# Patient Record
Sex: MALE | Race: WHITE | NOT HISPANIC OR LATINO | Employment: OTHER | URBAN - METROPOLITAN AREA
[De-identification: names, ages, dates, MRNs, and addresses within clinical notes are randomized per-mention and may not be internally consistent; named-entity substitution may affect disease eponyms.]

---

## 2017-07-29 ENCOUNTER — HOSPITAL ENCOUNTER (EMERGENCY)
Facility: HOSPITAL | Age: 67
Discharge: HOME | End: 2017-07-29
Admitting: EMERGENCY MEDICINE
Payer: MEDICARE

## 2017-07-29 VITALS
SYSTOLIC BLOOD PRESSURE: 168 MMHG | DIASTOLIC BLOOD PRESSURE: 78 MMHG | BODY MASS INDEX: 32.89 KG/M2 | HEIGHT: 68 IN | RESPIRATION RATE: 16 BRPM | TEMPERATURE: 98 F | WEIGHT: 217 LBS | OXYGEN SATURATION: 100 % | HEART RATE: 87 BPM

## 2017-07-29 DIAGNOSIS — G89.29 CHRONIC LEFT HIP PAIN: Primary | ICD-10-CM

## 2017-07-29 DIAGNOSIS — M25.552 CHRONIC LEFT HIP PAIN: Primary | ICD-10-CM

## 2017-07-29 PROCEDURE — 99283 EMERGENCY DEPT VISIT LOW MDM: CPT

## 2017-07-29 RX ORDER — HYDROCODONE BITARTRATE AND ACETAMINOPHEN 5; 325 MG/1; MG/1
1 TABLET ORAL EVERY 6 HOURS PRN
Qty: 20 TABLET | Refills: 0 | Status: SHIPPED | OUTPATIENT
Start: 2017-07-29 | End: 2021-07-22 | Stop reason: CLARIF

## 2017-07-29 RX ORDER — HYDROCODONE BITARTRATE AND ACETAMINOPHEN 5; 325 MG/1; MG/1
1 TABLET ORAL ONCE
Status: COMPLETED | OUTPATIENT
Start: 2017-07-29 | End: 2017-07-29

## 2017-07-29 RX ADMIN — HYDROCODONE BITARTRATE AND ACETAMINOPHEN 1 TABLET: 5; 325 TABLET ORAL at 15:33

## 2017-08-15 ENCOUNTER — APPOINTMENT (EMERGENCY)
Dept: CT IMAGING | Facility: HOSPITAL | Age: 67
End: 2017-08-15
Payer: MEDICARE

## 2017-08-15 ENCOUNTER — HOSPITAL ENCOUNTER (EMERGENCY)
Facility: HOSPITAL | Age: 67
Discharge: HOME | End: 2017-08-15
Attending: EMERGENCY MEDICINE
Payer: MEDICARE

## 2017-08-15 VITALS
RESPIRATION RATE: 16 BRPM | SYSTOLIC BLOOD PRESSURE: 142 MMHG | OXYGEN SATURATION: 97 % | HEART RATE: 76 BPM | BODY MASS INDEX: 33.32 KG/M2 | WEIGHT: 212.3 LBS | DIASTOLIC BLOOD PRESSURE: 72 MMHG | TEMPERATURE: 98 F | HEIGHT: 67 IN

## 2017-08-15 DIAGNOSIS — M25.552 LEFT HIP PAIN: Primary | ICD-10-CM

## 2017-08-15 PROCEDURE — 73700 CT LOWER EXTREMITY W/O DYE: CPT

## 2017-08-15 PROCEDURE — 99283 EMERGENCY DEPT VISIT LOW MDM: CPT

## 2017-08-15 RX ORDER — CYCLOBENZAPRINE HCL 10 MG
10 TABLET ORAL 2 TIMES DAILY PRN
Qty: 20 TABLET | Refills: 0 | Status: SHIPPED | OUTPATIENT
Start: 2017-08-15 | End: 2017-09-05

## 2017-08-17 ENCOUNTER — ALLSCRIPTS OFFICE VISIT (OUTPATIENT)
Dept: OTHER | Facility: OTHER | Age: 67
End: 2017-08-17

## 2017-08-17 DIAGNOSIS — M54.16 RADICULOPATHY OF LUMBAR REGION: ICD-10-CM

## 2017-09-05 ENCOUNTER — APPOINTMENT (EMERGENCY)
Dept: RADIOLOGY | Facility: HOSPITAL | Age: 67
DRG: 312 | End: 2017-09-05
Payer: MEDICARE

## 2017-09-05 ENCOUNTER — APPOINTMENT (OUTPATIENT)
Dept: RADIOLOGY | Facility: HOSPITAL | Age: 67
DRG: 312 | End: 2017-09-05
Payer: MEDICARE

## 2017-09-05 ENCOUNTER — APPOINTMENT (OUTPATIENT)
Dept: MRI IMAGING | Facility: HOSPITAL | Age: 67
DRG: 312 | End: 2017-09-05
Payer: MEDICARE

## 2017-09-05 ENCOUNTER — APPOINTMENT (OUTPATIENT)
Dept: NON INVASIVE DIAGNOSTICS | Facility: HOSPITAL | Age: 67
DRG: 312 | End: 2017-09-05
Payer: MEDICARE

## 2017-09-05 ENCOUNTER — HOSPITAL ENCOUNTER (INPATIENT)
Facility: HOSPITAL | Age: 67
LOS: 1 days | Discharge: HOME | DRG: 312 | End: 2017-09-07
Attending: EMERGENCY MEDICINE | Admitting: FAMILY MEDICINE
Payer: MEDICARE

## 2017-09-05 ENCOUNTER — APPOINTMENT (OUTPATIENT)
Dept: ULTRASOUND IMAGING | Facility: HOSPITAL | Age: 67
DRG: 312 | End: 2017-09-05
Payer: MEDICARE

## 2017-09-05 ENCOUNTER — APPOINTMENT (EMERGENCY)
Dept: CT IMAGING | Facility: HOSPITAL | Age: 67
DRG: 312 | End: 2017-09-05
Payer: MEDICARE

## 2017-09-05 DIAGNOSIS — R20.0 RIGHT ARM NUMBNESS: ICD-10-CM

## 2017-09-05 DIAGNOSIS — R42 DIZZINESS: Primary | ICD-10-CM

## 2017-09-05 DIAGNOSIS — T40.2X5A: ICD-10-CM

## 2017-09-05 DIAGNOSIS — E11.9 DIABETES (HCC): ICD-10-CM

## 2017-09-05 PROBLEM — Z72.0 TOBACCO ABUSE: Chronic | Status: ACTIVE | Noted: 2017-09-05

## 2017-09-05 PROBLEM — R20.2 NUMBNESS AND TINGLING OF RIGHT ARM: Status: ACTIVE | Noted: 2017-09-05

## 2017-09-05 LAB
ALBUMIN SERPL BCP-MCNC: 3.6 G/DL (ref 3.5–5)
ALP SERPL-CCNC: 55 U/L (ref 46–116)
ALT SERPL W P-5'-P-CCNC: 40 U/L (ref 12–78)
ANION GAP SERPL CALCULATED.3IONS-SCNC: 15 MMOL/L (ref 4–13)
AST SERPL W P-5'-P-CCNC: 18 U/L (ref 5–45)
BASOPHILS # BLD AUTO: 0.07 THOUSANDS/ΜL (ref 0–0.1)
BASOPHILS NFR BLD AUTO: 1 % (ref 0–1)
BILIRUB SERPL-MCNC: 0.4 MG/DL (ref 0.2–1)
BUN SERPL-MCNC: 22 MG/DL (ref 5–25)
CALCIUM SERPL-MCNC: 9.4 MG/DL (ref 8.3–10.1)
CHLORIDE SERPL-SCNC: 99 MMOL/L (ref 100–108)
CO2 SERPL-SCNC: 20 MMOL/L (ref 21–32)
CREAT SERPL-MCNC: 1.4 MG/DL (ref 0.6–1.3)
EOSINOPHIL # BLD AUTO: 0.33 THOUSAND/ΜL (ref 0–0.61)
EOSINOPHIL NFR BLD AUTO: 3 % (ref 0–6)
ERYTHROCYTE [DISTWIDTH] IN BLOOD BY AUTOMATED COUNT: 13 % (ref 11.6–15.1)
GFR SERPL CREATININE-BSD FRML MDRD: 52 ML/MIN/1.73SQ M
GLUCOSE SERPL-MCNC: 112 MG/DL (ref 65–140)
GLUCOSE SERPL-MCNC: 128 MG/DL (ref 65–140)
GLUCOSE SERPL-MCNC: 73 MG/DL (ref 65–140)
HCT VFR BLD AUTO: 38.4 % (ref 36.5–49.3)
HGB BLD-MCNC: 13.2 G/DL (ref 12–17)
LYMPHOCYTES # BLD AUTO: 3.21 THOUSANDS/ΜL (ref 0.6–4.47)
LYMPHOCYTES NFR BLD AUTO: 29 % (ref 14–44)
MCH RBC QN AUTO: 28.4 PG (ref 26.8–34.3)
MCHC RBC AUTO-ENTMCNC: 34.4 G/DL (ref 31.4–37.4)
MCV RBC AUTO: 83 FL (ref 82–98)
MONOCYTES # BLD AUTO: 0.78 THOUSAND/ΜL (ref 0.17–1.22)
MONOCYTES NFR BLD AUTO: 7 % (ref 4–12)
NEUTROPHILS # BLD AUTO: 6.48 THOUSANDS/ΜL (ref 1.85–7.62)
NEUTS SEG NFR BLD AUTO: 59 % (ref 43–75)
NRBC BLD AUTO-RTO: 0 /100 WBCS
PLATELET # BLD AUTO: 319 THOUSANDS/UL (ref 149–390)
PMV BLD AUTO: 10 FL (ref 8.9–12.7)
POTASSIUM SERPL-SCNC: 4.9 MMOL/L (ref 3.5–5.3)
PROT SERPL-MCNC: 7.3 G/DL (ref 6.4–8.2)
RBC # BLD AUTO: 4.64 MILLION/UL (ref 3.88–5.62)
SODIUM SERPL-SCNC: 134 MMOL/L (ref 136–145)
TROPONIN I SERPL-MCNC: <0.02 NG/ML
WBC # BLD AUTO: 10.97 THOUSAND/UL (ref 4.31–10.16)

## 2017-09-05 PROCEDURE — 99285 EMERGENCY DEPT VISIT HI MDM: CPT

## 2017-09-05 PROCEDURE — 93306 TTE W/DOPPLER COMPLETE: CPT

## 2017-09-05 PROCEDURE — 84484 ASSAY OF TROPONIN QUANT: CPT | Performed by: EMERGENCY MEDICINE

## 2017-09-05 PROCEDURE — 72050 X-RAY EXAM NECK SPINE 4/5VWS: CPT

## 2017-09-05 PROCEDURE — 70450 CT HEAD/BRAIN W/O DYE: CPT

## 2017-09-05 PROCEDURE — 93005 ELECTROCARDIOGRAM TRACING: CPT | Performed by: EMERGENCY MEDICINE

## 2017-09-05 PROCEDURE — 70551 MRI BRAIN STEM W/O DYE: CPT

## 2017-09-05 PROCEDURE — 36415 COLL VENOUS BLD VENIPUNCTURE: CPT

## 2017-09-05 PROCEDURE — 85025 COMPLETE CBC W/AUTO DIFF WBC: CPT | Performed by: EMERGENCY MEDICINE

## 2017-09-05 PROCEDURE — 82948 REAGENT STRIP/BLOOD GLUCOSE: CPT

## 2017-09-05 PROCEDURE — 71020 HB CHEST X-RAY 2VW FRONTAL&LATL: CPT

## 2017-09-05 PROCEDURE — 80053 COMPREHEN METABOLIC PANEL: CPT | Performed by: EMERGENCY MEDICINE

## 2017-09-05 RX ORDER — NICOTINE 21 MG/24HR
1 PATCH, TRANSDERMAL 24 HOURS TRANSDERMAL DAILY
Status: DISCONTINUED | OUTPATIENT
Start: 2017-09-05 | End: 2017-09-07 | Stop reason: HOSPADM

## 2017-09-05 RX ORDER — ASPIRIN 81 MG/1
324 TABLET, CHEWABLE ORAL DAILY
Status: DISCONTINUED | OUTPATIENT
Start: 2017-09-06 | End: 2017-09-07 | Stop reason: HOSPADM

## 2017-09-05 RX ORDER — HYDROCODONE BITARTRATE AND ACETAMINOPHEN 5; 325 MG/1; MG/1
1 TABLET ORAL EVERY 6 HOURS PRN
Status: DISCONTINUED | OUTPATIENT
Start: 2017-09-05 | End: 2017-09-07 | Stop reason: HOSPADM

## 2017-09-05 RX ORDER — ASPIRIN 81 MG/1
81 TABLET, CHEWABLE ORAL DAILY
Status: DISCONTINUED | OUTPATIENT
Start: 2017-09-06 | End: 2017-09-05

## 2017-09-05 RX ORDER — ATORVASTATIN CALCIUM 40 MG/1
40 TABLET, FILM COATED ORAL EVERY EVENING
Status: DISCONTINUED | OUTPATIENT
Start: 2017-09-05 | End: 2017-09-07 | Stop reason: HOSPADM

## 2017-09-05 RX ORDER — LISINOPRIL 20 MG/1
20 TABLET ORAL DAILY
Status: DISCONTINUED | OUTPATIENT
Start: 2017-09-06 | End: 2017-09-07 | Stop reason: HOSPADM

## 2017-09-05 RX ORDER — ACETAMINOPHEN 325 MG/1
650 TABLET ORAL EVERY 6 HOURS PRN
Status: DISCONTINUED | OUTPATIENT
Start: 2017-09-05 | End: 2017-09-07 | Stop reason: HOSPADM

## 2017-09-05 RX ADMIN — SODIUM CHLORIDE 1000 ML: 0.9 INJECTION, SOLUTION INTRAVENOUS at 12:18

## 2017-09-05 RX ADMIN — ATORVASTATIN CALCIUM 40 MG: 40 TABLET, FILM COATED ORAL at 18:31

## 2017-09-06 ENCOUNTER — APPOINTMENT (OUTPATIENT)
Dept: ULTRASOUND IMAGING | Facility: HOSPITAL | Age: 67
DRG: 312 | End: 2017-09-06
Payer: MEDICARE

## 2017-09-06 PROBLEM — R42 ORTHOSTATIC LIGHTHEADEDNESS: Status: RESOLVED | Noted: 2017-09-05 | Resolved: 2017-09-06

## 2017-09-06 PROBLEM — R20.2 NUMBNESS AND TINGLING OF RIGHT ARM: Status: RESOLVED | Noted: 2017-09-05 | Resolved: 2017-09-06

## 2017-09-06 PROBLEM — R20.0 NUMBNESS AND TINGLING OF RIGHT ARM: Status: RESOLVED | Noted: 2017-09-05 | Resolved: 2017-09-06

## 2017-09-06 LAB
ANION GAP SERPL CALCULATED.3IONS-SCNC: 10 MMOL/L (ref 4–13)
ATRIAL RATE: 157 BPM
BACTERIA UR QL AUTO: ABNORMAL /HPF
BILIRUB UR QL STRIP: NEGATIVE
BUN SERPL-MCNC: 18 MG/DL (ref 5–25)
CALCIUM SERPL-MCNC: 8.4 MG/DL (ref 8.3–10.1)
CHLORIDE SERPL-SCNC: 103 MMOL/L (ref 100–108)
CHOLEST SERPL-MCNC: 124 MG/DL (ref 50–200)
CLARITY UR: CLEAR
CO2 SERPL-SCNC: 24 MMOL/L (ref 21–32)
COLOR UR: YELLOW
CREAT SERPL-MCNC: 1.03 MG/DL (ref 0.6–1.3)
ERYTHROCYTE [DISTWIDTH] IN BLOOD BY AUTOMATED COUNT: 12.8 % (ref 11.6–15.1)
EST. AVERAGE GLUCOSE BLD GHB EST-MCNC: 123 MG/DL
GFR SERPL CREATININE-BSD FRML MDRD: 75 ML/MIN/1.73SQ M
GLUCOSE SERPL-MCNC: 122 MG/DL (ref 65–140)
GLUCOSE SERPL-MCNC: 130 MG/DL (ref 65–140)
GLUCOSE SERPL-MCNC: 135 MG/DL (ref 65–140)
GLUCOSE SERPL-MCNC: 168 MG/DL (ref 65–140)
GLUCOSE SERPL-MCNC: 84 MG/DL (ref 65–140)
GLUCOSE UR STRIP-MCNC: NEGATIVE MG/DL
HBA1C MFR BLD: 5.9 % (ref 4.2–6.3)
HCT VFR BLD AUTO: 35.8 % (ref 36.5–49.3)
HDLC SERPL-MCNC: 32 MG/DL (ref 40–60)
HGB BLD-MCNC: 12.3 G/DL (ref 12–17)
HGB UR QL STRIP.AUTO: NEGATIVE
KETONES UR STRIP-MCNC: NEGATIVE MG/DL
LDLC SERPL CALC-MCNC: 55 MG/DL (ref 0–100)
LEUKOCYTE ESTERASE UR QL STRIP: NEGATIVE
MCH RBC QN AUTO: 28.4 PG (ref 26.8–34.3)
MCHC RBC AUTO-ENTMCNC: 34.4 G/DL (ref 31.4–37.4)
MCV RBC AUTO: 83 FL (ref 82–98)
NITRITE UR QL STRIP: NEGATIVE
NON-SQ EPI CELLS URNS QL MICRO: ABNORMAL /HPF
P AXIS: 67 DEGREES
PH UR STRIP.AUTO: 5.5 [PH] (ref 4.5–8)
PLATELET # BLD AUTO: 280 THOUSANDS/UL (ref 149–390)
PMV BLD AUTO: 9.7 FL (ref 8.9–12.7)
POTASSIUM SERPL-SCNC: 4.4 MMOL/L (ref 3.5–5.3)
PROT UR STRIP-MCNC: NEGATIVE MG/DL
QRS AXIS: 84 DEGREES
QRSD INTERVAL: 72 MS
QT INTERVAL: 322 MS
QTC INTERVAL: 520 MS
RBC # BLD AUTO: 4.33 MILLION/UL (ref 3.88–5.62)
RBC #/AREA URNS AUTO: ABNORMAL /HPF
SODIUM SERPL-SCNC: 137 MMOL/L (ref 136–145)
SP GR UR STRIP.AUTO: <=1.005 (ref 1–1.03)
T WAVE AXIS: 48 DEGREES
TRIGL SERPL-MCNC: 183 MG/DL
UROBILINOGEN UR QL STRIP.AUTO: 0.2 E.U./DL
VENTRICULAR RATE: 157 BPM
WBC # BLD AUTO: 9.95 THOUSAND/UL (ref 4.31–10.16)
WBC #/AREA URNS AUTO: ABNORMAL /HPF

## 2017-09-06 PROCEDURE — 81001 URINALYSIS AUTO W/SCOPE: CPT | Performed by: PHYSICIAN ASSISTANT

## 2017-09-06 PROCEDURE — 93880 EXTRACRANIAL BILAT STUDY: CPT

## 2017-09-06 PROCEDURE — 97163 PT EVAL HIGH COMPLEX 45 MIN: CPT

## 2017-09-06 PROCEDURE — G8979 MOBILITY GOAL STATUS: HCPCS

## 2017-09-06 PROCEDURE — 82948 REAGENT STRIP/BLOOD GLUCOSE: CPT

## 2017-09-06 PROCEDURE — G8978 MOBILITY CURRENT STATUS: HCPCS

## 2017-09-06 PROCEDURE — 80061 LIPID PANEL: CPT | Performed by: PHYSICIAN ASSISTANT

## 2017-09-06 PROCEDURE — 80048 BASIC METABOLIC PNL TOTAL CA: CPT | Performed by: PHYSICIAN ASSISTANT

## 2017-09-06 PROCEDURE — 97165 OT EVAL LOW COMPLEX 30 MIN: CPT

## 2017-09-06 PROCEDURE — 83036 HEMOGLOBIN GLYCOSYLATED A1C: CPT | Performed by: PHYSICIAN ASSISTANT

## 2017-09-06 PROCEDURE — 85027 COMPLETE CBC AUTOMATED: CPT | Performed by: PHYSICIAN ASSISTANT

## 2017-09-06 RX ADMIN — HYDROCODONE BITARTRATE AND ACETAMINOPHEN 1 TABLET: 5; 325 TABLET ORAL at 03:03

## 2017-09-06 RX ADMIN — INSULIN LISPRO 1 UNITS: 100 INJECTION, SOLUTION INTRAVENOUS; SUBCUTANEOUS at 13:36

## 2017-09-06 RX ADMIN — LISINOPRIL 20 MG: 20 TABLET ORAL at 10:30

## 2017-09-06 RX ADMIN — ACETAMINOPHEN 650 MG: 325 TABLET ORAL at 07:47

## 2017-09-06 RX ADMIN — ENOXAPARIN SODIUM 40 MG: 40 INJECTION SUBCUTANEOUS at 10:31

## 2017-09-06 RX ADMIN — ATORVASTATIN CALCIUM 40 MG: 40 TABLET, FILM COATED ORAL at 17:46

## 2017-09-06 RX ADMIN — HYDROCODONE BITARTRATE AND ACETAMINOPHEN 1 TABLET: 5; 325 TABLET ORAL at 22:03

## 2017-09-06 RX ADMIN — ASPIRIN 81 MG 324 MG: 81 TABLET ORAL at 10:30

## 2017-09-07 VITALS
OXYGEN SATURATION: 98 % | WEIGHT: 190.92 LBS | DIASTOLIC BLOOD PRESSURE: 69 MMHG | TEMPERATURE: 97.4 F | HEART RATE: 64 BPM | SYSTOLIC BLOOD PRESSURE: 153 MMHG | RESPIRATION RATE: 19 BRPM | BODY MASS INDEX: 29.97 KG/M2 | HEIGHT: 67 IN

## 2017-09-07 LAB — GLUCOSE SERPL-MCNC: 122 MG/DL (ref 65–140)

## 2017-09-07 PROCEDURE — 82948 REAGENT STRIP/BLOOD GLUCOSE: CPT

## 2017-09-07 RX ORDER — ASPIRIN 81 MG/1
324 TABLET, CHEWABLE ORAL DAILY
Refills: 0
Start: 2017-09-07

## 2017-09-07 RX ORDER — NICOTINE 21 MG/24HR
1 PATCH, TRANSDERMAL 24 HOURS TRANSDERMAL DAILY
Qty: 28 PATCH | Refills: 0 | Status: SHIPPED | OUTPATIENT
Start: 2017-09-07 | End: 2021-07-22 | Stop reason: CLARIF

## 2017-09-07 RX ADMIN — LISINOPRIL 20 MG: 20 TABLET ORAL at 09:21

## 2017-09-07 RX ADMIN — HYDROCODONE BITARTRATE AND ACETAMINOPHEN 1 TABLET: 5; 325 TABLET ORAL at 04:48

## 2017-09-07 RX ADMIN — ASPIRIN 81 MG 324 MG: 81 TABLET ORAL at 09:21

## 2017-10-23 NOTE — PROGRESS NOTES
Assessment  1  Left lumbar radiculopathy (724 4) (M54 16)    Plan  Left lumbar radiculopathy    · MethylPREDNISolone 4 MG Oral Tablet Therapy Pack; Take as directed in a  tapering dose   · *1 - [facility]  *  Status: Active  Requested for: [id number]  Care Summary provided  : Yes   · *1 - [facility]  *  Status: Active  Requested for:  [id number]  Care Summary provided  : Yes    Discussion/Summary    Lumbar radiculopathy#1 referral to physical therapy#2 referral to pain management#3 Medrol Dosepak prescribed#4 follow-up on a when necessary basis  History of Present Illness  HPI: Patient comes in today with regards to his left hip  He reports his been on pain since Winter 3  No specific injury  He reports the pain is zero or one out of 10-10 out of 10 walking makes the pain worse  Lying on the right side causes hip pain lying on the back causes hip and back pain  Lying on the left side goes down his leg  Hydrocodone and cyclobenzaprine have been the medications that were prescribed by the ER  No previous injuries  Occasional twinge in the hip in the past  Past medical history does have a history of some back pain  He also has a history of diabetes views systems are otherwise unremarkable he does smoke a pack a day  He is not drink alcohol  Review of Systems    Constitutional: No fever or chills, feels well, no tiredness, no recent weight loss or weight gain  Eyes: No complaints of red eyes, no eyesight problems  ENT: no complaints of loss of hearing, no nosebleeds, no sore throat  Cardiovascular: No complaints of chest pain, no palpitations, no leg claudication or lower extremity edema  Respiratory: No complaints of shortness of breath, no wheezing, no cough  Gastrointestinal: No complaints of abdominal pain, no constipation, no nausea or vomiting, no diarrhea or bloody stools     Genitourinary: No complaints of dysuria or incontinence, no hesitancy, no nocturia  Musculoskeletal: as noted in HPI  Integumentary: No complaints of skin rash or lesion, no itching or dry skin, no skin wounds  Neurological: No complaints of headache, no confusion, no numbness or tingling, no dizziness  Psychiatric: No suicidal thoughts, no anxiety, no depression  Endocrine: No muscle weakness, no frequent urination, no excessive thirst, no feelings of weakness  Past Medical History    The active problems and past medical history were reviewed and updated today  Surgical History   · History of Tonsillectomy    The surgical history was reviewed and updated today  Family History    The family history was reviewed and updated today  Social History   · Current every day smoker (305 1) (F17 200)  The social history was reviewed and updated today  Current Meds    The medication list was reviewed and updated today  Allergies  1  No Known Drug Allergies    Physical Exam  No audible wheeze no shortness of breath no appreciable arrhythmia  Examination of the patient's lower extremity shows sensation decreased in the L3-L4 and L5 distribution  There is also weakness in the L3 L4-L5 distribution  Weakness of the hip flexor knee extensor and dorsiflexion of the foot  Straight leg raise is positive at 45? he is pinpoint tender over the SI joint lumbosacral junction as well    Nae's test is negative internal rotation does not cause any pain hip grind test is also negative     Constitutional - General appearance: Normal    Musculoskeletal - Digits and nails: Normal --f2Pewhbfnybv/palpation of joints, bones, and muscles: Normal --l5Dxektz strength/tone: Normal    Cardiovascular - Pulses: Normal    Skin - Skin and subcutaneous tissue: Normal    Neurologic - Reflexes: Normal --[id number]: Normal    Psychiatric - Orientation to person, place, and time: Normal --b0Mood and affect: Normal    Eyes   Conjunctiva and lids: Normal     Pupils and irises: Normal  Results/Data  I personally reviewed the films/images/results in the office today  My interpretation follows  CT Scan Review CT scan of left hip shows minimal arthritis especially ior aspect  MRI Review MRI of lumbar spine looks to have degenerative changes throughout the lumbar spine is also a disc bulge at L3        Signatures   Electronically signed by : Lennie Victoria DO; Aug 17 2017  2:22PM EST                       (Author)

## 2017-11-20 ENCOUNTER — APPOINTMENT (OUTPATIENT)
Dept: LAB | Facility: HOSPITAL | Age: 67
End: 2017-11-20
Payer: MEDICARE

## 2017-11-20 ENCOUNTER — TRANSCRIBE ORDERS (OUTPATIENT)
Dept: ADMINISTRATIVE | Facility: HOSPITAL | Age: 67
End: 2017-11-20

## 2017-11-20 DIAGNOSIS — E78.5 HYPERLIPIDEMIA, UNSPECIFIED HYPERLIPIDEMIA TYPE: Primary | ICD-10-CM

## 2017-11-20 DIAGNOSIS — E13.9 DIABETES MELLITUS OF OTHER TYPE WITHOUT COMPLICATION, UNSPECIFIED LONG TERM INSULIN USE STATUS: ICD-10-CM

## 2017-11-20 DIAGNOSIS — E78.5 HYPERLIPIDEMIA, UNSPECIFIED HYPERLIPIDEMIA TYPE: ICD-10-CM

## 2017-11-20 LAB
ALBUMIN SERPL BCP-MCNC: 3.9 G/DL (ref 3.5–5)
ALP SERPL-CCNC: 71 U/L (ref 46–116)
ALT SERPL W P-5'-P-CCNC: 30 U/L (ref 12–78)
ANION GAP SERPL CALCULATED.3IONS-SCNC: 10 MMOL/L (ref 4–13)
AST SERPL W P-5'-P-CCNC: 12 U/L (ref 5–45)
BILIRUB DIRECT SERPL-MCNC: 0.16 MG/DL (ref 0–0.2)
BILIRUB SERPL-MCNC: 0.5 MG/DL (ref 0.2–1)
BUN SERPL-MCNC: 16 MG/DL (ref 5–25)
CALCIUM SERPL-MCNC: 9.6 MG/DL (ref 8.3–10.1)
CHLORIDE SERPL-SCNC: 102 MMOL/L (ref 100–108)
CHOLEST SERPL-MCNC: 185 MG/DL (ref 50–200)
CO2 SERPL-SCNC: 27 MMOL/L (ref 21–32)
CREAT SERPL-MCNC: 0.96 MG/DL (ref 0.6–1.3)
CREAT UR-MCNC: 247 MG/DL
EST. AVERAGE GLUCOSE BLD GHB EST-MCNC: 214 MG/DL
GFR SERPL CREATININE-BSD FRML MDRD: 82 ML/MIN/1.73SQ M
GLUCOSE P FAST SERPL-MCNC: 156 MG/DL (ref 65–99)
HBA1C MFR BLD: 9.1 % (ref 4.2–6.3)
HDLC SERPL-MCNC: 38 MG/DL (ref 40–60)
LDLC SERPL CALC-MCNC: 120 MG/DL (ref 0–100)
MICROALBUMIN UR-MCNC: 30.3 MG/L (ref 0–20)
MICROALBUMIN/CREAT 24H UR: 12 MG/G CREATININE (ref 0–30)
POTASSIUM SERPL-SCNC: 4.3 MMOL/L (ref 3.5–5.3)
PROT SERPL-MCNC: 7.8 G/DL (ref 6.4–8.2)
SODIUM SERPL-SCNC: 139 MMOL/L (ref 136–145)
TRIGL SERPL-MCNC: 135 MG/DL

## 2017-11-20 PROCEDURE — 82043 UR ALBUMIN QUANTITATIVE: CPT | Performed by: INTERNAL MEDICINE

## 2017-11-20 PROCEDURE — 83036 HEMOGLOBIN GLYCOSYLATED A1C: CPT

## 2017-11-20 PROCEDURE — 80048 BASIC METABOLIC PNL TOTAL CA: CPT

## 2017-11-20 PROCEDURE — 82570 ASSAY OF URINE CREATININE: CPT | Performed by: INTERNAL MEDICINE

## 2017-11-20 PROCEDURE — 36415 COLL VENOUS BLD VENIPUNCTURE: CPT

## 2017-11-20 PROCEDURE — 80061 LIPID PANEL: CPT

## 2017-11-20 PROCEDURE — 80076 HEPATIC FUNCTION PANEL: CPT

## 2018-04-25 ENCOUNTER — APPOINTMENT (EMERGENCY)
Dept: RADIOLOGY | Facility: HOSPITAL | Age: 68
End: 2018-04-25
Payer: MEDICARE

## 2018-04-25 ENCOUNTER — HOSPITAL ENCOUNTER (EMERGENCY)
Facility: HOSPITAL | Age: 68
Discharge: HOME | End: 2018-04-25
Attending: EMERGENCY MEDICINE | Admitting: EMERGENCY MEDICINE
Payer: MEDICARE

## 2018-04-25 VITALS
HEART RATE: 69 BPM | BODY MASS INDEX: 29.35 KG/M2 | SYSTOLIC BLOOD PRESSURE: 137 MMHG | HEIGHT: 67 IN | DIASTOLIC BLOOD PRESSURE: 71 MMHG | RESPIRATION RATE: 17 BRPM | WEIGHT: 187 LBS | TEMPERATURE: 98.7 F | OXYGEN SATURATION: 100 %

## 2018-04-25 DIAGNOSIS — R00.2 PALPITATIONS: Primary | ICD-10-CM

## 2018-04-25 LAB
ALBUMIN SERPL BCP-MCNC: 3.6 G/DL (ref 3.5–5)
ALP SERPL-CCNC: 75 U/L (ref 46–116)
ALT SERPL W P-5'-P-CCNC: 27 U/L (ref 12–78)
ANION GAP SERPL CALCULATED.3IONS-SCNC: 5 MMOL/L (ref 4–13)
APTT PPP: 37 SECONDS (ref 23–35)
AST SERPL W P-5'-P-CCNC: 16 U/L (ref 5–45)
ATRIAL RATE: 72 BPM
BACTERIA UR QL AUTO: NORMAL /HPF
BASOPHILS # BLD AUTO: 0.07 THOUSANDS/ΜL (ref 0–0.1)
BASOPHILS NFR BLD AUTO: 1 % (ref 0–1)
BILIRUB SERPL-MCNC: 0.4 MG/DL (ref 0.2–1)
BILIRUB UR QL STRIP: NEGATIVE
BUN SERPL-MCNC: 19 MG/DL (ref 5–25)
CALCIUM SERPL-MCNC: 8.5 MG/DL (ref 8.3–10.1)
CHLORIDE SERPL-SCNC: 103 MMOL/L (ref 100–108)
CLARITY UR: CLEAR
CO2 SERPL-SCNC: 28 MMOL/L (ref 21–32)
COLOR UR: YELLOW
CREAT SERPL-MCNC: 1 MG/DL (ref 0.6–1.3)
EOSINOPHIL # BLD AUTO: 0.29 THOUSAND/ΜL (ref 0–0.61)
EOSINOPHIL NFR BLD AUTO: 4 % (ref 0–6)
ERYTHROCYTE [DISTWIDTH] IN BLOOD BY AUTOMATED COUNT: 11.9 % (ref 11.6–15.1)
GFR SERPL CREATININE-BSD FRML MDRD: 78 ML/MIN/1.73SQ M
GLUCOSE SERPL-MCNC: 326 MG/DL (ref 65–140)
GLUCOSE UR STRIP-MCNC: ABNORMAL MG/DL
HCT VFR BLD AUTO: 42.7 % (ref 36.5–49.3)
HGB BLD-MCNC: 14.4 G/DL (ref 12–17)
HGB UR QL STRIP.AUTO: NEGATIVE
INR PPP: 1.06 (ref 0.86–1.16)
KETONES UR STRIP-MCNC: NEGATIVE MG/DL
LEUKOCYTE ESTERASE UR QL STRIP: ABNORMAL
LYMPHOCYTES # BLD AUTO: 2.28 THOUSANDS/ΜL (ref 0.6–4.47)
LYMPHOCYTES NFR BLD AUTO: 34 % (ref 14–44)
MAGNESIUM SERPL-MCNC: 1.6 MG/DL (ref 1.6–2.6)
MCH RBC QN AUTO: 29 PG (ref 26.8–34.3)
MCHC RBC AUTO-ENTMCNC: 33.7 G/DL (ref 31.4–37.4)
MCV RBC AUTO: 86 FL (ref 82–98)
MONOCYTES # BLD AUTO: 0.57 THOUSAND/ΜL (ref 0.17–1.22)
MONOCYTES NFR BLD AUTO: 8 % (ref 4–12)
NEUTROPHILS # BLD AUTO: 3.56 THOUSANDS/ΜL (ref 1.85–7.62)
NEUTS SEG NFR BLD AUTO: 52 % (ref 43–75)
NITRITE UR QL STRIP: NEGATIVE
NON-SQ EPI CELLS URNS QL MICRO: NORMAL /HPF
NRBC BLD AUTO-RTO: 0 /100 WBCS
P AXIS: 69 DEGREES
PH UR STRIP.AUTO: 5.5 [PH] (ref 4.5–8)
PLATELET # BLD AUTO: 216 THOUSANDS/UL (ref 149–390)
PMV BLD AUTO: 10.4 FL (ref 8.9–12.7)
POTASSIUM SERPL-SCNC: 4.5 MMOL/L (ref 3.5–5.3)
PR INTERVAL: 192 MS
PROT SERPL-MCNC: 7 G/DL (ref 6.4–8.2)
PROT UR STRIP-MCNC: NEGATIVE MG/DL
PROTHROMBIN TIME: 14 SECONDS (ref 12.1–14.4)
QRS AXIS: 95 DEGREES
QRSD INTERVAL: 72 MS
QT INTERVAL: 386 MS
QTC INTERVAL: 422 MS
RBC # BLD AUTO: 4.97 MILLION/UL (ref 3.88–5.62)
RBC #/AREA URNS AUTO: NORMAL /HPF
SODIUM SERPL-SCNC: 136 MMOL/L (ref 136–145)
SP GR UR STRIP.AUTO: 1.02 (ref 1–1.03)
T WAVE AXIS: 76 DEGREES
TROPONIN I SERPL-MCNC: <0.02 NG/ML
TSH SERPL DL<=0.05 MIU/L-ACNC: 2.77 UIU/ML (ref 0.36–3.74)
UROBILINOGEN UR QL STRIP.AUTO: 0.2 E.U./DL
VENTRICULAR RATE: 72 BPM
WBC # BLD AUTO: 6.8 THOUSAND/UL (ref 4.31–10.16)
WBC #/AREA URNS AUTO: NORMAL /HPF

## 2018-04-25 PROCEDURE — 99285 EMERGENCY DEPT VISIT HI MDM: CPT

## 2018-04-25 PROCEDURE — 36415 COLL VENOUS BLD VENIPUNCTURE: CPT | Performed by: EMERGENCY MEDICINE

## 2018-04-25 PROCEDURE — 84443 ASSAY THYROID STIM HORMONE: CPT | Performed by: EMERGENCY MEDICINE

## 2018-04-25 PROCEDURE — 85610 PROTHROMBIN TIME: CPT | Performed by: EMERGENCY MEDICINE

## 2018-04-25 PROCEDURE — 85025 COMPLETE CBC W/AUTO DIFF WBC: CPT | Performed by: EMERGENCY MEDICINE

## 2018-04-25 PROCEDURE — 81001 URINALYSIS AUTO W/SCOPE: CPT | Performed by: EMERGENCY MEDICINE

## 2018-04-25 PROCEDURE — 93010 ELECTROCARDIOGRAM REPORT: CPT | Performed by: INTERNAL MEDICINE

## 2018-04-25 PROCEDURE — 71046 X-RAY EXAM CHEST 2 VIEWS: CPT

## 2018-04-25 PROCEDURE — 96360 HYDRATION IV INFUSION INIT: CPT

## 2018-04-25 PROCEDURE — 84484 ASSAY OF TROPONIN QUANT: CPT | Performed by: EMERGENCY MEDICINE

## 2018-04-25 PROCEDURE — 83735 ASSAY OF MAGNESIUM: CPT | Performed by: EMERGENCY MEDICINE

## 2018-04-25 PROCEDURE — 93005 ELECTROCARDIOGRAM TRACING: CPT

## 2018-04-25 PROCEDURE — 85730 THROMBOPLASTIN TIME PARTIAL: CPT | Performed by: EMERGENCY MEDICINE

## 2018-04-25 PROCEDURE — 96361 HYDRATE IV INFUSION ADD-ON: CPT

## 2018-04-25 PROCEDURE — 80053 COMPREHEN METABOLIC PANEL: CPT | Performed by: EMERGENCY MEDICINE

## 2018-04-25 RX ADMIN — SODIUM CHLORIDE 1000 ML: 0.9 INJECTION, SOLUTION INTRAVENOUS at 09:17

## 2018-04-25 NOTE — ED PROVIDER NOTES
History  Chief Complaint   Patient presents with    Irregular Heart Beat     felt like heart was pounding and irregular, had dizziness with it this morning     52-year-old male with a history of hypertension hyperlipidemia presenting chief complaint of irregular heartbeat  He reports that he woke up in his usual health this morning he states that after being awake for approximately an hour he felt that his heart was beating irregularly in his chest he had a blood pressure cuff with a heart monitor he put on his bouncing around between the 60s and 100 it is  This entire episode lasted about 2 hr he did not have dizziness chest pain shortness of breath near syncope diaphoresis cough production hemoptysis he had no other exertional symptoms with this he had no other complaints or concerns however the irregular heartbeat was concerning  I read and disagree with the nursing note  He is otherwise asymptomatic currently, he has no history of similar no known cardiac disease no risk factors signs symptoms of DVT or PE  Complete review systems otherwise negative as noted            Prior to Admission Medications   Prescriptions Last Dose Informant Patient Reported? Taking?    Calcium Carbonate (CALCIUM 600 PO)   Yes No   Sig: Take 1 tablet by mouth daily   Cholecalciferol (VITAMIN D-3 PO)   Yes No   Sig: Take 400 Units by mouth daily   Dulaglutide (TRULICITY) 1 5 OZ/8 2ZC SOPN   Yes No   Sig: Inject 1 5 mg under the skin once a week Fridays     HYDROcodone-acetaminophen (NORCO) 5-325 mg per tablet   No No   Sig: Take 1 tablet by mouth every 6 (six) hours as needed (Not relieved by Anti-inflammatory) for up to 20 doses Max Daily Amount: 4 tablets   aspirin 81 mg chewable tablet   No No   Sig: Chew 4 tablets daily   lisinopril (ZESTRIL) 20 mg tablet   Yes No   Sig: Take 20 mg by mouth daily     nicotine (NICODERM CQ) 21 mg/24 hr TD 24 hr patch   No No   Sig: Place 1 patch on the skin daily   vitamin E, tocopherol, 400 units capsule   Yes No   Sig: Take 400 Units by mouth daily      Facility-Administered Medications: None       Past Medical History:   Diagnosis Date    Arthritis     Diabetes mellitus ([facility] [address] )     Hyperlipidemia     Hypertension     Tobacco abuse        History reviewed  No pertinent surgical history  History reviewed  No pertinent family history  I have reviewed and agree with the history as documented  Social History   Substance Use Topics    Smoking status: Current Every Day Smoker     Packs/day: 1 00     Types: Cigarettes    Smokeless tobacco: Not on file    Alcohol use No        Review of Systems   Constitutional: Negative for chills and fever  Respiratory: Negative for cough and shortness of breath  Cardiovascular: Positive for palpitations  Negative for chest pain and leg swelling  Gastrointestinal: Negative for abdominal pain, diarrhea, nausea and vomiting  Genitourinary: Negative for dysuria, frequency and urgency  Musculoskeletal: Negative for arthralgias, back pain and myalgias  Skin: Negative for color change and rash  Neurological: Negative for dizziness, weakness and light-headedness  Hematological: Negative for adenopathy  Does not bruise/bleed easily  Psychiatric/Behavioral: Negative for agitation and behavioral problems  All other systems reviewed and are negative        Physical Exam  ED Triage Vitals   Temperature Pulse Respirations Blood Pressure SpO2   04/25/18 0828 04/25/18 0828 04/25/18 0828 04/25/18 0828 04/25/18 0828   98 7 °F (37 1 °C) 65 18 151/70 100 %      Temp src Heart Rate Source Patient Position - Orthostatic VS BP Location FiO2 (%)   -- 04/25/18 1044 04/25/18 1044 04/25/18 1044 --    Monitor Sitting Left arm       Pain Score       04/25/18 0828       No Pain           Orthostatic Vital Signs  Vitals:    04/25/18 0828 04/25/18 1044 04/25/18 1115 04/25/18 1130   BP: 151/70 146/99  137/71   Pulse: 65 63 63 69   Patient Position - Orthostatic VS: Sitting  Lying       Physical Exam   Constitutional: He is oriented to person, place, and time  He appears well-developed and well-nourished  No distress  Very well-appearing in no acute distress   HENT:   Head: Normocephalic and atraumatic  Eyes: EOM are normal  Pupils are equal, round, and reactive to light  Neck: Normal range of motion  Neck supple  No tracheal deviation present  Cardiovascular: Normal rate, regular rhythm and normal heart sounds  Exam reveals no gallop and no friction rub  No murmur heard  Extra systole heard however pulses regular no murmur   Pulmonary/Chest: Effort normal and breath sounds normal  He has no wheezes  He has no rales  Abdominal: Soft  Bowel sounds are normal  He exhibits no distension  There is no tenderness  There is no rebound and no guarding  Musculoskeletal: Normal range of motion  He exhibits no edema or tenderness  Neurological: He is alert and oriented to person, place, and time  No cranial nerve deficit  He exhibits normal muscle tone  Coordination normal    Skin: Skin is warm and dry  No rash noted  Psychiatric: He has a normal mood and affect  His behavior is normal    Nursing note and vitals reviewed        ED Medications  Medications   sodium chloride 0 9 % bolus 1,000 mL (0 mL Intravenous Stopped 4/25/18 1131)       Diagnostic Studies  Results Reviewed     Procedure Component Value Units Date/Time    Urine Microscopic [[id number]]  (Normal) Collected:  04/25/18 1046    Lab Status:  Final result Specimen:  Urine from Urine, Clean Catch Updated:  04/25/18 1059     RBC, UA None Seen /hpf      WBC, UA None Seen /hpf      Epithelial Cells None Seen /hpf      Bacteria, UA None Seen /hpf     UA w Reflex to Microscopic w Reflex to Culture [[id number]]  (Abnormal) Collected:  04/25/18 1046    Lab Status:  Final result Specimen:  Urine from Urine, Clean Catch Updated:  04/25/18 1051     Color, UA Yellow     Clarity, UA Clear     Specific Marble, UA 1 020 pH, UA 5 5     Leukocytes, UA Elevated glucose may cause decreased leukocyte values  See urine microscopic for [facility] result/ (A)     Nitrite, UA Negative     Protein, UA Negative mg/dl      Glucose, UA >=1000 (1%) (A) mg/dl      Ketones, UA Negative mg/dl      Urobilinogen, UA 0 2 E U /dl      Bilirubin, UA Negative     Blood, UA Negative    TSH, 3rd generation with T4 reflex [[id number]]  (Normal) Collected:  04/25/18 0916    Lab Status:  Final result Specimen:  Blood from Arm, Right Updated:  04/25/18 0949     TSH 3RD GENERATON 2 774 uIU/mL     Narrative:         Patients undergoing fluorescein dye angiography may retain small amounts of fluorescein in the body for 48-72 hours post procedure  Samples containing fluorescein can produce falsely depressed TSH values  If the patient had this procedure,a specimen should be resubmitted post fluorescein clearance  Magnesium [[id number]]  (Normal) Collected:  04/25/18 0916    Lab Status:  Final result Specimen:  Blood from Arm, Right Updated:  04/25/18 0949     Magnesium 1 6 mg/dL     Troponin I [[id number]]  (Normal) Collected:  04/25/18 0916    Lab Status:  Final result Specimen:  Blood from Arm, Right Updated:  04/25/18 0945     Troponin I <0 02 ng/mL     Narrative:         [employer] Chemistry analyzer 99% cutoff is > 0 04 ng/mL in network labs    o cTnI 99% cutoff is useful only when applied to patients in the clinical setting of myocardial ischemia  o cTnI 99% cutoff should be interpreted in the context of clinical history, ECG findings and possibly cardiac imaging to establish correct diagnosis  o cTnI 99% cutoff may be suggestive but clearly not indicative of a coronary event without the clinical setting of myocardial ischemia      Comprehensive metabolic panel [[id number]]  (Abnormal) Collected:  04/25/18 0916    Lab Status:  Final result Specimen:  Blood from Arm, Right Updated:  04/25/18 0940     Sodium 136 mmol/L      Potassium 4 5 mmol/L      Chloride 103 mmol/L CO2 28 mmol/L      Anion Gap 5 mmol/L      BUN 19 mg/dL      Creatinine 1 00 mg/dL      Glucose 326 (H) mg/dL      Calcium 8 5 mg/dL      AST 16 U/L      ALT 27 U/L      Alkaline Phosphatase 75 U/L      Total Protein 7 0 g/dL      Albumin 3 6 g/dL      Total Bilirubin 0 40 mg/dL      eGFR 78 ml/min/1 73sq m     Narrative:         National Kidney Disease Education Program recommendations are as follows:  GFR calculation is accurate only with a steady state creatinine  Chronic Kidney disease less than 60 ml/min/1 73 sq  meters  Kidney failure less than 15 ml/min/1 73 sq  meters  Protime-INR [[id number]]  (Normal) Collected:  04/25/18 0916    Lab Status:  Final result Specimen:  Blood from Arm, Right Updated:  04/25/18 0937     Protime 14 0 seconds      INR 1 06    APTT [[id number]]  (Abnormal) Collected:  04/25/18 0916    Lab Status:  Final result Specimen:  Blood from Arm, Right Updated:  04/25/18 0937     PTT 37 (H) seconds     CBC and differential [[id number]]  (Normal) Collected:  04/25/18 0916    Lab Status:  Final result Specimen:  Blood from Arm, Right Updated:  04/25/18 0924     WBC 6 80 Thousand/uL      RBC 4 97 Million/uL      Hemoglobin 14 4 g/dL      Hematocrit 42 7 %      MCV 86 fL      MCH 29 0 pg      MCHC 33 7 g/dL      RDW 11 9 %      MPV 10 4 fL      Platelets 195 Thousands/uL      nRBC 0 /100 WBCs      Neutrophils Relative 52 %      Lymphocytes Relative 34 %      Monocytes Relative 8 %      Eosinophils Relative 4 %      Basophils Relative 1 %      Neutrophils Absolute 3 56 Thousands/µL      Lymphocytes Absolute 2 28 Thousands/µL      Monocytes Absolute 0 57 Thousand/µL      Eosinophils Absolute 0 29 Thousand/µL      Basophils Absolute 0 07 Thousands/µL                  XR chest 2 views   ED Interpretation by Cinthia Gage DO (04/25 0919)   No acute abnormality      Final Result by Anabella Ulloa MD (04/25 1015)      No acute cardiopulmonary disease              Workstation performed: [id number] Procedures  Procedures       Phone Contacts  ED Phone Contact    ED Course  ED Course as of Apr 25 2224 Wed Apr 25, 2018   7076 Patient's EKG reviewed by myself normal sinus rhythm 72 beats per minute right axis otherwise normal intervals no acute ST segment changes, PVCs are present    1108 Patient has several PVCs on telemetry his EKG is sinus rhythm he has no other symptoms with this of laboratory evaluation including troponin is electrolytes and TSH were negative, will discharge with Holter monitor close return follow-up instructions patient agreeable plan                                MDM  Number of Diagnoses or Management Options  Palpitations:   Diagnosis management comments: 46-year-old male hypertension hyperlipidemia woke up this morning in his usual health felt as though his heart was beating irregularly, he had no chest pain shortness of breath near syncope dizziness diaphoresis or other associated symptoms with this he put a blood pressure cuff on with a heart rate and it was inconsistent between a rate of , symptoms resolved on exam here he is afebrile normal vital signs is clinically well appearing he does have extrasystoles also stated but regular palpable pulses on telemetry patient has infrequent PVCs EKG sinus rhythm with infrequent PVCs, will perform cardiac workup with TSH electrolytes, chest x-ray will observed on the monitor, if workup negative will consider discharge with Holter monitor cardiology follow-up close return follow-up instructions, patient takes is aspirin daily    CritCare Time    Disposition  Final diagnoses:   Palpitations     Time reflects when diagnosis was documented in both MDM as applicable and the Disposition within this note     Time User Action Codes Description Comment    4/25/2018 11:20 AM Trav Maldonado Add [R00 2] Palpitations       ED Disposition     ED Disposition Condition Comment    Discharge  Jose Luis Drier discharge to home/self care     Condition at discharge: Good        Follow-up Information     Follow up With Specialties Details Why Contact Info Additional Information    [address] [facility] Emergency Department Emergency Medicine  If symptoms worsen [address] [facility] [address]  [phone] [place] ED, [address], [place], [facility]  In 3 days  [facility] [address] [facility] [address]       Isela Fernandez MD Cardiology   [address] [address]  [phone]           Discharge Medication List as of 4/25/2018 11:27 AM      CONTINUE these medications which have NOT CHANGED    Details   aspirin 81 mg chewable tablet Chew 4 tablets daily, Starting Thu 9/7/2017, No Print      Calcium Carbonate (CALCIUM 600 PO) Take 1 tablet by mouth daily, Until Discontinued, Historical Med      Cholecalciferol (VITAMIN D-3 PO) Take 400 Units by mouth daily, Until Discontinued, Historical Med      Dulaglutide (TRULICITY) 1 5 HM/1 1UW SOPN Inject 1 5 mg under the skin once a week Fridays  , Starting Fri 8/18/2017, Historical Med      HYDROcodone-acetaminophen (NORCO) 5-325 mg per tablet Take 1 tablet by mouth every 6 (six) hours as needed (Not relieved by Anti-inflammatory) for up to 20 doses Max Daily Amount: 4 tablets, Starting Sat 7/29/2017, Print      lisinopril (ZESTRIL) 20 mg tablet Take 20 mg by mouth daily  , Historical Med      nicotine (NICODERM CQ) 21 mg/24 hr TD 24 hr patch Place 1 patch on the skin daily, Starting Thu 9/7/2017, Print      vitamin E, tocopherol, 400 units capsule Take 400 Units by mouth daily, Until Discontinued, Historical Med             Outpatient Discharge Orders  Holter monitor - 24 hour   Standing Status: Future  Standing Exp   Date: 04/25/22         ED Provider  Electronically Signed by           Eleazar Verduzco DO  04/25/18 1616

## 2018-04-25 NOTE — ED NOTES
D/c instructions and rx reviewed w/ pt  All questions and concerns addressed at this time         Kate Diggs RN  04/25/18 8420

## 2018-04-25 NOTE — DISCHARGE INSTRUCTIONS
Please return if he developed worsening symptoms or other concerning symptoms otherwise please follow up as instructed as discussed     Heart Palpitations   WHAT YOU NEED TO KNOW:   Heart palpitations are feelings that your heart races, jumps, throbs, or flutters  You may feel extra beats, no beats for a short time, or skipped beats  You may have these feelings in your chest, throat, or neck  They may happen when you are sitting, standing, or lying  Heart palpitations may be frightening, but are usually not caused by a serious problem  DISCHARGE INSTRUCTIONS:   Call 911 or have someone else call for any of the following:   · You have any of the following signs of a heart attack:      ¨ Squeezing, pressure, or pain in your chest that lasts longer than 5 minutes or returns    ¨ Discomfort or pain in your back, neck, jaw, stomach, or arm     ¨ Trouble breathing    ¨ Nausea or vomiting    ¨ Lightheadedness or a sudden cold sweat, especially with chest pain or trouble breathing    · You have any of the following signs of a stroke:      ¨ Numbness or drooping on one side of your face     ¨ Weakness in an arm or leg    ¨ Confusion or difficulty speaking    ¨ Dizziness, a severe headache, or vision loss    · You faint or lose consciousness  Return to the emergency department if:   · Your palpitations happen more often or get more intense  Contact your healthcare provider if:   · You have new or worsening swelling in your feet or ankles  · You have questions or concerns about your condition or care  Follow up with your healthcare provider as directed: You may need to follow up with a cardiologist  Masha Diaz may need tests to check for heart problems that cause palpitations  Write down your questions so you remember to ask them during your visits  Keep a record:  Write down when your palpitations start and stop, what you were doing when they started, and your symptoms   Keep track of what you ate or drank within a few hours of your palpitations  Include anything that seemed to help your symptoms, such as lying down or holding your breath  This record will help you and your healthcare provider learn what triggers your palpitations  Bring this record with you to your follow up visits  Help prevent heart palpitations:   · Manage stress and anxiety  Find ways to relax such as listening to music, meditating, or doing yoga  Exercise can also help decrease stress and anxiety  Talk to someone you trust about your stress or anxiety  You can also talk to a therapist      · Get plenty of sleep every night  Ask your healthcare provider how much sleep you need each night  · Do not drink caffeine or alcohol  Caffeine and alcohol can make your palpitations worse  Caffeine is found in soda, coffee, tea, chocolate, and drinks that increase your energy  · Do not smoke  Nicotine and other chemicals in cigarettes and cigars may damage your heart and blood vessels  Ask your healthcare provider for information if you currently smoke and need help to quit  E-cigarettes or smokeless tobacco still contain nicotine  Talk to your healthcare provider before you use these products  · Do not use illegal drugs  Talk to your healthcare provider if you use illegal drugs and want help to quit  © 2017 [facility] [facility] Information is for End User's use only and may not be sold, redistributed or otherwise used for commercial purposes  All illustrations and images included in CareNotes® are the copyrighted property of [employer] , [employer]  or Taye Gilman  The above information is an  only  It is not intended as medical advice for individual conditions or treatments  Talk to your doctor, nurse or pharmacist before following any medical regimen to see if it is safe and effective for you  Holter Monitoring   WHAT YOU NEED TO KNOW:   A Holter monitor is also called a portable electrocardiography (EKG) monitor   It shows your heart's electrical activity while you do your usual activities  The monitor is a small battery-operated device that you wear  It will show how fast your heart beats and if it beats in a regular pattern  DISCHARGE INSTRUCTIONS:   How to wear a Holter monitor:   · Sticky pads are placed on certain areas of your chest  Three to 8 sticky pads may be used  Healthcare providers may tape the electrodes to your skin to keep them in place  Wear loose-fitting clothes with your monitor so you can move freely  · The electrodes will be plugged into the monitor  The monitor will be turned on and will record electrical signals constantly for 24 to 48 hours  You may need to use your monitor for up to 7 days  The monitor will be put in a pouch for you to carry  Keep a log:  List any symptoms you have while you wear the monitor  Write down the time and what you were doing when the symptoms started  List when you take any medicines or drink any alcohol  Take this log with you when you see your healthcare provider  The log may help him learn what is causing your abnormal heart activity  The following are some examples of symptoms to write down in the log:  · Chest pain or discomfort    · Dizziness or fainting    · Irregular heartbeats, such as a fluttery feeling in your chest    · Shortness of breath or trouble breathing    · Strong, pounding heartbeats  Do not get your chest wet: Your sticky pads may fall off if they get wet  The monitor cannot record your heart rhythm without the sticky pads and electrodes in place  Do not take a shower while you wear the Holter monitor  Take sponge baths instead  Follow up with your healthcare provider or cardiologist in 24 to 48 hours: You will need to return to have the sticky pads and monitor removed  Bring your log with you  Write down your questions so you remember to ask them during your visits    Contact your healthcare provider if:   · The sticky pads or electrodes come off your chest     · Your monitor stops working  · You have a headache, dizziness, or feel like you are going to faint  · You have a rash on your skin under the sticky pads  · You have questions or concerns about the Holter monitor, your condition, or care  Return to the emergency department if:   · You are pale and have cold, sweaty skin  · You have a heavy or squeezing feeling in your chest that lasts longer than a few minutes  · You have pain in your chest that spreads to your shoulders, neck, or arms  · You have trouble breathing  © 2017 [facility] [facility] Information is for End User's use only and may not be sold, redistributed or otherwise used for commercial purposes  All illustrations and images included in CareNotes® are the copyrighted property of A D A M , Mehdi  or Taye Gilman  The above information is an  only  It is not intended as medical advice for individual conditions or treatments  Talk to your doctor, nurse or pharmacist before following any medical regimen to see if it is safe and effective for you

## 2018-04-30 ENCOUNTER — HOSPITAL ENCOUNTER (OUTPATIENT)
Dept: NON INVASIVE DIAGNOSTICS | Facility: CLINIC | Age: 68
Discharge: HOME | End: 2018-04-30
Payer: MEDICARE

## 2018-04-30 DIAGNOSIS — R00.2 PALPITATIONS: ICD-10-CM

## 2018-04-30 PROCEDURE — 93226 XTRNL ECG REC<48 HR SCAN A/R: CPT

## 2018-04-30 PROCEDURE — 93225 XTRNL ECG REC<48 HRS REC: CPT

## 2018-05-08 PROCEDURE — 93227 XTRNL ECG REC<48 HR R&I: CPT | Performed by: INTERNAL MEDICINE

## 2019-11-21 ENCOUNTER — APPOINTMENT (OUTPATIENT)
Dept: LAB | Facility: HOSPITAL | Age: 69
End: 2019-11-21
Payer: MEDICARE

## 2019-11-21 ENCOUNTER — TRANSCRIBE ORDERS (OUTPATIENT)
Dept: ADMINISTRATIVE | Facility: HOSPITAL | Age: 69
End: 2019-11-21

## 2019-11-21 DIAGNOSIS — Z12.5 SPECIAL SCREENING FOR MALIGNANT NEOPLASM OF PROSTATE: ICD-10-CM

## 2019-11-21 DIAGNOSIS — E13.69 OTHER SPECIFIED DIABETES MELLITUS WITH OTHER SPECIFIED COMPLICATION, UNSPECIFIED WHETHER LONG TERM INSULIN USE (HCC): Primary | ICD-10-CM

## 2019-11-21 DIAGNOSIS — E13.69 OTHER SPECIFIED DIABETES MELLITUS WITH OTHER SPECIFIED COMPLICATION, UNSPECIFIED WHETHER LONG TERM INSULIN USE (HCC): ICD-10-CM

## 2019-11-21 DIAGNOSIS — E78.5 HYPERLIPIDEMIA, UNSPECIFIED HYPERLIPIDEMIA TYPE: ICD-10-CM

## 2019-11-21 LAB
ALBUMIN SERPL BCP-MCNC: 3.7 G/DL (ref 3.5–5)
ALP SERPL-CCNC: 70 U/L (ref 46–116)
ALT SERPL W P-5'-P-CCNC: 39 U/L (ref 12–78)
ANION GAP SERPL CALCULATED.3IONS-SCNC: 7 MMOL/L (ref 4–13)
AST SERPL W P-5'-P-CCNC: 15 U/L (ref 5–45)
BILIRUB DIRECT SERPL-MCNC: 0.13 MG/DL (ref 0–0.2)
BILIRUB SERPL-MCNC: 0.6 MG/DL (ref 0.2–1)
BUN SERPL-MCNC: 17 MG/DL (ref 5–25)
CALCIUM SERPL-MCNC: 8.9 MG/DL (ref 8.3–10.1)
CHLORIDE SERPL-SCNC: 103 MMOL/L (ref 100–108)
CHOLEST SERPL-MCNC: 195 MG/DL (ref 50–200)
CO2 SERPL-SCNC: 29 MMOL/L (ref 21–32)
CREAT SERPL-MCNC: 0.93 MG/DL (ref 0.6–1.3)
CREAT UR-MCNC: 190 MG/DL
EST. AVERAGE GLUCOSE BLD GHB EST-MCNC: 171 MG/DL
GFR SERPL CREATININE-BSD FRML MDRD: 84 ML/MIN/1.73SQ M
GLUCOSE P FAST SERPL-MCNC: 200 MG/DL (ref 65–99)
HBA1C MFR BLD: 7.6 % (ref 4.2–6.3)
HDLC SERPL-MCNC: 38 MG/DL
LDLC SERPL CALC-MCNC: 121 MG/DL (ref 0–100)
MICROALBUMIN UR-MCNC: 46.2 MG/L (ref 0–20)
MICROALBUMIN/CREAT 24H UR: 24 MG/G CREATININE (ref 0–30)
NONHDLC SERPL-MCNC: 157 MG/DL
POTASSIUM SERPL-SCNC: 4.1 MMOL/L (ref 3.5–5.3)
PROT SERPL-MCNC: 7.2 G/DL (ref 6.4–8.2)
PSA SERPL-MCNC: 2 NG/ML (ref 0–4)
SODIUM SERPL-SCNC: 139 MMOL/L (ref 136–145)
TRIGL SERPL-MCNC: 180 MG/DL

## 2019-11-21 PROCEDURE — 80076 HEPATIC FUNCTION PANEL: CPT

## 2019-11-21 PROCEDURE — 82570 ASSAY OF URINE CREATININE: CPT | Performed by: INTERNAL MEDICINE

## 2019-11-21 PROCEDURE — G0103 PSA SCREENING: HCPCS

## 2019-11-21 PROCEDURE — 80061 LIPID PANEL: CPT

## 2019-11-21 PROCEDURE — 36415 COLL VENOUS BLD VENIPUNCTURE: CPT

## 2019-11-21 PROCEDURE — 82043 UR ALBUMIN QUANTITATIVE: CPT | Performed by: INTERNAL MEDICINE

## 2019-11-21 PROCEDURE — 80048 BASIC METABOLIC PNL TOTAL CA: CPT

## 2019-11-21 PROCEDURE — 83036 HEMOGLOBIN GLYCOSYLATED A1C: CPT

## 2021-03-10 DIAGNOSIS — Z23 ENCOUNTER FOR IMMUNIZATION: ICD-10-CM

## 2021-03-12 LAB
LEFT EYE DIABETIC RETINOPATHY: NORMAL
RIGHT EYE DIABETIC RETINOPATHY: NORMAL

## 2021-03-29 ENCOUNTER — IMMUNIZATIONS (OUTPATIENT)
Dept: FAMILY MEDICINE CLINIC | Facility: HOSPITAL | Age: 71
End: 2021-03-29

## 2021-03-29 DIAGNOSIS — Z23 ENCOUNTER FOR IMMUNIZATION: Primary | ICD-10-CM

## 2021-03-29 PROCEDURE — 0001A SARS-COV-2 / COVID-19 MRNA VACCINE (PFIZER-BIONTECH) 30 MCG: CPT

## 2021-03-29 PROCEDURE — 91300 SARS-COV-2 / COVID-19 MRNA VACCINE (PFIZER-BIONTECH) 30 MCG: CPT

## 2021-04-19 ENCOUNTER — IMMUNIZATIONS (OUTPATIENT)
Dept: FAMILY MEDICINE CLINIC | Facility: HOSPITAL | Age: 71
End: 2021-04-19

## 2021-04-19 DIAGNOSIS — Z23 ENCOUNTER FOR IMMUNIZATION: Primary | ICD-10-CM

## 2021-04-19 PROCEDURE — 91300 SARS-COV-2 / COVID-19 MRNA VACCINE (PFIZER-BIONTECH) 30 MCG: CPT

## 2021-04-19 PROCEDURE — 0002A SARS-COV-2 / COVID-19 MRNA VACCINE (PFIZER-BIONTECH) 30 MCG: CPT

## 2021-06-18 LAB — COLOGUARD (HISTORICAL): NEGATIVE

## 2021-08-10 ENCOUNTER — APPOINTMENT (OUTPATIENT)
Dept: LAB | Facility: HOSPITAL | Age: 71
End: 2021-08-10
Attending: INTERNAL MEDICINE
Payer: MEDICARE

## 2021-08-10 DIAGNOSIS — Z11.59 NEED FOR HEPATITIS C SCREENING TEST: ICD-10-CM

## 2021-08-10 DIAGNOSIS — E11.9 TYPE 2 DIABETES MELLITUS WITHOUT COMPLICATION, WITHOUT LONG-TERM CURRENT USE OF INSULIN (HCC): Chronic | ICD-10-CM

## 2021-08-10 DIAGNOSIS — Z12.5 SCREENING FOR PROSTATE CANCER: ICD-10-CM

## 2021-08-10 LAB
ALBUMIN SERPL BCP-MCNC: 3.7 G/DL (ref 3.5–5)
ALP SERPL-CCNC: 69 U/L (ref 46–116)
ALT SERPL W P-5'-P-CCNC: 32 U/L (ref 12–78)
ANION GAP SERPL CALCULATED.3IONS-SCNC: 10 MMOL/L (ref 4–13)
AST SERPL W P-5'-P-CCNC: 15 U/L (ref 5–45)
BILIRUB DIRECT SERPL-MCNC: 0.16 MG/DL (ref 0–0.2)
BILIRUB SERPL-MCNC: 0.69 MG/DL (ref 0.2–1)
BUN SERPL-MCNC: 14 MG/DL (ref 5–25)
CALCIUM SERPL-MCNC: 8.7 MG/DL (ref 8.3–10.1)
CHLORIDE SERPL-SCNC: 102 MMOL/L (ref 100–108)
CHOLEST SERPL-MCNC: 120 MG/DL (ref 50–200)
CO2 SERPL-SCNC: 27 MMOL/L (ref 21–32)
CREAT SERPL-MCNC: 1.03 MG/DL (ref 0.6–1.3)
EST. AVERAGE GLUCOSE BLD GHB EST-MCNC: 146 MG/DL
GFR SERPL CREATININE-BSD FRML MDRD: 73 ML/MIN/1.73SQ M
GLUCOSE P FAST SERPL-MCNC: 136 MG/DL (ref 65–99)
HBA1C MFR BLD: 6.7 %
HCV AB SER QL: NORMAL
HDLC SERPL-MCNC: 42 MG/DL
LDLC SERPL CALC-MCNC: 54 MG/DL (ref 0–100)
NONHDLC SERPL-MCNC: 78 MG/DL
POTASSIUM SERPL-SCNC: 4.4 MMOL/L (ref 3.5–5.3)
PROT SERPL-MCNC: 7.3 G/DL (ref 6.4–8.2)
PSA SERPL-MCNC: 3.6 NG/ML (ref 0–4)
SODIUM SERPL-SCNC: 139 MMOL/L (ref 136–145)
TRIGL SERPL-MCNC: 122 MG/DL

## 2021-08-10 PROCEDURE — 80061 LIPID PANEL: CPT

## 2021-08-10 PROCEDURE — 83036 HEMOGLOBIN GLYCOSYLATED A1C: CPT

## 2021-08-10 PROCEDURE — 36415 COLL VENOUS BLD VENIPUNCTURE: CPT

## 2021-08-10 PROCEDURE — 80048 BASIC METABOLIC PNL TOTAL CA: CPT

## 2021-08-10 PROCEDURE — 86803 HEPATITIS C AB TEST: CPT

## 2021-08-10 PROCEDURE — 80076 HEPATIC FUNCTION PANEL: CPT

## 2021-08-10 PROCEDURE — G0103 PSA SCREENING: HCPCS

## 2021-08-14 ENCOUNTER — OFFICE VISIT (OUTPATIENT)
Dept: INTERNAL MEDICINE CLINIC | Facility: CLINIC | Age: 71
End: 2021-08-14
Payer: MEDICARE

## 2021-08-14 VITALS
RESPIRATION RATE: 16 BRPM | BODY MASS INDEX: 31.64 KG/M2 | WEIGHT: 213.6 LBS | TEMPERATURE: 97.6 F | HEART RATE: 74 BPM | SYSTOLIC BLOOD PRESSURE: 122 MMHG | OXYGEN SATURATION: 97 % | DIASTOLIC BLOOD PRESSURE: 78 MMHG | HEIGHT: 69 IN

## 2021-08-14 DIAGNOSIS — E11.69 TYPE 2 DIABETES MELLITUS WITH OTHER SPECIFIED COMPLICATION, WITHOUT LONG-TERM CURRENT USE OF INSULIN (HCC): Primary | ICD-10-CM

## 2021-08-14 DIAGNOSIS — E78.5 HYPERLIPIDEMIA ASSOCIATED WITH TYPE 2 DIABETES MELLITUS (HCC): ICD-10-CM

## 2021-08-14 DIAGNOSIS — E11.59 HYPERTENSION ASSOCIATED WITH TYPE 2 DIABETES MELLITUS (HCC): ICD-10-CM

## 2021-08-14 DIAGNOSIS — Z12.2 ENCOUNTER FOR SCREENING FOR LUNG CANCER: ICD-10-CM

## 2021-08-14 DIAGNOSIS — E11.69 HYPERLIPIDEMIA ASSOCIATED WITH TYPE 2 DIABETES MELLITUS (HCC): ICD-10-CM

## 2021-08-14 DIAGNOSIS — Z86.010 PERSONAL HISTORY OF COLONIC POLYPS: ICD-10-CM

## 2021-08-14 DIAGNOSIS — I15.2 HYPERTENSION ASSOCIATED WITH TYPE 2 DIABETES MELLITUS (HCC): ICD-10-CM

## 2021-08-14 DIAGNOSIS — F17.210 CIGARETTE NICOTINE DEPENDENCE WITHOUT COMPLICATION: ICD-10-CM

## 2021-08-14 PROCEDURE — 99204 OFFICE O/P NEW MOD 45 MIN: CPT | Performed by: INTERNAL MEDICINE

## 2021-08-14 PROCEDURE — 1123F ACP DISCUSS/DSCN MKR DOCD: CPT | Performed by: INTERNAL MEDICINE

## 2021-08-14 RX ORDER — METFORMIN HYDROCHLORIDE 500 MG/1
1000 TABLET, EXTENDED RELEASE ORAL 2 TIMES DAILY
COMMUNITY
Start: 2021-07-21 | End: 2021-09-20 | Stop reason: SDUPTHER

## 2021-08-14 RX ORDER — PRAVASTATIN SODIUM 20 MG
20 TABLET ORAL DAILY
COMMUNITY
Start: 2021-06-21

## 2021-08-14 RX ORDER — DIPHENOXYLATE HYDROCHLORIDE AND ATROPINE SULFATE 2.5; .025 MG/1; MG/1
1 TABLET ORAL DAILY
COMMUNITY

## 2021-08-14 RX ORDER — EXENATIDE 2 MG/.85ML
INJECTION, SUSPENSION, EXTENDED RELEASE SUBCUTANEOUS
COMMUNITY
Start: 2021-07-19

## 2021-08-14 NOTE — PATIENT INSTRUCTIONS
Medicare Preventive Visit Patient Instructions  Thank you for completing your Welcome to Medicare Visit or Medicare Annual Wellness Visit today  Your next wellness visit will be due in one year (8/15/2022)  The screening/preventive services that you may require over the next 5-10 years are detailed below  Some tests may not apply to you based off risk factors and/or age  Screening tests ordered at today's visit but not completed yet may show as past due  Also, please note that scanned in results may not display below  Preventive Screenings:  Service Recommendations Previous Testing/Comments   Colorectal Cancer Screening  · Colonoscopy    · Fecal Occult Blood Test (FOBT)/Fecal Immunochemical Test (FIT)  · Fecal DNA/Cologuard Test  · Flexible Sigmoidoscopy Age: 54-65 years old   Colonoscopy: every 10 years (May be performed more frequently if at higher risk)  OR  FOBT/FIT: every 1 year  OR  Cologuard: every 3 years  OR  Sigmoidoscopy: every 5 years  Screening may be recommended earlier than age 48 if at higher risk for colorectal cancer  Also, an individualized decision between you and your healthcare provider will decide whether screening between the ages of 74-80 would be appropriate   Colonoscopy: Not on file  FOBT/FIT: Not on file  Cologuard: Not on file  Sigmoidoscopy: Not on file    Screening Current     Prostate Cancer Screening Individualized decision between patient and health care provider in men between ages of 53-78   Medicare will cover every 12 months beginning on the day after your 50th birthday PSA: 3 6 ng/mL     Screening Current     Hepatitis C Screening Once for adults born between 1945 and 1965  More frequently in patients at high risk for Hepatitis C Hep C Antibody: 08/10/2021    Screening Current   Diabetes Screening 1-2 times per year if you're at risk for diabetes or have pre-diabetes Fasting glucose: 136 mg/dL   A1C: 6 7 %    Screening Not Indicated  History Diabetes   Cholesterol Screening Once every 5 years if you don't have a lipid disorder  May order more often based on risk factors  Lipid panel: 08/10/2021    Screening Current      Other Preventive Screenings Covered by Medicare:  1  Abdominal Aortic Aneurysm (AAA) Screening: covered once if your at risk  You're considered to be at risk if you have a family history of AAA or a male between the age of 73-68 who smoking at least 100 cigarettes in your lifetime  2  Lung Cancer Screening: covers low dose CT scan once per year if you meet all of the following conditions: (1) Age 50-69; (2) No signs or symptoms of lung cancer; (3) Current smoker or have quit smoking within the last 15 years; (4) You have a tobacco smoking history of at least 30 pack years (packs per day x number of years you smoked); (5) You get a written order from a healthcare provider  3  Glaucoma Screening: covered annually if you're considered high risk: (1) You have diabetes OR (2) Family history of glaucoma OR (3)  aged 48 and older OR (3)  American aged 72 and older  3  Osteoporosis Screening: covered every 2 years if you meet one of the following conditions: (1) Have a vertebral abnormality; (2) On glucocorticoid therapy for more than 3 months; (3) Have primary hyperparathyroidism; (4) On osteoporosis medications and need to assess response to drug therapy  5  HIV Screening: covered annually if you're between the age of 12-76  Also covered annually if you are younger than 13 and older than 72 with risk factors for HIV infection  For pregnant patients, it is covered up to 3 times per pregnancy      Immunizations:  Immunization Recommendations   Influenza Vaccine Annual influenza vaccination during flu season is recommended for all persons aged >= 6 months who do not have contraindications   Pneumococcal Vaccine (Prevnar and Pneumovax)  * Prevnar = PCV13  * Pneumovax = PPSV23 Adults 25-60 years old: 1-3 doses may be recommended based on certain risk factors  Adults 72 years old: Prevnar (PCV13) vaccine recommended followed by Pneumovax (PPSV23) vaccine  If already received PPSV23 since turning 65, then PCV13 recommended at least one year after PPSV23 dose  Hepatitis B Vaccine 3 dose series if at intermediate or high risk (ex: diabetes, end stage renal disease, liver disease)   Tetanus (Td) Vaccine - COST NOT COVERED BY MEDICARE PART B Following completion of primary series, a booster dose should be given every 10 years to maintain immunity against tetanus  Td may also be given as tetanus wound prophylaxis  Tdap Vaccine - COST NOT COVERED BY MEDICARE PART B Recommended at least once for all adults  For pregnant patients, recommended with each pregnancy  Shingles Vaccine (Shingrix) - COST NOT COVERED BY MEDICARE PART B  2 shot series recommended in those aged 48 and above     Health Maintenance Due:      Topic Date Due    Lung Cancer Screening  Never done    Colorectal Cancer Screening  Never done    Hepatitis C Screening  Completed     Immunizations Due:      Topic Date Due    Pneumococcal Vaccine: 65+ Years (1 of 2 - PPSV23) Never done    DTaP,Tdap,and Td Vaccines (1 - Tdap) Never done    Influenza Vaccine (1) 09/01/2021     Advance Directives   What are advance directives? Advance directives are legal documents that state your wishes and plans for medical care  These plans are made ahead of time in case you lose your ability to make decisions for yourself  Advance directives can apply to any medical decision, such as the treatments you want, and if you want to donate organs  What are the types of advance directives? There are many types of advance directives, and each state has rules about how to use them  You may choose a combination of any of the following:  · Living will: This is a written record of the treatment you want  You can also choose which treatments you do not want, which to limit, and which to stop at a certain time   This includes surgery, medicine, IV fluid, and tube feedings  · Durable power of  for healthcare [facility]): This is a written record that states who you want to make healthcare choices for you when you are unable to make them for yourself  This person, called a proxy, is usually a family member or a friend  You may choose more than 1 proxy  · Do not resuscitate (DNR) order:  A DNR order is used in case your heart stops beating or you stop breathing  It is a request not to have certain forms of treatment, such as CPR  A DNR order may be included in other types of advance directives  · Medical directive: This covers the care that you want if you are in a coma, near death, or unable to make decisions for yourself  You can list the treatments you want for each condition  Treatment may include pain medicine, surgery, blood transfusions, dialysis, IV or tube feedings, and a ventilator (breathing machine)  · Values history: This document has questions about your views, beliefs, and how you feel and think about life  This information can help others choose the care that you would choose  Why are advance directives important? An advance directive helps you control your care  Although spoken wishes may be used, it is better to have your wishes written down  Spoken wishes can be misunderstood, or not followed  Treatments may be given even if you do not want them  An advance directive may make it easier for your family to make difficult choices about your care  Cigarette Smoking and Your Health   Risks to your health if you smoke:  Nicotine and other chemicals found in tobacco damage every cell in your body  Even if you are a light smoker, you have an increased risk for cancer, heart disease, and lung disease  If you are pregnant or have diabetes, smoking increases your risk for complications     Benefits to your health if you stop smoking:   · You decrease respiratory symptoms such as coughing, wheezing, and shortness of breath  · You reduce your risk for cancers of the lung, mouth, throat, kidney, bladder, pancreas, stomach, and cervix  If you already have cancer, you increase the benefits of chemotherapy  You also reduce your risk for cancer returning or a second cancer from developing  · You reduce your risk for heart disease, blood clots, heart attack, and stroke  · You reduce your risk for lung infections, and diseases such as pneumonia, asthma, chronic bronchitis, and emphysema  · Your circulation improves  More oxygen can be delivered to your body  If you have diabetes, you lower your risk for complications, such as kidney, artery, and eye diseases  You also lower your risk for nerve damage  Nerve damage can lead to amputations, poor vision, and blindness  · You improve your body's ability to heal and to fight infections  For more information and support to stop smoking:   · [employer]  Phone: 6- 392 - [phone]  Web Address: rankur  Weight Management   Why it is important to manage your weight:  Being overweight increases your risk of health conditions such as heart disease, high blood pressure, type 2 diabetes, and certain types of cancer  It can also increase your risk for osteoarthritis, sleep apnea, and other respiratory problems  Aim for a slow, steady weight loss  Even a small amount of weight loss can lower your risk of health problems  How to lose weight safely:  A safe and healthy way to lose weight is to eat fewer calories and get regular exercise  You can lose up about 1 pound a week by decreasing the number of calories you eat by 500 calories each day  Healthy meal plan for weight management:  A healthy meal plan includes a variety of foods, contains fewer calories, and helps you stay healthy  A healthy meal plan includes the following:  · Eat whole-grain foods more often  A healthy meal plan should contain fiber   Fiber is the part of grains, fruits, and vegetables that is not broken down by your body  Whole-grain foods are healthy and provide extra fiber in your diet  Some examples of whole-grain foods are whole-wheat breads and pastas, oatmeal, brown rice, and bulgur  · Eat a variety of vegetables every day  Include dark, leafy greens such as spinach, kale, shanita greens, and mustard greens  Eat yellow and orange vegetables such as carrots, sweet potatoes, and winter squash  · Eat a variety of fruits every day  Choose fresh or canned fruit (canned in its own juice or light syrup) instead of juice  Fruit juice has very little or no fiber  · Eat low-fat dairy foods  Drink fat-free (skim) milk or 1% milk  Eat fat-free yogurt and low-fat cottage cheese  Try low-fat cheeses such as mozzarella and other reduced-fat cheeses  · Choose meat and other protein foods that are low in fat  Choose beans or other legumes such as split peas or lentils  Choose fish, skinless poultry (chicken or turkey), or lean cuts of red meat (beef or pork)  Before you cook meat or poultry, cut off any visible fat  · Use less fat and oil  Try baking foods instead of frying them  Add less fat, such as margarine, sour cream, regular salad dressing and mayonnaise to foods  Eat fewer high-fat foods  Some examples of high-fat foods include french fries, doughnuts, ice cream, and cakes  · Eat fewer sweets  Limit foods and drinks that are high in sugar  This includes candy, cookies, regular soda, and sweetened drinks  Exercise:  Exercise at least 30 minutes per day on most days of the week  Some examples of exercise include walking, biking, dancing, and swimming  You can also fit in more physical activity by taking the stairs instead of the elevator or parking farther away from stores  Ask your healthcare provider about the best exercise plan for you  © Copyright [employer] 2018 Information is for End User's use only and may not be sold, redistributed or otherwise used for commercial purposes  All illustrations and images included in CareNotes® are the copyrighted property of A D A M [employer]  or Mehran Ramos

## 2021-08-14 NOTE — PROGRESS NOTES
INTERNAL MEDICINE INITIAL OFFICE VISIT  [facility] - [facility] FELIZ LIZ    NAME: Chas Jefferson  AGE: 80 y o  SEX: male  : 1950     DATE: 2021     Assessment and Plan:     1  Type 2 diabetes mellitus with other specified complication, without long-term current use of insulin ([address] )    Most recent A1c was 6 7 % on 8/10/2021  Diabetes is well controlled  Has associated HTN, HLD, obesity  Continue Bydureon BCise and Metformin XR  Not a candidate for freestyle luis m  Discussed with him that he doesn't have to test everyday  Monitor     - Hemoglobin A1C; Future  - Basic metabolic panel; Future    2  Hypertension associated with type 2 diabetes mellitus (HCC)    BP is well controlled  Continue lisinopril  Weight loss advised  3  Hyperlipidemia associated with type 2 diabetes mellitus (HCC)    Continue pravastatin  Cholesterol is well controlled  4  Personal history of colonic polyps    He would be due for surveillance colonoscopy based off the history he provided today  - Ambulatory referral to Gastroenterology; Future    5  Cigarette nicotine dependence without complication  6  Encounter for screening for lung cancer    Tobacco cessation was advised  Discussed risks and benefits of lung cancer screening and patient does meet criteria  Not having any symptoms of lung cancer  Recommend lung cancer screening and patient agreed  - CT lung screening program; Future    BMI Counseling: Body mass index is 32 kg/m²  The BMI is above normal  Nutrition recommendations include decreasing portion sizes, encouraging healthy choices of fruits and vegetables, limiting drinks that contain sugar, moderation in carbohydrate intake and increasing intake of lean protein  Exercise recommendations include exercising 3-5 times per week  Tobacco Cessation Counseling: Tobacco cessation counseling was provided   The patient is sincerely urged to quit consumption of tobacco  He is not ready to quit tobacco       Return in about 3 months (around 11/23/2021) for Follow-up  Chief Complaint:     Chief Complaint   Patient presents with    Establish Care      History of Present Illness:     New patient to establish care  Had been seeing Dr Danay Beatty  Has history of type 2 diabetes, HTN, HLD, colon polyps, and nicotine dependence  Type 2 diabetes - denies any neuropathy, retinopathy, or nephropathy  He is prescribed Bydureon BCisde and metformin  No significant renal disease  Does not see a foot doctor  Does follow with eye doctor and has some cataracts  Most recent A1c was 6 7 % on 8/10/2021  HTN/HLD - denies history of stroke or heart attack  No current cardiac symptoms  BP has been controlled per his report  He is taking lisinopril and pravastatin  Cholesterol very well controlled  Personal history of colon polyps - states he has had polyps removed in past and hasn't had colonoscopy for about 10 years  He did mention he had a cologuard in the past  After patient left, I was able to link his care everywhere account through [facility] and did confirm that he had negative cologuard on 6/18/2021  I did discuss with him that cologuard isn't indicated to use with patients who have history of colon polyps  Can't remember who did his last colonoscopy  Nicotine dependence - has been smoking since age 12/13  Has been smoking 1 ppd  Hasn't had lung cancer screening to his knowledge  The following portions of the patient's history were reviewed and updated as appropriate: allergies, current medications, past family history, past medical history, past social history, past surgical history and problem list      Review of Systems:     Review of Systems   Constitutional: Negative for appetite change, chills, fatigue and fever  HENT: Negative for congestion, hearing loss, postnasal drip, rhinorrhea, sore throat, tinnitus and trouble swallowing      Eyes: Negative for pain, discharge, redness and visual disturbance  Respiratory: Negative for cough, chest tightness, shortness of breath and wheezing  Cardiovascular: Negative for chest pain, palpitations and leg swelling  Gastrointestinal: Negative for abdominal distention, abdominal pain, blood in stool, constipation, diarrhea, nausea and vomiting  Endocrine: Negative for cold intolerance, heat intolerance, polydipsia, polyphagia and polyuria  Genitourinary: Negative for difficulty urinating, dysuria, frequency, hematuria and urgency  Musculoskeletal: Negative for arthralgias, back pain, gait problem, joint swelling, myalgias, neck pain and neck stiffness  Skin: Negative for color change and rash  Neurological: Negative for dizziness, tremors, seizures, syncope, speech difficulty, weakness, light-headedness, numbness and headaches  Hematological: Negative for adenopathy  Does not bruise/bleed easily  Psychiatric/Behavioral: Negative for agitation, behavioral problems, confusion, hallucinations, sleep disturbance and suicidal ideas  The patient is not nervous/anxious         Past Medical History:     Past Medical History:   Diagnosis Date    Arthritis     Diabetes mellitus ([facility] [address] )     Hyperlipidemia     Hypertension     Tobacco abuse       Past Surgical History:     Past Surgical History:   Procedure Laterality Date    TONSILLECTOMY  1955      Social History:     Social History     Socioeconomic History    Marital status: /Civil Union     Spouse name: None    Number of children: None    Years of education: None    Highest education level: None   Occupational History    None   Tobacco Use    Smoking status: Current Every Day Smoker     Packs/day: 1 00     Years: 55 00     Pack years: 55 00     Types: Cigarettes     Start date: 4/26/1964    Smokeless tobacco: Never Used   Vaping Use    Vaping Use: Never used   Substance and Sexual Activity    Alcohol use: No    Drug use: No    Sexual activity: None   Other Topics Concern    None   Social History Narrative    None     Social Determinants of Health     Financial Resource Strain:     Difficulty of Paying Living Expenses:    Food Insecurity:     Worried About Running Out of Food in the Last Year:     [address] in the Last Year:    Transportation Needs:     Lack of Transportation (Medical):      Lack of Transportation (Non-Medical):    Physical Activity: Inactive    Days of Exercise per Week: 0 days    Minutes of Exercise per Session: 0 min   Stress: No Stress Concern Present    Feeling of Stress : Not at all   Social Connections:     Frequency of Communication with Friends and Family:     Frequency of Social Gatherings with Friends and Family:     Attends Orthodoxy Services:     Active Member of Clubs or Organizations:     Attends Club or Organization Meetings:     Marital Status:    Intimate Partner Violence:     Fear of Current or Ex-Partner:     Emotionally Abused:     Physically Abused:     Sexually Abused:          Family History:     Family History   Problem Relation Age of Onset    Alzheimer's disease Mother     Heart block Father     Heart disease Father         Current Medications:     Current Outpatient Medications:     aspirin 81 mg chewable tablet, Chew 4 tablets daily, Disp: , Rfl: 0    Bydureon BCise 2 MG/0 85ML AUIJ, INJECT 2MG UNDER THE SKIN    EVERY 7 DAYS IN THE ABDOMEN, THIGHS OR OUTER AREA OR UPPER ARM ROTATING INJECTION SITES, Disp: , Rfl:     Cholecalciferol (VITAMIN D-3 PO), Take 400 Units by mouth daily, Disp: , Rfl:     lisinopril (ZESTRIL) 20 mg tablet, Take 20 mg by mouth daily  , Disp: , Rfl:     metFORMIN (GLUCOPHAGE-XR) 500 mg 24 hr tablet, Take 1,000 mg by mouth 2 (two) times a day, Disp: , Rfl:     multivitamin (THERAGRAN) TABS, Take 1 tablet by mouth daily, Disp: , Rfl:     pravastatin (PRAVACHOL) 20 mg tablet, Take 20 mg by mouth daily, Disp: , Rfl:      Allergies:   No Known Allergies     Physical Exam: /78 (BP Location: Left arm, Patient Position: Sitting, Cuff Size: Standard)   Pulse 74   Temp 97 6 °F (36 4 °C) (Temporal) Comment: no nsaids  Resp 16   Ht 5' 8 5" (1 74 m)   Wt 96 9 kg (213 lb 9 6 oz) Comment: with shoes on  SpO2 97%   BMI 32 00 kg/m²     Physical Exam  Vitals reviewed  Constitutional:       General: He is not in acute distress  Appearance: He is well-developed  He is obese  He is not diaphoretic  Eyes:      General: No scleral icterus  Right eye: No discharge  Left eye: No discharge  Conjunctiva/sclera: Conjunctivae normal    Neck:      Thyroid: No thyromegaly  Vascular: No JVD  Cardiovascular:      Rate and Rhythm: Normal rate and regular rhythm  Pulses: no weak pulses          Dorsalis pedis pulses are 2+ on the right side and 2+ on the left side  Posterior tibial pulses are 2+ on the right side and 2+ on the left side  Heart sounds: Normal heart sounds  No murmur heard  Pulmonary:      Effort: Pulmonary effort is normal  No respiratory distress  Breath sounds: Normal breath sounds  No wheezing or rales  Abdominal:      General: Bowel sounds are normal  There is no distension  Palpations: Abdomen is soft  Tenderness: There is no abdominal tenderness  Musculoskeletal:      Cervical back: Neck supple  Right lower leg: No edema  Left lower leg: No edema  Feet:      Right foot:      Skin integrity: No ulcer, skin breakdown, erythema, warmth, callus or dry skin  Left foot:      Skin integrity: No ulcer, skin breakdown, erythema, warmth, callus or dry skin  Lymphadenopathy:      Cervical: No cervical adenopathy  Skin:     General: Skin is warm and dry  Neurological:      Mental Status: He is alert  Psychiatric:         Mood and Affect: Mood normal          Behavior: Behavior normal      Diabetic Foot Exam  Patient's shoes and socks removed  Right Foot/Ankle   Right Foot Inspection  Skin Exam: skin normal and skin intact no dry skin, no warmth, no callus, no erythema, no maceration, no abnormal color, no pre-ulcer, no ulcer and no callus                          Toe Exam: ROM and strength within normal limits  Sensory     Proprioception: intact   Monofilament testing: intact  Vascular  Capillary refills: < 3 seconds  The right DP pulse is 2+  The right PT pulse is 2+  Left Foot/Ankle  Left Foot Inspection  Skin Exam: skin normal and skin intactno dry skin, no warmth, no erythema, no maceration, normal color, no pre-ulcer, no ulcer and no callus                         Toe Exam: ROM and strength within normal limits                   Sensory     Proprioception: intact  Monofilament: intact  Vascular  Capillary refills: < 3 seconds  The left DP pulse is 2+  The left PT pulse is 2+  Assign Risk Category:  No deformity present; No loss of protective sensation;  No weak pulses       Risk: 0     Dale Jasso [facility] [address]

## 2021-08-16 ENCOUNTER — TELEPHONE (OUTPATIENT)
Dept: ADMINISTRATIVE | Facility: OTHER | Age: 71
End: 2021-08-16

## 2021-08-16 NOTE — LETTER
Diabetic Eye Exam Form    Date Requested: 21  Patient: Kristin Moran  Patient : 1950   Referring Provider: Marques Mullins,     Dilated Retinal Exam, Optomap-Iris Exam, or Fundus Photography Done         Yes ([place] one above)         No     Date of Diabetic Eye Exam ______________________________  Left Eye      Exam did show retinopathy    Exam did not show retinopathy         Mild       Moderate       None       Proliferative       Severe     Right Eye     Exam did show retinopathy    Exam did not show retinopathy         Mild       Moderate       None       Proliferative       Severe     Comments __________________________________________________________    Practice Providing Exam ______________________________________________    Exam Performed By (print name) _______________________________________      Provider Signature ___________________________________________________      These reports are needed for  compliance    Please fax this completed form and a copy of the Diabetic Eye Exam report to our office located at [place] [address] as soon as possible to [phone] presley Figueroa: Phone [phone]    We thank you for your assistance in treating our mutual patient

## 2021-09-10 ENCOUNTER — TELEPHONE (OUTPATIENT)
Dept: GASTROENTEROLOGY | Facility: CLINIC | Age: 71
End: 2021-09-10

## 2021-09-20 DIAGNOSIS — E11.69 TYPE 2 DIABETES MELLITUS WITH OTHER SPECIFIED COMPLICATION, WITHOUT LONG-TERM CURRENT USE OF INSULIN (HCC): Primary | ICD-10-CM

## 2021-09-20 RX ORDER — METFORMIN HYDROCHLORIDE 500 MG/1
1000 TABLET, EXTENDED RELEASE ORAL 2 TIMES DAILY
Qty: 120 TABLET | Refills: 0 | Status: SHIPPED | OUTPATIENT
Start: 2021-09-20 | End: 2021-10-21

## 2021-09-20 NOTE — TELEPHONE ENCOUNTER
Patient called in to request for a refill on this medication  He stated he is completely out of the medication

## 2021-10-01 ENCOUNTER — TELEPHONE (OUTPATIENT)
Dept: GASTROENTEROLOGY | Facility: HOSPITAL | Age: 71
End: 2021-10-01

## 2021-10-01 ENCOUNTER — HOSPITAL ENCOUNTER (OUTPATIENT)
Dept: CT IMAGING | Facility: HOSPITAL | Age: 71
Discharge: HOME | End: 2021-10-01
Attending: INTERNAL MEDICINE
Payer: MEDICARE

## 2021-10-01 DIAGNOSIS — F17.210 CIGARETTE NICOTINE DEPENDENCE WITHOUT COMPLICATION: ICD-10-CM

## 2021-10-01 DIAGNOSIS — Z12.2 ENCOUNTER FOR SCREENING FOR LUNG CANCER: ICD-10-CM

## 2021-10-01 PROCEDURE — 71271 CT THORAX LUNG CANCER SCR C-: CPT

## 2021-10-04 ENCOUNTER — HOSPITAL ENCOUNTER (OUTPATIENT)
Dept: GASTROENTEROLOGY | Facility: HOSPITAL | Age: 71
Setting detail: OUTPATIENT SURGERY
Discharge: HOME | End: 2021-10-04
Attending: INTERNAL MEDICINE | Admitting: INTERNAL MEDICINE
Payer: MEDICARE

## 2021-10-04 ENCOUNTER — ANESTHESIA EVENT (OUTPATIENT)
Dept: GASTROENTEROLOGY | Facility: HOSPITAL | Age: 71
End: 2021-10-04

## 2021-10-04 ENCOUNTER — ANESTHESIA (OUTPATIENT)
Dept: GASTROENTEROLOGY | Facility: HOSPITAL | Age: 71
End: 2021-10-04

## 2021-10-04 VITALS
DIASTOLIC BLOOD PRESSURE: 62 MMHG | TEMPERATURE: 97.8 F | SYSTOLIC BLOOD PRESSURE: 120 MMHG | HEART RATE: 72 BPM | RESPIRATION RATE: 18 BRPM | WEIGHT: 207.23 LBS | OXYGEN SATURATION: 98 % | HEIGHT: 68 IN | BODY MASS INDEX: 31.41 KG/M2

## 2021-10-04 DIAGNOSIS — Z12.11 SCREEN FOR COLON CANCER: ICD-10-CM

## 2021-10-04 PROBLEM — I10 HTN (HYPERTENSION): Status: ACTIVE | Noted: 2021-10-04

## 2021-10-04 PROBLEM — E78.5 HYPERLIPIDEMIA: Status: ACTIVE | Noted: 2021-10-04

## 2021-10-04 LAB — GLUCOSE SERPL-MCNC: 135 MG/DL (ref 65–140)

## 2021-10-04 PROCEDURE — 82948 REAGENT STRIP/BLOOD GLUCOSE: CPT

## 2021-10-04 PROCEDURE — 88305 TISSUE EXAM BY PATHOLOGIST: CPT | Performed by: PATHOLOGY

## 2021-10-04 PROCEDURE — 45385 COLONOSCOPY W/LESION REMOVAL: CPT | Performed by: INTERNAL MEDICINE

## 2021-10-04 RX ORDER — SODIUM CHLORIDE, SODIUM LACTATE, POTASSIUM CHLORIDE, CALCIUM CHLORIDE 600; 310; 30; 20 MG/100ML; MG/100ML; MG/100ML; MG/100ML
125 INJECTION, SOLUTION INTRAVENOUS CONTINUOUS
Status: CANCELLED | OUTPATIENT
Start: 2021-10-04

## 2021-10-04 RX ORDER — PROPOFOL 10 MG/ML
INJECTION, EMULSION INTRAVENOUS AS NEEDED
Status: DISCONTINUED | OUTPATIENT
Start: 2021-10-04 | End: 2021-10-04

## 2021-10-04 RX ORDER — SODIUM CHLORIDE, SODIUM LACTATE, POTASSIUM CHLORIDE, CALCIUM CHLORIDE 600; 310; 30; 20 MG/100ML; MG/100ML; MG/100ML; MG/100ML
INJECTION, SOLUTION INTRAVENOUS CONTINUOUS PRN
Status: DISCONTINUED | OUTPATIENT
Start: 2021-10-04 | End: 2021-10-04

## 2021-10-04 RX ADMIN — SODIUM CHLORIDE, SODIUM LACTATE, POTASSIUM CHLORIDE, AND CALCIUM CHLORIDE: .6; .31; .03; .02 INJECTION, SOLUTION INTRAVENOUS at 07:16

## 2021-10-04 RX ADMIN — PROPOFOL 20 MG: 10 INJECTION, EMULSION INTRAVENOUS at 07:26

## 2021-10-04 RX ADMIN — PROPOFOL 20 MG: 10 INJECTION, EMULSION INTRAVENOUS at 07:22

## 2021-10-04 RX ADMIN — PROPOFOL 140 MG: 10 INJECTION, EMULSION INTRAVENOUS at 07:19

## 2021-10-07 ENCOUNTER — TELEPHONE (OUTPATIENT)
Dept: INTERNAL MEDICINE CLINIC | Facility: CLINIC | Age: 71
End: 2021-10-07

## 2021-10-21 DIAGNOSIS — E11.69 TYPE 2 DIABETES MELLITUS WITH OTHER SPECIFIED COMPLICATION, WITHOUT LONG-TERM CURRENT USE OF INSULIN (HCC): ICD-10-CM

## 2021-10-21 RX ORDER — METFORMIN HYDROCHLORIDE 500 MG/1
TABLET, EXTENDED RELEASE ORAL
Qty: 120 TABLET | Refills: 0 | Status: SHIPPED | OUTPATIENT
Start: 2021-10-21 | End: 2021-11-15

## 2021-10-24 ENCOUNTER — OFFICE VISIT (OUTPATIENT)
Dept: URGENT CARE | Facility: CLINIC | Age: 71
End: 2021-10-24
Payer: MEDICARE

## 2021-10-24 VITALS
OXYGEN SATURATION: 100 % | RESPIRATION RATE: 16 BRPM | WEIGHT: 211 LBS | BODY MASS INDEX: 32.08 KG/M2 | SYSTOLIC BLOOD PRESSURE: 132 MMHG | HEART RATE: 73 BPM | DIASTOLIC BLOOD PRESSURE: 74 MMHG | TEMPERATURE: 97.3 F

## 2021-10-24 DIAGNOSIS — S30.860A TICK BITE OF PELVIC REGION, INITIAL ENCOUNTER: Primary | ICD-10-CM
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
## 2021-10-24 PROCEDURE — G0463 HOSPITAL OUTPT CLINIC VISIT: HCPCS | Performed by: PHYSICIAN ASSISTANT

## 2021-10-24 PROCEDURE — 99213 OFFICE O/P EST LOW 20 MIN: CPT | Performed by: PHYSICIAN ASSISTANT

## 2021-10-24 RX ORDER — DOXYCYCLINE 100 MG/1
200 TABLET ORAL ONCE
Qty: 2 TABLET | Refills: 0 | Status: SHIPPED | OUTPATIENT
Start: 2021-10-24 | End: 2021-10-24

## 2021-11-02 ENCOUNTER — HOSPITAL ENCOUNTER (EMERGENCY)
Facility: HOSPITAL | Age: 71
Discharge: HOME | End: 2021-11-02
Attending: EMERGENCY MEDICINE
Payer: MEDICARE

## 2021-11-02 VITALS
DIASTOLIC BLOOD PRESSURE: 85 MMHG | TEMPERATURE: 98 F | HEART RATE: 78 BPM | SYSTOLIC BLOOD PRESSURE: 202 MMHG | RESPIRATION RATE: 18 BRPM | OXYGEN SATURATION: 99 %

## 2021-11-02 DIAGNOSIS — M79.602 LEFT ARM PAIN: ICD-10-CM

## 2021-11-02 DIAGNOSIS — I10 HYPERTENSION: ICD-10-CM

## 2021-11-02 DIAGNOSIS — M54.2 NECK PAIN: Primary | ICD-10-CM

## 2021-11-02 DIAGNOSIS — M54.9 BACK PAIN: ICD-10-CM

## 2021-11-02 PROCEDURE — 99284 EMERGENCY DEPT VISIT MOD MDM: CPT | Performed by: EMERGENCY MEDICINE

## 2021-11-02 PROCEDURE — 99283 EMERGENCY DEPT VISIT LOW MDM: CPT

## 2021-11-02 RX ORDER — CYCLOBENZAPRINE HCL 10 MG
10 TABLET ORAL
Qty: 10 TABLET | Refills: 0 | Status: SHIPPED | OUTPATIENT
Start: 2021-11-02 | End: 2022-07-25 | Stop reason: ALTCHOICE

## 2021-11-12 ENCOUNTER — OFFICE VISIT (OUTPATIENT)
Dept: INTERNAL MEDICINE CLINIC | Facility: CLINIC | Age: 71
End: 2021-11-12
Payer: MEDICARE

## 2021-11-12 VITALS
WEIGHT: 208.6 LBS | RESPIRATION RATE: 17 BRPM | DIASTOLIC BLOOD PRESSURE: 78 MMHG | TEMPERATURE: 97.5 F | HEIGHT: 68 IN | SYSTOLIC BLOOD PRESSURE: 162 MMHG | BODY MASS INDEX: 31.61 KG/M2 | OXYGEN SATURATION: 97 % | HEART RATE: 80 BPM

## 2021-11-12 DIAGNOSIS — S16.1XXS STRAIN OF NECK MUSCLE, SEQUELA: Primary | ICD-10-CM

## 2021-11-12 PROCEDURE — 99213 OFFICE O/P EST LOW 20 MIN: CPT | Performed by: NURSE PRACTITIONER

## 2021-11-12 RX ORDER — MELOXICAM 15 MG/1
15 TABLET ORAL DAILY
Qty: 30 TABLET | Refills: 0 | Status: SHIPPED | OUTPATIENT
Start: 2021-11-12 | End: 2022-03-29 | Stop reason: SDUPTHER

## 2021-11-12 RX ORDER — SENNOSIDES 8.6 MG
650 CAPSULE ORAL AS NEEDED
COMMUNITY
End: 2022-03-29 | Stop reason: CLARIF

## 2021-11-15 DIAGNOSIS — E11.69 TYPE 2 DIABETES MELLITUS WITH OTHER SPECIFIED COMPLICATION, WITHOUT LONG-TERM CURRENT USE OF INSULIN (HCC): ICD-10-CM

## 2021-11-15 RX ORDER — METFORMIN HYDROCHLORIDE 500 MG/1
TABLET, EXTENDED RELEASE ORAL
Qty: 120 TABLET | Refills: 0 | Status: SHIPPED | OUTPATIENT
Start: 2021-11-15 | End: 2021-11-18

## 2021-11-18 DIAGNOSIS — E11.69 TYPE 2 DIABETES MELLITUS WITH OTHER SPECIFIED COMPLICATION, WITHOUT LONG-TERM CURRENT USE OF INSULIN (HCC): ICD-10-CM

## 2021-11-18 RX ORDER — METFORMIN HYDROCHLORIDE 500 MG/1
TABLET, EXTENDED RELEASE ORAL
Qty: 120 TABLET | Refills: 0 | Status: SHIPPED | OUTPATIENT
Start: 2021-11-18 | End: 2022-01-11

## 2021-11-23 ENCOUNTER — RA CDI HCC (OUTPATIENT)
Dept: OTHER | Facility: HOSPITAL | Age: 71
End: 2021-11-23

## 2021-11-29 ENCOUNTER — APPOINTMENT (OUTPATIENT)
Dept: LAB | Facility: CLINIC | Age: 71
End: 2021-11-29
Payer: MEDICARE

## 2021-11-29 ENCOUNTER — OFFICE VISIT (OUTPATIENT)
Dept: INTERNAL MEDICINE CLINIC | Facility: CLINIC | Age: 71
End: 2021-11-29
Payer: MEDICARE

## 2021-11-29 ENCOUNTER — TELEPHONE (OUTPATIENT)
Dept: INTERNAL MEDICINE CLINIC | Facility: CLINIC | Age: 71
End: 2021-11-29

## 2021-11-29 VITALS
BODY MASS INDEX: 31.46 KG/M2 | TEMPERATURE: 97.7 F | HEIGHT: 68 IN | WEIGHT: 207.6 LBS | SYSTOLIC BLOOD PRESSURE: 144 MMHG | HEART RATE: 78 BPM | OXYGEN SATURATION: 98 % | DIASTOLIC BLOOD PRESSURE: 64 MMHG | RESPIRATION RATE: 16 BRPM

## 2021-11-29 DIAGNOSIS — E11.9 TYPE 2 DIABETES MELLITUS WITHOUT COMPLICATION, WITHOUT LONG-TERM CURRENT USE OF INSULIN (HCC): Primary | Chronic | ICD-10-CM

## 2021-11-29 DIAGNOSIS — Z23 ENCOUNTER FOR IMMUNIZATION: ICD-10-CM

## 2021-11-29 DIAGNOSIS — I10 PRIMARY HYPERTENSION: ICD-10-CM

## 2021-11-29 DIAGNOSIS — E11.69 TYPE 2 DIABETES MELLITUS WITH OTHER SPECIFIED COMPLICATION, WITHOUT LONG-TERM CURRENT USE OF INSULIN (HCC): ICD-10-CM

## 2021-11-29 DIAGNOSIS — E78.2 MIXED HYPERLIPIDEMIA: ICD-10-CM

## 2021-11-29 LAB
ANION GAP SERPL CALCULATED.3IONS-SCNC: 9 MMOL/L (ref 4–13)
BUN SERPL-MCNC: 24 MG/DL (ref 5–25)
CALCIUM SERPL-MCNC: 9.3 MG/DL (ref 8.3–10.1)
CHLORIDE SERPL-SCNC: 108 MMOL/L (ref 100–108)
CO2 SERPL-SCNC: 22 MMOL/L (ref 21–32)
CREAT SERPL-MCNC: 0.99 MG/DL (ref 0.6–1.3)
EST. AVERAGE GLUCOSE BLD GHB EST-MCNC: 143 MG/DL
GFR SERPL CREATININE-BSD FRML MDRD: 77 ML/MIN/1.73SQ M
GLUCOSE SERPL-MCNC: 138 MG/DL (ref 65–140)
HBA1C MFR BLD: 6.6 %
POTASSIUM SERPL-SCNC: 4.9 MMOL/L (ref 3.5–5.3)
SODIUM SERPL-SCNC: 139 MMOL/L (ref 136–145)

## 2021-11-29 PROCEDURE — 36415 COLL VENOUS BLD VENIPUNCTURE: CPT

## 2021-11-29 PROCEDURE — 99214 OFFICE O/P EST MOD 30 MIN: CPT | Performed by: INTERNAL MEDICINE

## 2021-11-29 PROCEDURE — 83036 HEMOGLOBIN GLYCOSYLATED A1C: CPT

## 2021-11-29 PROCEDURE — 80048 BASIC METABOLIC PNL TOTAL CA: CPT

## 2021-11-29 PROCEDURE — G0008 ADMIN INFLUENZA VIRUS VAC: HCPCS | Performed by: INTERNAL MEDICINE

## 2021-11-29 PROCEDURE — 90662 IIV NO PRSV INCREASED AG IM: CPT | Performed by: INTERNAL MEDICINE

## 2021-11-29 RX ORDER — AMLODIPINE BESYLATE 5 MG/1
5 TABLET ORAL DAILY
Qty: 90 TABLET | Refills: 1 | Status: SHIPPED | OUTPATIENT
Start: 2021-11-29 | End: 2022-05-21

## 2021-12-06 ENCOUNTER — EVALUATION (OUTPATIENT)
Dept: PHYSICAL THERAPY | Facility: CLINIC | Age: 71
End: 2021-12-06
Payer: MEDICARE

## 2021-12-06 DIAGNOSIS — S16.1XXD STRAIN OF NECK MUSCLE, SUBSEQUENT ENCOUNTER: Primary | ICD-10-CM

## 2021-12-06 PROCEDURE — 97161 PT EVAL LOW COMPLEX 20 MIN: CPT

## 2021-12-08 ENCOUNTER — OFFICE VISIT (OUTPATIENT)
Dept: PHYSICAL THERAPY | Facility: CLINIC | Age: 71
End: 2021-12-08
Payer: MEDICARE

## 2021-12-08 DIAGNOSIS — S16.1XXD STRAIN OF NECK MUSCLE, SUBSEQUENT ENCOUNTER: Primary | ICD-10-CM

## 2021-12-08 PROCEDURE — 97110 THERAPEUTIC EXERCISES: CPT

## 2021-12-08 PROCEDURE — 97140 MANUAL THERAPY 1/> REGIONS: CPT

## 2021-12-15 ENCOUNTER — OFFICE VISIT (OUTPATIENT)
Dept: PHYSICAL THERAPY | Facility: CLINIC | Age: 71
End: 2021-12-15
Payer: MEDICARE

## 2021-12-15 DIAGNOSIS — S16.1XXD STRAIN OF NECK MUSCLE, SUBSEQUENT ENCOUNTER: Primary | ICD-10-CM

## 2021-12-15 PROCEDURE — 97112 NEUROMUSCULAR REEDUCATION: CPT

## 2021-12-15 PROCEDURE — 97140 MANUAL THERAPY 1/> REGIONS: CPT

## 2021-12-15 PROCEDURE — 97110 THERAPEUTIC EXERCISES: CPT

## 2021-12-17 ENCOUNTER — OFFICE VISIT (OUTPATIENT)
Dept: PHYSICAL THERAPY | Facility: CLINIC | Age: 71
End: 2021-12-17
Payer: MEDICARE

## 2021-12-17 DIAGNOSIS — S16.1XXD STRAIN OF NECK MUSCLE, SUBSEQUENT ENCOUNTER: Primary | ICD-10-CM

## 2021-12-17 PROCEDURE — 97112 NEUROMUSCULAR REEDUCATION: CPT

## 2021-12-17 PROCEDURE — 97140 MANUAL THERAPY 1/> REGIONS: CPT

## 2021-12-17 PROCEDURE — 97110 THERAPEUTIC EXERCISES: CPT

## 2021-12-21 ENCOUNTER — OFFICE VISIT (OUTPATIENT)
Dept: PHYSICAL THERAPY | Facility: CLINIC | Age: 71
End: 2021-12-21
Payer: MEDICARE

## 2021-12-21 DIAGNOSIS — S16.1XXD STRAIN OF NECK MUSCLE, SUBSEQUENT ENCOUNTER: Primary | ICD-10-CM

## 2021-12-21 PROCEDURE — 97140 MANUAL THERAPY 1/> REGIONS: CPT

## 2021-12-21 PROCEDURE — 97110 THERAPEUTIC EXERCISES: CPT

## 2021-12-21 PROCEDURE — 97112 NEUROMUSCULAR REEDUCATION: CPT

## 2021-12-23 ENCOUNTER — OFFICE VISIT (OUTPATIENT)
Dept: PHYSICAL THERAPY | Facility: CLINIC | Age: 71
End: 2021-12-23
Payer: MEDICARE

## 2021-12-23 DIAGNOSIS — S16.1XXD STRAIN OF NECK MUSCLE, SUBSEQUENT ENCOUNTER: Primary | ICD-10-CM

## 2021-12-23 PROCEDURE — 97110 THERAPEUTIC EXERCISES: CPT

## 2021-12-23 PROCEDURE — 97140 MANUAL THERAPY 1/> REGIONS: CPT

## 2021-12-28 ENCOUNTER — OFFICE VISIT (OUTPATIENT)
Dept: PHYSICAL THERAPY | Facility: CLINIC | Age: 71
End: 2021-12-28
Payer: MEDICARE

## 2021-12-28 DIAGNOSIS — S16.1XXD STRAIN OF NECK MUSCLE, SUBSEQUENT ENCOUNTER: Primary | ICD-10-CM

## 2021-12-28 PROCEDURE — 97140 MANUAL THERAPY 1/> REGIONS: CPT

## 2021-12-28 PROCEDURE — 97110 THERAPEUTIC EXERCISES: CPT

## 2021-12-28 PROCEDURE — 97112 NEUROMUSCULAR REEDUCATION: CPT

## 2021-12-30 ENCOUNTER — EVALUATION (OUTPATIENT)
Dept: PHYSICAL THERAPY | Facility: CLINIC | Age: 71
End: 2021-12-30
Payer: MEDICARE

## 2021-12-30 DIAGNOSIS — S16.1XXD STRAIN OF NECK MUSCLE, SUBSEQUENT ENCOUNTER: Primary | ICD-10-CM

## 2021-12-30 PROCEDURE — 97110 THERAPEUTIC EXERCISES: CPT

## 2021-12-30 PROCEDURE — 97140 MANUAL THERAPY 1/> REGIONS: CPT

## 2022-01-04 ENCOUNTER — OFFICE VISIT (OUTPATIENT)
Dept: PHYSICAL THERAPY | Facility: CLINIC | Age: 72
End: 2022-01-04
Payer: MEDICARE

## 2022-01-04 DIAGNOSIS — S16.1XXD STRAIN OF NECK MUSCLE, SUBSEQUENT ENCOUNTER: Primary | ICD-10-CM

## 2022-01-04 PROCEDURE — 97140 MANUAL THERAPY 1/> REGIONS: CPT

## 2022-01-04 PROCEDURE — 97110 THERAPEUTIC EXERCISES: CPT

## 2022-01-04 NOTE — PROGRESS NOTES
Daily Note     Today's date: 2022  Patient name: Ela Siegel  : 1950  MRN: [id number]  Referring provider: JANET Ayon  Dx:   Encounter Diagnosis     ICD-10-CM    1  Strain of neck muscle, subsequent encounter  S16  1XXD                   Subjective: patient reports radicular symptoms into L UE earlier this morning but has since subsided  He reports soreness in L shoulder after picking up desil fuel this morning  Objective: See treatment diary below      Assessment: Tactile/visual cueing to avoid UT compensation and ensure posterior tilt of b/l scapulae specifically during rows/extensions  Added bottoms up carry to continue working on functional carrying to work on  strength  Unable to added additional exercises/repetitions secondary to muscular soreness in L shoulder  Improvement in sensation in L hand post manual NG  Plan: Continue with current POC to address pt deficits        Precautions: type 2 diabetes, HTN    DATES:  12/6 12/8 12/15 12/17 12/21 12/23 12/28 12/30 1/4    Manuals             STM   TB: L UT, cerv extensors    L UT L UT   TB    Mobs (possible upglides)       L sided upglides at C5-7 L sided upglides at C5-7     Manual Cervical Traction   ES TB TB MARK ES ES TB    Manual NG    median, ulnar: TB median, ulnar : TB median/ulnar  MARK  Median and ulnar L, ES  median and unlar L, TB    Neuro Re-Ed             Wall clocks    2x5 yellow 2x5 yellow 2x5 yellow  2x5 yellow  b/l 2x5 red  2x5 red 2x5 red     ITY             Ulnar NG     x10 L        Median NG     x10 L         DNF  x10 2x10 5s 2x10 5s 2x10 5s         C/s banded pulls  ytb x15 fwd 15x fwd yellow 15x fwd yellow, 10x diagonal b/l yellow 15x fwd yellow, 10x diagonal b/l yellow 20x fwd YTB    10x b/l  YTB 20x fwd red, 10x b/l red      LT pressdown       10x 5s       LT wall lift        2x10 b/l red                    Ther Ex             Pt edu Pt pres, anatomy, ex tech, HEP, POC Updated HEP   FOTO, current goals/difficulties    assessment, progress, HEP updated     UBE     2 min/2 min 3'/3' 2/2 min 3/3 min 3/3 min     rows  Green 2x10 green 3x10 green 2x10 Green x10 then orange 2x10 Orange 2x10  25x orange 25x orange    ext  Yellow 2x10 Yellow 3x10 yellow 2x10 Green 3x10 Green   2x15 2x10 green w/ palms up 2x10 green w/ palms up 2x10 green w/palms up      B/l ER 15x red 2x10 red supine 2x10 yellow standing 2x10 yellow standing 2x10 ytb standing  2x15 ytb standing 2x10 red seated 2x10 red seated 2x10 red seated (consider increasing resistance NV)     horiz abd  2x10 supine red 2x10 red supine 2x10 yellow standing 2x10 yellow standing  2x15 ytb standing 2x10 red seated 2x10 red seated 2x10 red seated     T/s ext  5s x20 20x 5s 20x 5s 20x 5s  20x 5"        C/s SNAGs   x10 to L  10x to L  10x b/l  10x b/l  10"x10 b/l  15x b/l 15x b/l     Tricep pressdown   25x red  25x red 20x yelllow bl   20x green L       Tricep -  or kick back      5# b/l       Weighted carries for         3 laps 10lbs L 3 laps 10lbs L +bottoms up 5lbs 2 laps                  Ther Activity                                       Gait Training                                       Modalities                          HEP: [id number]

## 2022-01-05 ENCOUNTER — OFFICE VISIT (OUTPATIENT)
Dept: PHYSICAL THERAPY | Facility: CLINIC | Age: 72
End: 2022-01-05
Payer: MEDICARE

## 2022-01-05 DIAGNOSIS — S16.1XXD STRAIN OF NECK MUSCLE, SUBSEQUENT ENCOUNTER: Primary | ICD-10-CM

## 2022-01-05 PROCEDURE — 97112 NEUROMUSCULAR REEDUCATION: CPT

## 2022-01-05 PROCEDURE — 97110 THERAPEUTIC EXERCISES: CPT

## 2022-01-05 PROCEDURE — 97140 MANUAL THERAPY 1/> REGIONS: CPT

## 2022-01-05 NOTE — PROGRESS NOTES
Daily Note     Today's date: 2022  Patient name: Durga Tidwell  : 1950  MRN: [id number]  Referring provider: JANET Germain  Dx:   Encounter Diagnosis     ICD-10-CM    1  Strain of neck muscle, subsequent encounter  S16  1XXD                   Subjective: patient reports continued soreness in L shoulder today; less than yesterday but still present  Denies radicular symptoms  Objective: See treatment diary below      Assessment: Unable to perform full length of time of UBE secondary to discomfort in L shoulder elbow  Increase in symptoms with chin tucks this visit; trialed cerv retractions in seated and UT stretch which caused an increase in symptoms into all digits therefore held and resumed manual cerv traction with straight pull which decreased radicular symptoms to shoulder  Ended with  and reviewed benefits/parameters and added ulnar nerve glides to HEP which improved radicular symptoms  Majority of session was spent performing pt education and manuals  Plan: Continue with current POC to address pt deficits        Precautions: type 2 diabetes, HTN    DATES:  12/6 12/8 12/15 12/17 12/21 12/23 12/28 12/30 1/4 1/5   Manuals             STM   TB: L UT, cerv extensors    L UT L UT   TB TB L UT   Mobs (possible upglides)       L sided upglides at C5-7 L sided upglides at C5-7     Manual Cervical Traction   ES TB TB MARK ES ES TB TB   Manual NG    median, ulnar: TB median, ulnar : TB median/ulnar  MARK  Median and ulnar L, ES  median and unlar L, TB median and ulnar L: TB   Neuro Re-Ed             Wall clocks    2x5 yellow 2x5 yellow 2x5 yellow  2x5 yellow  b/l 2x5 red  2x5 red 2x5 red     ITY             Ulnar NG     x10 L     x10 radic to elbow   x10 radic to shoulder   Median NG     x10 L      x10 L no change   cerv retractions          trialed-p! 3x +technique   DNF  x10 2x10 5s 2x10 5s 2x10 5s      15x then increased in radic   C/s banded pulls  ytb x15 fwd 15x fwd yellow 15x fwd yellow, 10x diagonal b/l yellow 15x fwd yellow, 10x diagonal b/l yellow 20x fwd YTB    10x b/l  YTB 20x fwd red, 10x b/l red      LT pressdown       10x 5s       LT wall lift        2x10 b/l red                    Ther Ex             Pt edu Pt pres, anatomy, ex tech, HEP, POC Updated HEP   FOTO, current goals/difficulties    assessment, progress, HEP updated  Ulnar NG HEP creation and instruction, symptom measurement t/o tx, benefits of MH/NG   UBE     2 min/2 min 3'/3' 2/2 min 3/3 min 3/3 min  3/1 min    rows  Green 2x10 green 3x10 green 2x10 Green x10 then orange 2x10 Orange 2x10  25x orange 25x orange    ext  Yellow 2x10 Yellow 3x10 yellow 2x10 Green 3x10 Green   2x15 2x10 green w/ palms up 2x10 green w/ palms up 2x10 green w/palms up      B/l ER 15x red 2x10 red supine 2x10 yellow standing 2x10 yellow standing 2x10 ytb standing  2x15 ytb standing 2x10 red seated 2x10 red seated 2x10 red seated (consider increasing resistance NV)     UT stretch           10s x3 L    horiz abd  2x10 supine red 2x10 red supine 2x10 yellow standing 2x10 yellow standing  2x15 ytb standing 2x10 red seated 2x10 red seated 2x10 red seated     T/s ext  5s x20 20x 5s 20x 5s 20x 5s  20x 5"     20x 5"    C/s SNAGs   x10 to L  10x to L  10x b/l  10x b/l  10"x10 b/l  15x b/l 15x b/l  15x b/l    Tricep pressdown   25x red  25x red 20x yelllow bl   20x green L       Tricep -  or kick back      5# b/l       Weighted carries for         3 laps 10lbs L 3 laps 10lbs L +bottoms up 5lbs 2 laps                  Ther Activity                                       Gait Training                                       Modalities             MH          8 min +pt ed    HEP: [id number]

## 2022-01-07 ENCOUNTER — IMMUNIZATIONS (OUTPATIENT)
Dept: FAMILY MEDICINE CLINIC | Facility: HOSPITAL | Age: 72
End: 2022-01-07

## 2022-01-07 DIAGNOSIS — Z23 ENCOUNTER FOR IMMUNIZATION: Primary | ICD-10-CM

## 2022-01-07 PROCEDURE — 0001A COVID-19 PFIZER VACC 0.3 ML: CPT

## 2022-01-07 PROCEDURE — 91300 COVID-19 PFIZER VACC 0.3 ML: CPT

## 2022-01-11 ENCOUNTER — OFFICE VISIT (OUTPATIENT)
Dept: PHYSICAL THERAPY | Facility: CLINIC | Age: 72
End: 2022-01-11
Payer: MEDICARE

## 2022-01-11 DIAGNOSIS — E11.69 TYPE 2 DIABETES MELLITUS WITH OTHER SPECIFIED COMPLICATION, WITHOUT LONG-TERM CURRENT USE OF INSULIN (HCC): ICD-10-CM

## 2022-01-11 DIAGNOSIS — S16.1XXD STRAIN OF NECK MUSCLE, SUBSEQUENT ENCOUNTER: Primary | ICD-10-CM

## 2022-01-11 PROCEDURE — 97110 THERAPEUTIC EXERCISES: CPT

## 2022-01-11 PROCEDURE — 97140 MANUAL THERAPY 1/> REGIONS: CPT

## 2022-01-11 RX ORDER — METFORMIN HYDROCHLORIDE 500 MG/1
TABLET, EXTENDED RELEASE ORAL
Qty: 120 TABLET | Refills: 0 | Status: SHIPPED | OUTPATIENT
Start: 2022-01-11 | End: 2022-02-20

## 2022-01-11 NOTE — PROGRESS NOTES
Daily Note     Today's date: 2022  Patient name: Brandy Nunn  : 1950  MRN: [id number]  Referring provider: JANET Block  Dx:   Encounter Diagnosis     ICD-10-CM    1  Strain of neck muscle, subsequent encounter  S16  1XXD                   Subjective: patient reports less pain in neck since last visit and denies parasthesias  Objective: See treatment diary below      Assessment: Patient able to resume majority of exercise program this visit not having any instances of paraesthesias minus the UBE (consider holding NV)  Paresthesias decreased in L UE with manuals and manual NG  Intermittent tactile cues to avoid UT compensation  Added UT stretch this visit with improvement in flexibility and no pain  Progress as able  Plan: Continue with current POC to address pt deficits        Precautions: type 2 diabetes, HTN    DATES:  1/11  12/15 12/17 12/21 12/23 12/28 12/30 1/4 1/5   Manuals             STM  TB L UT    L UT L UT   TB TB L UT   Mobs (possible upglides)       L sided upglides at C5-7 L sided upglides at C5-7     Manual Cervical Traction TB  ES TB TB MARK ES ES TB TB   Manual UT stretch  TB            Manual NG TB    median, ulnar: TB median, ulnar : TB median/ulnar  MARK  Median and ulnar L, ES  median and unlar L, TB median and ulnar L: TB   Neuro Re-Ed             Wall clocks    2x5 yellow 2x5 yellow 2x5 yellow  2x5 yellow  b/l 2x5 red  2x5 red 2x5 red     ITY             Ulnar NG     x10 L     x10 radic to elbow   x10 radic to shoulder   Median NG     x10 L      x10 L no change   cerv retractions          trialed-p! 3x +technique   DNF 2x10 gentle  2x10 5s 2x10 5s 2x10 5s      15x then increased in radic   C/s banded pulls   15x fwd yellow 15x fwd yellow, 10x diagonal b/l yellow 15x fwd yellow, 10x diagonal b/l yellow 20x fwd YTB    10x b/l  YTB 20x fwd red, 10x b/l red      LT pressdown       10x 5s       LT wall lift        2x10 b/l red                    Ther Ex Pt edu     FOTO, current goals/difficulties    assessment, progress, HEP updated  Ulnar NG HEP creation and instruction, symptom measurement t/o tx, benefits of MH/NG   UBE 2 min fwd then increased radic (consider holding NV)    2 min/2 min 3'/3' 2/2 min 3/3 min 3/3 min  3/1 min    rows 2x10 orange   green 3x10 green 2x10 Green x10 then orange 2x10 Orange 2x10  25x orange 25x orange    ext 2x10 green w/palms up   Yellow 3x10 yellow 2x10 Green 3x10 Green   2x15 2x10 green w/ palms up 2x10 green w/ palms up 2x10 green w/palms up      B/l ER 2x10 red supine (consider increasing NV)  2x10 yellow standing 2x10 yellow standing 2x10 ytb standing  2x15 ytb standing 2x10 red seated 2x10 red seated 2x10 red seated (consider increasing resistance NV)     UT stretch  10s x10         10s x3 L    horiz abd 2x10 red supine   2x10 red supine 2x10 yellow standing 2x10 yellow standing  2x15 ytb standing 2x10 red seated 2x10 red seated 2x10 red seated     T/s ext 20x5"  20x 5s 20x 5s 20x 5s  20x 5"     20x 5"    C/s SNAGs    10x to L  10x b/l  10x b/l  10"x10 b/l  15x b/l 15x b/l  15x b/l    Tricep pressdown   25x red  25x red 20x yelllow bl   20x green L       Tricep -  or kick back      5# b/l       Weighted carries for  3 lap 10lbs L        3 laps 10lbs L 3 laps 10lbs L +bottoms up 5lbs 2 laps                  Ther Activity                                       Gait Training                                       Modalities             MH          8 min +pt ed    HEP: [id number]

## 2022-01-13 ENCOUNTER — OFFICE VISIT (OUTPATIENT)
Dept: PHYSICAL THERAPY | Facility: CLINIC | Age: 72
End: 2022-01-13
Payer: MEDICARE

## 2022-01-13 DIAGNOSIS — S16.1XXD STRAIN OF NECK MUSCLE, SUBSEQUENT ENCOUNTER: Primary | ICD-10-CM

## 2022-01-13 PROCEDURE — 97012 MECHANICAL TRACTION THERAPY: CPT

## 2022-01-13 PROCEDURE — 97110 THERAPEUTIC EXERCISES: CPT

## 2022-01-13 PROCEDURE — 97112 NEUROMUSCULAR REEDUCATION: CPT

## 2022-01-13 PROCEDURE — 97140 MANUAL THERAPY 1/> REGIONS: CPT

## 2022-01-18 ENCOUNTER — OFFICE VISIT (OUTPATIENT)
Dept: PHYSICAL THERAPY | Facility: CLINIC | Age: 72
End: 2022-01-18
Payer: MEDICARE

## 2022-01-18 DIAGNOSIS — S16.1XXD STRAIN OF NECK MUSCLE, SUBSEQUENT ENCOUNTER: Primary | ICD-10-CM

## 2022-01-18 PROCEDURE — 97112 NEUROMUSCULAR REEDUCATION: CPT

## 2022-01-18 PROCEDURE — 97110 THERAPEUTIC EXERCISES: CPT

## 2022-01-18 PROCEDURE — 97012 MECHANICAL TRACTION THERAPY: CPT

## 2022-01-18 PROCEDURE — 97140 MANUAL THERAPY 1/> REGIONS: CPT

## 2022-01-18 NOTE — PROGRESS NOTES
Daily Note     Today's date: 2022  Patient name: Dwain Avitia  : 1950  MRN: [id number]  Referring provider: JANET Romero  Dx:   Encounter Diagnosis     ICD-10-CM    1  Strain of neck muscle, subsequent encounter  S16  1XXD                   Subjective: patient reports soreness in L shoulder today after pushing  yesterday  Radicular symptoms only in L shoulder blade  He reports intermittent radic symptoms into L elbow over the weekend but denies in ever going into digits  Objective: See treatment diary below      Assessment: Added fwd/bw pulling using [place] to work on postural positioning when using  and  strength; denied any increases in pain or increased radic symptoms  Improvement in soft tissue quality to L UT post STM  Added wall clocks back into program to work on stability with good tolerance and appropriate fatigue post  Ended with mech cerv traction w/improvement in intensity of radicular symptoms in L scap following mechanical traction  Plan: Continue with current POC to address pt deficits        Precautions: type 2 diabetes, HTN    DATES:     Manuals             STM  TB L UT TB L UT TB L UT   L UT   TB TB L UT   Mobs (possible upglides)       L sided upglides at C5-7 L sided upglides at C5-7     Manual Cervical Traction TB mechanica traction 10 min 13lbs TB mechanical traction 10 min 14lbs TB (towel roll under L elbow)   MARK ES ES TB TB   Manual UT stretch  TB  TB          Manual NG TB  TB median and ulnar TB Median/ulnar   median/ulnar  MARK  Median and ulnar L, ES  median and unlar L, TB median and ulnar L: TB   Neuro Re-Ed             Wall clocks    2x5 red    2x5 yellow  b/l 2x5 red  2x5 red 2x5 red     ITY             Ulnar NG  x20 x20       x10 radic to elbow   x10 radic to shoulder   Median NG  x20 90 deg abd, elbow ext, wrist ext x20 by side (shoulder sore)       x10 L no change   cerv retractions trialed-p! 3x +technique   DNF 2x10 gentle 20x seated  x20 seated       15x then increased in radic   C/s banded pulls      20x fwd YTB    10x b/l  YTB 20x fwd red, 10x b/l red      LT pressdown       10x 5s       LT wall lift        2x10 b/l red                    Ther Ex             Pt edu  Reviewed contras to mechanical traction, symptom measurement t/o      assessment, progress, HEP updated  Ulnar NG HEP creation and instruction, symptom measurement t/o tx, benefits of MH/NG   UBE 2 min fwd then increased radic (consider holding NV) held    3'/3' 2/2 min 3/3 min 3/3 min  3/1 min    rows 2x10 orange  25x orange 25x orange    Orange 2x10  25x orange 25x orange    ext 2x10 green w/palms up  25x green w/palms up  25x green w/palms up   Green   2x15 2x10 green w/ palms up 2x10 green w/ palms up 2x10 green w/palms up      B/l ER 2x10 red supine (consider increasing NV) 2x10 red supine 20x grn seated    2x15 ytb standing 2x10 red seated 2x10 red seated 2x10 red seated (consider increasing resistance NV)     UT stretch  10s x10 10x 10s 10x 10s       10s x3 L    horiz abd 2x10 red supine  2x10 supine 20x seated grn    2x15 ytb standing 2x10 red seated 2x10 red seated 2x10 red seated     T/s ext 20x5" 10x then increase in radic    20x 5"     20x 5"    C/s SNAGs       10"x10 b/l  15x b/l 15x b/l  15x b/l    Tricep pressdown       20x green L       Tricep -  or kick back      5# b/l       Weighted carries for  3 lap 10lbs L        3 laps 10lbs L 3 laps 10lbs L +bottoms up 5lbs 2 laps     Resisted pushing/pulling [place]    20# 5x fwd/5x bw          Ther Activity                                       Gait Training                                       Modalities             MH          8 min +pt ed    HEP: [id number]

## 2022-01-20 ENCOUNTER — OFFICE VISIT (OUTPATIENT)
Dept: PHYSICAL THERAPY | Facility: CLINIC | Age: 72
End: 2022-01-20
Payer: MEDICARE

## 2022-01-20 DIAGNOSIS — S16.1XXD STRAIN OF NECK MUSCLE, SUBSEQUENT ENCOUNTER: Primary | ICD-10-CM

## 2022-01-20 PROCEDURE — 97110 THERAPEUTIC EXERCISES: CPT

## 2022-01-20 PROCEDURE — 97012 MECHANICAL TRACTION THERAPY: CPT

## 2022-01-20 PROCEDURE — 97112 NEUROMUSCULAR REEDUCATION: CPT

## 2022-01-20 PROCEDURE — 97140 MANUAL THERAPY 1/> REGIONS: CPT

## 2022-01-20 NOTE — PROGRESS NOTES
Daily Note     Today's date: 2022  Patient name: Danie Lopez  : 1950  MRN: [id number]  Referring provider: Samuel Boas, CRNP  Dx:   Encounter Diagnosis     ICD-10-CM    1  Strain of neck muscle, subsequent encounter  S16  1XXD                   Subjective: Patient reports having a good day yesterday but having increased pain this morning after walking his dog and looking up into the [place]ari that caused him to take a Tylenol which has since been helping  He reports overall symptoms are less intense but continues to have a dull achey pain in L inferior shoulder blade and L elbow  He denies soreness in UT today  Objective: See treatment diary below      Assessment: Added IYT exercise to work on shoulder strengthening,scapular stability and postural corrections with good tolerance; denied any increases in radicular symptoms t/o visit  Fatigue with wall clocks  Mechanical traction abolished symptoms in L shoulder blade and  L elbow  Plan: Continue with current POC to address pt deficits        Precautions: type 2 diabetes, HTN    DATES:     Manuals             STM  TB L UT TB L UT TB L UT TB-L UT  L UT   TB TB L UT   Mobs (possible upglides)       L sided upglides at C5-7 L sided upglides at C5-7     Manual Cervical Traction TB mechanica traction 10 min 13lbs TB mechanical traction 10 min 14lbs TB (towel roll under L elbow) mechanical traction 10 min 14lbs (towel rol under L eblow)  MARK ES ES TB TB   Manual UT stretch  TB  TB          Manual NG TB  TB median and ulnar TB Median/ulnar TB median/ulnar  median/ulnar  MARK  Median and ulnar L, ES  median and unlar L, TB median and ulnar L: TB   Neuro Re-Ed             Wall clocks    2x5 red  3x5 red   2x5 yellow  b/l 2x5 red  2x5 red 2x5 red     ITY    bands: yellow x20 "T", pink x20 "Y"         Ulnar NG  x20 x20 x20      x10 radic to elbow   x10 radic to shoulder   Median NG  x20 90 deg abd, elbow ext, wrist ext x20 by side (shoulder sore) x20 90deg abd, ext ext, wrist ext       x10 L no change   cerv retractions          trialed-p! 3x +technique   DNF 2x10 gentle 20x seated  x20 seated x20      15x then increased in radic   C/s banded pulls      20x fwd YTB    10x b/l  YTB 20x fwd red, 10x b/l red      LT pressdown       10x 5s       LT wall lift        2x10 b/l red                    Ther Ex             Pt edu  Reviewed contras to mechanical traction, symptom measurement t/o  FOTO    assessment, progress, HEP updated  Ulnar NG HEP creation and instruction, symptom measurement t/o tx, benefits of MH/NG   UBE 2 min fwd then increased radic (consider holding NV) held    3'/3' 2/2 min 3/3 min 3/3 min  3/1 min    rows 2x10 orange  25x orange 25x orange  25x orange (consider increasing NV)  Orange 2x10  25x orange 25x orange    ext 2x10 green w/palms up  25x green w/palms up  25x green w/palms up 25x grn w/  Green   2x15 2x10 green w/ palms up 2x10 green w/ palms up 2x10 green w/palms up      B/l ER 2x10 red supine (consider increasing NV) 2x10 red supine 20x grn seated  HEP  2x15 ytb standing 2x10 red seated 2x10 red seated 2x10 red seated (consider increasing resistance NV)     UT stretch  10s x10 10x 10s 10x 10s 10s x10      10s x3 L    horiz abd 2x10 red supine  2x10 supine 20x seated grn  HEP  2x15 ytb standing 2x10 red seated 2x10 red seated 2x10 red seated     T/s ext 20x5" 10x then increase in radic    20x 5"     20x 5"    C/s SNAGs       10"x10 b/l  15x b/l 15x b/l  15x b/l    Tricep pressdown       20x green L       Tricep -  or kick back      5# b/l       Weighted carries for  3 lap 10lbs L        3 laps 10lbs L 3 laps 10lbs L +bottoms up 5lbs 2 laps     Resisted pushing/pulling [place]    20# 5x fwd/5x bw 20# 5x fwd/5x bw         Ther Activity                                       Gait Training                                       Modalities             MH          8 min +pt ed    HEP: [id number]

## 2022-01-25 ENCOUNTER — OFFICE VISIT (OUTPATIENT)
Dept: PHYSICAL THERAPY | Facility: CLINIC | Age: 72
End: 2022-01-25
Payer: MEDICARE

## 2022-01-25 DIAGNOSIS — S16.1XXD STRAIN OF NECK MUSCLE, SUBSEQUENT ENCOUNTER: Primary | ICD-10-CM

## 2022-01-25 PROCEDURE — 97012 MECHANICAL TRACTION THERAPY: CPT

## 2022-01-25 PROCEDURE — 97112 NEUROMUSCULAR REEDUCATION: CPT

## 2022-01-25 PROCEDURE — 97140 MANUAL THERAPY 1/> REGIONS: CPT

## 2022-01-25 PROCEDURE — 97110 THERAPEUTIC EXERCISES: CPT

## 2022-01-25 NOTE — PROGRESS NOTES
Daily Note     Today's date: 2022  Patient name: Stef Alvarado  : 1950  MRN: [id number]  Referring provider: JANET Gutiérrez  Dx:   Encounter Diagnosis     ICD-10-CM    1  Strain of neck muscle, subsequent encounter  S16  1XXD        Start Time: 8124  Stop Time: 09  Total time in clinic (min): 63 minutes    Subjective: patient reports feeling good today and over the weekend; just soreness that feels like a knot in L UT  Objective: See treatment diary below      Assessment: Progressed resistance with rows with intermittent tactile cues to ensure correct exercise technique  Improvement in soft tissue quality post manuals to L UT  Denied any increases in radicular symptoms this visit  Continues to be most challenged with wall clocks secondary to fatigue  Plan: Continue with current POC to address pt deficits        Precautions: type 2 diabetes, HTN    DATES:     Manuals             STM  TB L UT TB L UT TB L UT TB-L UT TB-L UT    TB TB L UT   Mobs (possible upglides)        L sided upglides at C5-7     Manual Cervical Traction TB mechanica traction 10 min 13lbs TB mechanical traction 10 min 14lbs TB (towel roll under L elbow) mechanical traction 10 min 14lbs (towel rol under L eblow) mechanical traction 10 min 14lbs    ES TB TB   Manual UT stretch  TB  TB          Manual NG TB  TB median and ulnar TB Median/ulnar TB median/ulnar TB-median/ulnar    median and unlar L, TB median and ulnar L: TB   Neuro Re-Ed             Wall clocks    2x5 red  3x5 red  3x5 red   2x5 red 2x5 red     ITY    bands: yellow x20 "T", pink x20 "Y" bands: yellow x20 "T", pink x20 "y"        Ulnar NG  x20 x20 x20 x20     x10 radic to elbow   x10 radic to shoulder   Median NG  x20 90 deg abd, elbow ext, wrist ext x20 by side (shoulder sore) x20 90deg abd, ext ext, wrist ext  x20 90 deg abd, elbow ext, wrist ext     x10 L no change   cerv retractions     x20 seated trialed-p! 3x +technique   DNF 2x10 gentle 20x seated  x20 seated x20      15x then increased in radic   C/s banded pulls             LT pressdown             LT wall lift                           Ther Ex             Pt edu  Reviewed contras to mechanical traction, symptom measurement t/o  FOTO    assessment, progress, HEP updated  Ulnar NG HEP creation and instruction, symptom measurement t/o tx, benefits of MH/NG   UBE 2 min fwd then increased radic (consider holding NV) held   2 min retro   3/3 min 3/3 min  3/1 min    rows 2x10 orange  25x orange 25x orange  25x orange (consider increasing NV) 25x blue    25x orange 25x orange    ext 2x10 green w/palms up  25x green w/palms up  25x green w/palms up 25x grn w/ 25x grn palms down    2x10 green w/ palms up 2x10 green w/palms up      B/l ER 2x10 red supine (consider increasing NV) 2x10 red supine 20x grn seated  HEP    2x10 red seated 2x10 red seated (consider increasing resistance NV)     Levator scap stretch      10s x10        UT stretch  10s x10 10x 10s 10x 10s 10s x10 10s x10     10s x3 L    horiz abd 2x10 red supine  2x10 supine 20x seated grn  HEP    2x10 red seated 2x10 red seated     T/s ext 20x5" 10x then increase in radic        20x 5"    C/s SNAGs         15x b/l 15x b/l  15x b/l    Tricep pressdown             Tricep -  or kick back             Weighted carries for  3 lap 10lbs L        3 laps 10lbs L 3 laps 10lbs L +bottoms up 5lbs 2 laps     Resisted pushing/pulling [place]    20# 5x fwd/5x bw 20# 5x fwd/5x bw 20# 10x fwd, 10x bw        Ther Activity                                       Gait Training                                       Modalities             MH          8 min +pt ed    HEP: [id number]

## 2022-01-27 ENCOUNTER — EVALUATION (OUTPATIENT)
Dept: PHYSICAL THERAPY | Facility: CLINIC | Age: 72
End: 2022-01-27
Payer: MEDICARE

## 2022-01-27 DIAGNOSIS — S16.1XXD STRAIN OF NECK MUSCLE, SUBSEQUENT ENCOUNTER: Primary | ICD-10-CM

## 2022-01-27 PROCEDURE — 97140 MANUAL THERAPY 1/> REGIONS: CPT | Performed by: PHYSICAL MEDICINE & REHABILITATION

## 2022-01-27 PROCEDURE — 97110 THERAPEUTIC EXERCISES: CPT | Performed by: PHYSICAL MEDICINE & REHABILITATION

## 2022-01-27 NOTE — PROGRESS NOTES
PT Re-Evaluation     Today's date: 2022  Patient name: Liborio Macedo  : 1950  MRN: [id number]  Referring provider: JANET Bowles  Dx:   Encounter Diagnosis     ICD-10-CM    1  Strain of neck muscle, subsequent encounter  S16  1XXD                   Assessment  Assessment details: Patient has made significant progress since beginning therapy in objective and subjective measurements  Patient continues with intermittent mild tingling, but is appropriate for discharge at this time as he now has appropriate strategies to manage sxs  We will welcome patient's return with any further issues  Impairments: abnormal or restricted ROM, activity intolerance, impaired physical strength, lacks appropriate home exercise program and pain with function    Goals  STG: to be achieved by 4 weeks  Pt will demonstrate independence with provided HEP  - UPDATED 21  Pt will demonstrate decreased pain to no greater than 2/10  - MET 21  Pt will improve bilateral cervical rotation ROM to at least 77 deg  - ALMOST MET 21  Pt will improve L shoulder flex and abd strength to at least 4+/5 (TBA)  MET    LTG: to be achieved by discharge  Pt will improve FOTO score to at least 65  - MET 21  Pt will be able to perform yard work activities with no onset of L UE pain  Pt will demonstrate WNL cervical ROM in all planes  - MET  Pt will report no difficulty with all ADLs   - progressing  Pt will improve L  strength to within at least 10lbs of the R   - PROGRESSING 21      Plan  Plan details: D/c to HEP  Patient would benefit from: PT eval and skilled physical therapy  Planned modality interventions: cryotherapy and thermotherapy: hydrocollator packs  Planned therapy interventions: home exercise program, functional ROM exercises, flexibility, therapeutic exercise, therapeutic activities, stretching, strengthening, postural training, patient education, neuromuscular re-education, manual therapy and joint mobilization        Subjective Evaluation    History of Present Illness  Mechanism of injury: : patient notes his symptoms have been "really good" lately  Improved L hand strength  Pt is RHD  No significant cervical ROM restrictions  Patient notes reduced frequency and intensity of radicular sxs      % Improvement in Condition: 80%  Improvements: less tingling, less pain, improved neck and arm ROM, L hand strength  Continued Difficulties: weakness in L hand  Goals and Continued PT: more strength in hand, continue PT     Pt noting no tingling since last session  Reporting that he had some pain in the morning in his shoulder blade  Pt reporting some difficulty with opening dogs treats at home     Pain  Current pain ratin  At worst pain ratin          Objective     Postural Observations  Seated posture: fair  Standing posture: fair    Additional Postural Observation Details  Fwd head, rounded shoulders, elevated L shoulder    : continued mild forward head posture    Tenderness     Additional Tenderness Details  +TTP to the L UT with palpable TrP with peripheralization of symptoms     : TTP through L levator, no peripheralization/reproduction of sxs    Neurological Testing     Reflexes   Left   Biceps (C5/C6): absent (0)  Brachioradialis (C6): absent (0)  Parikh's reflex: negative    Right   Biceps (C5/C6): absent (0)  Brachioradialis (C6): absent (0)  Parikh's reflex: negative    Additional Neurological Details  UE dermatomes intact b/l via bilateral discrimination    UE myotomes impaired at L elbow (C7) and L thumb (C8)  : L C7 at elbow 4+/5, L C8 at thumb 4/5  : minimal myotomal deficits noted    Active Range of Motion     Additional Active Range of Motion Details  Cervical flex: 52  Cervical ext: 45, pain, peripheralize   Cervical R rot: 72, less pain in L UE  Cervical L rot: 73, pain in L shoulder   Cervical R SB: 37  Cervical L SB: 42    :   Cervical flex: 60  Cervical ext: 51  Cervical R rot: 76  Cervical L rot: 76     1/27  Flex: 64  Ext: 52  L rotation 75  R rotation 70    Passive Range of Motion   Cervical/Thoracic Spine   Cervical     Flexion (degrees):  WFL  Left lateral flexion (degrees):  WFL  Right lateral flexion (degrees):  WFL  Left rotation (degrees):  WFL  Right rotation (degrees):  [facility]    Joint Play     Hypomobile: C2, C3, C4, C5, C6 and C7     Pain: C6 and C7   C2 comments: cPA  C3 comments: cPA  C4 comments: cPA  C5 comments: cPA  C6 comments: cPA, peripheralize  C7 comments: cPA, peripheralize  Mechanical Assessment    Cervical    Seated retraction: repeated movements   Pain location: peripheralized  Pain intensity: worse  Pain level: increased  Seated right rotation: repeated movements  Pain location: no change    Thoracic      Lumbar      Tests   Cervical   Negative vertical compression and cervical distraction (inconsistent response)  Left   Negative Spurling's Test A  Right   Negative Spurling's Test A  Left Shoulder   Negative ULTT1 and ULTT2  Right Shoulder   Negative ULTT1 and ULTT2  Lumbar   Negative vertical compression       Additional Tests Details  + L shoulder abd sign per pt report (not observed in the clinic)     General Comments:      Cervical/Thoracic Comments   STRENGTH: R = 80lbs, L = 49lbs  12/30: L = 54lbs, 60lbs then 55lbs after manuals and 56lbs   1/27: 45 lbs, 50lbs, 50lbs elbow ext position 3, elbow bent: 55#, 60#, 55#    (+) median n  tension         Precautions: type 2 diabetes, HTN           DATES:  1/11 1/13 1/18 1/20 1/25 1/27 12/30 1/4 1/5   Manuals      RE/dc       STM  TB L UT TB L UT TB L UT TB-L UT TB-L UT LH   TB TB L UT   Mobs (possible upglides)        L sided upglides at C5-7     Manual Cervical Traction TB mechanica traction 10 min 13lbs TB mechanical traction 10 min 14lbs TB (towel roll under L elbow) mechanical traction 10 min 14lbs (towel rol under L eblow) mechanical traction 10 min 14lbs    ES TB TB   Manual UT stretch  TB  TB          Manual NG TB  TB median and ulnar TB Median/ulnar TB median/ulnar TB-median/ulnar LH   median and unlar L, TB median and ulnar L: TB   Neuro Re-Ed      1/27       Wall clocks    2x5 red  3x5 red  3x5 red   2x5 red 2x5 red     ITY    bands: yellow x20 "T", pink x20 "Y" bands: yellow x20 "T", pink x20 "y"        Ulnar NG  x20 x20 x20 x20     x10 radic to elbow   x10 radic to shoulder   Median NG  x20 90 deg abd, elbow ext, wrist ext x20 by side (shoulder sore) x20 90deg abd, ext ext, wrist ext  x20 90 deg abd, elbow ext, wrist ext     x10 L no change   cerv retractions     x20 seated      trialed-p! 3x +technique   DNF 2x10 gentle 20x seated  x20 seated x20      15x then increased in radic   C/s banded pulls             LT pressdown             LT wall lift                           Ther Ex             Pt edu  Reviewed contras to mechanical traction, symptom measurement t/o  FOTO    assessment, progress, HEP updated  Ulnar NG HEP creation and instruction, symptom measurement t/o tx, benefits of MH/NG   UBE 2 min fwd then increased radic (consider holding NV) held   2 min retro 2' retro  3/3 min 3/3 min  3/1 min    rows 2x10 orange  25x orange 25x orange  25x orange (consider increasing NV) 25x blue    25x orange 25x orange    ext 2x10 green w/palms up  25x green w/palms up  25x green w/palms up 25x grn w/ 25x grn palms down    2x10 green w/ palms up 2x10 green w/palms up      B/l ER 2x10 red supine (consider increasing NV) 2x10 red supine 20x grn seated  HEP    2x10 red seated 2x10 red seated (consider increasing resistance NV)     Levator scap stretch      10s x10        UT stretch  10s x10 10x 10s 10x 10s 10s x10 10s x10     10s x3 L    horiz abd 2x10 red supine  2x10 supine 20x seated grn  HEP    2x10 red seated 2x10 red seated     T/s ext 20x5" 10x then increase in radic        20x 5"    C/s SNAGs         15x b/l 15x b/l  15x b/l    Tricep pressdown             Tricep -  or kick back             Weighted carries for  3 lap 10lbs L        3 laps 10lbs L 3 laps 10lbs L +bottoms up 5lbs 2 laps     Resisted pushing/pulling [place]    20# 5x fwd/5x bw 20# 5x fwd/5x bw 20# 10x fwd, 10x bw        Ther Activity                                       Gait Training                                       Modalities                       8 min +pt ed    HEP: [id number]

## 2022-02-20 DIAGNOSIS — E11.69 TYPE 2 DIABETES MELLITUS WITH OTHER SPECIFIED COMPLICATION, WITHOUT LONG-TERM CURRENT USE OF INSULIN (HCC): ICD-10-CM

## 2022-02-20 RX ORDER — METFORMIN HYDROCHLORIDE 500 MG/1
TABLET, EXTENDED RELEASE ORAL
Qty: 120 TABLET | Refills: 0 | Status: SHIPPED | OUTPATIENT
Start: 2022-02-20 | End: 2022-03-21

## 2022-02-26 ENCOUNTER — APPOINTMENT (EMERGENCY)
Dept: RADIOLOGY | Facility: HOSPITAL | Age: 72
End: 2022-02-26
Payer: MEDICARE

## 2022-02-26 ENCOUNTER — HOSPITAL ENCOUNTER (EMERGENCY)
Facility: HOSPITAL | Age: 72
Discharge: HOME | End: 2022-02-26
Attending: EMERGENCY MEDICINE
Payer: MEDICARE

## 2022-02-26 VITALS
SYSTOLIC BLOOD PRESSURE: 169 MMHG | OXYGEN SATURATION: 99 % | TEMPERATURE: 98.2 F | RESPIRATION RATE: 18 BRPM | DIASTOLIC BLOOD PRESSURE: 75 MMHG | HEART RATE: 78 BPM

## 2022-02-26 DIAGNOSIS — M25.512 LEFT SHOULDER PAIN: Primary | ICD-10-CM
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
## 2022-02-26 DIAGNOSIS — M25.522 LEFT ELBOW PAIN: ICD-10-CM

## 2022-02-26 PROCEDURE — 96372 THER/PROPH/DIAG INJ SC/IM: CPT

## 2022-02-26 PROCEDURE — 73080 X-RAY EXAM OF ELBOW: CPT

## 2022-02-26 PROCEDURE — 99284 EMERGENCY DEPT VISIT MOD MDM: CPT | Performed by: PHYSICIAN ASSISTANT

## 2022-02-26 PROCEDURE — 99283 EMERGENCY DEPT VISIT LOW MDM: CPT

## 2022-02-26 PROCEDURE — 73030 X-RAY EXAM OF SHOULDER: CPT

## 2022-02-26 RX ORDER — ACETAMINOPHEN 325 MG/1
650 TABLET ORAL ONCE
Status: COMPLETED | OUTPATIENT
Start: 2022-02-26 | End: 2022-02-26

## 2022-02-26 RX ORDER — KETOROLAC TROMETHAMINE 30 MG/ML
15 INJECTION, SOLUTION INTRAMUSCULAR; INTRAVENOUS ONCE
Status: COMPLETED | OUTPATIENT
Start: 2022-02-26 | End: 2022-02-26

## 2022-02-26 RX ADMIN — ACETAMINOPHEN 650 MG: 325 TABLET ORAL at 19:47

## 2022-02-26 RX ADMIN — KETOROLAC TROMETHAMINE 15 MG: 30 INJECTION, SOLUTION INTRAMUSCULAR at 19:47

## 2022-02-27 NOTE — DISCHARGE INSTRUCTIONS
Rest, ice, and elevation of the left upper extremity  Tylenol and voltaren gel for pain relief  Please follow-up for reassessment if there is persistent symptoms and return immediately to the emergency department with any new or worsening symptoms

## 2022-03-17 ENCOUNTER — TELEPHONE (OUTPATIENT)
Dept: LAB | Facility: HOSPITAL | Age: 72
End: 2022-03-17

## 2022-03-21 DIAGNOSIS — E11.69 TYPE 2 DIABETES MELLITUS WITH OTHER SPECIFIED COMPLICATION, WITHOUT LONG-TERM CURRENT USE OF INSULIN (HCC): ICD-10-CM

## 2022-03-21 RX ORDER — METFORMIN HYDROCHLORIDE 500 MG/1
TABLET, EXTENDED RELEASE ORAL
Qty: 120 TABLET | Refills: 0 | Status: SHIPPED | OUTPATIENT
Start: 2022-03-21 | End: 2022-03-22

## 2022-03-22 DIAGNOSIS — E11.69 TYPE 2 DIABETES MELLITUS WITH OTHER SPECIFIED COMPLICATION, WITHOUT LONG-TERM CURRENT USE OF INSULIN (HCC): ICD-10-CM

## 2022-03-22 RX ORDER — METFORMIN HYDROCHLORIDE 500 MG/1
TABLET, EXTENDED RELEASE ORAL
Qty: 120 TABLET | Refills: 0 | Status: SHIPPED | OUTPATIENT
Start: 2022-03-22 | End: 2022-04-21

## 2022-03-23 ENCOUNTER — APPOINTMENT (OUTPATIENT)
Dept: LAB | Facility: HOSPITAL | Age: 72
End: 2022-03-23
Attending: INTERNAL MEDICINE
Payer: MEDICARE

## 2022-03-23 DIAGNOSIS — E11.9 TYPE 2 DIABETES MELLITUS WITHOUT COMPLICATION, WITHOUT LONG-TERM CURRENT USE OF INSULIN (HCC): Chronic | ICD-10-CM

## 2022-03-23 DIAGNOSIS — E78.2 MIXED HYPERLIPIDEMIA: ICD-10-CM

## 2022-03-23 LAB
ALBUMIN SERPL BCP-MCNC: 3.9 G/DL (ref 3.5–5)
ALP SERPL-CCNC: 56 U/L (ref 46–116)
ALT SERPL W P-5'-P-CCNC: 40 U/L (ref 12–78)
ANION GAP SERPL CALCULATED.3IONS-SCNC: 2 MMOL/L (ref 4–13)
AST SERPL W P-5'-P-CCNC: 20 U/L (ref 5–45)
BILIRUB SERPL-MCNC: 0.48 MG/DL (ref 0.2–1)
BUN SERPL-MCNC: 19 MG/DL (ref 5–25)
CALCIUM SERPL-MCNC: 9.2 MG/DL (ref 8.3–10.1)
CHLORIDE SERPL-SCNC: 109 MMOL/L (ref 100–108)
CHOLEST SERPL-MCNC: 161 MG/DL
CO2 SERPL-SCNC: 27 MMOL/L (ref 21–32)
CREAT SERPL-MCNC: 0.91 MG/DL (ref 0.6–1.3)
CREAT UR-MCNC: 158 MG/DL
ERYTHROCYTE [DISTWIDTH] IN BLOOD BY AUTOMATED COUNT: 12.1 % (ref 11.6–15.1)
EST. AVERAGE GLUCOSE BLD GHB EST-MCNC: 148 MG/DL
GFR SERPL CREATININE-BSD FRML MDRD: 85 ML/MIN/1.73SQ M
GLUCOSE P FAST SERPL-MCNC: 147 MG/DL (ref 65–99)
HBA1C MFR BLD: 6.8 %
HCT VFR BLD AUTO: 44.3 % (ref 36.5–49.3)
HDLC SERPL-MCNC: 47 MG/DL
HGB BLD-MCNC: 14.4 G/DL (ref 12–17)
LDLC SERPL CALC-MCNC: 86 MG/DL (ref 0–100)
MCH RBC QN AUTO: 29.6 PG (ref 26.8–34.3)
MCHC RBC AUTO-ENTMCNC: 32.5 G/DL (ref 31.4–37.4)
MCV RBC AUTO: 91 FL (ref 82–98)
MICROALBUMIN UR-MCNC: 76.8 MG/L (ref 0–20)
MICROALBUMIN/CREAT 24H UR: 49 MG/G CREATININE (ref 0–30)
NONHDLC SERPL-MCNC: 114 MG/DL
PLATELET # BLD AUTO: 339 THOUSANDS/UL (ref 149–390)
PMV BLD AUTO: 11 FL (ref 8.9–12.7)
POTASSIUM SERPL-SCNC: 4.8 MMOL/L (ref 3.5–5.3)
PROT SERPL-MCNC: 7.5 G/DL (ref 6.4–8.2)
RBC # BLD AUTO: 4.86 MILLION/UL (ref 3.88–5.62)
SODIUM SERPL-SCNC: 138 MMOL/L (ref 136–145)
TRIGL SERPL-MCNC: 138 MG/DL
WBC # BLD AUTO: 8.58 THOUSAND/UL (ref 4.31–10.16)

## 2022-03-23 PROCEDURE — 36415 COLL VENOUS BLD VENIPUNCTURE: CPT

## 2022-03-23 PROCEDURE — 82570 ASSAY OF URINE CREATININE: CPT

## 2022-03-23 PROCEDURE — 80053 COMPREHEN METABOLIC PANEL: CPT

## 2022-03-23 PROCEDURE — 82043 UR ALBUMIN QUANTITATIVE: CPT

## 2022-03-23 PROCEDURE — 80061 LIPID PANEL: CPT

## 2022-03-23 PROCEDURE — 85027 COMPLETE CBC AUTOMATED: CPT

## 2022-03-23 PROCEDURE — 83036 HEMOGLOBIN GLYCOSYLATED A1C: CPT

## 2022-03-29 ENCOUNTER — OFFICE VISIT (OUTPATIENT)
Dept: INTERNAL MEDICINE CLINIC | Facility: CLINIC | Age: 72
End: 2022-03-29
Payer: MEDICARE

## 2022-03-29 VITALS
HEART RATE: 73 BPM | WEIGHT: 210 LBS | OXYGEN SATURATION: 99 % | HEIGHT: 68 IN | BODY MASS INDEX: 31.83 KG/M2 | TEMPERATURE: 97.6 F | SYSTOLIC BLOOD PRESSURE: 124 MMHG | DIASTOLIC BLOOD PRESSURE: 70 MMHG

## 2022-03-29 DIAGNOSIS — E11.3293 TYPE 2 DIABETES MELLITUS WITH BOTH EYES AFFECTED BY MILD NONPROLIFERATIVE RETINOPATHY WITHOUT MACULAR EDEMA, WITHOUT LONG-TERM CURRENT USE OF INSULIN (HCC): Chronic | ICD-10-CM

## 2022-03-29 DIAGNOSIS — E11.29 TYPE 2 DIABETES MELLITUS WITH MICROALBUMINURIA, WITHOUT LONG-TERM CURRENT USE OF INSULIN (HCC): Primary | Chronic | ICD-10-CM

## 2022-03-29 DIAGNOSIS — S16.1XXS STRAIN OF NECK MUSCLE, SEQUELA: ICD-10-CM

## 2022-03-29 DIAGNOSIS — E11.69 HYPERLIPIDEMIA ASSOCIATED WITH TYPE 2 DIABETES MELLITUS (HCC): ICD-10-CM

## 2022-03-29 DIAGNOSIS — R80.9 TYPE 2 DIABETES MELLITUS WITH MICROALBUMINURIA, WITHOUT LONG-TERM CURRENT USE OF INSULIN (HCC): Primary | Chronic | ICD-10-CM

## 2022-03-29 DIAGNOSIS — E78.5 HYPERLIPIDEMIA ASSOCIATED WITH TYPE 2 DIABETES MELLITUS (HCC): ICD-10-CM

## 2022-03-29 DIAGNOSIS — I10 PRIMARY HYPERTENSION: ICD-10-CM

## 2022-03-29 PROBLEM — F17.210 DEPENDENCE ON NICOTINE FROM CIGARETTES: Chronic | Status: ACTIVE | Noted: 2017-09-05

## 2022-03-29 PROBLEM — E11.3299 TYPE 2 DIABETES MELLITUS WITH MILD NONPROLIFERATIVE RETINOPATHY WITHOUT MACULAR EDEMA, WITHOUT LONG-TERM CURRENT USE OF INSULIN (HCC): Chronic | Status: ACTIVE | Noted: 2021-03-01

## 2022-03-29 PROCEDURE — 99214 OFFICE O/P EST MOD 30 MIN: CPT | Performed by: INTERNAL MEDICINE

## 2022-03-29 RX ORDER — MELOXICAM 15 MG/1
15 TABLET ORAL DAILY PRN
Qty: 90 TABLET | Refills: 1 | Status: SHIPPED | OUTPATIENT
Start: 2022-03-29 | End: 2022-08-10

## 2022-03-29 NOTE — PROGRESS NOTES
[place]    NAME: Dwain Avitia  AGE: 80 y o  SEX: male  : 1950     DATE: 3/29/2022     Assessment and Plan:     1  Type 2 diabetes mellitus with microalbuminuria, without long-term current use of insulin ([facility] )  Assessment & Plan:  Most recent A1c was 6 8 % on 3/23/2022  Continue to keep A1c controlled  Continue lisinopril as prescribed  No changes to his diabetic medications  Will repeat labs in the future  Stay active  Orders:  -     Hemoglobin A1C; Future; Expected date: 2022  -     Basic metabolic panel; Future; Expected date: 2022    2  Type 2 diabetes mellitus with both eyes affected by mild nonproliferative retinopathy without macular edema, without long-term current use of insulin [facility])  Assessment & Plan:  He last saw his eye doctor in 2021  He has to call to make an appointment this year  Continue to keep blood sugars controlled  3  Hyperlipidemia associated with type 2 diabetes mellitus ([facility] )  Assessment & Plan:  Cholesterol is well controlled and at goal   Continue pravastatin 20 mg       4  Primary hypertension  Assessment & Plan:  Blood pressure is well controlled  Continue amlodipine and lisinopril  5  Strain of neck muscle, sequela  -     meloxicam (Mobic) 15 mg tablet; Take 1 tablet (15 mg total) by mouth daily as needed for moderate pain    BMI Counseling: Body mass index is 31 93 kg/m²  The BMI is above normal  Nutrition recommendations include encouraging healthy choices of fruits and vegetables, limiting drinks that contain sugar, moderation in carbohydrate intake and increasing intake of lean protein  Exercise recommendations include exercising 3-5 times per week  Rationale for BMI follow-up plan is due to patient being overweight or obese  Depression Screening and Follow-up Plan: Patient was screened for depression during today's encounter   They screened negative with a PHQ-2 score of 0        Return in about 4 months (around 8/4/2022) for Subsequent AWV  History of Present Illness:       Patient presents for routine follow-up  Denies any chest pain, shortness of breath, palpitations  His A1c continues to be well controlled  No changes with his health  Deals with shoulder pain at times  Meloxicam helps the pain  Review of Systems:     Review of Systems   Constitutional: Negative for activity change, appetite change and fatigue  Respiratory: Negative for apnea, cough, chest tightness, shortness of breath and wheezing  Cardiovascular: Negative for chest pain, palpitations and leg swelling  Gastrointestinal: Negative for abdominal distention, abdominal pain, blood in stool, constipation, diarrhea, nausea and vomiting  Musculoskeletal: Positive for arthralgias  Neurological: Negative for dizziness, weakness, light-headedness, numbness and headaches  Psychiatric/Behavioral: Negative for behavioral problems, confusion, hallucinations, sleep disturbance and suicidal ideas  The patient is not nervous/anxious  Objective:     /70   Pulse 73   Temp 97 6 °F (36 4 °C)   Ht 5' 8" (1 727 m)   Wt 95 3 kg (210 lb)   SpO2 99%   BMI 31 93 kg/m²     Physical Exam  Constitutional:       General: He is not in acute distress  Appearance: He is obese  He is not ill-appearing  Cardiovascular:      Rate and Rhythm: Normal rate and regular rhythm  Pulses: no weak pulses          Dorsalis pedis pulses are 2+ on the right side and 2+ on the left side  Posterior tibial pulses are 2+ on the right side and 2+ on the left side  Heart sounds: No murmur heard  Pulmonary:      Effort: Pulmonary effort is normal  No respiratory distress  Breath sounds: No wheezing or rales  Abdominal:      General: Bowel sounds are normal  There is no distension  Tenderness: There is no abdominal tenderness  Musculoskeletal:      Right lower leg: No edema  Left lower leg: No edema  Feet:      Right foot:      Skin integrity: No ulcer, skin breakdown, erythema, warmth, callus or dry skin  Left foot:      Skin integrity: No ulcer, skin breakdown, erythema, warmth, callus or dry skin  Neurological:      Mental Status: He is alert  Diabetic Foot Exam  Patient's shoes and socks removed  Right Foot/Ankle   Right Foot Inspection  Skin Exam: skin normal and skin intact  No dry skin, no warmth, no callus, no erythema, no maceration, no abnormal color, no pre-ulcer, no ulcer and no callus  Toe Exam: ROM and strength within normal limits  Sensory   Proprioception: intact  Monofilament testing: intact    Vascular  Capillary refills: < 3 seconds  The right DP pulse is 2+  The right PT pulse is 2+  Left Foot/Ankle  Left Foot Inspection  Skin Exam: skin normal and skin intact  No dry skin, no warmth, no erythema, no maceration, normal color, no pre-ulcer, no ulcer and no callus  Toe Exam: ROM and strength within normal limits  Sensory   Proprioception: intact  Monofilament testing: intact    Vascular  Capillary refills: < 3 seconds  The left DP pulse is 2+  The left PT pulse is 2+       Assign Risk Category  No deformity present  No loss of protective sensation  No weak pulses  Risk: 0    Christiana Lose, DO  MEDICAL [id number]

## 2022-03-29 NOTE — ASSESSMENT & PLAN NOTE
He last saw his eye doctor in March of 2021  He has to call to make an appointment this year  Continue to keep blood sugars controlled

## 2022-03-29 NOTE — ASSESSMENT & PLAN NOTE
Most recent A1c was 6 8 % on 3/23/2022  Continue to keep A1c controlled  Continue lisinopril as prescribed  No changes to his diabetic medications  Will repeat labs in the future  Stay active

## 2022-03-29 NOTE — PATIENT INSTRUCTIONS
10% - bad control"> 10% - bad control,Hemoglobin A1c (HbA1c) greater than 10% indicating poor diabetic control,Haemoglobin A1c greater than 10% indicating poor diabetic control">   Diabetes Mellitus Type 2 in Adults, Ambulatory Care   GENERAL INFORMATION:   Diabetes mellitus type 2  is a disease that affects how your body uses glucose (sugar)  Insulin helps move sugar out of the blood so it can be used for energy  Normally, when the blood sugar level increases, the pancreas makes more insulin  Type 2 diabetes develops because either the body cannot make enough insulin, or it cannot use the insulin correctly  After many years, your pancreas may stop making insulin  Common symptoms include the following:   · More hunger or thirst than usual     · Frequent urination     · Weight loss without trying     · Blurred vision  Seek immediate care for the following symptoms:   · Severe abdominal pain, or pain that spreads to your back  You may also be vomiting  · Trouble staying awake or focusing    · Shaking or sweating    · Blurred or double vision    · Breath has a fruity, sweet smell    · Breathing is deep and labored, or rapid and shallow    · Heartbeat is fast and weak  Treatment for diabetes mellitus type 2  includes keeping your blood sugar at a normal level  You must eat the right foods, and exercise regularly  You may also need medicine if you cannot control your blood sugar level with nutrition and exercise  Manage diabetes mellitus type 2:   · Check your blood sugar level  You will be taught how to check a small drop of blood in a glucose monitor  Ask your healthcare provider when and how often to check during the day  Ask your healthcare provider what your blood sugar levels should be when you check them  · Keep track of carbohydrates (sugar and starchy foods)  Your blood sugar level can get too high if you eat too many carbohydrates   Your dietitian will help you plan meals and snacks that have the right amount of carbohydrates  · Eat low-fat foods  Some examples are skinless chicken and low-fat milk  · Eat less sodium (salt)  Some examples of high-sodium foods to limit are soy sauce, potato chips, and soup  Do not add salt to food you cook  Limit your use of table salt  · Eat high-fiber foods  Foods that are a good source of fiber include vegetables, whole grain bread, and beans  · Limit alcohol  Alcohol affects your blood sugar level and can make it harder to manage your diabetes  Women should limit alcohol to 1 drink a day  Men should limit alcohol to 2 drinks a day  A drink of alcohol is 12 ounces of beer, 5 ounces of wine, or 1½ ounces of liquor  · Get regular exercise  Exercise can help keep your blood sugar level steady, decrease your risk of heart disease, and help you lose weight  Exercise for at least 30 minutes, 5 days a week  Include muscle strengthening activities 2 days each week  Work with your healthcare provider to create an exercise plan  · Check your feet each day  for injuries or open sores  Ask your healthcare provider for activities you can do if you have an open sore  · Quit smoking  If you smoke, it is never too late to quit  Smoking can worsen the problems that may occur with diabetes  Ask your healthcare provider for information about how to stop smoking if you are having trouble quitting  · Ask about your weight:  Ask healthcare providers if you need to lose weight, and how much to lose  Ask them to help you with a weight loss program  Even a 10 to 15 pound weight loss can help you manage your blood sugar level  · Carry medical alert identification  Wear medical alert jewelry or carry a card that says you have diabetes  Ask your healthcare provider where to get these items  · Ask about vaccines  Diabetes puts you at risk of serious illness if you get the flu, pneumonia, or hepatitis   Ask your healthcare provider if you should get a flu, pneumonia, or hepatitis B vaccine, and when to get the vaccine  Follow up with your healthcare provider as directed:  Write down your questions so you remember to ask them during your visits  CARE AGREEMENT:   You have the right to help plan your care  Learn about your health condition and how it may be treated  Discuss treatment options with your caregivers to decide what care you want to receive  You always have the right to refuse treatment  The above information is an  only  It is not intended as medical advice for individual conditions or treatments  Talk to your doctor, nurse or pharmacist before following any medical regimen to see if it is safe and effective for you  © 2014 [address] is for End User's use only and may not be sold, redistributed or otherwise used for commercial purposes  All illustrations and images included in CareNotes® are the copyrighted property of A D A M , Inc  or Taye Gilman

## 2022-04-21 DIAGNOSIS — E11.69 TYPE 2 DIABETES MELLITUS WITH OTHER SPECIFIED COMPLICATION, WITHOUT LONG-TERM CURRENT USE OF INSULIN (HCC): ICD-10-CM

## 2022-04-21 RX ORDER — METFORMIN HYDROCHLORIDE 500 MG/1
TABLET, EXTENDED RELEASE ORAL
Qty: 120 TABLET | Refills: 0 | Status: SHIPPED | OUTPATIENT
Start: 2022-04-21 | End: 2022-05-21

## 2022-05-21 DIAGNOSIS — I10 PRIMARY HYPERTENSION: ICD-10-CM

## 2022-05-21 DIAGNOSIS — E11.69 TYPE 2 DIABETES MELLITUS WITH OTHER SPECIFIED COMPLICATION, WITHOUT LONG-TERM CURRENT USE OF INSULIN (HCC): ICD-10-CM

## 2022-05-21 RX ORDER — AMLODIPINE BESYLATE 5 MG/1
TABLET ORAL
Qty: 90 TABLET | Refills: 1 | Status: SHIPPED | OUTPATIENT
Start: 2022-05-21

## 2022-05-21 RX ORDER — METFORMIN HYDROCHLORIDE 500 MG/1
TABLET, EXTENDED RELEASE ORAL
Qty: 120 TABLET | Refills: 0 | Status: SHIPPED | OUTPATIENT
Start: 2022-05-21 | End: 2022-06-18

## 2022-06-18 DIAGNOSIS — E11.69 TYPE 2 DIABETES MELLITUS WITH OTHER SPECIFIED COMPLICATION, WITHOUT LONG-TERM CURRENT USE OF INSULIN (HCC): ICD-10-CM

## 2022-06-18 RX ORDER — METFORMIN HYDROCHLORIDE 500 MG/1
TABLET, EXTENDED RELEASE ORAL
Qty: 120 TABLET | Refills: 0 | Status: SHIPPED | OUTPATIENT
Start: 2022-06-18 | End: 2022-06-20

## 2022-06-20 DIAGNOSIS — E11.69 TYPE 2 DIABETES MELLITUS WITH OTHER SPECIFIED COMPLICATION, WITHOUT LONG-TERM CURRENT USE OF INSULIN (HCC): ICD-10-CM

## 2022-06-20 RX ORDER — METFORMIN HYDROCHLORIDE 500 MG/1
TABLET, EXTENDED RELEASE ORAL
Qty: 120 TABLET | Refills: 0 | Status: SHIPPED | OUTPATIENT
Start: 2022-06-20

## 2022-07-12 ENCOUNTER — TELEPHONE (OUTPATIENT)
Dept: INTERNAL MEDICINE CLINIC | Facility: CLINIC | Age: 72
End: 2022-07-12

## 2022-07-12 ENCOUNTER — HOSPITAL ENCOUNTER (OUTPATIENT)
Dept: MRI IMAGING | Facility: HOSPITAL | Age: 72
Discharge: HOME | End: 2022-07-12
Attending: INTERNAL MEDICINE
Payer: MEDICARE

## 2022-07-12 DIAGNOSIS — M24.9 DERANGEMENT OF LEFT SHOULDER JOINT: ICD-10-CM

## 2022-07-12 DIAGNOSIS — M19.019 GLENOHUMERAL ARTHRITIS: ICD-10-CM

## 2022-07-12 DIAGNOSIS — M75.122 COMPLETE TEAR OF LEFT ROTATOR CUFF, UNSPECIFIED WHETHER TRAUMATIC: Primary | ICD-10-CM

## 2022-07-12 DIAGNOSIS — M75.82 TENDINITIS OF LEFT ROTATOR CUFF: ICD-10-CM

## 2022-07-12 PROCEDURE — 73221 MRI JOINT UPR EXTREM W/O DYE: CPT

## 2022-07-12 NOTE — TELEPHONE ENCOUNTER
----- Message from Koki Lundberg DO sent at 7/12/2022  4:17 PM EDT -----  MRI shows arthritis, tendonitis, and a large rotator cuff tear  Orthopedics evaluation is advised   I placed referral

## 2022-07-14 ENCOUNTER — TELEPHONE (OUTPATIENT)
Dept: INTERNAL MEDICINE CLINIC | Facility: CLINIC | Age: 72
End: 2022-07-14

## 2022-07-15 ENCOUNTER — TELEPHONE (OUTPATIENT)
Dept: ADMINISTRATIVE | Facility: OTHER | Age: 72
End: 2022-07-15

## 2022-07-15 NOTE — TELEPHONE ENCOUNTER
----- Message from Alan Shaw LPN sent at 8/23/5659 11:38 AM EDT -----  Regarding: DM eye  07/14/22 11:38 AM    Hello, our patient Stuart Mckenzie has had DM eye exam completed/performed  Please assist in obtaining the exclusion documentation by [facility]  The date of service is 2022       Thank you,  Alan Shaw LPN  [facility]

## 2022-07-15 NOTE — LETTER
Diabetic Eye Exam Form    Date Requested: 07/15/22  Patient: Lucille Terry  Patient : 1950   Referring Provider: Koki Lundberg DO    DIABETIC Eye Exam Date _______________________________    Type of Exam MUST be documented for Diabetic Eye Exams  Please CHECK ONE  Retinal Exam       Dilated Retinal Exam       OCT       Optomap-Iris Exam      Fundus Photography     Left Eye - Please check Retinopathy AND Type or No Retinopathy      Exam did show retinopathy    Exam did not show retinopathy         Mild     Proliferative           Moderate    Severe            None         Right Eye - Please check Retinopathy AND Type or No Retinopathy     Exam did show retinopathy    Exam did not show retinopathy         Mild     Proliferative        Moderate    Severe        None       Comments __________________________________________________________    Practice Providing Exam ______________________________________________    Exam Performed By (print name) _______________________________________      Provider Signature ___________________________________________________    These reports are needed for  compliance  Please fax this completed form and a copy of the Diabetic Eye Exam report to our office located at [place] [address] as soon as possible via [phone] attention [facility] [place]: Phone [phone]  We thank you for your assistance in treating our mutual patient

## 2022-07-15 NOTE — TELEPHONE ENCOUNTER
Upon review of the In Basket request and the patient's chart, initial outreach has been made via fax, please see Contacts section for details       Thank you  Selina Esqueda

## 2022-07-18 NOTE — TELEPHONE ENCOUNTER
Upon review of the In Basket request we have found as a result of outreach that patient did not have the requested item(s) completed  The last Diabetic Eye exam with this provider was 3-  This is already in patient chart and HM  Any additional questions or concerns should be emailed to the Practice Liaisons via [email or web address] email, please do not reply via In Basket      Thank you  Sukhjinder Concepcion

## 2022-07-25 ENCOUNTER — APPOINTMENT (OUTPATIENT)
Dept: LAB | Facility: HOSPITAL | Age: 72
End: 2022-07-25
Payer: MEDICARE

## 2022-07-25 ENCOUNTER — OFFICE VISIT (OUTPATIENT)
Dept: LAB | Facility: HOSPITAL | Age: 72
End: 2022-07-25
Payer: MEDICARE

## 2022-07-25 ENCOUNTER — OFFICE VISIT (OUTPATIENT)
Dept: OBGYN CLINIC | Facility: CLINIC | Age: 72
End: 2022-07-25
Payer: MEDICARE

## 2022-07-25 ENCOUNTER — HOSPITAL ENCOUNTER (OUTPATIENT)
Dept: RADIOLOGY | Facility: HOSPITAL | Age: 72
Discharge: HOME | End: 2022-07-25
Payer: MEDICARE

## 2022-07-25 VITALS
DIASTOLIC BLOOD PRESSURE: 63 MMHG | RESPIRATION RATE: 18 BRPM | WEIGHT: 214.8 LBS | BODY MASS INDEX: 32.55 KG/M2 | HEART RATE: 76 BPM | HEIGHT: 68 IN | SYSTOLIC BLOOD PRESSURE: 145 MMHG

## 2022-07-25 DIAGNOSIS — Z01.818 PREOPERATIVE CLEARANCE: ICD-10-CM

## 2022-07-25 DIAGNOSIS — M25.512 LEFT SHOULDER PAIN, UNSPECIFIED CHRONICITY: ICD-10-CM

## 2022-07-25 DIAGNOSIS — S46.002A INJURY OF LEFT ROTATOR CUFF, INITIAL ENCOUNTER: ICD-10-CM

## 2022-07-25 DIAGNOSIS — S46.012A TRAUMATIC COMPLETE TEAR OF LEFT ROTATOR CUFF, INITIAL ENCOUNTER: Primary | ICD-10-CM

## 2022-07-25 DIAGNOSIS — S46.012A TRAUMATIC COMPLETE TEAR OF LEFT ROTATOR CUFF, INITIAL ENCOUNTER: ICD-10-CM

## 2022-07-25 DIAGNOSIS — M75.22 BICEPS TENDINITIS OF LEFT UPPER EXTREMITY: ICD-10-CM

## 2022-07-25 DIAGNOSIS — R80.9 TYPE 2 DIABETES MELLITUS WITH MICROALBUMINURIA, WITHOUT LONG-TERM CURRENT USE OF INSULIN (HCC): Chronic | ICD-10-CM

## 2022-07-25 DIAGNOSIS — E11.29 TYPE 2 DIABETES MELLITUS WITH MICROALBUMINURIA, WITHOUT LONG-TERM CURRENT USE OF INSULIN (HCC): Chronic | ICD-10-CM

## 2022-07-25 LAB
ALBUMIN SERPL BCP-MCNC: 3.9 G/DL (ref 3.5–5)
ALP SERPL-CCNC: 65 U/L (ref 46–116)
ALT SERPL W P-5'-P-CCNC: 32 U/L (ref 12–78)
ANION GAP SERPL CALCULATED.3IONS-SCNC: 9 MMOL/L (ref 4–13)
AST SERPL W P-5'-P-CCNC: 15 U/L (ref 5–45)
BASOPHILS # BLD AUTO: 0.08 THOUSANDS/ΜL (ref 0–0.1)
BASOPHILS NFR BLD AUTO: 1 % (ref 0–1)
BILIRUB SERPL-MCNC: 0.34 MG/DL (ref 0.2–1)
BUN SERPL-MCNC: 25 MG/DL (ref 5–25)
CALCIUM SERPL-MCNC: 9.4 MG/DL (ref 8.3–10.1)
CHLORIDE SERPL-SCNC: 102 MMOL/L (ref 96–108)
CO2 SERPL-SCNC: 27 MMOL/L (ref 21–32)
CREAT SERPL-MCNC: 0.91 MG/DL (ref 0.6–1.3)
EOSINOPHIL # BLD AUTO: 0.44 THOUSAND/ΜL (ref 0–0.61)
EOSINOPHIL NFR BLD AUTO: 4 % (ref 0–6)
ERYTHROCYTE [DISTWIDTH] IN BLOOD BY AUTOMATED COUNT: 11.9 % (ref 11.6–15.1)
GFR SERPL CREATININE-BSD FRML MDRD: 84 ML/MIN/1.73SQ M
GLUCOSE P FAST SERPL-MCNC: 148 MG/DL (ref 65–99)
HCT VFR BLD AUTO: 39.9 % (ref 36.5–49.3)
HGB BLD-MCNC: 13.4 G/DL (ref 12–17)
IMM GRANULOCYTES # BLD AUTO: 0.05 THOUSAND/UL (ref 0–0.2)
IMM GRANULOCYTES NFR BLD AUTO: 1 % (ref 0–2)
LYMPHOCYTES # BLD AUTO: 2.7 THOUSANDS/ΜL (ref 0.6–4.47)
LYMPHOCYTES NFR BLD AUTO: 26 % (ref 14–44)
MCH RBC QN AUTO: 29.8 PG (ref 26.8–34.3)
MCHC RBC AUTO-ENTMCNC: 33.6 G/DL (ref 31.4–37.4)
MCV RBC AUTO: 89 FL (ref 82–98)
MONOCYTES # BLD AUTO: 0.73 THOUSAND/ΜL (ref 0.17–1.22)
MONOCYTES NFR BLD AUTO: 7 % (ref 4–12)
NEUTROPHILS # BLD AUTO: 6.22 THOUSANDS/ΜL (ref 1.85–7.62)
NEUTS SEG NFR BLD AUTO: 61 % (ref 43–75)
NRBC BLD AUTO-RTO: 0 /100 WBCS
PLATELET # BLD AUTO: 299 THOUSANDS/UL (ref 149–390)
PMV BLD AUTO: 10.6 FL (ref 8.9–12.7)
POTASSIUM SERPL-SCNC: 4.6 MMOL/L (ref 3.5–5.3)
PROT SERPL-MCNC: 7.5 G/DL (ref 6.4–8.4)
RBC # BLD AUTO: 4.49 MILLION/UL (ref 3.88–5.62)
SODIUM SERPL-SCNC: 138 MMOL/L (ref 135–147)
WBC # BLD AUTO: 10.22 THOUSAND/UL (ref 4.31–10.16)

## 2022-07-25 PROCEDURE — 83036 HEMOGLOBIN GLYCOSYLATED A1C: CPT

## 2022-07-25 PROCEDURE — 85025 COMPLETE CBC W/AUTO DIFF WBC: CPT

## 2022-07-25 PROCEDURE — 36415 COLL VENOUS BLD VENIPUNCTURE: CPT

## 2022-07-25 PROCEDURE — 80053 COMPREHEN METABOLIC PANEL: CPT

## 2022-07-25 PROCEDURE — 71046 X-RAY EXAM CHEST 2 VIEWS: CPT

## 2022-07-25 PROCEDURE — 93005 ELECTROCARDIOGRAM TRACING: CPT

## 2022-07-25 PROCEDURE — 99204 OFFICE O/P NEW MOD 45 MIN: CPT | Performed by: ORTHOPAEDIC SURGERY

## 2022-07-25 RX ORDER — CHLORHEXIDINE GLUCONATE 4 G/100ML
SOLUTION TOPICAL DAILY PRN
Status: CANCELLED | OUTPATIENT
Start: 2022-07-25

## 2022-07-25 RX ORDER — CHLORHEXIDINE GLUCONATE 0.12 MG/ML
15 RINSE ORAL ONCE
Status: CANCELLED | OUTPATIENT
Start: 2022-07-25 | End: 2022-07-25

## 2022-07-25 NOTE — PROGRESS NOTES
Patient Name:  Regino Damian  MRN:  [id number]    [address] [place] [address]     1  Traumatic complete tear of left rotator cuff, initial encounter  -     Ambulatory referral to Orthopedic Surgery  -     CBC and differential; Future  -     Comprehensive metabolic panel; Future  -     XR chest pa & lateral; Future; Expected date: 07/25/2022  -     ECG 12 lead; Future  -     Ambulatory Referral to Internal Medicine; Future  -     Arc 2 0  -     Case request operating room: ARTHROSCOPY SHOULDER- Left shoulder arthroscopic rotator cuff repair, subscapularis repair, possible open subscapularis repair, all associated procedures; Standing  -     Ambulatory referral to [facility]; Future  -     Comprehensive metabolic panel; Future  -     CBC and differential; Future  -     EKG 12 lead; Future  -     XR chest pa & lateral; Future; Expected date: 07/25/2022  -     Case request operating room: ARTHROSCOPY SHOULDER- Left shoulder arthroscopic rotator cuff repair, subscapularis repair, possible open subscapularis repair, all associated procedures    2  Left shoulder pain, unspecified chronicity  -     Ambulatory referral to Orthopedic Surgery  -     CBC and differential; Future  -     Comprehensive metabolic panel; Future  -     XR chest pa & lateral; Future; Expected date: 07/25/2022  -     ECG 12 lead; Future  -     Ambulatory Referral to Internal Medicine; Future    3  Preoperative clearance  -     CBC and differential; Future  -     Comprehensive metabolic panel; Future  -     XR chest pa & lateral; Future; Expected date: 07/25/2022  -     ECG 12 lead; Future  -     Ambulatory Referral to Internal Medicine; Future    4  Injury of left rotator cuff, initial encounter   -     CBC and differential; Future    5  Biceps tendinitis of left upper extremity      80-year-old RHD male with traumatic full-thickness left supraspinatus tear s/p injury on 2/25/2022  His MRI was reviewed with him today    We discussed at length today conservative options in the form of physical therapy, oral pain relievers and steroid injection versus operative treatment of this tear  Given that this appears to be a subacute traumatic injury with persistent symptoms I would recommend left shoulder arthroscopic supraspinatus rotator cuff repair, possible open subscapularis repair, possible biceps tenotomy and all associated procedures  Risks, benefits, and expected recovery for the above procedure discussed with the patient  Risks include but are not limited to bleeding, infection/wound breakdown, malunion, nonunion, stiffness, need for additional surgery, damage to nerves and vessels, cosmetic deformity with tenotomy, cramping, DVT, PE, failure of repair, need for subsequent surgery, anesthesia complications  Patient would like to proceed with the above procedure  Patient was given the opportunity to ask questions  All questions were answered to the patient's satisfaction  Detailed consent reviewed and signed by the surgeon  We also did discuss the need for postoperative physical therapy and immobilization  I also had a lengthy discussion with the patient regarding smoking cessation today, this as well as his history of diabetes put him at increased risk of infection and poor healing of the repair  I instructed him on the importance of quit smoking completely  He understands and plans to quit immediately  I will plan to see him back in the office postoperatively for suture removal     Chief Complaint     Left shoulder pain    History of the Present Illness     Saturnino Valdivia is a 71 y o  RHD male with left shoulder pain  Patient was referred for orthopedic consultation by their PCP Dr King Navas  On 02/25/2022 patient was walking his dog when he slipped and fell onto his outstretched left hand  He had immediate onset of pain in his left shoulder  The next day he was evaluated at the ED where x-rays were taken    Since this injury he continues to complain of persistent pain in the left shoulder, worse in the posterior aspect  Pain is moderate to severe depending on what he is doing at the time  He states his pain is worse with trying to lift the arm overhead, he can only lift it to a certain point and then needs to use his other arm to help it along  He does occasionally have left-sided neck pain that shoots down the extremity but he had been doing physical therapy for this prior to his injury  He has been using Tylenol on a muscle relaxer with some relief  Patient is diabetic but denies any history of cardiac disease  He does smoke 1 pack of cigarettes per day  Review of Systems     Review of Systems   Constitutional: Negative for appetite change and unexpected weight change  HENT: Negative for congestion and trouble swallowing  Eyes: Negative for visual disturbance  Respiratory: Negative for cough and shortness of breath  Cardiovascular: Negative for chest pain and palpitations  Gastrointestinal: Negative for nausea and vomiting  Endocrine: Negative for cold intolerance and heat intolerance  Musculoskeletal: Negative for gait problem  Skin: Negative for rash  Neurological: Negative for seizures  Physical Exam     /63   Pulse 76   Resp 18   Ht 5' 8" (1 727 m)   Wt 97 4 kg (214 lb 12 8 oz)   BMI 32 66 kg/m²     Left  Shoulder: Active range of motion   60 degrees forward flexion  80 degrees abduction  70 degrees external rotation   2 level restriction internal rotation    Passive range of motion   150 degrees of forward flexion     There is no obvious tenderness present  There is 4/5 strength with external rotation testing at the side      Empty can testing is positive   Belly press test is positive for mild pain and weakness  Kaur test is positive  Rich's test is negative    Speed's test is Negative  Bear hug test is positive for mild pain and weakness  The patient is neurovascularly intact distally in the extremity  Eyes:  Anicteric sclerae  Neck:  Supple  Cardiac: Regular rate and rhythm  Pulmonary:  Wheezes in upper lobes bilaterally  Skin:  Intact without erythema  Neurologic:  Sensation grossly intact to light touch  Psychiatric:  Mood and affect are appropriate  Data Review     I have personally reviewed pertinent films in PACS, and my interpretation follows:    X-rays of the left shoulder performed 2/26/2022 demonstrate no acute osseous abnormalities  MRI of the left shoulder performed 7/12/2022 demonstrates full thickness tear of supraspinatus with ret[facility] the [facility] joint, biep tendinosis       Past Medical History:   Diagnosis Date    Arthritis     Diabetes mellitus ([facility] )     Hyperlipidemia     Hypertension     Tobacco abuse        Past Surgical History:   Procedure Laterality Date    TONSILLECTOMY  1955       No Known Allergies    Current Outpatient Medications on File Prior to Visit   Medication Sig Dispense Refill    amLODIPine (NORVASC) 5 mg tablet TAKE 1 TABLET BY MOUTH DAILY 90 tablet 1    aspirin 81 mg chewable tablet Chew 4 tablets daily  0    Bydureon BCise 2 MG/0 85ML AUIJ INJECT 2MG UNDER THE SKIN    EVERY 7 DAYS IN THE ABDOMEN, THIGHS OR OUTER AREA OR UPPER ARM ROTATING INJECTION SITES      Cholecalciferol (VITAMIN D-3 PO) Take 400 Units by mouth daily      lisinopril (ZESTRIL) 20 mg tablet Take 20 mg by mouth daily        meloxicam (Mobic) 15 mg tablet Take 1 tablet (15 mg total) by mouth daily as needed for moderate pain 90 tablet 1    metFORMIN (GLUCOPHAGE-XR) 500 mg 24 hr tablet TAKE TWO TABLETS BY MOUTH TWICE A  tablet 0    multivitamin (THERAGRAN) TABS Take 1 tablet by mouth daily      pravastatin (PRAVACHOL) 20 mg tablet Take 20 mg by mouth daily      [DISCONTINUED] cyclobenzaprine (FLEXERIL) 10 mg tablet Take 1 tablet (10 mg total) by mouth daily at bedtime as needed for muscle spasms 10 tablet 0     No current facility-administered medications on file prior to visit  Social History     Tobacco Use    Smoking status: Current Every Day Smoker     Packs/day: 1 00     Years: 55 00     Pack years: 55 00     Types: Cigarettes     Start date: 4/26/1964    Smokeless tobacco: Never Used   Vaping Use    Vaping Use: Never used   Substance Use Topics    Alcohol use: No    Drug use: No       Family History   Problem Relation Age of Onset    Alzheimer's disease Mother     Heart block Father     Heart disease Father          Procedures Performed     No procedures performed today      Scribe Attestation    I,:  Liseth Madrigal am acting as a scribe while in the presence of the attending physician :       I,:  Rajani Vizcaino DO personally performed the services described in this documentation    as scribed in my presence :

## 2022-07-25 NOTE — H&P (VIEW-ONLY)
Patient Name:  Lucille Terry  MRN:  [id number]    [address] [place] [address]     1  Traumatic complete tear of left rotator cuff, initial encounter  -     Ambulatory referral to Orthopedic Surgery  -     CBC and differential; Future  -     Comprehensive metabolic panel; Future  -     XR chest pa & lateral; Future; Expected date: 07/25/2022  -     ECG 12 lead; Future  -     Ambulatory Referral to Internal Medicine; Future  -     Arc 2 0  -     Case request operating room: ARTHROSCOPY SHOULDER- Left shoulder arthroscopic rotator cuff repair, subscapularis repair, possible open subscapularis repair, all associated procedures; Standing  -     Ambulatory referral to [facility]; Future  -     Comprehensive metabolic panel; Future  -     CBC and differential; Future  -     EKG 12 lead; Future  -     XR chest pa & lateral; Future; Expected date: 07/25/2022  -     Case request operating room: ARTHROSCOPY SHOULDER- Left shoulder arthroscopic rotator cuff repair, subscapularis repair, possible open subscapularis repair, all associated procedures    2  Left shoulder pain, unspecified chronicity  -     Ambulatory referral to Orthopedic Surgery  -     CBC and differential; Future  -     Comprehensive metabolic panel; Future  -     XR chest pa & lateral; Future; Expected date: 07/25/2022  -     ECG 12 lead; Future  -     Ambulatory Referral to Internal Medicine; Future    3  Preoperative clearance  -     CBC and differential; Future  -     Comprehensive metabolic panel; Future  -     XR chest pa & lateral; Future; Expected date: 07/25/2022  -     ECG 12 lead; Future  -     Ambulatory Referral to Internal Medicine; Future    4  Injury of left rotator cuff, initial encounter   -     CBC and differential; Future    5  Biceps tendinitis of left upper extremity      78-year-old RHD male with traumatic full-thickness left supraspinatus tear s/p injury on 2/25/2022  His MRI was reviewed with him today    We discussed at length today conservative options in the form of physical therapy, oral pain relievers and steroid injection versus operative treatment of this tear  Given that this appears to be a subacute traumatic injury with persistent symptoms I would recommend left shoulder arthroscopic supraspinatus rotator cuff repair, possible open subscapularis repair, possible biceps tenotomy and all associated procedures  Risks, benefits, and expected recovery for the above procedure discussed with the patient  Risks include but are not limited to bleeding, infection/wound breakdown, malunion, nonunion, stiffness, need for additional surgery, damage to nerves and vessels, cosmetic deformity with tenotomy, cramping, DVT, PE, failure of repair, need for subsequent surgery, anesthesia complications  Patient would like to proceed with the above procedure  Patient was given the opportunity to ask questions  All questions were answered to the patient's satisfaction  Detailed consent reviewed and signed by the surgeon  We also did discuss the need for postoperative physical therapy and immobilization  I also had a lengthy discussion with the patient regarding smoking cessation today, this as well as his history of diabetes put him at increased risk of infection and poor healing of the repair  I instructed him on the importance of quit smoking completely  He understands and plans to quit immediately  I will plan to see him back in the office postoperatively for suture removal     Chief Complaint     Left shoulder pain    History of the Present Illness     Kaushik Baker is a 71 y o  RHD male with left shoulder pain  Patient was referred for orthopedic consultation by their PCP Dr Inés Holley  On 02/25/2022 patient was walking his dog when he slipped and fell onto his outstretched left hand  He had immediate onset of pain in his left shoulder  The next day he was evaluated at the ED where x-rays were taken    Since this injury he continues to complain of persistent pain in the left shoulder, worse in the posterior aspect  Pain is moderate to severe depending on what he is doing at the time  He states his pain is worse with trying to lift the arm overhead, he can only lift it to a certain point and then needs to use his other arm to help it along  He does occasionally have left-sided neck pain that shoots down the extremity but he had been doing physical therapy for this prior to his injury  He has been using Tylenol on a muscle relaxer with some relief  Patient is diabetic but denies any history of cardiac disease  He does smoke 1 pack of cigarettes per day  Review of Systems     Review of Systems   Constitutional: Negative for appetite change and unexpected weight change  HENT: Negative for congestion and trouble swallowing  Eyes: Negative for visual disturbance  Respiratory: Negative for cough and shortness of breath  Cardiovascular: Negative for chest pain and palpitations  Gastrointestinal: Negative for nausea and vomiting  Endocrine: Negative for cold intolerance and heat intolerance  Musculoskeletal: Negative for gait problem  Skin: Negative for rash  Neurological: Negative for seizures  Physical Exam     /63   Pulse 76   Resp 18   Ht 5' 8" (1 727 m)   Wt 97 4 kg (214 lb 12 8 oz)   BMI 32 66 kg/m²     Left  Shoulder: Active range of motion   60 degrees forward flexion  80 degrees abduction  70 degrees external rotation   2 level restriction internal rotation    Passive range of motion   150 degrees of forward flexion     There is no obvious tenderness present  There is 4/5 strength with external rotation testing at the side      Empty can testing is positive   Belly press test is positive for mild pain and weakness  Kaur test is positive  Mahoning's test is negative    Speed's test is Negative  Bear hug test is positive for mild pain and weakness  The patient is neurovascularly intact distally in the extremity  Eyes:  Anicteric sclerae  Neck:  Supple  Cardiac: Regular rate and rhythm  Pulmonary:  Wheezes in upper lobes bilaterally  Skin:  Intact without erythema  Neurologic:  Sensation grossly intact to light touch  Psychiatric:  Mood and affect are appropriate  Data Review     I have personally reviewed pertinent films in PACS, and my interpretation follows:    X-rays of the left shoulder performed 2/26/2022 demonstrate no acute osseous abnormalities  MRI of the left shoulder performed 7/12/2022 demonstrates full thickness tear of supraspinatus [facility], biep tendinosis       Past Medical History:   Diagnosis Date    Arthritis     Diabetes mellitus ([facility] [address] )     Hyperlipidemia     Hypertension     Tobacco abuse        Past Surgical History:   Procedure Laterality Date    TONSILLECTOMY  1955       No Known Allergies    Current Outpatient Medications on File Prior to Visit   Medication Sig Dispense Refill    amLODIPine (NORVASC) 5 mg tablet TAKE 1 TABLET BY MOUTH DAILY 90 tablet 1    aspirin 81 mg chewable tablet Chew 4 tablets daily  0    Bydureon BCise 2 MG/0 85ML AUIJ INJECT 2MG UNDER THE SKIN    EVERY 7 DAYS IN THE ABDOMEN, THIGHS OR OUTER AREA OR UPPER ARM ROTATING INJECTION SITES      Cholecalciferol (VITAMIN D-3 PO) Take 400 Units by mouth daily      lisinopril (ZESTRIL) 20 mg tablet Take 20 mg by mouth daily        meloxicam (Mobic) 15 mg tablet Take 1 tablet (15 mg total) by mouth daily as needed for moderate pain 90 tablet 1    metFORMIN (GLUCOPHAGE-XR) 500 mg 24 hr tablet TAKE TWO TABLETS BY MOUTH TWICE A  tablet 0    multivitamin (THERAGRAN) TABS Take 1 tablet by mouth daily      pravastatin (PRAVACHOL) 20 mg tablet Take 20 mg by mouth daily      [DISCONTINUED] cyclobenzaprine (FLEXERIL) 10 mg tablet Take 1 tablet (10 mg total) by mouth daily at bedtime as needed for muscle spasms 10 tablet 0     No current facility-administered medications on file prior to visit  Social History     Tobacco Use    Smoking status: Current Every Day Smoker     Packs/day: 1 00     Years: 55 00     Pack years: 55 00     Types: Cigarettes     Start date: 4/26/1964    Smokeless tobacco: Never Used   Vaping Use    Vaping Use: Never used   Substance Use Topics    Alcohol use: No    Drug use: No       Family History   Problem Relation Age of Onset    Alzheimer's disease Mother     Heart block Father     Heart disease Father          Procedures Performed     No procedures performed today      Scribe Attestation    I,:  Killian Dwyer am acting as a scribe while in the presence of the attending physician :       I,:  Naz Addison DO personally performed the services described in this documentation    as scribed in my presence :

## 2022-07-26 LAB
EST. AVERAGE GLUCOSE BLD GHB EST-MCNC: 166 MG/DL
HBA1C MFR BLD: 7.4 %

## 2022-07-27 LAB
ATRIAL RATE: 70 BPM
P AXIS: 62 DEGREES
PR INTERVAL: 202 MS
QRS AXIS: 69 DEGREES
QRSD INTERVAL: 70 MS
QT INTERVAL: 366 MS
QTC INTERVAL: 395 MS
T WAVE AXIS: 60 DEGREES
VENTRICULAR RATE: 70 BPM

## 2022-07-27 PROCEDURE — 93010 ELECTROCARDIOGRAM REPORT: CPT | Performed by: INTERNAL MEDICINE

## 2022-08-03 LAB
LEFT EYE DIABETIC RETINOPATHY: NORMAL
LEFT EYE DIABETIC RETINOPATHY: NORMAL
RIGHT EYE DIABETIC RETINOPATHY: NORMAL
RIGHT EYE DIABETIC RETINOPATHY: NORMAL

## 2022-08-04 RX ORDER — SENNOSIDES 8.6 MG
650 CAPSULE ORAL EVERY 8 HOURS PRN
COMMUNITY

## 2022-08-04 NOTE — PRE-PROCEDURE INSTRUCTIONS
Pre-Surgery Instructions:   Medication Instructions    acetaminophen (TYLENOL) 650 mg CR tablet Uses PRN- OK to take day of surgery    amLODIPine (NORVASC) 5 mg tablet Take day of surgery   aspirin 81 mg chewable tablet to check with PCP when to stop    Bydureon BCise 2 MG/0 85ML AUIJ Hold day of surgery   Cholecalciferol (VITAMIN D-3 PO) Stop taking 7 days prior to surgery   lisinopril (ZESTRIL) 20 mg tablet Hold day of surgery   meloxicam (Mobic) 15 mg tablet Stop taking 3 days prior to surgery   metFORMIN (GLUCOPHAGE-XR) 500 mg 24 hr tablet Hold day of surgery   multivitamin (THERAGRAN) TABS Stop taking 7 days prior to surgery   pravastatin (PRAVACHOL) 20 mg tablet Take day of surgery  Verbal pre-op instructions given to pt via phone  Pt verbalizes understanding

## 2022-08-05 ENCOUNTER — CONSULT (OUTPATIENT)
Dept: INTERNAL MEDICINE CLINIC | Facility: CLINIC | Age: 72
End: 2022-08-05
Payer: MEDICARE

## 2022-08-05 VITALS
HEIGHT: 68 IN | HEART RATE: 84 BPM | TEMPERATURE: 98.1 F | OXYGEN SATURATION: 97 % | BODY MASS INDEX: 32.34 KG/M2 | SYSTOLIC BLOOD PRESSURE: 116 MMHG | WEIGHT: 213.4 LBS | DIASTOLIC BLOOD PRESSURE: 58 MMHG

## 2022-08-05 DIAGNOSIS — Z87.891 PERSONAL HISTORY OF NICOTINE DEPENDENCE: ICD-10-CM

## 2022-08-05 DIAGNOSIS — Z23 ENCOUNTER FOR IMMUNIZATION: ICD-10-CM

## 2022-08-05 DIAGNOSIS — Z12.2 ENCOUNTER FOR SCREENING FOR LUNG CANCER: ICD-10-CM

## 2022-08-05 PROCEDURE — 99214 OFFICE O/P EST MOD 30 MIN: CPT

## 2022-08-05 PROCEDURE — G0438 PPPS, INITIAL VISIT: HCPCS

## 2022-08-05 NOTE — PROGRESS NOTES
Assessment and Plan:       Falls Plan of Care: balance, strength, and gait training instructions were provided  Preventive health issues were discussed with patient, and age appropriate screening tests were ordered as noted in patient's After Visit Summary  Personalized health advice and appropriate referrals for health education or preventive services given if needed, as noted in patient's After Visit Summary       History of Present Illness:     Patient presents for a Medicare Wellness Visit as well as a pre-op clearance    HPI   Patient Care Team:  Reny Garcia DO as PCP - General (Internal Medicine)     Problem List:     Patient Active Problem List   Diagnosis    Type 2 diabetes mellitus with microalbuminuria, without long-term current use of insulin (HCC)    Dependence on nicotine from cigarettes    Primary hypertension    Hyperlipidemia associated with type 2 diabetes mellitus ([facility] [address] [facility]    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 edema, without long-term current use of insulin ([facility])      Past Medical and Surgical History:     Past Medical History:   Diagnosis Date    Arthritis     Diabetes mellitus ([address] )     Hyperlipidemia     Hypertension     Left rotator cuff tear     Tobacco abuse     Wears glasses      Past Surgical History:   Procedure Laterality Date    COLONOSCOPY      TONSILLECTOMY  1955      Family History:     Family History   Problem Relation Age of Onset    Alzheimer's disease Mother     Heart block Father     Heart disease Father       Social History:     Social History     Socioeconomic History    Marital status: /Civil Union     Spouse name: None    Number of children: None    Years of education: None    Highest education level: None   Occupational History    None   Tobacco Use    Smoking status: Current Every Day Smoker     Packs/day: 0 50     Years: 55 00     Pack years: 27 50     Types: Cigarettes     Start date: 4/26/1964    Smokeless tobacco: Never Used   Vaping Use    Vaping Use: Never used   Substance and Sexual Activity    Alcohol use: Yes     Comment: rarely    Drug use: No    Sexual activity: None   Other Topics Concern    None   Social History Narrative    None     Social Determinants of Health     Financial Resource Strain: Not on file   Food Insecurity: Not on file   Transportation Needs: Not on file   Physical Activity: Inactive    Days of Exercise per Week: 0 days   [employer] of Exercise per Session: 0 min   Stress: Stress Concern Present    Feeling of Stress : Rather much   Social Connections: Not on file   Intimate Partner Violence: Not on file   Housing Stability: Not on file      Medications and Allergies:     Current Outpatient Medications   Medication Sig Dispense Refill    acetaminophen (TYLENOL) 650 mg CR tablet Take 650 mg by mouth every 8 (eight) hours as needed for mild pain      amLODIPine (NORVASC) 5 mg tablet TAKE 1 TABLET BY MOUTH DAILY 90 tablet 1    aspirin 81 mg chewable tablet Chew 4 tablets daily (Patient taking differently: Chew 81 mg daily Pt to check with PCP 8/5 when to stop)  0    Bydureon BCise 2 MG/0 85ML AUIJ Friday or saturdays      Cholecalciferol (VITAMIN D-3 PO) Take 400 Units by mouth daily      lisinopril (ZESTRIL) 20 mg tablet Take 20 mg by mouth daily        meloxicam (Mobic) 15 mg tablet Take 1 tablet (15 mg total) by mouth daily as needed for moderate pain 90 tablet 1    metFORMIN (GLUCOPHAGE-XR) 500 mg 24 hr tablet TAKE TWO TABLETS BY MOUTH TWICE A  tablet 0    multivitamin (THERAGRAN) TABS Take 1 tablet by mouth daily      pravastatin (PRAVACHOL) 20 mg tablet Take 20 mg by mouth daily       No current facility-administered medications for this visit       No Known Allergies   Immunizations:     Immunization History   Administered Date(s) Administered    COVID-19 PFIZER VACCINE 0 3 ML IM 03/29/2021, 04/19/2021, 01/07/2022    Influenza, high dose seasonal 0 7 mL 11/29/2021      Health Maintenance:         Topic Date Due    Lung Cancer Screening  10/01/2022    Colorectal Cancer Screening  10/03/2026    Hepatitis C Screening  Completed         Topic Date Due    Pneumococcal Vaccine: 65+ Years (1 - PCV) Never done    COVID-19 Vaccine (4 - Booster for Pfizer series) 05/07/2022    Influenza Vaccine (1) 09/01/2022      Medicare Screening Tests and Risk Assessments:     Valentin Thomas is here for his Subsequent Wellness visit  Health Risk Assessment:   Patient rates overall health as good  Patient feels that their physical health rating is same  Patient is satisfied with their life  Eyesight was rated as same  Hearing was rated as same  Patient feels that their emotional and mental health rating is same  Patients states they are sometimes angry  Patient states they are sometimes unusually tired/fatigued  Pain experienced in the last 7 days has been some  Patient's pain rating has been 5/10  Patient states that he has experienced no weight loss or gain in last 6 months  Fall Risk Screening: In the past year, patient has experienced: history of falling in past year    Number of falls: 1  Injured during fall?: Yes    Feels unsteady when standing or walking?: No    Worried about falling?: No      Home Safety:  Patient does not have trouble with stairs inside or outside of their home  Patient has working smoke alarms and has working carbon monoxide detector  Home safety hazards include: none  Nutrition:   Current diet is Regular  Medications:   Patient is currently taking over-the-counter supplements  OTC medications include: see medication list  Patient is able to manage medications  Activities of Daily Living (ADLs)/Instrumental Activities of Daily Living (IADLs):   Walk and transfer into and out of bed and chair?: Yes  Dress and groom yourself?: Yes    Bathe or shower yourself?: Yes    Feed yourself?  Yes  Do your laundry/housekeeping?: Yes  Manage your money, pay your bills and track your expenses?: Yes  Make your own meals?: Yes    Do your own shopping?: Yes    Advance Care Planning:   Living will: Yes    Durable POA for healthcare: Yes    Advanced directive: Yes      PREVENTIVE SCREENINGS      Cardiovascular Screening:    General: Screening Not Indicated and History Lipid Disorder      Diabetes Screening:     General: Screening Not Indicated and History Diabetes      Colorectal Cancer Screening:     General: Screening Current      Prostate Cancer Screening:    General: Screening Current      Abdominal Aortic Aneurysm (AAA) Screening:    Risk factors include: age between 73-67 yo and tobacco use        Lung Cancer Screening:     General: Screening Not Indicated      Hepatitis C Screening:    General: Screening Current    Screening, Brief Intervention, and Referral to Treatment (SBIRT)    Screening  Typical number of drinks in a day: 0  Typical number of drinks in a week: 0  Interpretation: Low risk drinking behavior      Single Item Drug Screening:  How often have you used an illegal drug (including marijuana) or a prescription medication for non-medical reasons in the past year? never    Single Item Drug Screen Score: 0  Interpretation: Negative screen for possible drug use disorder         JANET Simon

## 2022-08-05 NOTE — PROGRESS NOTES
INTERNAL MEDICINE PRE-OPERATIVE EVALUATION  [facility] OF [address] [place] [address]    NAME: Kassandra Sunshine  AGE: 71 y o  SEX: male  : 1950     DATE: 2022     Internal Medicine Pre-Operative Evaluation:     Chief Complaint: Pre-operative Evaluation     Surgery:  ARTHROSCOPY SHOULDER- Left shoulder arthroscopic rotator cuff repair, subscapularis repair, possible open subscapularis repair, all associated procedures (Left Shoulder)  Dr Roseann Cantu    Anticipated Date of Surgery: 08/10/2022    Referring Provider: Mark Richardson DO        History of Present Illness:     Kassandra Sunshine is a 71 y o  male who presents to the office today for a preoperative consultation at the request of surgeon, Dr Ayesha Noe , who plans on performing ARTHROSCOPY SHOULDER- Left shoulder arthroscopic rotator cuff repair, subscapularis repair, possible open subscapularis repair, all associated procedures (Left Shoulder)  on 08/10/2022  Planned anesthesia is general  Patient has a bleeding risk of: no recent abnormal bleeding  Patient does not have objections to receiving blood products if needed  Current anti-platelet/anti-coagulation medications that the patient is prescribed includes: Aspirin        Assessment of Chronic Conditions:   - Diabetes Mellitus: HGA1C 6 8   - Hypertension: controlled     Assessment of Cardiac Risk:  · Denies unstable or severe angina or MI in the last 6 weeks or history of stent placement in the last year   · Denies decompensated heart failure (e g  New onset heart failure, NYHA functional class IV heart failure, or worsening existing heart failure)  · Denies significant arrhythmias such as high grade AV block, symptomatic ventricular arrhythmia, newly recognized ventricular tachycardia, supraventricular tachycardia with resting heart rate >100, or symptomatic bradycardia  · Denies severe heart valve disease including aortic stenosis or symptomatic mitral stenosis Exercise Capacity:  · Able to walk 4 blocks without symptoms?: Yes  · Able to walk 2 flights without symptoms?: Yes    Prior Anesthesia Reactions: No     Personal history of venous thromboembolic disease? No    History of steroid use for >2 weeks within last year? No    STOP-BANG Sleep Apnea Screening Questionnaire:      Do you SNORE loudly (louder than talking or loud enough to be heard through closed doors)? No = 0 point   Do you often feel TIRED, fatigued, or sleepy during daytime? No = 0 point   Has anyone OBSERVED you stop breathing during your sleep? No = 0 point   Do you have or are you being treated for high blood pressure? Yes = 1 point   BMI more than 35 kg/m2? No = 0 point   AGE over 48years old? Yes = 1 point   NECK circumference > 16 inches (40 cm)? No = 0 point   Male GENDER? Yes = 1 point   TOTAL SCORE 3 = INTERMEDIATE risk of DENICE       Review of Systems:     Review of Systems   Constitutional: Negative for appetite change, chills, diaphoresis, fatigue, fever and unexpected weight change  HENT: Negative for postnasal drip and sneezing  Eyes: Negative for visual disturbance  Respiratory: Negative for chest tightness and shortness of breath  Cardiovascular: Negative for chest pain, palpitations and leg swelling  Gastrointestinal: Negative for abdominal pain and blood in stool  Endocrine: Negative for cold intolerance, heat intolerance, polydipsia, polyphagia and polyuria  Genitourinary: Negative for difficulty urinating, dysuria, frequency and urgency  Musculoskeletal: Negative for arthralgias and myalgias  Skin: Negative for rash and wound  Neurological: Negative for dizziness, weakness, light-headedness and headaches  Hematological: Negative for adenopathy  Psychiatric/Behavioral: Negative for confusion, dysphoric mood and sleep disturbance  The patient is not nervous/anxious           Problem List:     Patient Active Problem List   Diagnosis    Type 2 diabetes mellitus with microalbuminuria, without long-term current use of insulin ([facility])    Dependence on nicotine from cigarettes    Primary hypertension    Hyperlipidemia associated with type 2 diabetes mellitus ([facility] )    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 edema, without long-term current use of insulin ([facility])        Allergies:     No Known Allergies     Current Medications:       Current Outpatient Medications:     acetaminophen (TYLENOL) 650 mg CR tablet, Take 650 mg by mouth every 8 (eight) hours as needed for mild pain, Disp: , Rfl:     amLODIPine (NORVASC) 5 mg tablet, TAKE 1 TABLET BY MOUTH DAILY, Disp: 90 tablet, Rfl: 1    aspirin 81 mg chewable tablet, Chew 4 tablets daily (Patient taking differently: Chew 81 mg daily Pt to check with PCP 8/5 when to stop), Disp: , Rfl: 0    Bydureon BCise 2 MG/0 85ML AUIJ, Friday or saturdays, Disp: , Rfl:     Cholecalciferol (VITAMIN D-3 PO), Take 400 Units by mouth daily, Disp: , Rfl:     lisinopril (ZESTRIL) 20 mg tablet, Take 20 mg by mouth daily  , Disp: , Rfl:     meloxicam (Mobic) 15 mg tablet, Take 1 tablet (15 mg total) by mouth daily as needed for moderate pain, Disp: 90 tablet, Rfl: 1    metFORMIN (GLUCOPHAGE-XR) 500 mg 24 hr tablet, TAKE TWO TABLETS BY MOUTH TWICE A DAY, Disp: 120 tablet, Rfl: 0    multivitamin (THERAGRAN) TABS, Take 1 tablet by mouth daily, Disp: , Rfl:     pravastatin (PRAVACHOL) 20 mg tablet, Take 20 mg by mouth daily, Disp: , Rfl:      Past History:     Past Medical History:   Diagnosis Date    Arthritis     Diabetes mellitus ([facility] )     Hyperlipidemia     Hypertension     Left rotator cuff tear     Tobacco abuse     Wears glasses         Past Surgical History:   Procedure Laterality Date    COLONOSCOPY      TONSILLECTOMY  1955        Family History   Problem Relation Age of Onset    Alzheimer's disease Mother     Heart block Father     Heart disease Father         Social History Socioeconomic History    Marital status: /Civil Union     Spouse name: Not on file    Number of children: Not on file    Years of education: Not on file    Highest education level: Not on file   Occupational History    Not on file   Tobacco Use    Smoking status: Current Every Day Smoker     Packs/day: 0 50     Years: 55 00     Pack years: 27 50     Types: Cigarettes     Start date: 4/26/1964    Smokeless tobacco: Never Used   Vaping Use    Vaping Use: Never used   Substance and Sexual Activity    Alcohol use: Yes     Comment: rarely    Drug use: No    Sexual activity: Not on file   Other Topics Concern    Not on file   Social History Narrative    Not on file     Social Determinants of Health     Financial Resource Strain: Not on file   Food Insecurity: Not on file   Transportation Needs: Not on file   Physical Activity: Inactive    Days of Exercise per Week: 0 days    Minutes of Exercise per Session: 0 min   Stress: Stress Concern Present    Feeling of Stress : Rather much   Social Connections: Not on file   Intimate Partner Violence: Not on file   Housing Stability: Not on file        Physical Exam:      /58 (BP Location: Right arm, Patient Position: Sitting, Cuff Size: Standard) Comment: bp  Pulse 84   Temp 98 1 °F (36 7 °C) (Temporal) Comment: no  Ht 5' 8" (1 727 m)   Wt 96 8 kg (213 lb 6 4 oz)   SpO2 97%   BMI 32 45 kg/m²     Physical Exam  Vitals reviewed  Constitutional:       General: He is not in acute distress  Appearance: He is well-developed  He is not diaphoretic  HENT:      Head: Normocephalic and atraumatic  Eyes:      General: No scleral icterus  Right eye: No discharge  Left eye: No discharge  Conjunctiva/sclera: Conjunctivae normal       Pupils: Pupils are equal, round, and reactive to light  Neck:      Thyroid: No thyromegaly  Vascular: No JVD  Trachea: No tracheal deviation     Cardiovascular:      Rate and Rhythm: Normal rate and regular rhythm  Heart sounds: Normal heart sounds  No murmur heard  No friction rub  No gallop  Pulmonary:      Effort: Pulmonary effort is normal  No respiratory distress  Breath sounds: Normal breath sounds  No stridor  No wheezing or rales  Chest:      Chest wall: No tenderness  Abdominal:      General: Bowel sounds are normal  There is no distension  Palpations: Abdomen is soft  There is no mass  Tenderness: There is no abdominal tenderness  There is no guarding or rebound  Musculoskeletal:         General: No tenderness or deformity  Left shoulder: Decreased range of motion  Cervical back: Normal range of motion and neck supple  Lymphadenopathy:      Cervical: No cervical adenopathy  Skin:     General: Skin is warm and dry  Coloration: Skin is not pale  Findings: No erythema or rash  Neurological:      Mental Status: He is alert and oriented to person, place, and time  Cranial Nerves: No cranial nerve deficit  Motor: No abnormal muscle tone  Coordination: Coordination normal    Psychiatric:         Behavior: Behavior normal            Data:     Pre-operative work-up    Laboratory Results: I have personally reviewed the pertinent laboratory results/reports     EKG: I have personally reviewed pertinent reports  Chest x-ray: I have personally reviewed pertinent reports  Assessment:     1  Encounter for immunization     2  Encounter for screening for lung cancer     3  Personal history of nicotine dependence          Plan:     71 y o  male with planned surgery: ARTHROSCOPY SHOULDER- Left shoulder arthroscopic rotator cuff repair, subscapularis repair, possible open subscapularis repair, all associated procedures (Left Shoulder)        Cardiac Risk Estimation: per the Revised Cardiac Risk Index (Circ  100:1043, 1999), the patient's risk factors for cardiac complications include HTN, putting him in: RCI RISK CLASS II (1 risk factor, risk of major cardiac compl  appr  1 3%)  1  Further preoperative workup as follows:   - None; no further preoperative work-up is required    2  Medication Management/Recommendations:   - Patient has been instructed to avoid herbs or non-directed vitamins the week prior to surgery to ensure no drug interactions with perioperative surgical and anesthetic medications  - Hold metformin the morning of surgery and do not resume until 48 hours AFTER surgery to avoid risk of lactic acidosis  Do not resume if eGFR is < 30  - Patient has been instructed to avoid aspirin containing medications or non-steroidal anti-inflammatory drugs for the week preceding surgery  3  Prophylaxis for cardiac events with perioperative beta-blockers: not indicated  4  Patient requires further consultation with: None    Clearance  Patient is CLEARED for surgery without any additional cardiac testing       Brenda Jimenez [facility] OF [address] [address]  Phone#  [phone]  Fax#  [phone]

## 2022-08-08 ENCOUNTER — TELEPHONE (OUTPATIENT)
Dept: ADMINISTRATIVE | Facility: OTHER | Age: 72
End: 2022-08-08

## 2022-08-08 NOTE — LETTER
Diabetic Eye Exam Form    Date Requested: 22  Patient: Kassandra Sunshine  Patient : 1950   Referring Provider: Ashley Flores DO    DIABETIC Eye Exam Date _______________________________    Type of Exam MUST be documented for Diabetic Eye Exams  Please CHECK ONE  Retinal Exam       Dilated Retinal Exam       OCT       Optomap-Iris Exam      Fundus Photography     Left Eye - Please check Retinopathy AND Type or No Retinopathy      Exam did show retinopathy    Exam did not show retinopathy         Mild     Proliferative           Moderate    Severe            None         Right Eye - Please check Retinopathy AND Type or No Retinopathy     Exam did show retinopathy    Exam did not show retinopathy         Mild     Proliferative        Moderate    Severe        None       Comments __________________________________________________________    Practice Providing Exam ______________________________________________    Exam Performed By (print name) _______________________________________      Provider Signature ___________________________________________________    These reports are needed for  compliance  Please fax this completed form and a copy of the Diabetic Eye Exam report to our office located at [place] [address] as soon as possible via [phone] presley [employer]: Phone [phone]  We thank you for your assistance in treating our mutual patient     [facility] [phone]

## 2022-08-08 NOTE — LETTER
Diabetic Eye Exam Form    Date Requested: 08/10/22  Patient: Danie Lopez  Patient : 1950   Referring Provider: Luba Roy DO    DIABETIC Eye Exam Date _______________________________    Type of Exam MUST be documented for Diabetic Eye Exams  Please CHECK ONE  Retinal Exam       Dilated Retinal Exam       OCT       Optomap-Iris Exam      Fundus Photography     Left Eye - Please check Retinopathy AND Type or No Retinopathy      Exam did show retinopathy    Exam did not show retinopathy         Mild     Proliferative           Moderate    Severe            None         Right Eye - Please check Retinopathy AND Type or No Retinopathy     Exam did show retinopathy    Exam did not show retinopathy         Mild     Proliferative        Moderate    Severe        None       Comments __________________________________________________________    Practice Providing Exam ______________________________________________    Exam Performed By (print name) _______________________________________      Provider Signature ___________________________________________________    These reports are needed for  compliance  Please fax this completed form and a copy of the Diabetic Eye Exam report to our office located at [place] [address] as soon as possible via [phone] presley [employer]: Phone [phone]  We thank you for your assistance in treating our mutual patient     [facility] [phone] f [phone]

## 2022-08-08 NOTE — TELEPHONE ENCOUNTER
----- Message from Keyon Winter sent at 8/5/2022  2:17 PM EDT -----  08/05/22 2:17 PM    Hello, our patient Liborio Macedo has had Diabetic Eye Exam completed/performed   Please assist in updating the patient chart by making an External outreach to Dr Angel Martin facility located in [address]  The date of service is 08/03/2022    Thank you,  Harlan Gale MA  [facility]  [address]

## 2022-08-09 ENCOUNTER — ANESTHESIA EVENT (OUTPATIENT)
Dept: PERIOP | Facility: HOSPITAL | Age: 72
End: 2022-08-09
Payer: MEDICARE

## 2022-08-09 NOTE — TELEPHONE ENCOUNTER
Upon review of the In Basket request and the patient's chart, initial outreach has been made via fax, please see Contacts section for details       Thank you  Ml Das

## 2022-08-10 ENCOUNTER — HOSPITAL ENCOUNTER (OUTPATIENT)
Facility: HOSPITAL | Age: 72
Setting detail: OUTPATIENT SURGERY
Discharge: HOME | End: 2022-08-10
Attending: ORTHOPAEDIC SURGERY | Admitting: ORTHOPAEDIC SURGERY
Payer: MEDICARE

## 2022-08-10 ENCOUNTER — ANESTHESIA (OUTPATIENT)
Dept: PERIOP | Facility: HOSPITAL | Age: 72
End: 2022-08-10
Payer: MEDICARE

## 2022-08-10 VITALS
HEIGHT: 68 IN | BODY MASS INDEX: 31.71 KG/M2 | WEIGHT: 209.22 LBS | DIASTOLIC BLOOD PRESSURE: 58 MMHG | TEMPERATURE: 98.1 F | OXYGEN SATURATION: 100 % | RESPIRATION RATE: 22 BRPM | SYSTOLIC BLOOD PRESSURE: 127 MMHG | HEART RATE: 71 BPM

## 2022-08-10 DIAGNOSIS — T65.291A TOXIC EFFECT OF TOBACCO AND NICOTINE, ACCIDENTAL OR UNINTENTIONAL, INITIAL ENCOUNTER: Primary | ICD-10-CM

## 2022-08-10 DIAGNOSIS — M75.122 COMPLETE TEAR OF LEFT ROTATOR CUFF, UNSPECIFIED WHETHER TRAUMATIC: ICD-10-CM

## 2022-08-10 DIAGNOSIS — S46.812D PARTIAL TEAR OF LEFT SUBSCAPULARIS TENDON, SUBSEQUENT ENCOUNTER: ICD-10-CM

## 2022-08-10 LAB — GLUCOSE SERPL-MCNC: 121 MG/DL (ref 65–140)

## 2022-08-10 PROCEDURE — 29823 SHO ARTHRS SRG XTNSV DBRDMT: CPT | Performed by: ORTHOPAEDIC SURGERY

## 2022-08-10 PROCEDURE — 82948 REAGENT STRIP/BLOOD GLUCOSE: CPT

## 2022-08-10 PROCEDURE — 29826 SHO ARTHRS SRG DECOMPRESSION: CPT | Performed by: ORTHOPAEDIC SURGERY

## 2022-08-10 PROCEDURE — 29826 SHO ARTHRS SRG DECOMPRESSION: CPT | Performed by: PHYSICIAN ASSISTANT

## 2022-08-10 PROCEDURE — 29827 SHO ARTHRS SRG RT8TR CUF RPR: CPT | Performed by: PHYSICIAN ASSISTANT

## 2022-08-10 PROCEDURE — C1713 ANCHOR/SCREW BN/BN,TIS/BN: HCPCS | Performed by: ORTHOPAEDIC SURGERY

## 2022-08-10 PROCEDURE — C9290 INJ, BUPIVACAINE LIPOSOME: HCPCS | Performed by: ANESTHESIOLOGY

## 2022-08-10 PROCEDURE — 29827 SHO ARTHRS SRG RT8TR CUF RPR: CPT | Performed by: ORTHOPAEDIC SURGERY

## 2022-08-10 PROCEDURE — 29823 SHO ARTHRS SRG XTNSV DBRDMT: CPT | Performed by: PHYSICIAN ASSISTANT

## 2022-08-10 DEVICE — BIO-COMPOSITE CRKSCRW 5.5X14.7MM
Type: IMPLANTABLE DEVICE | Site: SHOULDER | Status: FUNCTIONAL
Brand: ARTHREX®

## 2022-08-10 DEVICE — DBL LOADED 4.75MM BIO-COMP SWVLK
Type: IMPLANTABLE DEVICE | Site: SHOULDER | Status: FUNCTIONAL
Brand: ARTHREX®

## 2022-08-10 RX ORDER — CHLORHEXIDINE GLUCONATE 0.12 MG/ML
15 RINSE ORAL ONCE
Status: COMPLETED | OUTPATIENT
Start: 2022-08-10 | End: 2022-08-10

## 2022-08-10 RX ORDER — BUPIVACAINE HYDROCHLORIDE 5 MG/ML
INJECTION, SOLUTION PERINEURAL
Status: COMPLETED | OUTPATIENT
Start: 2022-08-10 | End: 2022-08-10

## 2022-08-10 RX ORDER — ACETAMINOPHEN 325 MG/1
975 TABLET ORAL ONCE
Status: COMPLETED | OUTPATIENT
Start: 2022-08-10 | End: 2022-08-10

## 2022-08-10 RX ORDER — NEOSTIGMINE METHYLSULFATE 1 MG/ML
INJECTION INTRAVENOUS AS NEEDED
Status: DISCONTINUED | OUTPATIENT
Start: 2022-08-10 | End: 2022-08-10

## 2022-08-10 RX ORDER — ONDANSETRON 4 MG/1
4 TABLET, ORALLY DISINTEGRATING ORAL EVERY 6 HOURS PRN
Qty: 20 TABLET | Refills: 0 | Status: SHIPPED | OUTPATIENT
Start: 2022-08-10 | End: 2022-09-19

## 2022-08-10 RX ORDER — EPHEDRINE SULFATE 50 MG/ML
INJECTION INTRAVENOUS AS NEEDED
Status: DISCONTINUED | OUTPATIENT
Start: 2022-08-10 | End: 2022-08-10

## 2022-08-10 RX ORDER — PROPOFOL 10 MG/ML
INJECTION, EMULSION INTRAVENOUS CONTINUOUS PRN
Status: DISCONTINUED | OUTPATIENT
Start: 2022-08-10 | End: 2022-08-10

## 2022-08-10 RX ORDER — ONDANSETRON 2 MG/ML
4 INJECTION INTRAMUSCULAR; INTRAVENOUS ONCE AS NEEDED
Status: DISCONTINUED | OUTPATIENT
Start: 2022-08-10 | End: 2022-08-10 | Stop reason: HOSPADM

## 2022-08-10 RX ORDER — SODIUM CHLORIDE, SODIUM LACTATE, POTASSIUM CHLORIDE, CALCIUM CHLORIDE 600; 310; 30; 20 MG/100ML; MG/100ML; MG/100ML; MG/100ML
125 INJECTION, SOLUTION INTRAVENOUS CONTINUOUS
Status: DISCONTINUED | OUTPATIENT
Start: 2022-08-10 | End: 2022-08-10 | Stop reason: HOSPADM

## 2022-08-10 RX ORDER — CEFAZOLIN SODIUM 2 G/50ML
2000 SOLUTION INTRAVENOUS ONCE
Status: COMPLETED | OUTPATIENT
Start: 2022-08-10 | End: 2022-08-10

## 2022-08-10 RX ORDER — ONDANSETRON 2 MG/ML
INJECTION INTRAMUSCULAR; INTRAVENOUS AS NEEDED
Status: DISCONTINUED | OUTPATIENT
Start: 2022-08-10 | End: 2022-08-10

## 2022-08-10 RX ORDER — PROPOFOL 10 MG/ML
INJECTION, EMULSION INTRAVENOUS AS NEEDED
Status: DISCONTINUED | OUTPATIENT
Start: 2022-08-10 | End: 2022-08-10

## 2022-08-10 RX ORDER — GLYCOPYRROLATE 0.2 MG/ML
INJECTION INTRAMUSCULAR; INTRAVENOUS AS NEEDED
Status: DISCONTINUED | OUTPATIENT
Start: 2022-08-10 | End: 2022-08-10

## 2022-08-10 RX ORDER — OXYCODONE HYDROCHLORIDE AND ACETAMINOPHEN 5; 325 MG/1; MG/1
1 TABLET ORAL EVERY 4 HOURS PRN
Qty: 20 TABLET | Refills: 0 | Status: SHIPPED | OUTPATIENT
Start: 2022-08-10 | End: 2022-09-19

## 2022-08-10 RX ORDER — ROCURONIUM BROMIDE 10 MG/ML
INJECTION, SOLUTION INTRAVENOUS AS NEEDED
Status: DISCONTINUED | OUTPATIENT
Start: 2022-08-10 | End: 2022-08-10

## 2022-08-10 RX ORDER — FENTANYL CITRATE/PF 50 MCG/ML
25 SYRINGE (ML) INJECTION
Status: DISCONTINUED | OUTPATIENT
Start: 2022-08-10 | End: 2022-08-10 | Stop reason: HOSPADM

## 2022-08-10 RX ORDER — HYDROMORPHONE HCL/PF 1 MG/ML
0.5 SYRINGE (ML) INJECTION
Status: DISCONTINUED | OUTPATIENT
Start: 2022-08-10 | End: 2022-08-10 | Stop reason: HOSPADM

## 2022-08-10 RX ORDER — ONDANSETRON 2 MG/ML
4 INJECTION INTRAMUSCULAR; INTRAVENOUS EVERY 6 HOURS PRN
Status: CANCELLED | OUTPATIENT
Start: 2022-08-10

## 2022-08-10 RX ORDER — LIDOCAINE HYDROCHLORIDE 10 MG/ML
INJECTION, SOLUTION EPIDURAL; INFILTRATION; INTRACAUDAL; PERINEURAL AS NEEDED
Status: DISCONTINUED | OUTPATIENT
Start: 2022-08-10 | End: 2022-08-10

## 2022-08-10 RX ORDER — MIDAZOLAM HYDROCHLORIDE 2 MG/2ML
INJECTION, SOLUTION INTRAMUSCULAR; INTRAVENOUS
Status: COMPLETED | OUTPATIENT
Start: 2022-08-10 | End: 2022-08-10

## 2022-08-10 RX ORDER — ALBUTEROL SULFATE 2.5 MG/3ML
2.5 SOLUTION RESPIRATORY (INHALATION) ONCE AS NEEDED
Status: DISCONTINUED | OUTPATIENT
Start: 2022-08-10 | End: 2022-08-10 | Stop reason: HOSPADM

## 2022-08-10 RX ORDER — DIPHENHYDRAMINE HYDROCHLORIDE 50 MG/ML
12.5 INJECTION INTRAMUSCULAR; INTRAVENOUS ONCE AS NEEDED
Status: DISCONTINUED | OUTPATIENT
Start: 2022-08-10 | End: 2022-08-10 | Stop reason: HOSPADM

## 2022-08-10 RX ORDER — IBUPROFEN 600 MG/1
600 TABLET ORAL EVERY 8 HOURS PRN
Qty: 30 TABLET | Refills: 0 | Status: SHIPPED | OUTPATIENT
Start: 2022-08-10

## 2022-08-10 RX ORDER — IBUPROFEN 400 MG/1
800 TABLET ORAL ONCE
Status: DISCONTINUED | OUTPATIENT
Start: 2022-08-10 | End: 2022-08-10 | Stop reason: HOSPADM

## 2022-08-10 RX ORDER — OXYCODONE HYDROCHLORIDE AND ACETAMINOPHEN 5; 325 MG/1; MG/1
1 TABLET ORAL ONCE
Status: DISCONTINUED | OUTPATIENT
Start: 2022-08-10 | End: 2022-08-10 | Stop reason: HOSPADM

## 2022-08-10 RX ORDER — FENTANYL CITRATE 50 UG/ML
INJECTION, SOLUTION INTRAMUSCULAR; INTRAVENOUS
Status: COMPLETED | OUTPATIENT
Start: 2022-08-10 | End: 2022-08-10

## 2022-08-10 RX ORDER — SODIUM CHLORIDE, SODIUM LACTATE, POTASSIUM CHLORIDE, AND CALCIUM CHLORIDE .6; .31; .03; .02 G/100ML; G/100ML; G/100ML; G/100ML
IRRIGANT IRRIGATION AS NEEDED
Status: DISCONTINUED | OUTPATIENT
Start: 2022-08-10 | End: 2022-08-10 | Stop reason: HOSPADM

## 2022-08-10 RX ADMIN — FENTANYL CITRATE 50 MCG: 50 INJECTION, SOLUTION INTRAMUSCULAR; INTRAVENOUS at 13:37

## 2022-08-10 RX ADMIN — ONDANSETRON 4 MG: 2 INJECTION INTRAMUSCULAR; INTRAVENOUS at 16:45

## 2022-08-10 RX ADMIN — FENTANYL CITRATE 50 MCG: 50 INJECTION, SOLUTION INTRAMUSCULAR; INTRAVENOUS at 12:05

## 2022-08-10 RX ADMIN — ACETAMINOPHEN 975 MG: 325 TABLET ORAL at 11:13

## 2022-08-10 RX ADMIN — MIDAZOLAM HYDROCHLORIDE 2 MG: 1 INJECTION, SOLUTION INTRAMUSCULAR; INTRAVENOUS at 12:05

## 2022-08-10 RX ADMIN — PROPOFOL 160 MG: 10 INJECTION, EMULSION INTRAVENOUS at 13:37

## 2022-08-10 RX ADMIN — EPHEDRINE SULFATE 10 MG: 50 INJECTION, SOLUTION INTRAVENOUS at 13:50

## 2022-08-10 RX ADMIN — GLYCOPYRROLATE 0.4 MG: 0.2 INJECTION, SOLUTION INTRAMUSCULAR; INTRAVENOUS at 16:45

## 2022-08-10 RX ADMIN — CEFAZOLIN SODIUM 2000 MG: 2 SOLUTION INTRAVENOUS at 13:40

## 2022-08-10 RX ADMIN — BUPIVACAINE HYDROCHLORIDE 8 ML: 5 INJECTION, SOLUTION PERINEURAL at 12:06

## 2022-08-10 RX ADMIN — ROCURONIUM BROMIDE 10 MG: 10 INJECTION, SOLUTION INTRAVENOUS at 15:43

## 2022-08-10 RX ADMIN — CHLORHEXIDINE GLUCONATE 0.12% ORAL RINSE 15 ML: 1.2 LIQUID ORAL at 11:14

## 2022-08-10 RX ADMIN — ROCURONIUM BROMIDE 50 MG: 10 INJECTION, SOLUTION INTRAVENOUS at 13:37

## 2022-08-10 RX ADMIN — ROCURONIUM BROMIDE 20 MG: 10 INJECTION, SOLUTION INTRAVENOUS at 14:36

## 2022-08-10 RX ADMIN — EPHEDRINE SULFATE 5 MG: 50 INJECTION, SOLUTION INTRAVENOUS at 14:34

## 2022-08-10 RX ADMIN — LIDOCAINE HYDROCHLORIDE 50 MG: 10 INJECTION, SOLUTION EPIDURAL; INFILTRATION; INTRACAUDAL; PERINEURAL at 13:37

## 2022-08-10 RX ADMIN — NEOSTIGMINE METHYLSULFATE 3 MG: 1 INJECTION INTRAVENOUS at 16:45

## 2022-08-10 RX ADMIN — SODIUM CHLORIDE, SODIUM LACTATE, POTASSIUM CHLORIDE, AND CALCIUM CHLORIDE 125 ML/HR: .6; .31; .03; .02 INJECTION, SOLUTION INTRAVENOUS at 11:15

## 2022-08-10 RX ADMIN — ONDANSETRON 4 MG: 2 INJECTION INTRAMUSCULAR; INTRAVENOUS at 13:49

## 2022-08-10 RX ADMIN — BUPIVACAINE 12 ML: 13.3 INJECTION, SUSPENSION, LIPOSOMAL INFILTRATION at 12:06

## 2022-08-10 NOTE — TELEPHONE ENCOUNTER
Upon review of the In Basket request we were able to locate, review, and update the patient chart as requested for Diabetic Eye Exam     Any additional questions or concerns should be emailed to the Practice Liaisons via [email or web address] email, please do not reply via In Basket      Thank you  Krystina Youngblood

## 2022-08-10 NOTE — ANESTHESIA PREPROCEDURE EVALUATION
Procedure:  ARTHROSCOPY SHOULDER- Left shoulder arthroscopic rotator cuff repair, subscapularis repair, possible open subscapularis repair, all associated procedures (Left Shoulder)    Relevant Problems   CARDIO   (+) Hyperlipidemia associated with type 2 diabetes mellitus (HCC)   (+) Primary hypertension      ENDO   (+) Type 2 diabetes mellitus with microalbuminuria, without long-term current use of insulin (HCC)   (+) 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 edema, without long-term current use of insulin (HCC)      History Comments History Comments   Hyperlipidemia  Hypertension    Diabetes mellitus ([address] )  Arthritis    Tobacco abuse  Wears glasses    Left rotator cuff tear          Surgical History     Current as of 08/10/22 1028  TONSILLECTOMY COLONOSCOPY     Substance History     Current as of 08/10/22 1028  Smoking Status: Current Every Day Smoker - 27 5 pack years   Smokeless Tobacco Status: Never Used   Alcohol use: Yes, unspecified volume   Drug use: No       Physical Exam    Airway    Mallampati score: III  TM Distance: >3 FB  Neck ROM: full     Dental       Cardiovascular  Cardiovascular exam normal    Pulmonary  Rhonchi,     Other Findings      Medical Juan   Anesthesia Plan  ASA Score- 3     Anesthesia Type- general with ASA Monitors  Additional Monitors:   Airway Plan: ETT  Comment: IS nerve block agreed to  Plan Factors-Exercise tolerance (METS): >4 METS  Chart reviewed  EKG reviewed  Imaging results reviewed  Existing labs reviewed  Patient summary reviewed  Patient is a current smoker  Patient not instructed to abstain from smoking on day of procedure  Obstructive sleep apnea risk education given perioperatively  Induction- intravenous  Postoperative Plan- Plan for postoperative opioid use  Planned trial extubation    Informed Consent- Anesthetic plan and risks discussed with patient    I personally reviewed this patient with the CRNA  Discussed and agreed on the Anesthesia Plan with the CRNA            Cardiac Risk Estimation: per the Revised Cardiac Risk Index (Circ  100:1043, 1999), the patient's risk factors for cardiac complications include HTN, putting him in: RCI RISK CLASS II (1 risk factor, risk of major cardiac compl  appr  1 3%)      1  Further preoperative workup as follows:   - None; no further preoperative work-up is required     2  Medication Management/Recommendations:   - Patient has been instructed to avoid herbs or non-directed vitamins the week prior to surgery to ensure no drug interactions with perioperative surgical and anesthetic medications  - Hold metformin the morning of surgery and do not resume until 48 hours AFTER surgery to avoid risk of lactic acidosis  Do not resume if eGFR is < 30  - Patient has been instructed to avoid aspirin containing medications or non-steroidal anti-inflammatory drugs for the week preceding surgery      3  Prophylaxis for cardiac events with perioperative beta-blockers: not indicated      4  Patient requires further consultation with: None     Clearance  Patient is CLEARED for surgery without any additional cardiac testing

## 2022-08-10 NOTE — OP NOTE
OPERATIVE REPORT  PATIENT NAME: Cinthia Erickson    :  1950  MRN: [id number]  Pt Location: [facility] OR ROOM 04    SURGERY DATE: 8/10/2022    Surgeon(s) and Role:     * Mac Lewis,  - Primary     * Rony Melvin PA-C - Assisting    Preop Diagnosis:  Traumatic complete tear of left rotator cuff, initial encounter [S46 012A]  Full-thickness tear of supraspinatus with high-grade tearing of superior subscapularis tendon      Post-Op Diagnosis Codes:     * Traumatic complete tear of left rotator cuff, initial encounter [S46 012A]  Full-thickness tear of supraspinatus tendon   Full-thickness tear of superior 1/3 subscapularis tendon  Type 2 slap tear  Degenerative tearing of inferior labrum  Subacromial impingement a large subacromial spur    Procedure(s) (LRB):  Left Shoulder Arthroscopy, supraspinatus and Subscapularis Repair, Bicep Tenotomy, acromioplasty, extensive debridement (Left)    Specimen(s):  * No specimens in log *    Estimated Blood Loss:   Minimal    Drains:  * No LDAs found *    Anesthesia Type:   General w/ Interscalene Block    Operative Indications:  Traumatic complete tear of left rotator cuff, initial encounter [S46 012A]  Persistent pain affecting activities of daily living and limited active range of motion secondary to tear    Operative Findings:  Full-thickness tear supraspinatus tendon with retraction, full-thickness tear superior 1/3 of subscapularis tendon, type 2 slap tear, degenerative tearing of inferior labrum, subacromial impingement with large subacromial spur    Complications:   None    Procedure and Technique:    Antibiotics:  2g Ancef    Fluids:  1 2 L LR    Implants:  Arthrex 4 75 mm BioComposite SwiveLock anchor, 5 5 mm BioComposite triple loaded anchor x2    The patient was seen in the preoperative holding area and the appropriate site of surgery was confirmed and the patient was marked    Upon bringing the patient back to the operating room he was placed supine on the operating room table and general anesthesia was provided  The patient was placed in the lateral decubitus position with the left side up  The patient was held in position with a beanbag reinforce with a seat belt and tape  All bony prominences were appropriately padded and the brachial plexus was offloaded with the appropriate ramp pillow functioning as an axillary roll  An exam under anesthesia was performed and displayed near full passive range of motion with no instability  The left upper extremity was prepped and draped in the usual sterile fashion and placed in 10 lbs of in-line traction  A preoperative time-out was performed to once again confirm the site of surgery and procedure  A posterior arthroscopic viewing portal was made with an 11 blade  The arthroscopic camera was inserted  Upon entering the glenohumeral joint space an anterior portal was made under direct visualization with the aid of an 18 gauge spinal needle  An anterior cannula was inserted, followed by an arthroscopic probe, and a diagnostic arthroscopy was performed  Diagnostic arthroscopy revealed type 2 slap tear with degenerative tearing of the inferior labrum  Full-thickness tearing was noted of the superior subscapularis tendon as well as supraspinatus tendon with retraction the supraspinatus nearly to the glenoid  Grade 2 chondromalacia was noted over the inferior aspect of the glenoid  At this time a meniscal biter was inserted to release the biceps tendon  This was followed by an arthroscopic shaver used to debride the proximal biceps tendon stump, underside of supraspinatus tear, superior labrum, inferior labrum, and rotator interval to reveal coracoid process  Further debridement of the subscapularis was performed with arthroscopic shaver superiorly, posteriorly, and anteriorly  This was followed by an arthroscopic elevator to further release the tendon  Mobilization was confirmed with a rotator cuff grasper  Radiofrequency device was used to remove soft tissue from footprint on the lesser tuberosity  This was followed by an arthroscopic bur to lightly decorticate the repair site  A 4 5 mm awl was used followed by insertion of a 4 75 double loaded BioComposite SwiveLock anchor  Sutures were then passed in horizontal mattress fashion through the superior aspect of the subscapularis tendon  Accessory portal was made off of the anterior lateral acromion for suture management  Upon passage of all 4 limbs sutures were sequentially tied  Proper reduction was noted of this subscapularis tendon and reduction was maintained through terminal external rotation  Upon completion of arthroscopic work within the glenohumeral joint, the arthroscope was removed and subsequently placed into the subacromial space  A lateral portal was made 3 cm off of the lateral aspect of the acromion in line with the rotator cuff tear  Subacromial bursitis was noted  The arthroscopic shaver was inserted and a bursectomy was performed  Arthroscopic radiofrequency ablation device was used to remove soft tissue off the undersurface of the acromion  At this time, the arthroscopic camera was placed in the lateral portal and the remainder of the bursectomy was performed posteriorly  A large subacromial spur was visualized on the inferior aspect of the anterolateral acromion  An arthroscopic aniket was used to perform an acromioplasty  Next, attention was turned to the rotator cuff tear  Improved reduction had been noted since repair of subscapularis tendon  After visualization of the rotator cuff and associated tear, the tear morphology was characterized  Soft tissue was removed off the footprint on greater tuberosity  An arthroscopic bur was used to lightly decorticate the rotator cuff footprint  An accessory anterolateral was made to improve trajectory to rotator cuff tear    The supraspinatus tendon was properly released with arthroscopic shaver and mobilized to the appropriate point on the rotator cuff footprint without over tensioning  Next, previously made accessory portal was used for placement of anchors  A 4 5 mm tap was inserted followed by a 5 5 mm triple loaded anchor posteriorly in the footprint  This process was repeated anteriorly to 1st anchor     At this time sutures were passed through the rotator cuff tendon near the musculotendinous junction from anterior to posterior  Sutures were then sequentially tied from anterior to posterior, noting proper reduction of the supraspinatus tendon  Final repair was visualized and probed  Proper reduction and tensioning of the rotator cuff repair was confirmed through internal and external rotation and final arthroscopic pictures were taken  Arthroscopic incisions were closed with 3-0 nylon suture  A sterile dressing was applied and the patient was placed in a sling and abduction pillow  The patient tolerated the procedure well and was transferred to the PACU without complication  I was present for the entire procedure, A qualified resident physician was not available and A physician assistant, Nhan Gallegos PA-C, was required during the procedure for patient positioning, assistance with arthroscopic camera and instrumentation due to the minimally invasive nature of the surgical case, anchor insertion, and suturing      Patient Disposition:  PACU       SIGNATURE: Sahil Dunham DO  DATE: August 10, 2022  TIME: 4:55 PM

## 2022-08-10 NOTE — DISCHARGE INSTRUCTIONS
Shoulder Surgery:  Postoperative Instructions  Dr Frances Salinas may resume your regular diet as soon as possible    Medication    Take the pain medication as prescribed   Take pain medication with food   While taking pain medications, you may NOT operate a vehicle, heavy machinery, or appliances   While taking pain medication, you may NOT drink alcoholic beverages   If you have any reactions to your medications, stop taking them and call my office   Please keep in mind that constipation is a very common side effect of taking narcotic pain medication  Take precautions to prevent constipation:  o Drink plenty of water (6-8 glasses of 8 oz  a day)  o Avoid alcohol, caffeine, and dairy products  o Eat plenty of fiber (fruits, vegetables, and whole grains)  o Take an over the counter stool softener (Colace or Dulcolax)  o Patients that have had upper extremity surgery should take frequent walks    Activity    Minimize activity the day of surgery    DO NOT USE HEAT    Please keep ice applied to the shoulder for at least the first 72 hours or as long as pain or swelling persists  Do not apply ice directly to skin, or allow water to leak on your dressing  Place a pillow behind your elbow while lying down or sleeping  Sleeping in a more upright position (recliner) may be more comfortable initially  You should NOT roll onto the operative shoulder  You are in an immobilizer or sling and it should remain on at all times except when showering until your first postoperative visit   Open and close your hand 10 times every day for about 1 minute  You may also flex and extend your elbow each day when your sling is removed for showering  Be sure to keep your elbow at your side  DO NOT actively (on your own) lift your operative arm away from the side of your body or rotate it out away from your body  DO NOT use exercise equipment unless otherwise instructed       Sling/ Immobilizer    Use the sling/immobilizer at all times and while sleeping until your next office appointment  Showering    You may shower 4 days after surgery unless told otherwise  DO NOT immerse the shoulder under water and DO NOT rub the incision  Place new Band-Aids over the sutures after showering  You may sponge bathe for the first 72 hours, taking care to keep the dressing clean and dry  Dressing Care    It is normal to get some bloody wound seepage through the bandage  DO NOT BE ALARMED  If the dressing gets soaked with wound seepage, place more gauze over the dressing and secure with tape  If this soaks through remove the entire dressing and replace with STERILE gauze and tape    Remove all dressings at 72 hours after surgery  If there is still some wound seepage, apply a fresh STERILE gauze over the incisions and secure with tape  DO NOT TOUCH OR REMOVE THE SUTURES!! Emergency/Follow-Up   Please notify my office at [phone] if you develop any fever (101 deg or above), unexpected warmth, redness or swelling  Please call if your fingers become cold, purple, numb, or there is excessive bleeding  Please call the office within 24 hours at [phone] to schedule a follow up appt within 7-10 days from surgery

## 2022-08-10 NOTE — ANESTHESIA PROCEDURE NOTES
Peripheral Block    Patient location during procedure: holding area  Start time: 8/10/2022 12:05 PM  Reason for block: at surgeon's request and post-op pain management  Staffing  Performed: Anesthesiologist   Anesthesiologist: Tru Webb MD  Preanesthetic Checklist  Completed: patient identified, IV checked, site marked, risks and benefits discussed, surgical consent, monitors and equipment checked, pre-op evaluation and timeout performed  Peripheral Block  Patient position: sitting  Prep: ChloraPrep  Patient monitoring: heart rate, cardiac monitor, continuous pulse ox and frequent blood pressure checks  Block type: interscalene  Laterality: left  Injection technique: single-shot  Procedures: ultrasound guided, Ultrasound guidance required for the procedure to increase accuracy and safety of medication placement and decrease risk of complications    Ultrasound permanent image savedbupivacaine (MARCAINE) 0 5 % - Perineural   8 mL - 8/10/2022 12:06:00 PM  midazolam (VERSED) 2 mg/2 mL - Intravenous   2 mg - 8/10/2022 12:05:00 PM  fentaNYL 50 mcg/mL - Intravenous   50 mcg - 8/10/2022 12:05:00 PM  Needle  Needle type: Stimuplex   Needle gauge: 22 G  Needle length: 10 cm  Needle localization: ultrasound guidance  Needle insertion depth: 3 cm  Test dose: negative  Assessment  Injection assessment: incremental injection, local visualized surrounding nerve on ultrasound and negative aspiration for heme  Heart rate change: no  Slow fractionated injection: yes  Post-procedure:  site cleaned  patient tolerated the procedure well with no immediate complications

## 2022-08-10 NOTE — INTERVAL H&P NOTE
H&P reviewed  After examining the patient I find no changes in the patients condition since the H&P had been written  Patient was seen and evaluated in the office where continued nonoperative vs surgical management of Left shoulder rotator cuff tear was discussed  In light of patients moderate Left shoulder pain, weakness, difficulty with ADLs and function, patient wished to proceed forward with surgical intervention  Risks of surgery discussed with patient and detailed consent obtained  Patient will go to OR with Dr Kayla Acuña on 08/10/2022 for Left shoulder arthroscopic rotator cuff repair, subscapularis repair, possible open, all associated procedures  14 point ROS in office with Left shoulder pain         Heart RRR  Lungs CTA BL      Vitals:    08/10/22 1100   BP: 166/72   Pulse: 75   Resp: 20   Temp: (!) 97 °F (36 1 °C)   SpO2: 98%

## 2022-08-10 NOTE — ANESTHESIA POSTPROCEDURE EVALUATION
Post-Op Assessment Note    CV Status:  Stable  Pain Score: 0    Pain management: adequate     Mental Status:  Alert and awake   Hydration Status:  Euvolemic   PONV Controlled:  Controlled   Airway Patency:  Patent   Two or more mitigation strategies used for obstructive sleep apnea   Post Op Vitals Reviewed: Yes      Staff: CRNA         No complications documented      BP   175/76   Temp 97 5   Pulse 83   Resp 22   SpO2 100

## 2022-08-10 NOTE — TELEPHONE ENCOUNTER
As a follow-up, a second attempt has been made for outreach via fax, please see Contacts section for details      Thank you  Ricardo Ann

## 2022-08-15 ENCOUNTER — TELEPHONE (OUTPATIENT)
Dept: OBGYN CLINIC | Facility: HOSPITAL | Age: 72
End: 2022-08-15

## 2022-08-15 NOTE — TELEPHONE ENCOUNTER
Spoke to the patient and he states that he has showered the surgical area  He is having swelling and itching to the area that is itching  He is unable to really get the ice pack to this area without removing sling  He does not see any red rash to area  Advised that he can try taking benedryl OTC for itching symptoms  Do not take the benadryl at same time as Percocet, take 2 hrs inbetween due to sedation  Patient asked if he is okay to take Tylenol Arthritis 650mg for added pain relief  He is taking his Percocet every 6 hrs  I advised that he can take 650mg twice daily inbetween Percocet doses  Max daily Tylenol dosage 3000 mg (Percocet 325mg included)  Patient verbalized understanding

## 2022-08-15 NOTE — TELEPHONE ENCOUNTER
Dr [facility]    Patient is having a lot of itching after his sx from his elbow up, not the incision site  Asking what he can do?      # [phone]

## 2022-08-22 ENCOUNTER — APPOINTMENT (OUTPATIENT)
Dept: RADIOLOGY | Facility: CLINIC | Age: 72
End: 2022-08-22
Payer: MEDICARE

## 2022-08-22 ENCOUNTER — OFFICE VISIT (OUTPATIENT)
Dept: OBGYN CLINIC | Facility: CLINIC | Age: 72
End: 2022-08-22

## 2022-08-22 VITALS
DIASTOLIC BLOOD PRESSURE: 78 MMHG | HEIGHT: 68 IN | WEIGHT: 209.3 LBS | HEART RATE: 72 BPM | BODY MASS INDEX: 31.72 KG/M2 | SYSTOLIC BLOOD PRESSURE: 138 MMHG

## 2022-08-22 DIAGNOSIS — Z48.89 AFTERCARE FOLLOWING SURGERY: ICD-10-CM

## 2022-08-22 DIAGNOSIS — Z48.89 AFTERCARE FOLLOWING SURGERY: Primary | ICD-10-CM

## 2022-08-22 DIAGNOSIS — Z98.890 S/P ARTHROSCOPY OF LEFT SHOULDER: ICD-10-CM

## 2022-08-22 DIAGNOSIS — Z98.890 H/O REPAIR OF LEFT ROTATOR CUFF: ICD-10-CM

## 2022-08-22 PROCEDURE — 73030 X-RAY EXAM OF SHOULDER: CPT

## 2022-08-22 PROCEDURE — 99024 POSTOP FOLLOW-UP VISIT: CPT | Performed by: ORTHOPAEDIC SURGERY

## 2022-08-22 NOTE — PROGRESS NOTES
Patient Name:  Aleksey Kathleen  MRN:  [id number]    [address] [place] [address]  Aftercare following surgery  -     XR shoulder 2+ vw left; Future; Expected date: 08/22/2022    2  S/P arthroscopy of left shoulder    3  H/O repair of left rotator cuff        71 y o  male approximately  2 weeks s/p left shoulder arthroscopy and rotator cuff repair performed on 08/10/2022  Arthroscopy images reviewed and discussed with the patient He should continue to wear the sling 24/7 for protection, only taking it off to with and for gentle passive range of motion exercises and for hygeine  I went over home exercises that he should start doing daily as tolerated  He will start physical therapy in 3 weeks  Sutures removed in the office today     Incisions are healing well  He will follow-up 4 weeks for repeat evaluation  History of the Present Illness   Aleksey Kathleen is a 71 y o  male approximately 2 weeks s/p left shoulder arthroscopic performed on 08/10/2022  He reports doing well  He has been compliant with his sling  Pain is well controlled at rest         Review of Systems     Review of Systems   Constitutional: Positive for activity change  Negative for chills, fever and unexpected weight change  HENT: Negative for hearing loss, nosebleeds and sore throat  Eyes: Negative for pain, redness and visual disturbance  Respiratory: Negative for cough, shortness of breath and wheezing  Cardiovascular: Negative for chest pain, palpitations and leg swelling  Gastrointestinal: Negative for abdominal pain, nausea and vomiting  Endocrine: Negative for polyphagia and polyuria  Genitourinary: Negative for dysuria and hematuria  Musculoskeletal: Positive for arthralgias, joint swelling and myalgias  See HPI   Skin: Negative for rash and wound  Neurological: Negative for dizziness, numbness and headaches  Psychiatric/Behavioral: Negative for decreased concentration and suicidal ideas   The patient is not nervous/anxious  Physical Exam     /78   Pulse 72   Ht 5' 8" (1 727 m)   Wt 94 9 kg (209 lb 4 8 oz)   BMI 31 82 kg/m²     Left  Shoulder:   Surgical incisions show over routine healing  Active range of motion    degrees forward flexion     Passive range of motion   60 degrees of forward flexion     There is no tenderness present   There is 4/5 strength with external rotation testing at the side  Empty can testing is negative  The patient is neurovascularly intact distally in the extremity  Mild ecchymosis improving    Data Review     I have personally reviewed pertinent films in PACS, and my interpretation follows  X-rays taken today 08/22/2022 of the left shoulder demonstrates mild degenerative changes  There is no fracture dislocation  There are no lytic or blastic lesions      Social History     Tobacco Use    Smoking status: Current Every Day Smoker     Packs/day: 0 50     Years: 55 00     Pack years: 27 50     Types: Cigarettes     Start date: 4/26/1964    Smokeless tobacco: Never Used   Vaping Use    Vaping Use: Never used   Substance Use Topics    Alcohol use: Yes     Comment: rarely    Drug use: No           Procedures  none    Diaz Weinberg DO   Scribe Attestation    I,:  Paige Silvestre am acting as a scribe while in the presence of the attending physician :       I,:  Diaz Weinberg DO personally performed the services described in this documentation    as scribed in my presence :

## 2022-09-18 DIAGNOSIS — E11.69 TYPE 2 DIABETES MELLITUS WITH OTHER SPECIFIED COMPLICATION, WITHOUT LONG-TERM CURRENT USE OF INSULIN (HCC): ICD-10-CM

## 2022-09-18 RX ORDER — METFORMIN HYDROCHLORIDE 500 MG/1
TABLET, EXTENDED RELEASE ORAL
Qty: 120 TABLET | Refills: 0 | Status: SHIPPED | OUTPATIENT
Start: 2022-09-18

## 2022-09-19 ENCOUNTER — OFFICE VISIT (OUTPATIENT)
Dept: OBGYN CLINIC | Facility: CLINIC | Age: 72
End: 2022-09-19

## 2022-09-19 VITALS
BODY MASS INDEX: 31.74 KG/M2 | DIASTOLIC BLOOD PRESSURE: 75 MMHG | WEIGHT: 209.4 LBS | HEIGHT: 68 IN | SYSTOLIC BLOOD PRESSURE: 132 MMHG | HEART RATE: 83 BPM

## 2022-09-19 DIAGNOSIS — Z98.890 H/O REPAIR OF LEFT ROTATOR CUFF: ICD-10-CM

## 2022-09-19 DIAGNOSIS — Z48.89 AFTERCARE FOLLOWING SURGERY: Primary | ICD-10-CM

## 2022-09-19 DIAGNOSIS — Z98.890 S/P ARTHROSCOPY OF LEFT SHOULDER: ICD-10-CM

## 2022-09-19 PROCEDURE — 99024 POSTOP FOLLOW-UP VISIT: CPT | Performed by: ORTHOPAEDIC SURGERY

## 2022-09-19 NOTE — PROGRESS NOTES
Patient Name:  Dwain Avitia  MRN:  [id number]    [address] [place] [address]  Aftercare following surgery    2  H/O repair of left rotator cuff    3  S/P arthroscopy of left shoulder        71 y o  male approximately 6 weeks s/p left shoulder arthroscopic and rotator cuff repair performed on 08/10/2022  Patient is doing well status post surgical intervention  I am pleased with the steady progress he is making thus far  He can start to transition out of wearing the sling, and doing active range of motion exercises  However, he should not be lifting anything  I am okay with him driving himself to physical therapy  He will be starting physical therapy tomorrow allowing him to make improvements with range of motion and slowly building up his strength  I would like to see him back in 6 weeks for re-evaluation of the left shoulder  History of the Present Illness   Dwain Avitia is a 71 y o  male approximately 6 weeks s/p left shoulder arthroscopic and rotator cuff repair performed on 08/10/2022  He has been compliant with wearing the sling  Tylenol is his main pain medication, he is taking 2 times per day for his soreness and stiffness postoperatively  He is starting physical therapy tomorrow  He has been compliant with doing home exercises/stretches and feels well overall  Review of Systems     Review of Systems   Constitutional: Positive for activity change  Negative for chills, fever and unexpected weight change  HENT: Negative for hearing loss, nosebleeds and sore throat  Eyes: Negative for pain, redness and visual disturbance  Respiratory: Negative for cough, shortness of breath and wheezing  Cardiovascular: Negative for chest pain, palpitations and leg swelling  Gastrointestinal: Negative for abdominal pain, nausea and vomiting  Endocrine: Negative for polyphagia and polyuria  Genitourinary: Negative for dysuria and hematuria     Musculoskeletal: Positive for arthralgias and myalgias  See HPI   Skin: Negative for rash and wound  Neurological: Negative for dizziness, numbness and headaches  Psychiatric/Behavioral: Negative for decreased concentration and suicidal ideas  The patient is not nervous/anxious  Physical Exam     /75   Pulse 83   Ht 5' 8" (1 727 m)   Wt 95 kg (209 lb 6 4 oz)   BMI 31 84 kg/m²     Left Shoulder:   Surgical incisions are well healed, show no signs infection  Active range of motion   60 degrees forward flexion  Passive range of motion   95 degrees of forward flexion    degrees of left elbow range of motion    There is no  tenderness present    The patient is neurovascularly intact distally in the extremity  Data Review     I have personally reviewed pertinent films in PACS, and my interpretation follows  No new imaging taken in the office today       Social History     Tobacco Use    Smoking status: Current Every Day Smoker     Packs/day: 0 50     Years: 55 00     Pack years: 27 50     Types: Cigarettes     Start date: 4/26/1964    Smokeless tobacco: Never Used   Vaping Use    Vaping Use: Never used   Substance Use Topics    Alcohol use: Yes     Comment: rarely    Drug use: No           Procedures  None    [facility]    I,:  Iliana Tatum am acting as a scribe while in the presence of the attending physician :       I,:  Noris Rodarte, DO personally performed the services described in this documentation    as scribed in my presence :

## 2022-09-20 ENCOUNTER — EVALUATION (OUTPATIENT)
Dept: PHYSICAL THERAPY | Facility: CLINIC | Age: 72
End: 2022-09-20
Payer: MEDICARE

## 2022-09-20 DIAGNOSIS — M75.122 COMPLETE TEAR OF LEFT ROTATOR CUFF, UNSPECIFIED WHETHER TRAUMATIC: ICD-10-CM

## 2022-09-20 DIAGNOSIS — S46.812D PARTIAL TEAR OF LEFT SUBSCAPULARIS TENDON, SUBSEQUENT ENCOUNTER: ICD-10-CM

## 2022-09-20 PROCEDURE — 97162 PT EVAL MOD COMPLEX 30 MIN: CPT | Performed by: PHYSICAL THERAPIST

## 2022-09-20 PROCEDURE — 97140 MANUAL THERAPY 1/> REGIONS: CPT | Performed by: PHYSICAL THERAPIST

## 2022-09-20 PROCEDURE — 97110 THERAPEUTIC EXERCISES: CPT | Performed by: PHYSICAL THERAPIST
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
## 2022-09-22 ENCOUNTER — OFFICE VISIT (OUTPATIENT)
Dept: PHYSICAL THERAPY | Facility: CLINIC | Age: 72
End: 2022-09-22
Payer: MEDICARE

## 2022-09-22 DIAGNOSIS — M75.122 COMPLETE TEAR OF LEFT ROTATOR CUFF, UNSPECIFIED WHETHER TRAUMATIC: Primary | ICD-10-CM

## 2022-09-22 DIAGNOSIS — S46.812D PARTIAL TEAR OF LEFT SUBSCAPULARIS TENDON, SUBSEQUENT ENCOUNTER: ICD-10-CM

## 2022-09-22 PROCEDURE — 97140 MANUAL THERAPY 1/> REGIONS: CPT

## 2022-09-22 PROCEDURE — 97110 THERAPEUTIC EXERCISES: CPT

## 2022-09-22 NOTE — PROGRESS NOTES
Daily Note     Today's date: 2022  Patient name: Obey Bravo  : 1950  MRN: [id number]  Referring provider: Kamilla Ybarra PA-C  Dx:   Encounter Diagnosis     ICD-10-CM    1  Complete tear of left rotator cuff, unspecified whether traumatic  M75 122    2  Partial tear of left subscapularis tendon, subsequent encounter  S46 812D        Start Time: 1146  Stop Time: 1215  Total time in clinic (min): 29 minutes    Subjective: patient reports increased soreness post IE  Reports non compliance with HEP due to soreness  Denies pain      Objective: See treatment diary below      Assessment:  Patient was able to complete therapy with out reported increase to baseline soreness  Was able to introduce supine cane flexion and pullies with in protocol parameters and cueing on use of R UE to promote movement and avoid AROM with L         Plan: Continue per plan of care  Progress treatment as tolerated         Diagnosis: s/p left shoulder RCR 8/10/2022   Precautions: Diabetic type II, HTN (controlled w/ meds)   POC Expires: 11/15/22   Re-evaluation Date: 10/18/22   FOTO Scores/Date: Goal - 60; 22 - 32   Visit Count 1/10 2/10      Manuals       L shoulder PROM all planes KD EARLE                                      Ther Ex       Scapular retractions 20x 5"x10      Supine cane ER 10x5" 5"x10      Step back at counter 10x5" 5"x10      Table slides 3-way 10x3" ea       Pullies  Flex/Scap x 2 min ea      Supine Cane Flex  5'x10                              HEP Creation/instruction        Neuro Re-Ed                                                                                                                       Ther Act                                         Modalities

## 2022-09-27 ENCOUNTER — OFFICE VISIT (OUTPATIENT)
Dept: PHYSICAL THERAPY | Facility: CLINIC | Age: 72
End: 2022-09-27
Payer: MEDICARE

## 2022-09-27 DIAGNOSIS — M75.122 COMPLETE TEAR OF LEFT ROTATOR CUFF, UNSPECIFIED WHETHER TRAUMATIC: Primary | ICD-10-CM

## 2022-09-27 DIAGNOSIS — S46.812D PARTIAL TEAR OF LEFT SUBSCAPULARIS TENDON, SUBSEQUENT ENCOUNTER: ICD-10-CM

## 2022-09-27 PROCEDURE — 97140 MANUAL THERAPY 1/> REGIONS: CPT

## 2022-09-27 PROCEDURE — 97110 THERAPEUTIC EXERCISES: CPT

## 2022-09-27 NOTE — PROGRESS NOTES
Daily Note     Today's date: 10/4/2022  Patient name: Beatriz Tabor  : 1950  MRN: [id number]  Referring provider: Mary Brito PA-C  Dx:   Encounter Diagnosis     ICD-10-CM    1  Complete tear of left rotator cuff, unspecified whether traumatic  M75 122    2  Partial tear of left subscapularis tendon, subsequent encounter  S46 812D        Start Time: 0800  Stop Time: 0835  Total time in clinic (min): 35 minutes    Subjective: Pt reports mild soreness at the start of his session      Objective: See treatment diary below      Assessment: Tolerated treatment well  He demonstrates good tolerance to exercises completed  He is given cues on proper reps/sets during session and is able to complete exercises with no increase in sx  Patient exhibited good technique with therapeutic exercises and would benefit from continued PT  He verbalizes no increase in pain at the end of his session  Plan: Continue per plan of care        Diagnosis: s/p left shoulder RCR 8/10/2022   Precautions: Diabetic type II, HTN (controlled w/ meds)   POC Expires: 11/15/22   Re-evaluation Date: 10/18/22   FOTO Scores/Date: Goal - 60; 22 - 28   Visit Count 1/10 2/10 3/10     Manuals      L shoulder PROM all planes KD EARLE FH                                     Ther Ex      Scapular retractions 20x 5"x10 5''x10     Supine cane ER 10x5" 5"x10 5'' x10     Step back at counter 10x5" 5"x10 5''x10     Table slides 3-way 10x3" ea  10x3'' ea     Pullies  Flex/Scap x 2 min ea Flex/Scap x 2 min ea     Supine Cane Flex  5'x10 5''x10                             HEP Creation/instruction        Neuro Re-Ed                                                                                                                       Ther Act                                         Modalities

## 2022-09-29 ENCOUNTER — APPOINTMENT (OUTPATIENT)
Dept: PHYSICAL THERAPY | Facility: CLINIC | Age: 72
End: 2022-09-29
Payer: MEDICARE

## 2022-09-30 ENCOUNTER — OFFICE VISIT (OUTPATIENT)
Dept: PHYSICAL THERAPY | Facility: CLINIC | Age: 72
End: 2022-09-30
Payer: MEDICARE

## 2022-09-30 DIAGNOSIS — M75.122 COMPLETE TEAR OF LEFT ROTATOR CUFF, UNSPECIFIED WHETHER TRAUMATIC: Primary | ICD-10-CM

## 2022-09-30 DIAGNOSIS — S46.812D PARTIAL TEAR OF LEFT SUBSCAPULARIS TENDON, SUBSEQUENT ENCOUNTER: ICD-10-CM

## 2022-09-30 PROCEDURE — 97110 THERAPEUTIC EXERCISES: CPT

## 2022-09-30 PROCEDURE — 97140 MANUAL THERAPY 1/> REGIONS: CPT

## 2022-09-30 NOTE — PROGRESS NOTES
Daily Note     Today's date: 2022  Patient name: Cinthia Erickson  : 1950  MRN: [id number]  Referring provider: Gage Teixeira PA-C  Dx:   Encounter Diagnosis     ICD-10-CM    1  Complete tear of left rotator cuff, unspecified whether traumatic  M75 122    2  Partial tear of left subscapularis tendon, subsequent encounter  S46 812D        Start Time: 0846  Stop Time: 0925  Total time in clinic (min): 39 minutes    Subjective: patient reports no new incidents or complaints  Denies any pain  Reports improved sleeping      Objective: See treatment diary below      Assessment:    Daily Note     Today's date: 2022  Patient name: Cinthia Erickson  : 1950  MRN: [id number]  Referring provider: Gage Teixeira PA-C  Dx:   Encounter Diagnosis     ICD-10-CM    1  Complete tear of left rotator cuff, unspecified whether traumatic  M75 122    2  Partial tear of left subscapularis tendon, subsequent encounter  S46 812D        Start Time: 846  Stop Time: 0925  Total time in clinic (min): 39 minutes    Subjective: patient reports no new incidents or complaints  Reports improved sleeping  Denies any increased pain  Objective: See treatment diary below      Assessment:  Patient was able to progress in therapy with in protocol  Was able to introduce supine AROM and Standing AAROM with cueing on mechanics  Patient reports soreness with out presence of pain      Plan: Continue per plan of care  Progress treatment as tolerated         Diagnosis: s/p left shoulder RCR 8/10/2022   Precautions: Diabetic type II, HTN (controlled w/ meds)   POC Expires: 11/15/22   Re-evaluation Date: 10/18/22   FOTO Scores/Date: Goal - 60; 22 - 32   Visit Count 10 2/10 3/10     Manuals      L shoulder PROM all planes KD EARLE FH                                     Ther Ex      Scapular retractions 20x 5"x10 5''x10     Supine cane ER 10x5" 5"x10 5'' x10     Step back at counter 10x5" 5"x10 5''x10     Table slides 3-way 10x3" ea  10x3'' ea     Pullies  Flex/Scap x 2 min ea Flex/Scap x 2 min ea     Supine Cane Flex  5'x10 5''x10                             HEP Creation/instruction        Neuro Re-Ed                                                                                                                       Ther Act                                         Modalities                                                                  Plan: Continue per plan of care  Progress treatment as tolerated         Diagnosis: s/p left shoulder RCR 8/10/2022   Precautions: Diabetic type II, HTN (controlled w/ meds)   POC Expires: 11/15/22   Re-evaluation Date: 10/18/22   FOTO Scores/Date: Goal - 60; 9/20/22 - 28   Visit Count 1/10 2/10 3/10 4/10    Manuals 9/20 9/22 9/27 9/30    L shoulder PROM all planes KD EARLE FH                                     Ther Ex 9/20 9/22 9/27 9/30    Scapular retractions 20x 5"x10 5''x10 5"x10    Supine cane ER 10x5" 5"x10 5'' x10 10"x10    Step back at counter 10x5" 5"x10 5''x10 DC    Table slides 3-way 10x3" ea  10x3'' ea DC    Pullies  Flex/Scap x 2 min ea Flex/Scap x 2 min ea Flex/Scap x 2 mins ea    Supine Cane Flex  5'x10 5''x10     UBE    L1 3/3    Wall Wash    Flex 3"x10     Supine Punches/Flexion    x10 ea    T-Band Rows/Ext        Standing Cane 3 Way    Flex/Scap x10     HEP Creation/instruction        Neuro Re-Ed                                                                                                                       Ther Act                                         Modalities

## 2022-10-04 ENCOUNTER — OFFICE VISIT (OUTPATIENT)
Dept: PHYSICAL THERAPY | Facility: CLINIC | Age: 72
End: 2022-10-04
Payer: MEDICARE

## 2022-10-04 DIAGNOSIS — M75.122 COMPLETE TEAR OF LEFT ROTATOR CUFF, UNSPECIFIED WHETHER TRAUMATIC: Primary | ICD-10-CM

## 2022-10-04 DIAGNOSIS — S46.812D PARTIAL TEAR OF LEFT SUBSCAPULARIS TENDON, SUBSEQUENT ENCOUNTER: ICD-10-CM

## 2022-10-04 PROCEDURE — 97140 MANUAL THERAPY 1/> REGIONS: CPT

## 2022-10-04 PROCEDURE — 97110 THERAPEUTIC EXERCISES: CPT

## 2022-10-04 NOTE — PROGRESS NOTES
Daily Note     Today's date: 10/4/2022  Patient name: Ela Siegel  : 1950  MRN: [id number]  Referring provider: Shaista Alfonso PA-C  Dx:   Encounter Diagnosis     ICD-10-CM    1  Complete tear of left rotator cuff, unspecified whether traumatic  M75 122    2  Partial tear of left subscapularis tendon, subsequent encounter  S46 812D        Start Time: 930  Stop Time: 1011  Total time in clinic (min): 41 minutes    Subjective: Pt reports elevated soreness at the start of his session  He denies any medical changes since previous session  Objective: See treatment diary below      Assessment: Tc tolerated treatment well  He is given consistent cuing throughout session on proper form with exercises and appropriate amount of reps  He is able to complete treatment with no increase in sx  TE's were performed with increased reps  New TE's were demonstrated with proper technique, and tolerated well  Following treatment, the patient exhibited good technique with therapeutic exercises and would benefit from continued PT         Plan: Continue Per Plan of care     Diagnosis: s/p left shoulder RCR 8/10/2022   Precautions: Diabetic type II, HTN (controlled w/ meds)   POC Expires: 11/15/22   Re-evaluation Date: 10/18/22   FOTO Scores/Date: Goal - 61; 22 - 32   Visit Count 1/10 2/10 3/10 4/10    Manuals  10/4    L shoulder PROM all planes KD EARLE FH FH with UT TPR                                    Ther Ex  10/4    Scapular retractions 20x 5"x10 5''x10 20x    Supine cane ER 10x5" 5"x10 5'' x10 5''x10    Step back at counter 10x5" 5"x10 5''x10 5''x10    Table slides 3-way 10x3" ea  10x3'' ea 20x 3'' ea flex/scap    Pullies  Flex/Scap x 2 min ea Flex/Scap x 2 min ea Flex/Scap x 2 min ea    Supine Cane Flex  5'x10 5''x10 5''x20    Tband Rows, ext    RTB x15                     HEP Creation/instruction        Neuro Re-Ed Ther Act                                         Modalities

## 2022-10-06 ENCOUNTER — OFFICE VISIT (OUTPATIENT)
Dept: PHYSICAL THERAPY | Facility: CLINIC | Age: 72
End: 2022-10-06
Payer: MEDICARE

## 2022-10-06 DIAGNOSIS — S46.812D PARTIAL TEAR OF LEFT SUBSCAPULARIS TENDON, SUBSEQUENT ENCOUNTER: ICD-10-CM

## 2022-10-06 DIAGNOSIS — M75.122 COMPLETE TEAR OF LEFT ROTATOR CUFF, UNSPECIFIED WHETHER TRAUMATIC: Primary | ICD-10-CM

## 2022-10-06 PROCEDURE — 97140 MANUAL THERAPY 1/> REGIONS: CPT

## 2022-10-06 PROCEDURE — 97110 THERAPEUTIC EXERCISES: CPT

## 2022-10-06 NOTE — PROGRESS NOTES
Daily Note     Today's date: 10/6/2022  Patient name: Liborio Macedo  : 1950  MRN: [id number]  Referring provider: Rand Mohr PA-C  Dx:   Encounter Diagnosis     ICD-10-CM    1  Complete tear of left rotator cuff, unspecified whether traumatic  M75 122    2  Partial tear of left subscapularis tendon, subsequent encounter  S46 646G                   Subjective: Patient reports doing well and if he does something he isnt supposed to his body lets him know  Objective: See treatment diary below      Assessment: Tolerated treatment well  No issues with charted program  ROM cont to improve  Tolerated new additional [place] with no c/o discomfort  Cuing required for technique with good carryover to performance  Patient demonstrated fatigue post treatment and would benefit from continued PT      Plan: Continue per plan of care        Diagnosis: s/p left shoulder RCR 8/10/2022   Precautions: Diabetic type II, HTN (controlled w/ meds)   POC Expires: 11/15/22   Re-evaluation Date: 10/18/22   FOTO Scores/Date: Goal - 60; 22 -    Visit Count 1/10 2/10 3/10 4/10 5/10   Manuals  10/4 10/6   L shoulder PROM all planes KD EARLE FH FH with UT TPR MARK                                   Ther Ex  10/4 10/6   Scapular retractions 20x 5"x10 5''x10 20x    Supine cane ER 10x5" 5"x10 5'' x10 5''x10 10"x10   Step back at counter 10x5" 5"x10 5''x10 5''x10 5-10" 10x   Table slides 3-way 10x3" ea  10x3'' ea 20x 3'' ea flex/scap NP   Wall slides     10x 3" ea  Flex/scap   Pullies  Flex/Scap x 2 min ea Flex/Scap x 2 min ea Flex/Scap x 2 min ea Flex/scap 2 min ea   Supine Cane Flex  5'x10 5''x10 5''x20 5"x20   Tband Rows, ext    RTB x15  GTB 20x ea   Tband ER     Pink 15x   Tband IR     Pink 15x   Serratus Press     3"x20   HEP Creation/instruction        Neuro Re-Ed                                                                                                                       Ther Act Modalities

## 2022-10-11 ENCOUNTER — OFFICE VISIT (OUTPATIENT)
Dept: PHYSICAL THERAPY | Facility: CLINIC | Age: 72
End: 2022-10-11
Payer: MEDICARE

## 2022-10-11 DIAGNOSIS — S46.812D PARTIAL TEAR OF LEFT SUBSCAPULARIS TENDON, SUBSEQUENT ENCOUNTER: ICD-10-CM

## 2022-10-11 DIAGNOSIS — M75.122 COMPLETE TEAR OF LEFT ROTATOR CUFF, UNSPECIFIED WHETHER TRAUMATIC: Primary | ICD-10-CM

## 2022-10-11 PROCEDURE — 97140 MANUAL THERAPY 1/> REGIONS: CPT

## 2022-10-11 PROCEDURE — 97110 THERAPEUTIC EXERCISES: CPT

## 2022-10-11 NOTE — PROGRESS NOTES
Daily Note     Today's date: 10/11/2022  Patient name: Beatriz Tabor  : 1950  MRN: [id number]  Referring provider: Mary Brito PA-C  Dx:   Encounter Diagnosis     ICD-10-CM    1  Complete tear of left rotator cuff, unspecified whether traumatic  M75 122    2  Partial tear of left subscapularis tendon, subsequent encounter  S46 812D        Start Time: 803  Stop Time: 08  Total time in clinic (min): 48 minutes    Subjective: patient reports no new incidents or complaints denies any increased pain since last therapy session      Objective: See treatment diary below      Assessment:   Patient was able to progress in therapy with in protocol parameters  Was able to Pitney Yaquelin with out issue and begin supine/SL AROM and standing AAROM without issue  Cueing on positioning and mechanics required  Plan: Continue per plan of care  Progress treatment as tolerated         Diagnosis: s/p left shoulder RCR 8/10/2022   Precautions: Diabetic type II, HTN (controlled w/ meds)   POC Expires: 11/15/22   Re-evaluation Date: 10/18/22   FOTO Scores/Date: Goal - 60; 22 -    Visit Count 10 2/10 3/10 4/10 5/10   Manuals 10/11 9/22 9/27 10/4 10/6   L shoulder PROM all planes EARLE EARLE FH FH with UT TPR MARK                                   Ther Ex 10/11 9/22 9/27 10/4 10/6   Scapular retractions DC 5"x10 5''x10 20x    Supine cane ER  5"x10 5'' x10 5''x10 10"x10   Step back at counter DC 5"x10 5''x10 5''x10    Table slides 3-way DC  10x3'' ea 20x 3'' ea flex/scap DC   Wall slides Flex/scap 3" x10 ea    10x 3" ea  Flex/scap   Pullies Flex/scap x 2 mins ea Flex/Scap x 2 min ea Flex/Scap x 2 min ea Flex/Scap x 2 min ea Flex/scap 2 min ea   Supine Cane Flex DC 5'x10 5''x10 5''x20 5"x20   Tband Rows, ext    RTB x15  GTB 20x ea   Tband ER     Pink 15x   Tband IR     Pink 15x   Supine Punches/Flex 2# 2x10/ 2x10       SL ABD 2x10       SL ER 2x10       Standing Cane 3 Way x10 ea       UBE L1 3/3       Serratus Press 3"x20   HEP        Neuro Re-Ed                                                                                                                      Ther Act                                      Modalities

## 2022-10-13 ENCOUNTER — OFFICE VISIT (OUTPATIENT)
Dept: PHYSICAL THERAPY | Facility: CLINIC | Age: 72
End: 2022-10-13
Payer: MEDICARE

## 2022-10-13 ENCOUNTER — TELEPHONE (OUTPATIENT)
Dept: OBGYN CLINIC | Facility: CLINIC | Age: 72
End: 2022-10-13

## 2022-10-13 DIAGNOSIS — M75.122 COMPLETE TEAR OF LEFT ROTATOR CUFF, UNSPECIFIED WHETHER TRAUMATIC: Primary | ICD-10-CM

## 2022-10-13 DIAGNOSIS — S46.812D PARTIAL TEAR OF LEFT SUBSCAPULARIS TENDON, SUBSEQUENT ENCOUNTER: ICD-10-CM

## 2022-10-13 PROCEDURE — 97110 THERAPEUTIC EXERCISES: CPT

## 2022-10-13 PROCEDURE — 97140 MANUAL THERAPY 1/> REGIONS: CPT

## 2022-10-13 NOTE — PROGRESS NOTES
Daily Note     Today's date: 10/13/2022  Patient name: Stuart Mckenzie  : 1950  MRN: [id number]  Referring provider: Yves Rider PA-C  Dx:   Encounter Diagnosis     ICD-10-CM    1  Complete tear of left rotator cuff, unspecified whether traumatic  M75 122    2  Partial tear of left subscapularis tendon, subsequent encounter  S46 812D        Start Time: 801  Stop Time: 852  Total time in clinic (min): 51 minutes    Subjective: patient reports no new complaints or incidents  Denies any pain or soreness with progressions made last therapy session  Objective: See treatment diary below      Assessment:  Patient was able to complete and make small progressions in therapy without onset of reported pain  Was able transition to standing AROM into flex and scap with cues on shoulder hike compensation  Introduced IR strap stretch to improve functional IR  Cues throughout on sequencing of exercise and corrective positioning  Plan: Continue per plan of care  Progress treatment as tolerated         Diagnosis: s/p left shoulder RCR 8/10/2022   Precautions: Diabetic type II, HTN (controlled w/ meds)   POC Expires: 11/15/22   Re-evaluation Date: 10/18/22   FOTO Scores/Date: Goal - 60; 22 - 28   Visit Count 6/10 7/10  4/10 5/10   Manuals 10/11 10/13  10/4 10/6   L shoulder PROM all planes EARLE EARLE  FH with UT TPR MARK                                   Ther Ex 10/11 10/13  10/4 10/6   Scapular retractions DC   20x    Supine cane ER    5''x10 10"x10   Step back at counter DC   5''x10    Table slides 3-way DC   20x 3'' ea flex/scap DC   Wall slides Flex/scap 3" x10 ea Flex/Scap 3" x10 ea    10x 3" ea  Flex/scap   Pullies Flex/scap x 2 mins ea DC  Flex/Scap x 2 min ea Flex/scap 2 min ea   Supine Cane Flex DC DC  5''x20 5"x20   Tband Rows, ext  GTB 2x10 ea  RTB x15  GTB 20x ea   Tband ER  GTB 2x10   Pink 15x   Tband IR  GTB 2x10   Pink 15x   Supine Punches/Flex 2# 2x10/ 2x10 Punch w/protract 2# 2x10      SL ABD 2x10 2x10      SL ER 2x10 2x10      Standing Cane 3 Way x10 ea x10 ea      UBE L1 3/3 L1 3/3      Standing AROM  Flex/Scap 3" iso x10 ea      IR Strap Stretch  10"x10      ABC's  2# x1      HEP        Neuro Re-Ed                                                                                                                     Ther Act                                   Modalities

## 2022-10-18 ENCOUNTER — OFFICE VISIT (OUTPATIENT)
Dept: PHYSICAL THERAPY | Facility: CLINIC | Age: 72
End: 2022-10-18
Payer: MEDICARE

## 2022-10-18 DIAGNOSIS — S46.812D PARTIAL TEAR OF LEFT SUBSCAPULARIS TENDON, SUBSEQUENT ENCOUNTER: ICD-10-CM

## 2022-10-18 DIAGNOSIS — M75.122 COMPLETE TEAR OF LEFT ROTATOR CUFF, UNSPECIFIED WHETHER TRAUMATIC: Primary | ICD-10-CM

## 2022-10-18 PROCEDURE — 97140 MANUAL THERAPY 1/> REGIONS: CPT

## 2022-10-18 PROCEDURE — 97110 THERAPEUTIC EXERCISES: CPT

## 2022-10-18 NOTE — PROGRESS NOTES
Daily  Note     Today's date: 10/18/2022  Patient name: Kaushik Baker  : 1950  MRN: [id number]  Referring provider: Carine Curry PA-C  Dx:   Encounter Diagnosis     ICD-10-CM    1  Complete tear of left rotator cuff, unspecified whether traumatic  M75 122    2  Partial tear of left subscapularis tendon, subsequent encounter  S46 812D        Start Time: 0800  Stop Time: 0903  Total time in clinic (min): 63 minutes    Subjective:  Patient reports having increased soreness with min pain over past few days  Reports "in joint"  Reports 1/10 coming into therapy  Denies any new incidents or complaints      Objective: See treatment diary below      Assessment:  Patient was able to complete therapy without reported increase to subjective baseline  Held any further progressions due to subjective baseline  Demonstrates improved AROM in standing being able to perform in increased ROM with less associated shoulder hike compensation  Plan: Continue per plan of care  Progress treatment as tolerated         Diagnosis: s/p left shoulder RCR 8/10/2022   Precautions: Diabetic type II, HTN (controlled w/ meds)   POC Expires: 11/15/22   Re-evaluation Date: 10/18/22   FOTO Scores/Date: Goal - 60; 22 - 28   Visit Count 6/10 7/10 8/10 4/10 5/10   Manuals 10/11 10/13 10/18 10/4 10/6   L shoulder PROM all planes EARLE EARLE EARLE FH with UT TPR MARK                                   Ther Ex 10/11 10/13 10/18 10/4 10/6   Scapular retractions DC   20x    Supine cane ER   5"x10 5''x10 10"x10   Step back at counter DC   5''x10    Table slides 3-way DC   20x 3'' ea flex/scap DC   Wall slides Flex/scap 3" x10 ea Flex/Scap 3" x10 ea  Flex/Scap 3'x10 ea  10x 3" ea  Flex/scap   Pullies Flex/scap x 2 mins ea DC  Flex/Scap x 2 min ea Flex/scap 2 min ea   Supine Cane Flex DC DC  5''x20 5"x20   Tband Rows, ext  GTB 2x10 ea GTB 2x10 ea RTB x15  GTB 20x ea   Tband ER  GTB 2x10   Pink 15x   Tband IR  GTB 2x10 GTB 2x10   Pink 15x   Supine Punches/Flex 2# 2x10/ 2x10 Punch w/protract 2# 2x10 Punch w/protract 2#/flex no weigth 2x10     SL ABD 2x10 2x10 2x10     SL ER 2x10 2x10 2x10     Standing Cane 3 Way x10 ea x10 ea      UBE L1 3/3 L1 3/3 L1 3/3     Standing AROM  Flex/Scap 3" iso x10 ea      IR Strap Stretch  10"x10 10"x10     ABC's  2# x1      HEP        Neuro Re-Ed                                                                                                                     Ther Act                                   Modalities

## 2022-10-20 ENCOUNTER — OFFICE VISIT (OUTPATIENT)
Dept: PHYSICAL THERAPY | Facility: CLINIC | Age: 72
End: 2022-10-20
Payer: MEDICARE

## 2022-10-20 DIAGNOSIS — M75.122 COMPLETE TEAR OF LEFT ROTATOR CUFF, UNSPECIFIED WHETHER TRAUMATIC: Primary | ICD-10-CM

## 2022-10-20 DIAGNOSIS — S46.812D PARTIAL TEAR OF LEFT SUBSCAPULARIS TENDON, SUBSEQUENT ENCOUNTER: ICD-10-CM

## 2022-10-20 PROCEDURE — 97140 MANUAL THERAPY 1/> REGIONS: CPT

## 2022-10-20 PROCEDURE — 97110 THERAPEUTIC EXERCISES: CPT

## 2022-10-20 NOTE — PROGRESS NOTES
PT Re-Evaluation      Collection of subjective/objective data performed by Wil Horowitz PTA; Assessment, POC, and Goal attainment performed by Jana Camargo DPT     Today's date: 10/20/2022  Patient name: Bubba Krabbe  : 1950  MRN: [id number]  Referring provider: Mary Anne Thomas PA-C  Dx:   Encounter Diagnosis     ICD-10-CM    1  Complete tear of left rotator cuff, unspecified whether traumatic  M75 122    2  Partial tear of left subscapularis tendon, subsequent encounter  S46 812D        Start Time: 801  Stop Time: 840  Total time in clinic (min): 39 minutes    Assessment  Assessment details: Patient is a 71 y o  male s/p left shoulder RCR performed on 8/10/2022 by Dr Suze Ronquillo and has completed 10 visits to date with improved FOTO score of 32 to 70 since initial evaluation  Patient demonstrates notable subjective/objective improvement since enrolling in PT to include improving pain, ROM, and strength  Patient continues to exhibit lingering deficits to include limited end range A/PROM and compensatory strategies noted with AROM, decreased strength/stability, and limited activity tolerance that continues to impact tolerance/ability to perform ADLs and recreational activities  Patient will benefit from continued skilled PT intervention to address the aforementioned impairments, achieve goals, maximize function, and improve quality of life  Pt is in agreement with this plan             Impairments: abnormal or restricted ROM, activity intolerance, impaired physical strength, lacks appropriate home exercise program, pain with function, poor posture  and poor body mechanics    Goals  ST weeks  Pt will demonstrate good understanding and compliance with HEP ONGOING  Pt will decrease pain to 3-4/10 with activity  MET  Pt will demonstrate improved postural awareness and ability to self-correct without reliance on external cues PROGRESSING    LT weeks  Pt will improve FOTO score to > or = to 60 to indicate improved functional abilities MET  Pt will decrease pain to 0-1/10 with activity PROGRESSING  Pt will increase shoulder strength to 4/5 for improved tolerance/independence with ADLs PROGRESSING  Pt will increase PROM to WNL to allow for return of AROM of affected shoulder PROGRESSING  Pt will increase shoulder flexion/abduction to 150 degrees to increase independence with ADLs  PROGRESSING  Pt will increase functional IR to be able to don/doff belt and wash back without pain PROGRESSING  Pt will increase functional ER to be able to wash hair independently without pain MET      Plan  Plan details: Cont to tx per POC  Patient would benefit from: skilled physical therapy  Planned modality interventions: cryotherapy and thermotherapy: hydrocollator packs  Planned therapy interventions: ADL training, joint mobilization, manual therapy, neuromuscular re-education, body mechanics training, patient education, postural training, self care, strengthening, stretching, therapeutic activities, therapeutic exercise, home exercise program, graded exercise, graded activity, functional ROM exercises and flexibility  Frequency: 2x week  Duration in weeks: 8  Plan of Care beginning date: 2022  Plan of Care expiration date: 11/15/2022  Treatment plan discussed with: patient        Subjective Evaluation    History of Present Illness  Mechanism of injury: Patient is 10 weeks 1 day S/P L RTC repair performed 8/10/22  Reports 70% improvement since beginning therapy  Reports improved mobility including reaching over head and out to side  Reports improved ability to self care with difficulty putting on and pulling belt on  Reports normal weakness associated with RTC surgery  Patient reports difficulty with getting wife's compression boots on as he is her primary care giver  Reports more soreness than pain      Pain  Current pain ratin  At best pain ratin  At worst pain ratin  Quality: discomfort    Hand dominance: left    Patient Goals  Patient goals for therapy: decreased pain, increased motion, increased strength, independence with ADLs/IADLs and return to sport/leisure activities          Objective     General Comments:      Shoulder Comments   Left shoulder PROM: Flex 164* Abd 145* IR 48* ER 64* (IR/ER assessed @ 90* abd)     Left shoulder AROM:  Flex  133* (min shoulder hike), Abd 140*,  Functional IR Mid Sacrum,  Functional ER:  T1 level    Left shoulder MMT:  Flex: 4-/5,  Abd 3+/5,  IR 5/5,  ER:  5/5    FOTO: 70  (32 @ IE)                Diagnosis: s/p left shoulder RCR 8/10/2022   Precautions: Diabetic type II, HTN (controlled w/ meds)   POC Expires: 11/15/22   Re-evaluation Date: 11/15/22   FOTO Scores/Date: Goal - 60; 9/20/22 - 32, 10/20/22-70   Visit Count 6/10 7/10 8/10 1/10 5/10   Manuals 10/11 10/13 10/18 10/20 10/6   L shoulder PROM all planes EARLE EARLE EARLE EARLE MARK                                   Ther Ex 10/11 10/13 10/18  10/6   Scapular retractions DC       Supine cane ER   5"x10  10"x10   Step back at counter DC       Table slides 3-way DC    DC   Wall slides Flex/scap 3" x10 ea Flex/Scap 3" x10 ea  Flex/Scap 3'x10 ea  10x 3" ea  Flex/scap   Pullies Flex/scap x 2 mins ea DC   Flex/scap 2 min ea   Supine Cane Flex DC DC   5"x20   Tband Rows, ext  GTB 2x10 ea GTB 2x10 ea  GTB 20x ea   Tband ER  GTB 2x10   Pink 15x   Tband IR  GTB 2x10 GTB 2x10   Pink 15x   Supine Punches/Flex 2# 2x10/ 2x10 Punch w/protract 2# 2x10 Punch w/protract 2#/flex no weigth 2x10     SL ABD 2x10 2x10 2x10     SL ER 2x10 2x10 2x10     Standing Cane 3 Way x10 ea x10 ea      UBE L1 3/3 L1 3/3 L1 3/3     Standing AROM  Flex/Scap 3" iso x10 ea      IR Strap Stretch  10"x10 10"x10     ABC's  2# x1      FOTO    Administered performed, subjective objective data collected for re-eval    Neuro Re-Ed                                                                                                                     Ther Act Modalities

## 2022-11-07 ENCOUNTER — OFFICE VISIT (OUTPATIENT)
Dept: OBGYN CLINIC | Facility: CLINIC | Age: 72
End: 2022-11-07

## 2022-11-07 VITALS
WEIGHT: 209.4 LBS | HEIGHT: 68 IN | HEART RATE: 76 BPM | BODY MASS INDEX: 31.74 KG/M2 | SYSTOLIC BLOOD PRESSURE: 121 MMHG | DIASTOLIC BLOOD PRESSURE: 73 MMHG

## 2022-11-07 DIAGNOSIS — Z48.89 AFTERCARE FOLLOWING SURGERY: Primary | ICD-10-CM

## 2022-11-07 DIAGNOSIS — Z98.890 S/P ARTHROSCOPY OF LEFT SHOULDER: ICD-10-CM

## 2022-11-07 NOTE — PROGRESS NOTES
Patient Name:  Kyrie Gonzales  MRN:  [id number]    [address] [place] [address]  Aftercare following surgery    2  S/P arthroscopy of left shoulder        71 y o  male approximately 12 week s/p Left  shoulder arthroscopic supraspinatus and subscapularis repair, biceps tenotomy, acromioplasty performed on 08/10/2022  Overall, patient doing very well s/p surgical intervention with improved range of motion and strength  At this time, strongly recommended patient to continue the home exercises for obtaining last few degrees of range of motion  Patient will have some difficulty going to therapy outpatient as 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  If patient can find the time, suggested at least 1 session weekly or increasing his home exercises throughout the day  Will place new script today for more range of motion  Will hold off on Rx of wheelchair for his wife; he is allowed to start increasing his strengthening in addition to range of motion  He can participate in proper lifting mechanics with PT  He will follow up in office in 3 months for reevaluation  History of the Present Illness   Kyrie Gonzales is a 71 y o  male approximately 12 week s/p Left  shoulder arthroscopic supraspinatus and subscapularis repair, biceps tenotomy, acromioplasty performed on 08/10/2022  Today, patient reports he is doing well and moving in the right direction  He states he does have some residual shoulder pain since surgery, but overall improving  He also admits to mild tingling that radiates into his thumb and into his neck  Review of Systems     Review of Systems   Constitutional: Negative for chills and fever  HENT: Negative for ear pain and sore throat  Eyes: Negative for pain and visual disturbance  Respiratory: Negative for cough and shortness of breath  Cardiovascular: Negative for chest pain and palpitations  Gastrointestinal: Negative for abdominal pain and vomiting     Genitourinary: Negative for dysuria and hematuria  Musculoskeletal: Negative for arthralgias and back pain  Skin: Negative for color change and rash  Neurological: Negative for seizures and syncope  All other systems reviewed and are negative  Physical Exam     /73   Pulse 76   Ht 5' 8" (1 727 m)   Wt 95 kg (209 lb 6 4 oz)   BMI 31 84 kg/m²     Left Shoulder:   Surgical incisions well healed  Active range of motion   150-160 degrees forward flexion  150-160 degrees abduction  50 degrees external rotation   L4 internal rotation      There is mild tenderness present over the deltoid  There is 5/5 strength with external rotation testing at the side  Empty can testing is negative   Negative Belly press test  The patient is neurovascularly intact distally in the extremity  Data Review     I have personally reviewed pertinent films in PACS, and my interpretation follows      No new images    Social History     Tobacco Use   • Smoking status: Current Every Day Smoker     Packs/day: 0 50     Years: 55 00     Pack years: 27 50     Types: Cigarettes     Start date: 4/26/1964   • Smokeless tobacco: Never Used   Vaping Use   • Vaping Use: Never used   Substance Use Topics   • Alcohol use: Yes     Comment: rarely   • Drug use: No           Procedures  None     Soheila Romo PA-C

## 2022-11-08 ENCOUNTER — EVALUATION (OUTPATIENT)
Dept: PHYSICAL THERAPY | Facility: CLINIC | Age: 72
End: 2022-11-08

## 2022-11-08 DIAGNOSIS — S46.812D PARTIAL TEAR OF LEFT SUBSCAPULARIS TENDON, SUBSEQUENT ENCOUNTER: ICD-10-CM

## 2022-11-08 DIAGNOSIS — M75.122 COMPLETE TEAR OF LEFT ROTATOR CUFF, UNSPECIFIED WHETHER TRAUMATIC: Primary | ICD-10-CM
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
## 2022-11-10 ENCOUNTER — OFFICE VISIT (OUTPATIENT)
Dept: PHYSICAL THERAPY | Facility: CLINIC | Age: 72
End: 2022-11-10

## 2022-11-10 DIAGNOSIS — M75.122 COMPLETE TEAR OF LEFT ROTATOR CUFF, UNSPECIFIED WHETHER TRAUMATIC: Primary | ICD-10-CM

## 2022-11-10 DIAGNOSIS — S46.812D PARTIAL TEAR OF LEFT SUBSCAPULARIS TENDON, SUBSEQUENT ENCOUNTER: ICD-10-CM

## 2022-11-10 NOTE — PROGRESS NOTES
Daily Note     Today's date: 11/10/2022  Patient name: Grazyna Suresh  : 1950  MRN: [id number]  Referring provider: Italia Zhong PA-C  Dx:   Encounter Diagnosis     ICD-10-CM    1  Complete tear of left rotator cuff, unspecified whether traumatic  M75 122    2  Partial tear of left subscapularis tendon, subsequent encounter  S46 726K                   Subjective: Patient reports taking a break from therapy but his Dr  told him to return  Objective: See treatment diary below      Assessment: Tolerated treatment well  Able to resume program as charted  Patient limited more by fatigue than pain  Reports good stretches to tolerance  Patient demonstrated fatigue post treatment and would benefit from continued PT      Plan: Progress treatment as tolerated  Update HEP NV        Diagnosis: s/p left shoulder RCR 8/10/2022   Precautions: Diabetic type II, HTN (controlled w/ meds)   POC Expires: 22   Re-evaluation Date: 22   FOTO Scores/Date: Goal - 60; 22 - 32, 10/20/22-; 22 -    Visit Count 2/10  8/10 1/10 1/10   Manuals 11/10  10/18 10/20 11/8   L shoulder PROM all planes MARK   EARLE EARLE KD                                   Ther Ex   10/18  11/8   Scapular retractions        Supine cane ER 10"x10   5"x10     Step back at counter        Table slides 3-way        Wall slides 10"x10  Flex/Scap 3'x10 ea     Pullies 2'/2'   5" holds       Supine Cane Flex 2# 10x10"       Tband Rows, ext   GTB 2x10 ea     Tband ER        Tband IR   GTB 2x10      Supine Punches/Flex 0# 10x ea  Punch w/protract 2#/flex no weigth 2x10     SL ABD 2x10  2x10     SL ER 2x10   2x10     Standing Cane 3 Way        UBE In use  L1 3/3     Standing AROM        IR Strap Stretch   10"x10     ABC's        FOTO    Administered performed, subjective objective data collected for re-eval Re-assessed L shoulder and FOTO; updated PT prognosis/POC and HEP   Neuro Re-Ed Ther Act                                 Modalities

## 2022-11-15 ENCOUNTER — OFFICE VISIT (OUTPATIENT)
Dept: PHYSICAL THERAPY | Facility: CLINIC | Age: 72
End: 2022-11-15

## 2022-11-15 DIAGNOSIS — M75.122 COMPLETE TEAR OF LEFT ROTATOR CUFF, UNSPECIFIED WHETHER TRAUMATIC: Primary | ICD-10-CM

## 2022-11-15 DIAGNOSIS — S46.812D PARTIAL TEAR OF LEFT SUBSCAPULARIS TENDON, SUBSEQUENT ENCOUNTER: ICD-10-CM

## 2022-11-15 NOTE — PROGRESS NOTES
Daily Note     Today's date: 11/15/2022  Patient name: Aleksey Kathleen  : 1950  MRN: [id number]  Referring provider: Sofi Mix PA-C  Dx:   Encounter Diagnosis     ICD-10-CM    1  Complete tear of left rotator cuff, unspecified whether traumatic  M75 122    2  Partial tear of left subscapularis tendon, subsequent encounter  S46 812D        Start Time: 845  Stop Time: 924  Total time in clinic (min): 39 minutes    Subjective: patient reports compliance with HEP  Denies any new incidents or complaints  Denies any increased pain/      Objective: See treatment diary below      Assessment:  Patient was able to progress in therapy within protocol introducing standing AROM being able to complete flex and scaption without compensation  Introduced ball roll up to improve strength and added lift off portion with wall slides to improve upon end range of motion    Plan: Continue per plan of care  Progress treatment as tolerated         Diagnosis: s/p left shoulder RCR 8/10/2022   Precautions: Diabetic type II, HTN (controlled w/ meds)   POC Expires: 22   Re-evaluation Date: 22   FOTO Scores/Date: Goal - 60; 22 - 32, 10/20/22-70; 22 - 78   Visit Count 2/10 3/10 8/10 1/10 1/10   Manuals 11/10 11/15 10/18 10/20 11/8   L shoulder PROM all planes MARK  EARLE EARLE EARLE KD                                   Ther Ex  11/15 10/18  118           Supine cane ER 10"x10   5"x10             Ball Roll Up Wall  5# 2x10      Wall slides 10"x10 W/lift off flex/abd 2 x 10 ea Flex/Scap 3'x10 ea     Pullies 2'/2'   5" holds       Supine Cane Flex 2# 10x10"       Tband Rows, ext   GTB 2x10 ea     Tband ER        Tband IR   GTB 2x10      Supine Punches/Flex 0# 10x ea  Punch w/protract 2#/flex no weigth 2x10     SL ABD 2x10  2x10     SL ER 2x10   2x10     Standing Cane 3 Way        UBE In use L3 3/3 L1 3/3     Standing AROM  Flex 0#x10 1#x10/ Scap 0# 2x10      IR Strap Stretch  10"x10 10"x10     ABC's        FOTO Administered performed, subjective objective data collected for re-eval Re-assessed L shoulder and FOTO; updated PT prognosis/POC and HEP   Neuro Re-Ed                                                                                                                    Ther Act                                 Modalities

## 2022-11-17 ENCOUNTER — OFFICE VISIT (OUTPATIENT)
Dept: PHYSICAL THERAPY | Facility: CLINIC | Age: 72
End: 2022-11-17

## 2022-11-17 DIAGNOSIS — E11.69 TYPE 2 DIABETES MELLITUS WITH OTHER SPECIFIED COMPLICATION, WITHOUT LONG-TERM CURRENT USE OF INSULIN (HCC): ICD-10-CM

## 2022-11-17 DIAGNOSIS — M75.122 COMPLETE TEAR OF LEFT ROTATOR CUFF, UNSPECIFIED WHETHER TRAUMATIC: Primary | ICD-10-CM

## 2022-11-17 DIAGNOSIS — S46.812D PARTIAL TEAR OF LEFT SUBSCAPULARIS TENDON, SUBSEQUENT ENCOUNTER: ICD-10-CM

## 2022-11-17 DIAGNOSIS — I10 PRIMARY HYPERTENSION: ICD-10-CM

## 2022-11-17 RX ORDER — AMLODIPINE BESYLATE 5 MG/1
TABLET ORAL
Qty: 90 TABLET | Refills: 1 | Status: SHIPPED | OUTPATIENT
Start: 2022-11-17

## 2022-11-17 RX ORDER — METFORMIN HYDROCHLORIDE 500 MG/1
TABLET, EXTENDED RELEASE ORAL
Qty: 120 TABLET | Refills: 0 | Status: SHIPPED | OUTPATIENT
Start: 2022-11-17

## 2022-11-17 NOTE — PROGRESS NOTES
Daily Note     Today's date: 2022  Patient name: Shantal Smith  : 1950  MRN: [id number]  Referring provider: Edgar Hilton PA-C  Dx:   Encounter Diagnosis     ICD-10-CM    1  Complete tear of left rotator cuff, unspecified whether traumatic  M75 122       2  Partial tear of left subscapularis tendon, subsequent encounter  S46 812D           Start Time: 09  Stop Time: 1020  Total time in clinic (min): 45 minutes    Subjective: patient reports normal soreness post last therapy session without presence of pain  Objective: See treatment diary below      Assessment:  Patient reports improved soreness post therapy session  Held load with active shoulder flex but was able to perform weight ball roll up without issue  Was able to re-introduce t-band based scapular muscularity strengthening required cues on corrective positioning and mechanics along with compensations to avoid      Plan: Continue per plan of care  Progress treatment as tolerated         Diagnosis: s/p left shoulder RCR 8/10/2022   Precautions: Diabetic type II, HTN (controlled w/ meds)   POC Expires: 22   Re-evaluation Date: 22   FOTO Scores/Date: Goal - 60; 22 - 32, 10/20/22-70; 22 - 78   Visit Count 2/10 3/10 4/10 1/10 1/10   Manuals 11/10 11/15 11/17 10/20 11/8   L shoulder PROM all planes MARK  EARLE EARLE EARLE KD                                   Ther Ex  11/15 11/17  11           Supine cane ER 10"x10   5"x10              Ball Roll Up Wall  5# 2x10 5# 2x10     Wall slides 10"x10 W/lift off flex/abd 2 x 10 ea W/lift off flex/abd 2x10 ea     Pullies 2'/2'   5" holds       Supine Cane Flex 2# 10x10"       Tband Rows, ext   [place] 2x10 ea     Tband ER   GTB 2x10 ea     Tband IR   GTB 2x10 ea     Supine Punches/Flex 0# 10x ea       SL ABD 2x10       SL ER 2x10        Standing Cane 3 Way        UBE In use L3 3/3 L3 3/3     Standing AROM  Flex 0#x10 1#x10/ Scap 0# 2x10 Flex/Scap 2x10     IR Strap Stretch  10"x10 ABC's        FOTO    Administered performed, subjective objective data collected for re-eval Re-assessed L shoulder and FOTO; updated PT prognosis/POC and HEP   Neuro Re-Ed                                                         Ther Act                                 Modalities

## 2022-11-21 ENCOUNTER — OFFICE VISIT (OUTPATIENT)
Dept: PHYSICAL THERAPY | Facility: CLINIC | Age: 72
End: 2022-11-21

## 2022-11-21 DIAGNOSIS — M75.122 COMPLETE TEAR OF LEFT ROTATOR CUFF, UNSPECIFIED WHETHER TRAUMATIC: Primary | ICD-10-CM

## 2022-11-21 DIAGNOSIS — S46.812D PARTIAL TEAR OF LEFT SUBSCAPULARIS TENDON, SUBSEQUENT ENCOUNTER: ICD-10-CM

## 2022-11-21 NOTE — PROGRESS NOTES
Daily Note     Today's date: 2022  Patient name: Jaenna Brenner  : 1950  MRN: [id number]  Referring provider: Kathie Bronson PA-C  Dx:   Encounter Diagnosis     ICD-10-CM    1  Complete tear of left rotator cuff, unspecified whether traumatic  M75 122       2  Partial tear of left subscapularis tendon, subsequent encounter  S46 812D           Start Time: 1100  Stop Time: 1147  Total time in clinic (min): 47 minutes    Subjective: patient reports normal soreness post last therapy session  Report pain with quick movements  Denies any new incidents       Objective: See treatment diary below      Assessment:  Patient was able to resume standing flexion with 1# load without compensation and demonstrating good control  Attempted 1# into scapular plane but was unable due to weakness  Demonstrates improved PROM with improved tolerance into flexion  Small cues on sequencing along with positioning required        Plan: Continue per plan of care  Progress treatment as tolerated         Diagnosis: s/p left shoulder RCR 8/10/2022   Precautions: Diabetic type II, HTN (controlled w/ meds)   POC Expires: 22   Re-evaluation Date: 22   FOTO Scores/Date: Goal - 60; 22 - 32, 10/20/22-70; 22 - 78   Visit Count 2/10 3/10 4/10 5/10 1/10   Manuals 11/10 11/15 11/17 11/21 118   L shoulder PROM all planes MARK  EARLE EARLE EARLE KD                                   Ther Ex  11/15 11/17 11/21 11/8           Supine cane ER 10"x10   5"x10  5"x10            Ball Roll Up Wall  5# 2x10 5# 2x10 5# 2x10    Wall slides 10"x10 W/lift off flex/abd 2 x 10 ea W/lift off flex/abd 2x10 ea W/lift off flex/abd 2x10 ea    Pullies 2'/2'   5" holds       Supine Cane Flex 2# 10x10"       Tband Rows, ext   Orange 2x10 ea Orange 2x10 ea    Tband ER   GTB 2x10 ea GTB 2x10    Tband IR   GTB 2x10 ea GTB 2x10     Supine Punches/Flex 0# 10x ea       SL ABD 2x10       SL ER 2x10        BL Horizontal Abd    NV    UBE In use L3 3/3 L3 3/3 L3 3/3    Standing AROM  Flex 0#x10 1#x10/ Scap 0# 2x10 Flex/Scap 2x10 Flex 1# x20/Scap 0# x20    IR Strap Stretch  10"x10  10"x10    's Carry    NV    FOTO     Re-assessed L shoulder and FOTO; updated PT prognosis/POC and HEP   Neuro Re-Ed                                                         Ther Act                              Modalities

## 2022-11-23 ENCOUNTER — OFFICE VISIT (OUTPATIENT)
Dept: PHYSICAL THERAPY | Facility: CLINIC | Age: 72
End: 2022-11-23

## 2022-11-23 DIAGNOSIS — S46.812D PARTIAL TEAR OF LEFT SUBSCAPULARIS TENDON, SUBSEQUENT ENCOUNTER: ICD-10-CM

## 2022-11-23 DIAGNOSIS — M75.122 COMPLETE TEAR OF LEFT ROTATOR CUFF, UNSPECIFIED WHETHER TRAUMATIC: Primary | ICD-10-CM

## 2022-11-23 NOTE — PROGRESS NOTES
Daily Note     Today's date: 2022  Patient name: Ela Jaramillo  : 1950  MRN: [id number]  Referring provider: Felix Snyder PA-C  Dx:   Encounter Diagnosis     ICD-10-CM    1  Complete tear of left rotator cuff, unspecified whether traumatic  M75 122       2  Partial tear of left subscapularis tendon, subsequent encounter  S46 812D           Start Time: 930  Stop Time: 1016  Total time in clinic (min): 46 minutes    Subjective: patient reports having minor episode of Left shoulder discomfort  Reports receiving quick pinch sensation that quickly improved  Objective: See treatment diary below      Assessment:  Patient was able to complete and progress in therapy with out onset of reported pain and reporting normal muscular fatigue/soreness  Was able to perform active scaption and introduce seated OH press with  1# load  Introduced 's carry without load with cueing on mechanics and compensations  Patient was encouraged to utilize rest to address fatigue      Plan: Continue per plan of care  Progress treatment as tolerated         Diagnosis: s/p left shoulder RCR 8/10/2022   Precautions: Diabetic type II, HTN (controlled w/ meds)   POC Expires: 22   Re-evaluation Date: 22   FOTO Scores/Date: Goal - 60; 22 - 32, 10/20/22-70; 22 - 78   Visit Count 210 3/10 4/10 5/10 6/10   Manuals 11/10 11/15 11/17 11/21 11/23   L shoulder PROM all planes [address]                                   Ther Ex  11/15 11/17 11/21 11/23           Supine cane ER 10"x10   5"x10  5"x10            Ball Roll Up Wall  5# 2x10 5# 2x10 5# 2x10 8# 2x10   Wall slides 10"x10 W/lift off flex/abd 2 x 10 ea W/lift off flex/abd 2x10 ea W/lift off flex/abd 2x10 ea W/lift off flex/abd 2x10 ea   Pullies 2'/2'   5" holds       Supine Cane Flex 2# 10x10"       Tband Rows, ext   Orange 2x10 ea Orange 2x10 ea    Tband ER   GTB 2x10 ea GTB 2x10    Tband IR   GTB 2x10 ea GTB 2x10     Supine Punches/Flex 0# 10x ea       SL ABD 2x10       SL ER 2x10        BL Horizontal Abd    NV GTB x 10 (Supine NV)   UBE In use L3 3/3 L3 3/3 L3 3/3 L3 3/3   Standing AROM  Flex 0#x10 1#x10/ Scap 0# 2x10 Flex/Scap 2x10 Flex 1# x20/Scap 0# x20 Flex/scao 1# 2x10 ea    Seated OH Press     1# x15    IR Strap Stretch  10"x10  10"x10 10"x10   's Carry    NV No weight x 3 laps   FOTO        Neuro Re-Ed                                                         Ther Act                              Modalities

## 2022-11-28 ENCOUNTER — APPOINTMENT (OUTPATIENT)
Dept: PHYSICAL THERAPY | Facility: CLINIC | Age: 72
End: 2022-11-28

## 2022-11-30 ENCOUNTER — APPOINTMENT (OUTPATIENT)
Dept: PHYSICAL THERAPY | Facility: CLINIC | Age: 72
End: 2022-11-30

## 2022-12-07 ENCOUNTER — OFFICE VISIT (OUTPATIENT)
Dept: PHYSICAL THERAPY | Facility: CLINIC | Age: 72
End: 2022-12-07

## 2022-12-07 DIAGNOSIS — S46.812D PARTIAL TEAR OF LEFT SUBSCAPULARIS TENDON, SUBSEQUENT ENCOUNTER: ICD-10-CM

## 2022-12-07 DIAGNOSIS — M75.122 COMPLETE TEAR OF LEFT ROTATOR CUFF, UNSPECIFIED WHETHER TRAUMATIC: Primary | ICD-10-CM

## 2022-12-07 NOTE — PROGRESS NOTES
Daily Note     Today's date: 2022  Patient name: Dustin Rayo  : 1950  MRN: [id number]  Referring provider: Katy Silva PA-C  Dx:   Encounter Diagnosis     ICD-10-CM    1  Complete tear of left rotator cuff, unspecified whether traumatic  M75 122       2  Partial tear of left subscapularis tendon, subsequent encounter  S46 812D           Start Time: 846  Stop Time: 931  Total time in clinic (min): 45 minutes    Subjective: Reports that his shoulder is doing "alirght" - hasn't given him any real problems over the past week or so  Patient almost 4 months from surgery date of 8/10/22  Is unable to think of anything really that bothers him with his shoulder at home specifically - states that caring for his wife comes with a lot of tasks around the house such as laundry, cooking, and cleaning  Objective: See treatment diary below      Assessment: Tolerated treatment well  Patient perfomred all activities with good form - good neuromotor control  Can progress per protocol with activities increasing RTC activities as well as scapular stabilization work in prone as per protocol (> 12 weeks since surgery)  Progressed RTC strengthening activities as per protocol timeframe - will benefit from continued progressions  Patient demonstrated fatigue post treatment and would benefit from continued PT      Plan: Continue per plan of care  Progress treatment as tolerated         Diagnosis: s/p left shoulder RCR 8/10/2022   Precautions: Diabetic type II, HTN (controlled w/ meds)   POC Expires: 22   Re-evaluation Date: 22   FOTO Scores/Date: Goal - 60; 22 - 32, 10/20/22-70; 22 - 78   Visit Count 7/10 3/10 10 5/10 6/10   Manuals 12/7 11/15 11/17 11/21 11/23   L shoulder PROM all planes MH EARLE EARLE EARLE EARLE   Rhythmic Stabilization 5x5" ea plane iso 90 deg flex                               Ther Ex 12/7 11/15 11/17 11/21 11/23           Supine cane ER   5"x10  5"x10            Ball Roll Up Neema Hilton #10 2x10 5# 2x10 5# 2x10 5# 2x10 8# 2x10   Wall slides W/ lift off flex/abd 2x10 ea W/lift off flex/abd 2 x 10 ea W/lift off flex/abd 2x10 ea W/lift off flex/abd 2x10 ea W/lift off flex/abd 2x10 ea   Pullies        Supine Cane Flex        Tband Rows, ext GTB 2x10x1"   Orange 2x10 ea Orange 2x10 ea    Tband ER GTB 2x10x1"  GTB 2x10 ea GTB 2x10    Tband IR GTB 2x10x1"  GTB 2x10 ea GTB 2x10     Supine Punches/Flex 1# 2x10 (progress resistance NV)       SL ABD / Flexion 1# 2x10 flexion       SL ER 1x10 NW   1x10 1# x3" iso hold       BL Horizontal Abd GTB 2x10 - progress iso hold NV or volume   NV GTB x 10 (Supine NV)   UBE L3 3'/3' L3 3/3 L3 3/3 L3 3/3 L3 3/3   Standing AROM Flex/Scap 1# 1x10  Flex/Scap 2# 1x10  Flex 0#x10 1#x10/ Scap 0# 2x10 Flex/Scap 2x10 Flex 1# x20/Scap 0# x20 Flex/scao 1# 2x10 ea    Seated OH Press     1# x15    IR Strap Stretch 10x15"  10"x10  10"x10 10"x10   's Carry NV   NV No weight x 3 laps   FOTO        Neuro Re-Ed                                                         Ther Act                              Modalities

## 2022-12-09 ENCOUNTER — OFFICE VISIT (OUTPATIENT)
Dept: PHYSICAL THERAPY | Facility: CLINIC | Age: 72
End: 2022-12-09

## 2022-12-09 DIAGNOSIS — M75.122 COMPLETE TEAR OF LEFT ROTATOR CUFF, UNSPECIFIED WHETHER TRAUMATIC: Primary | ICD-10-CM

## 2022-12-09 DIAGNOSIS — S46.812D PARTIAL TEAR OF LEFT SUBSCAPULARIS TENDON, SUBSEQUENT ENCOUNTER: ICD-10-CM
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
## 2022-12-13 ENCOUNTER — OFFICE VISIT (OUTPATIENT)
Dept: PHYSICAL THERAPY | Facility: CLINIC | Age: 72
End: 2022-12-13

## 2022-12-13 DIAGNOSIS — M75.122 COMPLETE TEAR OF LEFT ROTATOR CUFF, UNSPECIFIED WHETHER TRAUMATIC: Primary | ICD-10-CM

## 2022-12-13 DIAGNOSIS — S46.812D PARTIAL TEAR OF LEFT SUBSCAPULARIS TENDON, SUBSEQUENT ENCOUNTER: ICD-10-CM

## 2022-12-13 NOTE — PROGRESS NOTES
Daily Note     Today's date: 2022  Patient name: Lucille Terry  : 1950  MRN: [id number]  Referring provider: Jose Boucher PA-C  Dx:   Encounter Diagnosis     ICD-10-CM    1  Complete tear of left rotator cuff, unspecified whether traumatic  M75 122       2  Partial tear of left subscapularis tendon, subsequent encounter  S46 812D           Start Time: 931  Stop Time: 101  Total time in clinic (min): 46 minutes    Subjective: patient reports no new complaints or incidents  Reports no increase of pain since last therapy session  Objective: See treatment diary below      Assessment:  Patient was able to complete therapy being challenged by muscular fatigue vs pain  Was able to perform SL and Standing AROM with 2# load requiring normal rest periods between sets  Patient was challenged with [facility] strengthening as evident with performance of seated OH presses and 's carry  Plan: Continue per plan of care  Progress treatment as tolerated         ---------------------------------------------------ADD Veverly Ates----------------------------------------------------------------------------------------   Diagnosis: s/p left shoulder RCR 8/10/2022   Precautions: Diabetic type II, HTN (controlled w/ meds)   POC Expires: 23   Re-evaluation Date: 23    FOTO Scores/Date: Goal - 60; 22 - 32, 10/20/22-; 22 - ; 22 -    Visit Count 7/10 1/10 2/10 5/10 6/10   Manuals    L shoulder PROM all planes MH KD EARLE EARLE EARLE   Rhythmic Stabilization 5x5" ea plane iso 90 deg flex                               Ther Ex /23           Supine cane ER    5"x10            Ball Roll Up Wall #10 2x10  10# 2x10 5# 2x10 8# 2x10   Wall slides W/ lift off flex/abd 2x10 ea   W/lift off flex/abd 2x10 ea W/lift off flex/abd 2x10 ea   Pullies        Supine Cane Flex        Tband Rows, ext GTB 2x10x1"    Orange 2x10 ea    Tband ER GTB 2x10x1"   GTB 2x10    Tband IR GTB 2x10x1"   GTB 2x10     Supine Punches/Flex 1# 2x10 (progress resistance NV)       SL ABD / Flexion 1# 2x10 flexion  abd 2# 2x10     SL ER 1x10 NW   1x10 1# x3" iso hold  2# 2x10     BL Horizontal Abd GTB 2x10 - progress iso hold NV or volume   NV GTB x 10 (Supine NV)   UBE L3 3'/3' L3 3/3 L3 3/3 L3 3/3 L3 3/3   Standing AROM Flex/Scap 1# 1x10  Flex/Scap 2# 1x10   2# 2x10 ea Flex 1# x20/Scap 0# x20 Flex/scao 1# 2x10 ea    Seated OH Press   1#x 10  1# x15    IR Strap Stretch 10x15"    10"x10 10"x10   's Carry NV  No weight x 4 laps NV No weight x 3 laps   FOTO  Re-assessed left shoulder and FOTO; Updated PT prognosis/POC and goals       Neuro Re-Ed                                                         Ther Act                              Modalities

## 2022-12-15 ENCOUNTER — OFFICE VISIT (OUTPATIENT)
Dept: PHYSICAL THERAPY | Facility: CLINIC | Age: 72
End: 2022-12-15

## 2022-12-15 DIAGNOSIS — S46.812D PARTIAL TEAR OF LEFT SUBSCAPULARIS TENDON, SUBSEQUENT ENCOUNTER: ICD-10-CM

## 2022-12-15 DIAGNOSIS — M75.122 COMPLETE TEAR OF LEFT ROTATOR CUFF, UNSPECIFIED WHETHER TRAUMATIC: Primary | ICD-10-CM

## 2022-12-15 NOTE — PROGRESS NOTES
Daily Note     Today's date: 12/15/2022  Patient name: Saturnino Valdivia  : 1950  MRN: [id number]  Referring provider: Halima Quintana PA-C  Dx:   Encounter Diagnosis     ICD-10-CM    1  Complete tear of left rotator cuff, unspecified whether traumatic  M75 122       2  Partial tear of left subscapularis tendon, subsequent encounter  S46 812D           Start Time: 1145  Stop Time: 1230  Total time in clinic (min): 45 minutes    Subjective: Pt reports shoulder motion/strength continues to progress but still feels limited with reaching overhead and weak with heavy lifting/carrying or prolonged use of left arm  Objective: See treatment diary below      Assessment: Pt with continued limitations with manual PROM into end ranges of flexion/abd/IR with most restrictions in IR secondary to pain with slight muscle guarding  Pt remains easily fatigued with all strengthening exercises and unable to increase weight, actually having to regress to 1# for standing flex/abd due to fatigue  Pt remains reliant on cues for correction of form and maintaining maximal shoulder flexion during unloaded  carries with instruction on rest breaks between each walking lap due to fatigue  Added functional cone stacking to further challenge shoulder strength/endurance for 1 min sets with notable fatigue but no increase in shoulder pain symptoms  HEP was updated and reviewed  Plan: Continue per plan of care  Progress treatment as tolerated         ---------------------------------------------------ADD Mar Ortiz----------------------------------------------------------------------------------------   Diagnosis: s/p left shoulder RCR 8/10/2022   Precautions: Diabetic type II, HTN (controlled w/ meds)   POC Expires: 23   Re-evaluation Date: 23    FOTO Scores/Date: Goal - 60; 22 - 32, 10/20/22-70; 22 - 78; 22   Visit Count 7/10 1/10 2/10 3/10 6/10   Manuals 12/7 12/9 12/13 12/15 11/23   L shoulder PROM all planes MH KD EARLE KD EARLE   Rhythmic Stabilization 5x5" ea plane iso 90 deg flex                               Ther Ex 12/7 12/9 12/13 12/15 11/23           Supine cane ER                 Ball Roll Up Wall #10 2x10  10# 2x10 10# 2x10 8# 2x10   Wall slides W/ lift off flex/abd 2x10 ea     W/lift off flex/abd 2x10 ea   Pullies        Supine Cane Flex        Tband Rows, ext GTB 2x10x1"         Tband ER GTB 2x10x1"        Tband IR GTB 2x10x1"        Supine Punches/Flex 1# 2x10 (progress resistance NV)       SL ABD / Flexion 1# 2x10 flexion  2# 2x10 2# 2x10 ea    SL ER 1x10 NW   1x10 1# x3" iso hold  2# 2x10 2# 2x10    BL Horizontal Abd GTB 2x10 - progress iso hold NV or volume   NV GTB x 10 (Supine NV)   UBE for strength/endurance L3 3'/3' L3 3/3 L3 3/3 L3 3/3 L3 3/3   Standing AROM Flex/Scap 1# 1x10  Flex/Scap 2# 1x10   2# 2x10 ea 1# 2x10 ea Flex/scao 1# 2x10 ea    Seated OH Press   1# x10 1# 2x10 1# x15    IR Strap Stretch 10x15"      10"x10   's Carry NV  No weight x 4 laps No weight x 4 laps No weight x 3 laps   Functional cone stacking    3 cones to top shelf 2x 1 min                    FOTO  Re-assessed left shoulder and FOTO; Updated PT prognosis/POC and goals       Neuro Re-Ed                                                         Ther Act                              Modalities

## 2022-12-17 DIAGNOSIS — E11.69 TYPE 2 DIABETES MELLITUS WITH OTHER SPECIFIED COMPLICATION, WITHOUT LONG-TERM CURRENT USE OF INSULIN (HCC): ICD-10-CM

## 2022-12-17 RX ORDER — METFORMIN HYDROCHLORIDE 500 MG/1
TABLET, EXTENDED RELEASE ORAL
Qty: 120 TABLET | Refills: 0 | Status: SHIPPED | OUTPATIENT
Start: 2022-12-17

## 2022-12-20 ENCOUNTER — OFFICE VISIT (OUTPATIENT)
Dept: PHYSICAL THERAPY | Facility: CLINIC | Age: 72
End: 2022-12-20

## 2022-12-20 DIAGNOSIS — M75.122 COMPLETE TEAR OF LEFT ROTATOR CUFF, UNSPECIFIED WHETHER TRAUMATIC: Primary | ICD-10-CM

## 2022-12-20 DIAGNOSIS — S46.812D PARTIAL TEAR OF LEFT SUBSCAPULARIS TENDON, SUBSEQUENT ENCOUNTER: ICD-10-CM

## 2022-12-20 NOTE — PROGRESS NOTES
Daily Note     Today's date: 2022  Patient name: Meño Oviedo  : 1950  MRN: [id number]  Referring provider: Michoacano Osorio PA-C  Dx:   Encounter Diagnosis     ICD-10-CM    1  Complete tear of left rotator cuff, unspecified whether traumatic  M75 122       2  Partial tear of left subscapularis tendon, subsequent encounter  S46 695G                      Subjective: Patient reports his arm is a little sore since yesterday but he cannot recall doing anything differently  He cont to have a sharp pain certain ways he moves  Objective: See treatment diary below      Assessment: Tolerated treatment well  ROM progressing well, most limited with IR  Muscular endurance improving, able to increase reps as charted with no compensation  Most challenged with OH motions, occasional cues to reduce UT compensation with [facility] press   Patient demonstrated fatigue post treatment and would benefit from continued PT      Plan: Continue per plan of care       ---------------------------------------------------ADD Levora Mendez----------------------------------------------------------------------------------------   Diagnosis: s/p left shoulder RCR 8/10/2022   Precautions: Diabetic type II, HTN (controlled w/ meds)   POC Expires: 23   Re-evaluation Date: 23    FOTO Scores/Date: Goal - 60; 22 - 32, 10/20/22-70; 22 - ; 22 -    Visit Count 7/10 1/10 2/10 3/10 4/10   Manuals 12/7 12/9 12/13 12/15 12/20   L shoulder PROM all planes MH KD EARLE KD MARK   Rhythmic Stabilization 5x5" ea plane iso 90 deg flex                               Ther Ex 12/7 12/9 12/13 12/15 12/20           Supine cane ER                 Ball Roll Up Wall #10 2x10  10# 2x10 10# 2x10 10# 2x10   Wall slides W/ lift off flex/abd 2x10 ea        Pullies        Supine Cane Flex        Tband Rows, ext GTB 2x10x1"         Tband ER GTB 2x10x1"        Tband IR GTB 2x10x1"        Supine Punches/Flex 1# 2x10 (progress resistance NV)       SL ABD / Flexion 1# 2x10 flexion  2# 2x10 2# 2x10 ea 2# 2x12 ea   SL ER 1x10 NW   1x10 1# x3" iso hold  2# 2x10 2# 2x10 2# 2x12   BL Horizontal Abd GTB 2x10 - progress iso hold NV or volume   NV Seated GTB 2x10    UBE for strength/endurance L3 3'/3' L3 3/3 L3 3/3 L3 3/3 L3 3/3   Standing AROM Flex/Scap 1# 1x10  Flex/Scap 2# 1x10   2# 2x10 ea 1# 2x10 ea 1# 2x12 ea flex/scap   Seated OH Press   1# x10 1# 2x10 1# 2x10 ea   IR Strap Stretch 10x15"         's Carry NV  No weight x 4 laps No weight x 4 laps NV   Functional cone stacking    3 cones to top shelf 2x 1 min 1# cuff weight on wrist  3 cones to top shelf 2x1 min                    FOTO  Re-assessed left shoulder and FOTO; Updated PT prognosis/POC and goals       Neuro Re-Ed                                                         Ther Act                           Modalities

## 2022-12-22 ENCOUNTER — OFFICE VISIT (OUTPATIENT)
Dept: PHYSICAL THERAPY | Facility: CLINIC | Age: 72
End: 2022-12-22

## 2022-12-22 DIAGNOSIS — M75.122 COMPLETE TEAR OF LEFT ROTATOR CUFF, UNSPECIFIED WHETHER TRAUMATIC: Primary | ICD-10-CM

## 2022-12-22 DIAGNOSIS — S46.812D PARTIAL TEAR OF LEFT SUBSCAPULARIS TENDON, SUBSEQUENT ENCOUNTER: ICD-10-CM

## 2022-12-22 NOTE — PROGRESS NOTES
Daily Note     Today's date: 2022  Patient name: Pamela Hagen  : 1950  MRN: [id number]  Referring provider: Amrita Dominique PA-C  Dx:   Encounter Diagnosis     ICD-10-CM    1  Complete tear of left rotator cuff, unspecified whether traumatic  M75 122       2  Partial tear of left subscapularis tendon, subsequent encounter  S46 812D           Start Time: 09  Stop Time: 1024  Total time in clinic (min): 52 minutes    Subjective: patient reports no new complaints or incidents  Denies any increased pain since last therapy sesison  Objective: See treatment diary below      Assessment: patient was able to progress in therapy further challenged shoulder strength  Was able to perform standing and seated AROM with 2# load  Required frequent rest periods with [place] press due to muscular fatigue  Cueing with performance of 's carry performed with 1# load to address elbow compensation  Introduced functional IR cane reaching to improve upon AROM  Plan: Continue per plan of care  Progress treatment as tolerated         ---------------------------------------------------ADD Loletha Furrow----------------------------------------------------------------------------------------   Diagnosis: s/p left shoulder RCR 8/10/2022   Precautions: Diabetic type II, HTN (controlled w/ meds)   POC Expires: 23   Re-evaluation Date: 23    FOTO Scores/Date: Goal - 60; 22 - 32, 10/20/22-70; 22 - 78; 22 -    Visit Count 5/10 1/10 2/10 3/10 4/10   Manuals 12/22 12/9 12/13 12/15 12/20   L shoulder PROM all planes EARLE KD EARLE KD MARK   Rhythmic Stabilization                                Ther Ex 12/22 12/9 12/13 12/15 12/20           Supine cane ER                 Ball Roll Up Wall   10# 2x10 10# 2x10 10# 2x10   Wall slides W/lift off 2x10 flex/scap        Pullies        Supine Cane Flex        Tband Rows, ext         Tband ER         Tband IR         Supine Punches/Flex        SL ABD / Flexion   2# 2x10 2# 2x10 ea 2# 2x12 ea   SL ER   2# 2x10 2# 2x10 2# 2x12   BL Horizontal Abd    NV Seated GTB 2x10    UBE for strength/endurance L3 3/3 L3 3/3 L3 3/3 L3 3/3 L3 3/3   Standing AROM 2# 2x10 ea  2# 2x10 ea 1# 2x10 ea 1# 2x12 ea flex/scap   Seated OH Press 2# 2x10  1# x10 1# 2x10 1# 2x10 ea   IR Strap Stretch 10"x10        's Carry 1# x 4 laps   No weight x 4 laps No weight x 4 laps NV   Functional cone stacking    3 cones to top shelf 2x 1 min 1# cuff weight on wrist  3 cones to top shelf 2x1 min    Functional Stick Passing x10               FOTO  Re-assessed left shoulder and FOTO; Updated PT prognosis/POC and goals       Neuro Re-Ed                                                         Ther Act                           Modalities

## 2022-12-28 ENCOUNTER — OFFICE VISIT (OUTPATIENT)
Dept: PHYSICAL THERAPY | Facility: CLINIC | Age: 72
End: 2022-12-28

## 2022-12-28 DIAGNOSIS — M75.122 COMPLETE TEAR OF LEFT ROTATOR CUFF, UNSPECIFIED WHETHER TRAUMATIC: Primary | ICD-10-CM

## 2022-12-28 DIAGNOSIS — S46.812D PARTIAL TEAR OF LEFT SUBSCAPULARIS TENDON, SUBSEQUENT ENCOUNTER: ICD-10-CM

## 2022-12-28 NOTE — PROGRESS NOTES
Daily Note     Today's date: 2022  Patient name: Merline Rick  : 1950  MRN: [id number]  Referring provider: Stacia Stanley PA-C  Dx:   Encounter Diagnosis     ICD-10-CM    1  Complete tear of left rotator cuff, unspecified whether traumatic  M75 122       2  Partial tear of left subscapularis tendon, subsequent encounter  S46 815F           Start Time: 0800          Subjective: pt reports he still has difficulty reaching behind the back  Objective: See treatment diary below      Assessment: Tolerated treatment well  Patient would benefit from continued PT  Continued w/ current POC as listed below  Improved mobility, especially in IR, post PROM  Pt became easily fatigued w/ OH activities         Plan: Continue per plan of care       ---------------------------------------------------ADD Elnita Rush----------------------------------------------------------------------------------------   Diagnosis: s/p left shoulder RCR 8/10/2022   Precautions: Diabetic type II, HTN (controlled w/ meds)   POC Expires: 23   Re-evaluation Date: 23    FOTO Scores/Date: Goal - 60; 22 - 32, 10/20/22-; 22 - ; 22 -    Visit Count 10 6/10   4/10   Manuals    L shoulder PROM all planes [facility]   Rhythmic Stabilization                                Ther Ex            Supine cane ER                Ball Roll Up Wall     10# 2x10   Wall slides W/lift off 2x10 flex/scap       Pullies        Supine Cane Flex        Tband Rows, ext        Tband ER        Tband IR        Supine Punches/Flex        SL ABD / Flexion     2# 2x12 ea   SL ER     2# 2x12   BL Horizontal Abd     Seated GTB 2x10    UBE for strength/endurance L3 3/3 L3 3/3   L3 3/3   Standing AROM 2# 2x10 ea 2# 2x10 ea   1# 2x12 ea flex/scap   Seated OH Press 2# 2x10 2# 2x10   1# 2x10 ea   IR Strap Stretch 10"x10 10"x10      's Carry 1# x 4 laps  1# x 4 laps    NV   Functional cone stacking 1# cuff weight on wrist  3 cones to top shelf 2x1 min    Functional Stick Passing x10 10x              FOTO        Neuro Re-Ed                                                         Ther Act                          Modalities

## 2022-12-30 ENCOUNTER — OFFICE VISIT (OUTPATIENT)
Dept: PHYSICAL THERAPY | Facility: CLINIC | Age: 72
End: 2022-12-30

## 2022-12-30 DIAGNOSIS — M75.122 COMPLETE TEAR OF LEFT ROTATOR CUFF, UNSPECIFIED WHETHER TRAUMATIC: Primary | ICD-10-CM

## 2022-12-30 DIAGNOSIS — S46.812D PARTIAL TEAR OF LEFT SUBSCAPULARIS TENDON, SUBSEQUENT ENCOUNTER: ICD-10-CM

## 2022-12-30 NOTE — PROGRESS NOTES
Daily Note     Today's date: 2022  Patient name: Yumiko Randall  : 1950  MRN: [id number]  Referring provider: Bessy Kirk PA-C  Dx:   Encounter Diagnosis     ICD-10-CM    1  Complete tear of left rotator cuff, unspecified whether traumatic  M75 122       2  Partial tear of left subscapularis tendon, subsequent encounter  S46 812D           Start Time: 930  Stop Time: 1015  Total time in clinic (min): 45 minutes    Subjective: Pt reports minimal shoulder pain currently of 1-2/10, feels motion and strength is progressing well  Objective: See treatment diary below      Assessment: Pt continues to progress well with manual PROM all planes with nearly full flexion/abduction with slight end range discomfort/stretching with most limitations presenting into IR but reports of end range stiffness versus pain  Pt with notable improvement in quality/quantity of movement with functional IR mobility with IR stick pass exercises and improved active mobility after completion of exercises  Pt remains easily fatigued with functional strengthening with ability to increase weight with fwd/lat raises but unable to progress weight with  press or Magalene Her carries due to weakness with intermittent cues required for correction of form and eccentric control  Pt with improved endurance noted with cone stacking and was able to increase to 2# ankle weight, as well as adding set into scapular plane  Pt denies increase in pain post tx, only muscle fatigue/soreness  Plan: Continue per plan of care  Progress treatment as tolerated         ---------------------------------------------------ADD Augustina Pinch----------------------------------------------------------------------------------------   Diagnosis: s/p left shoulder RCR 8/10/2022   Precautions: Diabetic type II, HTN (controlled w/ meds)   POC Expires: 23   Re-evaluation Date: 23    FOTO Scores/Date: Goal - 60; 22 - 32, 10/20/22-; 22 - 78; 12/9/22 - 83   Visit Count 5/10 6/10 7/10  4/10   Manuals 12/22 12/28 12/30 12/20   L shoulder PROM all planes E[facility]   Rhythmic Stabilization                                Ther Ex 12/22 12/28 12/30 12/20           Supine cane ER                Ball Roll Up Wall     10# 2x10   Wall slides W/lift off 2x10 flex/scap       Pullies        Supine Cane Flex        Tband Rows, ext        Tband ER        Tband IR        Supine Punches/Flex        SL ABD / Flexion     2# 2x12 ea   SL ER     2# 2x12   BL Horizontal Abd     Seated GTB 2x10    UBE for strength/endurance L3 3/3 L3 3/3 L3 3/3  L3 3/3   Standing AROM 2# 2x10 ea 2# 2x10 ea 3# 2x10 ea  1# 2x12 ea flex/scap   Seated OH Press 2# 2x10 2# 2x10 2# 2x10  1# 2x10 ea   IR Strap Stretch 10"x10 10"x10 10x10"     's Carry 1# x 4 laps  1# x 4 laps  1# 4 laps  NV   Functional cone stacking   2# ankle weight on wrist, 3 cones to top shelf; 2 min ea (flex/scap)  1# cuff weight on wrist  3 cones to top shelf 2x1 min    Functional Stick Passing x10 10x 10x             FOTO        Neuro Re-Ed                                                         Ther Act                          Modalities

## 2023-01-04 ENCOUNTER — OFFICE VISIT (OUTPATIENT)
Dept: PHYSICAL THERAPY | Facility: CLINIC | Age: 73
End: 2023-01-04

## 2023-01-04 DIAGNOSIS — M75.122 COMPLETE TEAR OF LEFT ROTATOR CUFF, UNSPECIFIED WHETHER TRAUMATIC: Primary | ICD-10-CM

## 2023-01-04 DIAGNOSIS — S46.812D PARTIAL TEAR OF LEFT SUBSCAPULARIS TENDON, SUBSEQUENT ENCOUNTER: ICD-10-CM

## 2023-01-04 NOTE — PROGRESS NOTES
Daily Note     Today's date: 2023  Patient name: Ela Siegel  : 1950  MRN: [id number]  Referring provider: Fabiola Ruiz PA-C  Dx:   Encounter Diagnosis     ICD-10-CM    1  Complete tear of left rotator cuff, unspecified whether traumatic  M75 122       2  Partial tear of left subscapularis tendon, subsequent encounter  S46 812D           Start Time: 928  Stop Time:   Total time in clinic (min): 48 minutes    Subjective: patient reports no new complaints or incidents  Denies any increased pain since last therapy session  Objective: See treatment diary below      Assessment:  Patient demonstrates improved PROM presenting with full to near full in all planes  Was able to progress in therapy with addition on BL carrying and lifting activities including 20# box carry and lift with cues on postioning, mechanics, and compensatory patterns  Patient was able to complete without onset of reported pain and only normal fatigue that improved with rest periods  Plan: Continue per plan of care  Progress treatment as tolerated         ---------------------------------------------------ADD Seble Macedo----------------------------------------------------------------------------------------   Diagnosis: s/p left shoulder RCR 8/10/2022   Precautions: Diabetic type II, HTN (controlled w/ meds)   POC Expires: 23   Re-evaluation Date: 23    FOTO Scores/Date: Goal - 60; 22 - 32, 10/20/22-70; 22 - 78; 22 - 80   Visit Count 5/10 6/10 7/10 810 /10   Manuals    L shoulder PROM all planes EARLE [facility] EARLE MARK   Rhythmic Stabilization                                Ther Ex            Supine cane ER                Ball Roll Up Wall     10# 2x10   Wall slides W/lift off 2x10 flex/scap       Pullies        Supine Cane Flex        Tband Rows, ext        Tband ER        Tband IR        Supine Punches/Flex        SL ABD / Flexion     2# 2x12 ea   SL ER     2# 2x12   BL Horizontal Abd     Seated GTB 2x10    UBE for strength/endurance L3 3/3 L3 3/3 L3 3/3 L3 3/3 L3 3/3   Standing AROM 2# 2x10 ea 2# 2x10 ea 3# 2x10 ea 3# 2x10 1# 2x12 ea flex/scap   Seated OH Press 2# 2x10 2# 2x10 2# 2x10 3# 2x10  1# 2x10 ea   IR Strap Stretch 10"x10 10"x10 10x10"     's Carry 1# x 4 laps  1# x 4 laps  1# 4 laps 2# x 4 laps NV   Functional cone stacking   2# ankle weight on wrist, 3 cones to top shelf; 2 min ea (flex/scap)  1# cuff weight on wrist  3 cones to top shelf 2x1 min    Functional Stick Passing x10 10x 10x     Box Lift    Chair to cuff rack 20# 2x10    Box Carry    20# x 4 laps     FOTO        Neuro Re-Ed                                                         Ther Act                          Modalities

## 2023-01-06 ENCOUNTER — OFFICE VISIT (OUTPATIENT)
Dept: PHYSICAL THERAPY | Facility: CLINIC | Age: 73
End: 2023-01-06

## 2023-01-06 DIAGNOSIS — M75.122 COMPLETE TEAR OF LEFT ROTATOR CUFF, UNSPECIFIED WHETHER TRAUMATIC: Primary | ICD-10-CM

## 2023-01-06 DIAGNOSIS — S46.812D PARTIAL TEAR OF LEFT SUBSCAPULARIS TENDON, SUBSEQUENT ENCOUNTER: ICD-10-CM
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
## 2023-01-16 DIAGNOSIS — E11.69 TYPE 2 DIABETES MELLITUS WITH OTHER SPECIFIED COMPLICATION, WITHOUT LONG-TERM CURRENT USE OF INSULIN (HCC): ICD-10-CM

## 2023-01-16 RX ORDER — METFORMIN HYDROCHLORIDE 500 MG/1
TABLET, EXTENDED RELEASE ORAL
Qty: 120 TABLET | Refills: 0 | Status: SHIPPED | OUTPATIENT
Start: 2023-01-16

## 2023-01-30 NOTE — TELEPHONE ENCOUNTER
----- Message from Atilio Garcia DO sent at 8/14/2021  9:07 AM EDT -----  Regarding: Colonoscopy [facility] Internal [facility]  Contact: [phone]   08/14/21 9:07 AM    Hello, our patient Anjana Pandey has had Diabetic Eye Exam completed/performed  Please assist in updating the patient chart by making an External outreach to Dr April Montoya facility located in [place], [place]       Thank you,  Lester Roth DO  [facility]  [address]
Upon review of the In Basket request and the patient's chart, initial outreach has been made via fax, please see Contacts section for details       Thank you  Tai Peng
Upon review of the In Basket request we were able to locate, review, and update the patient chart as requested for Diabetic Eye Exam     Any additional questions or concerns should be emailed to the Practice Liaisons via [email or web address] email, please do not reply via In Basket      Thank you  Janel Ryan
Yes

## 2023-02-13 ENCOUNTER — OFFICE VISIT (OUTPATIENT)
Dept: OBGYN CLINIC | Facility: CLINIC | Age: 73
End: 2023-02-13

## 2023-02-13 VITALS
BODY MASS INDEX: 29.16 KG/M2 | HEIGHT: 73 IN | SYSTOLIC BLOOD PRESSURE: 134 MMHG | HEART RATE: 75 BPM | WEIGHT: 220 LBS | DIASTOLIC BLOOD PRESSURE: 77 MMHG

## 2023-02-13 DIAGNOSIS — Z98.890 S/P ARTHROSCOPY OF LEFT SHOULDER: ICD-10-CM

## 2023-02-13 DIAGNOSIS — Z98.890 H/O REPAIR OF LEFT ROTATOR CUFF: Primary | ICD-10-CM

## 2023-02-13 NOTE — PROGRESS NOTES
Patient Name:  Taylor Post  MRN:  [id number]    [address] [place] [address]     1  H/O repair of left rotator cuff    2  S/P arthroscopy of left shoulder      71 y o  male approximately 6 month s/p Left  Shoulder arthroscopic supraspinatus and subscapularis repair, biceps tenotomy, acromioplasty performed on 8/10/22  I am pleased with his progress thus far status post surgical intervention  I recommend he continue to complete his home exercise regimine provided by physical therapy to maintain his strength and range of motion post operatively  I will see the patient back as needed if symptoms worsen or fail to improve  History of the Present Illness   Taylor Post is a 71 y o  male approximately 6 month s/p Left  Shoulder arthroscopic supraspinatus and subscapularis repair, biceps tenotomy, acromioplasty performed on 8/10/22  He is doing well thus far status post surgical intervention  He is has completed formal physical therapy and transitioned to a home exercise plan  He feels significant improvement in his symptoms compared to prior to surgery         Review of Systems     Review of Systems   Constitutional: Positive for activity change  Negative for chills, fever and unexpected weight change  HENT: Negative for hearing loss, nosebleeds and sore throat  Eyes: Negative for pain, redness and visual disturbance  Respiratory: Negative for cough, shortness of breath and wheezing  Cardiovascular: Negative for chest pain, palpitations and leg swelling  Gastrointestinal: Negative for abdominal pain, nausea and vomiting  Endocrine: Negative for polyphagia and polyuria  Genitourinary: Negative for dysuria and hematuria  Musculoskeletal: Positive for myalgias  See HPI   Skin: Negative for rash and wound  Neurological: Negative for dizziness, numbness and headaches  Psychiatric/Behavioral: Negative for decreased concentration and suicidal ideas  The patient is not nervous/anxious          Physical Exam     /77   Pulse 75   Ht 6' 1" (1 854 m)   Wt 99 8 kg (220 lb)   BMI 29 03 kg/m²     Left  Shoulder:   Surgical incisions are healed  Active range of motion   160 degrees forward flexion  150 degrees abduction  60 degrees external rotation   Equal lower throacic internal rotation        There is no tenderness presentThere is 5/5 strength with external rotation testing at the side  Empty can testing is negative   Belly press: negative   The patient is neurovascularly intact distally in the extremity  Data Review     I have personally reviewed pertinent films in PACS, and my interpretation follows  No new images to review at today's appointment       Social History     Tobacco Use   • Smoking status: Every Day     Packs/day: 0 50     Years: 55 00     Pack years: 27 50     Types: Cigarettes     Start date: 4/26/1964   • Smokeless tobacco: Never   Vaping Use   • Vaping Use: Never used   Substance Use Topics   • Alcohol use: Yes     Comment: rarely   • Drug use: No           Procedures  none    Scribe Attestation    I,:  Denise Liz am acting as a scribe while in the presence of the attending physician :       I,:  Amaya Montano DO personally performed the services described in this documentation    as scribed in my presence :

## 2023-03-01 ENCOUNTER — RA CDI HCC (OUTPATIENT)
Dept: OTHER | Facility: HOSPITAL | Age: 73
End: 2023-03-01

## 2023-03-01 NOTE — PROGRESS NOTES
[address]  coding opportunities       Chart reviewed, no opportunity found: CHART REVIEWED, NO OPPORTUNITY FOUND        Patients Insurance     Medicare Insurance: Medicare

## 2023-03-07 ENCOUNTER — OFFICE VISIT (OUTPATIENT)
Dept: INTERNAL MEDICINE CLINIC | Facility: CLINIC | Age: 73
End: 2023-03-07

## 2023-03-07 VITALS
WEIGHT: 218 LBS | TEMPERATURE: 98 F | BODY MASS INDEX: 28.89 KG/M2 | HEART RATE: 80 BPM | OXYGEN SATURATION: 98 % | DIASTOLIC BLOOD PRESSURE: 64 MMHG | RESPIRATION RATE: 20 BRPM | SYSTOLIC BLOOD PRESSURE: 140 MMHG | HEIGHT: 73 IN

## 2023-03-07 DIAGNOSIS — E11.69 HYPERLIPIDEMIA ASSOCIATED WITH TYPE 2 DIABETES MELLITUS (HCC): ICD-10-CM

## 2023-03-07 DIAGNOSIS — E11.3293 TYPE 2 DIABETES MELLITUS WITH BOTH EYES AFFECTED BY MILD NONPROLIFERATIVE RETINOPATHY WITHOUT MACULAR EDEMA, WITHOUT LONG-TERM CURRENT USE OF INSULIN (HCC): Primary | Chronic | ICD-10-CM

## 2023-03-07 DIAGNOSIS — E11.29 TYPE 2 DIABETES MELLITUS WITH MICROALBUMINURIA, WITHOUT LONG-TERM CURRENT USE OF INSULIN (HCC): Chronic | ICD-10-CM

## 2023-03-07 DIAGNOSIS — R80.9 TYPE 2 DIABETES MELLITUS WITH MICROALBUMINURIA, WITHOUT LONG-TERM CURRENT USE OF INSULIN (HCC): Chronic | ICD-10-CM

## 2023-03-07 DIAGNOSIS — Z12.5 SCREENING FOR PROSTATE CANCER: ICD-10-CM

## 2023-03-07 DIAGNOSIS — F17.210 SMOKING GREATER THAN 20 PACK YEARS: ICD-10-CM

## 2023-03-07 DIAGNOSIS — Z12.2 ENCOUNTER FOR SCREENING FOR LUNG CANCER: ICD-10-CM

## 2023-03-07 DIAGNOSIS — E78.5 HYPERLIPIDEMIA ASSOCIATED WITH TYPE 2 DIABETES MELLITUS (HCC): ICD-10-CM

## 2023-03-07 DIAGNOSIS — I10 PRIMARY HYPERTENSION: Chronic | ICD-10-CM

## 2023-03-07 RX ORDER — GLIPIZIDE 10 MG/1
10 TABLET, FILM COATED, EXTENDED RELEASE ORAL DAILY
Qty: 90 TABLET | Refills: 1 | Status: SHIPPED | OUTPATIENT
Start: 2023-03-07 | End: 2023-09-03

## 2023-03-07 NOTE — PATIENT INSTRUCTIONS
Wellness Visit for Adults   AMBULATORY CARE:   A wellness visit  is when you see your healthcare provider to get screened for health problems  Your healthcare provider will also give you advice on how to stay healthy  Write down your questions so you remember to ask them  Ask your healthcare provider how often you should have a wellness visit  What happens at a wellness visit:  Your healthcare provider will ask about your health, and your family history of health problems  This includes high blood pressure, heart disease, and cancer  He or she will ask if you have symptoms that concern you, if you smoke, and about your mood  You may also be asked about your intake of medicines, supplements, food, and alcohol  Any of the following may be done: Your weight  will be checked  Your height may also be checked so your body mass index (BMI) can be calculated  Your BMI shows if you are at a healthy weight  Your blood pressure  and heart rate will be checked  Your temperature may also be checked  Blood and urine tests  may be done  Blood tests may be done to check your cholesterol levels  Abnormal cholesterol levels increase your risk for heart disease and stroke  You may also need a blood or urine test to check for diabetes if you are at increased risk  Urine tests may be done to look for signs of an infection or kidney disease  A physical exam  includes checking your heartbeat and lungs with a stethoscope  Your healthcare provider may also check your skin to look for sun damage  Screening tests  may be recommended  A screening test is done to check for diseases that may not cause symptoms  The screening tests you may need depend on your age, gender, family history, and lifestyle habits  For example, colorectal screening may be recommended if you are 48years old or older  Screening tests you need if you are a woman:   A Pap smear  is used to screen for cervical cancer   Pap smears are usually done every 3 to 5 years depending on your age  You may need them more often if you have had abnormal Pap smear test results in the past  Ask your healthcare provider how often you should have a Pap smear  A mammogram  is an x-ray of your breasts to screen for breast cancer  Experts recommend mammograms every 2 years starting at age 48 years  You may need a mammogram at age 52 years or younger if you have an increased risk for breast cancer  Talk to your healthcare provider about when you should start having mammograms and how often you need them  Vaccines you may need:   Get an influenza vaccine  every year  The influenza vaccine protects you from the flu  Several types of viruses cause the flu  The viruses change over time, so new vaccines are made each year  Get a tetanus-diphtheria (Td) booster vaccine  every 10 years  This vaccine protects you against tetanus and diphtheria  Tetanus is a severe infection that may cause painful muscle spasms and lockjaw  Diphtheria is a severe bacterial infection that causes a thick covering in the back of your mouth and throat  Get a human papillomavirus (HPV) vaccine  if you are female and aged 23 to 32 or male 23 to 24 and never received it  This vaccine protects you from HPV infection  HPV is the most common infection spread by sexual contact  HPV may also cause vaginal, penile, and anal cancers  Get a pneumococcal vaccine  if you are aged 72 years or older  The pneumococcal vaccine is an injection given to protect you from pneumococcal disease  Pneumococcal disease is an infection caused by pneumococcal bacteria  The infection may cause pneumonia, meningitis, or an ear infection  Get a shingles vaccine  if you are 60 or older, even if you have had shingles before  The shingles vaccine is an injection to protect you from the varicella-zoster virus  This is the same virus that causes chickenpox   Shingles is a painful rash that develops in people who had chickenpox or have been exposed to the virus  How to eat healthy:  My Plate is a model for planning healthy meals  It shows the types and amounts of foods that should go on your plate  Fruits and vegetables make up about half of your plate, and grains and protein make up the other half  A serving of dairy is included on the side of your plate  The amount of calories and serving sizes you need depends on your age, gender, weight, and height  Examples of healthy foods are listed below:  Eat a variety of vegetables  such as dark green, red, and orange vegetables  You can also include canned vegetables low in sodium (salt) and frozen vegetables without added butter or sauces  Eat a variety of fresh fruits , canned fruit in 100% juice, frozen fruit, and dried fruit  Include whole grains  At least half of the grains you eat should be whole grains  Examples include whole-wheat bread, wheat pasta, brown rice, and whole-grain cereals such as oatmeal     Eat a variety of protein foods such as seafood (fish and shellfish), lean meat, and poultry without skin (turkey and chicken)  Examples of lean meats include pork leg, shoulder, or tenderloin, and beef round, sirloin, tenderloin, and extra lean ground beef  Other protein foods include eggs and egg substitutes, beans, peas, soy products, nuts, and seeds  Choose low-fat dairy products such as skim or 1% milk or low-fat yogurt, cheese, and cottage cheese  Limit unhealthy fats  such as butter, hard margarine, and shortening  Exercise:  Exercise at least 30 minutes per day on most days of the week  Some examples of exercise include walking, biking, dancing, and swimming  You can also fit in more physical activity by taking the stairs instead of the elevator or parking farther away from stores  Include muscle strengthening activities 2 days each week  Regular exercise provides many health benefits   It helps you manage your weight, and decreases your risk for type 2 diabetes, heart disease, stroke, and high blood pressure  Exercise can also help improve your mood  Ask your healthcare provider about the best exercise plan for you  General health and safety guidelines:   Do not smoke  Nicotine and other chemicals in cigarettes and cigars can cause lung damage  Ask your healthcare provider for information if you currently smoke and need help to quit  E-cigarettes or smokeless tobacco still contain nicotine  Talk to your healthcare provider before you use these products  Limit alcohol  A drink of alcohol is 12 ounces of beer, 5 ounces of wine, or 1½ ounces of liquor  Lose weight, if needed  Being overweight increases your risk of certain health conditions  These include heart disease, high blood pressure, type 2 diabetes, and certain types of cancer  Protect your skin  Do not sunbathe or use tanning beds  Use sunscreen with a SPF 15 or higher  Apply sunscreen at least 15 minutes before you go outside  Reapply sunscreen every 2 hours  Wear protective clothing, hats, and sunglasses when you are outside  Drive safely  Always wear your seatbelt  Make sure everyone in your car wears a seatbelt  A seatbelt can save your life if you are in an accident  Do not use your cell phone when you are driving  This could distract you and cause an accident  Pull over if you need to make a call or send a text message  Practice safe sex  Use latex condoms if are sexually active and have more than one partner  Your healthcare provider may recommend screening tests for sexually transmitted infections (STIs)  Wear helmets, lifejackets, and protective gear  Always wear a helmet when you ride a bike or motorcycle, go skiing, or play sports that could cause a head injury  Wear protective equipment when you play sports  Wear a lifejacket when you are on a boat or doing water sports      © Copyright [facility] 2022 Information is for End User's use only and may not be sold, redistributed or otherwise used for commercial purposes  The above information is an  only  It is not intended as medical advice for individual conditions or treatments  Talk to your doctor, nurse or pharmacist before following any medical regimen to see if it is safe and effective for you

## 2023-03-07 NOTE — PROGRESS NOTES
Name: Chaitanya Rondon      : 1950      MRN: [id number]  Encounter Provider: Alyce Upton DO  Encounter Date: 3/7/2023   [facility]   [address]     1  Type 2 diabetes mellitus with both eyes affected by mild nonproliferative retinopathy without macular edema, without long-term current use of insulin ([facility] )  2  Type 2 diabetes mellitus with microalbuminuria, without long-term current use of insulin ([facility])  3  Hyperlipidemia associated with type 2 diabetes mellitus ([facility] )    Need to check updated labs  Most recent A1c was 7 4 % on 2022  GLP-1 agonists are not affordable  Switch to glipizide  Monitor for hypoglycemia  Needs to work on diet and exercise  Continue statin as prescribed  Continue ACE inhibitor for microalbuminuria  Follows with eye doctor  Last had eye exam in Aug 2022 showing mild retinopathy      - Hemoglobin A1C; Future  - Microalbumin / creatinine urine ratio; Future  - glipiZIDE (GLUCOTROL XL) 10 mg 24 hr tablet; Take 1 tablet (10 mg total) by mouth daily  Dispense: 90 tablet; Refill: 1  - Comprehensive metabolic panel; Future  - CBC and Platelet; Future  - Lipid Panel with Direct LDL reflex; Future    4  Primary hypertension    BP a little high in office today  Will monitor  Follow-up in 6 months  Watch salt in diet  5  Encounter for screening for lung cancer  6  Smoking greater than 20 pack years    I discussed with him that he is a candidate for lung cancer CT screening  The following Shared Decision-Making points were covered:  1  Benefits of screening were discussed, including the rates of reduction in death from lung cancer and other causes  Harms of screening were reviewed, including false positive tests, radiation exposure levels, risks of invasive procedures, risks of complications of screening, and risk of overdiagnosis    2  I counseled on the importance of adherence to annual lung cancer LDCT screening, impact of co-morbidities, and ability or willingness to undergo diagnosis and treatment  3  I counseled on the importance of maintaining abstinence as a former smoker or was counseled on the importance of smoking cessation if a current smoker    Review of Eligibility Criteria: He meets all of the criteria for Lung Cancer Screening  · He is 71 y o    · He has 20 pack year tobacco history and is a current smoker or has quit within the past 15 years  · He presents no signs or symptoms of lung cancer    After discussion, the patient decided to elect lung cancer screening   - CT lung screening program; Future    7  Screening for prostate cancer  - PSA, Total Screen; Future     BMI Counseling: Body mass index is 28 76 kg/m²  The BMI is above normal  Nutrition recommendations include encouraging healthy choices of fruits and vegetables, moderation in carbohydrate intake and increasing intake of lean protein  Exercise recommendations include exercising 3-5 times per week  Rationale for BMI follow-up plan is due to patient being overweight or obese  Depression Screening and Follow-up Plan: Patient was screened for depression during today's encounter  They screened negative with a PHQ-2 score of 0  Tobacco Cessation Counseling: Tobacco cessation counseling was provided  The patient is sincerely urged to quit consumption of tobacco  He is not ready to quit tobacco        Return in about 6 months (around 9/7/2023) for diabetes follow-up, Subsequent AWV  Radha Urbano presents for follow-up  Due for diabetic labs  Doesn't check his sugars at home  Cannot afford Bydureon right now  He is in the donut hole  Taking metformin as prescribed  Most recent A1c was 7 4 % on 7/25/2022  Continues to smoke unfortunately  No increased SOB or cough  Review of Systems   Constitutional: Negative  Respiratory: Negative  Cardiovascular: Negative  Gastrointestinal: Negative  Musculoskeletal: Positive for arthralgias  Objective     /64 (BP Location: Left arm, Patient Position: Sitting, Cuff Size: Standard)   Pulse 80   Temp 98 °F (36 7 °C) (Temporal)   Resp 20   Ht 6' 1" (1 854 m)   Wt 98 9 kg (218 lb)   SpO2 98%   BMI 28 76 kg/m²     Physical Exam  Constitutional:       General: He is not in acute distress  Appearance: He is well-developed  He is not diaphoretic  Neck:      Thyroid: No thyromegaly  Vascular: No JVD  Cardiovascular:      Rate and Rhythm: Normal rate and regular rhythm  Pulses: no weak pulses          Dorsalis pedis pulses are 2+ on the right side and 2+ on the left side  Posterior tibial pulses are 2+ on the right side and 2+ on the left side  Heart sounds: Normal heart sounds  No murmur heard  Pulmonary:      Effort: Pulmonary effort is normal  No respiratory distress  Breath sounds: Normal breath sounds  No wheezing or rales  Abdominal:      General: Bowel sounds are normal  There is no distension  Palpations: Abdomen is soft  There is no mass  Tenderness: There is no abdominal tenderness  There is no guarding or rebound  Musculoskeletal:      Right lower leg: No edema  Left lower leg: No edema  Feet:      Right foot:      Skin integrity: No ulcer, skin breakdown, erythema, warmth, callus or dry skin  Left foot:      Skin integrity: No ulcer, skin breakdown, erythema, warmth, callus or dry skin  Neurological:      Mental Status: He is alert  Diabetic Foot Exam  Patient's shoes and socks removed  Right Foot/Ankle   Right Foot Inspection  Skin Exam: skin normal and skin intact  No dry skin, no warmth, no callus, no erythema, no maceration, no abnormal color, no pre-ulcer, no ulcer and no callus  Toe Exam: ROM and strength within normal limits  Sensory   Proprioception: intact  Monofilament testing: intact    Vascular  Capillary refills: < 3 seconds  The right DP pulse is 2+  The right PT pulse is 2+       Left Foot/Ankle  Left Foot Inspection  Skin Exam: skin normal and skin intact  No dry skin, no warmth, no erythema, no maceration, normal color, no pre-ulcer, no ulcer and no callus  Toe Exam: ROM and strength within normal limits  Sensory   Proprioception: intact  Monofilament testing: intact    Vascular  Capillary refills: < 3 seconds  The left DP pulse is 2+  The left PT pulse is 2+       Assign Risk Category  No deformity present  No loss of protective sensation  No weak pulses  Risk: 0        Lester Roth,

## 2023-03-09 ENCOUNTER — TELEPHONE (OUTPATIENT)
Dept: INTERNAL MEDICINE CLINIC | Facility: CLINIC | Age: 73
End: 2023-03-09

## 2023-03-09 ENCOUNTER — APPOINTMENT (OUTPATIENT)
Dept: LAB | Facility: HOSPITAL | Age: 73
End: 2023-03-09

## 2023-03-09 DIAGNOSIS — E11.3293 TYPE 2 DIABETES MELLITUS WITH BOTH EYES AFFECTED BY MILD NONPROLIFERATIVE RETINOPATHY WITHOUT MACULAR EDEMA, WITHOUT LONG-TERM CURRENT USE OF INSULIN (HCC): Chronic | ICD-10-CM

## 2023-03-09 DIAGNOSIS — E11.69 HYPERLIPIDEMIA ASSOCIATED WITH TYPE 2 DIABETES MELLITUS (HCC): ICD-10-CM

## 2023-03-09 DIAGNOSIS — E78.5 HYPERLIPIDEMIA ASSOCIATED WITH TYPE 2 DIABETES MELLITUS (HCC): ICD-10-CM

## 2023-03-09 DIAGNOSIS — Z12.5 SCREENING FOR PROSTATE CANCER: ICD-10-CM

## 2023-03-09 DIAGNOSIS — E11.65 TYPE 2 DIABETES MELLITUS WITH HYPERGLYCEMIA, WITHOUT LONG-TERM CURRENT USE OF INSULIN (HCC): Primary | ICD-10-CM

## 2023-03-09 LAB
ALBUMIN SERPL BCP-MCNC: 4.3 G/DL (ref 3.5–5)
ALP SERPL-CCNC: 66 U/L (ref 34–104)
ALT SERPL W P-5'-P-CCNC: 22 U/L (ref 7–52)
ANION GAP SERPL CALCULATED.3IONS-SCNC: 6 MMOL/L (ref 4–13)
AST SERPL W P-5'-P-CCNC: 15 U/L (ref 13–39)
BILIRUB SERPL-MCNC: 0.5 MG/DL (ref 0.2–1)
BUN SERPL-MCNC: 18 MG/DL (ref 5–25)
CALCIUM SERPL-MCNC: 9.5 MG/DL (ref 8.4–10.2)
CHLORIDE SERPL-SCNC: 101 MMOL/L (ref 96–108)
CHOLEST SERPL-MCNC: 179 MG/DL
CO2 SERPL-SCNC: 29 MMOL/L (ref 21–32)
CREAT SERPL-MCNC: 0.98 MG/DL (ref 0.6–1.3)
CREAT UR-MCNC: 229 MG/DL
ERYTHROCYTE [DISTWIDTH] IN BLOOD BY AUTOMATED COUNT: 12.4 % (ref 11.6–15.1)
EST. AVERAGE GLUCOSE BLD GHB EST-MCNC: 235 MG/DL
GFR SERPL CREATININE-BSD FRML MDRD: 77 ML/MIN/1.73SQ M
GLUCOSE P FAST SERPL-MCNC: 299 MG/DL (ref 65–99)
HBA1C MFR BLD: 9.8 %
HCT VFR BLD AUTO: 43.1 % (ref 36.5–49.3)
HDLC SERPL-MCNC: 55 MG/DL
HGB BLD-MCNC: 14.2 G/DL (ref 12–17)
LDLC SERPL CALC-MCNC: 88 MG/DL (ref 0–100)
MCH RBC QN AUTO: 28.8 PG (ref 26.8–34.3)
MCHC RBC AUTO-ENTMCNC: 32.9 G/DL (ref 31.4–37.4)
MCV RBC AUTO: 87 FL (ref 82–98)
MICROALBUMIN UR-MCNC: 460 MG/L (ref 0–20)
MICROALBUMIN/CREAT 24H UR: 201 MG/G CREATININE (ref 0–30)
PLATELET # BLD AUTO: 310 THOUSANDS/UL (ref 149–390)
PMV BLD AUTO: 10.7 FL (ref 8.9–12.7)
POTASSIUM SERPL-SCNC: 4.5 MMOL/L (ref 3.5–5.3)
PROT SERPL-MCNC: 7.2 G/DL (ref 6.4–8.4)
PSA SERPL-MCNC: 3.4 NG/ML (ref 0–4)
RBC # BLD AUTO: 4.93 MILLION/UL (ref 3.88–5.62)
SODIUM SERPL-SCNC: 136 MMOL/L (ref 135–147)
TRIGL SERPL-MCNC: 178 MG/DL
WBC # BLD AUTO: 10.03 THOUSAND/UL (ref 4.31–10.16)

## 2023-03-09 NOTE — TELEPHONE ENCOUNTER
----- Message from Yulisa Wilkes DO sent at 3/9/2023  3:45 PM EST -----  Diabetes is uncontrolled and sugar levels are higher than previous  Needs to work on improving diet to avoid excess sugars  Repeat labs in 3 months and follow-up with me after labs   I placed orders

## 2023-03-12 ENCOUNTER — NURSE TRIAGE (OUTPATIENT)
Dept: OTHER | Facility: OTHER | Age: 73
End: 2023-03-12

## 2023-03-12 DIAGNOSIS — E11.65 TYPE 2 DIABETES MELLITUS WITH HYPERGLYCEMIA, WITHOUT LONG-TERM CURRENT USE OF INSULIN (HCC): Primary | ICD-10-CM

## 2023-03-12 RX ORDER — LANCETS 28 GAUGE
EACH MISCELLANEOUS
Qty: 100 EACH | Refills: 3 | Status: SHIPPED | OUTPATIENT
Start: 2023-03-12 | End: 2023-03-15

## 2023-03-12 RX ORDER — BLOOD SUGAR DIAGNOSTIC
STRIP MISCELLANEOUS
Qty: 100 EACH | Refills: 3 | Status: SHIPPED | OUTPATIENT
Start: 2023-03-12 | End: 2023-03-15

## 2023-03-12 NOTE — TELEPHONE ENCOUNTER
Answer Assessment - Initial Assessment Questions  1  DRUG NAME: "What medicine do you need to have refilled?"      Freestyle test strips  2  REFILLS REMAINING: "How many refills are remaining?" (Note: The label on the medicine or pill bottle will show how many refills are remaining  If there are no refills remaining, then a renewal may be needed )      0    Protocols used: MEDICATION REFILL AND RENEWAL CALL-ADULT-    Pt  Out of freestyle test strips  Freestyle test strips prescribed by previous PCP  Advised pt to call pharmacy and see if they are able to provide an emergency refill  Will call pt  Back to verify  Pt  Spoke with pharmacy, they are unable to refill  Paged on call provider for order for test strips  Spoke with pt again  Pt states he has not checked his BS in at least a month  Current test strips  in   Advised to call office in the morning for new script for test strips

## 2023-03-12 NOTE — TELEPHONE ENCOUNTER
Regarding: med refil- juju style test strips  ----- Message from [facility] sent at 3/12/2023  9:52 AM EDT -----  " I am out of my freestyle test strips "

## 2023-03-12 NOTE — TELEPHONE ENCOUNTER
Reason for Disposition  • [1] Prescription refill request for ESSENTIAL medicine (i e , likelihood of harm to patient if not taken) AND [2] triager unable to refill per department policy    Protocols used: MEDICATION REFILL AND RENEWAL CALL-ADULT-

## 2023-03-15 RX ORDER — LANCETS 28 GAUGE
EACH MISCELLANEOUS
Qty: 100 EACH | Refills: 3 | Status: SHIPPED | OUTPATIENT
Start: 2023-03-15

## 2023-03-15 RX ORDER — BLOOD SUGAR DIAGNOSTIC
STRIP MISCELLANEOUS
Qty: 100 EACH | Refills: 3 | Status: SHIPPED | OUTPATIENT
Start: 2023-03-15

## 2023-03-30 ENCOUNTER — HOSPITAL ENCOUNTER (OUTPATIENT)
Dept: CT IMAGING | Facility: HOSPITAL | Age: 73
End: 2023-03-30
Attending: INTERNAL MEDICINE

## 2023-03-30 DIAGNOSIS — F17.210 SMOKING GREATER THAN 20 PACK YEARS: ICD-10-CM

## 2023-03-30 DIAGNOSIS — Z12.2 ENCOUNTER FOR SCREENING FOR LUNG CANCER: ICD-10-CM

## 2023-05-01 ENCOUNTER — HOSPITAL ENCOUNTER (OUTPATIENT)
Dept: RADIOLOGY | Facility: HOSPITAL | Age: 73
Discharge: HOME | End: 2023-05-01

## 2023-05-01 ENCOUNTER — OFFICE VISIT (OUTPATIENT)
Age: 73
End: 2023-05-01

## 2023-05-01 VITALS — DIASTOLIC BLOOD PRESSURE: 72 MMHG | SYSTOLIC BLOOD PRESSURE: 138 MMHG | HEART RATE: 80 BPM

## 2023-05-01 DIAGNOSIS — M25.552 LEFT HIP PAIN: ICD-10-CM

## 2023-05-01 DIAGNOSIS — E11.65 TYPE 2 DIABETES MELLITUS WITH HYPERGLYCEMIA, WITHOUT LONG-TERM CURRENT USE OF INSULIN (HCC): Primary | ICD-10-CM

## 2023-05-01 DIAGNOSIS — B36.9 FUNGAL DERMATITIS: ICD-10-CM

## 2023-05-01 RX ORDER — CLOTRIMAZOLE AND BETAMETHASONE DIPROPIONATE 10; .64 MG/G; MG/G
CREAM TOPICAL 2 TIMES DAILY
Qty: 30 G | Refills: 1 | Status: SHIPPED | OUTPATIENT
Start: 2023-05-01

## 2023-05-01 NOTE — PROGRESS NOTES
Name: Marlene Doshi      : 1950      MRN: [id number]  Encounter Provider: Caitlyn Maya DO  Encounter Date: 2023   Encounter department: [address] [place] [address]  Type 2 diabetes mellitus with hyperglycemia, without long-term current use of insulin ([facility] )    Discussed needing to be strict with diet  Discussed carbs vs proteins  Printed out education  If sugars aren't coming down over next month, will have to potentially consider insulin as his most affordable option  2  Fungal dermatitis    Appears to have fungal irritation with his scrotum/penis  Prescription provided to his pharmacy  Risk factors are poorly controlled diabetes  Discussed symptoms to monitor for  - clotrimazole-betamethasone (LOTRISONE) 1-0 05 % cream; Apply topically 2 (two) times a day  Dispense: 30 g; Refill: 1    3  Left hip pain    Discussed arthritis +/- bursitis  Start NSAIDs over the next 2 weeks  Will check x-ray  - XR hip/pelv 2-3 vws left if performed; Future        Subjective      Has been eating poorly and sugars have been high  Cost concerns with affording some of the newer diabetic medications  Was started on glipizide at recent appointment  On 2000mg of metformin  Sugars are between 200s to 300s  With sugars being high, has had a rash and peeling skin on his scrotum/penile region  No fever or chills  No draining skin lesions  No dysuria or abnormal discharge  Also left hip has been more stiff and bothering him more  Also hurts to lay on it at night at times  Denies any recent falls or specific injury  Review of Systems   Constitutional: Negative for chills, fatigue and fever  Gastrointestinal: Negative  Genitourinary: Negative for difficulty urinating and dysuria  Musculoskeletal: Positive for arthralgias (left hip)  Skin: Positive for rash (scrotum, penis)       Objective     /72   Pulse 80     Physical Exam  Constitutional:       General: He is not in acute distress  Appearance: He is not ill-appearing  Musculoskeletal:         General: Tenderness (lateral left hip) present  Right lower leg: No edema  Left lower leg: No edema  Skin:     Findings: Rash (erythema, warmth, and dry skin noted around scrotum and head of penis) present  Neurological:      Mental Status: He is alert  Motor: No weakness         Sonya Rosa DO

## 2023-05-25 DIAGNOSIS — I10 PRIMARY HYPERTENSION: Primary | Chronic | ICD-10-CM

## 2023-05-25 RX ORDER — LISINOPRIL 20 MG/1
20 TABLET ORAL DAILY
Qty: 90 TABLET | Refills: 3 | Status: SHIPPED | OUTPATIENT
Start: 2023-05-25

## 2023-06-01 DIAGNOSIS — E11.69 TYPE 2 DIABETES MELLITUS WITH OTHER SPECIFIED COMPLICATION, WITHOUT LONG-TERM CURRENT USE OF INSULIN (HCC): ICD-10-CM

## 2023-06-01 RX ORDER — METFORMIN HYDROCHLORIDE 500 MG/1
TABLET, EXTENDED RELEASE ORAL
Qty: 120 TABLET | Refills: 0 | Status: SHIPPED | OUTPATIENT
Start: 2023-06-01

## 2023-06-07 ENCOUNTER — APPOINTMENT (OUTPATIENT)
Dept: LAB | Facility: HOSPITAL | Age: 73
End: 2023-06-07
Payer: MEDICARE

## 2023-06-07 DIAGNOSIS — E11.65 TYPE 2 DIABETES MELLITUS WITH HYPERGLYCEMIA, WITHOUT LONG-TERM CURRENT USE OF INSULIN (HCC): ICD-10-CM

## 2023-06-07 LAB
ANION GAP SERPL CALCULATED.3IONS-SCNC: 5 MMOL/L (ref 4–13)
BUN SERPL-MCNC: 27 MG/DL (ref 5–25)
CALCIUM SERPL-MCNC: 9.7 MG/DL (ref 8.4–10.2)
CHLORIDE SERPL-SCNC: 102 MMOL/L (ref 96–108)
CO2 SERPL-SCNC: 29 MMOL/L (ref 21–32)
CREAT SERPL-MCNC: 1.01 MG/DL (ref 0.6–1.3)
EST. AVERAGE GLUCOSE BLD GHB EST-MCNC: 269 MG/DL
GFR SERPL CREATININE-BSD FRML MDRD: 73 ML/MIN/1.73SQ M
GLUCOSE P FAST SERPL-MCNC: 186 MG/DL (ref 65–99)
HBA1C MFR BLD: 11 %
POTASSIUM SERPL-SCNC: 4.3 MMOL/L (ref 3.5–5.3)
SODIUM SERPL-SCNC: 136 MMOL/L (ref 135–147)

## 2023-06-07 PROCEDURE — 83036 HEMOGLOBIN GLYCOSYLATED A1C: CPT

## 2023-06-07 PROCEDURE — 36415 COLL VENOUS BLD VENIPUNCTURE: CPT

## 2023-06-07 PROCEDURE — 80048 BASIC METABOLIC PNL TOTAL CA: CPT

## 2023-06-12 ENCOUNTER — OFFICE VISIT (OUTPATIENT)
Age: 73
End: 2023-06-12
Payer: MEDICARE

## 2023-06-12 VITALS
HEIGHT: 73 IN | HEART RATE: 76 BPM | SYSTOLIC BLOOD PRESSURE: 122 MMHG | DIASTOLIC BLOOD PRESSURE: 60 MMHG | TEMPERATURE: 97.4 F | WEIGHT: 220.4 LBS | OXYGEN SATURATION: 99 % | BODY MASS INDEX: 29.21 KG/M2

## 2023-06-12 DIAGNOSIS — E78.5 HYPERLIPIDEMIA ASSOCIATED WITH TYPE 2 DIABETES MELLITUS (HCC): Chronic | ICD-10-CM

## 2023-06-12 DIAGNOSIS — E11.65 TYPE 2 DIABETES MELLITUS WITH HYPERGLYCEMIA, WITH LONG-TERM CURRENT USE OF INSULIN (HCC): Primary | ICD-10-CM

## 2023-06-12 DIAGNOSIS — E11.69 HYPERLIPIDEMIA ASSOCIATED WITH TYPE 2 DIABETES MELLITUS (HCC): Chronic | ICD-10-CM

## 2023-06-12 DIAGNOSIS — I10 PRIMARY HYPERTENSION: Chronic | ICD-10-CM

## 2023-06-12 DIAGNOSIS — Z79.4 TYPE 2 DIABETES MELLITUS WITH HYPERGLYCEMIA, WITH LONG-TERM CURRENT USE OF INSULIN (HCC): Primary | ICD-10-CM

## 2023-06-12 PROCEDURE — 99214 OFFICE O/P EST MOD 30 MIN: CPT | Performed by: INTERNAL MEDICINE

## 2023-06-12 RX ORDER — LANCETS 33 GAUGE
EACH MISCELLANEOUS
Qty: 300 EACH | Refills: 3 | Status: SHIPPED | OUTPATIENT
Start: 2023-06-12

## 2023-06-12 RX ORDER — BLOOD-GLUCOSE METER
KIT MISCELLANEOUS
Qty: 1 KIT | Refills: 0 | Status: SHIPPED | OUTPATIENT
Start: 2023-06-12

## 2023-06-12 RX ORDER — PEN NEEDLE, DIABETIC 32GX 5/32"
NEEDLE, DISPOSABLE MISCELLANEOUS
Qty: 100 EACH | Refills: 3 | Status: SHIPPED | OUTPATIENT
Start: 2023-06-12

## 2023-06-12 RX ORDER — BLOOD SUGAR DIAGNOSTIC
STRIP MISCELLANEOUS
Qty: 300 EACH | Refills: 3 | Status: SHIPPED | OUTPATIENT
Start: 2023-06-12

## 2023-06-12 RX ORDER — INSULIN GLARGINE 100 [IU]/ML
20 INJECTION, SOLUTION SUBCUTANEOUS
Qty: 18 ML | Refills: 1 | Status: SHIPPED | OUTPATIENT
Start: 2023-06-12 | End: 2023-12-09

## 2023-06-12 NOTE — PATIENT INSTRUCTIONS
Type 2 Diabetes Management for Adults   WHAT YOU NEED TO KNOW:   What do I need to know about type 2 diabetes management? Type 2 diabetes is a disease that affects how your body uses glucose (sugar)  Either your body cannot make enough insulin, or it cannot use the insulin correctly  It is important to keep diabetes controlled to prevent damage to your heart, blood vessels, and other organs  Management will help you feel well and enjoy your daily activities  Your diabetes care team providers can help you make a plan to fit diabetes care into your schedule  Your plan can change over time to fit your needs and your family's needs  What do I need to know about high blood sugar levels? High blood sugar levels may not cause any symptoms  You may feel more thirsty or urinate more often than usual  Over time, high blood sugar levels can damage your nerves, blood vessels, tissues, and organs  The following can increase your blood sugar levels:  Large meals or large amounts of carbohydrates at one time    Less physical activity    Stress    Illness    A lower dose of diabetes medicine or insulin, or a late dose    What do I need to know about low blood sugar levels? Symptoms include feeling shaky, dizzy, irritable, or confused  You can prevent symptoms by keeping your blood sugar levels from going too low  Treat a low blood sugar level right away:      Drink 4 ounces of juice or have 1 tube of glucose gel  Check your blood sugar level again 10 to 15 minutes later  When the level goes back to normal, eat a meal or snack to prevent another decrease  Keep glucose gel, raisins, or hard candy with you at all times to treat a low blood sugar level  Your blood sugar level can get too low if you take diabetes medicine or insulin and do not eat enough food  If you use insulin, check your blood sugar level before you exercise        If your blood sugar level is below 100 mg/dL, eat 4 crackers or 2 ounces of raisins, or drink 4 ounces of juice  Check your level every 30 minutes if you exercise longer than 1 hour  You may need a snack during or after exercise  What can I do to manage my blood sugar levels? Check your blood sugar levels as directed and as needed  Several items are available to use to check your levels  You may need to check by testing a drop of blood in a glucose monitor  You may instead be given a continuous glucose monitoring (CGM) device  The device is worn at all times  The CGM checks your blood sugar level every 5 minutes  It sends results to an electronic device such as a smart phone  A CGM can be used with or without an insulin pump  You and your diabetes care team providers will decide on the best method for you  The goal for blood sugar levels before meals  is between 80 and 130 mg/dL and 2 hours after eating  is lower than 180 mg/dL  Make healthy food choices  Work with a dietitian to develop a meal plan that works for you and your schedule  A dietitian can help you learn how to eat the right amount of carbohydrates during your meals and snacks  Carbohydrates can raise your blood sugar level if you eat too many at one time  Examples of foods that contain carbohydrates are breads, cereals, rice, pasta, and sweets  Eat high-fiber foods as directed  Fiber helps improve blood sugar levels  Fiber also lowers your risk for heart disease and other problems diabetes can cause  Examples of high-fiber foods include vegetables, whole-grain bread, and beans such as leyva beans  Your dietitian can tell you how much fiber to have each day  Get regular physical activity  Physical activity can help you get to your target blood sugar level goal and manage your weight  Get at least 150 minutes of moderate to vigorous aerobic physical activity each week  Do not miss more than 2 days in a row  Do not sit longer than 30 minutes at a time   Your healthcare provider can help you create an activity plan  The plan can include the best activities for you and can help you build your strength and endurance  Maintain a healthy weight  Ask your team what a healthy weight is for you  A healthy weight can help you control diabetes and prevent heart disease  Ask your team to help you create a weight loss plan, if needed  Weight loss can help make a difference in managing diabetes  Your team will help you set a weight-loss goal, such as 10 to 15 pounds, or 5% of your extra weight  Together you and your team can set manageable weight loss goals  Take your diabetes medicine or insulin as directed  You may need diabetes medicine, insulin, or both to help control your blood sugar levels  Your provider will teach you how and when to take your diabetes medicine or insulin  You will also be taught about side effects oral diabetes medicine can cause  Insulin may be injected or given through a pump or pen  You and your providers will decide on the best method for you: An insulin pump  is an implanted device that gives your insulin 24 hours a day  An insulin pump prevents the need for multiple insulin injections in a day  An insulin pen  is a device prefilled with the right amount of insulin  You and your family members will be taught how to draw up and give insulin  if this is the best method for you  Your providers will also teach you how to dispose of needles and syringes  You will learn how much insulin you need  and when to give it  You will be taught when not to give insulin  You will also be taught what to do if your blood sugar level drops too low  This may happen if you take insulin and do not eat the right amount of carbohydrates  What else can I do to manage type 2 diabetes? Wear medical alert identification  Wear medical alert jewelry or carry a card that says you have diabetes  Ask your provider where to get these items  Do not smoke  Nicotine and other chemicals in cigarettes and cigars can cause lung and blood vessel damage  It also makes it more difficult to manage your diabetes  Ask your provider for information if you currently smoke and need help to quit  Do not use e-cigarettes or smokeless tobacco in place of cigarettes or to help you quit  They still contain nicotine  Check your feet each day for cuts, scratches, calluses, or other wounds  Look for redness and swelling, and feel for warmth  Wear shoes that fit well  Check your shoes for rocks or other objects that can hurt your feet  Do not walk barefoot or wear shoes without socks  Wear cotton socks to help keep your feet dry  Ask about vaccines you may need  You have a higher risk for serious illness if you get the flu, pneumonia, COVID-19, or hepatitis  Ask your provider if you should get vaccines to prevent these or other diseases, and when to get the vaccines  Talk to your provider if you become stressed about diabetes care  Sometimes being able to fit diabetes care into your life can cause increased stress  The stress can cause you not to take care of yourself properly  Your care team providers can help by offering tips about self-care  Your providers may suggest you talk to a mental health provider who can listen and offer help with self-care issues  Have your A1c checked as directed  Your provider may check your A1c every 3 months, or 2 times each year if your diabetes is controlled  An A1c test shows the average amount of sugar in your blood over the past 2 to 3 months  Your provider will tell you what your A1c level should be  Have screening tests as directed  Your provider may recommend screening for complications of diabetes and other conditions that may develop   Some screenings may begin right away and some may happen within the first 5 years of diagnosis:    Examples of diabetes complications  include kidney problems, high cholesterol, high blood pressure, blood vessel problems, eye problems, and sleep apnea  You may be screened for a low vitamin B level  if you take oral diabetes medicine for a long time  Women of childbearing years may be screened  for polycystic ovarian syndrome (PCOS)  Have someone call your local emergency number (911 in the [address] [facility]) if:   You cannot be woken  You have signs of diabetic ketoacidosis:     confusion, fatigue    vomiting    rapid heartbeat    fruity smelling breath    extreme thirst    dry mouth and skin    You have any of the following signs of a heart attack:      Squeezing, pressure, or pain in your chest    You may  also have any of the following:     Discomfort or pain in your back, neck, jaw, stomach, or arm    Shortness of breath    Nausea or vomiting    Lightheadedness or a sudden cold sweat    You have any of the following signs of a stroke:      Numbness or drooping on one side of your face     Weakness in an arm or leg    Confusion or difficulty speaking    Dizziness, a severe headache, or vision loss    When should I call my doctor or diabetes care team provider? You have a sore or wound that will not heal     You have a change in the amount you urinate  Your blood sugar levels are higher than your target goals  You often have lower blood sugar levels than your target goals  Your skin is red, dry, warm, or swollen  You have trouble coping with diabetes, or you feel anxious or depressed  You have questions or concerns about your condition or care  CARE AGREEMENT:   You have the right to help plan your care  Learn about your health condition and how it may be treated  Discuss treatment options with your healthcare providers to decide what care you want to receive  You always have the right to refuse treatment  The above information is an  only  It is not intended as medical advice for individual conditions or treatments   Talk to your doctor, nurse or pharmacist before following any medical regimen to see if it is safe and effective for you  © Copyright [employer] 2022 Information is for End User's use only and may not be sold, redistributed or otherwise used for commercial purposes

## 2023-06-12 NOTE — PROGRESS NOTES
Name: Dewight Gosselin      : 1950      MRN: [id number]  Encounter Provider: Tanner Bang DO  Encounter Date: 2023   Encounter department: [address] [place] [address]  Type 2 diabetes mellitus with hyperglycemia, with long-term current use of insulin ([facility] [address] )  2  Hyperlipidemia associated with type 2 diabetes mellitus ([facility] [address] )    Most recent A1c was 11 0 % on 2023  Continue metformin and glipizide  Discussed with his current blood sugars, insulin would be best option  Cost concerns regarding GLP-1 agonists  Discussed basal insulin and how to inject  Discussed blood sugar goals  Discussed dietary & lifestyle principles  Repeat labs in 3-4 months  Continue statin as prescribed  - Insulin Glargine Solostar (Lantus SoloStar) 100 UNIT/ML SOPN; Inject 0 2 mL (20 Units total) under the skin daily at bedtime  Dispense: 18 mL; Refill: 1  - Insulin Pen Needle (BD Pen Needle Nadine U/F) 32G X 4 MM MISC; Use daily as directed with insulin pen  Dispense: 100 each; Refill: 3  - Blood Glucose Monitoring Suppl (OneTouch Verio Reflect) w/Device KIT; Check blood sugars three times daily  Please substitute with appropriate alternative as covered by patient's insurance  Dx: E11   Dispense: 1 kit; Refill: 0  - glucose blood (OneTouch Verio) test strip; Check blood sugars three times daily  Please substitute with appropriate alternative as covered by patient's insurance  Dx: E11 65  Dispense: 300 each; Refill: 3  - OneTouch Delica Lancets 34Y MISC; Check blood sugars three times daily  Please substitute with appropriate alternative as covered by patient's insurance  Dx: E11 65  Dispense: 300 each; Refill: 3  - Basic metabolic panel; Future  - Hemoglobin A1C; Future  - Lipid Panel with Direct LDL reflex; Future    3  Primary hypertension    BP is stable and at goal  Continue current anti-HTN regimen  Return in about 16 weeks (around 10/2/2023) for Subsequent AWV      Subjective "  Deanne Lubin presents for follow up and to go over labs  Sugars continue to remain poorly controlled and A1c is up to 11 0%  Him and his wife are trying to change their diet but they have both struggled with their weight for many years  Review of Systems   Constitutional: Negative  Respiratory: Negative  Cardiovascular: Negative  Gastrointestinal: Negative  Musculoskeletal: Positive for arthralgias  Objective     /60   Pulse 76   Temp (!) 97 4 °F (36 3 °C)   Ht 6' 1\" (1 854 m)   Wt 100 kg (220 lb 6 4 oz)   SpO2 99%   BMI 29 08 kg/m²     Physical Exam  Constitutional:       General: He is not in acute distress  Appearance: He is well-developed  He is not diaphoretic  Neck:      Thyroid: No thyromegaly  Vascular: No JVD  Cardiovascular:      Rate and Rhythm: Normal rate and regular rhythm  Heart sounds: Normal heart sounds  No murmur heard  Pulmonary:      Effort: Pulmonary effort is normal  No respiratory distress  Breath sounds: Normal breath sounds  No wheezing or rales  Abdominal:      General: Bowel sounds are normal  There is no distension  Palpations: Abdomen is soft  There is no mass  Tenderness: There is no abdominal tenderness  There is no guarding or rebound  Musculoskeletal:      Right lower leg: No edema  Left lower leg: No edema  Neurological:      Mental Status: He is alert         Lester Notlaron,     "

## 2023-07-10 DIAGNOSIS — E11.69 TYPE 2 DIABETES MELLITUS WITH OTHER SPECIFIED COMPLICATION, WITHOUT LONG-TERM CURRENT USE OF INSULIN (HCC): ICD-10-CM

## 2023-07-10 DIAGNOSIS — E11.3293 TYPE 2 DIABETES MELLITUS WITH BOTH EYES AFFECTED BY MILD NONPROLIFERATIVE RETINOPATHY WITHOUT MACULAR EDEMA, WITHOUT LONG-TERM CURRENT USE OF INSULIN (HCC): Chronic | ICD-10-CM

## 2023-07-10 RX ORDER — GLIPIZIDE 10 MG/1
TABLET, EXTENDED RELEASE ORAL
Qty: 90 TABLET | Refills: 3 | Status: SHIPPED | OUTPATIENT
Start: 2023-07-10

## 2023-07-10 RX ORDER — METFORMIN HYDROCHLORIDE 500 MG/1
TABLET, EXTENDED RELEASE ORAL
Qty: 360 TABLET | Refills: 3 | Status: SHIPPED | OUTPATIENT
Start: 2023-07-10

## 2023-07-11 DIAGNOSIS — E11.3293 TYPE 2 DIABETES MELLITUS WITH BOTH EYES AFFECTED BY MILD NONPROLIFERATIVE RETINOPATHY WITHOUT MACULAR EDEMA, WITHOUT LONG-TERM CURRENT USE OF INSULIN (HCC): Chronic | ICD-10-CM

## 2023-07-11 DIAGNOSIS — E11.69 TYPE 2 DIABETES MELLITUS WITH OTHER SPECIFIED COMPLICATION, WITHOUT LONG-TERM CURRENT USE OF INSULIN (HCC): ICD-10-CM

## 2023-07-12 RX ORDER — GLIPIZIDE 10 MG/1
10 TABLET, FILM COATED, EXTENDED RELEASE ORAL DAILY
Qty: 90 TABLET | Refills: 0 | OUTPATIENT
Start: 2023-07-12

## 2023-07-12 RX ORDER — METFORMIN HYDROCHLORIDE 500 MG/1
1000 TABLET, EXTENDED RELEASE ORAL 2 TIMES DAILY
Qty: 360 TABLET | Refills: 0 | OUTPATIENT
Start: 2023-07-12

## 2023-08-02 DIAGNOSIS — I10 PRIMARY HYPERTENSION: ICD-10-CM

## 2023-08-03 RX ORDER — AMLODIPINE BESYLATE 5 MG/1
5 TABLET ORAL DAILY
Qty: 90 TABLET | Refills: 1 | Status: SHIPPED | OUTPATIENT
Start: 2023-08-03

## 2023-08-10 ENCOUNTER — TELEPHONE (OUTPATIENT)
Age: 73
End: 2023-08-10

## 2023-08-17 DIAGNOSIS — Z79.4 TYPE 2 DIABETES MELLITUS WITH HYPERGLYCEMIA, WITH LONG-TERM CURRENT USE OF INSULIN (HCC): ICD-10-CM

## 2023-08-17 DIAGNOSIS — E11.65 TYPE 2 DIABETES MELLITUS WITH HYPERGLYCEMIA, WITH LONG-TERM CURRENT USE OF INSULIN (HCC): ICD-10-CM

## 2023-08-17 RX ORDER — INSULIN GLARGINE 100 [IU]/ML
20 INJECTION, SOLUTION SUBCUTANEOUS
Qty: 18 ML | Refills: 1 | Status: SHIPPED | OUTPATIENT
Start: 2023-08-17 | End: 2024-02-13

## 2023-08-25 LAB
LEFT EYE DIABETIC RETINOPATHY: POSITIVE
RIGHT EYE DIABETIC RETINOPATHY: POSITIVE

## 2023-09-15 DIAGNOSIS — I10 PRIMARY HYPERTENSION: Chronic | ICD-10-CM

## 2023-09-15 DIAGNOSIS — Z79.4 TYPE 2 DIABETES MELLITUS WITH HYPERGLYCEMIA, WITH LONG-TERM CURRENT USE OF INSULIN (HCC): ICD-10-CM

## 2023-09-15 DIAGNOSIS — E11.65 TYPE 2 DIABETES MELLITUS WITH HYPERGLYCEMIA, WITH LONG-TERM CURRENT USE OF INSULIN (HCC): ICD-10-CM

## 2023-09-15 RX ORDER — LISINOPRIL 20 MG/1
20 TABLET ORAL DAILY
Qty: 90 TABLET | Refills: 0 | Status: SHIPPED | OUTPATIENT
Start: 2023-09-15

## 2023-09-15 RX ORDER — LANCETS 33 GAUGE
EACH MISCELLANEOUS
Qty: 300 EACH | Refills: 0 | Status: SHIPPED | OUTPATIENT
Start: 2023-09-15

## 2023-10-02 ENCOUNTER — RA CDI HCC (OUTPATIENT)
Dept: OTHER | Facility: HOSPITAL | Age: 73
End: 2023-10-02

## 2023-10-02 NOTE — PROGRESS NOTES
[address] [facility] coding opportunities       Chart reviewed, no opportunity found: CHART REVIEWED, NO OPPORTUNITY FOUND        Patients Insurance     Medicare Insurance: Medicare

## 2023-10-06 ENCOUNTER — TELEMEDICINE (OUTPATIENT)
Age: 73
End: 2023-10-06
Payer: MEDICARE

## 2023-10-06 DIAGNOSIS — Z00.00 MEDICARE ANNUAL WELLNESS VISIT, SUBSEQUENT: ICD-10-CM

## 2023-10-06 DIAGNOSIS — I10 PRIMARY HYPERTENSION: Chronic | ICD-10-CM

## 2023-10-06 DIAGNOSIS — E78.5 HYPERLIPIDEMIA ASSOCIATED WITH TYPE 2 DIABETES MELLITUS: Primary | ICD-10-CM

## 2023-10-06 DIAGNOSIS — E11.65 TYPE 2 DIABETES MELLITUS WITH HYPERGLYCEMIA, WITHOUT LONG-TERM CURRENT USE OF INSULIN (HCC): ICD-10-CM

## 2023-10-06 DIAGNOSIS — E11.69 HYPERLIPIDEMIA ASSOCIATED WITH TYPE 2 DIABETES MELLITUS: Primary | ICD-10-CM

## 2023-10-06 PROCEDURE — G0439 PPPS, SUBSEQ VISIT: HCPCS | Performed by: INTERNAL MEDICINE

## 2023-10-06 PROCEDURE — 99214 OFFICE O/P EST MOD 30 MIN: CPT | Performed by: INTERNAL MEDICINE

## 2023-10-06 NOTE — PATIENT INSTRUCTIONS
Medicare Preventive Visit Patient Instructions  Thank you for completing your Welcome to Medicare Visit or Medicare Annual Wellness Visit today. Your next wellness visit will be due in one year (10/6/2024). The screening/preventive services that you may require over the next 5-10 years are detailed below. Some tests may not apply to you based off risk factors and/or age. Screening tests ordered at today's visit but not completed yet may show as past due. Also, please note that scanned in results may not display below. Preventive Screenings:  Service Recommendations Previous Testing/Comments   Colorectal Cancer Screening  Colonoscopy    Fecal Occult Blood Test (FOBT)/Fecal Immunochemical Test (FIT)  Fecal DNA/Cologuard Test  Flexible Sigmoidoscopy Age: 43-73 years old   Colonoscopy: every 10 years (May be performed more frequently if at higher risk)  OR  FOBT/FIT: every 1 year  OR  Cologuard: every 3 years  OR  Sigmoidoscopy: every 5 years  Screening may be recommended earlier than age 39 if at higher risk for colorectal cancer. Also, an individualized decision between you and your healthcare provider will decide whether screening between the ages of 77-80 would be appropriate.  Colonoscopy: 10/04/2021  FOBT/FIT: Not on file  Cologuard: 06/18/2021  Sigmoidoscopy: Not on file    Screening Current     Prostate Cancer Screening Individualized decision between patient and health care provider in men between ages of 53-66   Medicare will cover every 12 months beginning on the day after your 50th birthday PSA: 3.4 ng/mL     Screening Current     Hepatitis C Screening Once for adults born between 1945 and 1965  More frequently in patients at high risk for Hepatitis C Hep C Antibody: 08/10/2021    Screening Current   Diabetes Screening 1-2 times per year if you're at risk for diabetes or have pre-diabetes Fasting glucose: 186 mg/dL (6/7/2023)  A1C: 11.0 % (6/7/2023)  Screening Not Indicated  History Diabetes   Cholesterol Screening Once every 5 years if you don't have a lipid disorder. May order more often based on risk factors. Lipid panel: 03/09/2023  Screening Not Indicated  History Lipid Disorder      Other Preventive Screenings Covered by Medicare:  Abdominal Aortic Aneurysm (AAA) Screening: covered once if your at risk. You're considered to be at risk if you have a family history of AAA or a male between the age of 70-76 who smoking at least 100 cigarettes in your lifetime. Lung Cancer Screening: covers low dose CT scan once per year if you meet all of the following conditions: (1) Age 48-67; (2) No signs or symptoms of lung cancer; (3) Current smoker or have quit smoking within the last 15 years; (4) You have a tobacco smoking history of at least 20 pack years (packs per day x number of years you smoked); (5) You get a written order from a healthcare provider. Glaucoma Screening: covered annually if you're considered high risk: (1) You have diabetes OR (2) Family history of glaucoma OR (3)  aged 48 and older OR (3)  American aged 72 and older  Osteoporosis Screening: covered every 2 years if you meet one of the following conditions: (1) Have a vertebral abnormality; (2) On glucocorticoid therapy for more than 3 months; (3) Have primary hyperparathyroidism; (4) On osteoporosis medications and need to assess response to drug therapy. HIV Screening: covered annually if you're between the age of 14-79. Also covered annually if you are younger than 13 and older than 72 with risk factors for HIV infection. For pregnant patients, it is covered up to 3 times per pregnancy.     Immunizations:  Immunization Recommendations   Influenza Vaccine Annual influenza vaccination during flu season is recommended for all persons aged >= 6 months who do not have contraindications   Pneumococcal Vaccine   * Pneumococcal conjugate vaccine = PCV13 (Prevnar 13), PCV15 (Vaxneuvance), PCV20 (Prevnar 20)  * Pneumococcal polysaccharide vaccine = PPSV23 (Pneumovax) Adults 2364 years old: 1-3 doses may be recommended based on certain risk factors  Adults 72 years old: 1-2 doses may be recommended based off what pneumonia vaccine you previously received   Hepatitis B Vaccine 3 dose series if at intermediate or high risk (ex: diabetes, end stage renal disease, liver disease)   Tetanus (Td) Vaccine - COST NOT COVERED BY MEDICARE PART B Following completion of primary series, a booster dose should be given every 10 years to maintain immunity against tetanus. Td may also be given as tetanus wound prophylaxis. Tdap Vaccine - COST NOT COVERED BY MEDICARE PART B Recommended at least once for all adults. For pregnant patients, recommended with each pregnancy. Shingles Vaccine (Shingrix) - COST NOT COVERED BY MEDICARE PART B  2 shot series recommended in those aged 48 and above     Health Maintenance Due:      Topic Date Due    Lung Cancer Screening  03/30/2024    Colorectal Cancer Screening  10/03/2026    Hepatitis C Screening  Completed     Immunizations Due:      Topic Date Due    Pneumococcal Vaccine: 65+ Years (1 - PCV) Never done    COVID-19 Vaccine (4 - Pfizer series) 03/04/2022    Influenza Vaccine (1) 09/01/2023     Advance Directives   What are advance directives? Advance directives are legal documents that state your wishes and plans for medical care. These plans are made ahead of time in case you lose your ability to make decisions for yourself. Advance directives can apply to any medical decision, such as the treatments you want, and if you want to donate organs. What are the types of advance directives? There are many types of advance directives, and each state has rules about how to use them. You may choose a combination of any of the following:  Living will: This is a written record of the treatment you want. You can also choose which treatments you do not want, which to limit, and which to stop at a certain time. This includes surgery, medicine, IV fluid, and tube feedings. Durable power of  for healthcare [facility]): This is a written record that states who you want to make healthcare choices for you when you are unable to make them for yourself. This person, called a proxy, is usually a family member or a friend. You may choose more than 1 proxy. Do not resuscitate (DNR) order:  A DNR order is used in case your heart stops beating or you stop breathing. It is a request not to have certain forms of treatment, such as CPR. A DNR order may be included in other types of advance directives. Medical directive: This covers the care that you want if you are in a coma, near death, or unable to make decisions for yourself. You can list the treatments you want for each condition. Treatment may include pain medicine, surgery, blood transfusions, dialysis, IV or tube feedings, and a ventilator (breathing machine). Values history: This document has questions about your views, beliefs, and how you feel and think about life. This information can help others choose the care that you would choose. Why are advance directives important? An advance directive helps you control your care. Although spoken wishes may be used, it is better to have your wishes written down. Spoken wishes can be misunderstood, or not followed. Treatments may be given even if you do not want them. An advance directive may make it easier for your family to make difficult choices about your care. Cigarette Smoking and Your Health   Risks to your health if you smoke:  Nicotine and other chemicals found in tobacco damage every cell in your body. Even if you are a light smoker, you have an increased risk for cancer, heart disease, and lung disease. If you are pregnant or have diabetes, smoking increases your risk for complications. Benefits to your health if you stop smoking:    You decrease respiratory symptoms such as coughing, wheezing, and shortness of breath. You reduce your risk for cancers of the lung, mouth, throat, kidney, bladder, pancreas, stomach, and cervix. If you already have cancer, you increase the benefits of chemotherapy. You also reduce your risk for cancer returning or a second cancer from developing. You reduce your risk for heart disease, blood clots, heart attack, and stroke. You reduce your risk for lung infections, and diseases such as pneumonia, asthma, chronic bronchitis, and emphysema. Your circulation improves. More oxygen can be delivered to your body. If you have diabetes, you lower your risk for complications, such as kidney, artery, and eye diseases. You also lower your risk for nerve damage. Nerve damage can lead to amputations, poor vision, and blindness. You improve your body's ability to heal and to fight infections. For more information and support to stop smoking:   [employer]  Phone: 9- 432 - [phone]  Web Address: www.[employer]  Weight Management   Why it is important to manage your weight:  Being overweight increases your risk of health conditions such as heart disease, high blood pressure, type 2 diabetes, and certain types of cancer. It can also increase your risk for osteoarthritis, sleep apnea, and other respiratory problems. Aim for a slow, steady weight loss. Even a small amount of weight loss can lower your risk of health problems. How to lose weight safely:  A safe and healthy way to lose weight is to eat fewer calories and get regular exercise. You can lose up about 1 pound a week by decreasing the number of calories you eat by 500 calories each day. Healthy meal plan for weight management:  A healthy meal plan includes a variety of foods, contains fewer calories, and helps you stay healthy. A healthy meal plan includes the following:  Eat whole-grain foods more often. A healthy meal plan should contain fiber.  Fiber is the part of grains, fruits, and vegetables that is not broken down by your body. Whole-grain foods are healthy and provide extra fiber in your diet. Some examples of whole-grain foods are whole-wheat breads and pastas, oatmeal, brown rice, and bulgur. Eat a variety of vegetables every day. Include dark, leafy greens such as spinach, kale, shanita greens, and mustard greens. Eat yellow and orange vegetables such as carrots, sweet potatoes, and winter squash. Eat a variety of fruits every day. Choose fresh or canned fruit (canned in its own juice or light syrup) instead of juice. Fruit juice has very little or no fiber. Eat low-fat dairy foods. Drink fat-free (skim) milk or 1% milk. Eat fat-free yogurt and low-fat cottage cheese. Try low-fat cheeses such as mozzarella and other reduced-fat cheeses. Choose meat and other protein foods that are low in fat. Choose beans or other legumes such as split peas or lentils. Choose fish, skinless poultry (chicken or turkey), or lean cuts of red meat (beef or pork). Before you cook meat or poultry, cut off any visible fat. Use less fat and oil. Try baking foods instead of frying them. Add less fat, such as margarine, sour cream, regular salad dressing and mayonnaise to foods. Eat fewer high-fat foods. Some examples of high-fat foods include french fries, doughnuts, ice cream, and cakes. Eat fewer sweets. Limit foods and drinks that are high in sugar. This includes candy, cookies, regular soda, and sweetened drinks. Exercise:  Exercise at least 30 minutes per day on most days of the week. Some examples of exercise include walking, biking, dancing, and swimming. You can also fit in more physical activity by taking the stairs instead of the elevator or parking farther away from stores. Ask your healthcare provider about the best exercise plan for you. © Copyright [employer] 2018 Information is for End User's use only and may not be sold, redistributed or otherwise used for commercial purposes.  All illustrations and images included in [employer] are the copyrighted property of [employer] or [address] [place] [address]

## 2023-10-06 NOTE — PROGRESS NOTES
Virtual AWV Consent    Verification of patient location:    Patient is located at Home in the following state in which I hold an active license [place]    The patient was identified by name and date of birth. Harjit Bobby was informed that this is a telemedicine visit and that the visit is being conducted through the [employer]. He agrees to proceed. .  My office door was closed. No one else was in the room. He acknowledged consent and understanding of privacy and security of the video platform. The patient has agreed to participate and understands they can discontinue the visit at any time. Patient is aware this is a billable service. Encounter provider Sade Dang DO    Provider located at [facility] [place] [address] [facility]    Visit Time  Total Visit Duration: 15 minutes     Assessment and Plan:     1. Hyperlipidemia associated with type 2 diabetes mellitus   2. Type 2 diabetes mellitus with hyperglycemia, without long-term current use of insulin ([address])    Most recent A1c was 11.0 % on 6/7/2023. Need to check up-to-date lab work and it sounds like patient's A1c should be better from some of the numbers he was providing me. Continue current medications as prescribed. We will call with results of labs. 3. Primary hypertension    Continue current antihypertensive medications as prescribed. Should see him in the office in the future to recheck his blood pressure in the office. 4. Medicare annual wellness visit, subsequent      Depression Screening and Follow-up Plan: Patient was screened for depression during today's encounter. They screened negative with a PHQ-2 score of 0. Preventive health issues were discussed with patient, and age appropriate screening tests were ordered as noted in patient's After Visit Summary.   Personalized health advice and appropriate referrals for health education or preventive services given if needed, as noted in patient's After Visit Summary. History of Present Illness:     Patient presents for a Medicare Wellness Visit    Patient presents for follow-up. Very difficult to get out of the house with his wife. He does the best he can to help her with activities of daily living. States that his blood sugars have been better lately. He will get blood work drawn from the [facility] mobile lab. Patient Care Team:  Morgan Jasso DO as PCP - General (Internal Medicine)     Review of Systems:     Review of Systems   Constitutional: Negative. Respiratory: Negative. Cardiovascular: Negative. Gastrointestinal: Negative.        Problem List:     Patient Active Problem List   Diagnosis    Type 2 diabetes mellitus with microalbuminuria, without long-term current use of insulin     Dependence on nicotine from cigarettes    Primary hypertension    Hyperlipidemia associated with type 2 diabetes mellitus     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 edema, without long-term current use of insulin (HCC)    Type 2 diabetes mellitus with hyperglycemia, without long-term current use of insulin (HCC)      Past Medical and Surgical History:     Past Medical History:   Diagnosis Date    Arthritis     Diabetes mellitus ([address])     Hyperlipidemia     Hypertension     Left rotator cuff tear     Tobacco abuse     Wears glasses      Past Surgical History:   Procedure Laterality Date    COLONOSCOPY      KNEE SURGERY      CO SURGICAL ARTHROSCOPY SHOULDER W/ROTATOR CUFF RPR Left 08/10/2022    Procedure: Left Shoulder Arthroscopy, supraspinatus and Subscapularis Repair, Bicep Tenotomy, acromioplasty, extensive debridement;  Surgeon: Irma Herrera DO;  Location: [facility] OR;  Service: Orthopedics    TONSILLECTOMY  1954      Family History:     Family History   Problem Relation Age of Onset    Alzheimer's disease Mother     Heart block Father     Heart disease Father       Social History:     Social History     Socioeconomic History    Marital status: /Civil Union     Spouse name: Not on file    Number of children: Not on file    Years of education: Not on file    Highest education level: Not on file   Occupational History    Not on file   Tobacco Use    Smoking status: Every Day     Packs/day: 0.50     Years: 55.00     Total pack years: 27.50     Types: Cigarettes     Start date: 4/26/1964    Smokeless tobacco: Never   Vaping Use    Vaping Use: Never used   Substance and Sexual Activity    Alcohol use: Yes     Comment: rarely    Drug use: No    Sexual activity: Not on file   Other Topics Concern    Not on file   Social History Narrative    Not on file     Social Determinants of Health     Financial Resource Strain: Not on file   Food Insecurity: Not on file   Transportation Needs: Not on file   Physical Activity: Inactive (11/12/2021)    Exercise Vital Sign     Days of Exercise per Week: 0 days     Minutes of Exercise per Session: 0 min   Stress: Stress Concern Present (11/12/2021)    [address] [facility]     Feeling of Stress : Rather much   Social Connections: Not on file   Intimate Partner Violence: Not on file   Housing Stability: Not on file      Medications and Allergies:     Current Outpatient Medications   Medication Sig Dispense Refill    acetaminophen (TYLENOL) 650 mg CR tablet Take 650 mg by mouth every 8 (eight) hours as needed for mild pain      amLODIPine (NORVASC) 5 mg tablet Take 1 tablet (5 mg total) by mouth daily 90 tablet 1    aspirin 81 mg chewable tablet Chew 4 tablets daily (Patient taking differently: Chew 81 mg daily Pt to check with PCP 8/5 when to stop)  0    Blood Glucose Monitoring Suppl (OneTouch Verio Reflect) w/Device KIT Check blood sugars three times daily. Please substitute with appropriate alternative as covered by patient's insurance.  Dx: E11.65 1 kit 0 Cholecalciferol (VITAMIN D-3 PO) Take 400 Units by mouth daily      clotrimazole-betamethasone (LOTRISONE) 1-0.05 % cream Apply topically 2 (two) times a day 30 g 1    glipiZIDE XL 10 MG 24 hr tablet TAKE ONE TABLET BY MOUTH EVERY DAY 90 tablet 3    glucose blood (OneTouch Verio) test strip Check blood sugars three times daily. Please substitute with appropriate alternative as covered by patient's insurance. Dx: E11.65 300 each 3    ibuprofen (MOTRIN) 600 mg tablet Take 1 tablet (600 mg total) by mouth every 8 (eight) hours as needed for mild pain Take 1 tablet 3 times daily for 3 days, then take as needed. Take with food and water. Discontinue if stomach upset occurs. 30 tablet 0    Insulin Glargine Solostar (Lantus SoloStar) 100 UNIT/ML SOPN Inject 0.2 mL (20 Units total) under the skin daily at bedtime 18 mL 1    Insulin Pen Needle (BD Pen Needle Nadine U/F) 32G X 4 MM MISC Use daily as directed with insulin pen 100 each 3    lisinopril (ZESTRIL) 20 mg tablet Take 1 tablet (20 mg total) by mouth daily 90 tablet 0    metFORMIN (GLUCOPHAGE-XR) 500 mg 24 hr tablet TAKE TWO TABLETS BY MOUTH TWICE A  tablet 3    multivitamin (THERAGRAN) TABS Take 1 tablet by mouth daily      OneTouch Delica Lancets 69B MISC Check blood sugars three times daily. Please substitute with appropriate alternative as covered by patient's insurance. Dx: E11.65 300 each 0    pravastatin (PRAVACHOL) 20 mg tablet Take 20 mg by mouth daily       No current facility-administered medications for this visit.      No Known Allergies   Immunizations:     Immunization History   Administered Date(s) Administered    COVID-19 PFIZER VACCINE 0.3 ML IM 03/29/2021, 04/19/2021, 01/07/2022    Influenza, high dose seasonal 0.7 mL 11/29/2021      Health Maintenance:         Topic Date Due    Lung Cancer Screening  03/30/2024    Colorectal Cancer Screening  10/03/2026    Hepatitis C Screening  Completed         Topic Date Due    Pneumococcal Vaccine: 65+ Years (1 - PCV) Never done    COVID-19 Vaccine (4 - Pfizer series) 03/04/2022    Influenza Vaccine (1) 09/01/2023      Medicare Screening Tests and Risk Assessments:     Cesar Blue is here for his Subsequent Wellness visit. Last Medicare Wellness visit information reviewed, patient interviewed and updates made to the record today. Health Risk Assessment:   Patient rates overall health as very good. Patient feels that their physical health rating is slightly worse. Patient is satisfied with their life. Eyesight was rated as same. Hearing was rated as same. Patient feels that their emotional and mental health rating is same. Patients states they are never, rarely angry. Patient states they are never, rarely unusually tired/fatigued. Pain experienced in the last 7 days has been some. Patient's pain rating has been 4/10. Patient states that he has experienced no weight loss or gain in last 6 months. Depression Screening:   PHQ-2 Score: 0      Fall Risk Screening: In the past year, patient has experienced: no history of falling in past year      Home Safety:  Patient does not have trouble with stairs inside or outside of their home. Patient has working smoke alarms and has working carbon monoxide detector. Home safety hazards include: none. Nutrition:   Current diet is Diabetic. Medications:   Patient is currently taking over-the-counter supplements. OTC medications include: Centrum Silver, Nature Made D 3,2000 IU, Nature Made  C 1000 mg,  Care One  Inuprofen 200 mg. Patient is able to manage medications. Activities of Daily Living (ADLs)/Instrumental Activities of Daily Living (IADLs):   Walk and transfer into and out of bed and chair?: Yes  Dress and groom yourself?: Yes    Bathe or shower yourself?: Yes    Feed yourself?  Yes  Do your laundry/housekeeping?: Yes  Manage your money, pay your bills and track your expenses?: Yes  Make your own meals?: Yes    Do your own shopping?: Yes    Previous Hospitalizations:   Any hospitalizations or ED visits within the last 12 months?: No      Advance Care Planning:   Living will: Yes    Durable POA for healthcare: Yes    Advanced directive: Yes    Five wishes given: No      Cognitive Screening:   Provider or family/friend/caregiver concerned regarding cognition?: No    PREVENTIVE SCREENINGS      Cardiovascular Screening:    General: Screening Not Indicated and History Lipid Disorder      Diabetes Screening:     General: Screening Not Indicated and History Diabetes      Colorectal Cancer Screening:     General: Screening Current      Prostate Cancer Screening:    General: Screening Current      Osteoporosis Screening:    General: Screening Not Indicated      Abdominal Aortic Aneurysm (AAA) Screening:    Risk factors include: age between 70-75 yo and tobacco use      Due for: Screening AAA Ultrasound      Lung Cancer Screening:     General: Screening Current      Hepatitis C Screening:    General: Screening Current    Screening, Brief Intervention, and Referral to Treatment (SBIRT)    Screening  Typical number of drinks in a day: 0  Typical number of drinks in a week: 0  Interpretation: Low risk drinking behavior. AUDIT-C Screenin) How often did you have a drink containing alcohol in the past year? never  2) How many drinks did you have on a typical day when you were drinking in the past year? 0  3) How often did you have 6 or more drinks on one occasion in the past year? never    AUDIT-C Score: 0  Interpretation: Score 0-3 (male): Negative screen for alcohol misuse    Single Item Drug Screening:  How often have you used an illegal drug (including marijuana) or a prescription medication for non-medical reasons in the past year? never    Single Item Drug Screen Score: 0  Interpretation: Negative screen for possible drug use disorder    Brief Intervention  Alcohol & drug use screenings were reviewed. No concerns regarding substance use disorder identified. Other Counseling Topics:   Car/seat belt/driving safety, skin self-exam, sunscreen and regular weightbearing exercise. Physical Exam:     There were no vitals taken for this visit. Physical Exam  Constitutional:       General: He is not in acute distress. Appearance: He is not ill-appearing. Cardiovascular:      Rate and Rhythm: Normal rate and regular rhythm. Heart sounds: No murmur heard. Pulmonary:      Effort: Pulmonary effort is normal. No respiratory distress. Breath sounds: No wheezing. Abdominal:      General: Bowel sounds are normal. There is no distension. Tenderness: There is no abdominal tenderness. Musculoskeletal:      Right lower leg: No edema. Left lower leg: No edema. Neurological:      Mental Status: He is alert.         Corina Lambert DO

## 2023-11-12 DIAGNOSIS — E11.65 TYPE 2 DIABETES MELLITUS WITH HYPERGLYCEMIA, WITH LONG-TERM CURRENT USE OF INSULIN (HCC): ICD-10-CM

## 2023-11-12 DIAGNOSIS — Z79.4 TYPE 2 DIABETES MELLITUS WITH HYPERGLYCEMIA, WITH LONG-TERM CURRENT USE OF INSULIN (HCC): ICD-10-CM

## 2023-11-13 RX ORDER — INSULIN GLARGINE 100 [IU]/ML
20 INJECTION, SOLUTION SUBCUTANEOUS
Qty: 18 ML | Refills: 0 | Status: SHIPPED | OUTPATIENT
Start: 2023-11-13 | End: 2024-05-11

## 2023-12-04 ENCOUNTER — APPOINTMENT (OUTPATIENT)
Dept: RADIOLOGY | Facility: CLINIC | Age: 73
End: 2023-12-04
Payer: MEDICARE

## 2023-12-04 ENCOUNTER — OFFICE VISIT (OUTPATIENT)
Dept: OBGYN CLINIC | Facility: CLINIC | Age: 73
End: 2023-12-04
Payer: MEDICARE

## 2023-12-04 VITALS
HEART RATE: 75 BPM | BODY MASS INDEX: 37.98 KG/M2 | SYSTOLIC BLOOD PRESSURE: 170 MMHG | WEIGHT: 242 LBS | DIASTOLIC BLOOD PRESSURE: 81 MMHG | HEIGHT: 67 IN

## 2023-12-04 DIAGNOSIS — M25.511 RIGHT SHOULDER PAIN, UNSPECIFIED CHRONICITY: ICD-10-CM

## 2023-12-04 DIAGNOSIS — G54.9 NERVE ROOT IRRITATION: ICD-10-CM

## 2023-12-04 DIAGNOSIS — M25.511 ACUTE PAIN OF RIGHT SHOULDER: Primary | ICD-10-CM

## 2023-12-04 PROCEDURE — 73030 X-RAY EXAM OF SHOULDER: CPT

## 2023-12-04 PROCEDURE — 99213 OFFICE O/P EST LOW 20 MIN: CPT | Performed by: ORTHOPAEDIC SURGERY

## 2023-12-04 RX ORDER — NYSTATIN 100000 U/G
CREAM TOPICAL
COMMUNITY
Start: 2023-10-27

## 2023-12-04 NOTE — PROGRESS NOTES
Patient Name:  Yeyo Celestin  MRN:  [id number]    [id number] [facility]     1. Acute pain of right shoulder  -     XR shoulder 2+ vw right; Future; Expected date: 12/04/2023    2. Nerve root irritation  -     Ambulatory Referral to Physical Therapy; Future        Right shoulder pain, cervical spine nerve root irritation   In depth conversation had with patient regarding nonoperative treatment. New PT script placed today to work on shoulder range of motion and strengthening, incorporation of neck ROM and strengthening of scapulothoracic musculature. Can consider oral steroids, but will hold off in setting of high HGBA1C and recent poor blood glucose control. Can consider in the future if symptoms persist.  Continue OTC medication as needed for pain relief  If patient's symptoms persist or worsen, can consider oral steroids, Right shoulder CSI vs further imaging of the Right shoulder   Follow up 6-8 weeks     History of the Present Illness   Yeyo Celestin is a 68 y.o. male with Right shoulder pain for 3 weeks after trying to help his wife get dressed. He admits to pain throughout the Right upper extremity with numbness and tingling into the hand. He locates most pain to the scapula. Patient states this pain is different than the pain he previously had in the Left shoulder s/p rotator cuff repair in 08/2022. He has been taking Tylenol as needed for pain relief. Patient does have history of diabetes with most recent HGBA1c 11.0 performed 06/07/2023. He has started insulin about 2 months ago with improvement of blood glucose. Review of Systems     Review of Systems   Constitutional:  Negative for chills and fever. HENT:  Negative for ear pain and sore throat. Eyes:  Negative for pain and visual disturbance. Respiratory:  Negative for cough and shortness of breath. Cardiovascular:  Negative for chest pain and palpitations. Gastrointestinal:  Negative for abdominal pain and vomiting.    Genitourinary: Negative for dysuria and hematuria. Musculoskeletal:  Negative for arthralgias and back pain. Skin:  Negative for color change and rash. Neurological:  Negative for seizures and syncope. All other systems reviewed and are negative. Physical Exam     /81   Pulse 75   Ht 5' 7" (1.702 m)   Wt 110 kg (242 lb)   BMI 37.90 kg/m²     Right Shoulder: Active range of motion   150 degrees forward flexion with pain  140 degrees abduction with pain  60 degrees external rotation   2 level restriction internal rotation    There is tenderness present over the rhomboids  Supraspinatus testing 4+/5  Infraspinatus testing 4+/5  Subscapularis testing 5/5  Kaur test is negative   Do[place] Ana's test is negative    Speed's test is Negative  The patient is neurovascularly intact distally in the extremity. Cervical Spine:   Active range of motion restriction rotation Left. There is no midline tenderness. There is right paraspinal hypertonicity and tenderness. Sensation intact to light touch C5 through T1 dermatomes left upper extremity. Sensation intact to light touch C5 through T1 dermatomes right upper extremity. Left Motor: 5/5 biceps, 5/5 triceps, 5/5 wrist extension, 5/5 finger flexion, 5/5 finger abduction   Right Motor: 5/5 biceps, 5/5 triceps, 5/5 wrist extension, 5/5 finger flexion, 5/5 finger abduction   Spurling test is  negative  Parikh's sign negative      Data Review     I have personally reviewed pertinent films in PACS, and my interpretation follows. X-rays taken 12/04/2023 of Right shoulder independently reviewed and demonstrate mild glenohumeral degenerative changes, no acute fracture or dislocation noted.      Social History     Tobacco Use    Smoking status: Every Day     Packs/day: 0.50     Years: 55.00     Total pack years: 27.50     Types: Cigarettes     Start date: 4/26/1964    Smokeless tobacco: Never   Vaping Use    Vaping Use: Never used   Substance Use Topics Alcohol use: Yes     Comment: rarely    Drug use: No           Procedures  None     Teresia Hazard, DO

## 2023-12-28 ENCOUNTER — EVALUATION (OUTPATIENT)
Dept: PHYSICAL THERAPY | Facility: CLINIC | Age: 73
End: 2023-12-28
Payer: MEDICARE

## 2023-12-28 DIAGNOSIS — G54.9 NERVE ROOT IRRITATION: ICD-10-CM

## 2023-12-28 PROCEDURE — 97110 THERAPEUTIC EXERCISES: CPT

## 2023-12-28 PROCEDURE — 97162 PT EVAL MOD COMPLEX 30 MIN: CPT

## 2023-12-28 NOTE — PROGRESS NOTES
PT Evaluation     Today's date: 2023  Patient name: Tc Stovall  : 1950  MRN: [id number]  Referring provider: Isela Shannon PA-C  Dx:   Encounter Diagnosis     ICD-10-CM    1. Nerve root irritation  G54.9 Ambulatory Referral to Physical Therapy          Start Time: 1550  Stop Time: 1630  Total time in clinic (min): 40 minutes    Assessment  Assessment details: Patient is a 73 y.o. male who presents to physical therapy with c/o R shoulder and neck pain. Patient presents to evaluation with pain, decreased range of motion, decreased strength, and decreased tolerance to activity. Patient demonstrates good tolerance to treatment and was provided with a written copy of their initial home exercise program focusing on stretching and postural strengthening and was encouraged to perform daily per tolerance. I discussed risks, benefits, and alternatives to treatment, and answered all patient questions to patient satisfaction. Patient presents with baseline FOTO score of 57 indicating limited tolerance/ability to complete ADLs. Patient is an appropriate candidate for skilled PT and would benefit from skilled PT services to address the aforementioned impairments, achieve goals, maximize function, and improve quality of life. Pt is in agreement with this plan.    Patient Education: activity modifications as needed, pacing of activities, importance of HEP compliance, PT prognosis/POC    Impairments: activity intolerance and pain with function    Goals  ST weeks  Pt will demonstrate good understanding and compliance with HEP  Pt will demonstrate improved postural awareness and ability to self-correct without reliance on external cues     LT weeks  Pt will improve FOTO score to > or = to 69 to indicate improved functional abilities   Pt will decrease pain to 0-1/10 with activity  Pt will increase shoulder strength to 5/5 for improved tolerance/independence with ADLs  Pt will increase functional IR to be  able to don/doff bra/belt and wash back without pain   Pt will increase functional ER to be able to wash hair independently without pain      Plan  Patient would benefit from: PT eval and skilled physical therapy  Planned modality interventions: cryotherapy, TENS and thermotherapy: hydrocollator packs  Planned therapy interventions: manual therapy, functional ROM exercises, flexibility, graded activity, graded exercise, graded motor, home exercise program, strengthening, stretching, therapeutic activities, therapeutic exercise and therapeutic training  Frequency: 2x week  Duration in weeks: 8  Plan of Care beginning date: 2023  Plan of Care expiration date: 2024      Subjective Evaluation    History of Present Illness  Mechanism of injury: Pt is a 74 y/o male who presents to therapy with c/o of R shoulder pain and neck. 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 He notes about a month ago, he was helping her get dressed and he pulled and got instant pain. Pain and mobility has improved since the initial injury. He notes difficulty with gripping in the R hand. Pt reports difficulty with pulling, gripping, lifting, and other ADLs. He notes numbness on the R UE. He notes he was using ice and heat for help.   Patient Goals  Patient goals for therapy: independence with ADLs/IADLs and increased strength    Pain  Current pain ratin  At best pain ratin  At worst pain rating: 3        Objective     General Comments:      Shoulder Comments   Posture: fwd head, rounded shoulders, increased kyphosis     Cervical ROM:  Flex-30  Ext-40  L lat flex-20  R lat flex-20  L rot-60  R rot-70    Spurling: (-)   Traction: (+)     R AROM: flex-170 abd-170 IR-C7 ER-L3    R PROM: flex-180 abd-180    R shoulder strength: flex- 4+/5 abd- 4+/5 ER- 4+/5 IR- 4+/5    Sensation:  C4- intact  C5- intact  C6- diminished R side  C7- diminished R side  C8- intact  T1-intact    TTP: superior R shoulder    Hawkin's-Lawrence: (-)    Empty-Can: (-)              Diagnosis: R shoulder/neck pain   Precautions: hx L rtc repair, type 2 DBM   POC Expires: 02/22/24   Re-evaluation Date: 01/25/24   FOTO Scores/Date: Goal -69; 12/28-57   Visit Count 1/10       Manuals                                Ther Ex 12/28       Wall slides HEP       Scap retractions HEP       UT/LS stretch HEP                                                        Neuro Re-Ed                                                               Ther Act                                                                                 Modalities

## 2023-12-29 DIAGNOSIS — E78.5 HYPERLIPIDEMIA ASSOCIATED WITH TYPE 2 DIABETES MELLITUS: Primary | ICD-10-CM

## 2023-12-29 DIAGNOSIS — E11.69 HYPERLIPIDEMIA ASSOCIATED WITH TYPE 2 DIABETES MELLITUS: Primary | ICD-10-CM

## 2023-12-29 RX ORDER — PRAVASTATIN SODIUM 20 MG
20 TABLET ORAL DAILY
Qty: 90 TABLET | Refills: 3 | Status: SHIPPED | OUTPATIENT
Start: 2023-12-29

## 2024-01-02 ENCOUNTER — OFFICE VISIT (OUTPATIENT)
Dept: PHYSICAL THERAPY | Facility: CLINIC | Age: 74
End: 2024-01-02
Payer: MEDICARE

## 2024-01-02 DIAGNOSIS — G54.9 NERVE ROOT IRRITATION: Primary | ICD-10-CM

## 2024-01-02 PROCEDURE — 97112 NEUROMUSCULAR REEDUCATION: CPT

## 2024-01-02 PROCEDURE — 97110 THERAPEUTIC EXERCISES: CPT

## 2024-01-02 NOTE — PROGRESS NOTES
"Daily Note     Today's date: 2024  Patient name: Tc Stovall  : 1950  MRN: [id number]  Referring provider: Isela Shannon PA-C  Dx:   Encounter Diagnosis     ICD-10-CM    1. Nerve root irritation  G54.9           Start Time: 1100  Stop Time: 1140  Total time in clinic (min): 40 minutes    Subjective: Pt offers now new complaints.      Objective: See treatment diary below      Assessment: POC initiated as shown below. Cueing provided for correct form and performance with exercises. Tolerated treatment well. Patient demonstrated fatigue post treatment and would benefit from continued PT      Plan: Continue per plan of care.  Progress treatment as tolerated.       Diagnosis: R shoulder/neck pain   Precautions: hx L rtc repair, type 2 DBM   POC Expires: 24   Re-evaluation Date: 24   FOTO Scores/Date: 69; -   Visit Count 1/10 2/10      Manuals                                Ther Ex       Wall slides HEP 5\" 10x flex/scap       Scap retractions HEP 2x10       UT/LS stretch HEP  30\" x 3       Horizontal abd  2x10 rtb       B/l ER  2x10 rtb      Supine cane flex  Ys, Is 10\" x 10                               Neuro Re-Ed        UBE retro  5 min L1      Rows/ext  2x10 gtb                                              Ther Act                                                                                 Modalities                                            "

## 2024-01-04 ENCOUNTER — OFFICE VISIT (OUTPATIENT)
Dept: PHYSICAL THERAPY | Facility: CLINIC | Age: 74
End: 2024-01-04
Payer: MEDICARE

## 2024-01-04 DIAGNOSIS — G54.9 NERVE ROOT IRRITATION: Primary | ICD-10-CM

## 2024-01-04 PROCEDURE — 97110 THERAPEUTIC EXERCISES: CPT

## 2024-01-04 PROCEDURE — 97112 NEUROMUSCULAR REEDUCATION: CPT

## 2024-01-04 NOTE — PROGRESS NOTES
"Daily Note     Today's date: 2024  Patient name: Tc Stovall  : 1950  MRN: [id number]  Referring provider: Isela Shannon PA-C  Dx:   Encounter Diagnosis     ICD-10-CM    1. Nerve root irritation  G54.9                      Subjective: pt reports no pain in the shoulder/neck.       Objective: See treatment diary below      Assessment: Tolerated treatment well. Patient would benefit from continued PT.       Plan: Continue per plan of care.      Diagnosis: R shoulder/neck pain   Precautions: hx L rtc repair, type 2 DBM   POC Expires: 24   Re-evaluation Date: 24   FOTO Scores/Date: ;    Visit Count 1/10 2/10 3/10     Manuals                               Ther Ex      Wall slides HEP 5\" 10x flex/scap  5\" 10x flex/scap      Scap retractions HEP 2x10  2x10 5\"     UT/LS stretch HEP  30\" x 3  30\" x 3     Horizontal abd  2x10 rtb  2x10 rtb     B/l ER  2x10 rtb 2x10 rtb     Supine cane flex  Ys, Is 10\" x 10  Ys, Is 10\" x 10                              Neuro Re-Ed       UBE retro  5 min L1 5 min L1     Rows/ext  2x10 gtb  2x10 gtb                                             Ther Act                                                                                 Modalities                                              "

## 2024-01-09 ENCOUNTER — OFFICE VISIT (OUTPATIENT)
Dept: PHYSICAL THERAPY | Facility: CLINIC | Age: 74
End: 2024-01-09
Payer: MEDICARE

## 2024-01-09 DIAGNOSIS — G54.9 NERVE ROOT IRRITATION: Primary | ICD-10-CM

## 2024-01-09 PROCEDURE — 97110 THERAPEUTIC EXERCISES: CPT

## 2024-01-09 PROCEDURE — 97112 NEUROMUSCULAR REEDUCATION: CPT

## 2024-01-09 NOTE — PROGRESS NOTES
"Daily Note     Today's date: 2024  Patient name: Tc Stovall  : 1950  MRN: [id number]  Referring provider: Isela Shannon PA-C  Dx:   Encounter Diagnosis     ICD-10-CM    1. Nerve root irritation  G54.9                      Subjective: pt reports feeling stiff today.       Objective: See treatment diary below      Assessment: Tolerated treatment well. Patient would benefit from continued PT. Continued w/ current POC as listed below w/ good tolerance.       Plan: Continue per plan of care.      Diagnosis: R shoulder/neck pain   Precautions: hx L rtc repair, type 2 DBM   POC Expires: 24   Re-evaluation Date: 24   FOTO Scores/Date: ;    Visit Count 1/10 2/10 3/10 4/10    Manuals                              Ther Ex     Wall slides HEP 5\" 10x flex/scap  5\" 10x flex/scap  5\" 10x flex/scap    Scap retractions HEP 2x10  2x10 5\" 5\" 2x10    UT/LS stretch HEP  30\" x 3  30\" x 3 30\" x 3    Horizontal abd  2x10 rtb  2x10 rtb 2x10 rtb    B/l ER  2x10 rtb 2x10 rtb 2x10 rtb    Supine cane flex  Ys, Is 10\" x 10  Ys, Is 10\" x 10  Ys, Is 10\" x 10                             Neuro Re-Ed      UBE retro  5 min L1 5 min L1 5 min L1    Rows/ext  2x10 gtb  2x10 gtb  2x10 gtb    Prone scap retraction                                       Ther Act                                                                                 Modalities                                                "

## 2024-01-11 ENCOUNTER — OFFICE VISIT (OUTPATIENT)
Dept: PHYSICAL THERAPY | Facility: CLINIC | Age: 74
End: 2024-01-11
Payer: MEDICARE

## 2024-01-11 DIAGNOSIS — G54.9 NERVE ROOT IRRITATION: Primary | ICD-10-CM

## 2024-01-11 PROCEDURE — 97110 THERAPEUTIC EXERCISES: CPT

## 2024-01-11 PROCEDURE — 97112 NEUROMUSCULAR REEDUCATION: CPT

## 2024-01-11 PROCEDURE — 97530 THERAPEUTIC ACTIVITIES: CPT

## 2024-01-11 NOTE — PROGRESS NOTES
"Daily Note     Today's date: 2024  Patient name: Tc Stovall  : 1950  MRN: [id number]  Referring provider: Isela Shannon PA-C  Dx:   Encounter Diagnosis     ICD-10-CM    1. Nerve root irritation  G54.9                      Subjective: pt reports no new complaints upon arrival.       Objective: See treatment diary below      Assessment: Tolerated treatment well. Patient would benefit from continued PT. Progressed functional strengthening w/ good tolerance.       Plan: Continue per plan of care.      Diagnosis: R shoulder/neck pain   Precautions: hx L rtc repair, type 2 DBM   POC Expires: 24   Re-evaluation Date: 24   FOTO Scores/Date: ;    Visit Count 1/10 2/10 3/10 4/10 5/10   Manuals                             Ther Ex     Wall slides HEP 5\" 10x flex/scap  5\" 10x flex/scap  5\" 10x flex/scap 5\" 10x flex/scap   Scap retractions HEP 2x10  2x10 5\" 5\" 2x10 5\" 2x10   UT/LS stretch HEP  30\" x 3  30\" x 3 30\" x 3 30\" x 3   Horizontal abd  2x10 rtb  2x10 rtb 2x10 rtb Gtb 2x10   B/l ER  2x10 rtb 2x10 rtb 2x10 rtb Gtb 2x10   Supine cane flex  Ys, Is 10\" x 10  Ys, Is 10\" x 10  Ys, Is 10\" x 10  Ys, Is 10\" x 10  2#                           Neuro Re-Ed      UBE retro  5 min L1 5 min L1 5 min L1 5 min L1   Rows/ext  2x10 gtb  2x10 gtb  2x10 gtb 2x10 btb   Prone scap retraction                                       Ther Act             Box lifts     16# box 10x   Box lifts + carry     16# box 2x                                                       Modalities                                                  "

## 2024-01-12 DIAGNOSIS — I10 PRIMARY HYPERTENSION: Chronic | ICD-10-CM

## 2024-01-12 RX ORDER — LISINOPRIL 20 MG/1
20 TABLET ORAL DAILY
Qty: 90 TABLET | Refills: 0 | Status: SHIPPED | OUTPATIENT
Start: 2024-01-12 | End: 2024-01-21

## 2024-01-16 ENCOUNTER — APPOINTMENT (OUTPATIENT)
Dept: PHYSICAL THERAPY | Facility: CLINIC | Age: 74
End: 2024-01-16
Payer: MEDICARE

## 2024-01-18 ENCOUNTER — OFFICE VISIT (OUTPATIENT)
Dept: PHYSICAL THERAPY | Facility: CLINIC | Age: 74
End: 2024-01-18
Payer: MEDICARE

## 2024-01-18 DIAGNOSIS — G54.9 NERVE ROOT IRRITATION: Primary | ICD-10-CM

## 2024-01-18 PROCEDURE — 97110 THERAPEUTIC EXERCISES: CPT

## 2024-01-18 PROCEDURE — 97112 NEUROMUSCULAR REEDUCATION: CPT

## 2024-01-18 NOTE — PROGRESS NOTES
"Daily Note     Today's date: 2024  Patient name: Tc Stovall  : 1950  MRN: [id number]  Referring provider: Isela Shannon PA-C  Dx:   Encounter Diagnosis     ICD-10-CM    1. Nerve root irritation  G54.9                      Subjective: pt reports pulling is muscles in his R hand/arm when trying to start his tractor. Stated he applied heat the last couple days, which seemed to help.       Objective: See treatment diary below      Assessment: Tolerated treatment well. Patient would benefit from continued PT. Withheld box lifts to avoid exacerbation of R side this session.       Plan: Continue per plan of care.      Diagnosis: R shoulder/neck pain   Precautions: hx L rtc repair, type 2 DBM   POC Expires: 24   Re-evaluation Date: 24   FOTO Scores/Date: ;    Visit Count 6/10   4/10 5/10   Manuals                            Ther Ex        Wall slides    5\" 10x flex/scap 5\" 10x flex/scap   Scap retractions 5\" 2x10   5\" 2x10 5\" 2x10   UT/LS stretch 30\" x 3   30\" x 3 30\" x 3   Horizontal abd Rtb 2x10   2x10 rtb Gtb 2x10   B/l ER Rtb loop 2x10   2x10 rtb Gtb 2x10   Supine cane flex Ys, Is 10\" x 10  2#   Ys, Is 10\" x 10  Ys, Is 10\" x 10  2#                           Neuro Re-Ed        UBE retro 5 min L1   5 min L1 5 min L1   Rows/ext Gtb 2x10   2x10 gtb 2x10 btb   Prone scap retraction                                    Ther Act          Box lifts     16# box 10x   Box lifts + carry     16# box 2x                                                 Modalities                                              "

## 2024-01-19 DIAGNOSIS — I10 PRIMARY HYPERTENSION: Chronic | ICD-10-CM

## 2024-01-19 DIAGNOSIS — Z00.00 ADULT GENERAL MEDICAL EXAMINATION: Primary | ICD-10-CM

## 2024-01-21 RX ORDER — MELATONIN
1000 DAILY
Qty: 90 TABLET | Refills: 3 | Status: SHIPPED | OUTPATIENT
Start: 2024-01-21

## 2024-01-21 RX ORDER — LISINOPRIL 20 MG/1
20 TABLET ORAL DAILY
Qty: 90 TABLET | Refills: 0 | Status: SHIPPED | OUTPATIENT
Start: 2024-01-21

## 2024-01-23 ENCOUNTER — OFFICE VISIT (OUTPATIENT)
Dept: PHYSICAL THERAPY | Facility: CLINIC | Age: 74
End: 2024-01-23
Payer: MEDICARE

## 2024-01-23 DIAGNOSIS — G54.9 NERVE ROOT IRRITATION: Primary | ICD-10-CM

## 2024-01-23 PROCEDURE — 97110 THERAPEUTIC EXERCISES: CPT

## 2024-01-23 PROCEDURE — 97112 NEUROMUSCULAR REEDUCATION: CPT

## 2024-01-23 NOTE — PROGRESS NOTES
"Daily Note     Today's date: 2024  Patient name: Tc Stovall  : 1950  MRN: [id number]  Referring provider: Isela Shannon PA-C  Dx:   Encounter Diagnosis     ICD-10-CM    1. Nerve root irritation  G54.9           Start Time: 1058  Stop Time: 1141  Total time in clinic (min): 43 minutes    Subjective: patient reports no new complaints or incidents.  Denies any increased pain post last therapy session       Objective: See treatment diary below      Assessment: patient requires cueing throughout on sequencing of exercises but demonstrates good carryover from HEP and previous therapy session requiring small cues on corrective mechanics.  Was able to progress box lift adding pivot turn and reaching to shoulder height shelf.  Was able to progress in t-band resistance with BL ER and horizontal abd without compromise to control.      Plan: Continue per plan of care.  Progress treatment as tolerated.       Diagnosis: R shoulder/neck pain   Precautions: hx L rtc repair, type 2 DBM   POC Expires: 24   Re-evaluation Date: 24   FOTO Scores/Date: Goal -69; -   Visit Count 6/10 7/10  4/10 5/10   Manuals                            Ther Ex      Wall slides    5\" 10x flex/scap 5\" 10x flex/scap   Scap retractions 5\" 2x10 5\" s2x10   5\" 2x10 5\" 2x10   UT/LS stretch 30\" x 3 30\"x3 ea  30\" x 3 30\" x 3   Horizontal abd Rtb 2x10 GTB 2x10   2x10 rtb Gtb 2x10   B/l ER Rtb loop 2x10 GTB 2x10   2x10 rtb Gtb 2x10   Supine cane flex Ys, Is 10\" x 10  2# I's Y's 2# 10\"x10   Ys, Is 10\" x 10  Ys, Is 10\" x 10  2#                           Neuro Re-Ed      UBE retro 5 min L1 L3 x 6 mins  5 min L1 5 min L1   Rows/ext Gtb 2x10 BTB 2x10 ea   2x10 gtb 2x10 btb   Prone scap retraction                                    Ther Act          Box lifts  Floor to shelf x 10 16# box    16# box 10x   Box lifts + carry  16# box x 3 laps   16# box 2x                                               "   Modalities

## 2024-01-25 ENCOUNTER — OFFICE VISIT (OUTPATIENT)
Dept: PHYSICAL THERAPY | Facility: CLINIC | Age: 74
End: 2024-01-25
Payer: MEDICARE

## 2024-01-25 DIAGNOSIS — G54.9 NERVE ROOT IRRITATION: Primary | ICD-10-CM

## 2024-01-25 PROCEDURE — 97110 THERAPEUTIC EXERCISES: CPT

## 2024-01-25 NOTE — PROGRESS NOTES
PT Re-Evaluation  and PT Discharge    Today's date: 2024  Patient name: Tc Stovall  : 1950  MRN: [id number]  Referring provider: Isela Shannon PA-C  Dx:   Encounter Diagnosis     ICD-10-CM    1. Nerve root irritation  G54.9           Start Time: 1230  Stop Time: 1255  Total time in clinic (min): 25 minutes    Assessment  Assessment details: Patient is a 72 y/o male who presents to therapy with complaints of R shoulder and neck pain and has completed 8 visits to date with improved FOTO score of 74 from 57 at IE. Patient demonstrates subjective/objective improvement since starting PT such as strength, pain, and activity tolerance. Pt reports he no longer has any pain or difficulty with the shoulder. HEP reviewed with patient for home management of condition. Pt to discharge to [facility] at this time. Pt is in agreement with this plan.     Impairments: activity intolerance and pain with function    Goals  ST weeks  Pt will demonstrate good understanding and compliance with [facility] --met  Pt will demonstrate improved postural awareness and ability to self-correct without reliance on external cues --met     LT weeks  Pt will improve FOTO score to > or = to 69 to indicate improved functional abilities --met  Pt will decrease pain to 0-1/10 with activity --met  Pt will increase shoulder strength to 5/5 for improved tolerance/independence with ADLs --met  Pt will increase functional IR to be able to don/doff bra/belt and wash back without pain --met  Pt will increase functional ER to be able to wash hair independently without pain --met      Plan  Plan details: D/c to hep  Patient would benefit from: PT eval and skilled physical therapy  Planned modality interventions: cryotherapy, TENS and thermotherapy: hydrocollator packs  Planned therapy interventions: manual therapy, functional ROM exercises, flexibility, graded activity, graded exercise, graded motor, home exercise program, strengthening, stretching,  therapeutic activities, therapeutic exercise and therapeutic training  Frequency: 2x week  Duration in weeks: 8  Plan of Care beginning date: 2023  Plan of Care expiration date: 2024      Subjective Evaluation    History of Present Illness  Mechanism of injury: RE (24):  Pt is a 74 y/o male who is being seen for R shoulder pain. He notes since starting therapy he has decreased pain in the neck and improved strength. He reports it has been easier helping his wife get dressed. He no longer has any numbness in the shoulder. He notes feeling 90% improved since starting therapy.     IE (24):  Pt is a 74 y/o male who presents to therapy with c/o of R shoulder pain and neck. 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 He notes about a month ago, he was helping her get dressed and he pulled and got instant pain. Pain and mobility has improved since the initial injury. He notes difficulty with gripping in the R hand. Pt reports difficulty with pulling, gripping, lifting, and other ADLs. He notes numbness on the R UE. He notes he was using ice and heat for help.   Patient Goals  Patient goals for therapy: independence with ADLs/IADLs and increased strength    Pain  Current pain ratin  At best pain ratin  At worst pain ratin        Objective     General Comments:      Shoulder Comments   Posture: fwd head, rounded shoulders, increased kyphosis     Cervical ROM:  Flex-30  Ext-40  L lat flex-20  R lat flex-20  L rot-60  R rot-70    Spurling: (-)   Traction: (+)     R AROM: flex-180 abd-180 IR-L3 ER-T1    R PROM: flex-180 abd-180    R shoulder strength: flex- 5/5 abd- 5/5 ER- 5/5 IR- 5/5    Sensation:  C4- intact  C5- intact  C6- intact   C7- intact  C8- intact  T1-intact    TTP: no tenderness    Hawkin's-Lawrence: (-)   Empty-Can: (-)       Flowsheet Rows      Flowsheet Row Most Recent Value   PT/OT G-Codes    Current Score 74   Projected Score 69               Diagnosis: R shoulder/neck pain  "  Precautions: hx L rtc repair, type 2 DBM   POC Expires: 02/22/24   Re-evaluation Date: 01/25/24   FOTO Scores/Date: Goal -69; 12/28-57; 1/25-74    Visit Count 6/10 7/10 1/10 4/10 5/10   Manuals 1/18 1/23 1/25 1/9 1/11                           Ther Ex  1/23 1/25 1/9    Wall slides    5\" 10x flex/scap 5\" 10x flex/scap   Scap retractions 5\" 2x10 5\" s2x10   5\" 2x10 5\" 2x10   UT/LS stretch 30\" x 3 30\"x3 ea  30\" x 3 30\" x 3   Horizontal abd Rtb 2x10 GTB 2x10   2x10 rtb Gtb 2x10   B/l ER Rtb loop 2x10 GTB 2x10   2x10 rtb Gtb 2x10   Supine cane flex Ys, Is 10\" x 10  2# I's Y's 2# 10\"x10   Ys, Is 10\" x 10  Ys, Is 10\" x 10  2#              Updated FOTO/measurements             Neuro Re-Ed  1/23 1/25 1/9    UBE retro 5 min L1 L3 x 6 mins L3 x 6 mins 5 min L1 5 min L1   Rows/ext Gtb 2x10 BTB 2x10 ea   2x10 gtb 2x10 btb   Prone scap retraction                                    Ther Act          Box lifts  Floor to shelf x 10 16# box    16# box 10x   Box lifts + carry  16# box x 3 laps   16# box 2x                                                 Modalities                                      " No

## 2024-01-30 DIAGNOSIS — Z79.4 TYPE 2 DIABETES MELLITUS WITH HYPERGLYCEMIA, WITH LONG-TERM CURRENT USE OF INSULIN (HCC): ICD-10-CM

## 2024-01-30 DIAGNOSIS — E11.65 TYPE 2 DIABETES MELLITUS WITH HYPERGLYCEMIA, WITH LONG-TERM CURRENT USE OF INSULIN (HCC): ICD-10-CM

## 2024-01-30 RX ORDER — LANCETS 33 GAUGE
EACH MISCELLANEOUS
Qty: 300 EACH | Refills: 0 | Status: SHIPPED | OUTPATIENT
Start: 2024-01-30

## 2024-01-30 RX ORDER — INSULIN GLARGINE 100 [IU]/ML
20 INJECTION, SOLUTION SUBCUTANEOUS
Qty: 18 ML | Refills: 0 | Status: SHIPPED | OUTPATIENT
Start: 2024-01-30 | End: 2024-07-28

## 2024-03-03 DIAGNOSIS — I10 PRIMARY HYPERTENSION: ICD-10-CM

## 2024-03-03 RX ORDER — AMLODIPINE BESYLATE 5 MG/1
5 TABLET ORAL DAILY
Qty: 90 TABLET | Refills: 1 | Status: SHIPPED | OUTPATIENT
Start: 2024-03-03

## 2024-04-04 DIAGNOSIS — E78.5 HYPERLIPIDEMIA ASSOCIATED WITH TYPE 2 DIABETES MELLITUS  (HCC): Primary | ICD-10-CM

## 2024-04-04 DIAGNOSIS — E11.69 HYPERLIPIDEMIA ASSOCIATED WITH TYPE 2 DIABETES MELLITUS  (HCC): Primary | ICD-10-CM

## 2024-04-10 ENCOUNTER — APPOINTMENT (OUTPATIENT)
Dept: LAB | Facility: HOSPITAL | Age: 74
End: 2024-04-10
Attending: INTERNAL MEDICINE
Payer: MEDICARE

## 2024-04-10 ENCOUNTER — RA CDI HCC (OUTPATIENT)
Dept: OTHER | Facility: HOSPITAL | Age: 74
End: 2024-04-10

## 2024-04-10 ENCOUNTER — VBI (OUTPATIENT)
Dept: ADMINISTRATIVE | Facility: OTHER | Age: 74
End: 2024-04-10

## 2024-04-10 DIAGNOSIS — E11.65 TYPE 2 DIABETES MELLITUS WITH HYPERGLYCEMIA, WITH LONG-TERM CURRENT USE OF INSULIN (HCC): ICD-10-CM

## 2024-04-10 DIAGNOSIS — E78.5 HYPERLIPIDEMIA ASSOCIATED WITH TYPE 2 DIABETES MELLITUS  (HCC): ICD-10-CM

## 2024-04-10 DIAGNOSIS — E11.69 HYPERLIPIDEMIA ASSOCIATED WITH TYPE 2 DIABETES MELLITUS  (HCC): ICD-10-CM

## 2024-04-10 DIAGNOSIS — Z79.4 TYPE 2 DIABETES MELLITUS WITH HYPERGLYCEMIA, WITH LONG-TERM CURRENT USE OF INSULIN (HCC): ICD-10-CM

## 2024-04-10 LAB
ALBUMIN SERPL BCP-MCNC: 4.2 G/DL (ref 3.5–5)
ALP SERPL-CCNC: 63 U/L (ref 34–104)
ALT SERPL W P-5'-P-CCNC: 20 U/L (ref 7–52)
ANION GAP SERPL CALCULATED.3IONS-SCNC: 9 MMOL/L (ref 4–13)
AST SERPL W P-5'-P-CCNC: 19 U/L (ref 13–39)
BILIRUB SERPL-MCNC: 0.33 MG/DL (ref 0.2–1)
BUN SERPL-MCNC: 25 MG/DL (ref 5–25)
CALCIUM SERPL-MCNC: 9.4 MG/DL (ref 8.4–10.2)
CHLORIDE SERPL-SCNC: 104 MMOL/L (ref 96–108)
CHOLEST SERPL-MCNC: 154 MG/DL
CO2 SERPL-SCNC: 27 MMOL/L (ref 21–32)
CREAT SERPL-MCNC: 0.98 MG/DL (ref 0.6–1.3)
CREAT UR-MCNC: 196.2 MG/DL
ERYTHROCYTE [DISTWIDTH] IN BLOOD BY AUTOMATED COUNT: 12.5 % (ref 11.6–15.1)
EST. AVERAGE GLUCOSE BLD GHB EST-MCNC: 192 MG/DL
GFR SERPL CREATININE-BSD FRML MDRD: 76 ML/MIN/1.73SQ M
GLUCOSE P FAST SERPL-MCNC: 84 MG/DL (ref 65–99)
HBA1C MFR BLD: 8.3 %
HCT VFR BLD AUTO: 43.3 % (ref 36.5–49.3)
HDLC SERPL-MCNC: 43 MG/DL
HGB BLD-MCNC: 14.3 G/DL (ref 12–17)
LDLC SERPL CALC-MCNC: 79 MG/DL (ref 0–100)
MCH RBC QN AUTO: 28.8 PG (ref 26.8–34.3)
MCHC RBC AUTO-ENTMCNC: 33 G/DL (ref 31.4–37.4)
MCV RBC AUTO: 87 FL (ref 82–98)
MICROALBUMIN UR-MCNC: 510.7 MG/L
MICROALBUMIN/CREAT 24H UR: 260 MG/G CREATININE (ref 0–30)
PLATELET # BLD AUTO: 330 THOUSANDS/UL (ref 149–390)
PMV BLD AUTO: 10.5 FL (ref 8.9–12.7)
POTASSIUM SERPL-SCNC: 4.4 MMOL/L (ref 3.5–5.3)
PROT SERPL-MCNC: 7.1 G/DL (ref 6.4–8.4)
RBC # BLD AUTO: 4.97 MILLION/UL (ref 3.88–5.62)
SODIUM SERPL-SCNC: 140 MMOL/L (ref 135–147)
TRIGL SERPL-MCNC: 158 MG/DL
WBC # BLD AUTO: 11.53 THOUSAND/UL (ref 4.31–10.16)

## 2024-04-10 PROCEDURE — 85027 COMPLETE CBC AUTOMATED: CPT

## 2024-04-10 PROCEDURE — 36415 COLL VENOUS BLD VENIPUNCTURE: CPT

## 2024-04-10 PROCEDURE — 80053 COMPREHEN METABOLIC PANEL: CPT

## 2024-04-10 PROCEDURE — 83036 HEMOGLOBIN GLYCOSYLATED A1C: CPT

## 2024-04-10 PROCEDURE — 82043 UR ALBUMIN QUANTITATIVE: CPT

## 2024-04-10 PROCEDURE — 82570 ASSAY OF URINE CREATININE: CPT

## 2024-04-10 PROCEDURE — 80061 LIPID PANEL: CPT

## 2024-04-10 NOTE — PROGRESS NOTES
HCC coding opportunities          Chart Reviewed number of suggestions sent to Provider: 3     Patients Insurance   E11.3293, E11.50 and I73.9  Medicare Insurance: Medicare

## 2024-04-10 NOTE — TELEPHONE ENCOUNTER
04/10/24 1:43 PM    Patient contacted (left message) to bring Advance Directive, POLST, or Living Will document to next scheduled pcp visit.    Thank you.  Radha Singh  [facility]

## 2024-04-16 ENCOUNTER — TELEPHONE (OUTPATIENT)
Age: 74
End: 2024-04-16

## 2024-04-16 ENCOUNTER — OFFICE VISIT (OUTPATIENT)
Age: 74
End: 2024-04-16
Payer: MEDICARE

## 2024-04-16 VITALS
HEIGHT: 67 IN | OXYGEN SATURATION: 98 % | BODY MASS INDEX: 39.02 KG/M2 | SYSTOLIC BLOOD PRESSURE: 138 MMHG | TEMPERATURE: 96.5 F | WEIGHT: 248.6 LBS | HEART RATE: 75 BPM | DIASTOLIC BLOOD PRESSURE: 66 MMHG

## 2024-04-16 DIAGNOSIS — F17.210 SMOKING GREATER THAN 20 PACK YEARS: ICD-10-CM

## 2024-04-16 DIAGNOSIS — R20.2 NUMBNESS AND TINGLING IN RIGHT HAND: ICD-10-CM

## 2024-04-16 DIAGNOSIS — E11.29 TYPE 2 DIABETES MELLITUS WITH DIABETIC MICROALBUMINURIA, WITH LONG-TERM CURRENT USE OF INSULIN (HCC): Primary | ICD-10-CM

## 2024-04-16 DIAGNOSIS — E11.69 HYPERLIPIDEMIA ASSOCIATED WITH TYPE 2 DIABETES MELLITUS  (HCC): Chronic | ICD-10-CM

## 2024-04-16 DIAGNOSIS — E78.5 HYPERLIPIDEMIA ASSOCIATED WITH TYPE 2 DIABETES MELLITUS  (HCC): Chronic | ICD-10-CM

## 2024-04-16 DIAGNOSIS — Z79.4 TYPE 2 DIABETES MELLITUS WITH DIABETIC MICROALBUMINURIA, WITH LONG-TERM CURRENT USE OF INSULIN (HCC): Primary | ICD-10-CM

## 2024-04-16 DIAGNOSIS — R80.9 TYPE 2 DIABETES MELLITUS WITH DIABETIC MICROALBUMINURIA, WITH LONG-TERM CURRENT USE OF INSULIN (HCC): Primary | ICD-10-CM

## 2024-04-16 DIAGNOSIS — J06.9 VIRAL UPPER RESPIRATORY TRACT INFECTION: ICD-10-CM

## 2024-04-16 DIAGNOSIS — R20.0 NUMBNESS AND TINGLING IN RIGHT HAND: ICD-10-CM

## 2024-04-16 DIAGNOSIS — I10 PRIMARY HYPERTENSION: Chronic | ICD-10-CM

## 2024-04-16 PROCEDURE — 99214 OFFICE O/P EST MOD 30 MIN: CPT

## 2024-04-16 RX ORDER — GUAIFENESIN 600 MG/1
1200 TABLET, EXTENDED RELEASE ORAL EVERY 12 HOURS SCHEDULED
Qty: 28 TABLET | Refills: 0 | Status: SHIPPED | OUTPATIENT
Start: 2024-04-16 | End: 2024-04-23

## 2024-04-16 RX ORDER — FLUTICASONE PROPIONATE 50 MCG
1 SPRAY, SUSPENSION (ML) NASAL DAILY
Qty: 9.9 ML | Refills: 0 | Status: SHIPPED | OUTPATIENT
Start: 2024-04-16 | End: 2024-04-30

## 2024-04-16 RX ORDER — ACYCLOVIR 400 MG/1
1 TABLET ORAL
Qty: 9 EACH | Refills: 3 | Status: SHIPPED | OUTPATIENT
Start: 2024-04-16 | End: 2024-04-16 | Stop reason: CLARIF

## 2024-04-16 NOTE — ASSESSMENT & PLAN NOTE
Lab Results   Component Value Date    HGBA1C 8.3 (H) 04/10/2024   A1c improved from 11 in July 2023.  Pt admits he could improve his diet.  BS at home run between 90 to 170s in the a.m. fasting.  Won't make changes to regimen at this time as half his BS in the a.m. are around 90.  When they are high in the morning, pt states this is usually when he isn't eating as well.   Pt interested in CGM, order placed through [employer].  Referral to diabetes education as well to assist with diet education and CGM use.  SGLT2 is cost prohibitive for the pt - has tried to go on Trulicity and jardiance in past but never could start due to cost.  Is on Lisinopril.  Has microalbinuria.  Reviewed diet recommendations, weight loss would help.  Return in 3 months for POC A1c check and review CGM monitoring if he can get this covered by insurance.

## 2024-04-16 NOTE — TELEPHONE ENCOUNTER
Patient is being referred to a orthopedics. Please schedule accordingly.    [facility]   [phone]

## 2024-04-16 NOTE — PROGRESS NOTES
Name: Tc Stovall      : 1950      MRN: [id number]  Encounter Provider: Amanda Valenzuela PA-C  Encounter Date: 2024   Encounter department: [facility]IMARY [facility] [place]    Assessment & Plan     1. Type 2 diabetes mellitus with diabetic microalbuminuria, with long-term current use of insulin (HCC)  Assessment & Plan:    Lab Results   Component Value Date    HGBA1C 8.3 (H) 04/10/2024   A1c improved from 11 in 2023.  Pt admits he could improve his diet.  BS at home run between 90 to 170s in the a.m. fasting.  Won't make changes to regimen at this time as half his BS in the a.m. are around 90.  When they are high in the morning, pt states this is usually when he isn't eating as well.   Pt interested in CGM, order placed through [employer].  Referral to diabetes education as well to assist with diet education and CGM use.  SGLT2 is cost prohibitive for the pt - has tried to go on Trulicity and jardiance in past but never could start due to cost.  Is on Lisinopril.  Has microalbinuria.  Reviewed diet recommendations, weight loss would help.  Return in 3 months for POC A1c check and review CGM monitoring if he can get this covered by insurance.    Orders:  -     Ambulatory referral to Diabetic Education - use to refer for diabetes group classes, individual diabetes education, medical nutrition therapy, device training; Future; Expected date: 2024  -     Albumin / creatinine urine ratio; Future; Expected date: 10/13/2024  -     Hemoglobin A1C; Future; Expected date: 10/13/2024    2. Primary hypertension  Comments:  Stable, continue lisinopril and amlodipine.  Low salt diet, get more exercise and encouraged weight loss.  Assessment & Plan:  Mildly elevated today.  Encouraged to stop smoking.    Orders:  -     Comprehensive metabolic panel; Future; Expected date: 10/13/2024  -     CBC and differential; Future; Expected date: 10/13/2024    3. Smoking greater than 20 pack years  Comments:  Pt  would like to quit, but not interested in medications today. Agrees to lung cancer screening CT.  Has nodules that were stable on last scan a year ago.  Orders:  -     CT lung screening program; Future; Expected date: 04/16/2024    4. Viral upper respiratory tract infection  Comments:  Improving, but lingering symptoms. Encouraged fluids, start mucinex twice daily, Flonase once daily.  Report worsening symptoms including fever and SOB.  Orders:  -     guaiFENesin (MUCINEX) 600 mg 12 hr tablet; Take 2 tablets (1,200 mg total) by mouth every 12 (twelve) hours for 7 days  -     fluticasone (FLONASE) 50 mcg/act nasal spray; 1 spray into each nostril daily for 14 days    5. Numbness and tingling in right hand  Comments:  Start wearing wrist splint at night, avoid NSAIDs, may use ice and tylenol.  Referral to Ortho.  Orders:  -     Ambulatory Referral to Orthopedic Surgery; Future    6. Hyperlipidemia associated with type 2 diabetes mellitus  (HCC)  Comments:  Continue pravastatin, triglycerides are still elevated.  Heart healthy diet encouraged, exercise.  Orders:  -     Lipid Panel with Direct LDL reflex; Future; Expected date: 10/13/2024        Depression Screening and Follow-up Plan: Patient was screened for depression during today's encounter. They screened negative with a PHQ-2 score of 0.    Tobacco Cessation Counseling: Tobacco cessation counseling was provided. The patient is sincerely urged to quit consumption of tobacco. He is not ready to quit tobacco. Medication options and side effects of medication discussed. Patient refused medication. Has tried patches, gum, and cold turkey and has difficult time quitting.  Knows he should, but is not interested in trying at this time.        Subjective      Diabetes  Pertinent negatives for hypoglycemia include no headaches. Pertinent negatives for diabetes include no chest pain and no fatigue.     Pt is here for follow up regarding his diabetes, HLD, and HTN.      A1c  is improved from 11 to 8.3.  Is on metformin 1,000 BID, glipizide 10 mg daily, and 20 units Lantus.  Pt would like to do a CGM to keep better eye on hsi blod sugars.  He admits to room for improvement in his diet.      Pt has had PND and cough the past couple of weeks.      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  She is housebound and cannot get into car due to severe lymphedema.    Pt has R hand numbness in his thumb and index/middle fingers.  This is worse in the morning and at night.  He is assisting his wife more in recent weeks and this aggravates it.  Started a few weeks ago.  No joint swelling, erythema.    Review of Systems   Constitutional:  Negative for chills, diaphoresis, fatigue and fever.   HENT:  Positive for congestion, postnasal drip, rhinorrhea and sore throat. Negative for drooling, sinus pressure, trouble swallowing and voice change.    Eyes: Negative.    Respiratory:  Positive for cough. Negative for shortness of breath and wheezing.    Cardiovascular:  Negative for chest pain, palpitations and leg swelling.   Gastrointestinal: Negative.    Musculoskeletal:  Negative for gait problem.   Skin:  Negative for rash.   Neurological:  Negative for syncope, light-headedness and headaches.   All other systems reviewed and are negative.      Current Outpatient Medications on File Prior to Visit   Medication Sig    acetaminophen (TYLENOL) 650 mg CR tablet Take 650 mg by mouth every 8 (eight) hours as needed for mild pain    amLODIPine (NORVASC) 5 mg tablet Take 1 tablet (5 mg total) by mouth daily    aspirin 81 mg chewable tablet Chew 4 tablets daily (Patient taking differently: Chew 81 mg daily Pt to check with PCP 8/5 when to stop)    Blood Glucose Monitoring Suppl (OneTouch Verio Reflect) w/Device KIT Check blood sugars three times daily. Please substitute with appropriate alternative as covered by patient's insurance. Dx: E11.65     "cholecalciferol (VITAMIN D3) 1,000 units tablet Take 1 tablet (1,000 Units total) by mouth daily    clotrimazole-betamethasone (LOTRISONE) 1-0.05 % cream Apply topically 2 (two) times a day    glipiZIDE XL 10 MG 24 hr tablet TAKE ONE TABLET BY MOUTH EVERY DAY    glucose blood (OneTouch Verio) test strip Check blood sugars three times daily. Please substitute with appropriate alternative as covered by patient's insurance. Dx: E11.65    ibuprofen (MOTRIN) 600 mg tablet Take 1 tablet (600 mg total) by mouth every 8 (eight) hours as needed for mild pain Take 1 tablet 3 times daily for 3 days, then take as needed. Take with food and water. Discontinue if stomach upset occurs.    Insulin Glargine Solostar (Lantus SoloStar) 100 UNIT/ML SOPN Inject 0.2 mL (20 Units total) under the skin daily at bedtime    Insulin Pen Needle (BD Pen Needle Nadine U/F) 32G X 4 MM MISC Use daily as directed with insulin pen    lisinopril (ZESTRIL) 20 mg tablet TAKE ONE TABLET BY MOUTH DAILY    metFORMIN (GLUCOPHAGE-XR) 500 mg 24 hr tablet TAKE TWO TABLETS BY MOUTH TWICE A DAY    multivitamin (THERAGRAN) TABS Take 1 tablet by mouth daily    nystatin (MYCOSTATIN) cream APPLY TO FEET TWO TIMES A DAY    OneTouch Delica Lancets 33G MISC Check blood sugars three times daily. Please substitute with appropriate alternative as covered by patient's insurance. Dx: E11.65    pravastatin (PRAVACHOL) 20 mg tablet Take 1 tablet (20 mg total) by mouth daily       Objective     /66   Pulse 75   Temp (!) 96.5 °F (35.8 °C) (Tympanic)   Ht 5' 7\" (1.702 m)   Wt 113 kg (248 lb 9.6 oz)   SpO2 98%   BMI 38.94 kg/m²     Physical Exam  Vitals reviewed.   Constitutional:       General: He is not in acute distress.     Appearance: He is morbidly obese. He is not toxic-appearing or diaphoretic.   HENT:      Head: Normocephalic and atraumatic.      Right Ear: Hearing normal.      Left Ear: Hearing normal.      Nose: Congestion and rhinorrhea present. Rhinorrhea " is clear.      Right Sinus: No maxillary sinus tenderness or frontal sinus tenderness.      Left Sinus: No maxillary sinus tenderness or frontal sinus tenderness.      Mouth/Throat:      Lips: Pink.      Mouth: Mucous membranes are moist.      Pharynx: Oropharynx is clear.   Eyes:      General: Lids are normal. No scleral icterus.  Cardiovascular:      Rate and Rhythm: Normal rate and regular rhythm.      Pulses: no weak pulses.           Dorsalis pedis pulses are 2+ on the right side and 2+ on the left side.        Posterior tibial pulses are 2+ on the right side and 2+ on the left side.      Heart sounds: Normal heart sounds. No murmur heard.  Pulmonary:      Effort: Pulmonary effort is normal. No respiratory distress.      Breath sounds: Rhonchi (mild, cleared with coughing) present.   Abdominal:      General: Abdomen is protuberant. Bowel sounds are normal.      Palpations: Abdomen is soft.      Tenderness: There is no abdominal tenderness.   Musculoskeletal:      Right forearm: Normal.      Left forearm: Normal.      Right wrist: No swelling, tenderness, bony tenderness, snuff box tenderness or crepitus. Normal pulse.      Left wrist: No swelling, tenderness, bony tenderness, snuff box tenderness or crepitus. Normal pulse.      Right hand: No swelling, tenderness or bony tenderness. Normal range of motion. Decreased sensation of the radial distribution. Normal capillary refill. Normal pulse.      Left hand: Normal sensation.      Cervical back: Full passive range of motion without pain.      Right lower leg: No edema.      Left lower leg: No edema.      Comments: Pt has a positive Tinels and Phalan of the right wrist.  Normal sensation when wrist is in neutral position.  Good  strength bilaterally.   Feet:      Right foot:      Skin integrity: Callus and dry skin present. No ulcer, skin breakdown, erythema or warmth.      Left foot:      Skin integrity: Callus and dry skin present. No ulcer, skin breakdown,  erythema or warmth.   Skin:     General: Skin is warm and dry.      Capillary Refill: Capillary refill takes less than 2 seconds.   Neurological:      General: No focal deficit present.      Mental Status: He is alert and oriented to person, place, and time.      Motor: No weakness, tremor or atrophy.   Psychiatric:         Mood and Affect: Mood normal.         Behavior: Behavior normal.       Amanda Valenzuela PA-C          Diabetic Foot Exam    Patient's shoes and socks removed.    Right Foot/Ankle   Right Foot Inspection  Skin Exam: skin normal, dry skin, callus and callus. Skin not intact, no warmth, no erythema, no maceration, no abnormal color, no pre-ulcer and no ulcer.     Toe Exam: ROM and strength within normal limits.     Sensory   Proprioception: intact  Monofilament testing: intact    Vascular  Capillary refills: < 3 seconds  The right DP pulse is 2+. The right PT pulse is 2+.     Left Foot/Ankle  Left Foot Inspection  Skin Exam: skin normal, dry skin and callus. Skin not intact, no warmth, no erythema, no maceration, normal color, no pre-ulcer and no ulcer.     Toe Exam: ROM and strength within normal limits.     Sensory   Proprioception: intact  Monofilament testing: intact    Vascular  Capillary refills: < 3 seconds  The left DP pulse is 2+. The left PT pulse is 2+.     Assign Risk Category  No deformity present  No loss of protective sensation  No weak pulses  Risk: 0

## 2024-04-17 DIAGNOSIS — I10 PRIMARY HYPERTENSION: Chronic | ICD-10-CM

## 2024-04-17 LAB
DME PARACHUTE DELIVERY DATE ACTUAL: NORMAL
DME PARACHUTE DELIVERY DATE EXPECTED: NORMAL
DME PARACHUTE DELIVERY DATE REQUESTED: NORMAL
DME PARACHUTE ITEM DESCRIPTION: NORMAL
DME PARACHUTE ITEM DESCRIPTION: NORMAL
DME PARACHUTE ORDER STATUS: NORMAL
DME PARACHUTE SUPPLIER NAME: NORMAL
DME PARACHUTE SUPPLIER PHONE: NORMAL

## 2024-04-17 RX ORDER — LISINOPRIL 20 MG/1
20 TABLET ORAL DAILY
Qty: 90 TABLET | Refills: 0 | Status: SHIPPED | OUTPATIENT
Start: 2024-04-17

## 2024-04-26 DIAGNOSIS — Z79.4 TYPE 2 DIABETES MELLITUS WITH HYPERGLYCEMIA, WITH LONG-TERM CURRENT USE OF INSULIN (HCC): ICD-10-CM

## 2024-04-26 DIAGNOSIS — E11.65 TYPE 2 DIABETES MELLITUS WITH HYPERGLYCEMIA, WITH LONG-TERM CURRENT USE OF INSULIN (HCC): ICD-10-CM

## 2024-04-27 RX ORDER — INSULIN GLARGINE 100 [IU]/ML
20 INJECTION, SOLUTION SUBCUTANEOUS
Qty: 18 ML | Refills: 0 | Status: SHIPPED | OUTPATIENT
Start: 2024-04-27 | End: 2024-10-24

## 2024-04-30 NOTE — TELEPHONE ENCOUNTER
Patient is being referred to a orthopedics. Please schedule accordingly.    [facility]   [phone]

## 2024-05-07 NOTE — TELEPHONE ENCOUNTER
Patient is being referred to a orthopedics. Please schedule accordingly.    [facility] [place]   [phone]

## 2024-06-12 ENCOUNTER — NURSE TRIAGE (OUTPATIENT)
Age: 74
End: 2024-06-12

## 2024-06-12 ENCOUNTER — OFFICE VISIT (OUTPATIENT)
Age: 74
End: 2024-06-12
Payer: MEDICARE

## 2024-06-12 ENCOUNTER — APPOINTMENT (OUTPATIENT)
Age: 74
End: 2024-06-12
Payer: MEDICARE

## 2024-06-12 VITALS
HEART RATE: 80 BPM | TEMPERATURE: 97.3 F | WEIGHT: 234.6 LBS | OXYGEN SATURATION: 97 % | SYSTOLIC BLOOD PRESSURE: 140 MMHG | DIASTOLIC BLOOD PRESSURE: 64 MMHG | HEIGHT: 67 IN | BODY MASS INDEX: 36.82 KG/M2

## 2024-06-12 DIAGNOSIS — R20.0 NUMBNESS AND TINGLING OF RIGHT ARM: ICD-10-CM

## 2024-06-12 DIAGNOSIS — R20.2 NUMBNESS AND TINGLING OF RIGHT ARM: ICD-10-CM

## 2024-06-12 DIAGNOSIS — M54.2 NECK PAIN: Primary | ICD-10-CM

## 2024-06-12 DIAGNOSIS — M54.2 NECK PAIN: ICD-10-CM

## 2024-06-12 PROBLEM — E66.01 OBESITY, MORBID (HCC): Status: ACTIVE | Noted: 2024-06-12

## 2024-06-12 PROBLEM — I73.9 PERIPHERAL VASCULAR DISEASE, UNSPECIFIED (HCC): Status: ACTIVE | Noted: 2024-06-12

## 2024-06-12 PROCEDURE — 99213 OFFICE O/P EST LOW 20 MIN: CPT

## 2024-06-12 PROCEDURE — 72050 X-RAY EXAM NECK SPINE 4/5VWS: CPT

## 2024-06-12 PROCEDURE — G2211 COMPLEX E/M VISIT ADD ON: HCPCS

## 2024-06-12 RX ORDER — NAPROXEN 500 MG/1
500 TABLET ORAL 2 TIMES DAILY WITH MEALS
Qty: 30 TABLET | Refills: 1 | Status: SHIPPED | OUTPATIENT
Start: 2024-06-12

## 2024-06-12 RX ORDER — ACETAMINOPHEN 500 MG
500 TABLET ORAL EVERY 6 HOURS PRN
COMMUNITY

## 2024-06-12 NOTE — PROGRESS NOTES
Ambulatory Visit  Name: Tc Stovall      : 1950      MRN: [id number]  Encounter Provider: Sary Lockwood PA-C  Encounter Date: 2024   Encounter department: [facility] [place]    Assessment & Plan   1. Neck pain  -     XR spine cervical complete 4 or 5 vw non injury; Future; Expected date: 2024  -     Ambulatory Referral to Comprehensive Spine Program; Future  -     naproxen (Naprosyn) 500 mg tablet; Take 1 tablet (500 mg total) by mouth 2 (two) times a day with meals  Neck pain with radicular symptoms down the right arm.  Recommend completing cervical spine x-ray to look for degenerative changes and following up with the comprehensive spine program, who may recommend PT or Ortho follow-up.  Recommend the patient take naproxen up to 2 times a day as needed for pain.  Recommend not taking naproxen longer than 1 week since it can have negative effects on the kidneys.  Take with food.  2. Numbness and tingling of right arm  -     XR spine cervical complete 4 or 5 vw non injury; Future; Expected date: 2024  -     Ambulatory Referral to Comprehensive Spine Program; Future  See plan above.       History of Present Illness   {Disappearing Hyperlinks I Encounters * My Last Note * Since Last Visit * History :61943}  Tc is a 74-year-old male presenting to the office with complaint of right-sided neck pain and numbness/tingling/weakness of his right arm and numbness of his right thumb for the past month.  It has been gradually worsening over the past month.  His pain is currently a 3/10 but worsens with extension of his neck, during which he experiences increasing pain and tingling down his arm.  He has been taking Tylenol as needed for the pain.  He denies any injury or trauma to the neck, shoulder, arms.  He did have a fall about 3 weeks ago in his driveway and he skinned his knee and caught himself with his hands outstretched, but he was having some of the symptoms prior to  "that fall and did not notice worsening symptoms after the fall.  He denied any head injury from the fall.  He has been noticing some weakness in  strength of his right hand, especially when picking items up or trying to open a can.          Review of Systems   Constitutional:  Negative for chills and fever.   HENT:  Negative for congestion and sore throat.    Eyes:  Negative for pain and visual disturbance.   Respiratory:  Negative for cough and shortness of breath.    Cardiovascular:  Negative for chest pain and palpitations.   Gastrointestinal:  Negative for abdominal pain, constipation and diarrhea.   Genitourinary:  Negative for dysuria and hematuria.   Musculoskeletal:  Positive for arthralgias (Neck pain), myalgias (Neck), neck pain and neck stiffness.   Skin:  Negative for color change and rash.   Neurological:  Positive for weakness (Right hand) and numbness (Right arm, right first digit). Negative for dizziness and headaches.       Objective   {Disappearing Hyperlinks   Review Vitals * Enter New Vitals * Results Review * Labs * Imaging * Cardiology * Procedures * Lung Cancer Screening :40553}  /64   Pulse 80   Temp (!) 97.3 °F (36.3 °C) (Tympanic)   Ht 5' 7\" (1.702 m)   Wt 106 kg (234 lb 9.6 oz)   SpO2 97%   BMI 36.74 kg/m²     Physical Exam  Vitals and nursing note reviewed.   Constitutional:       General: He is not in acute distress.     Appearance: He is well-developed.   HENT:      Head: Normocephalic and atraumatic.      Right Ear: Tympanic membrane normal.      Left Ear: Tympanic membrane normal.      Nose: Nose normal.      Mouth/Throat:      Mouth: Mucous membranes are moist.      Pharynx: Oropharynx is clear. No oropharyngeal exudate.   Eyes:      Extraocular Movements: Extraocular movements intact.      Conjunctiva/sclera: Conjunctivae normal.   Neck:      Comments: Mild tenderness to palpation of the right cervical paraspinal muscles.  Full range of motion without pain to " lateral rotation of the neck.  I did not have the patient extend his neck, since that causes him significant discomfort and pain.  Cardiovascular:      Rate and Rhythm: Normal rate and regular rhythm.      Heart sounds: No murmur heard.  Pulmonary:      Effort: Pulmonary effort is normal. No respiratory distress.      Breath sounds: Normal breath sounds. No wheezing, rhonchi or rales.   Abdominal:      Palpations: Abdomen is soft.      Tenderness: There is no abdominal tenderness.   Musculoskeletal:         General: No swelling.      Right shoulder: Normal. No swelling or deformity. Normal range of motion.      Left shoulder: Normal. No swelling or deformity. Normal range of motion.      Right upper arm: Normal.      Left upper arm: Normal.      Right elbow: Normal.      Left elbow: Normal.      Right forearm: Normal.      Left forearm: Normal.      Right wrist: Normal.      Left wrist: Normal.      Right hand: No swelling, deformity or tenderness. Normal range of motion. Decreased strength of finger abduction. Normal strength of wrist extension. Decreased sensation (Right first digit). Normal pulse.      Left hand: No swelling, deformity or tenderness. Normal range of motion. Normal strength. Normal strength of finger abduction and wrist extension. Normal sensation. Normal pulse.      Cervical back: Neck supple. No edema or erythema. Pain with movement and muscular tenderness present. No spinous process tenderness. Normal range of motion.      Comments: Full range of motion of the bilateral shoulders, elbows, hands.  Full strength 5/5 to flexion extension of the shoulders bilaterally, flexion extension of the elbows bilaterally, flexion extension of the wrists bilaterally.   Decreased  strength of the right hand compared to the left.  Decreased strength of right hand finger abduction.  Numbness to palpation of the right first digit/thumb.   Skin:     General: Skin is warm and dry.      Capillary Refill:  Capillary refill takes less than 2 seconds.   Neurological:      General: No focal deficit present.      Mental Status: He is alert.   Psychiatric:         Mood and Affect: Mood normal.         Behavior: Behavior normal.       Administrative Statements {Disappearing Hyperlinks I  Level of Service * [facility]/PCSP:[id number]}

## 2024-06-12 NOTE — TELEPHONE ENCOUNTER
"Pt has c/o neck pain for 1 month that radiates down right arm and causing hand weakness. Pt feels he may have a pinched nerve. Pt scheduled for visit in office.     Reason for Disposition   Pain shoots (radiates) into arm or hand    Answer Assessment - Initial Assessment Questions  1. ONSET: \"When did the pain begin?\"       1 month ago  2. LOCATION: \"Where does it hurt?\"       Neck   3. PATTERN \"Does the pain come and go, or has it been constant since it started?\"       Comes and goes  4. SEVERITY: \"How bad is the pain?\"  (Scale 1-10; or mild, moderate, severe)    - NO PAIN (0): no pain or only slight stiffness     - MILD (1-3): doesn't interfere with normal activities     - MODERATE (4-7): interferes with normal activities or awakens from sleep     - SEVERE (8-10):  excruciating pain, unable to do any normal activities       10/10  5. RADIATION: \"Does the pain go anywhere else, shoot into your arms?\"      Radiates down right arm causing right hand weakness  6. CORD SYMPTOMS: \"Any weakness or numbness of the arms or legs?\"      Weakness in right hand  7. CAUSE: \"What do you think is causing the neck pain?\"      unsure  8. NECK OVERUSE: \"Any recent activities that involved turning or twisting the neck?\"      Denies  9. OTHER SYMPTOMS: \"Do you have any other symptoms?\" (e.g., headache, fever, chest pain, difficulty breathing, neck swelling)      Denies    Protocols used: Neck Pain or Stiffness-ADULT-[place]    "

## 2024-06-13 ENCOUNTER — TELEPHONE (OUTPATIENT)
Age: 74
End: 2024-06-13

## 2024-06-13 NOTE — TELEPHONE ENCOUNTER
----- Message from Sary Lockwood PA-C sent at 6/13/2024  9:26 AM EDT -----  X-ray shows some degenerative changes of the cervical spine.  No other significant abnormalities.  Recommend follow-up with the comprehensive spine program as discussed.

## 2024-06-14 ENCOUNTER — TELEPHONE (OUTPATIENT)
Age: 74
End: 2024-06-14

## 2024-06-18 ENCOUNTER — EVALUATION (OUTPATIENT)
Dept: PHYSICAL THERAPY | Facility: CLINIC | Age: 74
End: 2024-06-18
Payer: MEDICARE

## 2024-06-18 VITALS — HEART RATE: 75 BPM | DIASTOLIC BLOOD PRESSURE: 78 MMHG | SYSTOLIC BLOOD PRESSURE: 140 MMHG

## 2024-06-18 DIAGNOSIS — M54.2 NECK PAIN: Primary | ICD-10-CM

## 2024-06-18 PROCEDURE — 97161 PT EVAL LOW COMPLEX 20 MIN: CPT | Performed by: PHYSICAL THERAPIST

## 2024-06-18 PROCEDURE — 97140 MANUAL THERAPY 1/> REGIONS: CPT | Performed by: PHYSICAL THERAPIST

## 2024-06-18 NOTE — PROGRESS NOTES
PT Evaluation     Today's date: 2024  Patient name: Tc Stovall  : 1950  MRN: [id number]  Referring provider: Lester Roth DO  Dx:   Encounter Diagnosis     ICD-10-CM    1. Neck pain  M54.2           Start Time: 1505  Stop Time: 1540  Total time in clinic (min): 35 minutes    Assessment  Impairments: abnormal or restricted ROM, activity intolerance, impaired physical strength, lacks appropriate home exercise program, pain with function, poor posture  and poor body mechanics    Assessment details: Patient is a 74 y.o. male who presents to physical therapy with c/o neck pain with right sided radiculopathy. Patient presents to evaluation with pain, decreased range of motion, decreased strength, and decreased tolerance to activity. Patient demonstrates good tolerance to treatment focusing on manual interventions to include cervical traction with ability to fully abolish radicular sx, deferred formal exercises this date due to time constraints but will initiate next visit as symptoms allow. I discussed risks, benefits, and alternatives to treatment, and answered all patient questions to patient satisfaction. Patient presents with baseline FOTO score of 35 indicating limited tolerance/ability to complete ADLs. Patient is an appropriate candidate for skilled PT and would benefit from skilled PT services to address the aforementioned impairments, achieve goals, maximize function, and improve quality of life. Pt is in agreement with this plan.    Patient Education: activity modifications as needed, pacing of activities, importance of HEP compliance, PT prognosis/POC      Goals  ST weeks  Pt will restore full and pain-free cervical spine AROM to allow return to normal ADLs  Pt will demonstrate centralization of symptoms with repeated movements and/or traction of cervical spine  Pt will decrease neck pain to 3-4/10  Pt will demonstrate good understanding and compliance with HEP   Pt will demonstrate  improved postural awareness and ability to self-correct without reliance on external cues    LT weeks  Pt will demonstrate abolished radicular symptoms for 2 weeks  Pt will decrease neck pain to 0-1/10  Pt will exhibit proper lifting mechanics without reliance on external cues for correction of form    Pt will be able to tolerate prolonged standing/sitting activities > 30 minutes without provocation of neck pain   Pt will improve FOTO score to > or = to 57 to indicate improved functional abilities       Plan  Patient would benefit from: PT eval and skilled physical therapy  Planned modality interventions: cryotherapy and thermotherapy: hydrocollator packs    Planned therapy interventions: ADL training, activity modification, joint mobilization, manual therapy, body mechanics training, neuromuscular re-education, patient/caregiver education, postural training, self care, strengthening, stretching, therapeutic activities, therapeutic exercise, home exercise program, graded exercise, graded activity, functional ROM exercises and flexibility    Frequency: 2x week  Duration in weeks: 8  Plan of Care beginning date: 2024  Plan of Care expiration date: 2024  Treatment plan discussed with: patient        Subjective Evaluation    History of Present Illness  Mechanism of injury: Pt reports to PT with c/o neck pain that started about a month ago without antecedent trauma but does report having a fall but denies striking his head during the fall and was able to catch himself with his arms and scraped his left knee, denies noticing any increase in neck pain during or shortly after the fall but pain set in about 3 weeks ago. Pt had recent x-rays of cervical spine that revealed some degenerative changes. Pt does have N/T into his right arm that is localized to his hand. Pt reports increased pain with looking upward, rotation, and sleeping at night. Pt is currently using NSAIDs and tylenol for pain relief. Pt does  feel he is dropping items due to decreased strength on right. Pt is right hand dominant.     Pt denies hx of cancer, unrelenting night pain, saddle anesthesia, unexplained weight loss, changes in B&B function, changes in balance/gait, recent fever, hx of IV drug use, prolonged corticosteroid use, hx of osteoporosis, recent trauma.    Pt denies nausea, dizziness, diplopia, drop attacks, difficulty with speech or swallowing.    IMAGING:  X-rays of cervical spine reveal mild degenerative changes are noted at the C5-6 level which have slightly progressed compared to prior exam (per radiologist report)    Aggravating factors: looking upward, rotation, and sleeping at night    Easing Factors:  NSAIDs and tylenol    PLOF:  Episodic neck pain in the past    PMH: Diabetic, HTN (controlled w/ meds)  Patient Goals  Patient goals for therapy: increased strength, independence with ADLs/IADLs, return to sport/leisure activities, increased motion and decreased pain    Pain  Current pain ratin  At best pain ratin  At worst pain rating: 10  Quality: dull ache, sharp and throbbing          Objective     General Comments:      Cervical/Thoracic Comments  BP: 140/78 mmHg  Pulse: 75     Posture: FHP, RS, increased thoracic kyphosis      Cervical AROM:   Flex: 40*  Ext: 15* (pain w/ peripheralization of sx into right forearm)  Left ROT: 70*  Right ROT: 65* (pain w/ peripheralization of sx to medial border of right scapula)  Left Side-Bendin*  Right Side-Bendin*    UE Dermatomes:  C2: Intact/symmetrical  C3: Intact/symmetrical  C4: Intact/symmetrical  C5: Intact/symmetrical  C6: Intact bilaterally but diminished on right  C7: Intact/symmetrical  C8: Intact/symmetrical  T1: Intact/symmetrical  T2: Intact/symmetrical    UE Myotomes:  Cervical Lateral Flexion C3: Left 5/5, Right 5/5  Scapular Elevation C4: Left 5/5, Right 5/5  Shoulder Abduction C5: Left 5/5, Right 5/5  Elbow Flexion C6: Left 5/5, Right 5/5  Elbow Extension  C7: Left 5/5, Right 5/5  Thumb Extension C8: Left 5/5, Right 4+/5  Finger Abduction T1: Left 5/5, Right 4+/5     Strength: Left 70#; Right 15#  *Right hand dominant     DTRs:  Biceps Brachii (C5): Left 1+, Right 1+  Brachioradialis (C6): Left 1+, Right 1+  Triceps (C7): Left 1+, Right 1+    Cervical/Thoracic PAIVMs: deferred     Spurling compression: (+)  Cervical distraction: (+)  ULTT-A (median n.): (+)  Quadrant: (+)  Phalen's (-)  Reverse Phalen's (-)    Palpation: mild right UT tenderness    FOTO: 35        Flowsheet Rows      Flowsheet Row Most Recent Value   PT/OT G-Codes    Current Score 35   Projected Score 57               Diagnosis: Neck pain w/ right sided radiculopathy   Precautions: Diabetic, HTN (controlled w/ meds)   POC Expires: 8/13/24   Re-evaluation Date: 7/16/24   FOTO Scores/Date: Goal - 57; 6/18 - 35   Visit Count 1/10       Manuals 6/18       Cervical traction KD - in flexion and flexion/left SB       Median nerve glide NV               Ther Ex 6/18                                                                               Neuro Re-Ed                                                               Ther Act                                                                                 Modalities

## 2024-06-20 ENCOUNTER — TELEPHONE (OUTPATIENT)
Age: 74
End: 2024-06-20

## 2024-06-20 DIAGNOSIS — R80.9 TYPE 2 DIABETES MELLITUS WITH DIABETIC MICROALBUMINURIA, WITH LONG-TERM CURRENT USE OF INSULIN (HCC): Primary | ICD-10-CM

## 2024-06-20 DIAGNOSIS — E11.29 TYPE 2 DIABETES MELLITUS WITH DIABETIC MICROALBUMINURIA, WITH LONG-TERM CURRENT USE OF INSULIN (HCC): Primary | ICD-10-CM

## 2024-06-20 DIAGNOSIS — Z79.4 TYPE 2 DIABETES MELLITUS WITH DIABETIC MICROALBUMINURIA, WITH LONG-TERM CURRENT USE OF INSULIN (HCC): Primary | ICD-10-CM

## 2024-06-20 RX ORDER — ACYCLOVIR 400 MG/1
1 TABLET ORAL
Qty: 9 EACH | Refills: 3 | Status: SHIPPED | OUTPATIENT
Start: 2024-06-20

## 2024-06-20 NOTE — TELEPHONE ENCOUNTER
Need to speak to Tc- where did sensors come from last time ? Luan is stating they do not give his sensors because of medicare. I asked he call and speak to Pamela

## 2024-06-20 NOTE — TELEPHONE ENCOUNTER
Patient does not know where his supply comes from. So I told him I will order through [facility]

## 2024-06-20 NOTE — TELEPHONE ENCOUNTER
[facility] called stating they are not contracted with Medicare to dispense Dexcoms.     Please advise patient

## 2024-06-21 LAB
DME PARACHUTE DELIVERY DATE REQUESTED: NORMAL
DME PARACHUTE ITEM DESCRIPTION: NORMAL
DME PARACHUTE ITEM DESCRIPTION: NORMAL
DME PARACHUTE ORDER STATUS: NORMAL
DME PARACHUTE SUPPLIER NAME: NORMAL
DME PARACHUTE SUPPLIER PHONE: NORMAL

## 2024-06-24 ENCOUNTER — OFFICE VISIT (OUTPATIENT)
Dept: PHYSICAL THERAPY | Facility: CLINIC | Age: 74
End: 2024-06-24
Payer: MEDICARE

## 2024-06-24 DIAGNOSIS — M54.2 NECK PAIN: Primary | ICD-10-CM

## 2024-06-24 PROCEDURE — 97140 MANUAL THERAPY 1/> REGIONS: CPT

## 2024-06-24 PROCEDURE — 97110 THERAPEUTIC EXERCISES: CPT

## 2024-06-24 NOTE — PROGRESS NOTES
"Daily Note     Today's date: 2024  Patient name: Tc Stovall  : 1950  MRN: [id number]  Referring provider: Lester Roth DO  Dx:   Encounter Diagnosis     ICD-10-CM    1. Neck pain  M54.2           Start Time: 1545  Stop Time: 1615  Total time in clinic (min): 30 minutes    Subjective:  patient reports /10 coming therapy reports R sided radicular symptoms.  Reports difficulty with looking up      Objective: See treatment diary below      Assessment:  patient demonstrates fair to poor tolerance due to pain.  Requires frequent rest periods and positional changes due to pain.  Performed manual traction in degree of cervical flexion due to inability to lay flat because of neck pain.  Introduced cervical/thoracic ROM exercises with cueing on working with in tolerance and requiring rest periods between each set.   Patient reports relief with R upper trap stretch and manual traction reports decreased pain with performance and being able to hold head up higher.        Plan: Continue per plan of care.  Progress treatment as tolerated.       Diagnosis: Neck pain w/ right sided radiculopathy   Precautions: Diabetic, HTN (controlled w/ meds)   POC Expires: 24   Re-evaluation Date: 24   FOTO Scores/Date: Goal - 57;  -    Visit Count 1/10 2/10      Manuals       Cervical traction KD - in flexion and flexion/left SB EARLE in flex and L SB      Median nerve glide NV EARLE              Ther Ex       Cervical Flex/Ext  W/towel x10 ea      Cervical Rot  W/towel x 10 ea      Thoracic Ext  Seated w/ball x10       Scap Retraction   5\" x20       Upper Trap Stretch   R 30\"x3                                       Neuro Re-Ed                                                               Ther Act                                                                                 Modalities                                            "

## 2024-06-26 ENCOUNTER — OFFICE VISIT (OUTPATIENT)
Dept: PHYSICAL THERAPY | Facility: CLINIC | Age: 74
End: 2024-06-26
Payer: MEDICARE

## 2024-06-26 DIAGNOSIS — R20.0 NUMBNESS AND TINGLING OF RIGHT ARM: ICD-10-CM

## 2024-06-26 DIAGNOSIS — R20.2 NUMBNESS AND TINGLING OF RIGHT ARM: ICD-10-CM

## 2024-06-26 DIAGNOSIS — M54.2 NECK PAIN: Primary | ICD-10-CM

## 2024-06-26 PROCEDURE — 97140 MANUAL THERAPY 1/> REGIONS: CPT

## 2024-06-26 PROCEDURE — 97110 THERAPEUTIC EXERCISES: CPT

## 2024-06-26 NOTE — PROGRESS NOTES
"Daily Note     Today's date: 2024  Patient name: Tc Stovall  : 1950  MRN: [id number]  Referring provider: Lester Roth DO  Dx:   Encounter Diagnosis     ICD-10-CM    1. Neck pain  M54.2       2. Numbness and tingling of right arm  R20.0     R20.2                      Subjective: pt reports pain and sx in arm/hand are about the same. Stated he gets the greatest relief with L SB.       Objective: See treatment diary below      Assessment: Tolerated treatment well. Patient would benefit from continued PT. Pt responds well to traction in flexion and L SB w/ improved mobility and slight decrease in sx in arm. Tried medial nerve glides again w/ little to no relief. Pt did note he had increased mobility in his fingers post nerve glides. Pt experienced increased pain and irritability w/ c/s extension stretches w/ towel, noted pain was down arm and down R side of his spine. Pt has increased irritability w/ holding head up straight.       Plan: Continue per plan of care.      Diagnosis: Neck pain w/ right sided radiculopathy   Precautions: Diabetic, HTN (controlled w/ meds)   POC Expires: 24   Re-evaluation Date: 24   FOTO Scores/Date: Goal - 57;  -    Visit Count 1/10 2/10 3/10     Manuals      Cervical traction KD - in flexion and flexion/left SB EARLE in flex and L SB JH in flex and L SB     Median nerve glide NV EARLE JH             Ther Ex      Cervical Flex/Ext  W/towel x10 ea W/ towel x4 p!     Cervical Rot  W/towel x 10 ea W/towel x 10 ea     Thoracic Ext  Seated w/ball x10  Seated w/ ball      Scap Retraction   5\" x20  5\" x20     Upper Trap Stretch   R 30\"x3  R 30\" x3                                     Neuro Re-Ed                                                               Ther Act                                                                                 Modalities                                              "

## 2024-07-02 ENCOUNTER — OFFICE VISIT (OUTPATIENT)
Dept: PHYSICAL THERAPY | Facility: CLINIC | Age: 74
End: 2024-07-02
Payer: MEDICARE

## 2024-07-02 ENCOUNTER — OFFICE VISIT (OUTPATIENT)
Dept: OBGYN CLINIC | Facility: CLINIC | Age: 74
End: 2024-07-02
Payer: MEDICARE

## 2024-07-02 VITALS
DIASTOLIC BLOOD PRESSURE: 82 MMHG | HEIGHT: 67 IN | WEIGHT: 229.4 LBS | BODY MASS INDEX: 36 KG/M2 | SYSTOLIC BLOOD PRESSURE: 153 MMHG | HEART RATE: 65 BPM

## 2024-07-02 DIAGNOSIS — M54.2 NECK PAIN: Primary | ICD-10-CM

## 2024-07-02 DIAGNOSIS — M54.12 RADICULOPATHY, CERVICAL REGION: Primary | ICD-10-CM

## 2024-07-02 PROCEDURE — 97140 MANUAL THERAPY 1/> REGIONS: CPT

## 2024-07-02 PROCEDURE — 99203 OFFICE O/P NEW LOW 30 MIN: CPT | Performed by: FAMILY MEDICINE

## 2024-07-02 NOTE — PROGRESS NOTES
"Daily Note     Today's date: 2024  Patient name: Tc Stovall  : 1950  MRN: [id number]  Referring provider: Lester Roth DO  Dx:   Encounter Diagnosis     ICD-10-CM    1. Neck pain  M54.2           Start Time: 1415  Stop Time: 1435  Total time in clinic (min): 20 minutes    Subjective: patient reports increased R sided radicular symptoms coming into therapy.  Reports starting last night.  Denies any incidents or increased activity.   Reports seeing Dr. Beebe earlier in day who would like MRI of cervical spine with patient stating he would like to hold therapy till after the results.       Objective: See treatment diary below      Assessment:  patient continues to respond well to manual traction reporting temporary decrease to R sided radicular symptoms.  Reports symptoms returning after transitioning off of table to seated position.  Patient declined cervical ROM due to pain.   Plan: hold therapy till MRI results and next follow with M.D.     Diagnosis: Neck pain w/ right sided radiculopathy   Precautions: Diabetic, HTN (controlled w/ meds)   POC Expires: 24   Re-evaluation Date: 24   FOTO Scores/Date: Goal - 57;  -    Visit Count 1/10 2/10 3/10 4/10    Manuals  7/2    Cervical traction KD - in flexion and flexion/left SB EARLE in flex and L SB JH in flex and L SB EARLE in flex and L SB    Median nerve glide NV EARLE JH             Ther Ex  7/2    Cervical Flex/Ext  W/towel x10 ea W/ towel x4 p! Declined     Cervical Rot  W/towel x 10 ea W/towel x 10 ea Declined    Thoracic Ext  Seated w/ball x10  Seated w/ ball  Seated w/ball 3\"x10     Scap Retraction   5\" x20  5\" x20 5\"x10    Upper Trap Stretch   R 30\"x3  R 30\" x3 R 30\"x3                                     Neuro Re-Ed                                                               Ther Act                                                                                 Modalities                                 "

## 2024-07-02 NOTE — PROGRESS NOTES
Assessment/Plan:  Assessment & Plan   Diagnoses and all orders for this visit:    Radiculopathy, cervical region  -     MRI cervical spine wo contrast; Future      74-year-old right-hand-dominant male with neck and right upper extremity pain and numbness more than more than 2 months duration.  Discussed with patient physical exam, imaging studies, impression, and plan.  X-rays cervical spine noted for multilevel degenerative changes.  Physical exam cervical spine noted for bilateral paraspinal tenderness.  He has limited range of motion with extension and sidebending to both sides.  There is positive axial load and negative Spurling's.  Right shoulder has range of motion forward flexion to 160 degrees, abduction 160 degrees, and internal rotation to the lumbar spine.  He has normal strength in rotator cuff.  Empty can test is unremarkable.  He has decreased sensation to light touch right upper extremity dermatome C6.  Tinel's testing and carpal tunnel compression in right upper extremity are unremarkable.  He has normal radial pulse both upper extremities.  Clinical impression is that he has symptoms from cervical spine pathology contributing to cervical radiculopathy.  Symptoms persist despite conservative management of formal therapy and home exercises since 12/28/2023 and taking Tylenol and ibuprofen.  At this time I will refer him for MRI of the cervical spine to evaluate for disc pathology, stenosis, and nerve impingement as invasive management may be warranted.  He will follow-up after having MRI done.          Subjective:   Patient ID: Tc Stovall is a 74 y.o. male.  Chief Complaint   Patient presents with    Neck - Pain    Right Arm - Pain, Numbness        74-year-old right-hand-dominant male presents for evaluation of neck and right upper extremity pain and numbness more than 2 months duration.  He denies particular trauma or inciting event.  Pain described as gradual in onset, localized to the right  "side of neck and radiating to the right shoulder blade and distally to the forearm and hand, achy and burning, associated with numbness and tingling in the forearm and hand, worse with activity, and improved with resting.  He presented to primary care provider.  He was referred for x-rays which were noted for degenerative changes and he was prescribed naproxen 500 mg.  He been attending formal therapy and doing home exercises since 6/18/2024.  He reports slight improvement in symptoms, but symptoms are still bothersome.    Arm Pain  This is a new problem. The current episode started more than 1 month ago. The problem occurs constantly. The problem has been unchanged. Associated symptoms include arthralgias, neck pain and numbness. Pertinent negatives include no weakness. He has tried rest, position changes, NSAIDs and acetaminophen (Physical therapy, home exercise) for the symptoms. The treatment provided mild relief.           The following portions of the patient's history were reviewed and updated as appropriate: He  has a past medical history of Arthritis, Diabetes mellitus (HCC), Hyperlipidemia, Hypertension, Left rotator cuff tear, Tobacco abuse, and Wears glasses.  He has No Known Allergies..    Review of Systems   Musculoskeletal:  Positive for arthralgias and neck pain.   Neurological:  Positive for numbness. Negative for weakness.       Objective:  Vitals:    07/02/24 1118   BP: 153/82   Pulse: 65   Weight: 104 kg (229 lb 6.4 oz)   Height: 5' 7\" (1.702 m)      Back Exam     Comments:    Cervical spine  - Bilateral paraspinal tenderness  - Limited range of motion with extension and sidebending to both sides  - Positive axial load  - Negative Spurling's  - Decreased sensation to light touch right lower extremity dermatome C6      Right Hand Exam     Muscle Strength   Wrist extension: 5/5   Wrist flexion: 5/5     Other   Sensation: decreased  Pulse: present    Comments:  4+/5       Left Hand Exam "     Muscle Strength   Wrist extension: 5/5   Wrist flexion: 5/5   :  5/5     Other   Sensation: normal  Pulse: present      Right Elbow Exam     Other   Sensation: normal    Comments:  5/5 flexion and extension      Left Elbow Exam     Other   Sensation: normal    Comments:  5/5 flexion and extension      Right Shoulder Exam     Range of Motion   Active abduction:  150   Forward flexion:  160   Internal rotation 0 degrees:  Lumbar     Muscle Strength   Abduction: 5/5   Internal rotation: 5/5   External rotation: 5/5   Supraspinatus: 5/5     Other   Sensation: normal    Comments:  Negative empty can      Left Shoulder Exam     Other   Sensation: normal           Strength/Myotome Testing     Left Wrist/Hand   Wrist extension: 5  Wrist flexion: 5    Right Wrist/Hand   Wrist extension: 5  Wrist flexion: 5      Physical Exam  Vitals and nursing note reviewed.   Constitutional:       Appearance: Normal appearance. He is well-developed. He is not ill-appearing or diaphoretic.   HENT:      Head: Normocephalic and atraumatic.      Right Ear: External ear normal.      Left Ear: External ear normal.   Eyes:      Conjunctiva/sclera: Conjunctivae normal.   Neck:      Trachea: No tracheal deviation.   Cardiovascular:      Rate and Rhythm: Normal rate.   Pulmonary:      Effort: Pulmonary effort is normal. No respiratory distress.   Abdominal:      General: There is no distension.   Musculoskeletal:         General: Tenderness present.   Skin:     General: Skin is warm and dry.      Coloration: Skin is not jaundiced or pale.   Neurological:      Mental Status: He is alert and oriented to person, place, and time.   Psychiatric:         Mood and Affect: Mood normal.         Behavior: Behavior normal.         Thought Content: Thought content normal.         Judgment: Judgment normal.         I have personally reviewed pertinent films in PACS and my interpretation is  .  Cervical spine multilevel degenerative changes

## 2024-07-05 ENCOUNTER — APPOINTMENT (OUTPATIENT)
Dept: PHYSICAL THERAPY | Facility: CLINIC | Age: 74
End: 2024-07-05
Payer: MEDICARE

## 2024-07-08 ENCOUNTER — APPOINTMENT (OUTPATIENT)
Dept: PHYSICAL THERAPY | Facility: CLINIC | Age: 74
End: 2024-07-08
Payer: MEDICARE

## 2024-07-10 ENCOUNTER — RA CDI HCC (OUTPATIENT)
Dept: OTHER | Facility: HOSPITAL | Age: 74
End: 2024-07-10

## 2024-07-10 ENCOUNTER — APPOINTMENT (OUTPATIENT)
Dept: PHYSICAL THERAPY | Facility: CLINIC | Age: 74
End: 2024-07-10
Payer: MEDICARE

## 2024-07-10 NOTE — PROGRESS NOTES
HCC coding opportunities          Chart Reviewed number of suggestions sent to Provider: 3     Patients Insurance   E11.51, E11.59 and [id number]  Medicare Insurance: Medicare

## 2024-07-11 ENCOUNTER — TELEPHONE (OUTPATIENT)
Dept: ADMINISTRATIVE | Facility: OTHER | Age: 74
End: 2024-07-11

## 2024-07-11 NOTE — TELEPHONE ENCOUNTER
07/11/24 10:57 AM    Patient contacted to bring Advance Directive, POLST, or Living Will document to next scheduled pcp visit.[facility] Department spoke with patient/ caregiver.    Thank you.  Madai Russell MA  [facility]

## 2024-07-13 DIAGNOSIS — Z79.4 TYPE 2 DIABETES MELLITUS WITH HYPERGLYCEMIA, WITH LONG-TERM CURRENT USE OF INSULIN (HCC): ICD-10-CM

## 2024-07-13 DIAGNOSIS — E11.65 TYPE 2 DIABETES MELLITUS WITH HYPERGLYCEMIA, WITH LONG-TERM CURRENT USE OF INSULIN (HCC): ICD-10-CM

## 2024-07-13 RX ORDER — INSULIN GLARGINE 100 [IU]/ML
20 INJECTION, SOLUTION SUBCUTANEOUS
Qty: 18 ML | Refills: 2 | Status: SHIPPED | OUTPATIENT
Start: 2024-07-13 | End: 2025-01-09

## 2024-07-16 ENCOUNTER — APPOINTMENT (OUTPATIENT)
Dept: PHYSICAL THERAPY | Facility: CLINIC | Age: 74
End: 2024-07-16
Payer: MEDICARE

## 2024-07-16 ENCOUNTER — OFFICE VISIT (OUTPATIENT)
Age: 74
End: 2024-07-16
Payer: MEDICARE

## 2024-07-16 VITALS
DIASTOLIC BLOOD PRESSURE: 72 MMHG | RESPIRATION RATE: 16 BRPM | HEART RATE: 70 BPM | BODY MASS INDEX: 35.63 KG/M2 | TEMPERATURE: 96.6 F | OXYGEN SATURATION: 97 % | WEIGHT: 227 LBS | HEIGHT: 67 IN | SYSTOLIC BLOOD PRESSURE: 130 MMHG

## 2024-07-16 DIAGNOSIS — I10 PRIMARY HYPERTENSION: Chronic | ICD-10-CM

## 2024-07-16 DIAGNOSIS — E11.29 TYPE 2 DIABETES MELLITUS WITH DIABETIC MICROALBUMINURIA, WITH LONG-TERM CURRENT USE OF INSULIN (HCC): Primary | ICD-10-CM

## 2024-07-16 DIAGNOSIS — Z79.4 TYPE 2 DIABETES MELLITUS WITH DIABETIC MICROALBUMINURIA, WITH LONG-TERM CURRENT USE OF INSULIN (HCC): Primary | ICD-10-CM

## 2024-07-16 DIAGNOSIS — R80.9 TYPE 2 DIABETES MELLITUS WITH DIABETIC MICROALBUMINURIA, WITH LONG-TERM CURRENT USE OF INSULIN (HCC): Primary | ICD-10-CM

## 2024-07-16 DIAGNOSIS — H00.014 HORDEOLUM EXTERNUM LEFT UPPER EYELID: ICD-10-CM

## 2024-07-16 DIAGNOSIS — E66.01 OBESITY, MORBID (HCC): ICD-10-CM

## 2024-07-16 DIAGNOSIS — M54.12 CERVICAL RADICULOPATHY: ICD-10-CM

## 2024-07-16 LAB — SL AMB POCT HEMOGLOBIN AIC: 7.4 (ref ?–6.5)

## 2024-07-16 PROCEDURE — 99214 OFFICE O/P EST MOD 30 MIN: CPT

## 2024-07-16 PROCEDURE — 83036 HEMOGLOBIN GLYCOSYLATED A1C: CPT

## 2024-07-16 RX ORDER — ASPIRIN 81 MG/1
81 TABLET, CHEWABLE ORAL DAILY
Start: 2024-07-16

## 2024-07-16 NOTE — ASSESSMENT & PLAN NOTE
Patient has lost 21 pounds in the past 3 months.  Congratulated on his progress.  He attributes it to seeing his blood sugars in real-time using the CGM.  He has better control of his diabetes.

## 2024-07-16 NOTE — ASSESSMENT & PLAN NOTE
Lab Results   Component Value Date    HGBA1C 7.4 (A) 07/16/2024   A1c much improved, continue using the CGM.  Will get labs prior to his AWV due in October to reassess his proteinuria.  Overall patient is doing well and has better control of his diabetes.  He follows with podiatry and has an eye exam coming up in October.  Continue on lisinopril for his proteinuria and metformin/glipizide/Lantus.

## 2024-07-16 NOTE — ASSESSMENT & PLAN NOTE
Following with Ortho, has an MRI scheduled for tomorrow.  Continue with Tylenol, avoid NSAIDs if possible, gentle stretching, heat as needed.

## 2024-07-16 NOTE — PATIENT INSTRUCTIONS
"Patient Education     Carb counting for adults with diabetes   The Basics   Written by the doctors and editors at [facility]   What is carb counting? -- This is a type of meal planning that many people with diabetes use. It is a way to figure out how many carbohydrates, or \"carbs,\" you eat.  The body breaks down the food we eat into 3 main types of nutrients: carbs, proteins, and fats. Carbs are sugars and starches that come from food. The body uses carbs for energy.  Why do I need to count carbs? -- People with diabetes need to pay attention to how many carbs they eat. This is because carbs raise your blood sugar level.  Carb counting helps you:   Choose the right amount of insulin to take before meals and snacks - If you take insulin before meals, the dose depends on several things, including how many carbs you plan to eat. (It also depends on how much you plan to exercise and your blood sugar level.)   Plan your meals and snacks for the day - You can use carb counting to figure out how many carbs to eat at each meal and snack. This helps you make sure that you eat the right amount over the entire day.   Keep your blood sugar levels well managed - Spreading out the carbs you eat over a whole day can help keep your blood sugar from getting too high. If you take insulin or another diabetes medicine that can cause low blood sugar, eating about the same amount of carbs at each meal every day also helps keep your blood sugar from getting too low. Reducing the amount of carbs you eat can help you manage your diabetes better and prevent medical problems that diabetes can cause.  Your doctor, nurse, or dietitian (food expert) can help you figure out how many carbs to try to eat each day. This will depend on your eating habits, weight, activity level, and which diabetes medicines you take.  People who take insulin before meals might need to be very careful when they count the carbs in every meal and snack. This is so they " "can give themselves the right amount of insulin. If the insulin dose doesn't match the amount of carbs, their blood sugar might get too low or too high. Other people might be able to be a little more flexible as long as they get about the same amount of carbs at each meal or throughout the day.  Which foods have carbs? -- Foods with a lot of carbs include:   Grains - These include bread, pasta, rice, and cereal.   Fruits and starchy vegetables - Starchy vegetables include potatoes, corn, and squash.   Milk and other dairy products - Dairy products include cheese and yogurt.   Foods with added sugar - These include sweets and baked goods likes cookies and cakes, as well as sugary drinks like juice and soda.  It is best to get most of your carbs from fruits, vegetables, whole grains (like whole-wheat bread, whole-grain cereals, and brown rice), and low-fat milk and dairy products.  How do I count carbs? -- To count carbs in packaged foods, check the food's nutrition label (if it has one).  On the label (figure 1), check for:   \"Total Carbohydrate\" number - This tells you how many carbs are in 1 serving size of the food. If you eat 1 serving, then the number of carbs you eat is the same as the number of total carbohydrates.   \"Serving size\" - This tells you how much food is in 1 serving. If you have 2 servings, the number of carbs will be 2 times the number of carbohydrates listed.   \"Dietary Fiber\" - Fiber is a carb that is not digested, which means that it does not raise blood sugar. Foods with a lot of fiber can help manage your blood sugar. If a food has more than 5 grams (g) of fiber, you need less insulin to cover the total carbs in that food. So, if you are calculating an insulin dose, only count the carbs that are not from fiber (figure 1).  What is exchange planning? -- Exchange planning, or the \"exchange system,\" is a way for people to plan their meals without reading labels. This can be helpful since many " "foods don't come with a nutrition label.  The exchange system involves knowing how much of different foods have about 15 grams of carbs (table 1 and table 2 and table 3). Your doctor, nurse, or dietitian gives you a certain number of \"carb choices\" to eat with each meal and snack (table 4). Each \"choice\" is a portion of food that has about 15 grams of carbs. Knowing your options makes it easier to \"exchange\" 1 carb choice for another as you plan your meals and snacks. For example, 1 small apple could be exchanged for 1/3 cup of pasta.  How can I plan my meals? -- First, make sure that you know how many carbs you should be eating each day. Ask your doctor, nurse, or dietitian if you are not sure.  Here are some tips that might help:   Spread out your carbs over 4 to 6 small meals each day instead of 3 big ones.   Eat a similar number of carbs at each meal, for example, at each dinner.   Eat your meals at a similar time each day.   Plan your meals ahead of time.   Use the \"plate method.\" This is a simpler way to make sure that you get a good balance of carbs and other nutrients with each meal. It is not as exact as counting all of your carbs, but it can be helpful for people who prefer a simpler approach. If you take insulin before meals, it is generally better to adjust your insulin dose by counting how many carbs you plan to eat or using the exchange planning strategy.  For the plate method, you start with a plate about 9 inches (23 cm) across. Fill it with (figure 2):   1/2 non-starchy vegetables   1/4 protein   1/4 carbs   Follow your doctor's instructions for how and when to check your blood sugar. This can help you learn how certain foods affect your blood sugar.   Keep track of your meals and blood sugar levels. Show this to your doctor or nurse so they can adjust your treatment if needed. If you take insulin, you will also need to keep track of your exercise patterns and how much insulin you give yourself with " "each dose.   If you take insulin, make sure that you understand how to use it. This includes knowing how to adjust the dose based on your blood sugar level and what you plan to eat. Foods that have a lot of protein or fat also can affect your blood sugar level. Some people need to adjust their insulin doses when they eat these foods.   Remember that other things besides carbs can raise or lower your blood sugar level. These things can include exercise, getting sick, drinking alcohol, traveling, and stress. If you take insulin, make sure that you know how and when to adjust your dose in these situations.  If you are having trouble counting carbs or managing your blood sugar, talk to your doctor or nurse. They can help. A dietitian can also help you plan specific menus that will give you the right amount of carbs each day.  For more information, you can also get a book on counting carbs or check the American Diabetes Association website (www.diabetes.org).  All topics are updated as new evidence becomes available and our peer review process is complete.  This topic retrieved from [employer] on: Mar 27, 2024.  Topic 49289 Version 11.0  Release: 32.2.4 - C32.85  © 2024 UpToDate, Inc. and/or its affiliates. All rights reserved.  figure 1: Counting carbohydrates     To figure out the \"carb count\" in 1 serving, start with the number of grams of total carbohydrates (46 grams), then subtract the number of grams of dietary fiber (7 grams). It's also important to look at the serving size. In this example, the carb count is 39 grams. You can use this number when counting carbs for your insulin dose.  Graphic 64662 Version 8.0  table 1: Bread and grains with 15 grams of carbs*  Bread    Food  Serving size    Bagel 1/4 large bagel (1 oz)   Biscuit 1 biscuit (2.5 inches across)   Bread, reduced calorie, light 2 slices (1.5 oz)   Cornbread 1.75 inch cube (1.5 oz)   English muffin 1/2 muffin   Hot dog or hamburger bun 1/2 bun (3/4 oz) " "  Naan, chapati, or roti 1 oz   Pancake 1 pancake (4 inches across, 1/4 inch thick)   Rehana (6 inches across) 1/2 rehana   Tortilla, corn 1 small tortilla (6 inches across)   Tortilla, flour (white or whole wheat) 1 small tortilla (6 inches across) or 1/3 large tortilla (10 inches across)   Waffle 1 waffle (4-inch square or 4 inches across)   Cereals and grains (including pasta and rice)    Food  Serving size (cooked)    Barley, couscous, millet, pasta (white or whole wheat, all shapes and sizes), polenta, quinoa (all colors), or rice (white, brown, and other colors and types) 1/3 cup   Bran cereal (twigs, buds, or flakes), shredded wheat (plain), or sugar-coated cereal 1/2 cup   Bulgur, kasha, tabbouleh (tabouli), or wild rice 1/2 cup   Granola cereal 1/4 cup   Hot cereal (oats, oatmeal, grits) 1/2 cup   Unsweetened, ready-to-eat cereal 3/4 cup   * For bread and grains, 15 grams of carbs is considered 1 serving or \"choice\" for people who need to count carbs.  Graphic 989551 Version 1.0  table 2: Fruits with 15 grams of carbs*  Food  Serving size    Applesauce, unsweetened 1/2 cup   Banana 1 extra small banana, about 4 inches long (4 oz)   Blueberries 3/4 cup   Dried fruits (blueberries, cherries, cranberries, mixed fruit, raisins) 2 tbsp   Fruit, canned 1/2 cup   Fruit, whole, small (apple) 1 small fruit (4 oz)   Fruit, whole, medium (nectarine, orange, pear, tangerine) 1 medium fruit (6 oz)   Fruit juice, unsweetened 1/2 cup   Grapes 17 small grapes (3 oz)   Melon, diced 1 cup   Strawberries, whole 1 and 1/4 cups   When listed, weight (oz) includes skin and seeds. If you are not sure if your fruit is the right size for 1 serving, you can use a food scale to check the weight.  * For fruits, 15 grams of carbs is considered 1 serving or \"choice\" for people who need to count carbs.  Graphic [id number] Version 1.0  table 3: Starchy vegetables with 15 grams of carbs*  Food  Serving size (cooked)    Cassava, dasheen, or " "plantain 1/3 cup   Corn, green peas, mixed vegetables, or parsnips 1/2 cup   Marinara, pasta, or spaghetti sauce 1/2 cup   Mixed vegetables (with corn or peas) 1 cup   Potato, baked with skin 1/4 large (3 oz)   Potato, Urdu-fried (oven-baked) 1 cup (2 oz)   Potato, mashed with milk and fat 1/2 cup   Squash, winter (acorn, butternut) 1 cup   Yam or sweet potato, plain 1/2 cup (3 and 1/2 oz)   If you are not sure if your vegetable is the right size for 1 serving, you can use a food scale to check the weight.  * For starchy vegetables, 15 grams of carbs is considered 1 serving or \"choice\" for people who need to count carbs.  Graphic 304720 Version 1.0  table 4: Sample exchange system meal plan  Time  Exchange pattern  Sample menu  Carbohydrate count (g)    8 am 3 carbohydrate group    2 starch 1 English muffin 30    1 fruit 1 1/4 c strawberries 15    1 protein group 1/4 c cottage cheese -    1 fat group 1 tsp margarine -      Total: 45    12 noon 4 carbohydrate group    2 starch 2 slices of bread 30    1 fruit 1 orange 15    1 vegetable 1 c salad -    1 milk 8 oz skim milk 12    3 protein group 3 oz chicken -    1 fat group 1 tbsp low fat longo -      Total: 57    3 pm 1 carbohydrate group    1 fruit or 1 starch 1 apple or 6 crackers 15      Total: 15    6 pm 4 carbohydrate group    2 starch 1 c potato 30    1 fruit 1/2 c fruit salad 15    1 vegetable 1 c salad -    1 milk 8 oz skim milk 12    6 protein group 6 oz fish -    1 fat group 2 tbsp low fat salad dressing -      Total: 57    9 pm 1 carbohydrate group    1 starch 6 crackers 15    1 protein 2 tbsp peanut butter -      Total: 15    Graphic [id number] Version 3.0  figure 2: The \"plate method\"     For the plate method, you start with a plate about 9 inches (23 cm) across. Then fill it with 1/2 non-starchy vegetables, 1/4 protein, and 1/4 carbs.  Graphic 455623 Version 2.0  Consumer Information Use and Disclaimer   Disclaimer: This generalized information is a limited " "summary of diagnosis, treatment, and/or medication information. It is not meant to be comprehensive and should be used as a tool to help the user understand and/or assess potential diagnostic and treatment options. It does NOT include all information about conditions, treatments, medications, side effects, or risks that may apply to a specific patient. It is not intended to be medical advice or a substitute for the medical advice, diagnosis, or treatment of a health care provider based on the health care provider's examination and assessment of a patient's specific and unique circumstances. Patients must speak with a health care provider for complete information about their health, medical questions, and treatment options, including any risks or benefits regarding use of medications. This information does not endorse any treatments or medications as safe, effective, or approved for treating a specific patient. Up[employer], Inc. and its affiliates disclaim any warranty or liability relating to this information or the use thereof.The use of this information is governed by the Terms of Use, available at https://www.[employer].com/en/know/clinical-effectiveness-terms. 2024© Up[employer]Date, Inc. and its affiliates and/or licensors. All rights reserved.  Copyright   © 2024 Up[employer]Date, Inc. and/or its affiliates. All rights reserved.    Patient Education     Foot care for people with diabetes   The Basics   Written by the doctors and editors at [employer]   Why is foot care important if I have diabetes? -- Diabetes can cause nerve damage if your blood sugar is high for a long time. The medical term for this is \"diabetic neuropathy.\"  If you have problems with the nerves in your feet, you might not be able to feel pain in your foot. Normally, people feel pain when they get a cut or a blister on their foot. The pain tells them that they need to treat their cut so it can heal. But people with nerve damage might not feel any pain when " their feet get hurt. They might not even know that they have a cut, so they might not treat it. Problems that aren't treated right away can get much worse. For example, an untreated cut can get infected and turn into an open sore.  High blood sugar can also damage blood vessels and decrease blood flow to your feet. This can weaken your skin and make wounds take longer to heal. You are also more likely to get an infection if you have high blood sugar.  How do I take care of my feet? -- Taking good care of your feet can help prevent foot problems. You should:   Wash your feet every day with soap and warm water. Pat your feet dry, and be sure to dry the skin between your toes.   Keep your feet moisturized. Put lotion on the tops and bottoms of your feet, but not between your toes.   Check your feet every day (figure 1). Look for cuts, blisters, redness, or swelling. Use a mirror, or ask someone to help you check the bottoms of your feet. Check all parts of the foot, especially between the toes. Look for broken skin, ulcers, blisters, or redness.   Trim your toenails straight across when needed (figure 2). Do not cut the corners of your toenails. File rough edges. Do not cut your cuticles. Ask for help if you cannot see well or have problems reaching your feet.   Ask your doctor or nurse to check your feet at each visit. Take your shoes and socks off for these checks.   See a foot care provider (such as a podiatrist) if you have an ingrown toenail, corn, or callus. Do not try to remove corns and calluses yourself.  How do I protect my feet from injury? -- There are several ways to protect your feet. You can:   Wear shoes and socks at all times, even at home. Do not walk barefoot. Wear swim shoes if you go to the beach or a swimming pool.   Choose shoes that fit well. They should not be not too tight or too loose. Your shoes should have plenty of room for your toes (figure 3). Your doctor might give you a prescription  for special shoes. Check to see if they are covered by your insurance.   Check your shoes each time before you put them on to make sure that the lining is smooth. Also check to make sure that there is nothing inside the shoes before putting them on.   Do not wear shoes that expose any part of the foot, like sandals, thongs, or clogs.   Wear cotton socks that fit loosely. Do not wear shoes without socks.   Protect your feet from heat and cold. Test bath water before putting your feet in it to make sure that it is not too hot. Do not walk barefoot on hot ground. Take extra care when going outside in the cold and wear warm socks.  What else should I know? -- You can lower your risk for foot problems by keeping your blood sugar levels as close to your goal as possible. Other things you can do include:   Move your ankles and toes often to help with blood flow. You can wear a support stocking to help with swelling.   Walk often. Regular walking helps blood flow.   If you smoke, try to quit. Your doctor or nurse can help. Smoking causes poor blood flow to your feet and can damage your nerves.  When should I call the doctor? -- Call your doctor or nurse for advice if you have:   A fever of 100.4°F (38°C) or higher, chills, or a wound that will not heal   Swelling, redness, warmth around a wound, a foul smell coming from a wound, or yellowish, greenish, or bloody discharge   Sores or blisters on your feet that hurt more or less than you would expect   Numbness or tingling in your foot or leg   Corns, calluses, blisters, or new sores on your foot   Very dry, scaly, or cracked skin on your feet   Changes in the way your foot joints or arch look  All topics are updated as new evidence becomes available and our peer review process is complete.  This topic retrieved from [employer] on: Mar 13, 2024.  Topic 040677 Version 2.0  Release: 32.2.4 - C32.71  © 2024 UpToDate, Inc. and/or its affiliates. All rights reserved.  figure 1: Foot  check for people with diabetes     People with diabetes should check both of their feet every day. It is important to check your feet all over, including in between your toes. If you can't see the bottom of your foot, use a mirror or ask another person to check for you. Let your doctor or nurse know if you find any:  Redness   Cuts or cracks in the skin   Blisters   Swelling   Graphic 65286 Version 3.0  figure 2: Trim your toenails     Trim your toenails straight across and smooth them with a nail file.  Graphic 45947 Version 2.0  figure 3: Correct shoe shape     Choose shoes that fit the right way and are not too tight or too loose. Your shoes should have plenty of room for your toes.  Graphic 24811 Version 2.0  Consumer Information Use and Disclaimer   Disclaimer: This generalized information is a limited summary of diagnosis, treatment, and/or medication information. It is not meant to be comprehensive and should be used as a tool to help the user understand and/or assess potential diagnostic and treatment options. It does NOT include all information about conditions, treatments, medications, side effects, or risks that may apply to a specific patient. It is not intended to be medical advice or a substitute for the medical advice, diagnosis, or treatment of a health care provider based on the health care provider's examination and assessment of a patient's specific and unique circumstances. Patients must speak with a health care provider for complete information about their health, medical questions, and treatment options, including any risks or benefits regarding use of medications. This information does not endorse any treatments or medications as safe, effective, or approved for treating a specific patient. Up[employer], Inc. and its affiliates disclaim any warranty or liability relating to this information or the use thereof.The use of this information is governed by the Terms of Use, available at  https://www.[employer].com/en/know/clinical-effectiveness-terms. 2024© [employer] and its affiliates and/or licensors. All rights reserved.  Copyright   © 2024 [employer] and/or its affiliates. All rights reserved.

## 2024-07-16 NOTE — PROGRESS NOTES
Ambulatory Visit  Name: Tc Stovall      : 1950      MRN: [id number]  Encounter Provider: Amanda Valenzuela PA-C  Encounter Date: 2024   Encounter department: [facility] [place]    Assessment & Plan   1. Type 2 diabetes mellitus with diabetic microalbuminuria, with long-term current use of insulin (HCC)  Assessment & Plan:    Lab Results   Component Value Date    HGBA1C 7.4 (A) 2024   A1c much improved, continue using the CGM.  Will get labs prior to his AWV due in October to reassess his proteinuria.  Overall patient is doing well and has better control of his diabetes.  He follows with podiatry and has an eye exam coming up in October.  Continue on lisinopril for his proteinuria and metformin/glipizide/Lantus.  Orders:  -     POCT hemoglobin A1c  2. Hordeolum externum left upper eyelid  Comments:  Warm compresses to the left eye 10 minutes at a time 4 times a day. Follow-up eye doctor if not resolving after a month. Return if worsening redness and pain.  3. Primary hypertension  Comments:  Stable, continue amlodipine and lisinopril.  Orders:  -     aspirin 81 mg chewable tablet; Chew 1 tablet (81 mg total) daily  4. Cervical radiculopathy  Assessment & Plan:  Following with Ortho, has an MRI scheduled for tomorrow.  Continue with Tylenol, avoid NSAIDs if possible, gentle stretching, heat as needed.  5. Obesity, morbid (HCC)  Assessment & Plan:  Patient has lost 21 pounds in the past 3 months.  Congratulated on his progress.  He attributes it to seeing his blood sugars in real-time using the CGM.  He has better control of his diabetes.     History of Present Illness   {Disappearing Hyperlinks I Encounters * My Last Note * Since Last Visit * History :18613}  HPI    Pt is here for a 3 month follow up of diabetes with microalbuminuria and hypertension.  Pt has a chronically ill wife who recently was placed in rehab until .  However, she is not progressing well and  "may need to go to a SNF.      Last A1c was 8.3 in April 2024.  He was supposed to bring in CGM for review if he was able to get the CGM through insurance.  Unfortunately a SGLT2 is cost prohibitive for the pt.  Unfortunately the pt had to take off his sensor for the MRI tomorrow. He feels he is eating a little different now that he can see how his BS is affected by his eating.    POC A1c is 7.4 today.    Pt also seeing orthopedics for neck pain and right arm weakness and numbness.  Is scheduled for an MRI tomorrow, 7/17.  He stopped therapy for now until the MRI because it was aggravating his symptoms.    Review of Systems   Constitutional:  Negative for chills and fever.   HENT:  Negative for congestion and sore throat.    Eyes:  Negative for pain and visual disturbance.   Respiratory:  Negative for cough and shortness of breath.    Cardiovascular:  Negative for chest pain and palpitations.   Gastrointestinal:  Negative for abdominal pain, constipation and diarrhea.   Genitourinary:  Negative for dysuria and hematuria.   Musculoskeletal:  Positive for arthralgias (Neck pain), myalgias (Neck), neck pain and neck stiffness.   Skin:  Negative for color change and rash.   Neurological:  Positive for weakness (Right hand) and numbness (Right arm, right first digit). Negative for dizziness and headaches.       Objective   {Disappearing Hyperlinks   Review Vitals * Enter New Vitals * Results Review * Labs * Imaging * Cardiology * Procedures * Lung Cancer Screening :75723}  /72 (BP Location: Left arm, Patient Position: Sitting, Cuff Size: Standard)   Pulse 70   Temp (!) 96.6 °F (35.9 °C) (Tympanic)   Resp 16   Ht 5' 7\" (1.702 m)   Wt 103 kg (227 lb)   SpO2 97%   BMI 35.55 kg/m²     Physical Exam  Vitals and nursing note reviewed.   Constitutional:       General: He is not in acute distress.     Appearance: He is well-developed.   HENT:      Head: Normocephalic and atraumatic.      Right Ear: Tympanic membrane " normal.      Left Ear: Tympanic membrane normal.      Nose: Nose normal.      Mouth/Throat:      Mouth: Mucous membranes are moist.      Pharynx: Oropharynx is clear. No oropharyngeal exudate.   Eyes:      Extraocular Movements: Extraocular movements intact.      Conjunctiva/sclera: Conjunctivae normal.   Neck:      Comments: Mild tenderness to palpation of the right cervical paraspinal muscles.  Full range of motion without pain to lateral rotation of the neck.  I did not have the patient extend his neck, since that causes him significant discomfort and pain.  Cardiovascular:      Rate and Rhythm: Normal rate and regular rhythm.      Heart sounds: No murmur heard.  Pulmonary:      Effort: Pulmonary effort is normal. No respiratory distress.      Breath sounds: Normal breath sounds. No wheezing, rhonchi or rales.   Abdominal:      Palpations: Abdomen is soft.      Tenderness: There is no abdominal tenderness.   Musculoskeletal:         General: No swelling.      Right shoulder: Normal. No swelling or deformity. Normal range of motion.      Left shoulder: Normal. No swelling or deformity. Normal range of motion.      Right upper arm: Normal.      Left upper arm: Normal.      Right elbow: Normal.      Left elbow: Normal.      Right forearm: Normal.      Left forearm: Normal.      Right wrist: Normal.      Left wrist: Normal.      Right hand: No swelling, deformity or tenderness. Normal range of motion. Decreased strength of finger abduction. Normal strength of wrist extension. Decreased sensation (Right first digit). Normal pulse.      Left hand: No swelling, deformity or tenderness. Normal range of motion. Normal strength. Normal strength of finger abduction and wrist extension. Normal sensation. Normal pulse.      Cervical back: Neck supple. No edema or erythema. Pain with movement and muscular tenderness present. No spinous process tenderness. Normal range of motion.      Comments: Full range of motion of the  bilateral shoulders, elbows, hands.  Full strength 5/5 to flexion extension of the shoulders bilaterally, flexion extension of the elbows bilaterally, flexion extension of the wrists bilaterally.   Decreased  strength of the right hand compared to the left.  Decreased strength of right hand finger abduction.  Numbness to palpation of the right first digit/thumb.   Skin:     General: Skin is warm and dry.      Capillary Refill: Capillary refill takes less than 2 seconds.   Neurological:      General: No focal deficit present.      Mental Status: He is alert.   Psychiatric:         Mood and Affect: Mood normal.         Behavior: Behavior normal.       Administrative Statements {Disappearing Hyperlinks I  [id number]}

## 2024-07-17 ENCOUNTER — APPOINTMENT (OUTPATIENT)
Dept: PHYSICAL THERAPY | Facility: CLINIC | Age: 74
End: 2024-07-17
Payer: MEDICARE

## 2024-07-17 ENCOUNTER — HOSPITAL ENCOUNTER (OUTPATIENT)
Dept: MRI IMAGING | Facility: CLINIC | Age: 74
Discharge: HOME | End: 2024-07-17
Payer: MEDICARE

## 2024-07-17 DIAGNOSIS — M54.12 RADICULOPATHY, CERVICAL REGION: ICD-10-CM

## 2024-07-17 PROCEDURE — 72141 MRI NECK SPINE W/O DYE: CPT

## 2024-07-18 ENCOUNTER — APPOINTMENT (OUTPATIENT)
Dept: PHYSICAL THERAPY | Facility: CLINIC | Age: 74
End: 2024-07-18
Payer: MEDICARE

## 2024-07-19 ENCOUNTER — APPOINTMENT (OUTPATIENT)
Dept: PHYSICAL THERAPY | Facility: CLINIC | Age: 74
End: 2024-07-19
Payer: MEDICARE

## 2024-07-22 ENCOUNTER — APPOINTMENT (OUTPATIENT)
Dept: PHYSICAL THERAPY | Facility: CLINIC | Age: 74
End: 2024-07-22
Payer: MEDICARE

## 2024-07-24 ENCOUNTER — APPOINTMENT (OUTPATIENT)
Dept: PHYSICAL THERAPY | Facility: CLINIC | Age: 74
End: 2024-07-24
Payer: MEDICARE

## 2024-07-30 ENCOUNTER — APPOINTMENT (OUTPATIENT)
Dept: PHYSICAL THERAPY | Facility: CLINIC | Age: 74
End: 2024-07-30
Payer: MEDICARE

## 2024-07-30 ENCOUNTER — OFFICE VISIT (OUTPATIENT)
Dept: OBGYN CLINIC | Facility: CLINIC | Age: 74
End: 2024-07-30
Payer: MEDICARE

## 2024-07-30 VITALS
HEART RATE: 94 BPM | BODY MASS INDEX: 35.63 KG/M2 | DIASTOLIC BLOOD PRESSURE: 74 MMHG | HEIGHT: 67 IN | SYSTOLIC BLOOD PRESSURE: 124 MMHG | WEIGHT: 227 LBS

## 2024-07-30 DIAGNOSIS — M48.061 FORAMINAL STENOSIS OF LUMBAR REGION: ICD-10-CM

## 2024-07-30 DIAGNOSIS — M50.20 PROTRUSION OF CERVICAL INTERVERTEBRAL DISC: ICD-10-CM

## 2024-07-30 DIAGNOSIS — M54.12 RADICULOPATHY, CERVICAL REGION: Primary | ICD-10-CM

## 2024-07-30 PROCEDURE — 99214 OFFICE O/P EST MOD 30 MIN: CPT | Performed by: FAMILY MEDICINE

## 2024-07-30 RX ORDER — GABAPENTIN 300 MG/1
300 CAPSULE ORAL 2 TIMES DAILY
Qty: 60 CAPSULE | Refills: 1 | Status: SHIPPED | OUTPATIENT
Start: 2024-07-30

## 2024-08-19 ENCOUNTER — TELEPHONE (OUTPATIENT)
Dept: PAIN MEDICINE | Facility: CLINIC | Age: 74
End: 2024-08-19

## 2024-08-19 DIAGNOSIS — E11.69 TYPE 2 DIABETES MELLITUS WITH OTHER SPECIFIED COMPLICATION, WITHOUT LONG-TERM CURRENT USE OF INSULIN (HCC): ICD-10-CM

## 2024-08-19 DIAGNOSIS — I10 PRIMARY HYPERTENSION: Chronic | ICD-10-CM

## 2024-08-19 RX ORDER — LISINOPRIL 20 MG/1
20 TABLET ORAL DAILY
Qty: 90 TABLET | Refills: 1 | Status: SHIPPED | OUTPATIENT
Start: 2024-08-19

## 2024-08-19 RX ORDER — METFORMIN HCL 500 MG
TABLET, EXTENDED RELEASE 24 HR ORAL
Qty: 360 TABLET | Refills: 1 | Status: SHIPPED | OUTPATIENT
Start: 2024-08-19

## 2024-08-24 DIAGNOSIS — E11.3293 TYPE 2 DIABETES MELLITUS WITH BOTH EYES AFFECTED BY MILD NONPROLIFERATIVE RETINOPATHY WITHOUT MACULAR EDEMA, WITHOUT LONG-TERM CURRENT USE OF INSULIN (HCC): Chronic | ICD-10-CM

## 2024-08-24 RX ORDER — GLIPIZIDE 10 MG/1
10 TABLET, FILM COATED, EXTENDED RELEASE ORAL DAILY
Qty: 90 TABLET | Refills: 3 | Status: SHIPPED | OUTPATIENT
Start: 2024-08-24

## 2024-08-29 ENCOUNTER — HOSPITAL ENCOUNTER (OUTPATIENT)
Dept: RADIOLOGY | Facility: HOSPITAL | Age: 74
End: 2024-08-29
Payer: MEDICARE

## 2024-08-29 ENCOUNTER — NURSE TRIAGE (OUTPATIENT)
Age: 74
End: 2024-08-29
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
## 2024-08-29 DIAGNOSIS — R07.81 RIB PAIN ON LEFT SIDE: Primary | ICD-10-CM

## 2024-08-29 DIAGNOSIS — R07.81 RIB PAIN ON LEFT SIDE: ICD-10-CM

## 2024-08-29 PROCEDURE — 71101 X-RAY EXAM UNILAT RIBS/CHEST: CPT

## 2024-08-29 NOTE — H&P (VIEW-ONLY)
Assessment:  1. Radiculopathy, cervical region    2. Foraminal stenosis of lumbar region    3. Protrusion of cervical intervertebral disc        Plan:  Orders Placed This Encounter   Procedures    Durable Medical Equipment     1 cervical traction unit    FL spine and pain procedure     Standing Status:   Future     Standing Expiration Date:   8/30/2028     Order Specific Question:   Reason for Exam:     Answer:   RIGHT C7-T1 SERGIO (a1c=7.4)     Order Specific Question:   Anticoagulant hold needed?     Answer:   NSAID       No orders of the defined types were placed in this encounter.      My impressions and treatment recommendations were discussed in detail with the patient, who verbalized understanding and had no further questions.    74-year-old male presents her office with several months of neck pain with radiation down the right upper extremity.  Exam notable for right tricep extension weakness, right hand  strength weakness, positive spurling's test on the right, and notable pain down the right arm with cervical extension. MRI is notable for right sided disc protrusion causing notable foraminal narrowing and lateral recess stenosis.  This is likely responsible for most of his symptoms.  I discussed right C7-T1 cervical epidural steroid injection under fluoroscopic guidance to help the symptoms.    I will also order a cervical traction unit for the patient.     [place] Prescription Drug Monitoring Program report was reviewed and was appropriate     Complete risks and benefits including bleeding, infection, tissue reaction, nerve injury and allergic reaction were discussed. The approach was demonstrated using models and literature was provided. Verbal and written consent was obtained.    Discharge instructions were provided. I personally saw and examined the patient and I agree with the above discussed plan of care.    History of Present Illness:    Tc Stovall is a 74 y.o. male who presents to [facility] for initial evaluation of the above stated pain complaints. The patient has a past medical and chronic pain history as outlined in the assessment section. He was referred by Elia Beebe DO  [address]  [address]  [place] [address] .    Patient is here with chief complaint of 2 months of neck pain with radiation down the right arm and into the right shoulder blade region.  Moderate to severe over the past month.  10 out of 10.  Nearly constant.  All day.  Burning, shooting, numbness, sharp, pins-and-needles in nature.      He also reports diminished  strength on the right.    Pain is increased with standing, bending, exercise.  No change with lying down, sitting.  No change in relaxation, coughing, sneezing.    Past medical she includes diabetes.    He does smoke tobacco, 1 pack/day for 25 years.    PT made his symptoms worse        Review of Systems:    Review of Systems   Musculoskeletal:  Positive for myalgias and neck pain.   Neurological:  Positive for numbness.           Past Medical History:   Diagnosis Date    Arthritis     Diabetes mellitus (HCC)     Hyperlipidemia     Hypertension     Left rotator cuff tear     Tobacco abuse     Wears glasses        Past Surgical History:   Procedure Laterality Date    COLONOSCOPY      KNEE SURGERY      DC SURGICAL ARTHROSCOPY SHOULDER W/ROTATOR CUFF RPR Left 08/10/2022    Procedure: Left Shoulder Arthroscopy, supraspinatus and Subscapularis Repair, Bicep Tenotomy, acromioplasty, extensive debridement;  Surgeon: Otilio Mendez DO;  Location: [facility];  Service: Orthopedics    TONSILLECTOMY  1955       Family History   Problem Relation Age of Onset    Alzheimer's disease Mother     Heart block Father     Heart disease Father        Social History     Occupational History    Not on file   Tobacco Use    Smoking status: Every Day     Current packs/day: 0.50     Average packs/day: 0.5 packs/day for 60.3  years (30.2 ttl pk-yrs)     Types: Cigarettes     Start date: 4/26/1964    Smokeless tobacco: Never   Vaping Use    Vaping status: Never Used   Substance and Sexual Activity    Alcohol use: Yes     Comment: rarely    Drug use: No    Sexual activity: Not on file         Current Outpatient Medications:     acetaminophen (TYLENOL) 500 mg tablet, Take 500 mg by mouth every 6 (six) hours as needed for mild pain, Disp: , Rfl:     amLODIPine (NORVASC) 5 mg tablet, Take 1 tablet (5 mg total) by mouth daily, Disp: 90 tablet, Rfl: 1    aspirin 81 mg chewable tablet, Chew 1 tablet (81 mg total) daily, Disp: , Rfl:     Blood Glucose Monitoring Suppl (OneTouch Verio Reflect) w/Device KIT, Check blood sugars three times daily. Please substitute with appropriate alternative as covered by patient's insurance. Dx: E11.65, Disp: 1 kit, Rfl: 0    cholecalciferol (VITAMIN D3) 1,000 units tablet, Take 1 tablet (1,000 Units total) by mouth daily, Disp: 90 tablet, Rfl: 3    clotrimazole-betamethasone (LOTRISONE) 1-0.05 % cream, Apply topically 2 (two) times a day, Disp: 30 g, Rfl: 1    Continuous Glucose Sensor (Dexcom G7 Sensor), Use 1 Device every 10 days, Disp: 9 each, Rfl: 3    gabapentin (Neurontin) 300 mg capsule, Take 1 capsule (300 mg total) by mouth 2 (two) times a day, Disp: 60 capsule, Rfl: 1    glipiZIDE (GLUCOTROL XL) 10 mg 24 hr tablet, TAKE ONE TABLET BY MOUTH EVERY DAY, Disp: 90 tablet, Rfl: 3    glucose blood (OneTouch Verio) test strip, Check blood sugars three times daily. Please substitute with appropriate alternative as covered by patient's insurance. Dx: E11.65, Disp: 300 each, Rfl: 3    Insulin Glargine Solostar (Lantus SoloStar) 100 UNIT/ML SOPN, Inject 0.2 mL (20 Units total) under the skin daily at bedtime, Disp: 18 mL, Rfl: 2    Insulin Pen Needle (BD Pen Needle Nadine U/F) 32G X 4 MM MISC, Use daily as directed with insulin pen, Disp: 100 each, Rfl: 3    lisinopril (ZESTRIL) 20 mg tablet, TAKE ONE TABLET BY MOUTH  "DAILY, Disp: 90 tablet, Rfl: 1    metFORMIN (GLUCOPHAGE-XR) 500 mg 24 hr tablet, TAKE TWO TABLETS BY MOUTH TWICE A DAY, Disp: 360 tablet, Rfl: 1    multivitamin (THERAGRAN) TABS, Take 1 tablet by mouth daily, Disp: , Rfl:     naproxen (Naprosyn) 500 mg tablet, Take 1 tablet (500 mg total) by mouth 2 (two) times a day with meals, Disp: 30 tablet, Rfl: 1    OneTouch Delica Lancets 33G MISC, Check blood sugars three times daily. Please substitute with appropriate alternative as covered by patient's insurance. Dx: E11.65, Disp: 300 each, Rfl: 0    pravastatin (PRAVACHOL) 20 mg tablet, Take 1 tablet (20 mg total) by mouth daily, Disp: 90 tablet, Rfl: 3    fluticasone (FLONASE) 50 mcg/act nasal spray, 1 spray into each nostril daily for 14 days, Disp: 9.9 mL, Rfl: 0    No Known Allergies    Physical Exam:    /71   Pulse 79   Ht 5' 7\" (1.702 m)   Wt 103 kg (227 lb)   BMI 35.55 kg/m²     Constitutional: normal, well developed, well nourished, alert, in no distress and non-toxic and no overt pain behavior.  Eyes: anicteric  HEENT: grossly intact  Neck: supple, symmetric, trachea midline and no masses   Pulmonary:even and unlabored  Cardiovascular:No edema or pitting edema present  Skin:Normal without rashes or lesions and well hydrated  Psychiatric:Mood and affect appropriate  Neurologic:Cranial Nerves II-XII grossly intact  Musculoskeletal:normal    Cervical Spine Exam    Appearance:  Normal lordosis  Palpation/Tenderness:  no tenderness or spasm  Sensory:  no sensory deficits noted  Range of Motion:  Flexion:  No limitation  without pain  Extension:  Moderately limited  with pain  Motor Strength:  Left Arm Flexion  5/5  Left Arm Extension  5/5  Right Arm Flexion  5/5  Right Arm Extension  4/5  Left Wrist Flexion  5/5  Left Wrist Extension  5/5  Left Finger Abduction  5/5  Right Finger Abduction  5/5  Left Pincer Grasp  5/5  Right Pincer Grasp  4/5  Left    5/5  Right   4/5  Reflexes:  Left Biceps:  2+ "   Right Biceps:  2+   Left Brachioradialis:  2+   Right Brachioradialis:  2+   Left Triceps:  2+   Right Triceps:  2+   Special Tests:  Right Spurlings  positive      Imaging    MRI CERVICAL SPINE WITHOUT CONTRAST       7/17/24     INDICATION: M54.12: Radiculopathy, cervical region.     COMPARISON:  None.     TECHNIQUE:  Multiplanar, multisequence imaging of the cervical spine was performed. .        IMAGE QUALITY: The study is motion degraded.     FINDINGS:     ALIGNMENT: There is anterolisthesis of C4-C5.     MARROW SIGNAL:  Normal marrow signal is identified within the visualized bony structures.  No discrete marrow lesion.     CERVICAL AND VISUALIZED THORACIC CORD:  Normal signal within the visualized cord.     PREVERTEBRAL AND PARASPINAL SOFT TISSUES:  Normal.     VISUALIZED POSTERIOR FOSSA:  The visualized posterior fossa demonstrates no abnormal signal.     CERVICAL DISC SPACES:     C2-C3:  Normal.     C3-C4: There is a mild bulge. There is facet arthrosis. There is no significant canal stenosis. There is mild foraminal narrowing.     C4-C5: There is uncovering the disc space. There is a mild bulge. There is facet arthrosis. There is mild canal stenosis. There is mild foraminal narrowing.     C5-C6: There is mild bulge. There is no significant canal stenosis or foraminal narrowing.     C6-C7: There is a bulging annulus with a superimposed central disc protrusion. There is mild canal stenosis. There is mild foraminal narrowing.     C7-T1: There is a right paracentral disc protrusion. There is mild canal stenosis. There is moderate right foraminal narrowing. There is no significant left foraminal narrowing. There is near abutment of the right aspect of the cord.     UPPER THORACIC DISC SPACES:  Normal.     OTHER FINDINGS:  None.     IMPRESSION:     Motion-degraded examination is suboptimal for review. Multilevel cervical spondylosis as described above.     Right paracentral disc protrusion is noted at C7-T1  with near abutment of the right ventral aspect of the cord. Moderate right foraminal narrowing is present at this level.        FL spine and pain procedure    (Results Pending)       Orders Placed This Encounter   Procedures    Durable Medical Equipment    FL spine and pain procedure

## 2024-08-29 NOTE — TELEPHONE ENCOUNTER
"Pt fell off of [employer] and is requesting a left rib xr. No difficulty breathing but states he has increased pain when taking deep breathes or coughing. Advised pt that this may require an office visit but he would like a message sent to provider prior to scheduling.       Answer Assessment - Initial Assessment Questions  1. MECHANISM: \"How did the fall happen?\"      Pt fell off of scooter  2. DOMESTIC VIOLENCE AND ELDER ABUSE SCREENING: \"Did you fall because someone pushed you or tried to hurt you?\" If Yes, ask: \"Are you safe now?\"      denies  3. ONSET: \"When did the fall happen?\" (e.g., minutes, hours, or days ago)      Tuesday night  4. LOCATION: \"What part of the body hit the ground?\" (e.g., back, buttocks, head, hips, knees, hands, head, stomach)      Left ribs  5. INJURY: \"Did you hurt (injure) yourself when you fell?\" If Yes, ask: \"What did you injure? Tell me more about this?\" (e.g., body area; type of injury; pain severity)\"      Left ribs-painful when coughing and taking deep breath  6. PAIN: \"Is there any pain?\" If Yes, ask: \"How bad is the pain?\" (e.g., Scale 1-10; or mild,   moderate, severe)    - NONE (0): no pain    - MILD (1-3): doesn't interfere with normal activities     - MODERATE (4-7): interferes with normal activities or awakens from sleep     - SEVERE (8-10): excruciating pain, unable to do any normal activities       Mild-moderate  7. SIZE: For cuts, bruises, or swelling, ask: \"How large is it?\" (e.g., inches or centimeters)       denies  8. PREGNANCY: \"Is there any chance you are pregnant?\" \"When was your last menstrual period?\"      na  9. OTHER SYMPTOMS: \"Do you have any other symptoms?\" (e.g., dizziness, fever, weakness; new onset or worsening).       denies  10. CAUSE: \"What do you think caused the fall (or falling)?\" (e.g., tripped, dizzy spell)        unsure    Protocols used: Falls and Falling-ADULT-[place]    "

## 2024-08-29 NOTE — PROGRESS NOTES
Assessment:  1. Radiculopathy, cervical region    2. Foraminal stenosis of lumbar region    3. Protrusion of cervical intervertebral disc        Plan:  Orders Placed This Encounter   Procedures    Durable Medical Equipment     1 cervical traction unit    FL spine and pain procedure     Standing Status:   Future     Standing Expiration Date:   8/30/2028     Order Specific Question:   Reason for Exam:     Answer:   RIGHT C7-T1 SERGIO (a1c=7.4)     Order Specific Question:   Anticoagulant hold needed?     Answer:   NSAID       No orders of the defined types were placed in this encounter.      My impressions and treatment recommendations were discussed in detail with the patient, who verbalized understanding and had no further questions.    74-year-old male presents her office with several months of neck pain with radiation down the right upper extremity.  Exam notable for right tricep extension weakness, right hand  strength weakness, positive spurling's test on the right, and notable pain down the right arm with cervical extension. MRI is notable for right sided disc protrusion causing notable foraminal narrowing and lateral recess stenosis.  This is likely responsible for most of his symptoms.  I discussed right C7-T1 cervical epidural steroid injection under fluoroscopic guidance to help the symptoms.    I will also order a cervical traction unit for the patient.     [place] Prescription Drug Monitoring Program report was reviewed and was appropriate     Complete risks and benefits including bleeding, infection, tissue reaction, nerve injury and allergic reaction were discussed. The approach was demonstrated using models and literature was provided. Verbal and written consent was obtained.    Discharge instructions were provided. I personally saw and examined the patient and I agree with the above discussed plan of care.    History of Present Illness:    Tc Stovall is a 74 y.o. male who presents to [facility] for initial evaluation of the above stated pain complaints. The patient has a past medical and chronic pain history as outlined in the assessment section. He was referred by Elia Beebe DO  [address]  [address]  [place] [address] .    Patient is here with chief complaint of 2 months of neck pain with radiation down the right arm and into the right shoulder blade region.  Moderate to severe over the past month.  10 out of 10.  Nearly constant.  All day.  Burning, shooting, numbness, sharp, pins-and-needles in nature.      He also reports diminished  strength on the right.    Pain is increased with standing, bending, exercise.  No change with lying down, sitting.  No change in relaxation, coughing, sneezing.    Past medical she includes diabetes.    He does smoke tobacco, 1 pack/day for 25 years.    PT made his symptoms worse        Review of Systems:    Review of Systems   Musculoskeletal:  Positive for myalgias and neck pain.   Neurological:  Positive for numbness.           Past Medical History:   Diagnosis Date    Arthritis     Diabetes mellitus (HCC)     Hyperlipidemia     Hypertension     Left rotator cuff tear     Tobacco abuse     Wears glasses        Past Surgical History:   Procedure Laterality Date    COLONOSCOPY      KNEE SURGERY      DC SURGICAL ARTHROSCOPY SHOULDER W/ROTATOR CUFF RPR Left 08/10/2022    Procedure: Left Shoulder Arthroscopy, supraspinatus and Subscapularis Repair, Bicep Tenotomy, acromioplasty, extensive debridement;  Surgeon: Otilio Mendez DO;  Location: [facility];  Service: Orthopedics    TONSILLECTOMY  1955       Family History   Problem Relation Age of Onset    Alzheimer's disease Mother     Heart block Father     Heart disease Father        Social History     Occupational History    Not on file   Tobacco Use    Smoking status: Every Day     Current packs/day: 0.50     Average packs/day: 0.5 packs/day for 60.3  years (30.2 ttl pk-yrs)     Types: Cigarettes     Start date: 4/26/1964    Smokeless tobacco: Never   Vaping Use    Vaping status: Never Used   Substance and Sexual Activity    Alcohol use: Yes     Comment: rarely    Drug use: No    Sexual activity: Not on file         Current Outpatient Medications:     acetaminophen (TYLENOL) 500 mg tablet, Take 500 mg by mouth every 6 (six) hours as needed for mild pain, Disp: , Rfl:     amLODIPine (NORVASC) 5 mg tablet, Take 1 tablet (5 mg total) by mouth daily, Disp: 90 tablet, Rfl: 1    aspirin 81 mg chewable tablet, Chew 1 tablet (81 mg total) daily, Disp: , Rfl:     Blood Glucose Monitoring Suppl (OneTouch Verio Reflect) w/Device KIT, Check blood sugars three times daily. Please substitute with appropriate alternative as covered by patient's insurance. Dx: E11.65, Disp: 1 kit, Rfl: 0    cholecalciferol (VITAMIN D3) 1,000 units tablet, Take 1 tablet (1,000 Units total) by mouth daily, Disp: 90 tablet, Rfl: 3    clotrimazole-betamethasone (LOTRISONE) 1-0.05 % cream, Apply topically 2 (two) times a day, Disp: 30 g, Rfl: 1    Continuous Glucose Sensor (Dexcom G7 Sensor), Use 1 Device every 10 days, Disp: 9 each, Rfl: 3    gabapentin (Neurontin) 300 mg capsule, Take 1 capsule (300 mg total) by mouth 2 (two) times a day, Disp: 60 capsule, Rfl: 1    glipiZIDE (GLUCOTROL XL) 10 mg 24 hr tablet, TAKE ONE TABLET BY MOUTH EVERY DAY, Disp: 90 tablet, Rfl: 3    glucose blood (OneTouch Verio) test strip, Check blood sugars three times daily. Please substitute with appropriate alternative as covered by patient's insurance. Dx: E11.65, Disp: 300 each, Rfl: 3    Insulin Glargine Solostar (Lantus SoloStar) 100 UNIT/ML SOPN, Inject 0.2 mL (20 Units total) under the skin daily at bedtime, Disp: 18 mL, Rfl: 2    Insulin Pen Needle (BD Pen Needle Nadine U/F) 32G X 4 MM MISC, Use daily as directed with insulin pen, Disp: 100 each, Rfl: 3    lisinopril (ZESTRIL) 20 mg tablet, TAKE ONE TABLET BY MOUTH  "DAILY, Disp: 90 tablet, Rfl: 1    metFORMIN (GLUCOPHAGE-XR) 500 mg 24 hr tablet, TAKE TWO TABLETS BY MOUTH TWICE A DAY, Disp: 360 tablet, Rfl: 1    multivitamin (THERAGRAN) TABS, Take 1 tablet by mouth daily, Disp: , Rfl:     naproxen (Naprosyn) 500 mg tablet, Take 1 tablet (500 mg total) by mouth 2 (two) times a day with meals, Disp: 30 tablet, Rfl: 1    OneTouch Delica Lancets 33G MISC, Check blood sugars three times daily. Please substitute with appropriate alternative as covered by patient's insurance. Dx: E11.65, Disp: 300 each, Rfl: 0    pravastatin (PRAVACHOL) 20 mg tablet, Take 1 tablet (20 mg total) by mouth daily, Disp: 90 tablet, Rfl: 3    fluticasone (FLONASE) 50 mcg/act nasal spray, 1 spray into each nostril daily for 14 days, Disp: 9.9 mL, Rfl: 0    No Known Allergies    Physical Exam:    /71   Pulse 79   Ht 5' 7\" (1.702 m)   Wt 103 kg (227 lb)   BMI 35.55 kg/m²     Constitutional: normal, well developed, well nourished, alert, in no distress and non-toxic and no overt pain behavior.  Eyes: anicteric  HEENT: grossly intact  Neck: supple, symmetric, trachea midline and no masses   Pulmonary:even and unlabored  Cardiovascular:No edema or pitting edema present  Skin:Normal without rashes or lesions and well hydrated  Psychiatric:Mood and affect appropriate  Neurologic:Cranial Nerves II-XII grossly intact  Musculoskeletal:normal    Cervical Spine Exam    Appearance:  Normal lordosis  Palpation/Tenderness:  no tenderness or spasm  Sensory:  no sensory deficits noted  Range of Motion:  Flexion:  No limitation  without pain  Extension:  Moderately limited  with pain  Motor Strength:  Left Arm Flexion  5/5  Left Arm Extension  5/5  Right Arm Flexion  5/5  Right Arm Extension  4/5  Left Wrist Flexion  5/5  Left Wrist Extension  5/5  Left Finger Abduction  5/5  Right Finger Abduction  5/5  Left Pincer Grasp  5/5  Right Pincer Grasp  4/5  Left    5/5  Right   4/5  Reflexes:  Left Biceps:  2+ "   Right Biceps:  2+   Left Brachioradialis:  2+   Right Brachioradialis:  2+   Left Triceps:  2+   Right Triceps:  2+   Special Tests:  Right Spurlings  positive      Imaging    MRI CERVICAL SPINE WITHOUT CONTRAST       7/17/24     INDICATION: M54.12: Radiculopathy, cervical region.     COMPARISON:  None.     TECHNIQUE:  Multiplanar, multisequence imaging of the cervical spine was performed. .        IMAGE QUALITY: The study is motion degraded.     FINDINGS:     ALIGNMENT: There is anterolisthesis of C4-C5.     MARROW SIGNAL:  Normal marrow signal is identified within the visualized bony structures.  No discrete marrow lesion.     CERVICAL AND VISUALIZED THORACIC CORD:  Normal signal within the visualized cord.     PREVERTEBRAL AND PARASPINAL SOFT TISSUES:  Normal.     VISUALIZED POSTERIOR FOSSA:  The visualized posterior fossa demonstrates no abnormal signal.     CERVICAL DISC SPACES:     C2-C3:  Normal.     C3-C4: There is a mild bulge. There is facet arthrosis. There is no significant canal stenosis. There is mild foraminal narrowing.     C4-C5: There is uncovering the disc space. There is a mild bulge. There is facet arthrosis. There is mild canal stenosis. There is mild foraminal narrowing.     C5-C6: There is mild bulge. There is no significant canal stenosis or foraminal narrowing.     C6-C7: There is a bulging annulus with a superimposed central disc protrusion. There is mild canal stenosis. There is mild foraminal narrowing.     C7-T1: There is a right paracentral disc protrusion. There is mild canal stenosis. There is moderate right foraminal narrowing. There is no significant left foraminal narrowing. There is near abutment of the right aspect of the cord.     UPPER THORACIC DISC SPACES:  Normal.     OTHER FINDINGS:  None.     IMPRESSION:     Motion-degraded examination is suboptimal for review. Multilevel cervical spondylosis as described above.     Right paracentral disc protrusion is noted at C7-T1  with near abutment of the right ventral aspect of the cord. Moderate right foraminal narrowing is present at this level.        FL spine and pain procedure    (Results Pending)       Orders Placed This Encounter   Procedures    Durable Medical Equipment    FL spine and pain procedure

## 2024-08-30 ENCOUNTER — TELEPHONE (OUTPATIENT)
Dept: RADIOLOGY | Facility: CLINIC | Age: 74
End: 2024-08-30

## 2024-08-30 ENCOUNTER — DOCUMENTATION (OUTPATIENT)
Dept: PAIN MEDICINE | Facility: CLINIC | Age: 74
End: 2024-08-30

## 2024-08-30 ENCOUNTER — CONSULT (OUTPATIENT)
Dept: PAIN MEDICINE | Facility: CLINIC | Age: 74
End: 2024-08-30
Payer: MEDICARE

## 2024-08-30 VITALS
WEIGHT: 227 LBS | HEART RATE: 79 BPM | HEIGHT: 67 IN | DIASTOLIC BLOOD PRESSURE: 71 MMHG | BODY MASS INDEX: 35.63 KG/M2 | SYSTOLIC BLOOD PRESSURE: 132 MMHG

## 2024-08-30 DIAGNOSIS — M54.12 RADICULOPATHY, CERVICAL REGION: ICD-10-CM

## 2024-08-30 DIAGNOSIS — M50.20 PROTRUSION OF CERVICAL INTERVERTEBRAL DISC: ICD-10-CM

## 2024-08-30 DIAGNOSIS — M48.061 FORAMINAL STENOSIS OF LUMBAR REGION: ICD-10-CM

## 2024-08-30 PROCEDURE — 99204 OFFICE O/P NEW MOD 45 MIN: CPT | Performed by: STUDENT IN AN ORGANIZED HEALTH CARE EDUCATION/TRAINING PROGRAM

## 2024-08-30 NOTE — TELEPHONE ENCOUNTER
Spoke with pt about  -- is going to see if a family member is available, but does know he can drive himself, but will have to wait up to 2 hours before leaving, if he does

## 2024-08-30 NOTE — TELEPHONE ENCOUNTER
Pt is scheduled for SERGIO with Dr Smart on 9/25/24    Pt reported, while reviewing instructions, that he does not have a  available for the procedure.  Reported he was able to leave and drive himself after his back procedure.  Would like permission to do the same after this procedure.    Is aware this will mean he has to wait longer before leaving, if ok'd by physician.    Please advise.

## 2024-08-30 NOTE — PATIENT INSTRUCTIONS
"Patient Education     Epidural injection   The Basics   Written by the doctors and editors at [facility]   What is an epidural injection? -- An epidural injection can be used to treat a condition called \"radiculopathy.\" This is the medical term for the pain, weakness, numbness, or tingling that happens when nerves coming from the spinal cord get pinched or damaged.  The doctor injects medicines into the space outside the covering of the spinal cord (figure 1). This is similar to an \"epidural\" that is used for pain relief during labor and childbirth.  Epidural injections can be given into different parts of your back:   Cervical epidural injection - Used to help with pain in the head or arms.   Thoracic epidural injection - Used to help with pain in the upper or middle back.   Lumbar epidural injection - Used to help with pain in the lower back or legs.  How do I prepare for an epidural injection? -- The doctor or nurse will tell you if you need to do anything special to prepare. Before your procedure, your doctor will do an exam. They might send you to get tests, such as:   X-ray, ultrasound, or other imaging tests - Imaging tests create pictures of the inside of the body.  Your doctor will also ask you about your \"health history.\" This involves asking you questions about any health problems you have or had in the past, past surgeries, and any medicines you take. Tell them about:   Any medicines you are taking - This includes any prescription or \"over-the-counter\" medicines you use, plus any herbal supplements you take. It helps to write down and bring a list of any medicines you take, or bring a bag with all of your medicines with you.   Any allergies you have   Any bleeding problems you have - Certain medicines, including some herbs and supplements, can increase the risk of bleeding. Some health conditions also increase this risk.  You will also get information about:   Eating and drinking before your procedure - In " "some cases, you might need to \"fast\" before surgery. This means not eating or drinking anything for a period of time. In other cases, you might be allowed to have liquids until a short time before the procedure. Whether you need to fast, and for how long, depends on the procedure you are having.   What help you will need when you go home - For example, you might need to have someone else bring you home or stay with you for some time while you recover.  Ask the doctor or nurse if you have questions or if there is anything you do not understand.  What happens during an epidural injection? -- When it is time for the procedure:   You might get an \"IV,\" which is a thin tube that goes into a vein. This can be used to give you fluids and medicines.   You will get anesthesia medicines to numb the area where the doctor will give the injection. This is to make sure that you do not feel pain during the procedure. You might also get medicines to make you relax and feel sleepy, called \"sedatives.\"   The doctors and nurses will monitor your breathing, blood pressure, and heart rate during the procedure.   The doctor might use a continuous X-ray called \"fluoroscopy.\" This is to help make sure that the medicines are injected into the right place. The doctor might also inject a dye to see where to give the medicine.   The doctor will place a needle through your skin and inject the medicine into a space near your spine. Then, they will remove the needle and cover the area with a clean bandage.   The procedure takes 15 to 30 minutes.  What happens after an epidural injection? -- After your procedure, the staff will watch you closely for a short time. It might take a few days before you feel the effects of the epidural injection.  Before you go home, make sure that you know what problems to look out for and when to call the doctor. Make sure that you understand your doctor's or nurse's instructions. Ask questions about anything you do " "not understand.  For the rest of the day after your procedure:   Try to rest. Limit activities like exercise or driving.   The doctor might recommend an over-the-counter pain medicine. These include acetaminophen (sample brand name: Tylenol), ibuprofen (sample brand names: Advil, Motrin), and naproxen (sample brand name: Aleve).   Ice can help with pain and swelling. Put a cold gel pack, bag of ice, or bag of frozen vegetables on the injection site area every 1 to 2 hours, for 15 minutes each time. Put a thin towel between the ice (or other cold object) and the skin.  What are the risks of an epidural injection? -- Your doctor will talk to you about all of the possible risks, and answer your questions. Possible risks include:   Bleeding   Infection   Headache   Nerve injury  When should I call the doctor? -- Call for emergency help right away (in the [place] and [place], call 9-1-1) if:   You can't move your arms or legs.  Call for advice if:   You have a fever of 100.4°F (38°C) or higher, or chills.   You have redness or swelling around the injection site.   You have a headache.   Your arms or legs are numb, weak, or tingly.  All topics are updated as new evidence becomes available and our peer review process is complete.  This topic retrieved from [employer] on: May 15, 2024.  Topic 687272 Version 1.0  Release: 32.4.3 - C32.134  © 2024 UpToDate, Inc. and/or its affiliates. All rights reserved.  figure 1: Epidural injection     Duringan epidural injection, the doctor inserts a needle between 2 of the bones thatmake up the spine. Then, they inject medicines into the area around the spinalcord. This is an illustration of a \"lumbar\" epidural injection, which is given into the low back. The doctor can place the needle in other areas to treat other types of pain.  Graphic 996266 Version 1.0  Consumer Information Use and Disclaimer   Disclaimer: This generalized information is a limited summary of diagnosis, treatment, and/or " medication information. It is not meant to be comprehensive and should be used as a tool to help the user understand and/or assess potential diagnostic and treatment options. It does NOT include all information about conditions, treatments, medications, side effects, or risks that may apply to a specific patient. It is not intended to be medical advice or a substitute for the medical advice, diagnosis, or treatment of a health care provider based on the health care provider's examination and assessment of a patient's specific and unique circumstances. Patients must speak with a health care provider for complete information about their health, medical questions, and treatment options, including any risks or benefits regarding use of medications. This information does not endorse any treatments or medications as safe, effective, or approved for treating a specific patient. UpToDate, Inc. and its affiliates disclaim any warranty or liability relating to this information or the use thereof.The use of this information is governed by the Terms of Use, available at https://www.[employer].com/en/know/clinical-effectiveness-terms. 2024© UpToDate, Inc. and its affiliates and/or licensors. All rights reserved.  Copyright   © 2024 UpToDate, Inc. and/or its affiliates. All rights reserved.

## 2024-09-05 ENCOUNTER — TELEPHONE (OUTPATIENT)
Dept: RADIOLOGY | Facility: CLINIC | Age: 74
End: 2024-09-05

## 2024-09-05 NOTE — TELEPHONE ENCOUNTER
Pt of Dr Roth,    This mutual patient is to undergo a Cervical Epidural Steroid injection with Dr Smart  For this procedure he will need to hold his Aspirin x 6 days and resume post-op day of.    Do you give permission for this hold?  If granted, permission will be valid for only 60 days

## 2024-09-21 DIAGNOSIS — Z79.4 TYPE 2 DIABETES MELLITUS WITH DIABETIC MICROALBUMINURIA, WITH LONG-TERM CURRENT USE OF INSULIN (HCC): ICD-10-CM

## 2024-09-21 DIAGNOSIS — E11.29 TYPE 2 DIABETES MELLITUS WITH DIABETIC MICROALBUMINURIA, WITH LONG-TERM CURRENT USE OF INSULIN (HCC): ICD-10-CM

## 2024-09-21 DIAGNOSIS — R80.9 TYPE 2 DIABETES MELLITUS WITH DIABETIC MICROALBUMINURIA, WITH LONG-TERM CURRENT USE OF INSULIN (HCC): ICD-10-CM

## 2024-09-23 RX ORDER — ACYCLOVIR 400 MG/1
1 TABLET ORAL
Qty: 9 EACH | Refills: 1 | Status: SHIPPED | OUTPATIENT
Start: 2024-09-23

## 2024-09-25 ENCOUNTER — HOSPITAL ENCOUNTER (OUTPATIENT)
Dept: RADIOLOGY | Facility: CLINIC | Age: 74
Discharge: HOME | End: 2024-09-25
Payer: MEDICARE

## 2024-09-25 VITALS
OXYGEN SATURATION: 96 % | TEMPERATURE: 97 F | RESPIRATION RATE: 20 BRPM | DIASTOLIC BLOOD PRESSURE: 60 MMHG | HEART RATE: 73 BPM | SYSTOLIC BLOOD PRESSURE: 123 MMHG

## 2024-09-25 DIAGNOSIS — M54.12 RADICULOPATHY, CERVICAL REGION: ICD-10-CM

## 2024-09-25 PROCEDURE — 62321 NJX INTERLAMINAR CRV/THRC: CPT | Performed by: STUDENT IN AN ORGANIZED HEALTH CARE EDUCATION/TRAINING PROGRAM

## 2024-09-25 RX ORDER — METHYLPREDNISOLONE ACETATE 80 MG/ML
80 INJECTION, SUSPENSION INTRA-ARTICULAR; INTRALESIONAL; INTRAMUSCULAR; PARENTERAL; SOFT TISSUE ONCE
Status: DISCONTINUED | OUTPATIENT
Start: 2024-09-25 | End: 2024-09-25

## 2024-09-25 RX ORDER — METHYLPREDNISOLONE ACETATE 40 MG/ML
40 INJECTION, SUSPENSION INTRA-ARTICULAR; INTRALESIONAL; INTRAMUSCULAR; PARENTERAL; SOFT TISSUE ONCE
Status: COMPLETED | OUTPATIENT
Start: 2024-09-25 | End: 2024-09-25

## 2024-09-25 RX ADMIN — IOHEXOL 1 ML: 300 INJECTION, SOLUTION INTRAVENOUS at 09:59

## 2024-09-25 RX ADMIN — METHYLPREDNISOLONE ACETATE 40 MG: 40 INJECTION, SUSPENSION INTRA-ARTICULAR; INTRALESIONAL; INTRAMUSCULAR; SOFT TISSUE at 10:01

## 2024-09-25 NOTE — INTERVAL H&P NOTE
Update: (This section must be completed if the H&P was completed greater than 24 hrs to procedure or admission)    H&P reviewed. After examining the patient, I find no changed to the H&P since it had been written.    Patient re-evaluated. Accept as history and physical.    Jim Smart MD/September 25, 2024/9:48 AM

## 2024-09-25 NOTE — DISCHARGE INSTR - LAB
Epidural Steroid Injection   WHAT YOU NEED TO KNOW:   An epidural steroid injection (BRITTON) is a procedure to inject steroid medicine into the epidural space. The epidural space is between your spinal cord and vertebrae. Steroids reduce inflammation and fluid buildup in your spine that may be causing pain. You may be given pain medicine along with the steroids.          ACTIVITY  Do not drive or operate machinery today.  No strenuous activity today - bending, lifting, etc.  You may resume normal activites starting tomorrow - start slowly and as tolerated.  You may shower today, but no tub baths or hot tubs.  You may have numbness for several hours from the local anesthetic. Please use caution and common sense, especially with weight-bearing activities.    CARE OF THE INJECTION SITE  If you have soreness or pain, apply ice to the area today (20 minutes on/20 minutes off).  Starting tomorrow, you may use warm, moist heat or ice if needed.  You may have an increase or change in your discomfort for 36-48 hours after your treatment.  Apply ice and continue with any pain medication you have been prescribed.  Notify [facility] if you have any of the following: redness, drainage, swelling, headache, stiff neck or fever above 100°F.    SPECIAL INSTRUCTIONS  Our office will contact you in approximately 14 days for a progress report.    MEDICATIONS  Continue to take all routine medications.  Our office may have instructed you to hold some medications.    As no general anesthesia was used in today's procedure, you should not experience any side effects related to anesthesia.     If you are diabetic, the steroids used in today's injection may temporarily increase your blood sugar levels after the first few days after your injection. Please keep a close eye on your sugars and alert the doctor who manages your diabetes if your sugars are significantly high from your baseline or you are symptomatic.     If you have a  problem specifically related to your procedure, please call our office at [phone].  Problems not related to your procedure should be directed to your primary care physician.

## 2024-09-26 ENCOUNTER — TELEPHONE (OUTPATIENT)
Age: 74
End: 2024-09-26

## 2024-10-01 ENCOUNTER — TELEPHONE (OUTPATIENT)
Age: 74
End: 2024-10-01

## 2024-10-01 NOTE — TELEPHONE ENCOUNTER
Patient called the RX Refill Line. Message is being forwarded to the office.     Patient is requesting - Pt called and stated that his Rx for (Dexcom G7 Sensor) was sent to the [facility] pharmacy and it needs to got to [facility]. Pt only has a few days on his current sensor. Please review. Thank you.

## 2024-10-02 ENCOUNTER — TELEPHONE (OUTPATIENT)
Dept: LAB | Facility: HOSPITAL | Age: 74
End: 2024-10-02

## 2024-10-09 DIAGNOSIS — I10 PRIMARY HYPERTENSION: ICD-10-CM

## 2024-10-09 RX ORDER — AMLODIPINE BESYLATE 5 MG/1
5 TABLET ORAL DAILY
Qty: 90 TABLET | Refills: 1 | Status: SHIPPED | OUTPATIENT
Start: 2024-10-09

## 2024-10-16 ENCOUNTER — APPOINTMENT (OUTPATIENT)
Dept: LAB | Facility: HOSPITAL | Age: 74
End: 2024-10-16
Payer: MEDICARE

## 2024-10-16 DIAGNOSIS — E78.5 HYPERLIPIDEMIA ASSOCIATED WITH TYPE 2 DIABETES MELLITUS  (HCC): Chronic | ICD-10-CM

## 2024-10-16 DIAGNOSIS — E11.69 HYPERLIPIDEMIA ASSOCIATED WITH TYPE 2 DIABETES MELLITUS  (HCC): Chronic | ICD-10-CM

## 2024-10-16 DIAGNOSIS — E11.29 TYPE 2 DIABETES MELLITUS WITH DIABETIC MICROALBUMINURIA, WITH LONG-TERM CURRENT USE OF INSULIN (HCC): ICD-10-CM

## 2024-10-16 DIAGNOSIS — R80.9 TYPE 2 DIABETES MELLITUS WITH DIABETIC MICROALBUMINURIA, WITH LONG-TERM CURRENT USE OF INSULIN (HCC): ICD-10-CM

## 2024-10-16 DIAGNOSIS — Z79.4 TYPE 2 DIABETES MELLITUS WITH DIABETIC MICROALBUMINURIA, WITH LONG-TERM CURRENT USE OF INSULIN (HCC): ICD-10-CM

## 2024-10-16 DIAGNOSIS — I10 PRIMARY HYPERTENSION: Chronic | ICD-10-CM

## 2024-10-16 LAB
BASOPHILS # BLD AUTO: 0.09 THOUSANDS/ΜL (ref 0–0.1)
BASOPHILS NFR BLD AUTO: 1 % (ref 0–1)
CREAT UR-MCNC: 95.4 MG/DL
EOSINOPHIL # BLD AUTO: 0.44 THOUSAND/ΜL (ref 0–0.61)
EOSINOPHIL NFR BLD AUTO: 5 % (ref 0–6)
ERYTHROCYTE [DISTWIDTH] IN BLOOD BY AUTOMATED COUNT: 12.4 % (ref 11.6–15.1)
EST. AVERAGE GLUCOSE BLD GHB EST-MCNC: 157 MG/DL
HBA1C MFR BLD: 7.1 %
HCT VFR BLD AUTO: 41.5 % (ref 36.5–49.3)
HGB BLD-MCNC: 13.2 G/DL (ref 12–17)
IMM GRANULOCYTES # BLD AUTO: 0.08 THOUSAND/UL (ref 0–0.2)
IMM GRANULOCYTES NFR BLD AUTO: 1 % (ref 0–2)
LYMPHOCYTES # BLD AUTO: 2.39 THOUSANDS/ΜL (ref 0.6–4.47)
LYMPHOCYTES NFR BLD AUTO: 26 % (ref 14–44)
MCH RBC QN AUTO: 28.3 PG (ref 26.8–34.3)
MCHC RBC AUTO-ENTMCNC: 31.8 G/DL (ref 31.4–37.4)
MCV RBC AUTO: 89 FL (ref 82–98)
MICROALBUMIN UR-MCNC: 392.6 MG/L
MICROALBUMIN/CREAT 24H UR: 412 MG/G CREATININE (ref 0–30)
MONOCYTES # BLD AUTO: 0.83 THOUSAND/ΜL (ref 0.17–1.22)
MONOCYTES NFR BLD AUTO: 9 % (ref 4–12)
NEUTROPHILS # BLD AUTO: 5.27 THOUSANDS/ΜL (ref 1.85–7.62)
NEUTS SEG NFR BLD AUTO: 58 % (ref 43–75)
NRBC BLD AUTO-RTO: 0 /100 WBCS
PLATELET # BLD AUTO: 337 THOUSANDS/UL (ref 149–390)
PMV BLD AUTO: 10.9 FL (ref 8.9–12.7)
RBC # BLD AUTO: 4.67 MILLION/UL (ref 3.88–5.62)
WBC # BLD AUTO: 9.1 THOUSAND/UL (ref 4.31–10.16)

## 2024-10-16 PROCEDURE — 80053 COMPREHEN METABOLIC PANEL: CPT

## 2024-10-16 PROCEDURE — 80061 LIPID PANEL: CPT

## 2024-10-16 PROCEDURE — 83036 HEMOGLOBIN GLYCOSYLATED A1C: CPT

## 2024-10-16 PROCEDURE — 82570 ASSAY OF URINE CREATININE: CPT

## 2024-10-16 PROCEDURE — 82043 UR ALBUMIN QUANTITATIVE: CPT

## 2024-10-16 PROCEDURE — 85025 COMPLETE CBC W/AUTO DIFF WBC: CPT

## 2024-10-16 PROCEDURE — 36415 COLL VENOUS BLD VENIPUNCTURE: CPT

## 2024-10-17 LAB
ALBUMIN SERPL BCG-MCNC: 3.8 G/DL (ref 3.5–5)
ALP SERPL-CCNC: 71 U/L (ref 34–104)
ALT SERPL W P-5'-P-CCNC: 15 U/L (ref 7–52)
ANION GAP SERPL CALCULATED.3IONS-SCNC: 10 MMOL/L (ref 4–13)
AST SERPL W P-5'-P-CCNC: 14 U/L (ref 13–39)
BILIRUB SERPL-MCNC: 0.57 MG/DL (ref 0.2–1)
BUN SERPL-MCNC: 11 MG/DL (ref 5–25)
CALCIUM SERPL-MCNC: 8.8 MG/DL (ref 8.4–10.2)
CHLORIDE SERPL-SCNC: 105 MMOL/L (ref 96–108)
CHOLEST SERPL-MCNC: 134 MG/DL
CO2 SERPL-SCNC: 25 MMOL/L (ref 21–32)
CREAT SERPL-MCNC: 0.8 MG/DL (ref 0.6–1.3)
GFR SERPL CREATININE-BSD FRML MDRD: 88 ML/MIN/1.73SQ M
GLUCOSE P FAST SERPL-MCNC: 86 MG/DL (ref 65–99)
HDLC SERPL-MCNC: 39 MG/DL
LDLC SERPL CALC-MCNC: 71 MG/DL (ref 0–100)
POTASSIUM SERPL-SCNC: 4.7 MMOL/L (ref 3.5–5.3)
PROT SERPL-MCNC: 6.7 G/DL (ref 6.4–8.4)
SODIUM SERPL-SCNC: 140 MMOL/L (ref 135–147)
TRIGL SERPL-MCNC: 122 MG/DL

## 2024-10-18 ENCOUNTER — OFFICE VISIT (OUTPATIENT)
Age: 74
End: 2024-10-18
Payer: MEDICARE

## 2024-10-18 VITALS
RESPIRATION RATE: 18 BRPM | TEMPERATURE: 97.4 F | HEART RATE: 72 BPM | SYSTOLIC BLOOD PRESSURE: 124 MMHG | OXYGEN SATURATION: 95 % | HEIGHT: 67 IN | WEIGHT: 226.8 LBS | DIASTOLIC BLOOD PRESSURE: 70 MMHG | BODY MASS INDEX: 35.6 KG/M2

## 2024-10-18 DIAGNOSIS — Z23 NEED FOR COVID-19 VACCINE: ICD-10-CM

## 2024-10-18 DIAGNOSIS — Z23 ENCOUNTER FOR IMMUNIZATION: ICD-10-CM

## 2024-10-18 DIAGNOSIS — E11.69 HYPERLIPIDEMIA ASSOCIATED WITH TYPE 2 DIABETES MELLITUS  (HCC): Chronic | ICD-10-CM

## 2024-10-18 DIAGNOSIS — E11.65 TYPE 2 DIABETES MELLITUS WITH HYPERGLYCEMIA, WITHOUT LONG-TERM CURRENT USE OF INSULIN (HCC): ICD-10-CM

## 2024-10-18 DIAGNOSIS — R80.9 PROTEINURIA, UNSPECIFIED TYPE: ICD-10-CM

## 2024-10-18 DIAGNOSIS — E11.51 TYPE 2 DIABETES MELLITUS WITH PERIPHERAL VASCULAR DISEASE (HCC): Primary | ICD-10-CM

## 2024-10-18 DIAGNOSIS — Z00.00 MEDICARE ANNUAL WELLNESS VISIT, SUBSEQUENT: ICD-10-CM

## 2024-10-18 DIAGNOSIS — E78.5 HYPERLIPIDEMIA ASSOCIATED WITH TYPE 2 DIABETES MELLITUS  (HCC): Chronic | ICD-10-CM

## 2024-10-18 PROCEDURE — 91320 SARSCV2 VAC 30MCG TRS-SUC IM: CPT | Performed by: INTERNAL MEDICINE

## 2024-10-18 PROCEDURE — 90480 ADMN SARSCOV2 VAC 1/ONLY CMP: CPT | Performed by: INTERNAL MEDICINE

## 2024-10-18 PROCEDURE — 99214 OFFICE O/P EST MOD 30 MIN: CPT | Performed by: INTERNAL MEDICINE

## 2024-10-18 PROCEDURE — G0008 ADMIN INFLUENZA VIRUS VAC: HCPCS | Performed by: INTERNAL MEDICINE

## 2024-10-18 PROCEDURE — G0439 PPPS, SUBSEQ VISIT: HCPCS | Performed by: INTERNAL MEDICINE

## 2024-10-18 PROCEDURE — 90662 IIV NO PRSV INCREASED AG IM: CPT | Performed by: INTERNAL MEDICINE

## 2024-10-18 NOTE — PATIENT INSTRUCTIONS
Medicare Preventive Visit Patient Instructions  Thank you for completing your Welcome to Medicare Visit or Medicare Annual Wellness Visit today. Your next wellness visit will be due in one year (10/19/2025).  The screening/preventive services that you may require over the next 5-10 years are detailed below. Some tests may not apply to you based off risk factors and/or age. Screening tests ordered at today's visit but not completed yet may show as past due. Also, please note that scanned in results may not display below.  Preventive Screenings:  Service Recommendations Previous Testing/Comments   Colorectal Cancer Screening  Colonoscopy    Fecal Occult Blood Test (FOBT)/Fecal Immunochemical Test (FIT)  Fecal DNA/Cologuard Test  Flexible Sigmoidoscopy Age: 45-75 years old   Colonoscopy: every 10 years (May be performed more frequently if at higher risk)  OR  FOBT/FIT: every 1 year  OR  Cologuard: every 3 years  OR  Sigmoidoscopy: every 5 years  Screening may be recommended earlier than age 45 if at higher risk for colorectal cancer. Also, an individualized decision between you and your healthcare provider will decide whether screening between the ages of 76-85 would be appropriate. Colonoscopy: 10/04/2021  FOBT/FIT: Not on file  Cologuard: Not on file  Sigmoidoscopy: Not on file    Screening Current     Prostate Cancer Screening Individualized decision between patient and health care provider in men between ages of 55-69   Medicare will cover every 12 months beginning on the day after your 50th birthday PSA: 3.4 ng/mL           Hepatitis C Screening Once for adults born between 1945 and 1965  More frequently in patients at high risk for Hepatitis C Hep C Antibody: 08/10/2021    Screening Current   Diabetes Screening 1-2 times per year if you're at risk for diabetes or have pre-diabetes Fasting glucose: 86 mg/dL (10/16/2024)  A1C: 7.1 % (10/16/2024)  Screening Not Indicated  History Diabetes   Cholesterol Screening  Once every 5 years if you don't have a lipid disorder. May order more often based on risk factors. Lipid panel: 10/16/2024  Screening Not Indicated  History Lipid Disorder      Other Preventive Screenings Covered by Medicare:  Abdominal Aortic Aneurysm (AAA) Screening: covered once if your at risk. You're considered to be at risk if you have a family history of AAA or a male between the age of 65-75 who smoking at least 100 cigarettes in your lifetime.  Lung Cancer Screening: covers low dose CT scan once per year if you meet all of the following conditions: (1) Age 55-77; (2) No signs or symptoms of lung cancer; (3) Current smoker or have quit smoking within the last 15 years; (4) You have a tobacco smoking history of at least 20 pack years (packs per day x number of years you smoked); (5) You get a written order from a healthcare provider.  Glaucoma Screening: covered annually if you're considered high risk: (1) You have diabetes OR (2) Family history of glaucoma OR (3)  aged 50 and older OR (4)  American aged 65 and older  Osteoporosis Screening: covered every 2 years if you meet one of the following conditions: (1) Have a vertebral abnormality; (2) On glucocorticoid therapy for more than 3 months; (3) Have primary hyperparathyroidism; (4) On osteoporosis medications and need to assess response to drug therapy.  HIV Screening: covered annually if you're between the age of 15-65. Also covered annually if you are younger than 15 and older than 65 with risk factors for HIV infection. For pregnant patients, it is covered up to 3 times per pregnancy.    Immunizations:  Immunization Recommendations   Influenza Vaccine Annual influenza vaccination during flu season is recommended for all persons aged >= 6 months who do not have contraindications   Pneumococcal Vaccine   * Pneumococcal conjugate vaccine = PCV13 (Prevnar 13), PCV15 (Vaxneuvance), PCV20 (Prevnar 20)  * Pneumococcal polysaccharide  vaccine = PPSV23 (Pneumovax) Adults 19-65 yo with certain risk factors or if 65+ yo  If never received any pneumonia vaccine: recommend Prevnar 20 (PCV20)  Give PCV20 if previously received 1 dose of PCV13 or PPSV23   Hepatitis B Vaccine 3 dose series if at intermediate or high risk (ex: diabetes, end stage renal disease, liver disease)   Respiratory syncytial virus (RSV) Vaccine - COVERED BY MEDICARE PART D  * RSVPreF3 (Arexvy) CDC recommends that adults 60 years of age and older may receive a single dose of RSV vaccine using shared clinical decision-making (SCDM)   Tetanus (Td) Vaccine - COST NOT COVERED BY MEDICARE PART B Following completion of primary series, a booster dose should be given every 10 years to maintain immunity against tetanus. Td may also be given as tetanus wound prophylaxis.   Tdap Vaccine - COST NOT COVERED BY MEDICARE PART B Recommended at least once for all adults. For pregnant patients, recommended with each pregnancy.   Shingles Vaccine (Shingrix) - COST NOT COVERED BY MEDICARE PART B  2 shot series recommended in those 19 years and older who have or will have weakened immune systems or those 50 years and older     Health Maintenance Due:      Topic Date Due   • Lung Cancer Screening  03/30/2024   • Colorectal Cancer Screening  10/03/2026   • Hepatitis C Screening  Completed     Immunizations Due:      Topic Date Due   • Pneumococcal Vaccine: 65+ Years (1 of 2 - PCV) Never done   • Hepatitis A Vaccine (1 of 2 - Risk 2-dose series) Never done   • Influenza Vaccine (1) 09/01/2024   • COVID-19 Vaccine (5 - 2023-24 season) 09/01/2024     Advance Directives   What are advance directives?  Advance directives are legal documents that state your wishes and plans for medical care. These plans are made ahead of time in case you lose your ability to make decisions for yourself. Advance directives can apply to any medical decision, such as the treatments you want, and if you want to donate organs.    What are the types of advance directives?  There are many types of advance directives, and each state has rules about how to use them. You may choose a combination of any of the following:  Living will:  This is a written record of the treatment you want. You can also choose which treatments you do not want, which to limit, and which to stop at a certain time. This includes surgery, medicine, IV fluid, and tube feedings.   Durable power of  for healthcare (DPAHC):  This is a written record that states who you want to make healthcare choices for you when you are unable to make them for yourself. This person, called a proxy, is usually a family member or a friend. You may choose more than 1 proxy.  Do not resuscitate (DNR) order:  A DNR order is used in case your heart stops beating or you stop breathing. It is a request not to have certain forms of treatment, such as CPR. A DNR order may be included in other types of advance directives.  Medical directive:  This covers the care that you want if you are in a coma, near death, or unable to make decisions for yourself. You can list the treatments you want for each condition. Treatment may include pain medicine, surgery, blood transfusions, dialysis, IV or tube feedings, and a ventilator (breathing machine).  Values history:  This document has questions about your views, beliefs, and how you feel and think about life. This information can help others choose the care that you would choose.  Why are advance directives important?  An advance directive helps you control your care. Although spoken wishes may be used, it is better to have your wishes written down. Spoken wishes can be misunderstood, or not followed. Treatments may be given even if you do not want them. An advance directive may make it easier for your family to make difficult choices about your care.   Cigarette Smoking and Your Health   Risks to your health if you smoke:  Nicotine and other chemicals  found in tobacco damage every cell in your body. Even if you are a light smoker, you have an increased risk for cancer, heart disease, and lung disease. If you are pregnant or have diabetes, smoking increases your risk for complications.   Benefits to your health if you stop smoking:   You decrease respiratory symptoms such as coughing, wheezing, and shortness of breath.   You reduce your risk for cancers of the lung, mouth, throat, kidney, bladder, pancreas, stomach, and cervix. If you already have cancer, you increase the benefits of chemotherapy. You also reduce your risk for cancer returning or a second cancer from developing.   You reduce your risk for heart disease, blood clots, heart attack, and stroke.   You reduce your risk for lung infections, and diseases such as pneumonia, asthma, chronic bronchitis, and emphysema.  Your circulation improves. More oxygen can be delivered to your body. If you have diabetes, you lower your risk for complications, such as kidney, artery, and eye diseases. You also lower your risk for nerve damage. Nerve damage can lead to amputations, poor vision, and blindness.  You improve your body's ability to heal and to fight infections.  For more information and support to stop smoking:   [employer]  Phone: 7- 234 - 656-3461  Web Address: www.[employer]  Weight Management   Why it is important to manage your weight:  Being overweight increases your risk of health conditions such as heart disease, high blood pressure, type 2 diabetes, and certain types of cancer. It can also increase your risk for osteoarthritis, sleep apnea, and other respiratory problems. Aim for a slow, steady weight loss. Even a small amount of weight loss can lower your risk of health problems.  How to lose weight safely:  A safe and healthy way to lose weight is to eat fewer calories and get regular exercise. You can lose up about 1 pound a week by decreasing the number of calories you eat by 500 calories  each day.   Healthy meal plan for weight management:  A healthy meal plan includes a variety of foods, contains fewer calories, and helps you stay healthy. A healthy meal plan includes the following:  Eat whole-grain foods more often.  A healthy meal plan should contain fiber. Fiber is the part of grains, fruits, and vegetables that is not broken down by your body. Whole-grain foods are healthy and provide extra fiber in your diet. Some examples of whole-grain foods are whole-wheat breads and pastas, oatmeal, brown rice, and bulgur.  Eat a variety of vegetables every day.  Include dark, leafy greens such as spinach, kale, shanita greens, and mustard greens. Eat yellow and orange vegetables such as carrots, sweet potatoes, and winter squash.   Eat a variety of fruits every day.  Choose fresh or canned fruit (canned in its own juice or light syrup) instead of juice. Fruit juice has very little or no fiber.  Eat low-fat dairy foods.  Drink fat-free (skim) milk or 1% milk. Eat fat-free yogurt and low-fat cottage cheese. Try low-fat cheeses such as mozzarella and other reduced-fat cheeses.  Choose meat and other protein foods that are low in fat.  Choose beans or other legumes such as split peas or lentils. Choose fish, skinless poultry (chicken or turkey), or lean cuts of red meat (beef or pork). Before you cook meat or poultry, cut off any visible fat.   Use less fat and oil.  Try baking foods instead of frying them. Add less fat, such as margarine, sour cream, regular salad dressing and mayonnaise to foods. Eat fewer high-fat foods. Some examples of high-fat foods include french fries, doughnuts, ice cream, and cakes.  Eat fewer sweets.  Limit foods and drinks that are high in sugar. This includes candy, cookies, regular soda, and sweetened drinks.  Exercise:  Exercise at least 30 minutes per day on most days of the week. Some examples of exercise include walking, biking, dancing, and swimming. You can also fit in  more physical activity by taking the stairs instead of the elevator or parking farther away from stores. Ask your healthcare provider about the best exercise plan for you.      © Copyright [employer] 2018 Information is for End User's use only and may not be sold, redistributed or otherwise used for commercial purposes. All illustrations and images included in CareNotes® are the copyrighted property of "[employer] or [employer]

## 2024-10-18 NOTE — PROGRESS NOTES
Ambulatory Visit  Name: Tc Stovall      : 1950      MRN: [id number]  Encounter Provider: Lester Roth DO  Encounter Date: 10/18/2024   Encounter department: [facility] [place]    Assessment & Plan    1. Type 2 diabetes mellitus with peripheral vascular disease (HCC)  2. Type 2 diabetes mellitus with hyperglycemia, without long-term current use of insulin (HCC)    Most recent A1c was 7.1 % on 10/16/2024. A1c has improved very nicely over the past year. He should continue current medications as prescribed. Continue to use CGM which has been very helpful for him in monitoring his blood sugars so he knows how he is doing. Continue to work on diet and lifestyle changes to get the weight down. Follow-up with eye doctor and foot doctor. Regular walking regimen encouraged.    - CBC; Future  - Basic metabolic panel; Future  - Lipid Panel with Direct LDL reflex; Future  - Albumin / creatinine urine ratio; Future  - Hemoglobin A1C; Future    3. Hyperlipidemia associated with type 2 diabetes mellitus  (HCC)    Stable and well controlled. Continue pravastatin as prescribed.    4. Proteinuria, unspecified type    Unclear why his ratio increased even though his A1c has continued to improve. His blood pressure is controlled. Will monitor at this time as he cannot afford medications such as GLP agonists or SGLT2 inhibitors.    5. Medicare annual wellness visit, subsequent     6. Need for COVID-19 vaccine  - COVID-19 Pfizer mRNA vaccine 12 yr and older (Comirnaty pre-filled syringe)    7. Encounter for immunization  - influenza vaccine, high-dose, PF 0.5 mL (Fluzone High Dose)       Preventive health issues were discussed with patient, and age appropriate screening tests were ordered as noted in patient's After Visit Summary. Personalized health advice and appropriate referrals for health education or preventive services given if needed, as noted in patient's After Visit Summary.    History of Present  Illness     Tc presents for follow-up and medicare wellness visit. His sugars have continued to improve. He uses his CGM which has been very helpful for him in knowing where his blood sugars are at so he can be proactive. His wife is currently in the process of getting placed in a facility as it was too much for him to care for her.       Patient Care Team:  Lester Roth,  as PCP - General (Internal Medicine)    Review of Systems   Constitutional:  Negative for activity change, appetite change and fatigue.   Respiratory:  Negative for apnea, cough, chest tightness, shortness of breath and wheezing.    Cardiovascular:  Negative for chest pain, palpitations and leg swelling.   Gastrointestinal:  Negative for abdominal distention, abdominal pain, blood in stool, constipation, diarrhea, nausea and vomiting.   Musculoskeletal:  Positive for back pain.   Neurological:  Negative for dizziness, weakness, light-headedness, numbness and headaches.   Psychiatric/Behavioral:  Negative for behavioral problems, confusion, hallucinations, sleep disturbance and suicidal ideas. The patient is not nervous/anxious.      Medical History Reviewed by provider this encounter:  Tobacco  Allergies  Meds  Problems  Med Hx  Surg Hx  Fam Hx  Soc   Hx      Annual Wellness Visit Questionnaire   Tc is here for his Subsequent Wellness visit. Last Medicare Wellness visit information reviewed, patient interviewed and updates made to the record today.      Health Risk Assessment:   Patient rates overall health as good. Patient feels that their physical health rating is same. Patient is satisfied with their life. Eyesight was rated as same. Hearing was rated as same. Patient feels that their emotional and mental health rating is same. Patients states they are sometimes angry. Patient states they are never, rarely unusually tired/fatigued. Pain experienced in the last 7 days has been some. Patient's pain rating has been 5/10.  Patient states that he has experienced no weight loss or gain in last 6 months.     Fall Risk Screening:   In the past year, patient has experienced: no history of falling in past year      Home Safety:  Patient does not have trouble with stairs inside or outside of their home. Patient has working smoke alarms and has no working carbon monoxide detector. Home safety hazards include: none and not having non-slip bath and/or shower mats. Home safety hazards include: none and not having non-slip bath and/or shower mats.     Nutrition:   Current diet is Diabetic, Low Saturated Fat and Low Carb.     Medications:   Patient is currently taking over-the-counter supplements. OTC medications include: see medication list. Patient is able to manage medications.     Activities of Daily Living (ADLs)/Instrumental Activities of Daily Living (IADLs):   Walk and transfer into and out of bed and chair?: Yes  Dress and groom yourself?: Yes    Bathe or shower yourself?: Yes    Feed yourself? Yes  Do your laundry/housekeeping?: Yes  Manage your money, pay your bills and track your expenses?: Yes  Make your own meals?: Yes    Do your own shopping?: Yes    Previous Hospitalizations:   Any hospitalizations or ED visits within the last 12 months?: No      Advance Care Planning:   Living will: Yes    Durable POA for healthcare: No    Advanced directive: Yes    Five wishes given: No      PREVENTIVE SCREENINGS      Cardiovascular Screening:    General: Screening Not Indicated and History Lipid Disorder      Diabetes Screening:     General: Screening Not Indicated and History Diabetes      Colorectal Cancer Screening:     General: Screening Current      Prostate Cancer Screening:    General: Screening Current      Osteoporosis Screening:    General: Screening Not Indicated      Abdominal Aortic Aneurysm (AAA) Screening:    Risk factors include: age between 65-74 yo and tobacco use        General: Screening Current      Lung Cancer Screening:      General: Risks and Benefits Discussed    Due for: Low Dose CT (LDCT)      Hepatitis C Screening:    General: Screening Current    Screening, Brief Intervention, and Referral to Treatment (SBIRT)    Screening  Typical number of drinks in a day: 0  Typical number of drinks in a week: 0  Interpretation: Low risk drinking behavior.    AUDIT-C Screenin) How often did you have a drink containing alcohol in the past year? never  2) How many drinks did you have on a typical day when you were drinking in the past year? 0  3) How often did you have 6 or more drinks on one occasion in the past year? never    AUDIT-C Score: 0  Interpretation: Score 0-3 (male): Negative screen for alcohol misuse    Single Item Drug Screening:  How often have you used an illegal drug (including marijuana) or a prescription medication for non-medical reasons in the past year? never    Single Item Drug Screen Score: 0  Interpretation: Negative screen for possible drug use disorder    Brief Intervention  Alcohol & drug use screenings were reviewed. No concerns regarding substance use disorder identified.     Other Counseling Topics:   Car/seat belt/driving safety, skin self-exam, sunscreen and regular weightbearing exercise.     Social Determinants of Health     Financial Resource Strain: Medium Risk (10/6/2023)    Overall Financial Resource Strain (CARDIA)     Difficulty of Paying Living Expenses: Somewhat hard   Food Insecurity: No Food Insecurity (10/18/2024)    Hunger Vital Sign     Worried About Running Out of Food in the Last Year: Never true     Ran Out of Food in the Last Year: Never true   Transportation Needs: No Transportation Needs (10/18/2024)    PRAPARE - Transportation     Lack of Transportation (Medical): No     Lack of Transportation (Non-Medical): No   Housing Stability: Low Risk  (10/18/2024)    Housing Stability Vital Sign     Unable to Pay for Housing in the Last Year: No     Number of Times Moved in the Last Year: 0      "Homeless in the Last Year: No   Utilities: Not At Risk (10/18/2024)    [facility] Utilities     Threatened with loss of utilities: No     Objective     /70 (BP Location: Left arm, Patient Position: Sitting, Cuff Size: Large)   Pulse 72   Temp (!) 97.4 °F (36.3 °C) (Tympanic)   Resp 18   Ht 5' 7\" (1.702 m)   Wt 103 kg (226 lb 12.8 oz)   SpO2 95%   BMI 35.52 kg/m²     Physical Exam  Constitutional:       General: He is not in acute distress.     Appearance: He is obese. He is not ill-appearing.   Cardiovascular:      Rate and Rhythm: Normal rate and regular rhythm.      Heart sounds: No murmur heard.  Pulmonary:      Effort: Pulmonary effort is normal. No respiratory distress.      Breath sounds: No wheezing.   Abdominal:      General: Bowel sounds are normal. There is no distension.      Tenderness: There is no abdominal tenderness.   Musculoskeletal:      Right lower leg: No edema.      Left lower leg: No edema.   Neurological:      Mental Status: He is alert.       Lester Roth,    "

## 2024-10-25 ENCOUNTER — TELEPHONE (OUTPATIENT)
Age: 74
End: 2024-10-25

## 2024-10-25 NOTE — TELEPHONE ENCOUNTER
Caller: ryann Montez     Doctor: Bing     Reason for call: pt would like to know when could he get another injection.Please Advise     Call back#: [phone]

## 2024-10-25 NOTE — TELEPHONE ENCOUNTER
Per previous [employer] message,pt was offered repeat SERGIO  Pt would like to schedule  Please place order

## 2024-10-28 ENCOUNTER — PATIENT MESSAGE (OUTPATIENT)
Dept: RADIOLOGY | Facility: CLINIC | Age: 74
End: 2024-10-28

## 2024-10-28 DIAGNOSIS — M54.12 CERVICAL RADICULOPATHY: Primary | ICD-10-CM

## 2024-10-28 NOTE — PATIENT COMMUNICATION
Pt is scheduled for SERGIO with Dr Smart on 11/26/24     Pt is diabetic and wears a glucose monitor on his arm      Pt reports taking naproxen and also takes 81mg aspirin, prescribed by Dr Roth.  He is aware he will need to hold these and that the nurses will call him closer to his injection date with more details (advised in myc message to hold naproxen 4 days).     Pt given instruction review via [employer] message     Have you completed PT/HEP/Chiro in the past 6 months for dedicated area? PT with  Jhon[facility] from 6/18/24 thru 7/2/24 -- pt reported to Dr Smart that PT made his pain worse  If yes, how long did you complete?  What was the frequency?  Did it provide relief?  If no, reason therapy was not completed?   - pt has had previous injection

## 2024-11-06 DIAGNOSIS — M54.12 RADICULOPATHY, CERVICAL REGION: ICD-10-CM

## 2024-11-07 ENCOUNTER — TELEPHONE (OUTPATIENT)
Dept: RADIOLOGY | Facility: CLINIC | Age: 74
End: 2024-11-07

## 2024-11-07 RX ORDER — GABAPENTIN 300 MG/1
300 CAPSULE ORAL 2 TIMES DAILY
Qty: 60 CAPSULE | Refills: 5 | Status: SHIPPED | OUTPATIENT
Start: 2024-11-07

## 2024-11-07 NOTE — TELEPHONE ENCOUNTER
Pt of Dr Roth,    This mutual patient is to undergo a Cervical Epidural Steroid injection with Dr Smart on 11/26  For this procedure he will need to hold his Aspirin 81mg x 6 days and resume post-op day of.    Do you give permission for this hold?  If granted, permission will be valid for only 60 days

## 2024-11-20 ENCOUNTER — TELEPHONE (OUTPATIENT)
Age: 74
End: 2024-11-20

## 2024-11-20 NOTE — TELEPHONE ENCOUNTER
Message sent to care gap team to make an outreach for patients diabetic eye exam from [facility].

## 2024-11-21 ENCOUNTER — TELEPHONE (OUTPATIENT)
Dept: ADMINISTRATIVE | Facility: OTHER | Age: 74
End: 2024-11-21

## 2024-11-21 NOTE — TELEPHONE ENCOUNTER
----- Message from Tsering TSAI sent at 11/20/2024 11:36 AM EST -----  Regarding: diabetic eye exam  11/20/24 11:36 AM    Hello, our patient Tc Stovall has had Diabetic Eye Exam completed/performed. Please assist in updating the patient chart by making an External outreach to [facility] located in [address] [place] [address] . The date of service is 2024.    Thank you,  Tsering Chakraborty MA   PRIMARY CARE [place]

## 2024-11-21 NOTE — LETTER
Diabetic Eye Exam Form    Date Requested: 24  Patient: Tc Stovall  Patient : 1950   Referring Provider: Lester Roth DO      DIABETIC Eye Exam Date _______________________________      Type of Exam MUST be documented for Diabetic Eye Exams. Please CHECK ONE.     Retinal Exam       Dilated Retinal Exam       OCT       Optomap-Iris Exam      Fundus Photography       Left Eye - Please check Retinopathy or No Retinopathy        Exam did show retinopathy    Exam did not show retinopathy       Right Eye - Please check Retinopathy or No Retinopathy       Exam did show retinopathy    Exam did not show retinopathy       Comments __________________________________________________________    Practice Providing Exam ______________________________________________    Exam Performed By (print name) _______________________________________      Provider Signature ___________________________________________________      These reports are needed for  compliance.  Please fax this completed form and a copy of the Diabetic Eye Exam report to our office located at [address] [place] [address] as soon as possible via Fax [phone] [facility] [place]: Phone [phone]  We thank you for your assistance in treating our mutual patient.

## 2024-11-21 NOTE — LETTER
Diabetic Eye Exam Form    Date Requested: 24  Patient: Tc Stovall  Patient : 1950   Referring Provider: Lester Roth DO      DIABETIC Eye Exam Date _______________________________      Type of Exam MUST be documented for Diabetic Eye Exams. Please CHECK ONE.     Retinal Exam       Dilated Retinal Exam       OCT       Optomap-Iris Exam      Fundus Photography       Left Eye - Please check Retinopathy or No Retinopathy        Exam did show retinopathy    Exam did not show retinopathy       Right Eye - Please check Retinopathy or No Retinopathy       Exam did show retinopathy    Exam did not show retinopathy       Comments __________________________________________________________    Practice Providing Exam ______________________________________________    Exam Performed By (print name) _______________________________________      Provider Signature ___________________________________________________      These reports are needed for  compliance.  Please fax this completed form and a copy of the Diabetic Eye Exam report to our office located at [address] [place] [address] as soon as possible via Fax [phone] [facility] [place]: Phone [phone]  We thank you for your assistance in treating our mutual patient.

## 2024-11-21 NOTE — TELEPHONE ENCOUNTER
Upon review of the In Basket request and the patient's chart, initial outreach has been made via fax to facility. Please see Contacts section for details.     Thank you  [place] Nicholson MA

## 2024-11-26 ENCOUNTER — HOSPITAL ENCOUNTER (OUTPATIENT)
Dept: RADIOLOGY | Facility: CLINIC | Age: 74
Discharge: HOME | End: 2024-11-26
Admitting: STUDENT IN AN ORGANIZED HEALTH CARE EDUCATION/TRAINING PROGRAM
Payer: MEDICARE

## 2024-11-26 VITALS
SYSTOLIC BLOOD PRESSURE: 156 MMHG | RESPIRATION RATE: 20 BRPM | DIASTOLIC BLOOD PRESSURE: 67 MMHG | HEART RATE: 71 BPM | TEMPERATURE: 98.1 F | OXYGEN SATURATION: 97 %

## 2024-11-26 DIAGNOSIS — M54.12 CERVICAL RADICULOPATHY: ICD-10-CM

## 2024-11-26 PROCEDURE — 62321 NJX INTERLAMINAR CRV/THRC: CPT | Performed by: STUDENT IN AN ORGANIZED HEALTH CARE EDUCATION/TRAINING PROGRAM

## 2024-11-26 RX ORDER — METHYLPREDNISOLONE ACETATE 80 MG/ML
80 INJECTION, SUSPENSION INTRA-ARTICULAR; INTRALESIONAL; INTRAMUSCULAR; PARENTERAL; SOFT TISSUE ONCE
Status: COMPLETED | OUTPATIENT
Start: 2024-11-26 | End: 2024-11-26

## 2024-11-26 RX ADMIN — IOHEXOL 1 ML: 300 INJECTION, SOLUTION INTRAVENOUS at 11:30

## 2024-11-26 RX ADMIN — METHYLPREDNISOLONE ACETATE 80 MG: 80 INJECTION, SUSPENSION INTRA-ARTICULAR; INTRALESIONAL; INTRAMUSCULAR; SOFT TISSUE at 11:34

## 2024-11-26 NOTE — DISCHARGE INSTR - LAB
Epidural Steroid Injection   WHAT YOU NEED TO KNOW:   An epidural steroid injection (BRITTON) is a procedure to inject steroid medicine into the epidural space. The epidural space is between your spinal cord and vertebrae. Steroids reduce inflammation and fluid buildup in your spine that may be causing pain. You may be given pain medicine along with the steroids.          ACTIVITY  Do not drive or operate machinery today.  No strenuous activity today - bending, lifting, etc.  You may resume normal activites starting tomorrow - start slowly and as tolerated.  You may shower today, but no tub baths or hot tubs.  You may have numbness for several hours from the local anesthetic. Please use caution and common sense, especially with weight-bearing activities.    CARE OF THE INJECTION SITE  If you have soreness or pain, apply ice to the area today (20 minutes on/20 minutes off).  Starting tomorrow, you may use warm, moist heat or ice if needed.  You may have an increase or change in your discomfort for 36-48 hours after your treatment.  Apply ice and continue with any pain medication you have been prescribed.  Notify the [facility] if you have any of the following: redness, drainage, swelling, headache, stiff neck or fever above 100°F.    SPECIAL INSTRUCTIONS  Our office will contact you in approximately 14 days for a progress report.    MEDICATIONS  Continue to take all routine medications.  Our office may have instructed you to hold some medications.    As no general anesthesia was used in today's procedure, you should not experience any side effects related to anesthesia.     If you are diabetic, the steroids used in today's injection may temporarily increase your blood sugar levels after the first few days after your injection. Please keep a close eye on your sugars and alert the doctor who manages your diabetes if your sugars are significantly high from your baseline or you are symptomatic.     If you have a  problem specifically related to your procedure, please call our office at [phone].  Problems not related to your procedure should be directed to your primary care physician.

## 2024-11-26 NOTE — TELEPHONE ENCOUNTER
As a follow-up, a second attempt has been made for outreach via fax to facility. Please see Contacts section for details.    Thank you  [place] Nicholson MA

## 2024-11-26 NOTE — H&P
History of Present Illness: The patient is a 74 y.o. male who presents with complaints of right neck and arm pain    Past Medical History:   Diagnosis Date    Arthritis     Diabetes mellitus (HCC)     Hyperlipidemia     Hypertension     Left rotator cuff tear     Tobacco abuse     Wears glasses        Past Surgical History:   Procedure Laterality Date    COLONOSCOPY      KNEE SURGERY      MS SURGICAL ARTHROSCOPY SHOULDER W/ROTATOR CUFF RPR Left 08/10/2022    Procedure: Left Shoulder Arthroscopy, supraspinatus and Subscapularis Repair, Bicep Tenotomy, acromioplasty, extensive debridement;  Surgeon: Otilio Mendez DO;  Location: [facility] OR;  Service: Orthopedics    TONSILLECTOMY  1955         Current Outpatient Medications:     acetaminophen (TYLENOL) 500 mg tablet, Take 500 mg by mouth every 6 (six) hours as needed for mild pain, Disp: , Rfl:     amLODIPine (NORVASC) 5 mg tablet, TAKE ONE TABLET BY MOUTH DAILY, Disp: 90 tablet, Rfl: 1    aspirin 81 mg chewable tablet, Chew 1 tablet (81 mg total) daily, Disp: , Rfl:     Blood Glucose Monitoring Suppl (OneTouch Verio Reflect) w/Device KIT, Check blood sugars three times daily. Please substitute with appropriate alternative as covered by patient's insurance. Dx: E11.65, Disp: 1 kit, Rfl: 0    cholecalciferol (VITAMIN D3) 1,000 units tablet, Take 1 tablet (1,000 Units total) by mouth daily, Disp: 90 tablet, Rfl: 3    clotrimazole-betamethasone (LOTRISONE) 1-0.05 % cream, Apply topically 2 (two) times a day, Disp: 30 g, Rfl: 1    Continuous Glucose Sensor (Dexcom G7 Sensor), Use 1 Device every 10 days, Disp: 9 each, Rfl: 1    fluticasone (FLONASE) 50 mcg/act nasal spray, 1 spray into each nostril daily for 14 days (Patient taking differently: 1 spray into each nostril if needed), Disp: 9.9 mL, Rfl: 0    gabapentin (Neurontin) 300 mg capsule, Take 1 capsule (300 mg total) by mouth 2 (two) times a day, Disp: 60 capsule, Rfl: 5    glipiZIDE (GLUCOTROL XL) 10 mg 24 hr  tablet, TAKE ONE TABLET BY MOUTH EVERY DAY, Disp: 90 tablet, Rfl: 3    glucose blood (OneTouch Verio) test strip, Check blood sugars three times daily. Please substitute with appropriate alternative as covered by patient's insurance. Dx: E11.65, Disp: 300 each, Rfl: 3    Insulin Glargine Solostar (Lantus SoloStar) 100 UNIT/ML SOPN, Inject 0.2 mL (20 Units total) under the skin daily at bedtime, Disp: 18 mL, Rfl: 2    Insulin Pen Needle (BD Pen Needle Nadine U/F) 32G X 4 MM MISC, Use daily as directed with insulin pen, Disp: 100 each, Rfl: 3    lisinopril (ZESTRIL) 20 mg tablet, TAKE ONE TABLET BY MOUTH DAILY, Disp: 90 tablet, Rfl: 1    meloxicam (MOBIC) 15 mg tablet, Take 1 tablet (15 mg total) by mouth daily as needed for moderate pain, Disp: 30 tablet, Rfl: 0    metFORMIN (GLUCOPHAGE-XR) 500 mg 24 hr tablet, TAKE TWO TABLETS BY MOUTH TWICE A DAY, Disp: 360 tablet, Rfl: 1    multivitamin (THERAGRAN) TABS, Take 1 tablet by mouth daily, Disp: , Rfl:     OneTouch Delica Lancets 33G MISC, Check blood sugars three times daily. Please substitute with appropriate alternative as covered by patient's insurance. Dx: E11.65, Disp: 300 each, Rfl: 0    pravastatin (PRAVACHOL) 20 mg tablet, Take 1 tablet (20 mg total) by mouth daily, Disp: 90 tablet, Rfl: 3    No Known Allergies    Physical Exam: There were no vitals filed for this visit.  General: Awake, Alert, Oriented x 3, Mood and affect appropriate  Respiratory: Respirations even and unlabored  Cardiovascular: Peripheral pulses intact; no edema  Musculoskeletal Exam: neck non erythematous no lesions    ASA Score: 3         Assessment:   1. Cervical radiculopathy        Plan: right C7-T1 SERGIO

## 2024-11-29 LAB
LEFT EYE DIABETIC RETINOPATHY: POSITIVE
RIGHT EYE DIABETIC RETINOPATHY: POSITIVE

## 2024-11-30 DIAGNOSIS — E11.3293 TYPE 2 DIABETES MELLITUS WITH BOTH EYES AFFECTED BY MILD NONPROLIFERATIVE RETINOPATHY WITHOUT MACULAR EDEMA, WITHOUT LONG-TERM CURRENT USE OF INSULIN (HCC): Chronic | ICD-10-CM

## 2024-11-30 DIAGNOSIS — E11.69 TYPE 2 DIABETES MELLITUS WITH OTHER SPECIFIED COMPLICATION, WITHOUT LONG-TERM CURRENT USE OF INSULIN (HCC): ICD-10-CM

## 2024-11-30 DIAGNOSIS — R07.81 RIB PAIN: ICD-10-CM

## 2024-12-02 RX ORDER — MELOXICAM 15 MG/1
15 TABLET ORAL DAILY PRN
Qty: 30 TABLET | Refills: 1 | Status: SHIPPED | OUTPATIENT
Start: 2024-12-02

## 2024-12-02 RX ORDER — GLIPIZIDE 10 MG/1
10 TABLET, FILM COATED, EXTENDED RELEASE ORAL DAILY
Qty: 90 TABLET | Refills: 1 | Status: SHIPPED | OUTPATIENT
Start: 2024-12-02

## 2024-12-02 RX ORDER — METFORMIN HYDROCHLORIDE 500 MG/1
1000 TABLET, EXTENDED RELEASE ORAL 2 TIMES DAILY
Qty: 360 TABLET | Refills: 1 | Status: SHIPPED | OUTPATIENT
Start: 2024-12-02

## 2024-12-03 NOTE — TELEPHONE ENCOUNTER
As a final attempt, a third outreach has been made via telephone call to facility. Please see Contacts section for details. This encounter will be closed and completed by end of day. Should we receive the requested information because of previous outreach attempts, the requested patient's chart will be updated appropriately.     Thank you  [place] Nicholson MA

## 2024-12-05 ENCOUNTER — TELEPHONE (OUTPATIENT)
Dept: ADMINISTRATIVE | Facility: OTHER | Age: 74
End: 2024-12-05

## 2024-12-05 NOTE — LETTER
Diabetic Eye Exam Form     Date Requested: 24  Patient: Tc Stovall  Patient : 1950   Referring Provider: Lester Roth,       DIABETIC Eye Exam Date _______________________________      Type of Exam MUST be documented for Diabetic Eye Exams. Please CHECK ONE.     Retinal Exam       Dilated Retinal Exam       OCT       Optomap-Iris Exam      Fundus Photography       Left Eye - Please check Retinopathy or No Retinopathy        Exam did show retinopathy    Exam did not show retinopathy       Right Eye - Please check Retinopathy or No Retinopathy       Exam did show retinopathy    Exam did not show retinopathy       Comments __________________________________________________________    Practice Providing Exam ______________________________________________    Exam Performed By (print name) _______________________________________      Provider Signature ___________________________________________________      These reports are needed for  compliance.  Please fax this completed form and a copy of the Diabetic Eye Exam report to our office located at [address] [place] [address] as soon as possible via Fax [phone] [facility]: Phone [phone]  We thank you for your assistance in treating our mutual patient.

## 2024-12-05 NOTE — TELEPHONE ENCOUNTER
Upon review of the In Basket request and the patient's chart, initial outreach has been made via fax to facility. Please see Contacts section for details.     Thank you  Cara Brooke MA

## 2024-12-05 NOTE — LETTER
Diabetic Eye Exam Form 2nd Request! Please let us know, either way!    Date Requested: 24  Patient: Tc Stovall  Patient : 1950   Referring Provider: Lester Roth,       DIABETIC Eye Exam Date _______________________________      Type of Exam MUST be documented for Diabetic Eye Exams. Please CHECK ONE.     Retinal Exam       Dilated Retinal Exam       OCT       Optomap-Iris Exam      Fundus Photography       Left Eye - Please check Retinopathy or No Retinopathy        Exam did show retinopathy    Exam did not show retinopathy       Right Eye - Please check Retinopathy or No Retinopathy       Exam did show retinopathy    Exam did not show retinopathy       Comments __________________________________________________________    Practice Providing Exam ______________________________________________    Exam Performed By (print name) _______________________________________      Provider Signature ___________________________________________________      These reports are needed for  compliance.  Please fax this completed form and a copy of the Diabetic Eye Exam report to our office located at [address] [place] [address] as soon as possible via Fax [phone] [facility] [place]: Phone [phone]  We thank you for your assistance in treating our mutual patient.

## 2024-12-05 NOTE — TELEPHONE ENCOUNTER
----- Message from Lester Roth DO sent at 12/5/2024  6:59 AM EST -----  Regarding: DM eye exam  Contact: [phone]  12/05/24 6:59 AM    Hello, our patient Tc Stovall has had Diabetic Eye Exam completed/performed. Please assist in updating the patient chart by making an External outreach to [facility] (Dr. Jung) facility located in [place]. The date of service is 2024.    Thank you,  Lester Roth DO   [facility] [place]

## 2024-12-05 NOTE — TELEPHONE ENCOUNTER
Upon review of the In Basket request we were able to locate, review, and update the patient chart as requested for Diabetic Eye Exam.    Any additional questions or concerns should be emailed to the Practice Liaisons via the appropriate education email address, please do not reply via In Basket.    Thank you  [place] Nicholson MA   [facility]

## 2024-12-10 ENCOUNTER — TELEPHONE (OUTPATIENT)
Dept: PAIN MEDICINE | Facility: CLINIC | Age: 74
End: 2024-12-10

## 2024-12-10 NOTE — TELEPHONE ENCOUNTER
Pt reports  25% improvement post injection  Pain level  5/10.      Patient states unable to use neck brace and getting pain in neck radiating down please advise

## 2024-12-12 NOTE — TELEPHONE ENCOUNTER
As a follow-up, a second attempt has been made for outreach via fax to facility. Please see Contacts section for details.    Thank you  Cara Brooke MA

## 2024-12-13 DIAGNOSIS — M54.12 CERVICAL RADICULOPATHY: Primary | ICD-10-CM

## 2024-12-13 NOTE — TELEPHONE ENCOUNTER
S/w pt. Agreeable to repeat SERGIO  Please place order    Canceled 12/18 ov due to scheduling injection

## 2024-12-16 ENCOUNTER — PATIENT MESSAGE (OUTPATIENT)
Dept: PAIN MEDICINE | Facility: CLINIC | Age: 74
End: 2024-12-16

## 2024-12-16 NOTE — PATIENT COMMUNICATION
Pt scheduled for SERGIO with Dr Smart on 1/7/25     Pt is diabetic and wears a glucose monitor on his arm      Pt reports taking meloxicam and also takes 81mg aspirin, prescribed by Dr Roth.  He is aware he will need to hold Meloxicam for 4 days prior to his injection and also will need to hold aspirin, which he will receive a call regarding closer to his procedure.      Pt given instruction review via myc message     Have you completed PT/HEP/Chiro in the past 6 months for dedicated area? PT with  [facility] from 6/18/24 thru 7/2/24 -- pt reported to Dr Smart that PT made his pain worse  If yes, how long did you complete?  What was the frequency?  Did it provide relief?  If no, reason therapy was not completed?   - pt has had previous injections

## 2024-12-17 NOTE — TELEPHONE ENCOUNTER
Upon review of the In Basket request we were able to locate, review, and update the patient chart as requested for Diabetic Eye Exam. 11-29-24 fundus was done.    Any additional questions or concerns should be emailed to the Practice Liaisons via the appropriate education email address, please do not reply via In Basket.    Thank you  Cara Brooke MA   [facility]

## 2024-12-23 ENCOUNTER — TELEPHONE (OUTPATIENT)
Dept: PAIN MEDICINE | Facility: CLINIC | Age: 74
End: 2024-12-23

## 2024-12-23 NOTE — TELEPHONE ENCOUNTER
Caller: Tc    Doctor: Bing    Reason for call: patient missed your call I read him verbatim SL my chart message he understood.     Thank you for your call.

## 2025-01-02 DIAGNOSIS — E11.65 TYPE 2 DIABETES MELLITUS WITH HYPERGLYCEMIA, WITH LONG-TERM CURRENT USE OF INSULIN (HCC): ICD-10-CM

## 2025-01-02 DIAGNOSIS — Z79.4 TYPE 2 DIABETES MELLITUS WITH HYPERGLYCEMIA, WITH LONG-TERM CURRENT USE OF INSULIN (HCC): ICD-10-CM

## 2025-01-02 RX ORDER — PEN NEEDLE, DIABETIC 32GX 5/32"
NEEDLE, DISPOSABLE MISCELLANEOUS
Qty: 100 EACH | Refills: 1 | Status: ON HOLD | OUTPATIENT
Start: 2025-01-02

## 2025-01-07 ENCOUNTER — HOSPITAL ENCOUNTER (OUTPATIENT)
Dept: RADIOLOGY | Facility: CLINIC | Age: 75
Discharge: HOME | End: 2025-01-07
Payer: MEDICARE

## 2025-01-07 VITALS
HEART RATE: 70 BPM | SYSTOLIC BLOOD PRESSURE: 156 MMHG | RESPIRATION RATE: 18 BRPM | DIASTOLIC BLOOD PRESSURE: 75 MMHG | TEMPERATURE: 96.5 F | OXYGEN SATURATION: 97 %

## 2025-01-07 DIAGNOSIS — M54.12 CERVICAL RADICULOPATHY: ICD-10-CM

## 2025-01-07 DIAGNOSIS — R29.898 HAND WEAKNESS: Primary | ICD-10-CM

## 2025-01-07 PROCEDURE — 62321 NJX INTERLAMINAR CRV/THRC: CPT | Performed by: STUDENT IN AN ORGANIZED HEALTH CARE EDUCATION/TRAINING PROGRAM

## 2025-01-07 RX ORDER — METHYLPREDNISOLONE ACETATE 80 MG/ML
80 INJECTION, SUSPENSION INTRA-ARTICULAR; INTRALESIONAL; INTRAMUSCULAR; PARENTERAL; SOFT TISSUE ONCE
Status: DISCONTINUED | OUTPATIENT
Start: 2025-01-07 | End: 2025-01-07

## 2025-01-07 RX ORDER — METHYLPREDNISOLONE ACETATE 40 MG/ML
40 INJECTION, SUSPENSION INTRA-ARTICULAR; INTRALESIONAL; INTRAMUSCULAR; PARENTERAL; SOFT TISSUE ONCE
Status: COMPLETED | OUTPATIENT
Start: 2025-01-07 | End: 2025-01-07

## 2025-01-07 RX ADMIN — METHYLPREDNISOLONE ACETATE 40 MG: 40 INJECTION, SUSPENSION INTRA-ARTICULAR; INTRALESIONAL; INTRAMUSCULAR; SOFT TISSUE at 11:53

## 2025-01-07 RX ADMIN — IOHEXOL 1 ML: 300 INJECTION, SOLUTION INTRAVENOUS at 11:51

## 2025-01-07 NOTE — DISCHARGE INSTR - LAB
Epidural Steroid Injection   WHAT YOU NEED TO KNOW:   An epidural steroid injection (BRITTON) is a procedure to inject steroid medicine into the epidural space. The epidural space is between your spinal cord and vertebrae. Steroids reduce inflammation and fluid buildup in your spine that may be causing pain. You may be given pain medicine along with the steroids.          ACTIVITY  Do not drive or operate machinery today.  No strenuous activity today - bending, lifting, etc.  You may resume normal activites starting tomorrow - start slowly and as tolerated.  You may shower today, but no tub baths or hot tubs.  You may have numbness for several hours from the local anesthetic. Please use caution and common sense, especially with weight-bearing activities.    CARE OF THE INJECTION SITE  If you have soreness or pain, apply ice to the area today (20 minutes on/20 minutes off).  Starting tomorrow, you may use warm, moist heat or ice if needed.  You may have an increase or change in your discomfort for 36-48 hours after your treatment.  Apply ice and continue with any pain medication you have been prescribed.  Notify the [facility] if you have any of the following: redness, drainage, swelling, headache, stiff neck or fever above 100°F.    SPECIAL INSTRUCTIONS  Our office will contact you in approximately 14 days for a progress report.    MEDICATIONS  Continue to take all routine medications.  Our office may have instructed you to hold some medications.    As no general anesthesia was used in today's procedure, you should not experience any side effects related to anesthesia.     If you are diabetic, the steroids used in today's injection may temporarily increase your blood sugar levels after the first few days after your injection. Please keep a close eye on your sugars and alert the doctor who manages your diabetes if your sugars are significantly high from your baseline or you are symptomatic.     If you have a  problem specifically related to your procedure, please call our office at [phone].  Problems not related to your procedure should be directed to your primary care physician.

## 2025-01-07 NOTE — H&P
History of Present Illness: The patient is a 74 y.o. male who presents with complaints of neck and arm pain    Past Medical History:   Diagnosis Date    Arthritis     Diabetes mellitus (HCC)     Hyperlipidemia     Hypertension     Left rotator cuff tear     Tobacco abuse     Wears glasses        Past Surgical History:   Procedure Laterality Date    COLONOSCOPY      KNEE SURGERY      ME SURGICAL ARTHROSCOPY SHOULDER W/ROTATOR CUFF RPR Left 08/10/2022    Procedure: Left Shoulder Arthroscopy, supraspinatus and Subscapularis Repair, Bicep Tenotomy, acromioplasty, extensive debridement;  Surgeon: Otilio Mendez DO;  Location: [facility] OR;  Service: Orthopedics    TONSILLECTOMY  1955         Current Outpatient Medications:     acetaminophen (TYLENOL) 500 mg tablet, Take 500 mg by mouth every 6 (six) hours as needed for mild pain, Disp: , Rfl:     amLODIPine (NORVASC) 5 mg tablet, TAKE ONE TABLET BY MOUTH DAILY, Disp: 90 tablet, Rfl: 1    aspirin 81 mg chewable tablet, Chew 1 tablet (81 mg total) daily, Disp: , Rfl:     Blood Glucose Monitoring Suppl (OneTouch Verio Reflect) w/Device KIT, Check blood sugars three times daily. Please substitute with appropriate alternative as covered by patient's insurance. Dx: E11.65, Disp: 1 kit, Rfl: 0    cholecalciferol (VITAMIN D3) 1,000 units tablet, Take 1 tablet (1,000 Units total) by mouth daily, Disp: 90 tablet, Rfl: 3    clotrimazole-betamethasone (LOTRISONE) 1-0.05 % cream, Apply topically 2 (two) times a day, Disp: 30 g, Rfl: 1    Continuous Glucose Sensor (Dexcom G7 Sensor), Use 1 Device every 10 days, Disp: 9 each, Rfl: 1    fluticasone (FLONASE) 50 mcg/act nasal spray, 1 spray into each nostril daily for 14 days (Patient taking differently: 1 spray into each nostril if needed), Disp: 9.9 mL, Rfl: 0    gabapentin (Neurontin) 300 mg capsule, Take 1 capsule (300 mg total) by mouth 2 (two) times a day, Disp: 60 capsule, Rfl: 5    glipiZIDE (GLUCOTROL XL) 10 mg 24 hr tablet,  Take 1 tablet (10 mg total) by mouth daily, Disp: 90 tablet, Rfl: 1    glucose blood (OneTouch Verio) test strip, Check blood sugars three times daily. Please substitute with appropriate alternative as covered by patient's insurance. Dx: E11.65, Disp: 300 each, Rfl: 3    Insulin Glargine Solostar (Lantus SoloStar) 100 UNIT/ML SOPN, Inject 0.2 mL (20 Units total) under the skin daily at bedtime, Disp: 18 mL, Rfl: 2    Insulin Pen Needle (CareOne Unifine Pentips Plus) 32G X 4 MM MISC, USE DAILY AS INSTRUCTED WITH INSULIN PEN, Disp: 100 each, Rfl: 1    lisinopril (ZESTRIL) 20 mg tablet, TAKE ONE TABLET BY MOUTH DAILY, Disp: 90 tablet, Rfl: 1    meloxicam (MOBIC) 15 mg tablet, Take 1 tablet (15 mg total) by mouth daily as needed for moderate pain, Disp: 30 tablet, Rfl: 1    metFORMIN (GLUCOPHAGE-XR) 500 mg 24 hr tablet, Take 2 tablets (1,000 mg total) by mouth 2 (two) times a day, Disp: 360 tablet, Rfl: 1    multivitamin (THERAGRAN) TABS, Take 1 tablet by mouth daily, Disp: , Rfl:     OneTouch Delica Lancets 33G MISC, Check blood sugars three times daily. Please substitute with appropriate alternative as covered by patient's insurance. Dx: E11.65, Disp: 300 each, Rfl: 0    pravastatin (PRAVACHOL) 20 mg tablet, Take 1 tablet (20 mg total) by mouth daily, Disp: 90 tablet, Rfl: 3    No Known Allergies    Physical Exam: There were no vitals filed for this visit.  General: Awake, Alert, Oriented x 3, Mood and affect appropriate  Respiratory: Respirations even and unlabored  Cardiovascular: Peripheral pulses intact; no edema  Musculoskeletal Exam: neck non erythematous no lesions    ASA Score: 3    Patient/Chart Verification  Patient ID Verified: Verbal  ID Band Applied: No  Consents Confirmed: To be obtained in the Pre-Procedure area  H&P( within 30 days) Verified: To be obtained in the Procedural area  Interval H&P(within 24 hr) Complete (required for Outpatients and Surgery Admit only): To be obtained in the Procedural  area  Allergies Reviewed: Yes  Anticoag/NSAID held?: Yes (ASA x6  NSAID x4)  Currently on antibiotics?: No    Assessment:   1. Cervical radiculopathy        Plan: C7-T1 SERGIO (a1c=7.1)

## 2025-01-09 DIAGNOSIS — R07.81 RIB PAIN: ICD-10-CM

## 2025-01-09 RX ORDER — MELOXICAM 15 MG/1
TABLET ORAL
Qty: 30 TABLET | Refills: 1 | Status: SHIPPED | OUTPATIENT
Start: 2025-01-09

## 2025-01-10 ENCOUNTER — APPOINTMENT (EMERGENCY)
Dept: CT IMAGING | Facility: HOSPITAL | Age: 75
DRG: 066 | End: 2025-01-10
Payer: MEDICARE

## 2025-01-10 ENCOUNTER — HOSPITAL ENCOUNTER (INPATIENT)
Facility: HOSPITAL | Age: 75
LOS: 4 days | Discharge: HOME | DRG: 066 | End: 2025-01-14
Attending: EMERGENCY MEDICINE | Admitting: INTERNAL MEDICINE
Payer: MEDICARE

## 2025-01-10 DIAGNOSIS — R80.9 TYPE 2 DIABETES MELLITUS WITH DIABETIC MICROALBUMINURIA, WITH LONG-TERM CURRENT USE OF INSULIN (HCC): ICD-10-CM

## 2025-01-10 DIAGNOSIS — E11.29 TYPE 2 DIABETES MELLITUS WITH DIABETIC MICROALBUMINURIA, WITH LONG-TERM CURRENT USE OF INSULIN (HCC): ICD-10-CM

## 2025-01-10 DIAGNOSIS — H53.9 VISION CHANGES: Primary | ICD-10-CM

## 2025-01-10 DIAGNOSIS — I63.9 STROKE (HCC): ICD-10-CM

## 2025-01-10 DIAGNOSIS — H53.2 DIPLOPIA: ICD-10-CM

## 2025-01-10 DIAGNOSIS — Z72.0 TOBACCO ABUSE: ICD-10-CM

## 2025-01-10 DIAGNOSIS — Z79.4 TYPE 2 DIABETES MELLITUS WITH DIABETIC MICROALBUMINURIA, WITH LONG-TERM CURRENT USE OF INSULIN (HCC): ICD-10-CM

## 2025-01-10 LAB
2HR DELTA HS TROPONIN: 2 NG/L
4HR DELTA HS TROPONIN: 2 NG/L
ANION GAP SERPL CALCULATED.3IONS-SCNC: 4 MMOL/L (ref 4–13)
APTT PPP: 33 SECONDS (ref 23–34)
BUN SERPL-MCNC: 23 MG/DL (ref 5–25)
CALCIUM SERPL-MCNC: 8.3 MG/DL (ref 8.4–10.2)
CARDIAC TROPONIN I PNL SERPL HS: 3 NG/L (ref ?–50)
CARDIAC TROPONIN I PNL SERPL HS: 5 NG/L (ref ?–50)
CARDIAC TROPONIN I PNL SERPL HS: 5 NG/L (ref ?–50)
CHLORIDE SERPL-SCNC: 105 MMOL/L (ref 96–108)
CO2 SERPL-SCNC: 25 MMOL/L (ref 21–32)
CREAT SERPL-MCNC: 0.92 MG/DL (ref 0.6–1.3)
ERYTHROCYTE [DISTWIDTH] IN BLOOD BY AUTOMATED COUNT: 12.6 % (ref 11.6–15.1)
GFR SERPL CREATININE-BSD FRML MDRD: 82 ML/MIN/1.73SQ M
GLUCOSE SERPL-MCNC: 61 MG/DL (ref 65–140)
GLUCOSE SERPL-MCNC: 85 MG/DL (ref 65–140)
GLUCOSE SERPL-MCNC: 94 MG/DL (ref 65–140)
GLUCOSE SERPL-MCNC: 95 MG/DL (ref 65–140)
HCT VFR BLD AUTO: 37 % (ref 36.5–49.3)
HGB BLD-MCNC: 12 G/DL (ref 12–17)
INR PPP: 1.08 (ref 0.85–1.19)
MCH RBC QN AUTO: 28.4 PG (ref 26.8–34.3)
MCHC RBC AUTO-ENTMCNC: 32.4 G/DL (ref 31.4–37.4)
MCV RBC AUTO: 88 FL (ref 82–98)
PLATELET # BLD AUTO: 267 THOUSANDS/UL (ref 149–390)
PMV BLD AUTO: 10 FL (ref 8.9–12.7)
POTASSIUM SERPL-SCNC: 4.2 MMOL/L (ref 3.5–5.3)
PROTHROMBIN TIME: 14.7 SECONDS (ref 12.3–15)
RBC # BLD AUTO: 4.23 MILLION/UL (ref 3.88–5.62)
SODIUM SERPL-SCNC: 134 MMOL/L (ref 135–147)
WBC # BLD AUTO: 9.99 THOUSAND/UL (ref 4.31–10.16)

## 2025-01-10 PROCEDURE — 85610 PROTHROMBIN TIME: CPT

## 2025-01-10 PROCEDURE — 36415 COLL VENOUS BLD VENIPUNCTURE: CPT

## 2025-01-10 PROCEDURE — 99291 CRITICAL CARE FIRST HOUR: CPT | Performed by: PSYCHIATRY & NEUROLOGY

## 2025-01-10 PROCEDURE — 85730 THROMBOPLASTIN TIME PARTIAL: CPT

## 2025-01-10 PROCEDURE — 70496 CT ANGIOGRAPHY HEAD: CPT

## 2025-01-10 PROCEDURE — 80048 BASIC METABOLIC PNL TOTAL CA: CPT

## 2025-01-10 PROCEDURE — 84484 ASSAY OF TROPONIN QUANT: CPT

## 2025-01-10 PROCEDURE — 99285 EMERGENCY DEPT VISIT HI MDM: CPT

## 2025-01-10 PROCEDURE — 93005 ELECTROCARDIOGRAM TRACING: CPT

## 2025-01-10 PROCEDURE — 82948 REAGENT STRIP/BLOOD GLUCOSE: CPT

## 2025-01-10 PROCEDURE — 99223 1ST HOSP IP/OBS HIGH 75: CPT | Performed by: INTERNAL MEDICINE

## 2025-01-10 PROCEDURE — 70498 CT ANGIOGRAPHY NECK: CPT

## 2025-01-10 PROCEDURE — 85027 COMPLETE CBC AUTOMATED: CPT

## 2025-01-10 RX ORDER — LISINOPRIL 20 MG/1
20 TABLET ORAL DAILY
Status: CANCELLED | OUTPATIENT
Start: 2025-01-11

## 2025-01-10 RX ORDER — TETRACAINE HYDROCHLORIDE 5 MG/ML
1 SOLUTION OPHTHALMIC ONCE
Status: COMPLETED | OUTPATIENT
Start: 2025-01-10 | End: 2025-01-10

## 2025-01-10 RX ORDER — NICOTINE 21 MG/24HR
1 PATCH, TRANSDERMAL 24 HOURS TRANSDERMAL DAILY
Status: DISCONTINUED | OUTPATIENT
Start: 2025-01-11 | End: 2025-01-14 | Stop reason: HOSPADM

## 2025-01-10 RX ORDER — NICOTINE 21 MG/24HR
1 PATCH, TRANSDERMAL 24 HOURS TRANSDERMAL ONCE
Status: COMPLETED | OUTPATIENT
Start: 2025-01-10 | End: 2025-01-11

## 2025-01-10 RX ORDER — AMLODIPINE BESYLATE 5 MG/1
5 TABLET ORAL DAILY
Status: CANCELLED | OUTPATIENT
Start: 2025-01-11

## 2025-01-10 RX ORDER — GABAPENTIN 300 MG/1
300 CAPSULE ORAL 2 TIMES DAILY
Status: DISCONTINUED | OUTPATIENT
Start: 2025-01-11 | End: 2025-01-14 | Stop reason: HOSPADM

## 2025-01-10 RX ORDER — INSULIN GLARGINE 100 [IU]/ML
20 INJECTION, SOLUTION SUBCUTANEOUS
Status: DISCONTINUED | OUTPATIENT
Start: 2025-01-10 | End: 2025-01-14 | Stop reason: HOSPADM

## 2025-01-10 RX ORDER — INSULIN LISPRO 100 [IU]/ML
1-6 INJECTION, SOLUTION INTRAVENOUS; SUBCUTANEOUS
Status: DISCONTINUED | OUTPATIENT
Start: 2025-01-10 | End: 2025-01-14 | Stop reason: HOSPADM

## 2025-01-10 RX ORDER — ACETAMINOPHEN 325 MG/1
650 TABLET ORAL EVERY 6 HOURS PRN
Status: DISCONTINUED | OUTPATIENT
Start: 2025-01-10 | End: 2025-01-14 | Stop reason: HOSPADM

## 2025-01-10 RX ORDER — CLOTRIMAZOLE AND BETAMETHASONE DIPROPIONATE 10; .64 MG/G; MG/G
CREAM TOPICAL 2 TIMES DAILY
Status: DISCONTINUED | OUTPATIENT
Start: 2025-01-10 | End: 2025-01-14 | Stop reason: HOSPADM

## 2025-01-10 RX ORDER — FLUTICASONE PROPIONATE 50 MCG
1 SPRAY, SUSPENSION (ML) NASAL DAILY
Status: DISCONTINUED | OUTPATIENT
Start: 2025-01-11 | End: 2025-01-14 | Stop reason: HOSPADM

## 2025-01-10 RX ORDER — LORAZEPAM 2 MG/ML
1 INJECTION INTRAMUSCULAR ONCE AS NEEDED
Status: COMPLETED | OUTPATIENT
Start: 2025-01-10 | End: 2025-01-12

## 2025-01-10 RX ORDER — INSULIN LISPRO 100 [IU]/ML
1-6 INJECTION, SOLUTION INTRAVENOUS; SUBCUTANEOUS
Status: DISCONTINUED | OUTPATIENT
Start: 2025-01-11 | End: 2025-01-14 | Stop reason: HOSPADM

## 2025-01-10 RX ORDER — GLIPIZIDE 2.5 MG/1
10 TABLET, EXTENDED RELEASE ORAL DAILY
Status: DISCONTINUED | OUTPATIENT
Start: 2025-01-11 | End: 2025-01-10

## 2025-01-10 RX ORDER — CLOPIDOGREL BISULFATE 75 MG/1
75 TABLET ORAL DAILY
Status: DISCONTINUED | OUTPATIENT
Start: 2025-01-11 | End: 2025-01-12

## 2025-01-10 RX ORDER — GABAPENTIN 300 MG/1
300 CAPSULE ORAL ONCE
Status: COMPLETED | OUTPATIENT
Start: 2025-01-10 | End: 2025-01-10

## 2025-01-10 RX ORDER — ATORVASTATIN CALCIUM 40 MG/1
40 TABLET, FILM COATED ORAL EVERY EVENING
Status: DISCONTINUED | OUTPATIENT
Start: 2025-01-10 | End: 2025-01-14 | Stop reason: HOSPADM

## 2025-01-10 RX ORDER — ASPIRIN 81 MG/1
81 TABLET, CHEWABLE ORAL DAILY
Status: DISCONTINUED | OUTPATIENT
Start: 2025-01-11 | End: 2025-01-14 | Stop reason: HOSPADM

## 2025-01-10 RX ORDER — CLOPIDOGREL 300 MG/1
600 TABLET, FILM COATED ORAL ONCE
Status: COMPLETED | OUTPATIENT
Start: 2025-01-10 | End: 2025-01-10

## 2025-01-10 RX ORDER — ASPIRIN 81 MG/1
81 TABLET, CHEWABLE ORAL ONCE
Status: COMPLETED | OUTPATIENT
Start: 2025-01-10 | End: 2025-01-10

## 2025-01-10 RX ADMIN — IOHEXOL 85 ML: 350 INJECTION, SOLUTION INTRAVENOUS at 14:51

## 2025-01-10 RX ADMIN — GABAPENTIN 300 MG: 300 CAPSULE ORAL at 15:53

## 2025-01-10 RX ADMIN — NICOTINE 1 PATCH: 14 PATCH, EXTENDED RELEASE TRANSDERMAL at 21:33

## 2025-01-10 RX ADMIN — ASPIRIN 81 MG: 81 TABLET, CHEWABLE ORAL at 15:53

## 2025-01-10 RX ADMIN — CLOPIDOGREL BISULFATE 600 MG: 300 TABLET, FILM COATED ORAL at 17:57

## 2025-01-10 RX ADMIN — ATORVASTATIN CALCIUM 40 MG: 40 TABLET, FILM COATED ORAL at 17:47

## 2025-01-10 RX ADMIN — FLUORESCEIN SODIUM 1 STRIP: 1 STRIP OPHTHALMIC at 15:14

## 2025-01-10 RX ADMIN — TETRACAINE HYDROCHLORIDE 1 DROP: 5 SOLUTION OPHTHALMIC at 15:13

## 2025-01-10 RX ADMIN — ACETAMINOPHEN 650 MG: 325 TABLET, FILM COATED ORAL at 21:33

## 2025-01-10 NOTE — ASSESSMENT & PLAN NOTE
"Lab Results   Component Value Date    HGBA1C 7.1 (H) 10/16/2024       No results for input(s): \"POCGLU\" in the last 72 hours.    Blood Sugar Average: Last 72 hrs:    Continue statin  "

## 2025-01-10 NOTE — ASSESSMENT & PLAN NOTE
Patient prescribed amlodipine 5, lisinopril 20  Holding for permissive hypertension per neurology SBP < 220

## 2025-01-10 NOTE — H&P
"H&P - Hospitalist   Name: Tc Stovall 74 y.o. male I MRN: [id number]  Unit/Bed#: FT 03 I Date of Admission: 1/10/2025   Date of Service: 1/10/2025 I Hospital Day: 0     Assessment & Plan  Diplopia  Suspected secondary to CVA, patient was stroke alert in ED not a candidate for thrombolytics  Admit to [facility] telemetry  Loaded with aspirin and Plavix continue daily  Permissive hypertension  Continue statin  CT, CTA performed CT with evidence of ischemic CVA indeterminate age  MRI ordered and pending echocardiogram ordered and pending  Neurochecks  Lipid panel, hemoglobin A1c  PT OT  Neurology following    Primary hypertension  Patient prescribed amlodipine 5, lisinopril 20  Holding for permissive hypertension per neurology SBP < 220    Hyperlipidemia associated with type 2 diabetes mellitus  (HCC)  Lab Results   Component Value Date    HGBA1C 7.1 (H) 10/16/2024       No results for input(s): \"POCGLU\" in the last 72 hours.    Blood Sugar Average: Last 72 hrs:    Continue statin  Type 2 diabetes mellitus with peripheral vascular disease (HCC)  Lab Results   Component Value Date    HGBA1C 7.1 (H) 10/16/2024       No results for input(s): \"POCGLU\" in the last 72 hours.    Blood Sugar Average: Last 72 hrs:    Patient prescribed 20 units Lantus at bedtime (however he has been taking it as needed for sugar greater than 200) in addition to glipizide and metformin  Continue Lantus holding p.o. antihyperglycemic's  Sliding scale  Patient needs diabetic education    Radiculopathy, cervical region  Continue gabapentin      VTE Pharmacologic Prophylaxis:   Moderate Risk (Score 3-4) - Pharmacological DVT Prophylaxis Contraindicated. Sequential Compression Devices Ordered.  Code Status: Prior DNR  Discussion with family: Patient declined call to .     Anticipated Length of Stay: Patient will be admitted on an inpatient basis with an anticipated length of stay of greater than 2 midnights secondary to strokelike " symptoms.    History of Present Illness   Chief Complaint: Double vision    Tc Stovall is a 74 y.o. male with a PMH of diabetes on insulin, hyperlipidemia, hypertension who presents with double vision starting this morning.  Patient states this morning he was in his normal state of health and traveled to his wife's facility around 930 this morning when he suddenly developed double vision.  He states he is never experienced something like this before nor has he any associated weakness.  He stated he would close either eye and double vision would resolve.  He also denied any associated headache or new numbness (does have chronic numbness of right hand due to cervical radiculopathy).  He phoned his ophthalmologist who recommended he come to the emergency department.    On initial arrival he is blood pressure was elevated 155/102.  Given his new onset of acute symptoms stroke alert was called and he underwent CT head showed evidence of hypodensities within the body of the left caudate and left corona radiata, concerning for age-indeterminate infarcts.  CTA head and neck was negative for large vessel occlusion.  Dilation by neurology during stroke alert they deemed him not a candidate for thrombolytics he was loaded with aspirin and Plavix and referred for medical admission.    Review of Systems   Constitutional:  Negative for activity change and appetite change.   Eyes:         Diplopia   Respiratory:  Negative for chest tightness and shortness of breath.    Gastrointestinal:  Negative for abdominal distention and abdominal pain.   Neurological:  Negative for seizures, syncope and weakness.        Right hand numbness       Historical Information   Past Medical History:   Diagnosis Date    Arthritis     Diabetes mellitus (HCC)     Hyperlipidemia     Hypertension     Left rotator cuff tear     Tobacco abuse     Wears glasses      Past Surgical History:   Procedure Laterality Date    COLONOSCOPY      KNEE SURGERY       [facility] SURGICAL ARTHROSCOPY SHOULDER W/ROTATOR CUFF RPR Left 08/10/2022    Procedure: Left Shoulder Arthroscopy, supraspinatus and Subscapularis Repair, Bicep Tenotomy, acromioplasty, extensive debridement;  Surgeon: Otilio Mendez DO;  Location: [facility] OR;  Service: Orthopedics    TONSILLECTOMY  1955     Social History     Tobacco Use    Smoking status: Every Day     Current packs/day: 0.50     Average packs/day: 0.6 packs/day for 85.5 years (55.2 ttl pk-yrs)     Types: Cigarettes     Start date: 4/26/1964    Smokeless tobacco: Never   Vaping Use    Vaping status: Never Used   Substance and Sexual Activity    Alcohol use: Yes     Comment: rarely    Drug use: No    Sexual activity: Not Currently     Partners: Female     Birth control/protection: Female Sterilization     E-Cigarette/Vaping    E-Cigarette Use Never User      E-Cigarette/Vaping Substances    Nicotine No     THC No     CBD No     Flavoring No     Other No     Unknown No      Family History   Problem Relation Age of Onset    Alzheimer's disease Mother     Diabetes Mother     Heart block Father     Heart disease Father      Social History:  Marital Status: /Civil Union   Patient Pre-hospital Living Situation: Home  Patient Pre-hospital Level of Mobility: walks  Patient Pre-hospital Diet Restrictions: DM2    Meds/Allergies   I have reviewed home medications with patient personally.  Prior to Admission medications    Medication Sig Start Date End Date Taking? Authorizing Provider   acetaminophen (TYLENOL) 500 mg tablet Take 500 mg by mouth every 6 (six) hours as needed for mild pain    Historical Provider, MD   amLODIPine (NORVASC) 5 mg tablet TAKE ONE TABLET BY MOUTH DAILY 10/9/24   Lester Roth DO   aspirin 81 mg chewable tablet Chew 1 tablet (81 mg total) daily 7/16/24   Amanda Valenzuela PA-C   Blood Glucose Monitoring Suppl (OneTouch Verio Reflect) w/Device KIT Check blood sugars three times daily. Please substitute with appropriate  alternative as covered by patient's insurance. Dx: E11.65 6/12/23   [facility]   cholecalciferol (VITAMIN D3) 1,000 units tablet Take 1 tablet (1,000 Units total) by mouth daily 1/21/24   Lester Ira, DO   clotrimazole-betamethasone (LOTRISONE) 1-0.05 % cream Apply topically 2 (two) times a day 5/1/23   Lester Pachecojeffery, DO   Continuous Glucose Sensor (Dexcom G7 Sensor) Use 1 Device every 10 days 9/23/24   Amanda Valenzuela PA-C   fluticasone (FLONASE) 50 mcg/act nasal spray 1 spray into each nostril daily for 14 days  Patient taking differently: 1 spray into each nostril if needed 4/16/24 10/18/24  Amanda Valenzuela PA-C   gabapentin (Neurontin) 300 mg capsule Take 1 capsule (300 mg total) by mouth 2 (two) times a day 11/7/24   Lester Ira, DO   glipiZIDE (GLUCOTROL XL) 10 mg 24 hr tablet Take 1 tablet (10 mg total) by mouth daily 12/2/24   Lester Pachecojeffery, DO   glucose blood (OneTouch Verio) test strip Check blood sugars three times daily. Please substitute with appropriate alternative as covered by patient's insurance. Dx: E11.65 6/12/23   [facility]ester [facility] DO   Insulin Glargine Solostar (Lantus SoloStar) 100 UNIT/ML SOPN Inject 0.2 mL (20 Units total) under the skin daily at bedtime 7/13/24 1/9/25  Lester Pachecojeffery, DO   Insulin Pen Needle ([employer]One Unifine Pentips Plus) 32G X 4 MM MISC USE DAILY AS INSTRUCTED WITH INSULIN PEN 1/2/25   [facility], DO   lisinopril (ZESTRIL) 20 mg tablet TAKE ONE TABLET BY MOUTH DAILY 8/19/24   Lester Pachecojeffery, DO   meloxicam (MOBIC) 15 mg tablet TAKE ONE TABLET BY MOUTH EVERY DAY AS NEEDED FOR MODERATE PAIN 1/9/25   [facility]   metFORMIN (GLUCOPHAGE-XR) 500 mg 24 hr tablet Take 2 tablets (1,000 mg total) by mouth 2 (two) times a day 12/2/24   Lester Roth, DO   multivitamin (THERAGRAN) TABS Take 1 tablet by mouth daily    Historical Provider, MD   OneTouch Delica Lancets 33G MISC Check blood sugars three times daily. Please substitute with  appropriate alternative as covered by patient's insurance. Dx: E11.65 1/30/24   Lester Roth, DO   pravastatin (PRAVACHOL) 20 mg tablet Take 1 tablet (20 mg total) by mouth daily 12/29/23   Lester Ira, DO     No Known Allergies    Objective :  Temp:  [98.5 °F (36.9 °C)] 98.5 °F (36.9 °C)  HR:  [70-86] 70  BP: (155-180)/() 180/79  Resp:  [17-18] 18  SpO2:  [96 %-98 %] 96 %  O2 Device: None (Room air)    Physical Exam  Constitutional:       Comments: Elderly male well-appearing no acute distress   Cardiovascular:      Rate and Rhythm: Normal rate and regular rhythm.      Heart sounds: No murmur heard.  Pulmonary:      Effort: Pulmonary effort is normal. No respiratory distress.      Breath sounds: No wheezing.   Abdominal:      General: Abdomen is flat. There is no distension.   Neurological:      General: No focal deficit present.      Mental Status: He is oriented to person, place, and time.      Cranial Nerves: No cranial nerve deficit.            Lines/Drains:            Lab Results: I have reviewed the following results:  Results from last 7 days   Lab Units 01/10/25  1502   WBC Thousand/uL 9.99   HEMOGLOBIN g/dL 12.0   HEMATOCRIT % 37.0   PLATELETS Thousands/uL 267     Results from last 7 days   Lab Units 01/10/25  1502   SODIUM mmol/L 134*   POTASSIUM mmol/L 4.2   CHLORIDE mmol/L 105   CO2 mmol/L 25   BUN mg/dL 23   CREATININE mg/dL 0.92   ANION GAP mmol/L 4   CALCIUM mg/dL 8.3*   GLUCOSE RANDOM mg/dL 94     Results from last 7 days   Lab Units 01/10/25  1502   INR  1.08         Lab Results   Component Value Date    HGBA1C 7.1 (H) 10/16/2024    HGBA1C 7.4 (A) 07/16/2024    HGBA1C 8.3 (H) 04/10/2024           Imaging Results Review: I reviewed radiology reports from this admission including: CT head.  Other Study Results Review: No additional pertinent studies reviewed.    Administrative Statements   I have spent a total time of 65 minutes in caring for this patient on the day of the  visit/encounter including Documenting in the medical record, Reviewing / ordering tests, medicine, procedures  , Obtaining or reviewing history  , and Communicating with other healthcare professionals .    ** Please Note: This note has been constructed using a voice recognition system. **

## 2025-01-10 NOTE — STROKE DOCUMENTATION
Stroke protocols followed to extent possible within the confines of the department.  No rooms available, patient evaluated in hallway.  No vital sign monitoring equipment available as it is being used to monitor a cardiac patient in same hallway.  Neuro assessments completed.  Patient moved to first available room and EKG and repeat vital signs completed.

## 2025-01-10 NOTE — ED PROVIDER NOTES
Time reflects when diagnosis was documented in both MDM as applicable and the Disposition within this note       Time User Action Codes Description Comment    1/10/2025  2:43 PM Sadaf Brewer Add [H53.9] Vision changes     1/10/2025  3:45 PM Yael Lopez Add [H53.2] Diplopia           ED Disposition       ED Disposition   Admit    Condition   Stable    Date/Time   Fri Logan 10, 2025  3:24 PM    Comment                  Assessment & Plan       Medical Decision Making  Neurologic exam without evidence of meningismus, altered mental status, focal neurologic findings so doubt meningitis, encephalitis.   Presentation not consistent with acute intracranial bleed to include SAH.  No history of trauma so doubt ICH.  Given history and physical temporal arteritis unlikely, as is acute angle-closure glaucoma.  Doubt carotid artery dissection given no focal neurologic deficits, no neck trauma or neck strain.  Patient does have diplopia.  Patient reports that his double vision and blurred vision started at 0930 this morning.  Patient was a stroke alert.  Neurology recommended admitting for MRI of the brain and orbits with an echocardiogram. Patient was not a TNK candidate.  Patient outside the TNK window. CT/CTA resulted:  Hypodensities within the body of the left caudate and left corona radiata, concerning for age-indeterminate infarcts, although may be remote. Loaded patient with Plavix and aspirin.  Patient case discussed with [facility] internal medicine and patient was accepted for further evaluation.        Amount and/or Complexity of Data Reviewed  Labs: ordered.  Radiology: ordered.    Risk  OTC drugs.  Prescription drug management.  Decision regarding hospitalization.        ED Course as of 01/10/25 1558   Fri Logan 10, 2025   1445 Stroke Alert called   1534 Spoke with SLIM about patient's case.   Neurology recommended admission for MRI non-contrast with orbits and ECHO  Plavix loaded with ASA       Medications    clopidogrel (PLAVIX) tablet 600 mg (has no administration in time range)   atorvastatin (LIPITOR) tablet 40 mg (has no administration in time range)   aspirin chewable tablet 81 mg (has no administration in time range)   clopidogrel (PLAVIX) tablet 75 mg (has no administration in time range)   LORazepam (ATIVAN) injection 1 mg (has no administration in time range)   tetracaine 0.5 % ophthalmic solution 1 drop (1 drop Left Eye Given 1/10/25 1513)   fluorescein sodium sterile ophthalmic strip 1 strip (1 strip Both Eyes Given 1/10/25 1514)   iohexol (OMNIPAQUE) 350 MG/ML injection (MULTI-DOSE) 85 mL (85 mL Intravenous Given 1/10/25 1451)   aspirin chewable tablet 81 mg (81 mg Oral Given 1/10/25 1553)   gabapentin (NEURONTIN) capsule 300 mg (300 mg Oral Given 1/10/25 1553)       ED Risk Strat Scores                          SBIRT 22yo+      Flowsheet Row Most Recent Value   Initial Alcohol Screen: US AUDIT-C     1. How often do you have a drink containing alcohol? 0 Filed at: 01/10/2025 1431   2. How many drinks containing alcohol do you have on a typical day you are drinking?  0 Filed at: 01/10/2025 1431   3a. Male UNDER 65: How often do you have five or more drinks on one occasion? 0 Filed at: 01/10/2025 1431   3b. FEMALE Any Age, or MALE 65+: How often do you have 4 or more drinks on one occassion? 0 Filed at: 01/10/2025 1431   Audit-C Score 0 Filed at: 01/10/2025 1431   DILIP: How many times in the past year have you...    Used an illegal drug or used a prescription medication for non-medical reasons? Never Filed at: 01/10/2025 1431                            History of Present Illness       Chief Complaint   Patient presents with    Eye Problem     Double vision when looking with both eyes, if he covers one eye he doesn't have double vision; sent by eye doc fro head ct, started at 0930 this am     STROKE Alert       Past Medical History:   Diagnosis Date    Arthritis     Diabetes mellitus (HCC)     Hyperlipidemia      Hypertension     Left rotator cuff tear     Tobacco abuse     Wears glasses       Past Surgical History:   Procedure Laterality Date    COLONOSCOPY      KNEE SURGERY      [place] SURGICAL ARTHROSCOPY SHOULDER W/ROTATOR CUFF RPR Left 08/10/2022    Procedure: Left Shoulder Arthroscopy, supraspinatus and Subscapularis Repair, Bicep Tenotomy, acromioplasty, extensive debridement;  Surgeon: Otilio Mendez DO;  Location: [facility];  Service: Orthopedics    TONSILLECTOMY  1955      Family History   Problem Relation Age of Onset    Alzheimer's disease Mother     Diabetes Mother     Heart block Father     Heart disease Father       Social History     Tobacco Use    Smoking status: Every Day     Current packs/day: 0.50     Average packs/day: 0.6 packs/day for 85.5 years (55.2 ttl pk-yrs)     Types: Cigarettes     Start date: 4/26/1964    Smokeless tobacco: Never   Vaping Use    Vaping status: Never Used   Substance Use Topics    Alcohol use: Yes     Comment: rarely    Drug use: No      E-Cigarette/Vaping    E-Cigarette Use Never User       E-Cigarette/Vaping Substances    Nicotine No     THC No     CBD No     Flavoring No     Other No     Unknown No       I have reviewed and agree with the history as documented.     74-year-old male patient presents the ER for evaluation of double vision.  Patient stated that he was awake at 930 this morning when he started to have double vision and blurry vision.  Patient states that when he covers his right eye or left eye he is able to see and single vision although with eyes uncovered he has double vision.  Patient reports no head trauma or injury.  No noted loss of consciousness.  Patient does take aspirin daily.  No history of TIA/CVA.  Patient called his ophthalmologist and was referred to the ER for further evaluation.  Patient denies focal deficit.  Patient ambulatory on arrival.  Patient does wear glasses daily.      Eye Problem  Associated symptoms: no vomiting    STROKE  Alert  Associated symptoms: no abdominal pain, no chest pain, no cough, no ear pain, no fever, no rash, no shortness of breath, no sore throat and no vomiting        Review of Systems   Constitutional:  Negative for chills and fever.   HENT:  Negative for ear pain and sore throat.    Eyes:  Positive for visual disturbance. Negative for pain.   Respiratory:  Negative for cough and shortness of breath.    Cardiovascular:  Negative for chest pain and palpitations.   Gastrointestinal:  Negative for abdominal pain and vomiting.   Genitourinary:  Negative for dysuria and hematuria.   Musculoskeletal:  Negative for arthralgias and back pain.   Skin:  Negative for color change and rash.   Neurological:  Negative for seizures and syncope.   All other systems reviewed and are negative.          Objective       ED Triage Vitals [01/10/25 1428]   Temperature Pulse Blood Pressure Respirations SpO2 Patient Position - Orthostatic VS   98.5 °F (36.9 °C) 86 (!) 155/102 18 97 % Sitting      Temp Source Heart Rate Source BP Location FiO2 (%) Pain Score    Oral Monitor Left arm -- --      Vitals      Date and Time Temp Pulse SpO2 Resp BP Pain Score FACES Pain Rating User   01/10/25 1545 -- 72 98 % 17 171/75 -- -- TW   01/10/25 1428 98.5 °F (36.9 °C) 86 97 % 18 155/102 -- -- SG            Physical Exam  Vitals and nursing note reviewed.   Constitutional:       General: He is not in acute distress.     Appearance: He is well-developed.   HENT:      Head: Normocephalic and atraumatic.      Right Ear: External ear normal.      Left Ear: External ear normal.   Eyes:      General: Visual field deficit present.      Intraocular pressure: Right eye pressure is 9 mmHg. Left eye pressure is 10 mmHg.      Conjunctiva/sclera: Conjunctivae normal.      Pupils:      Right eye: No corneal abrasion or fluorescein uptake.      Left eye: No corneal abrasion or fluorescein uptake.   Cardiovascular:      Rate and Rhythm: Normal rate and regular rhythm.       Pulses: Normal pulses.      Heart sounds: Normal heart sounds.   Pulmonary:      Effort: Pulmonary effort is normal. No respiratory distress.      Breath sounds: Normal breath sounds.   Abdominal:      Palpations: Abdomen is soft.      Tenderness: There is no abdominal tenderness.   Musculoskeletal:         General: No swelling.      Cervical back: Neck supple.   Skin:     General: Skin is warm and dry.      Capillary Refill: Capillary refill takes less than 2 seconds.   Neurological:      Mental Status: He is alert.   Psychiatric:         Mood and Affect: Mood normal.         Results Reviewed       Procedure Component Value Units Date/Time    HS Troponin 0hr (reflex protocol) [[id number]]  (Normal) Collected: 01/10/25 1502    Lab Status: Final result Specimen: Blood from Arm, Right Updated: 01/10/25 1537     hs TnI 0hr 3 ng/L     HS Troponin I 2hr [[id number]]     Lab Status: No result Specimen: Blood     Basic metabolic panel [[id number]]  (Abnormal) Collected: 01/10/25 1502    Lab Status: Final result Specimen: Blood from Arm, Left Updated: 01/10/25 1526     Sodium 134 mmol/L      Potassium 4.2 mmol/L      Chloride 105 mmol/L      CO2 25 mmol/L      ANION GAP 4 mmol/L      BUN 23 mg/dL      Creatinine 0.92 mg/dL      Glucose 94 mg/dL      Calcium 8.3 mg/dL      eGFR 82 ml/min/1.73sq m     Narrative:      National Kidney Disease Foundation guidelines for Chronic Kidney Disease (CKD):     Stage 1 with normal or high GFR (GFR > 90 mL/min/1.73 square meters)    Stage 2 Mild CKD (GFR = 60-89 mL/min/1.73 square meters)    Stage 3A Moderate CKD (GFR = 45-59 mL/min/1.73 square meters)    Stage 3B Moderate CKD (GFR = 30-44 mL/min/1.73 square meters)    Stage 4 Severe CKD (GFR = 15-29 mL/min/1.73 square meters)    Stage 5 End Stage CKD (GFR <15 mL/min/1.73 square meters)  Note: GFR calculation is accurate only with a steady state creatinine    Protime-INR [[id number]]  (Normal) Collected: 01/10/25 1502    Lab Status: Final  result Specimen: Blood from Arm, Right Updated: 01/10/25 1523     Protime 14.7 seconds      INR 1.08    Narrative:      INR Therapeutic Range    Indication                                             INR Range      Atrial Fibrillation                                               2.0-3.0  Hypercoagulable State                                    2.0.2.3  Left Ventricular Asist Device                            2.0-3.0  Mechanical Heart Valve                                  -    Aortic(with afib, MI, embolism, HF, LA enlargement,    and/or coagulopathy)                                     2.0-3.0 (2.5-3.5)     Mitral                                                             2.5-3.5  Prosthetic/Bioprosthetic Heart Valve               2.0-3.0  Venous thromboembolism (VTE: VT, PE        2.0-3.0    APTT [[id number]]  (Normal) Collected: 01/10/25 1502    Lab Status: Final result Specimen: Blood from Arm, Right Updated: 01/10/25 1523     PTT 33 seconds     CBC and Platelet [[id number]]  (Normal) Collected: 01/10/25 1502    Lab Status: Final result Specimen: Blood from Arm, Left Updated: 01/10/25 1509     WBC 9.99 Thousand/uL      RBC 4.23 Million/uL      Hemoglobin 12.0 g/dL      Hematocrit 37.0 %      MCV 88 fL      MCH 28.4 pg      MCHC 32.4 g/dL      RDW 12.6 %      Platelets 267 Thousands/uL      MPV 10.0 fL             CTA stroke alert (head/neck)   Final Interpretation by Oumar Gómez MD (01/10 1539)      -No intracranial large vessel occlusion, proximal high-grade stenosis or aneurysm.      -No significant stenosis of the cervical carotid arteries. Mild to moderate stenosis of bilateral cervical vertebral arteries.      -Bilateral pulmonary nodules measuring up to 3 mm. Based on current Fleischner Society 2017 Guidelines on incidental pulmonary nodule, no routine follow-up is needed if the patient is low risk. If the patient is high risk, optional follow-up chest CT at    12 months can be considered.          Findings were directly discussed with Kailash Garduno at 3:23 pm. on 1/10/2025.      Workstation performed: [id number]         CT stroke alert brain   Final Interpretation by Oumar Gómez MD (01/10 9595)      -Hypodensities within the body of the left caudate and left corona radiata, concerning for age-indeterminate infarcts, although may be remote. Correlate with MRI.      -Chronic microangiopathic changes and remote lacunar infarcts as above.      Findings were directly discussed with Kailash Garduno at approximately 3:03 pm. on 1/10/2025..      Workstation performed: [id number]         MRI Inpatient Order    (Results Pending)       Procedures    ED Medication and Procedure Management   Prior to Admission Medications   Prescriptions Last Dose Informant Patient Reported? Taking?   Blood Glucose Monitoring Suppl (OneTouch Verio Reflect) w/Device KIT  Self No No   Sig: Check blood sugars three times daily. Please substitute with appropriate alternative as covered by patient's insurance. Dx: E11.65   Continuous Glucose Sensor (Dexcom G7 Sensor)  Self No No   Sig: Use 1 Device every 10 days   Insulin Glargine Solostar (Lantus SoloStar) 100 UNIT/ML SOPN  Self No No   Sig: Inject 0.2 mL (20 Units total) under the skin daily at bedtime   Insulin Pen Needle (CareOne Unifine Pentips Plus) 32G X 4 MM MISC   No No   Sig: USE DAILY AS INSTRUCTED WITH INSULIN PEN   OneTouch Delica Lancets 33G MISC  Self No No   Sig: Check blood sugars three times daily. Please substitute with appropriate alternative as covered by patient's insurance. Dx: E11.65   acetaminophen (TYLENOL) 500 mg tablet  Self Yes No   Sig: Take 500 mg by mouth every 6 (six) hours as needed for mild pain   amLODIPine (NORVASC) 5 mg tablet  Self No No   Sig: TAKE ONE TABLET BY MOUTH DAILY   aspirin 81 mg chewable tablet  Self No No   Sig: Chew 1 tablet (81 mg total) daily   cholecalciferol (VITAMIN D3) 1,000 units tablet  Self No No   Sig: Take 1 tablet (1,000 Units  total) by mouth daily   clotrimazole-betamethasone (LOTRISONE) 1-0.05 % cream  Self No No   Sig: Apply topically 2 (two) times a day   fluticasone (FLONASE) 50 mcg/act nasal spray  Self No No   Si spray into each nostril daily for 14 days   Patient taking differently: 1 spray into each nostril if needed   gabapentin (Neurontin) 300 mg capsule   No No   Sig: Take 1 capsule (300 mg total) by mouth 2 (two) times a day   glipiZIDE (GLUCOTROL XL) 10 mg 24 hr tablet   No No   Sig: Take 1 tablet (10 mg total) by mouth daily   glucose blood (OneTouch Verio) test strip  Self No No   Sig: Check blood sugars three times daily. Please substitute with appropriate alternative as covered by patient's insurance. Dx: E11.65   lisinopril (ZESTRIL) 20 mg tablet  Self No No   Sig: TAKE ONE TABLET BY MOUTH DAILY   meloxicam (MOBIC) 15 mg tablet   No No   Sig: TAKE ONE TABLET BY MOUTH EVERY DAY AS NEEDED FOR MODERATE PAIN   metFORMIN (GLUCOPHAGE-XR) 500 mg 24 hr tablet   No No   Sig: Take 2 tablets (1,000 mg total) by mouth 2 (two) times a day   multivitamin (THERAGRAN) TABS  Self Yes No   Sig: Take 1 tablet by mouth daily   pravastatin (PRAVACHOL) 20 mg tablet  Self No No   Sig: Take 1 tablet (20 mg total) by mouth daily      Facility-Administered Medications: None     Patient's Medications   Discharge Prescriptions    No medications on file     No discharge procedures on file.  ED SEPSIS DOCUMENTATION   Time reflects when diagnosis was documented in both MDM as applicable and the Disposition within this note       Time User Action Codes Description Comment    1/10/2025  2:43 PM Sadaf Brewer Add [H53.9] Vision changes     1/10/2025  3:45 PM Yael Lopez Add [H53.2] Diplopia                  JANET Van  01/10/25 9038

## 2025-01-10 NOTE — ASSESSMENT & PLAN NOTE
74 y.o. male with DM2, HTN, HLD, cervical radiculopathy with chronic neck/RUE pain/numbness, and tobacco use who presented to [facility] on 1/10/2025 as a stroke alert for intermittent binocular diplopia when looking to the left.    Upon arrival, /102.  NIHSS 0.  However, does appear to have subtle left cranial nerve VI palsy.  CT head shows hypodensities within the body of the left caudate and left corona radiata, concerning for age-indeterminate infarcts, although may be remote.  CTA head/neck negative for LVO or significant stenosis.  Incidentally noted bilateral pulmonary nodules.  Patient was not a TNK candidate due to being outside the appropriate time window.  Patient was loaded with Plavix 600 mg once and continued on home aspirin.    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  Diplopia could be due to diabetic cranial neuropathy, but cannot fully rule out acute brainstem stroke.    Plan:  - Stroke pathway  - MRI brain and orbits wo contrast pending (Ativan ordered)  - Echo pending  - Lipid Panel pending  - Hemoglobin A1c pending  - Load with Plavix 600 mg once and give home aspirin 81 mg  - Continue home Aspirin 81 mg and start Plavix 75 mg daily.  Will determine length of DAPT based on MRI findings  - Start Atorvastatin 40 mg QHS (patient was taking pravastatin PTA)  - Permissive HTN, labetalol if SBP >200  - Euglycemic, normothermic goal  - Continue telemetry  - PT/OT/ST  - Stroke education  - Patient should follow-up with ophthalmology outpatient  - Frequent neuro checks. Continue to monitor and notify neurology with any changes.  - STAT CT head for any acute change in neuro exam  - Medical management and supportive care per primary team. Correction of any metabolic or infectious disturbances.

## 2025-01-10 NOTE — CONSULTS
Consultation - Neurology   Name: Tc Stovall 74 y.o. male I MRN: [id number]  Unit/Bed#: FTH10 I Date of Admission: 1/10/2025   Date of Service: 1/10/2025 I Hospital Day: 0   Consult to Neurology  Consult performed by: Yael Lopez PA-C  Consult ordered by: JANET Van        Physician Requesting Evaluation: Ortiz Campos MD   Reason for Evaluation / Principal Problem: diplopia    Assessment & Plan  Diplopia  74 y.o. male with DM2, HTN, HLD, cervical radiculopathy with chronic neck/RUE pain/numbness, and tobacco use who presented to [facility] on 1/10/2025 as a stroke alert for intermittent binocular diplopia when looking to the left.    Upon arrival, /102.  NIHSS 0.  However, does appear to have subtle left cranial nerve VI palsy.  CT head shows hypodensities within the body of the left caudate and left corona radiata, concerning for age-indeterminate infarcts, although may be remote.  CTA head/neck negative for LVO or significant stenosis.  Incidentally noted bilateral pulmonary nodules.  Patient was not a TNK candidate due to being outside the appropriate time window.  Patient was loaded with Plavix 600 mg once and continued on home aspirin.    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  Diplopia could be due to diabetic cranial neuropathy, but cannot fully rule out acute brainstem stroke.    Plan:  - Stroke pathway  - MRI brain and orbits wo contrast pending (Ativan ordered)  - Echo pending  - Lipid Panel pending  - Hemoglobin A1c pending  - Load with Plavix 600 mg once and give home aspirin 81 mg  - Continue home Aspirin 81 mg and start Plavix 75 mg daily.  Will determine length of DAPT based on MRI findings  - Start Atorvastatin 40 mg QHS (patient was taking pravastatin PTA)  - Permissive HTN, labetalol if SBP >200  - Euglycemic, normothermic goal  - Continue telemetry  - PT/OT/ST  - Stroke education  - Patient should follow-up with ophthalmology outpatient  - Frequent neuro checks. Continue to  monitor and notify neurology with any changes.  - STAT CT head for any acute change in neuro exam  - Medical management and supportive care per primary team. Correction of any metabolic or infectious disturbances.       Thrombolytic Decision: Patient not a candidate. Unclear time of onset outside appropriate time window.    Recommendations for outpatient neurological follow up have yet to be determined.    History of Present Illness   Hx and PE limited by: N/A  Patient last known well: 9:30 AM today  Stroke alert called: 2:45 PM  Neurology time of arrival: immediate  HPI: Tc Stovall is a 74 y.o. male with DM2, HTN, HLD, cervical radiculopathy with chronic neck/RUE pain/numbness, and tobacco use who presented to [facility] on 1/10/2025 as a stroke alert for intermittent binocular diplopia.    History obtained per patient and chart review.  This morning, patient woke up in his normal state.  Around 9:30 AM he was visiting his wife at [facility] when he suddenly developed binocular diplopia.  He states that if he close each eye individually, double vision would resolve.  The diplopia was worse when looking to the left and would resolve when looking straight ahead or to the right.  No associated eye pain, headache, visual field loss, new numbness, weakness, facial asymmetry, or speech disturbances.  He called his ophthalmologist who recommended that he be evaluated in the ED.    Upon arrival, /102.  NIHSS 0.  However, does appear to have subtle left cranial nerve VI palsy.  CT head hypodensities within the body of the left caudate and left corona radiata, concerning for age-indeterminate infarcts, although may be remote.  CTA head/neck negative for LVO or significant stenosis.  Incidentally noted bilateral pulmonary nodules.  Patient was not a TNK candidate due to being outside the appropriate time window.  Patient was loaded with Plavix 600 mg once and continued on home aspirin.  He will be placed  on stroke pathway.    Review of Systems   Eyes:  Positive for visual disturbance.   All other systems reviewed and are negative.    I have reviewed the patient's PMH, PSH, Social History, Family History, Meds, and Allergies  Historical Information   Past Medical History:   Diagnosis Date    Arthritis     Diabetes mellitus (HCC)     Hyperlipidemia     Hypertension     Left rotator cuff tear     Tobacco abuse     Wears glasses      Past Surgical History:   Procedure Laterality Date    COLONOSCOPY      KNEE SURGERY      OK SURGICAL ARTHROSCOPY SHOULDER W/ROTATOR CUFF RPR Left 08/10/2022    Procedure: Left Shoulder Arthroscopy, supraspinatus and Subscapularis Repair, Bicep Tenotomy, acromioplasty, extensive debridement;  Surgeon: Otilio Mendez DO;  Location: [facility] OR;  Service: Orthopedics    TONSILLECTOMY  1955     Social History     Tobacco Use    Smoking status: Every Day     Current packs/day: 0.50     Average packs/day: 0.6 packs/day for 85.5 years (55.2 ttl pk-yrs)     Types: Cigarettes     Start date: 4/26/1964    Smokeless tobacco: Never   Vaping Use    Vaping status: Never Used   Substance and Sexual Activity    Alcohol use: Yes     Comment: rarely    Drug use: No    Sexual activity: Not Currently     Partners: Female     Birth control/protection: Female Sterilization     E-Cigarette/Vaping    E-Cigarette Use Never User      E-Cigarette/Vaping Substances    Nicotine No     THC No     CBD No     Flavoring No     Other No     Unknown No      Family History   Problem Relation Age of Onset    Alzheimer's disease Mother     Diabetes Mother     Heart block Father     Heart disease Father      Social History     Tobacco Use    Smoking status: Every Day     Current packs/day: 0.50     Average packs/day: 0.6 packs/day for 85.5 years (55.2 ttl pk-yrs)     Types: Cigarettes     Start date: 4/26/1964    Smokeless tobacco: Never   Vaping Use    Vaping status: Never Used   Substance and Sexual Activity    Alcohol use:  Yes     Comment: rarely    Drug use: No    Sexual activity: Not Currently     Partners: Female     Birth control/protection: Female Sterilization       Current Facility-Administered Medications:     aspirin chewable tablet 81 mg, Once    [START ON 2025] aspirin chewable tablet 81 mg, Daily    atorvastatin (LIPITOR) tablet 40 mg, QPM    clopidogrel (PLAVIX) tablet 600 mg, Once    [START ON 2025] clopidogrel (PLAVIX) tablet 75 mg, Daily    gabapentin (NEURONTIN) capsule 300 mg, Once  Prior to Admission Medications   Prescriptions Last Dose Informant Patient Reported? Taking?   Blood Glucose Monitoring Suppl (OneTouch Verio Reflect) w/Device KIT  Self No No   Sig: Check blood sugars three times daily. Please substitute with appropriate alternative as covered by patient's insurance. Dx: E11.65   Continuous Glucose Sensor (Dexcom G7 Sensor)  Self No No   Sig: Use 1 Device every 10 days   Insulin Glargine Solostar (Lantus SoloStar) 100 UNIT/ML SOPN  Self No No   Sig: Inject 0.2 mL (20 Units total) under the skin daily at bedtime   Insulin Pen Needle (CareOne Unifine Pentips Plus) 32G X 4 MM MISC   No No   Sig: USE DAILY AS INSTRUCTED WITH INSULIN PEN   OneTouch Delica Lancets 33G MISC  Self No No   Sig: Check blood sugars three times daily. Please substitute with appropriate alternative as covered by patient's insurance. Dx: E11.65   acetaminophen (TYLENOL) 500 mg tablet  Self Yes No   Sig: Take 500 mg by mouth every 6 (six) hours as needed for mild pain   amLODIPine (NORVASC) 5 mg tablet  Self No No   Sig: TAKE ONE TABLET BY MOUTH DAILY   aspirin 81 mg chewable tablet  Self No No   Sig: Chew 1 tablet (81 mg total) daily   cholecalciferol (VITAMIN D3) 1,000 units tablet  Self No No   Sig: Take 1 tablet (1,000 Units total) by mouth daily   clotrimazole-betamethasone (LOTRISONE) 1-0.05 % cream  Self No No   Sig: Apply topically 2 (two) times a day   fluticasone (FLONASE) 50 mcg/act nasal spray  Self No No   Si  spray into each nostril daily for 14 days   Patient taking differently: 1 spray into each nostril if needed   gabapentin (Neurontin) 300 mg capsule   No No   Sig: Take 1 capsule (300 mg total) by mouth 2 (two) times a day   glipiZIDE (GLUCOTROL XL) 10 mg 24 hr tablet   No No   Sig: Take 1 tablet (10 mg total) by mouth daily   glucose blood (OneTouch Verio) test strip  Self No No   Sig: Check blood sugars three times daily. Please substitute with appropriate alternative as covered by patient's insurance. Dx: E11.65   lisinopril (ZESTRIL) 20 mg tablet  Self No No   Sig: TAKE ONE TABLET BY MOUTH DAILY   meloxicam (MOBIC) 15 mg tablet   No No   Sig: TAKE ONE TABLET BY MOUTH EVERY DAY AS NEEDED FOR MODERATE PAIN   metFORMIN (GLUCOPHAGE-XR) 500 mg 24 hr tablet   No No   Sig: Take 2 tablets (1,000 mg total) by mouth 2 (two) times a day   multivitamin (THERAGRAN) TABS  Self Yes No   Sig: Take 1 tablet by mouth daily   pravastatin (PRAVACHOL) 20 mg tablet  Self No No   Sig: Take 1 tablet (20 mg total) by mouth daily      Facility-Administered Medications: None     Patient has no known allergies.    Objective :  Temp:  [98.5 °F (36.9 °C)] 98.5 °F (36.9 °C)  HR:  [86] 86  BP: (155)/(102) 155/102  Resp:  [18] 18  SpO2:  [97 %] 97 %  O2 Device: None (Room air)    Physical Exam  Vitals and nursing note reviewed.   Constitutional:       Appearance: Normal appearance.      Comments: Pleasant male lying comfortably in bed in no acute distress   HENT:      Head: Normocephalic and atraumatic.      Mouth/Throat:      Mouth: Mucous membranes are moist.      Pharynx: Oropharynx is clear.   Eyes:      Conjunctiva/sclera: Conjunctivae normal.      Pupils: Pupils are equal, round, and reactive to light.      Comments: When looking to the left, the left eye appears to be slightly delayed with abduction compared to the right eye.  Patient has subjective diplopia.  Concern for possible left cranial nerve VI palsy   Pulmonary:      Effort:  Pulmonary effort is normal.   Musculoskeletal:         General: Normal range of motion.   Skin:     General: Skin is warm and dry.   Neurological:      Mental Status: He is alert.     Neurological Exam  Mental Status  Alert.  Patient awake and alert.  Oriented to person, place, month, and year.  No dysarthria or aphasia.  Able to follow simple midline and appendicular commands..    Cranial Nerves  CN II: Vision test: When looking to the left, the left eye appears to be slightly delayed with abduction compared to the right eye.  Patient has subjective diplopia.  Concern for possible left cranial nerve VI palsy.  CN III, IV, VI: Pupils equal round and reactive to light bilaterally.  Pupils 3 mm, round, reactive to light bilaterally.  Have a subtle left cranial nerve VI palsy with subjective diplopia.  No nystagmus.  Normal EOMs when looking to the right, up, and down  No visual field deficits.  Facial sensation to light touch intact throughout.  Facial expressions full and symmetric.  Tongue midline.  Hearing grossly intact..    Motor  Normal muscle bulk throughout. Normal muscle tone.    No drift in any extremity  5/5 strength in bilateral upper and lower extremities except trace weakness in the RUE due to pain and cervical radiculopathy.    Sensory  Sensation to light touch and pinprick intact throughout..    Coordination    No ataxia with finger-to-nose or heel-to-shin bilaterally.    Gait    Deferred for patient's safety.      NIHSS:  1a.Level of Consciousness: 0 = Alert   1b. LOC Questions: 0 = Answers both correctly   1c. LOC Commands: 0 = Obeys both correctly   2. Best Gaze: 0 = Normal   3. Visual: 0 = No visual field loss   4. Facial Palsy: 0=Normal symmetric movement   5a. Motor Right Arm: 0=No drift, limb holds 90 (or 45) degrees for full 10 seconds   5b. Motor Left Arm: 0=No drift, limb holds 90 (or 45) degrees for full 10 seconds   6a. Motor Right Le=No drift, limb holds 90 (or 45) degrees for full 10  "seconds   6b. Motor Left Le=No drift, limb holds 90 (or 45) degrees for full 10 seconds   7. Limb Ataxia:  0=Absent   8. Sensory: 0=Normal; no sensory loss   9. Best Language:  0=No aphasia, normal   10. Dysarthria: 0=Normal articulation   11. Extinction and Inattention (formerly Neglect): 0=No abnormality   Total Score: 0     Time NIHSS was completed: 3:01 PM    Modified [place] Score:  0 (No baseline symptoms/disability)      Lab Results: I have reviewed the following results:CBC/BMP:   .     01/10/25  1502   WBC 9.99   HGB 12.0   HCT 37.0      SODIUM 134*   K 4.2      CO2 25   BUN 23   CREATININE 0.92   GLUC 94    , Creatinine Clearance: Estimated Creatinine Clearance: 81.8 mL/min (by C-G formula based on SCr of 0.92 mg/dL)., LFTs: No new results in last 24 hours. , PTT/INR:  .     01/10/25  1502   PTT 33   INR 1.08    , Troponin,BNP:  .     01/10/25  1502   HSTNI0 3    , Lactic Acid: No new results in last 24 hours. , Procalcitonin: No results found for: \"PROCALCITONI\", ABG: No new results in last 24 hours. , Lipase: No results found for: \"LIPASE\", Amylase: No results found for: \"AMYLASE\", Ammonia:   , Vitamins B1, B6, B12, A, and D: No results found for: \"B1\", \"THYROGLB\", \"JEOPRJUS76\", \"IAGA61MLHKGB\", Iron: No results found for: \"IRON\", Transferrin: No results found for: \"TRANSFERRIN\", Folate: No results found for: \"FOLATE\", Prealbumin: No results found for: \"PREALBUMIN\", Lipid Profile:   , TSH:   , Blood Culture: No results found for: \"BLOODCX\", Urinalysis: No results found for: \"COLORU\", \"CLARITYU\", \"SPECGRAV\", \"PHUR\", \"LEUKOCYTESUR\", \"NITRITE\", \"PROTEINUA\", \"GLUCOSEU\", \"KETONESU\", \"BILIRUBINUR\", \"BLOODU\", Urine Culture: No results found for: \"URINECX\", Wound Culure: No results found for: \"WOUNDCULT\", Lipid Profile:   , Drug Screen:   , Medication Drug Levels:       Invalid input(s): \"CARBAMAZEPINE\", \"OXCARBAZEPINE\", Sputum Culture: No results found for: \"SPUTUMCULTUR\", Throat Culture: No " "components found for: \"THROATCX\", RSV: No results found for: \"RSV\", FLU: No components found for: \"INFLUENZA\", Rapid Strep: No components found for: \"RAPIDSTREP\"    Imaging Results Review: I reviewed radiology reports from this admission including: CT head, CTA head/neck.    VTE Prophylaxis: VTE covered by:    None    and Sequential compression device (Venodyne)     Code Status: Prior  Advance Directive and Living Will:      Power of :    POLST:      "

## 2025-01-10 NOTE — ASSESSMENT & PLAN NOTE
Suspected secondary to CVA, patient was stroke alert in ED not a candidate for thrombolytics  Admit to [facility] telemetry  Loaded with aspirin and Plavix continue daily  Permissive hypertension  Continue statin  CT, CTA performed CT with evidence of ischemic CVA indeterminate age  MRI ordered and pending echocardiogram ordered and pending  Neurochecks  Lipid panel, hemoglobin A1c  PT OT  Neurology following

## 2025-01-10 NOTE — ASSESSMENT & PLAN NOTE
"Lab Results   Component Value Date    HGBA1C 7.1 (H) 10/16/2024       No results for input(s): \"POCGLU\" in the last 72 hours.    Blood Sugar Average: Last 72 hrs:    Patient prescribed 20 units Lantus at bedtime (however he has been taking it as needed for sugar greater than 200) in addition to glipizide and metformin  Continue Lantus holding p.o. antihyperglycemic's  Sliding scale  Patient needs diabetic education    "

## 2025-01-11 LAB
ANION GAP SERPL CALCULATED.3IONS-SCNC: 7 MMOL/L (ref 4–13)
ATRIAL RATE: 65 BPM
BUN SERPL-MCNC: 21 MG/DL (ref 5–25)
CALCIUM SERPL-MCNC: 8.8 MG/DL (ref 8.4–10.2)
CHLORIDE SERPL-SCNC: 105 MMOL/L (ref 96–108)
CHOLEST SERPL-MCNC: 129 MG/DL (ref ?–200)
CO2 SERPL-SCNC: 25 MMOL/L (ref 21–32)
CREAT SERPL-MCNC: 0.93 MG/DL (ref 0.6–1.3)
ERYTHROCYTE [DISTWIDTH] IN BLOOD BY AUTOMATED COUNT: 12.6 % (ref 11.6–15.1)
EST. AVERAGE GLUCOSE BLD GHB EST-MCNC: 171 MG/DL
GFR SERPL CREATININE-BSD FRML MDRD: 81 ML/MIN/1.73SQ M
GLUCOSE SERPL-MCNC: 101 MG/DL (ref 65–140)
GLUCOSE SERPL-MCNC: 148 MG/DL (ref 65–140)
GLUCOSE SERPL-MCNC: 289 MG/DL (ref 65–140)
GLUCOSE SERPL-MCNC: 93 MG/DL (ref 65–140)
GLUCOSE SERPL-MCNC: 97 MG/DL (ref 65–140)
HBA1C MFR BLD: 7.6 %
HCT VFR BLD AUTO: 39.2 % (ref 36.5–49.3)
HDLC SERPL-MCNC: 38 MG/DL
HGB BLD-MCNC: 12.9 G/DL (ref 12–17)
LDLC SERPL CALC-MCNC: 57 MG/DL (ref 0–100)
MCH RBC QN AUTO: 28.5 PG (ref 26.8–34.3)
MCHC RBC AUTO-ENTMCNC: 32.9 G/DL (ref 31.4–37.4)
MCV RBC AUTO: 87 FL (ref 82–98)
P AXIS: 51 DEGREES
PLATELET # BLD AUTO: 247 THOUSANDS/UL (ref 149–390)
PMV BLD AUTO: 10.1 FL (ref 8.9–12.7)
POTASSIUM SERPL-SCNC: 4.1 MMOL/L (ref 3.5–5.3)
PR INTERVAL: 210 MS
QRS AXIS: 89 DEGREES
QRSD INTERVAL: 68 MS
QT INTERVAL: 378 MS
QTC INTERVAL: 393 MS
RBC # BLD AUTO: 4.52 MILLION/UL (ref 3.88–5.62)
SODIUM SERPL-SCNC: 137 MMOL/L (ref 135–147)
T WAVE AXIS: 32 DEGREES
TRIGL SERPL-MCNC: 168 MG/DL (ref ?–150)
VENTRICULAR RATE: 65 BPM
WBC # BLD AUTO: 9.03 THOUSAND/UL (ref 4.31–10.16)

## 2025-01-11 PROCEDURE — 85027 COMPLETE CBC AUTOMATED: CPT | Performed by: INTERNAL MEDICINE

## 2025-01-11 PROCEDURE — 99232 SBSQ HOSP IP/OBS MODERATE 35: CPT | Performed by: INTERNAL MEDICINE

## 2025-01-11 PROCEDURE — 99232 SBSQ HOSP IP/OBS MODERATE 35: CPT | Performed by: PSYCHIATRY & NEUROLOGY

## 2025-01-11 PROCEDURE — 93010 ELECTROCARDIOGRAM REPORT: CPT | Performed by: INTERNAL MEDICINE

## 2025-01-11 PROCEDURE — 80048 BASIC METABOLIC PNL TOTAL CA: CPT | Performed by: INTERNAL MEDICINE

## 2025-01-11 PROCEDURE — 80061 LIPID PANEL: CPT | Performed by: INTERNAL MEDICINE

## 2025-01-11 PROCEDURE — 82948 REAGENT STRIP/BLOOD GLUCOSE: CPT

## 2025-01-11 PROCEDURE — 83036 HEMOGLOBIN GLYCOSYLATED A1C: CPT | Performed by: INTERNAL MEDICINE

## 2025-01-11 PROCEDURE — 97166 OT EVAL MOD COMPLEX 45 MIN: CPT

## 2025-01-11 RX ADMIN — CLOTRIMAZOLE AND BETAMETHASONE DIPROPIONATE 1 APPLICATION: 10; .64 CREAM TOPICAL at 09:14

## 2025-01-11 RX ADMIN — INSULIN LISPRO 4 UNITS: 100 INJECTION, SOLUTION INTRAVENOUS; SUBCUTANEOUS at 13:04

## 2025-01-11 RX ADMIN — ATORVASTATIN CALCIUM 40 MG: 40 TABLET, FILM COATED ORAL at 17:14

## 2025-01-11 RX ADMIN — FLUTICASONE PROPIONATE 1 SPRAY: 50 SPRAY, METERED NASAL at 09:14

## 2025-01-11 RX ADMIN — NICOTINE 1 PATCH: 14 PATCH, EXTENDED RELEASE TRANSDERMAL at 09:14

## 2025-01-11 RX ADMIN — ASPIRIN 81 MG: 81 TABLET, CHEWABLE ORAL at 09:14

## 2025-01-11 RX ADMIN — GABAPENTIN 300 MG: 300 CAPSULE ORAL at 17:14

## 2025-01-11 RX ADMIN — CLOPIDOGREL 75 MG: 75 TABLET ORAL at 09:14

## 2025-01-11 RX ADMIN — CLOTRIMAZOLE AND BETAMETHASONE DIPROPIONATE: 10; .64 CREAM TOPICAL at 17:14

## 2025-01-11 RX ADMIN — ACETAMINOPHEN 650 MG: 325 TABLET, FILM COATED ORAL at 02:52

## 2025-01-11 RX ADMIN — GABAPENTIN 300 MG: 300 CAPSULE ORAL at 09:14

## 2025-01-11 NOTE — ASSESSMENT & PLAN NOTE
Lab Results   Component Value Date    HGBA1C 7.1 (H) 10/16/2024       Recent Labs     01/10/25  1811 01/10/25  2128 01/11/25  0717 01/11/25  1047   POCGLU 95 85 101 289*       Blood Sugar Average: Last 72 hrs:  (P) 126.2

## 2025-01-11 NOTE — PROGRESS NOTES
Progress Note - Neurology   Name: Tc Stovall 74 y.o. male I MRN: [id number]  Unit/Bed#: 2 E 256-01 I Date of Admission: 1/10/2025   Date of Service: 1/11/2025 I Hospital Day: 1     Assessment & Plan  Diplopia  74 y.o. male with DM2, HTN, HLD, cervical radiculopathy with chronic neck/RUE pain/numbness, and tobacco use who presented to [facility] on 1/10/2025 as a stroke alert for intermittent binocular diplopia when looking to the left.    Upon arrival, /102.  NIHSS 0.  However, does appear to have subtle left cranial nerve 3 palsy.  CT head shows hypodensities within the body of the left caudate and left corona radiata, concerning for age-indeterminate infarcts, although may be remote.  CTA head/neck negative for LVO or significant stenosis.  Incidentally noted bilateral pulmonary nodules.  Patient was not a TNK candidate due to being outside the appropriate time window.  Patient was loaded with Plavix 600 mg once and continued on home aspirin.    Appears to have subtle L CN3 palsy.  Diplopia could be due to ischemic cranial neuropathy, but cannot fully rule out acute brainstem stroke.    Plan:  - Stroke pathway  - MRI brain and orbits wo contrast pending (Ativan ordered)  - Echo pending  - LDL 57  - Hemoglobin A1c pending  - Continue home Aspirin 81 mg and start Plavix 75 mg daily.  Will determine length of DAPT based on MRI findings  - Continue Atorvastatin 40 mg QHS (patient was taking pravastatin PTA)  - Permissive HTN, labetalol if SBP >200  - Euglycemic, normothermic goal  - Continue telemetry  - PT/OT/ST  - Stroke education  - Patient should follow-up with ophthalmology outpatient  - Frequent neuro checks. Continue to monitor and notify neurology with any changes.  - STAT CT head for any acute change in neuro exam  - Medical management and supportive care per primary team. Correction of any metabolic or infectious disturbances.   Hyperlipidemia associated with type 2 diabetes mellitus  (HCC)  Lab  Results   Component Value Date    HGBA1C 7.1 (H) 10/16/2024       Recent Labs     01/10/25  1811 01/10/25  2128 01/11/25  0717 01/11/25  1047   POCGLU 95 85 101 289*       Blood Sugar Average: Last 72 hrs:  (P) 126.2    Type 2 diabetes mellitus with peripheral vascular disease (HCC)  Lab Results   Component Value Date    HGBA1C 7.1 (H) 10/16/2024       Recent Labs     01/10/25  1811 01/10/25  2128 01/11/25  0717 01/11/25  1047   POCGLU 95 85 101 289*       Blood Sugar Average: Last 72 hrs:  (P) 126.2      Recommendations for outpatient neurological follow up have yet to be determined.      Subjective   No new symptoms. Feels his diplopia has resolved but with exam still endorsed horizontal diplopia when looking to the L. Otherwise doing well. Imaging remains pending.    Review of Systems   All other systems reviewed and are negative.      Objective :  Temp:  [97.6 °F (36.4 °C)-98.5 °F (36.9 °C)] 97.6 °F (36.4 °C)  HR:  [55-86] 61  BP: (140-187)/() 169/74  Resp:  [15-22] 18  SpO2:  [94 %-99 %] 99 %  O2 Device: None (Room air)    Physical Exam  Constitutional:       Appearance: He is well-developed.   HENT:      Head: Normocephalic and atraumatic.   Eyes:      Pupils: Pupils are equal, round, and reactive to light.   Cardiovascular:      Rate and Rhythm: Normal rate and regular rhythm.      Heart sounds: Normal heart sounds.   Pulmonary:      Effort: Pulmonary effort is normal.      Breath sounds: Normal breath sounds.   Abdominal:      General: Bowel sounds are normal.      Palpations: Abdomen is soft.   Musculoskeletal:      Cervical back: Normal range of motion and neck supple.   Neurological:      Mental Status: He is alert and oriented to person, place, and time.      Motor: Motor strength is normal.     Coordination: Coordination is intact.      Deep Tendon Reflexes: Reflexes are normal and symmetric.   Psychiatric:         Speech: Speech normal.     Neurological Exam  Mental Status  Alert. Oriented to  person, place and time. Oriented to person, place, and time. Speech is normal. Language is fluent with no aphasia. Attention and concentration are normal.    Cranial Nerves  CN II: Visual fields full to confrontation.  CN III, IV, VI: Pupils equal round and reactive to light bilaterally.  CN V: Facial sensation is normal.  CN VII: Full and symmetric facial movement.  CN VIII: Hearing is normal.  CN XII: Tongue midline without atrophy or fasciculations.  With gaze to the L, pt describes horizontal diplopia and appears to have subtle adduction palsy in the R eye; no clear abduction palsy as originally felt on AP exam yesterday.    Motor   Strength is 5/5 throughout all four extremities.    Sensory  Light touch is normal in upper and lower extremities.     Reflexes  Deep tendon reflexes are 2+ and symmetric in all four extremities.    Coordination    Finger-to-nose, rapid alternating movements and heel-to-shin normal bilaterally without dysmetria.        Lab Results: I have reviewed the following results:  Imaging Results Review: No pertinent imaging studies reviewed.  Other Study Results Review: No additional pertinent studies reviewed.    VTE Pharmacologic Prophylaxis: VTE covered by:    None

## 2025-01-11 NOTE — CASE MANAGEMENT
Case Management Assessment & Discharge Planning Note    Patient name Tc Stovall  Location 2 [facility] 256/2 E 256-01 MRN [id number]  : 1950 Date 2025       Current Admission Date: 1/10/2025  Current Admission Diagnosis:Diplopia   Patient Active Problem List    Diagnosis Date Noted Date Diagnosed    Diplopia 01/10/2025     Radiculopathy, cervical region 2024     Foraminal stenosis of lumbar region 2024     Protrusion of cervical intervertebral disc 2024     Cervical radiculopathy 2024     Type 2 diabetes mellitus with peripheral vascular disease ([facility]) 2024     Obesity, morbid ([facility]) 2024     Type 2 diabetes mellitus with hyperglycemia, without long-term current use of insulin ([facility]) 2023     Primary hypertension 10/04/2021     Hyperlipidemia associated with type 2 diabetes mellitus  ([facility]) 10/04/2021     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 - [place], [place] - [facility]  [address] [place] [address]  Phone: [phone] Fax: [phone]    Primary Care Provider: Lester Roth DO    Primary Insurance: MEDICARE  Secondary Insurance: AARP    ASSESSMENT:  Active Health Care Proxies       Mateusz Stovall Health Care Agent - Son   Primary Phone: [phone] (Home)                 Advance Directives  Does patient have a Health Care POA?: Yes  Does patient have Advance Directives?: Yes  Advance Directives: Living will  Primary Contact: son         Readmission  Root Cause  30 Day Readmission: No    Patient Information  Admitted from:: Home  Mental Status: Alert  During Assessment patient was accompanied by: Not accompanied during assessment  Assessment information provided by:: Patient  Primary Caregiver: Self  Support Systems: Son  County of Residence: [place]  What city do you live in?: [place]  Home entry access options. Select all that apply.: No steps to enter home  Type of Current Residence: [facility]  Living Arrangements: Lives Alone  Is patient a ?: No    Activities of Daily Living Prior to Admission  Functional Status: Independent  Completes ADLs independently?: Yes  Ambulates independently?: Yes  Does patient use assisted devices?: No  Does patient currently own DME?: Yes  What DME does the patient currently own?: Wheelchair, Walker, Straight Cane  Does patient have a history of Outpatient Therapy (PT/OT)?: Yes  Does the patient have a history of Short-Term Rehab?: No  Does patient have a history of HHC?: No  Does patient currently have HHC?: No         Patient Information Continued  Income Source: Pension/longterm  Does patient have prescription coverage?: Yes  Does patient receive dialysis treatments?: No  Does patient have a history of substance abuse?: No  Does patient have a history of Mental Health Diagnosis?: No         Means of Transportation  Means of Transport to Appts:: Drives Self          DISCHARGE DETAILS:    Discharge planning discussed with:: Patient  Freedom of Choice: Yes  Comments - Freedom of Choice: no anticipated DC needs. Does not want homecare  CM contacted family/caregiver?: No- see comments                  Requested Home Health Care         Is the patient interested in HHC at discharge?: No    DME Referral Provided  Referral made for DME?: No    Other Referral/Resources/Interventions Provided:  Referral Comments: No anticipated DC needs. Does not want homecare         Treatment Team Recommendation: Home  Discharge  Destination Plan:: Home  Transport at Discharge : Family

## 2025-01-11 NOTE — ASSESSMENT & PLAN NOTE
Lab Results   Component Value Date    HGBA1C 7.6 (H) 01/11/2025       Recent Labs     01/10/25  2128 01/11/25  0717 01/11/25  1047 01/11/25  1614   POCGLU 85 101 289* 93       Blood Sugar Average: Last 72 hrs:  (P) 120.9598107745960490  Continue statin

## 2025-01-11 NOTE — PLAN OF CARE
Problem: PAIN - ADULT  Goal: Verbalizes/displays adequate comfort level or baseline comfort level  Description: Interventions:  - Encourage patient to monitor pain and request assistance  - Assess pain using appropriate pain scale  - Administer analgesics based on type and severity of pain and evaluate response  - Implement non-pharmacological measures as appropriate and evaluate response  - Consider cultural and social influences on pain and pain management  - Notify physician/advanced practitioner if interventions unsuccessful or patient reports new pain  Outcome: Progressing     Problem: DISCHARGE PLANNING  Goal: Discharge to home or other facility with appropriate resources  Description: INTERVENTIONS:  - Identify barriers to discharge w/patient and caregiver  - Arrange for needed discharge resources and transportation as appropriate  - Identify discharge learning needs (meds, wound care, etc.)  - Arrange for interpretive services to assist at discharge as needed  - Refer to Case Management Department for coordinating discharge planning if the patient needs post-hospital services based on physician/advanced practitioner order or complex needs related to functional status, cognitive ability, or social support system  Outcome: Progressing     Problem: Knowledge Deficit  Goal: Patient/family/caregiver demonstrates understanding of disease process, treatment plan, medications, and discharge instructions  Description: Complete learning assessment and assess knowledge base.  Interventions:  - Provide teaching at level of understanding  - Provide teaching via preferred learning methods  Outcome: Progressing     Problem: Neurological Deficit  Goal: Neurological status is stable or improving  Description: Interventions:  - Monitor and assess patient's level of consciousness, motor function, sensory function, and level of assistance needed for ADLs.   - Monitor and report changes from baseline. Collaborate with  interdisciplinary team to initiate plan and implement interventions as ordered.   - Provide and maintain a safe environment.  - Consider seizure precautions.  - Consider fall precautions.  - Consider aspiration precautions.  - Consider bleeding precautions.  Outcome: Progressing     Problem: Nutrition  Goal: Nutrition/Hydration status is improving  Description: Monitor and assess patient's nutrition/hydration status for malnutrition (ex- brittle hair, bruises, dry skin, pale skin and conjunctiva, muscle wasting, smooth red tongue, and disorientation). Collaborate with interdisciplinary team and initiate plan and interventions as ordered.  Monitor patient's weight and dietary intake as ordered or per policy. Utilize nutrition screening tool and intervene per policy. Determine patient's food preferences and provide high-protein, high-caloric foods as appropriate.     - Assist patient with eating.  - Allow adequate time for meals.  - Encourage patient to take dietary supplement as ordered.  - Collaborate with clinical nutritionist.  - Include patient/family/caregiver in decisions related to nutrition.  Outcome: Progressing

## 2025-01-11 NOTE — ASSESSMENT & PLAN NOTE
74 y.o. male with DM2, HTN, HLD, cervical radiculopathy with chronic neck/RUE pain/numbness, and tobacco use who presented to [facility] on 1/10/2025 as a stroke alert for intermittent binocular diplopia when looking to the left.    Upon arrival, /102.  NIHSS 0.  However, does appear to have subtle left cranial nerve 3 palsy.  CT head shows hypodensities within the body of the left caudate and left corona radiata, concerning for age-indeterminate infarcts, although may be remote.  CTA head/neck negative for LVO or significant stenosis.  Incidentally noted bilateral pulmonary nodules.  Patient was not a TNK candidate due to being outside the appropriate time window.  Patient was loaded with Plavix 600 mg once and continued on home aspirin.    Appears to have subtle L CN3 palsy.  Diplopia could be due to ischemic cranial neuropathy, but cannot fully rule out acute brainstem stroke.    Plan:  - Stroke pathway  - MRI brain and orbits wo contrast pending (Ativan ordered)  - Echo pending  - LDL 57  - Hemoglobin A1c pending  - Continue home Aspirin 81 mg and start Plavix 75 mg daily.  Will determine length of DAPT based on MRI findings  - Continue Atorvastatin 40 mg QHS (patient was taking pravastatin PTA)  - Permissive HTN, labetalol if SBP >200  - Euglycemic, normothermic goal  - Continue telemetry  - PT/OT/ST  - Stroke education  - Patient should follow-up with ophthalmology outpatient  - Frequent neuro checks. Continue to monitor and notify neurology with any changes.  - STAT CT head for any acute change in neuro exam  - Medical management and supportive care per primary team. Correction of any metabolic or infectious disturbances.

## 2025-01-11 NOTE — OCCUPATIONAL THERAPY NOTE
Occupational Therapy Evaluation     Patient Name: Tc Stovall  Today's Date: 1/11/2025  Problem List  Principal Problem:    Diplopia  Active Problems:    Primary hypertension    Hyperlipidemia associated with type 2 diabetes mellitus  (HCC)    Type 2 diabetes mellitus with peripheral vascular disease (HCC)    Radiculopathy, cervical region    Past Medical History  Past Medical History:   Diagnosis Date    Arthritis     Diabetes mellitus (HCC)     Hyperlipidemia     Hypertension     Left rotator cuff tear     Tobacco abuse     Wears glasses      Past Surgical History  Past Surgical History:   Procedure Laterality Date    COLONOSCOPY      KNEE SURGERY      CO SURGICAL ARTHROSCOPY SHOULDER W/ROTATOR CUFF RPR Left 08/10/2022    Procedure: Left Shoulder Arthroscopy, supraspinatus and Subscapularis Repair, Bicep Tenotomy, acromioplasty, extensive debridement;  Surgeon: Otilio Mendez DO;  Location: [facility];  Service: Orthopedics    TONSILLECTOMY  1955 01/11/25 0902   OT Last Visit   OT Visit Date 01/11/25   Note Type   Note type Evaluation   Pain Assessment   Pain Assessment Tool 0-10   Pain Score 4   Pain Location/Orientation Orientation: Right;Location: Arm   Restrictions/Precautions   Weight Bearing Precautions Per Order No   Braces or Orthoses   (None per patient.)   Other Precautions Chair Alarm;Bed Alarm;Fall Risk;Pain   Home Living   Type of Home House   Home Layout One level;Ramped entrance   Bathroom Shower/Tub Walk-in shower   Bathroom Toilet Raised   Bathroom Equipment Grab bars in shower;Grab bars around toilet;Toilet raiser;Tub transfer bench   Bathroom Accessibility Accessible   Home Equipment Walker;Cane  (Does not use)   Prior Function   Level of [place] Independent with ADLs;Independent with functional mobility;Independent with IADLS   Lives With Alone  (Wife resides in a SNF)   Receives Help From Family   IADLs Independent with driving;Independent with meal prep;Independent  "with medication management   Falls in the last 6 months 0   Vocational Retired  (Worked at [employer])   Lifestyle   Autonomy Patient is independent with all ADLs and IADLs.   Reciprocal Relationships Family   Service to Others Retired   Intrinsic Gratification Watching tv   General   Family/Caregiver Present No   Subjective   Subjective \"I like to beny but I haven't since the issue with my neck.\"   ADL   Where Assessed   (In room)   Eating Assistance 7  Independent   Eating Deficit None   Grooming Assistance 6  Modified Independent   Grooming Deficit Increased time to complete   UB Bathing Assistance 6  Modified Independent   UB Bathing Deficit Increased time to complete   LB Bathing Assistance 6  Modified Independent   LB Bathing Deficit Increased time to complete   UB Dressing Assistance 6  Modified independent   UB Dressing Deficit Increased time to complete   LB Dressing Assistance 6  Modified independent   LB Dressing Deficit Increased time to complete   Toileting Assistance  6  Modified independent   Toileting Deficit Increased time to complete   Functional Assistance 6  Modified independent   Functional Deficit Increased time to complete   Bed Mobility   Supine to Sit 6  Modified independent   Transfers   Sit to Stand 7  Independent   Functional Mobility   Functional Mobility 7  Independent   Balance   Static Sitting Good   Dynamic Sitting Good   Static Standing Fair +   Dynamic Standing Fair +   Ambulatory Fair +   Activity Tolerance   Activity Tolerance Patient limited by pain   Nurse Made Aware CELE [place]   RUE Assessment   RUE Assessment X   RUE Overall AROM   R Shoulder Flexion WFL   R Elbow Flexion WFL   R Elbow Extension WFL   R Elbow Supination Limited   R Wrist Extension Limited   R Finger Extension Limited   R Finger ABduction Limited   R Finger ADduction Limited   RUE Strength   R Shoulder Flexion 4/5   R Elbow Flexion 4/5  (Hand 2+/5)   R Wrist Extension 2+/5   LUE Assessment   LUE " Assessment WFL   Hand Function   Gross Motor Coordination Functional   Fine Motor Coordination Impaired  (Right hand due to strength)   Sensation   Light Touch Partial deficits in the RUE   Sharp/Dull Partial deficits in the RUE   Proprioception   Proprioception No apparent deficits   Vision-Basic Assessment   Current Vision Wears glasses all the time   Vision - Complex Assessment   Acuity Able to read employee name badge without difficulty;Able to read normal print without difficulty   Diplopia Assessment   (Denies)   Perception   Inattention/Neglect Appears intact   Motor Planning Appears intact   Perseveration Not present   Cognition   Orientation Level Oriented X4   Assessment   Prognosis Good   Assessment Patient is a 74 y.o. male seen for OT evaluation s/p admit to [facility] on 1/10/2025 w/Diplopia. Commorbidities affecting patient's functional performance at time of assessment include: neck and right arm pain/weakness. Orders placed for OT evaluation and treatment. Performed at least two patient identifiers during session including name and wristband. Prior to admission, patient was independent with ADLs and IADLs . Personal factors affecting patient at time of initial evaluation include: limited caregiver support. Upon evaluation, patient requires independent and modified independent assist for UB ADLs, modified independent assist for LB ADLs, transfers and functional ambulation in room and bathroom with modified independent assist, without the use of a device. Presents with functional use of LUE, with intact prehension, coordination and  muscle strength, decreased strength and ROM on RUE/ Patient was educated on recommendation for outpatient therapy to address RUE, patient verbalized understanding and demonstrated recommended plan correctly.  Patient appears to be functioning at baseline. No further acute OT needs identified at this time to warrant continuation of services. D/C OT services.  From OT standpoint, recommendation at time of d/c would be Level III (Minimum Resource Intensity) to address RUE.   Plan   OT Frequency Eval only   Discharge Recommendation   Rehab Resource Intensity Level, OT III (Minimum Resource Intensity)   AM-PAC Daily Activity Inpatient   Lower Body Dressing 4   Bathing 4   Toileting 4   Upper Body Dressing 4   Grooming 4   Eating 4   Daily Activity Raw Score 24   Daily Activity Standardized Score (Calc for Raw Score >=11) 57.54   AM-PAC Applied Cognition Inpatient   Following a Speech/Presentation 4   Understanding Ordinary Conversation 4   Taking Medications 4   Remembering Where Things Are Placed or Put Away 4   Remembering List of 4-5 Errands 4   Taking Care of Complicated Tasks 4   Applied Cognition Raw Score 24   Applied Cognition Standardized Score 62.21   Barthel Index   Feeding 10   Bathing 5   Grooming Score 5   Dressing Score 10   Bladder Score 10   Bowels Score 10   Toilet Use Score 10   Transfers (Bed/Chair) Score 15   Mobility (Level Surface) Score 15   Stairs Score 10   Barthel Index Score 100

## 2025-01-11 NOTE — ASSESSMENT & PLAN NOTE
Lab Results   Component Value Date    HGBA1C 7.6 (H) 01/11/2025       Recent Labs     01/10/25  2128 01/11/25  0717 01/11/25  1047 01/11/25  1614   POCGLU 85 101 289* 93       Blood Sugar Average: Last 72 hrs:  (P) 120.5112963256975708  Patient prescribed 20 units Lantus at bedtime (however he has been taking it as needed for sugar greater than 200) in addition to glipizide and metformin  Continue Lantus holding p.o. antihyperglycemic's  Sliding scale  Patient needs diabetic education

## 2025-01-11 NOTE — PROGRESS NOTES
Progress Note - Hospitalist   Name: Tc Stovall 74 y.o. male I MRN: [id number]  Unit/Bed#: 2 E 256-01 I Date of Admission: 1/10/2025   Date of Service: 1/11/2025 I Hospital Day: 1    Assessment & Plan  Diplopia  Suspected secondary to CVA, patient was stroke alert in ED not a candidate for thrombolytics  Admit to Medr telemetry  Loaded with aspirin and Plavix continue daily  Permissive hypertension  Continue statin  CT, CTA performed CT with evidence of ischemic CVA indeterminate age  MRI ordered and pending echocardiogram ordered and pending  Neurochecks  Lipid panel, hemoglobin A1c  PT OT  Neurology following    Primary hypertension  Patient prescribed amlodipine 5, lisinopril 20  Holding for permissive hypertension per neurology SBP < 220    Hyperlipidemia associated with type 2 diabetes mellitus  (HCC)  Lab Results   Component Value Date    HGBA1C 7.6 (H) 01/11/2025       Recent Labs     01/10/25  2128 01/11/25  0717 01/11/25  1047 01/11/25  1614   POCGLU 85 101 289* 93       Blood Sugar Average: Last 72 hrs:  (P) 120.3857621678204646  Continue statin  Type 2 diabetes mellitus with peripheral vascular disease (HCC)  Lab Results   Component Value Date    HGBA1C 7.6 (H) 01/11/2025       Recent Labs     01/10/25  2128 01/11/25  0717 01/11/25  1047 01/11/25  1614   POCGLU 85 101 289* 93       Blood Sugar Average: Last 72 hrs:  (P) 120.6890370243862089  Patient prescribed 20 units Lantus at bedtime (however he has been taking it as needed for sugar greater than 200) in addition to glipizide and metformin  Continue Lantus holding p.o. antihyperglycemic's  Sliding scale  Patient needs diabetic education    Radiculopathy, cervical region  Continue gabapentin    VTE Pharmacologic Prophylaxis:   Moderate Risk (Score 3-4) - Pharmacological DVT Prophylaxis Contraindicated. Sequential Compression Devices Ordered.    Mobility:   Basic Mobility Inpatient Raw Score: 24  JH-HLM Goal: 8: Walk 250 feet or more  JH-HLM  Achieved: 8: Walk 250 feet ot more  -HLM Goal achieved. Continue to encourage appropriate mobility.    Patient Centered Rounds: I performed bedside rounds with nursing staff today.   Discussions with Specialists or Other Care Team Provider: RN    Education and Discussions with Family / Patient: Updated  (son) at bedside.    Current Length of Stay: 1 day(s)  Current Patient Status: Inpatient   Certification Statement: The patient will continue to require additional inpatient hospital stay due to CVA workup  Discharge Plan: Anticipate discharge in 24-48 hrs to discharge location to be determined pending rehab evaluations.    Code Status: Level 1 - Full Code    Subjective   Seen and examined at bedside.  No acute events overnight.  Symptoms that prompted him to present to the hospital have not recurred since being admitted.    Objective :  Temp:  [97.6 °F (36.4 °C)-98.2 °F (36.8 °C)] 98 °F (36.7 °C)  HR:  [55-69] 61  BP: (140-187)/(62-93) 146/76  Resp:  [15-22] 18  SpO2:  [94 %-99 %] 97 %  O2 Device: None (Room air)    Body mass index is 35.4 kg/m².     Input and Output Summary (last 24 hours):   No intake or output data in the 24 hours ending 01/11/25 1644    Physical Exam    Constitutional:       Comments: Elderly male well-appearing no acute distress   Cardiovascular:      Rate and Rhythm: Normal rate and regular rhythm.      Heart sounds: No murmur heard.  Pulmonary:      Effort: Pulmonary effort is normal. No respiratory distress.      Breath sounds: No wheezing.   Abdominal:      General: Abdomen is flat. There is no distension.   Neurological:      General: No focal deficit present.      Mental Status: He is oriented to person, place, and time.      Cranial Nerves: No cranial nerve deficit.     Lines/Drains:        Telemetry:  Telemetry Orders (From admission, onward)               24 Hour Telemetry Monitoring  Continuous x 24 Hours (Telem)        Expiring   Question:  Reason for 24 Hour  Telemetry  Answer:  TIA/Suspected CVA/ Confirmed CVA                     Telemetry Reviewed: Normal Sinus Rhythm  Indication for Continued Telemetry Use: Acute CVA               Lab Results: I have reviewed the following results:   Results from last 7 days   Lab Units 01/11/25  0510   WBC Thousand/uL 9.03   HEMOGLOBIN g/dL 12.9   HEMATOCRIT % 39.2   PLATELETS Thousands/uL 247     Results from last 7 days   Lab Units 01/11/25  0510   SODIUM mmol/L 137   POTASSIUM mmol/L 4.1   CHLORIDE mmol/L 105   CO2 mmol/L 25   BUN mg/dL 21   CREATININE mg/dL 0.93   ANION GAP mmol/L 7   CALCIUM mg/dL 8.8   GLUCOSE RANDOM mg/dL 97     Results from last 7 days   Lab Units 01/10/25  1502   INR  1.08     Results from last 7 days   Lab Units 01/11/25  1614 01/11/25  1047 01/11/25  0717 01/10/25  2128 01/10/25  1811 01/10/25  1729   POC GLUCOSE mg/dl 93 289* 101 85 95 61*     Results from last 7 days   Lab Units 01/11/25  0510   HEMOGLOBIN A1C % 7.6*           Recent Cultures (last 7 days):         Imaging Results Review: I reviewed radiology reports from this admission including: CT head.  Other Study Results Review: No additional pertinent studies reviewed.    Last 24 Hours Medication List:     Current Facility-Administered Medications:     acetaminophen (TYLENOL) tablet 650 mg, Q6H PRN    aspirin chewable tablet 81 mg, Daily    atorvastatin (LIPITOR) tablet 40 mg, QPM    clopidogrel (PLAVIX) tablet 75 mg, Daily    clotrimazole-betamethasone (LOTRISONE) 1-0.05 % cream, BID    fluticasone (FLONASE) 50 mcg/act nasal spray 1 spray, Daily    gabapentin (NEURONTIN) capsule 300 mg, BID    insulin glargine (LANTUS) subcutaneous injection 20 Units 0.2 mL, HS    insulin lispro (HumALOG/ADMELOG) 100 units/mL subcutaneous injection 1-6 Units, TID AC **AND** Fingerstick Glucose (POCT), TID AC    insulin lispro (HumALOG/ADMELOG) 100 units/mL subcutaneous injection 1-6 Units, HS    LORazepam (ATIVAN) injection 1 mg, Once PRN    nicotine (NICODERM  CQ) 14 mg/24hr TD 24 hr patch 1 patch, Daily    nicotine (NICODERM CQ) 14 mg/24hr TD 24 hr patch 1 patch, Once    Administrative Statements   Today, Patient Was Seen By: Timothy Rivera MD    **Please Note: This note may have been constructed using a voice recognition system.**

## 2025-01-11 NOTE — PHYSICAL THERAPY NOTE
01/11/25 0901   PT Last Visit   PT Visit Date 01/11/25   Note Type   Note type Screen   Additional Comments Chart reviewed. Pt encountered supine in bed. Pt was screened and is functional from a PT standpoint, as he is independent in ambulation, transfers, and has no deficits in strength in the LE's. No further needs identified at this time. If pt experiences change in medical status, PT will reconsult as appropriate.

## 2025-01-12 ENCOUNTER — APPOINTMENT (INPATIENT)
Dept: MRI IMAGING | Facility: HOSPITAL | Age: 75
DRG: 066 | End: 2025-01-12
Payer: MEDICARE

## 2025-01-12 PROBLEM — I63.9 STROKE (HCC): Status: ACTIVE | Noted: 2025-01-10

## 2025-01-12 LAB
D DIMER PPP FEU-MCNC: 1.03 UG/ML FEU
GLUCOSE SERPL-MCNC: 121 MG/DL (ref 65–140)
GLUCOSE SERPL-MCNC: 138 MG/DL (ref 65–140)
GLUCOSE SERPL-MCNC: 150 MG/DL (ref 65–140)
GLUCOSE SERPL-MCNC: 210 MG/DL (ref 65–140)
GLUCOSE SERPL-MCNC: 232 MG/DL (ref 65–140)

## 2025-01-12 PROCEDURE — 99233 SBSQ HOSP IP/OBS HIGH 50: CPT | Performed by: INTERNAL MEDICINE

## 2025-01-12 PROCEDURE — 82948 REAGENT STRIP/BLOOD GLUCOSE: CPT

## 2025-01-12 PROCEDURE — 70551 MRI BRAIN STEM W/O DYE: CPT

## 2025-01-12 PROCEDURE — 85379 FIBRIN DEGRADATION QUANT: CPT | Performed by: PSYCHIATRY & NEUROLOGY

## 2025-01-12 RX ORDER — LISINOPRIL 20 MG/1
20 TABLET ORAL DAILY
Status: DISCONTINUED | OUTPATIENT
Start: 2025-01-13 | End: 2025-01-14 | Stop reason: HOSPADM

## 2025-01-12 RX ORDER — AMLODIPINE BESYLATE 5 MG/1
5 TABLET ORAL DAILY
Status: DISCONTINUED | OUTPATIENT
Start: 2025-01-13 | End: 2025-01-14 | Stop reason: HOSPADM

## 2025-01-12 RX ADMIN — ATORVASTATIN CALCIUM 40 MG: 40 TABLET, FILM COATED ORAL at 17:22

## 2025-01-12 RX ADMIN — CLOPIDOGREL 75 MG: 75 TABLET ORAL at 08:27

## 2025-01-12 RX ADMIN — GABAPENTIN 300 MG: 300 CAPSULE ORAL at 08:27

## 2025-01-12 RX ADMIN — LORAZEPAM 1 MG: 2 INJECTION INTRAMUSCULAR; INTRAVENOUS at 10:22

## 2025-01-12 RX ADMIN — INSULIN LISPRO 1 UNITS: 100 INJECTION, SOLUTION INTRAVENOUS; SUBCUTANEOUS at 08:28

## 2025-01-12 RX ADMIN — INSULIN LISPRO 2 UNITS: 100 INJECTION, SOLUTION INTRAVENOUS; SUBCUTANEOUS at 11:54

## 2025-01-12 RX ADMIN — INSULIN GLARGINE 20 UNITS: 100 INJECTION, SOLUTION SUBCUTANEOUS at 21:26

## 2025-01-12 RX ADMIN — GABAPENTIN 300 MG: 300 CAPSULE ORAL at 17:22

## 2025-01-12 RX ADMIN — INSULIN LISPRO 3 UNITS: 100 INJECTION, SOLUTION INTRAVENOUS; SUBCUTANEOUS at 21:26

## 2025-01-12 RX ADMIN — NICOTINE 1 PATCH: 14 PATCH, EXTENDED RELEASE TRANSDERMAL at 08:28

## 2025-01-12 RX ADMIN — ASPIRIN 81 MG: 81 TABLET, CHEWABLE ORAL at 08:27

## 2025-01-12 NOTE — QUICK NOTE
Neurology Note    MRI imaging done this morning personally reviewed - subacute infarcts in R paramedian gianfranco and L parietal lobe.    Etiology is suspected to be proximal embolism, potentially cardioembolic, given different vascular territories with both anterior and posterior circulation involvement. DAPT is not indicated in this case - discontinued Plavix and can continue ASA. TTE is pending. Ordered D-dimer - if significantly elevated would check CT chest/abd/pel w/ contrast to evaluate for malignancy. Maintain on telemetry. No need for permissive HTN; goal normotension. If work-up unremarkable likely will need Zio patch and/or loop recorder on discharge.

## 2025-01-12 NOTE — PLAN OF CARE
Problem: PAIN - ADULT  Goal: Verbalizes/displays adequate comfort level or baseline comfort level  Description: Interventions:  - Encourage patient to monitor pain and request assistance  - Assess pain using appropriate pain scale  - Administer analgesics based on type and severity of pain and evaluate response  - Implement non-pharmacological measures as appropriate and evaluate response  - Consider cultural and social influences on pain and pain management  - Notify physician/advanced practitioner if interventions unsuccessful or patient reports new pain  Outcome: Progressing     Problem: DISCHARGE PLANNING  Goal: Discharge to home or other facility with appropriate resources  Description: INTERVENTIONS:  - Identify barriers to discharge w/patient and caregiver  - Arrange for needed discharge resources and transportation as appropriate  - Identify discharge learning needs (meds, wound care, etc.)  - Arrange for interpretive services to assist at discharge as needed  - Refer to Case Management Department for coordinating discharge planning if the patient needs post-hospital services based on physician/advanced practitioner order or complex needs related to functional status, cognitive ability, or social support system  Outcome: Progressing     Problem: Knowledge Deficit  Goal: Patient/family/caregiver demonstrates understanding of disease process, treatment plan, medications, and discharge instructions  Description: Complete learning assessment and assess knowledge base.  Interventions:  - Provide teaching at level of understanding  - Provide teaching via preferred learning methods  Outcome: Progressing     Problem: Neurological Deficit  Goal: Neurological status is stable or improving  Description: Interventions:  - Monitor and assess patient's level of consciousness, motor function, sensory function, and level of assistance needed for ADLs.   - Monitor and report changes from baseline. Collaborate with  interdisciplinary team to initiate plan and implement interventions as ordered.   - Provide and maintain a safe environment.  - Consider seizure precautions.  - Consider fall precautions.  - Consider aspiration precautions.  - Consider bleeding precautions.  Outcome: Progressing     Problem: Nutrition  Goal: Nutrition/Hydration status is improving  Description: Monitor and assess patient's nutrition/hydration status for malnutrition (ex- brittle hair, bruises, dry skin, pale skin and conjunctiva, muscle wasting, smooth red tongue, and disorientation). Collaborate with interdisciplinary team and initiate plan and interventions as ordered.  Monitor patient's weight and dietary intake as ordered or per policy. Utilize nutrition screening tool and intervene per policy. Determine patient's food preferences and provide high-protein, high-caloric foods as appropriate.     - Assist patient with eating.  - Allow adequate time for meals.  - Encourage patient to take dietary supplement as ordered.  - Collaborate with clinical nutritionist.  - Include patient/family/caregiver in decisions related to nutrition.  Outcome: Progressing     Problem: Nutrition/Hydration-ADULT  Goal: Nutrient/Hydration intake appropriate for improving, restoring or maintaining nutritional needs  Description: Monitor and assess patient's nutrition/hydration status for malnutrition. Collaborate with interdisciplinary team and initiate plan and interventions as ordered.  Monitor patient's weight and dietary intake as ordered or per policy. Utilize nutrition screening tool and intervene as necessary. Determine patient's food preferences and provide high-protein, high-caloric foods as appropriate.     INTERVENTIONS:  - Monitor oral intake, urinary output, labs, and treatment plans  - Assess nutrition and hydration status and recommend course of action  - Evaluate amount of meals eaten  - Assist patient with eating if necessary   - Allow adequate time for  meals  - Recommend/ encourage appropriate diets, oral nutritional supplements, and vitamin/mineral suppleme  - Order, calculate, and assess calorie counts as needed  - Recommend, monitor, and adjust tube feedings and TPN/PPN based on assessed needs  - Assess need for intravenous fluids  - Provide specific nutrition/hydration education as appropriate  - Include patient/family/caregiver in decisions related to nutrition  Outcome: Progressing

## 2025-01-12 NOTE — PLAN OF CARE
Problem: DISCHARGE PLANNING  Goal: Discharge to home or other facility with appropriate resources  Description: INTERVENTIONS:  - Identify barriers to discharge w/patient and caregiver  - Arrange for needed discharge resources and transportation as appropriate  - Identify discharge learning needs (meds, wound care, etc.)  - Arrange for interpretive services to assist at discharge as needed  - Refer to Case Management Department for coordinating discharge planning if the patient needs post-hospital services based on physician/advanced practitioner order or complex needs related to functional status, cognitive ability, or social support system  Outcome: Progressing     Problem: Knowledge Deficit  Goal: Patient/family/caregiver demonstrates understanding of disease process, treatment plan, medications, and discharge instructions  Description: Complete learning assessment and assess knowledge base.  Interventions:  - Provide teaching at level of understanding  - Provide teaching via preferred learning methods  Outcome: Progressing     Problem: Neurological Deficit  Goal: Neurological status is stable or improving  Description: Interventions:  - Monitor and assess patient's level of consciousness, motor function, sensory function, and level of assistance needed for ADLs.   - Monitor and report changes from baseline. Collaborate with interdisciplinary team to initiate plan and implement interventions as ordered.   - Provide and maintain a safe environment.  - Consider seizure precautions.  - Consider fall precautions.  - Consider aspiration precautions.  - Consider bleeding precautions.  Outcome: Progressing

## 2025-01-12 NOTE — SPEECH THERAPY NOTE
Consult received and chart reviewed. MRI results noted. Despite this per chart review and discussion with RN, pt passed RN dysphagia assessment and is tolerating regular diet with thin liquids with no s/s of aspiration. No dysarthria or language deficits noted or reported. Therefore, formal speech/swallow evaluation not indicated at this time. If new concerns arise, please reconsult.     Charmaine Hernandez M.S., CCC-SLP  Speech Language Pathologist   Available via Secure Chat  [place] #[id number]  PA #[id number]

## 2025-01-13 ENCOUNTER — APPOINTMENT (INPATIENT)
Dept: CT IMAGING | Facility: HOSPITAL | Age: 75
DRG: 066 | End: 2025-01-13
Payer: MEDICARE

## 2025-01-13 ENCOUNTER — TELEPHONE (OUTPATIENT)
Age: 75
End: 2025-01-13

## 2025-01-13 ENCOUNTER — APPOINTMENT (INPATIENT)
Dept: VASCULAR ULTRASOUND | Facility: HOSPITAL | Age: 75
DRG: 066 | End: 2025-01-13
Payer: MEDICARE

## 2025-01-13 ENCOUNTER — APPOINTMENT (INPATIENT)
Dept: NON INVASIVE DIAGNOSTICS | Facility: HOSPITAL | Age: 75
DRG: 066 | End: 2025-01-13
Payer: MEDICARE

## 2025-01-13 PROBLEM — Z72.0 TOBACCO ABUSE: Status: ACTIVE | Noted: 2025-01-13

## 2025-01-13 PROBLEM — E11.9 DM2 (DIABETES MELLITUS, TYPE 2) (HCC): Status: ACTIVE | Noted: 2024-06-12

## 2025-01-13 LAB
AORTIC ROOT: 3.7 CM
ASCENDING AORTA: 3.5 CM
AV REGURGITATION PRESSURE HALF TIME: 743 MS
BSA FOR ECHO PROCEDURE: 2.13 M2
E WAVE DECELERATION TIME: 257 MS
E/A RATIO: 0.67
FRACTIONAL SHORTENING: 30 (ref 28–44)
GLUCOSE SERPL-MCNC: 141 MG/DL (ref 65–140)
GLUCOSE SERPL-MCNC: 210 MG/DL (ref 65–140)
GLUCOSE SERPL-MCNC: 289 MG/DL (ref 65–140)
GLUCOSE SERPL-MCNC: 87 MG/DL (ref 65–140)
INTERVENTRICULAR SEPTUM IN DIASTOLE (PARASTERNAL SHORT AXIS VIEW): 1 CM
INTERVENTRICULAR SEPTUM: 1 CM (ref 0.6–1.1)
LAAS-AP2: 11.2 CM2
LAAS-AP4: 12.6 CM2
LEFT ATRIUM AREA SYSTOLE SINGLE PLANE A4C: 11.9 CM2
LEFT ATRIUM SIZE: 3.2 CM
LEFT ATRIUM VOLUME (MOD BIPLANE): 26 ML
LEFT ATRIUM VOLUME INDEX (MOD BIPLANE): 12.2 ML/M2
LEFT INTERNAL DIMENSION IN SYSTOLE: 3.3 CM (ref 2.1–4)
LEFT VENTRICULAR INTERNAL DIMENSION IN DIASTOLE: 4.7 CM (ref 3.5–6)
LEFT VENTRICULAR POSTERIOR WALL IN END DIASTOLE: 0.9 CM
LEFT VENTRICULAR STROKE VOLUME: 61 ML
LV EF US.2D.A4C+ESTIMATED: 55 %
LVSV (TEICH): 61 ML
MV E'TISSUE VEL-SEP: 6 CM/S
MV PEAK A VEL: 0.92 M/S
MV PEAK E VEL: 62 CM/S
MV STENOSIS PRESSURE HALF TIME: 75 MS
MV VALVE AREA P 1/2 METHOD: 2.93
RIGHT ATRIUM AREA SYSTOLE A4C: 10.3 CM2
RIGHT VENTRICLE ID DIMENSION: 2.5 CM
SL CV AV DECELERATION TIME RETROGRADE: 2563 MS
SL CV AV PEAK GRADIENT RETROGRADE: 64 MMHG
SL CV LEFT ATRIUM LENGTH A2C: 4 CM
SL CV LV EF: 55
SL CV PED ECHO LEFT VENTRICLE DIASTOLIC VOLUME (MOD BIPLANE) 2D: 104 ML
SL CV PED ECHO LEFT VENTRICLE SYSTOLIC VOLUME (MOD BIPLANE) 2D: 43 ML
TR MAX PG: 3 MMHG
TR PEAK VELOCITY: 0.9 M/S
TRICUSPID ANNULAR PLANE SYSTOLIC EXCURSION: 2.4 CM
TRICUSPID VALVE PEAK REGURGITATION VELOCITY: 0.89 M/S

## 2025-01-13 PROCEDURE — 93306 TTE W/DOPPLER COMPLETE: CPT

## 2025-01-13 PROCEDURE — 93306 TTE W/DOPPLER COMPLETE: CPT | Performed by: INTERNAL MEDICINE

## 2025-01-13 PROCEDURE — 93970 EXTREMITY STUDY: CPT

## 2025-01-13 PROCEDURE — 82948 REAGENT STRIP/BLOOD GLUCOSE: CPT

## 2025-01-13 PROCEDURE — 99232 SBSQ HOSP IP/OBS MODERATE 35: CPT | Performed by: STUDENT IN AN ORGANIZED HEALTH CARE EDUCATION/TRAINING PROGRAM

## 2025-01-13 PROCEDURE — 74177 CT ABD & PELVIS W/CONTRAST: CPT

## 2025-01-13 PROCEDURE — 99232 SBSQ HOSP IP/OBS MODERATE 35: CPT | Performed by: INTERNAL MEDICINE

## 2025-01-13 PROCEDURE — 71260 CT THORAX DX C+: CPT

## 2025-01-13 RX ORDER — ENOXAPARIN SODIUM 100 MG/ML
40 INJECTION SUBCUTANEOUS
Status: DISCONTINUED | OUTPATIENT
Start: 2025-01-13 | End: 2025-01-14 | Stop reason: HOSPADM

## 2025-01-13 RX ADMIN — NICOTINE 1 PATCH: 14 PATCH, EXTENDED RELEASE TRANSDERMAL at 08:33

## 2025-01-13 RX ADMIN — INSULIN LISPRO 4 UNITS: 100 INJECTION, SOLUTION INTRAVENOUS; SUBCUTANEOUS at 21:03

## 2025-01-13 RX ADMIN — ENOXAPARIN SODIUM 40 MG: 40 INJECTION SUBCUTANEOUS at 17:00

## 2025-01-13 RX ADMIN — GABAPENTIN 300 MG: 300 CAPSULE ORAL at 18:19

## 2025-01-13 RX ADMIN — INSULIN GLARGINE 20 UNITS: 100 INJECTION, SOLUTION SUBCUTANEOUS at 21:03

## 2025-01-13 RX ADMIN — IOHEXOL 100 ML: 350 INJECTION, SOLUTION INTRAVENOUS at 14:30

## 2025-01-13 RX ADMIN — ATORVASTATIN CALCIUM 40 MG: 40 TABLET, FILM COATED ORAL at 18:19

## 2025-01-13 RX ADMIN — AMLODIPINE BESYLATE 5 MG: 5 TABLET ORAL at 08:32

## 2025-01-13 RX ADMIN — GABAPENTIN 300 MG: 300 CAPSULE ORAL at 08:33

## 2025-01-13 RX ADMIN — ASPIRIN 81 MG: 81 TABLET, CHEWABLE ORAL at 08:32

## 2025-01-13 RX ADMIN — FLUTICASONE PROPIONATE 1 SPRAY: 50 SPRAY, METERED NASAL at 08:34

## 2025-01-13 RX ADMIN — INSULIN LISPRO 2 UNITS: 100 INJECTION, SOLUTION INTRAVENOUS; SUBCUTANEOUS at 12:00

## 2025-01-13 RX ADMIN — LISINOPRIL 20 MG: 20 TABLET ORAL at 08:32

## 2025-01-13 NOTE — ASSESSMENT & PLAN NOTE
Confirmed on MRI subacute infarcts in R paramedian gianfranco and L parietal lobe suspected embolic possibly cardioembolic.  Discussed in detail with neurology  Continue statins for now  Continue telemetry to monitor for occult A-fib  Noted neurology recommendation to check D-dimer and to evaluate for malignancy if significantly elevated, only 1.0 -we will look for other potential causes  Echocardiogram pending, if negative workup noted Zio patch recommendation  CT scan of the chest abdomen and pelvis to rule out any malignancy that could be causing this  Lipid panel, hemoglobin A1c  PT OT input appreciated

## 2025-01-13 NOTE — TELEPHONE ENCOUNTER
STILL ADMITTED;1/10/2025 - present (3 days)  [facility]    HFU/ SL [place]/ STROKE     ----- Message from Radha Martinez PA-C sent at 1/13/2025 12:11 PM EST -----  Regarding: HFU  Tc SHREE Stovall will need follow-up in in 6 weeks with neurovascular team for Other = ATTENDING in 60 minute appointment. They will not require outpatient neurological testing.

## 2025-01-13 NOTE — PROGRESS NOTES
Progress Note - Hospitalist   Name: Tc Stovall 74 y.o. male I MRN: [id number]  Unit/Bed#: 2 E 256-01 I Date of Admission: 1/10/2025   Date of Service: 1/12/2025 I Hospital Day: 2    Assessment & Plan  Stroke (HCC)  Confirmed on MRI subacute infarcts in R paramedian gianfranco and L parietal lobe suspected embolic possibly cardioembolic  Continue statin  Continue telemetry to monitor for occult A-fib  Noted neurology recommendation to check D-dimer and to evaluate for malignancy if significantly elevated, only 1.0 -we will look for other potential causes  Echocardiogram pending, if negative workup noted Zio patch recommendation  Neurochecks continued  Lipid panel, hemoglobin A1c  PT OT  Neurology following    Primary hypertension  Resume amlodipine 5, lisinopril 20  Monitor and titrate as needed    Hyperlipidemia associated with type 2 diabetes mellitus  (HCC)  Lab Results   Component Value Date    HGBA1C 7.6 (H) 01/11/2025       Recent Labs     01/12/25  0705 01/12/25  1032 01/12/25  1545 01/12/25  1723   POCGLU 150* 210* 138 121       Blood Sugar Average: Last 72 hrs:  (P) 135.0733591222277804  Continue statin  Type 2 diabetes mellitus with peripheral vascular disease (HCC)  Lab Results   Component Value Date    HGBA1C 7.6 (H) 01/11/2025       Recent Labs     01/12/25  0705 01/12/25  1032 01/12/25  1545 01/12/25  1723   POCGLU 150* 210* 138 121       Blood Sugar Average: Last 72 hrs:  (P) 135.4979357158314097  Patient prescribed 20 units Lantus at bedtime (however he has been taking it as needed for sugar greater than 200) in addition to glipizide and metformin  Continue Lantus holding p.o. antihyperglycemic's  Sliding scale  Patient needs diabetic education    Radiculopathy, cervical region  Continue gabapentin    VTE Pharmacologic Prophylaxis:   Moderate Risk (Score 3-4) - Pharmacological DVT Prophylaxis Contraindicated. Sequential Compression Devices Ordered.    Mobility:   Basic Mobility Inpatient Raw Score:  24  JH-HLM Goal: 8: Walk 250 feet or more  JH-HLM Achieved: 8: Walk 250 feet ot more  JH-HLM Goal achieved. Continue to encourage appropriate mobility.    Patient Centered Rounds: I performed bedside rounds with nursing staff today.   Discussions with Specialists or Other Care Team Provider: RN    Education and Discussions with Family / Patient: Updated  (son) at bedside.    Current Length of Stay: 2 day(s)  Current Patient Status: Inpatient   Certification Statement: The patient will continue to require additional inpatient hospital stay due to CVA workup  Discharge Plan: Anticipate discharge in 24-48 hrs to discharge location to be determined pending rehab evaluations.    Code Status: Level 1 - Full Code    Subjective   Seen and examined at bedside.  No acute events overnight.  Continues to feel well and symptoms of diplopia have not recurred no other complaints    Objective :  Temp:  [97.4 °F (36.3 °C)-97.9 °F (36.6 °C)] 97.8 °F (36.6 °C)  HR:  [56-71] 71  BP: (152-177)/(60-78) 158/64  Resp:  [18-19] 18  SpO2:  [94 %-98 %] 98 %  O2 Device: None (Room air)    Body mass index is 35.4 kg/m².     Input and Output Summary (last 24 hours):     Intake/Output Summary (Last 24 hours) at 1/12/2025 2016  Last data filed at 1/12/2025 1100  Gross per 24 hour   Intake 960 ml   Output --   Net 960 ml       Physical Exam    Constitutional:       Comments: Elderly male well-appearing no acute distress   Cardiovascular:      Rate and Rhythm: Normal rate and regular rhythm.      Heart sounds: No murmur heard.  Pulmonary:      Effort: Pulmonary effort is normal. No respiratory distress.      Breath sounds: No wheezing.   Abdominal:      General: Abdomen is flat. There is no distension.   Neurological:      General: No focal deficit present.      Mental Status: He is oriented to person, place, and time.      Cranial Nerves: No cranial nerve deficit.     Lines/Drains:        Telemetry:  Telemetry Orders (From admission,  onward)               24 Hour Telemetry Monitoring  Continuous x 24 Hours (Telem)        Expiring   Question:  Reason for 24 Hour Telemetry  Answer:  TIA/Suspected CVA/ Confirmed CVA                     Telemetry Reviewed: Normal Sinus Rhythm  Indication for Continued Telemetry Use: Acute CVA               Lab Results: I have reviewed the following results:   Results from last 7 days   Lab Units 01/11/25  0510   WBC Thousand/uL 9.03   HEMOGLOBIN g/dL 12.9   HEMATOCRIT % 39.2   PLATELETS Thousands/uL 247     Results from last 7 days   Lab Units 01/11/25  0510   SODIUM mmol/L 137   POTASSIUM mmol/L 4.1   CHLORIDE mmol/L 105   CO2 mmol/L 25   BUN mg/dL 21   CREATININE mg/dL 0.93   ANION GAP mmol/L 7   CALCIUM mg/dL 8.8   GLUCOSE RANDOM mg/dL 97     Results from last 7 days   Lab Units 01/10/25  1502   INR  1.08     Results from last 7 days   Lab Units 01/12/25  1723 01/12/25  1545 01/12/25  1032 01/12/25  0705 01/11/25  2127 01/11/25  1614 01/11/25  1047 01/11/25  0717 01/10/25  2128 01/10/25  1811 01/10/25  1729   POC GLUCOSE mg/dl 121 138 210* 150* 148* 93 289* 101 85 95 61*     Results from last 7 days   Lab Units 01/11/25  0510   HEMOGLOBIN A1C % 7.6*           Recent Cultures (last 7 days):         Imaging Results Review: I reviewed radiology reports from this admission including: CT head and MRI brain.  Other Study Results Review: Other studies reviewed include: labs    Last 24 Hours Medication List:     Current Facility-Administered Medications:   •  acetaminophen (TYLENOL) tablet 650 mg, Q6H PRN  •  [START ON 1/13/2025] amLODIPine (NORVASC) tablet 5 mg, Daily  •  aspirin chewable tablet 81 mg, Daily  •  atorvastatin (LIPITOR) tablet 40 mg, QPM  •  clotrimazole-betamethasone (LOTRISONE) 1-0.05 % cream, BID  •  fluticasone (FLONASE) 50 mcg/act nasal spray 1 spray, Daily  •  gabapentin (NEURONTIN) capsule 300 mg, BID  •  insulin glargine (LANTUS) subcutaneous injection 20 Units 0.2 mL, HS  •  insulin lispro  (HumALOG/ADMELOG) 100 units/mL subcutaneous injection 1-6 Units, TID AC **AND** Fingerstick Glucose (POCT), TID AC  •  insulin lispro (HumALOG/ADMELOG) 100 units/mL subcutaneous injection 1-6 Units, HS  •  [START ON 1/13/2025] lisinopril (ZESTRIL) tablet 20 mg, Daily  •  nicotine (NICODERM CQ) 14 mg/24hr TD 24 hr patch 1 patch, Daily    Administrative Statements   Today, Patient Was Seen By: Timothy Rivera MD    **Please Note: This note may have been constructed using a voice recognition system.**

## 2025-01-13 NOTE — ASSESSMENT & PLAN NOTE
- Encourage cessation.  - Extensively discussed with patient today at bedside and he notes he is planning to continue with Nicotine patch at home and he does appear motivated to quit.

## 2025-01-13 NOTE — PROGRESS NOTES
Progress Note - Hospitalist   Name: Tc Stovall 74 y.o. male I MRN: [id number]  Unit/Bed#: 2 E 256-01 I Date of Admission: 1/10/2025   Date of Service: 1/13/2025 I Hospital Day: 3    Assessment & Plan  Stroke (HCC)  Confirmed on MRI subacute infarcts in R paramedian gianfranco and L parietal lobe suspected embolic possibly cardioembolic.  Discussed in detail with neurology  Continue statins for now  Continue telemetry to monitor for occult A-fib  Noted neurology recommendation to check D-dimer and to evaluate for malignancy if significantly elevated, only 1.0 -we will look for other potential causes  Echocardiogram pending, if negative workup noted Zio patch recommendation  CT scan of the chest abdomen and pelvis to rule out any malignancy that could be causing this  Lipid panel, hemoglobin A1c  PT OT input appreciated    Primary hypertension  Resume amlodipine 5, lisinopril 20  Monitor and titrate as needed    Hyperlipidemia associated with type 2 diabetes mellitus  (HCC)  Lab Results   Component Value Date    HGBA1C 7.6 (H) 01/11/2025       Recent Labs     01/12/25  1723 01/12/25  2041 01/13/25  0747 01/13/25  1054   POCGLU 121 232* 141* 210*       Blood Sugar Average: Last 72 hrs:  (P) 148.5373242204886907  Continue statin  DM2 (diabetes mellitus, type 2) (HCC)  Lab Results   Component Value Date    HGBA1C 7.6 (H) 01/11/2025       Recent Labs     01/12/25  1723 01/12/25  2041 01/13/25  0747 01/13/25  1054   POCGLU 121 232* 141* 210*       Blood Sugar Average: Last 72 hrs:  (P) 148.1706029386162102  Patient prescribed 20 units Lantus at bedtime (however he has been taking it as needed for sugar greater than 200) in addition to glipizide and metformin  Continue Lantus holding p.o. antihyperglycemic's  Sliding scale  Patient needs diabetic education    Radiculopathy, cervical region  Continue gabapentin  Tobacco abuse      VTE Pharmacologic Prophylaxis:   Lovenox    Mobility:   Basic Mobility Inpatient Raw Score:  24  JH-HLM Goal: 8: Walk 250 feet or more  JH-HLM Achieved: 8: Walk 250 feet ot more  JH-HLM Goal NOT achieved. Continue with multidisciplinary rounding and encourage appropriate mobility to improve upon JH-HLM goals.    Patient Centered Rounds: I performed bedside rounds with nursing staff today.   Discussions with Specialists or Other Care Team Provider: Neurology    Education and Discussions with Family / Patient:  Spoke at length with patient.     Current Length of Stay: 3 day(s)  Current Patient Status: Inpatient   Certification Statement: The patient will continue to require additional inpatient hospital stay due to management of stroke  Discharge Plan: Anticipate discharge in 24-48 hrs to home with home services.    Code Status: Level 1 - Full Code    Subjective     Patient seen and examined at bedside by me.  He does not complain of any chest pain, palpitations or diaphoresis.  No fever or chills at this point of time.  Patient does have generalized weakness.  No dysuria or diarrhea    Objective :  Temp:  [97.7 °F (36.5 °C)-98.3 °F (36.8 °C)] 98.3 °F (36.8 °C)  HR:  [55-71] 63  BP: (151-169)/(64-97) 151/75  Resp:  [18] 18  SpO2:  [97 %-99 %] 98 %  O2 Device: None (Room air)    Body mass index is 35.56 kg/m².     Input and Output Summary (last 24 hours):   No intake or output data in the 24 hours ending 01/13/25 4307    Physical Exam    General exam- looks a little weak  HEENT - atraumatic and normocephalic  Neck- supple  Skin - no fresh rash  Extremities no fresh focal deformities  Cardiovascular- S1-S2 heard  Respiratory- bilateral air entry present, no crackles or rhonchi  Skin - no fresh rash  Abdomen - normal bowel sounds present, no rebound tenderness  CNS- No fresh focal deficits  Psych- no acute psychosis      Lines/Drains:        Telemetry:  Telemetry Orders (From admission, onward)               24 Hour Telemetry Monitoring  Continuous x 24 Hours (Telem)        Expiring   Question:  Reason for 24  Hour Telemetry  Answer:  TIA/Suspected CVA/ Confirmed CVA                     Telemetry Reviewed: Normal Sinus Rhythm  Indication for Continued Telemetry Use: Acute CVA               Lab Results: I have reviewed the following results:   Results from last 7 days   Lab Units 01/11/25  0510   WBC Thousand/uL 9.03   HEMOGLOBIN g/dL 12.9   HEMATOCRIT % 39.2   PLATELETS Thousands/uL 247     Results from last 7 days   Lab Units 01/11/25  0510   SODIUM mmol/L 137   POTASSIUM mmol/L 4.1   CHLORIDE mmol/L 105   CO2 mmol/L 25   BUN mg/dL 21   CREATININE mg/dL 0.93   ANION GAP mmol/L 7   CALCIUM mg/dL 8.8   GLUCOSE RANDOM mg/dL 97     Results from last 7 days   Lab Units 01/10/25  1502   INR  1.08     Results from last 7 days   Lab Units 01/13/25  1054 01/13/25  0747 01/12/25  2041 01/12/25  1723 01/12/25  1545 01/12/25  1032 01/12/25  0705 01/11/25  2127 01/11/25  1614 01/11/25  1047 01/11/25  0717 01/10/25  2128   POC GLUCOSE mg/dl 210* 141* 232* 121 138 210* 150* 148* 93 289* 101 85     Results from last 7 days   Lab Units 01/11/25  0510   HEMOGLOBIN A1C % 7.6*           Recent Cultures (last 7 days):         Imaging Results Review: No pertinent imaging studies reviewed.  Other Study Results Review: EKG was reviewed.     Last 24 Hours Medication List:     Current Facility-Administered Medications:     acetaminophen (TYLENOL) tablet 650 mg, Q6H PRN    amLODIPine (NORVASC) tablet 5 mg, Daily    aspirin chewable tablet 81 mg, Daily    atorvastatin (LIPITOR) tablet 40 mg, QPM    clotrimazole-betamethasone (LOTRISONE) 1-0.05 % cream, BID    enoxaparin (LOVENOX) subcutaneous injection 40 mg, Q24H LEONELA    fluticasone (FLONASE) 50 mcg/act nasal spray 1 spray, Daily    gabapentin (NEURONTIN) capsule 300 mg, BID    insulin glargine (LANTUS) subcutaneous injection 20 Units 0.2 mL, HS    insulin lispro (HumALOG/ADMELOG) 100 units/mL subcutaneous injection 1-6 Units, TID AC **AND** Fingerstick Glucose (POCT), TID AC    insulin lispro  (HumALOG/ADMELOG) 100 units/mL subcutaneous injection 1-6 Units, HS    lisinopril (ZESTRIL) tablet 20 mg, Daily    nicotine (NICODERM CQ) 14 mg/24hr TD 24 hr patch 1 patch, Daily    Administrative Statements   Today, Patient Was Seen By: Mac Crawford MD      **Please Note: This note may have been constructed using a voice recognition system.**

## 2025-01-13 NOTE — ASSESSMENT & PLAN NOTE
Lab Results   Component Value Date    HGBA1C 7.6 (H) 01/11/2025       Recent Labs     01/12/25  1545 01/12/25  1723 01/12/25  2041 01/13/25  0747   POCGLU 138 121 232* 141*       Blood Sugar Average: Last 72 hrs:  (P) 143.8575820298127352

## 2025-01-13 NOTE — PROGRESS NOTES
Progress Note - Neurology   Name: Tc Stovall 74 y.o. male I MRN: [id number]  Unit/Bed#: 2 E 256-01 I Date of Admission: 1/10/2025   Date of Service: 1/13/2025 I Hospital Day: 3     Assessment & Plan  Stroke (HCC)  74 y.o. male with DM2, HTN, HLD, cervical radiculopathy with chronic neck/RUE pain/numbness, and tobacco use who presented to [facility] on 1/10/2025 as a stroke alert for intermittent binocular diplopia when looking to the left. Stroke alert was activated and upon arrival to ED, /102 and NIHSS 0.  Patient was not a TNK candidate due to being outside the appropriate time window.  Patient was loaded with Plavix 600 mg once and continued on home aspirin.    Neuroimaging   1/10/2025 CTH:   Hypodensities within the body of the left caudate and left corona radiata, concerning for age-indeterminate infarcts, although may be remote. Correlate with MRI.   Chronic microangiopathic changes and remote lacunar infarcts.   1/10/2025 CTA head and neck with and without contrast:   No intracranial large vessel occlusion, proximal high-grade stenosis or aneurysm.   No significant stenosis of the cervical carotid arteries. Mild to moderate stenosis of B/L cervical vertebral arteryes.   B/L pulmonary nodules measuring up to 3 mm.   1/12/2025 MRI brain without contrast:   Acute to subacute lacunar infarcts involving the left corona radiate and parietal lobe white matter as well as within the right paramedian gianfranco. No associated hemorrhage.   Mild chronic microvascular ischemic change and chronic lacunar infarcts.   Left air cell effusion.     Etiology of infarcts is unclear, but concern for proximal embolism given appearance on imaging with strokes in different vascular territories and both anterior and posterior circulation involvement.    Plan:   - Continue with stroke pathway.  - Echocardiogram completed and results are pending.   - Lipid panel reviewed, total cholesterol 129, triglycerides 168, HDL 38, LDL 57.  -  Hemoglobin A1c reviewed, 7.6.  - Continue on home ASA 81 mg QD. DAPT not indicated in this case.  - Continue Atorvastatin 40 mg QHS (patient was taking pravastatin PTA).  - Given elevated d dimer, recommend check CT C/A/P with contrast to evaluate for occult malignancy and venous dopplers B/L LE to evaluate for DVT.   - Continue to monitor on telemetry. If above work-up is unremarkable for embolic source of infarcts, recommend outpatient cardiology evaluation for long term rhythm monitoring with Zio patch vs loop recorder.  - Goal normotension, normothermia and euglycemia.  - Continue telemetry  - PT/OT/ST  - Stroke education  - Patient should follow-up with ophthalmology outpatient.  - Frequent neuro checks. Continue to monitor and notify neurology with any changes.  - STAT CT head for any acute change in neuro exam  - Medical management and supportive care per primary team. Correction of any metabolic or infectious disturbances.   DM2 (diabetes mellitus, type 2) ([facility])  Lab Results   Component Value Date    HGBA1C 7.6 (H) 01/11/2025       Recent Labs     01/12/25  1545 01/12/25  1723 01/12/25  2041 01/13/25  0747   POCGLU 138 121 232* 141*       Blood Sugar Average: Last 72 hrs:  (P) 143.5148834763277531    - Goal euglycemia .   - Management as per primary team.  Tobacco abuse  - Encourage cessation.  - Extensively discussed with patient today at bedside and he notes he is planning to continue with Nicotine patch at home and he does appear motivated to quit.    Tc Stovall will need follow-up in in 6 weeks with neurovascular team for Other in 60 minute appointment. They will not require outpatient neurological testing. Message has been sent to scheduling team.    I have discussed the above management plan in detail with the primary service.     Subjective   Patient reports feels well and denies complaints. He notes he is eating well and ambulating without difficulty. He denies any new or worsening symptoms at  this time. He denies any issues with diplopia at this time.    Review of Systems   Constitutional:  Negative for chills, fatigue and fever.   HENT:  Negative for trouble swallowing.    Eyes:  Negative for visual disturbance.   Respiratory:  Negative for shortness of breath.    Cardiovascular:  Negative for chest pain.   Gastrointestinal:  Negative for abdominal pain, nausea and vomiting.   Genitourinary:  Negative for difficulty urinating and dysuria.   Musculoskeletal:  Negative for back pain, gait problem and neck pain.   Skin:  Negative for rash.   Neurological:  Negative for dizziness, speech difficulty, weakness, light-headedness, numbness and headaches.   Psychiatric/Behavioral:  Negative for confusion.      Medications  Scheduled Meds:  Current Facility-Administered Medications   Medication Dose Route Frequency Provider Last Rate    acetaminophen  650 mg Oral Q6H PRN Timothy Rivera MD      amLODIPine  5 mg Oral Daily Timothy Rivera MD      aspirin  81 mg Oral Daily Timothy Rivera MD      atorvastatin  40 mg Oral QPM Timothy Rivera MD      clotrimazole-betamethasone   Topical BID Timothy Rivera MD      fluticasone  1 spray Nasal Daily Timothy Rivera MD      gabapentin  300 mg Oral BID Timothy Rivera MD      insulin glargine  20 Units Subcutaneous HS Timothy Rivera MD      insulin lispro  1-6 Units Subcutaneous TID AC Cara Tobias PA-C      insulin lispro  1-6 Units Subcutaneous HS Cara Tobias PA-C      lisinopril  20 mg Oral Daily Timothy Rivera MD      nicotine  1 patch Transdermal Daily Timothy Rivera MD       Continuous Infusions:   PRN Meds:.  acetaminophen      Objective :  Temp:  [97.7 °F (36.5 °C)-98.1 °F (36.7 °C)] 97.7 °F (36.5 °C)  HR:  [55-71] 66  BP: (153-177)/(64-97) 158/76  Resp:  [18-19] 18  SpO2:  [96 %-99 %] 98 %  O2 Device: None (Room air)    Physical Exam  Constitutional:       General: He is not in acute distress.     Appearance: Normal appearance. He is not ill-appearing, toxic-appearing or  diaphoretic.   HENT:      Head: Normocephalic and atraumatic.      Mouth/Throat:      Mouth: Mucous membranes are moist.      Pharynx: Oropharynx is clear. No oropharyngeal exudate.   Eyes:      General: No scleral icterus.        Right eye: No discharge.         Left eye: No discharge.      Extraocular Movements: EOM normal. No nystagmus.      Conjunctiva/sclera: Conjunctivae normal.   Cardiovascular:      Rate and Rhythm: Normal rate and regular rhythm.   Pulmonary:      Effort: Pulmonary effort is normal. No respiratory distress.      Breath sounds: Normal breath sounds.   Abdominal:      General: There is no distension.      Palpations: Abdomen is soft.      Tenderness: There is no abdominal tenderness.   Musculoskeletal:         General: Normal range of motion.      Cervical back: Normal range of motion and neck supple.      Right lower leg: No edema.      Left lower leg: No edema.   Skin:     General: Skin is warm and dry.      Findings: No erythema or rash.   Neurological:      Mental Status: He is alert and oriented to person, place, and time.   Psychiatric:         Mood and Affect: Mood normal.         Speech: Speech normal.         Behavior: Behavior normal.     Neurological Exam  Mental Status  Alert. Oriented to person, place, time and situation. Oriented to person, place, and time. Speech is normal. Language is fluent with no aphasia. Attention and concentration are normal.    Cranial Nerves  CN II: Right normal visual field. Left normal visual field.  CN III, IV, VI: Extraocular movements intact bilaterally. No nystagmus. Normal saccades.   Right pupil: 3 mm. Round. Reactive to light.   Left pupil: 3 mm. Round. Reactive to light.  CN V:  Right: Facial sensation is normal.  Left: Facial sensation is normal on the left.  CN VII:  Right: There is no facial weakness.  Left: There is no facial weakness.  CN XII: Tongue midline without atrophy or fasciculations.    Motor  Normal muscle bulk throughout. No  fasciculations present. Normal muscle tone. No abnormal involuntary movements. No pronator drift.  Motor strength 5/5 B/L UE and LE.    Sensory  Light touch is normal in upper and lower extremities.     Coordination  Right: Finger-to-nose normal.Left: Finger-to-nose normal.    Gait    Deferred for safety..        Lab Results: I have reviewed the following results:  Recent Results (from the past 24 hours)   D-dimer, quantitative    Collection Time: 01/12/25 12:21 PM   Result Value Ref Range    D-Dimer, Quant 1.03 (H) <0.50 ug/ml FEU   Fingerstick Glucose (POCT)    Collection Time: 01/12/25  3:45 PM   Result Value Ref Range    POC Glucose 138 65 - 140 mg/dl   Fingerstick Glucose (POCT)    Collection Time: 01/12/25  5:23 PM   Result Value Ref Range    POC Glucose 121 65 - 140 mg/dl   Fingerstick Glucose (POCT)    Collection Time: 01/12/25  8:41 PM   Result Value Ref Range    POC Glucose 232 (H) 65 - 140 mg/dl   Fingerstick Glucose (POCT)    Collection Time: 01/13/25  7:47 AM   Result Value Ref Range    POC Glucose 141 (H) 65 - 140 mg/dl   Echo complete w/ contrast if indicated    Collection Time: 01/13/25  8:43 AM   Result Value Ref Range    BSA 2.13 m2    A4C EF 55 %    LVIDd 4.70 cm    LVIDS 3.30 cm    IVSd 1.00 cm    LVPWd 0.90 cm    FS 30 28 - 44    MV E' Tissue Velocity Septal 6 cm/s    LA Volume Index (BP) 12.2 mL/m2    E/A ratio 0.67     E wave deceleration time 257 ms    MV Peak E Antonio 62 cm/s    MV Peak A Antonio 0.92 m/s    RVID d 2.5 cm    Tricuspid annular plane systolic excursion 2.40 cm    LA size 3.2 cm    LA length (A2C) 4.00 cm    REDD A4C 11.9 cm2    LA volume (BP) 26 mL    RAA A4C 10.3 cm2    AV Deceleration Time 2,563 ms    AV regurgitation pressure 1/2 time 743 ms    MV stenosis pressure 1/2 time 75 ms    MV valve area p 1/2 method 2.93     TR Peak Antonio 0.9 m/s    Triscuspid Valve Regurgitation Peak Gradient 3.0 mmHg    Ao root 3.70 cm    Asc Ao 3.5 cm    AV peak gradient 64 mmHg    Tricuspid valve peak  regurgitation velocity 0.89 m/s    Left ventricular stroke volume (2D) 61.00 mL    IVS 1 cm    LEFT VENTRICLE SYSTOLIC VOLUME (MOD BIPLANE) 2D 43 mL    LV DIASTOLIC VOLUME (MOD BIPLANE) 2D 104 mL    Left Atrium Area-systolic Four Chamber 12.6 cm2    Left Atrium Area-systolic Apical Two Chamber 11.2 cm2    LVSV, 2D 61 mL    LV EF 55    Fingerstick Glucose (POCT)    Collection Time: 01/13/25 10:54 AM   Result Value Ref Range    POC Glucose 210 (H) 65 - 140 mg/dl     Imaging  Imaging Results Review: I personally reviewed the following image studies in PACS and associated radiology reports: MRI brain without contrast- Acute to subacute lacunar infarcts involving the left corona radiata and parietal lobe white matter as well as within the right paramedian gianfranco. No associated hemorrhage. Mild chronic microvascular ischemic change and chronic lacunar infarcts. Left mastoid air cell effusion.

## 2025-01-13 NOTE — ASSESSMENT & PLAN NOTE
Lab Results   Component Value Date    HGBA1C 7.6 (H) 01/11/2025       Recent Labs     01/12/25  1723 01/12/25  2041 01/13/25  0747 01/13/25  1054   POCGLU 121 232* 141* 210*       Blood Sugar Average: Last 72 hrs:  (P) 148.6501996982735878  Patient prescribed 20 units Lantus at bedtime (however he has been taking it as needed for sugar greater than 200) in addition to glipizide and metformin  Continue Lantus holding p.o. antihyperglycemic's  Sliding scale  Patient needs diabetic education

## 2025-01-13 NOTE — ASSESSMENT & PLAN NOTE
74 y.o. male with DM2, HTN, HLD, cervical radiculopathy with chronic neck/RUE pain/numbness, and tobacco use who presented to [facility] on 1/10/2025 as a stroke alert for intermittent binocular diplopia when looking to the left. Stroke alert was activated and upon arrival to ED, /102 and NIHSS 0.  Patient was not a TNK candidate due to being outside the appropriate time window.  Patient was loaded with Plavix 600 mg once and continued on home aspirin.    Neuroimaging   1/10/2025 CTH:   Hypodensities within the body of the left caudate and left corona radiata, concerning for age-indeterminate infarcts, although may be remote. Correlate with MRI.   Chronic microangiopathic changes and remote lacunar infarcts.   1/10/2025 CTA head and neck with and without contrast:   No intracranial large vessel occlusion, proximal high-grade stenosis or aneurysm.   No significant stenosis of the cervical carotid arteries. Mild to moderate stenosis of B/L cervical vertebral arteryes.   B/L pulmonary nodules measuring up to 3 mm.   1/12/2025 MRI brain without contrast:   Acute to subacute lacunar infarcts involving the left corona radiate and parietal lobe white matter as well as within the right paramedian gianfranco. No associated hemorrhage.   Mild chronic microvascular ischemic change and chronic lacunar infarcts.   Left air cell effusion.     Etiology of infarcts is unclear, but concern for proximal embolism given appearance on imaging with strokes in different vascular territories and both anterior and posterior circulation involvement.    Plan:   - Continue with stroke pathway.  - Echocardiogram completed and results are pending.   - Lipid panel reviewed, total cholesterol 129, triglycerides 168, HDL 38, LDL 57.  - Hemoglobin A1c reviewed, 7.6.  - Continue on home ASA 81 mg QD. DAPT not indicated in this case.  - Continue Atorvastatin 40 mg QHS (patient was taking pravastatin PTA).  - Given elevated d dimer, recommend check CT  C/A/P with contrast to evaluate for occult malignancy and venous dopplers B/L LE to evaluate for DVT.   - Continue to monitor on telemetry. If above work-up is unremarkable for embolic source of infarcts, recommend outpatient cardiology evaluation for long term rhythm monitoring with Zio patch vs loop recorder.  - Goal normotension, normothermia and euglycemia.  - Continue telemetry  - PT/OT/ST  - Stroke education  - Patient should follow-up with ophthalmology outpatient.  - Frequent neuro checks. Continue to monitor and notify neurology with any changes.  - STAT CT head for any acute change in neuro exam  - Medical management and supportive care per primary team. Correction of any metabolic or infectious disturbances.

## 2025-01-13 NOTE — ASSESSMENT & PLAN NOTE
Confirmed on MRI subacute infarcts in R paramedian gianfranco and L parietal lobe suspected embolic possibly cardioembolic  Continue statin  Continue telemetry to monitor for occult A-fib  Noted neurology recommendation to check D-dimer and to evaluate for malignancy if significantly elevated, only 1.0 -we will look for other potential causes  Echocardiogram pending, if negative workup noted Zio patch recommendation  Neurochecks continued  Lipid panel, hemoglobin A1c  PT OT  Neurology following

## 2025-01-13 NOTE — PLAN OF CARE
Problem: PAIN - JULIET  Goal: Verbalizes/displays adequate comfort level or baseline comfort level  Description: Interventions:  - Encourage patient to monitor pain and request assistance  - Assess pain using appropriate pain scale  - Administer analgesics based on type and severity of pain and evaluate response  - Implement non-pharmacological measures as appropriate and evaluate response  - Consider cultural and social influences on pain and pain management  - Notify physician/advanced practitioner if interventions unsuccessful or patient reports new pain  Outcome: Progressing     Problem: DISCHARGE PLANNING  Goal: Discharge to home or other facility with appropriate resources  Description: INTERVENTIONS:  - Identify barriers to discharge w/patient and caregiver  - Arrange for needed discharge resources and transportation as appropriate  - Identify discharge learning needs (meds, wound care, etc.)  - Arrange for interpretive services to assist at discharge as needed  - Refer to Case Management Department for coordinating discharge planning if the patient needs post-hospital services based on physician/advanced practitioner order or complex needs related to functional status, cognitive ability, or social support system  Outcome: Progressing     Problem: Knowledge Deficit  Goal: Patient/family/caregiver demonstrates understanding of disease process, treatment plan, medications, and discharge instructions  Description: Complete learning assessment and assess knowledge base.  Interventions:  - Provide teaching at level of understanding  - Provide teaching via preferred learning methods  Outcome: Progressing     Problem: Neurological Deficit  Goal: Neurological status is stable or improving  Description: Interventions:  - Monitor and assess patient's level of consciousness, motor function, sensory function, and level of assistance needed for ADLs.   - Monitor and report changes from baseline. Collaborate with  interdisciplinary team to initiate plan and implement interventions as ordered.   - Provide and maintain a safe environment.  - Consider seizure precautions.  - Consider fall precautions.  - Consider aspiration precautions.  - Consider bleeding precautions.  Outcome: Progressing     Problem: Nutrition  Goal: Nutrition/Hydration status is improving  Description: Monitor and assess patient's nutrition/hydration status for malnutrition (ex- brittle hair, bruises, dry skin, pale skin and conjunctiva, muscle wasting, smooth red tongue, and disorientation). Collaborate with interdisciplinary team and initiate plan and interventions as ordered.  Monitor patient's weight and dietary intake as ordered or per policy. Utilize nutrition screening tool and intervene per policy. Determine patient's food preferences and provide high-protein, high-caloric foods as appropriate.     - Assist patient with eating.  - Allow adequate time for meals.  - Encourage patient to take dietary supplement as ordered.  - Collaborate with clinical nutritionist.  - Include patient/family/caregiver in decisions related to nutrition.  Outcome: Progressing     Problem: Nutrition/Hydration-ADULT  Goal: Nutrient/Hydration intake appropriate for improving, restoring or maintaining nutritional needs  Description: Monitor and assess patient's nutrition/hydration status for malnutrition. Collaborate with interdisciplinary team and initiate plan and interventions as ordered.  Monitor patient's weight and dietary intake as ordered or per policy. Utilize nutrition screening tool and intervene as necessary. Determine patient's food preferences and provide high-protein, high-caloric foods as appropriate.     INTERVENTIONS:  - Monitor oral intake, urinary output, labs, and treatment plans  - Assess nutrition and hydration status and recommend course of action  - Evaluate amount of meals eaten  - Assist patient with eating if necessary   - Allow adequate time for  meals  - Recommend/ encourage appropriate diets, oral nutritional supplements, and vitamin/mineral supplements  - Order, calculate, and assess calorie counts as needed  - Recommend, monitor, and adjust tube feedings and TPN/PPN based on assessed needs  - Assess need for intravenous fluids  - Provide specific nutrition/hydration education as appropriate  - Include patient/family/caregiver in decisions related to nutrition  Outcome: Progressing

## 2025-01-13 NOTE — CASE MANAGEMENT
Case Management Progress Note    Patient name Tc Stovall  Location [facility] 256/2 E 256-01 MRN [id number]  : 1950 Date 2025       LOS (days): 3  Geometric Mean LOS (GMLOS) (days):   Days to GMLOS:        OBJECTIVE:     Current admission status: Inpatient  Preferred Pharmacy:   [id number] - [place], [place] - [address]  [address]  [facility] [address]  Phone: [phone] Fax: [phone]    Primary Care Provider: Lester Roth DO    Primary Insurance: MEDICARE  Secondary Insurance: [employer]    PROGRESS NOTE:  Per SLIM note, patient anticipated for d/c in 24-48hrs.  CM handoff notes patient does not want HHC.  AMPAC is 24.  MC is primary with IP status 1/10.  CM will continue to follow for needs.

## 2025-01-13 NOTE — ASSESSMENT & PLAN NOTE
>>ASSESSMENT AND PLAN FOR TOBACCO ABUSE WRITTEN ON 1/13/2025 12:16 PM BY SUSAN GRAVES PA-C    - Encourage cessation.  - Extensively discussed with patient today at bedside and he notes he is planning to continue with Nicotine patch at home and he does appear motivated to quit.

## 2025-01-13 NOTE — ASSESSMENT & PLAN NOTE
Lab Results   Component Value Date    HGBA1C 7.6 (H) 01/11/2025       Recent Labs     01/12/25  0705 01/12/25  1032 01/12/25  1545 01/12/25  1723   POCGLU 150* 210* 138 121       Blood Sugar Average: Last 72 hrs:  (P) 135.4098347992285075  Continue statin

## 2025-01-13 NOTE — ASSESSMENT & PLAN NOTE
Lab Results   Component Value Date    HGBA1C 7.6 (H) 01/11/2025       Recent Labs     01/12/25  0705 01/12/25  1032 01/12/25  1545 01/12/25  1723   POCGLU 150* 210* 138 121       Blood Sugar Average: Last 72 hrs:  (P) 135.8474615202370672  Patient prescribed 20 units Lantus at bedtime (however he has been taking it as needed for sugar greater than 200) in addition to glipizide and metformin  Continue Lantus holding p.o. antihyperglycemic's  Sliding scale  Patient needs diabetic education

## 2025-01-13 NOTE — ASSESSMENT & PLAN NOTE
Lab Results   Component Value Date    HGBA1C 7.6 (H) 01/11/2025       Recent Labs     01/12/25  1723 01/12/25  2041 01/13/25  0747 01/13/25  1054   POCGLU 121 232* 141* 210*       Blood Sugar Average: Last 72 hrs:  (P) 148.3648680064179264  Continue statin

## 2025-01-13 NOTE — ASSESSMENT & PLAN NOTE
Lab Results   Component Value Date    HGBA1C 7.6 (H) 01/11/2025       Recent Labs     01/12/25  1545 01/12/25  1723 01/12/25  2041 01/13/25  0747   POCGLU 138 121 232* 141*       Blood Sugar Average: Last 72 hrs:  (P) 143.0819358941790608    - Goal euglycemia .   - Management as per primary team.

## 2025-01-13 NOTE — PLAN OF CARE
Problem: PAIN - ADULT  Goal: Verbalizes/displays adequate comfort level or baseline comfort level  Description: Interventions:  - Encourage patient to monitor pain and request assistance  - Assess pain using appropriate pain scale  - Administer analgesics based on type and severity of pain and evaluate response  - Implement non-pharmacological measures as appropriate and evaluate response  - Consider cultural and social influences on pain and pain management  - Notify physician/advanced practitioner if interventions unsuccessful or patient reports new pain  Outcome: Progressing     Problem: DISCHARGE PLANNING  Goal: Discharge to home or other facility with appropriate resources  Description: INTERVENTIONS:  - Identify barriers to discharge w/patient and caregiver  - Arrange for needed discharge resources and transportation as appropriate  - Identify discharge learning needs (meds, wound care, etc.)  - Arrange for interpretive services to assist at discharge as needed  - Refer to Case Management Department for coordinating discharge planning if the patient needs post-hospital services based on physician/advanced practitioner order or complex needs related to functional status, cognitive ability, or social support system  Outcome: Progressing     Problem: Knowledge Deficit  Goal: Patient/family/caregiver demonstrates understanding of disease process, treatment plan, medications, and discharge instructions  Description: Complete learning assessment and assess knowledge base.  Interventions:  - Provide teaching at level of understanding  - Provide teaching via preferred learning methods  Outcome: Progressing     Problem: Neurological Deficit  Goal: Neurological status is stable or improving  Description: Interventions:  - Monitor and assess patient's level of consciousness, motor function, sensory function, and level of assistance needed for ADLs.   - Monitor and report changes from baseline. Collaborate with  interdisciplinary team to initiate plan and implement interventions as ordered.   - Provide and maintain a safe environment.  - Consider seizure precautions.  - Consider fall precautions.  - Consider aspiration precautions.  - Consider bleeding precautions.  Outcome: Progressing     Problem: Nutrition  Goal: Nutrition/Hydration status is improving  Description: Monitor and assess patient's nutrition/hydration status for malnutrition (ex- brittle hair, bruises, dry skin, pale skin and conjunctiva, muscle wasting, smooth red tongue, and disorientation). Collaborate with interdisciplinary team and initiate plan and interventions as ordered.  Monitor patient's weight and dietary intake as ordered or per policy. Utilize nutrition screening tool and intervene per policy. Determine patient's food preferences and provide high-protein, high-caloric foods as appropriate.     - Assist patient with eating.  - Allow adequate time for meals.  - Encourage patient to take dietary supplement as ordered.  - Collaborate with clinical nutritionist.  - Include patient/family/caregiver in decisions related to nutrition.  Outcome: Progressing     Problem: Nutrition/Hydration-ADULT  Goal: Nutrient/Hydration intake appropriate for improving, restoring or maintaining nutritional needs  Description: Monitor and assess patient's nutrition/hydration status for malnutrition. Collaborate with interdisciplinary team and initiate plan and interventions as ordered.  Monitor patient's weight and dietary intake as ordered or per policy. Utilize nutrition screening tool and intervene as necessary. Determine patient's food preferences and provide high-protein, high-caloric foods as appropriate.     INTERVENTIONS:  - Monitor oral intake, urinary output, labs, and treatment plans  - Assess nutrition and hydration status and recommend course of action  - Evaluate amount of meals eaten  - Assist patient with eating if necessary   - Allow adequate time for  meals  - Recommend/ encourage appropriate diets, oral nutritional supplements, and vitamin/mineral supplements  - Order, calculate, and assess calorie counts as needed  - Recommend, monitor, and adjust tube feedings and TPN/PPN based on assessed needs  - Assess need for intravenous fluids  - Provide specific nutrition/hydration education as appropriate  - Include patient/family/caregiver in decisions related to nutrition  Outcome: Progressing

## 2025-01-14 VITALS
BODY MASS INDEX: 35.64 KG/M2 | HEIGHT: 67 IN | WEIGHT: 227.07 LBS | HEART RATE: 68 BPM | DIASTOLIC BLOOD PRESSURE: 73 MMHG | SYSTOLIC BLOOD PRESSURE: 147 MMHG | TEMPERATURE: 98.1 F | RESPIRATION RATE: 18 BRPM | OXYGEN SATURATION: 98 %

## 2025-01-14 LAB
GLUCOSE SERPL-MCNC: 162 MG/DL (ref 65–140)
GLUCOSE SERPL-MCNC: 77 MG/DL (ref 65–140)

## 2025-01-14 PROCEDURE — 82948 REAGENT STRIP/BLOOD GLUCOSE: CPT

## 2025-01-14 PROCEDURE — 99239 HOSP IP/OBS DSCHRG MGMT >30: CPT | Performed by: INTERNAL MEDICINE

## 2025-01-14 PROCEDURE — 93970 EXTREMITY STUDY: CPT | Performed by: INTERNAL MEDICINE

## 2025-01-14 RX ORDER — NICOTINE 21 MG/24HR
1 PATCH, TRANSDERMAL 24 HOURS TRANSDERMAL DAILY
Qty: 28 PATCH | Refills: 0 | Status: SHIPPED | OUTPATIENT
Start: 2025-01-15 | End: 2025-01-21

## 2025-01-14 RX ORDER — ATORVASTATIN CALCIUM 40 MG/1
40 TABLET, FILM COATED ORAL EVERY EVENING
Qty: 30 TABLET | Refills: 0 | Status: SHIPPED | OUTPATIENT
Start: 2025-01-14

## 2025-01-14 RX ORDER — INSULIN GLARGINE 100 [IU]/ML
20 INJECTION, SOLUTION SUBCUTANEOUS
Qty: 10 ML | Refills: 0 | Status: SHIPPED | OUTPATIENT
Start: 2025-01-14

## 2025-01-14 RX ADMIN — AMLODIPINE BESYLATE 5 MG: 5 TABLET ORAL at 09:20

## 2025-01-14 RX ADMIN — ACETAMINOPHEN 650 MG: 325 TABLET, FILM COATED ORAL at 00:58

## 2025-01-14 RX ADMIN — ASPIRIN 81 MG: 81 TABLET, CHEWABLE ORAL at 09:20

## 2025-01-14 RX ADMIN — GABAPENTIN 300 MG: 300 CAPSULE ORAL at 09:20

## 2025-01-14 RX ADMIN — NICOTINE 1 PATCH: 14 PATCH, EXTENDED RELEASE TRANSDERMAL at 09:21

## 2025-01-14 RX ADMIN — ENOXAPARIN SODIUM 40 MG: 40 INJECTION SUBCUTANEOUS at 09:20

## 2025-01-14 RX ADMIN — LISINOPRIL 20 MG: 20 TABLET ORAL at 09:20

## 2025-01-14 RX ADMIN — FLUTICASONE PROPIONATE 1 SPRAY: 50 SPRAY, METERED NASAL at 09:21

## 2025-01-14 NOTE — CASE MANAGEMENT
Case Management Discharge Planning Note    Patient name Tc Stovall  Location 2 [facility] 256/2 E 256-01 MRN [id number]  : 1950 Date 2025       Current Admission Date: 1/10/2025  Current Admission Diagnosis:Stroke (formerly [facility])   Patient Active Problem List    Diagnosis Date Noted Date Diagnosed    Tobacco abuse 2025     Stroke (formerly [facility]) 01/10/2025     Radiculopathy, cervical region 2024     Foraminal stenosis of lumbar region 2024     Protrusion of cervical intervertebral disc 2024     Cervical radiculopathy 2024     DM2 (diabetes mellitus, type 2) (formerly [facility]) 2024     Obesity, morbid (formerly [facility]) 2024     Type 2 diabetes mellitus with hyperglycemia, without long-term current use of insulin (formerly [facility]) 2023     Primary hypertension 10/04/2021     Hyperlipidemia associated with type 2 diabetes mellitus  (formerly [facility]) 10/04/2021     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 edema, without long-term current use of insulin (formerly [facility]) 2021     Type 2 diabetes mellitus with diabetic microalbuminuria, with long-term current use of insulin (formerly [facility]) 2017     Dependence on nicotine from cigarettes 2017       LOS (days): 4  Geometric Mean LOS (GMLOS) (days): 1.9  Days to GMLOS:-1.9     OBJECTIVE:  Risk of Unplanned Readmission Score: 7.13         Current admission status: Inpatient   Preferred Pharmacy:   [id number] [place], [place] - [facility] [place] [id number]  Phone: [phone] Fax: [phone]    Primary Care Provider: Lester Roth DO    Primary Insurance: MEDICARE  Secondary Insurance: AARP    DISCHARGE DETAILS:    Discharge planning discussed with:: Patient at bedside.  Freedom of Choice: Yes  Comments - Freedom of Choice: FOC maintained - CM reviewed DCP.  Patient with no anticipated CM needs.  Patient endorses same.  AMPAC is 24.  Aware CM will remain available through to d/c for any needs/services.   States son will transport him home once cleared.  CM contacted family/caregiver?: No- see comments (Independent)  Were Treatment Team discharge recommendations reviewed with patient/caregiver?: Yes  Did patient/caregiver verbalize understanding of patient care needs?: Yes  Were patient/caregiver advised of the risks associated with not following Treatment Team discharge recommendations?: Yes    Requested Home Health Care         Is the patient interested in HHC at discharge?: No    DME Referral Provided  Referral made for DME?: No    Other Referral/Resources/Interventions Provided:  Interventions: None Indicated  Referral Comments: None at this time.    Treatment Team Recommendation: Home  Discharge Destination Plan:: Home  Transport at Discharge : Family    IMM Given (Date):: 01/14/25  IMM Given to:: Patient (Verbal review of IMM at bedside with patient.  Understanding verbalized, no questions or concerns offered.  Original to medical records.)

## 2025-01-14 NOTE — ASSESSMENT & PLAN NOTE
Lab Results   Component Value Date    HGBA1C 7.6 (H) 01/11/2025       Recent Labs     01/13/25  1557 01/13/25  2043 01/14/25  0638 01/14/25  1112   POCGLU 87 289* 77 162*       Blood Sugar Average: Last 72 hrs:  (P) 163.2  Replace Pravachol with atorvastatin

## 2025-01-14 NOTE — ASSESSMENT & PLAN NOTE
Lab Results   Component Value Date    HGBA1C 7.6 (H) 01/11/2025       Recent Labs     01/13/25  1557 01/13/25  2043 01/14/25  0638 01/14/25  1112   POCGLU 87 289* 77 162*       Blood Sugar Average: Last 72 hrs:  (P) 163.2  Patient prescribed 20 units Lantus at bedtime (however he has been taking it as needed for sugar greater than 200) in addition to glipizide and metformin

## 2025-01-14 NOTE — DISCHARGE SUMMARY
Discharge Summary - Hospitalist   Name: Tc Stovall 74 y.o. male I MRN: [id number]  Unit/Bed#: 2 E 256-01 I Date of Admission: 1/10/2025   Date of Service: 1/14/2025 I Hospital Day: 4     Assessment & Plan  Stroke (HCC)  Confirmed on MRI subacute infarcts in R paramedian gianfranco and L parietal lobe suspected embolic possibly cardioembolic.  Discussed in detail with neurology  Continue statins for now  No definite A-fib on telemetry.  Zio patch ordered for follow-up with cardiology as outpatient    Primary hypertension  Resume amlodipine 5, lisinopril 20    Hyperlipidemia associated with type 2 diabetes mellitus  (HCC)  Lab Results   Component Value Date    HGBA1C 7.6 (H) 01/11/2025       Recent Labs     01/13/25  1557 01/13/25  2043 01/14/25  0638 01/14/25  1112   POCGLU 87 289* 77 162*       Blood Sugar Average: Last 72 hrs:  (P) 163.2  Replace Pravachol with atorvastatin  DM2 (diabetes mellitus, type 2) (HCC)  Lab Results   Component Value Date    HGBA1C 7.6 (H) 01/11/2025       Recent Labs     01/13/25  1557 01/13/25  2043 01/14/25  0638 01/14/25  1112   POCGLU 87 289* 77 162*       Blood Sugar Average: Last 72 hrs:  (P) 163.2  Patient prescribed 20 units Lantus at bedtime (however he has been taking it as needed for sugar greater than 200) in addition to glipizide and metformin    Radiculopathy, cervical region  Continue gabapentin  Patient has outpatient follow-up with EMG  Tobacco abuse  Counseled extensively to quit.  Nicotine patch ordered     Admitting Provider:  Jeffrey Hook MD  Discharge Provider:  Mac Crawford MD  Admission Date: 1/10/2025       Discharge Date: 01/14/25   LOS: 4  Primary Care Physician at Discharge: Lester Roth [phone]    HOSPITAL COURSE:  Tc Stovall is a 74 y.o. male who presented with acute CVA.  Patient was extensively worked up and no obvious cause of the CVA was found.  Patient was also worked up with a CT chest abdomen pelvis to rule out occult malignancy  but even this was negative.  Patient is being discharged for follow-up with cardiology with Dr. Alvarez on June 20, 2025 and outpatient Zio patch ordering.  Mobility:   Basic Mobility Inpatient Raw Score: 24  JH-HLM Goal: 8: Walk 250 feet or more  JH-HLM Achieved: 8: Walk 250 feet ot more  JH-HLM Goal achieved. Continue to encourage appropriate mobility.    REASON FOR ADMISSION/ ADMISSION DIAGNOSES    DISCHARGE DIAGNOSES  Tobacco abuse  Assessment & Plan  Counseled extensively to quit.  Nicotine patch ordered    Radiculopathy, cervical region  Assessment & Plan  Continue gabapentin  Patient has outpatient follow-up with EMG    DM2 (diabetes mellitus, type 2) ([facility])  Assessment & Plan  Lab Results   Component Value Date    HGBA1C 7.6 (H) 01/11/2025       Recent Labs     01/13/25  1557 01/13/25  2043 01/14/25  0638 01/14/25  1112   POCGLU 87 289* 77 162*         Blood Sugar Average: Last 72 hrs:  (P) 163.2  Patient prescribed 20 units Lantus at bedtime (however he has been taking it as needed for sugar greater than 200) in addition to glipizide and metformin      Hyperlipidemia associated with type 2 diabetes mellitus  ([facility])  Assessment & Plan  Lab Results   Component Value Date    HGBA1C 7.6 (H) 01/11/2025       Recent Labs     01/13/25  1557 01/13/25  2043 01/14/25  0638 01/14/25  1112   POCGLU 87 289* 77 162*         Blood Sugar Average: Last 72 hrs:  (P) 163.2  Replace Pravachol with atorvastatin    Primary hypertension  Assessment & Plan  Resume amlodipine 5, lisinopril 20      * Stroke ([facility])  Assessment & Plan  Confirmed on MRI subacute infarcts in R paramedian gianfranco and L parietal lobe suspected embolic possibly cardioembolic.  Discussed in detail with neurology  Continue statins for now  No definite A-fib on telemetry.  Zio patch ordered for follow-up with cardiology as outpatient        CONSULTING PROVIDERS   IP CONSULT TO NEUROLOGY  IP CONSULT TO CASE MANAGEMENT  IP CONSULT TO NUTRITION SERVICES    PROCEDURES  PERFORMED  * No surgery found *    RADIOLOGY RESULTS  CT chest abdomen pelvis w contrast  Result Date: 1/13/2025  Impression: No evidence of a primary or metastatic malignancy in the chest, abdomen or pelvis within limitation of CT technique. Prostatomegaly. Additional findings as above. Workstation performed: [id number]     MRI brain wo contrast  Result Date: 1/12/2025  Impression: Acute to subacute lacunar infarcts involving the left corona radiata and parietal lobe white matter as well as within the right paramedian gianfranco. No associated hemorrhage. Mild chronic microvascular ischemic change and chronic lacunar infarcts, as described above. Left mastoid air cell effusion. The study was marked in EPIC for immediate notification. Resident: TOMASZ GRAF I, the attending radiologist, have reviewed the images and agree with the final report above. Workstation performed: [id number]     CT stroke alert brain  Result Date: 1/10/2025  Impression: -Hypodensities within the body of the left caudate and left corona radiata, concerning for age-indeterminate infarcts, although may be remote. Correlate with MRI. -Chronic microangiopathic changes and remote lacunar infarcts as above. Findings were directly discussed with Kailash Garduno at approximately 3:03 pm. on 1/10/2025.. Workstation performed: [id number]     CTA stroke alert (head/neck)  Result Date: 1/10/2025  Impression: -No intracranial large vessel occlusion, proximal high-grade stenosis or aneurysm. -No significant stenosis of the cervical carotid arteries. Mild to moderate stenosis of bilateral cervical vertebral arteries. -Bilateral pulmonary nodules measuring up to 3 mm. Based on current Fleischner Society 2017 Guidelines on incidental pulmonary nodule, no routine follow-up is needed if the patient is low risk. If the patient is high risk, optional follow-up chest CT at 12 months can be considered. Findings were directly discussed with Kailash Garduno at 3:23 pm. on  "1/10/2025. Workstation performed: [id number]       LABS  Results from last 7 days   Lab Units 01/11/25  0510 01/10/25  1502   WBC Thousand/uL 9.03 9.99   HEMOGLOBIN g/dL 12.9 12.0   HEMATOCRIT % 39.2 37.0   MCV fL 87 88   PLATELETS Thousands/uL 247 267   INR   --  1.08     Results from last 7 days   Lab Units 01/11/25  0510 01/10/25  1502   SODIUM mmol/L 137 134*   POTASSIUM mmol/L 4.1 4.2   CHLORIDE mmol/L 105 105   CO2 mmol/L 25 25   BUN mg/dL 21 23   CREATININE mg/dL 0.93 0.92   CALCIUM mg/dL 8.8 8.3*   EGFR ml/min/1.73sq m 81 82   GLUCOSE RANDOM mg/dL 97 94              Results from last 7 days   Lab Units 01/12/25  1221   D-DIMER QUANTITATIVE ug/ml FEU 1.03*     Results from last 7 days   Lab Units 01/14/25  1112 01/14/25  0638 01/13/25  2043 01/13/25  1557 01/13/25  1054 01/13/25  0747 01/12/25  2041 01/12/25  1723 01/12/25  1545 01/12/25  1032   POC GLUCOSE mg/dl 162* 77 289* 87 210* 141* 232* 121 138 210*     Results from last 7 days   Lab Units 01/11/25  0510   HEMOGLOBIN A1C % 7.6*             Results from last 7 days   Lab Units 01/11/25  0510   TRIGLYCERIDES mg/dL 168*   CHOLESTEROL mg/dL 129   LDL CALC mg/dL 57   HDL mg/dL 38*       Cultures:                   PHYSICAL EXAM:  Vitals:   Blood Pressure: 147/73 (01/14/25 1107)  Pulse: 68 (01/14/25 1107)  Temperature: 98.1 °F (36.7 °C) (01/14/25 1107)  Temp Source: Oral (01/14/25 1100)  Respirations: 18 (01/14/25 1100)  Height: 5' 7\" (170.2 cm) (01/13/25 0842)  Weight - Scale: 103 kg (227 lb 1.2 oz) (01/13/25 0842)  SpO2: 98 % (01/14/25 1107)    General appearance: alert, fatigued, appears stated age, and cooperative  HEENT - atraumatic and normocephalic  Neck- supple  Skin - no fresh rash  Extremities no fresh focal deformities  Cardiovascular- S1-S2 heard  Respiratory- bilateral air entry present, no crackles or rhonchi  Skin - no fresh rash  Abdomen - normal bowel sounds present, no rebound tenderness  CNS- No fresh focal deficits  Psych- no acute " psychosis     Planned Re-admission: No  Discharge Disposition: Home/Self Care    Test Results Pending at Discharge:   Incidental findings: None    Medications   Summary of Medication Adjustments made as a result of this hospitalization: Statin  Medication Dosing Tapers - Please refer to Discharge Medication List for details on any medication dosing tapers (if applicable to patient).  Discharge Medication List: See after visit summary for reconciled discharge medications.     Diet restrictions: Heart healthy diet       Diet Orders   (From admission, onward)                 Start     Ordered    01/10/25 2114  Diet Cam/CHO Controlled; Consistent Carbohydrate Diet Level 2 (5 carb servings/75 grams CHO/meal)  Diet effective now        References:    Adult Nutrition Support Algorithm    RD Therapeutic Diet Order Protocol   Question Answer Comment   Diet Type Cam/CHO Controlled    Cam/CHO Controlled Consistent Carbohydrate Diet Level 2 (5 carb servings/75 grams CHO/meal)    RD to adjust diet per protocol? Yes        01/10/25 2114                  Activity restrictions: No strenuous activity  Discharge Condition: stable    Outpatient Follow-Up and Discharge Instructions  See after visit summary section titled Discharge Instructions for information provided to patient and family.      Code Status: Level 1 - Full Code  Discharge Statement   I spent 35 minutes discharging the patient. This time was spent on the day of discharge. Greater than 50% of total time was spent with the patient and / or family counseling and / or coordination of care.    Mac Crawford MD  [facility]'s Internal Medicine    ** Please Note: This note has been constructed using a voice recognition system. **

## 2025-01-14 NOTE — ASSESSMENT & PLAN NOTE
>>ASSESSMENT AND PLAN FOR TOBACCO ABUSE WRITTEN ON 1/14/2025  6:25 PM BY LILIYA PARSONS MD    Counseled extensively to quit.  Nicotine patch ordered

## 2025-01-14 NOTE — ASSESSMENT & PLAN NOTE
Confirmed on MRI subacute infarcts in R paramedian gianfranco and L parietal lobe suspected embolic possibly cardioembolic.  Discussed in detail with neurology  Continue statins for now  No definite A-fib on telemetry.  Zio patch ordered for follow-up with cardiology as outpatient

## 2025-01-15 ENCOUNTER — TRANSITIONAL CARE MANAGEMENT (OUTPATIENT)
Age: 75
End: 2025-01-15

## 2025-01-16 NOTE — TELEPHONE ENCOUNTER
Hello Good Day,     Can you please help schedule an HFU Long With  as per below request only with a  stroke Attending, placed on waiting list , nothing available/sooner THAN JUNE AND PATIENT DECLINED TO SCHEDULE FOR THAT REASON BUT ASKED IF WE CAN GET HIM ASAP THEN HE WILL MAKE THE       Thank you in advance,     Nurys           PR- [place]- 1/14/2025  I HAVE PLACED ON WAITING LIST...

## 2025-01-21 ENCOUNTER — OFFICE VISIT (OUTPATIENT)
Age: 75
End: 2025-01-21
Payer: MEDICARE

## 2025-01-21 VITALS
BODY MASS INDEX: 35.47 KG/M2 | WEIGHT: 226 LBS | OXYGEN SATURATION: 95 % | DIASTOLIC BLOOD PRESSURE: 72 MMHG | TEMPERATURE: 98 F | SYSTOLIC BLOOD PRESSURE: 134 MMHG | RESPIRATION RATE: 18 BRPM | HEART RATE: 75 BPM | HEIGHT: 67 IN

## 2025-01-21 DIAGNOSIS — E78.5 HYPERLIPIDEMIA ASSOCIATED WITH TYPE 2 DIABETES MELLITUS  (HCC): ICD-10-CM

## 2025-01-21 DIAGNOSIS — E66.01 SEVERE OBESITY (BMI 35.0-39.9) WITH COMORBIDITY (HCC): ICD-10-CM

## 2025-01-21 DIAGNOSIS — I63.81 LACUNAR INFARCTION (HCC): Primary | Chronic | ICD-10-CM

## 2025-01-21 DIAGNOSIS — I10 PRIMARY HYPERTENSION: Chronic | ICD-10-CM

## 2025-01-21 DIAGNOSIS — E11.69 HYPERLIPIDEMIA ASSOCIATED WITH TYPE 2 DIABETES MELLITUS  (HCC): ICD-10-CM

## 2025-01-21 PROCEDURE — 99495 TRANSJ CARE MGMT MOD F2F 14D: CPT | Performed by: INTERNAL MEDICINE

## 2025-01-21 RX ORDER — NICOTINE 21 MG/24HR
1 PATCH, TRANSDERMAL 24 HOURS TRANSDERMAL EVERY 24 HOURS
COMMUNITY

## 2025-01-21 NOTE — PROGRESS NOTES
Transition of Care Visit  Name: Tc Stovall      : 1950      MRN: [id number]  Encounter Provider: Lester Roth DO  Encounter Date: 2025   Encounter department: [facility] [place]    Assessment & Plan  Lacunar infarction (HCC)  MRI brain showed Acute to subacute lacunar infarcts involving the left corona radiata and parietal lobe white matter as well as within the right paramedian gianfranco. No associated hemorrhage. Mild chronic microvascular ischemic change and chronic lacunar infarcts. Possible embolic source. Recommend Zio patch. Has appt with cardiology in February. Continue aspirin and statin. Keep blood pressure and blood sugars controlled.    He is having no residual deficits from the stroke.    Recommend mediterranean diet. Needs to quit smoking. Avoid excess alcohol intake.     Blood pressure goal <130/80  A1c goal <7.0%  LDL goal < 55 mg/dL       Hyperlipidemia associated with type 2 diabetes mellitus  (HCC)    Lab Results   Component Value Date    HGBA1C 7.6 (H) 2025     Work on improving blood sugars. Needs to eat better and be more active.    Primary hypertension    BP is stable and controlled. Continue current anti-HTN medications.    Severe obesity (BMI 35.0-39.9) with comorbidity (HCC)    Weight Loss Tips:     Weight loss is NOT easy for anyone.  Achieving weight loss goals have been a challenge for many people and a quick fad diet will NOT work the way the product or commercials promises us it will work.  Life changes are long lasting.      Weight loss comes in time and with changes that are personalized for you, NOT the same for everyone.       7% weight loss of your body weight will MAKE a HUGE difference in your health status including improvement to Diabetes and high blood pressure management.       Here are some quick calculations for 7% weight loss:      If you weigh 180 pounds then GOAL 7% weight loss would be 12.5 pounds.  If you weigh 190 pounds then  GOAL 7% weight loss would be 13.0 pounds.  If you weigh 200 pounds then GOAL 7% weight loss would be 14.0 pounds.  If you weigh 210 pounds then GOAL 7% weight loss would be 14.7 pounds.  If you weigh 220 pounds then GOAL 7% weight loss would be 15.5 pounds.  If you weigh 230 pounds then GOAL 7% weight loss would be 16.0 pounds.  If you weigh 240 pounds then GOAL 7% weight loss would be 17.0 pounds.     If you weight over 250 pounds then a weight loss of >17 pounds is recommended.       Quick tips:  1. Track food intake with an ezequiel or tracker like www.[employer]Cloud.com, sparkpeople.com, Levantait.com, calorieking.com.   Reduce calorie intake by 500 a day to help lose weight but do NOT eat less than 1400 calories a day as this will turn off your metabolism and result in no weight loss at all.       2. Higher protein and lower carbohydrate intake works well for many but do NOT eliminate ONE food group completely as this is difficult to maintain in the long-term and will result in gaining back the weight quickly.       3. Track your steps with ezequiel on phone, Heart ezequiel on iphone, or a pedometer such as fitbit or [employer] Up 24 to help you reach 10,000 steps daily.       4.  Many patients do well with a carbohydrate level daily that is less than 200 g of carbs.  In addition the protein level should be over 75 g of protein in the full day.       Depression Screening and Follow-up Plan: Patient was screened for depression during today's encounter. They screened negative with a PHQ-2 score of 1.    Tobacco Cessation Counseling: Tobacco cessation counseling was provided. The patient is sincerely urged to quit consumption of tobacco. He is not ready to quit tobacco.       History of Present Illness     Transitional Care Management Review:   Tc Stovall is a 74 y.o. male here for TCM follow up.     During the TCM phone call patient stated:  TCM Call     Date and time call was made  1/16/2025  9:26 AM    Patient was hospitialized  "at  [facility]    Date of Admission  01/10/25    Date of discharge  01/14/25    Diagnosis  double vision=Stroke    Disposition  Home      TCM Call     Post hospital issues  None    Scheduled for follow up?  Yes    Patients specialists  Cardiologist    Cardiologist name  Juancarlos Alvarez MD    Cardiologist contact #  [phone]    Did you obtain your prescribed medications  --  atorvastatin        Tc presents for TCM management. He was admitted on stroke pathway due to vision changes. MRI confirmed acute to subacute lacunar infarcts involving the left corona radiata and parietal lobe white matter as well as within the right paramedian gianfranco. Pravastatin was changed to atorvastatin. He was instructed to continue baby aspirin. He has hospital follow-up with cardiology scheduled and will likely be ordered Zio patch. No evidence of malignancy on CT chest/abdomen/pelvis.    He has no concerns or questions today. He is not having any residual deficits.      Review of Systems   Constitutional:  Negative for activity change, appetite change and fatigue.   Respiratory:  Negative for apnea, cough, chest tightness, shortness of breath and wheezing.    Cardiovascular:  Negative for chest pain, palpitations and leg swelling.   Gastrointestinal:  Negative for abdominal distention, abdominal pain, blood in stool, constipation, diarrhea, nausea and vomiting.   Neurological:  Negative for dizziness, weakness, light-headedness, numbness and headaches.   Psychiatric/Behavioral:  Negative for behavioral problems, confusion, hallucinations, sleep disturbance and suicidal ideas. The patient is not nervous/anxious.      Objective   /72 (BP Location: Left arm, Patient Position: Sitting, Cuff Size: Large)   Pulse 75   Temp 98 °F (36.7 °C) (Tympanic)   Resp 18   Ht 5' 7\" (1.702 m)   Wt 103 kg (226 lb)   SpO2 95%   BMI 35.40 kg/m²     Physical Exam  Constitutional:       General: He is not in acute distress.     " Appearance: He is obese. He is not ill-appearing.   Cardiovascular:      Rate and Rhythm: Normal rate and regular rhythm.      Heart sounds: No murmur heard.  Pulmonary:      Effort: Pulmonary effort is normal. No respiratory distress.      Breath sounds: No wheezing.   Abdominal:      General: Bowel sounds are normal. There is no distension.      Tenderness: There is no abdominal tenderness.   Musculoskeletal:      Right lower leg: No edema.      Left lower leg: No edema.   Neurological:      Mental Status: He is alert.       Medications have been reviewed by provider in current encounter    Lester Roth DO

## 2025-01-21 NOTE — ASSESSMENT & PLAN NOTE
Weight Loss Tips:     Weight loss is NOT easy for anyone.  Achieving weight loss goals have been a challenge for many people and a quick fad diet will NOT work the way the product or commercials promises us it will work.  Life changes are long lasting.      Weight loss comes in time and with changes that are personalized for you, NOT the same for everyone.       7% weight loss of your body weight will MAKE a HUGE difference in your health status including improvement to Diabetes and high blood pressure management.       Here are some quick calculations for 7% weight loss:      If you weigh 180 pounds then GOAL 7% weight loss would be 12.5 pounds.  If you weigh 190 pounds then GOAL 7% weight loss would be 13.0 pounds.  If you weigh 200 pounds then GOAL 7% weight loss would be 14.0 pounds.  If you weigh 210 pounds then GOAL 7% weight loss would be 14.7 pounds.  If you weigh 220 pounds then GOAL 7% weight loss would be 15.5 pounds.  If you weigh 230 pounds then GOAL 7% weight loss would be 16.0 pounds.  If you weigh 240 pounds then GOAL 7% weight loss would be 17.0 pounds.     If you weight over 250 pounds then a weight loss of >17 pounds is recommended.       Quick tips:  1. Track food intake with an ezequiel or tracker like www.[employer].com, sparkpeople.com, loseit.com, calorieking.com.   Reduce calorie intake by 500 a day to help lose weight but do NOT eat less than 1400 calories a day as this will turn off your metabolism and result in no weight loss at all.       2. Higher protein and lower carbohydrate intake works well for many but do NOT eliminate ONE food group completely as this is difficult to maintain in the long-term and will result in gaining back the weight quickly.       3. Track your steps with ezequiel on phone, Heart ezequiel on iphone, or a pedometer such as fitbit or [employer] Up 24 to help you reach 10,000 steps daily.       4.  Many patients do well with a carbohydrate level daily that is less than 200 g of  carbs.  In addition the protein level should be over 75 g of protein in the full day.

## 2025-01-21 NOTE — ASSESSMENT & PLAN NOTE
MRI brain showed Acute to subacute lacunar infarcts involving the left corona radiata and parietal lobe white matter as well as within the right paramedian gianfranco. No associated hemorrhage. Mild chronic microvascular ischemic change and chronic lacunar infarcts. Possible embolic source. Recommend Zio patch. Has appt with cardiology in February. Continue aspirin and statin. Keep blood pressure and blood sugars controlled.    He is having no residual deficits from the stroke.    Recommend mediterranean diet. Needs to quit smoking. Avoid excess alcohol intake.     Blood pressure goal <130/80  A1c goal <7.0%  LDL goal < 55 mg/dL

## 2025-02-11 ENCOUNTER — OFFICE VISIT (OUTPATIENT)
Dept: CARDIOLOGY CLINIC | Facility: CLINIC | Age: 75
End: 2025-02-11
Payer: MEDICARE

## 2025-02-11 VITALS
BODY MASS INDEX: 35.6 KG/M2 | DIASTOLIC BLOOD PRESSURE: 70 MMHG | HEART RATE: 68 BPM | WEIGHT: 226.8 LBS | HEIGHT: 67 IN | SYSTOLIC BLOOD PRESSURE: 132 MMHG

## 2025-02-11 DIAGNOSIS — E66.01 CLASS 2 SEVERE OBESITY DUE TO EXCESS CALORIES WITH SERIOUS COMORBIDITY AND BODY MASS INDEX (BMI) OF 35.0 TO 35.9 IN ADULT (HCC): ICD-10-CM

## 2025-02-11 DIAGNOSIS — Z79.4 TYPE 2 DIABETES MELLITUS WITH DIABETIC MICROALBUMINURIA, WITH LONG-TERM CURRENT USE OF INSULIN (HCC): ICD-10-CM

## 2025-02-11 DIAGNOSIS — E11.29 TYPE 2 DIABETES MELLITUS WITH DIABETIC MICROALBUMINURIA, WITH LONG-TERM CURRENT USE OF INSULIN (HCC): ICD-10-CM

## 2025-02-11 DIAGNOSIS — E66.812 CLASS 2 SEVERE OBESITY DUE TO EXCESS CALORIES WITH SERIOUS COMORBIDITY AND BODY MASS INDEX (BMI) OF 35.0 TO 35.9 IN ADULT (HCC): ICD-10-CM

## 2025-02-11 DIAGNOSIS — R80.9 TYPE 2 DIABETES MELLITUS WITH DIABETIC MICROALBUMINURIA, WITH LONG-TERM CURRENT USE OF INSULIN (HCC): ICD-10-CM

## 2025-02-11 DIAGNOSIS — I63.9 CEREBRAL INFARCTION, UNSPECIFIED MECHANISM (HCC): ICD-10-CM

## 2025-02-11 DIAGNOSIS — I49.3 PVC (PREMATURE VENTRICULAR CONTRACTION): ICD-10-CM

## 2025-02-11 DIAGNOSIS — I63.40 CEREBROVASCULAR ACCIDENT (CVA) DUE TO EMBOLISM OF CEREBRAL ARTERY (HCC): Primary | ICD-10-CM

## 2025-02-11 DIAGNOSIS — E78.5 HYPERLIPIDEMIA ASSOCIATED WITH TYPE 2 DIABETES MELLITUS  (HCC): Chronic | ICD-10-CM

## 2025-02-11 DIAGNOSIS — E11.69 HYPERLIPIDEMIA ASSOCIATED WITH TYPE 2 DIABETES MELLITUS  (HCC): Chronic | ICD-10-CM

## 2025-02-11 DIAGNOSIS — I10 PRIMARY HYPERTENSION: Chronic | ICD-10-CM

## 2025-02-11 PROCEDURE — 99204 OFFICE O/P NEW MOD 45 MIN: CPT | Performed by: INTERNAL MEDICINE

## 2025-02-11 NOTE — ASSESSMENT & PLAN NOTE
Lab Results   Component Value Date    HGBA1C 7.6 (H) 01/11/2025   He has triglyceride elevations over the years  Counseled regarding diet modifications to lower carbohydrate and added sugar intake  Increase cardio-exercises, pursue weight loss  Continue atorvastatin 40 mg daily

## 2025-02-11 NOTE — PROGRESS NOTES
[facility] [address] [address] [phone]                                              Cardiology Office Consult  Tc Stovall, 74 y.o. male  YOB: 1950  MRN: [id number] Encounter: [id number]      PCP - Lester Roth DO  Referring Provider - Self, Referral    Chief Complaint   Patient presents with   • New Patient Visit       Assessment  Acute stroke  PVC  Hyperlipidemia  Hypertension  Diabetes Mellitus Type 2  Obesity, Body mass index is 35.52 kg/m².     Plan  Assessment & Plan  Cerebrovascular accident (CVA) due to embolism of cerebral artery (HCC)  Overview  1/2025: [facility] Adm: blurred vision  MRi brain -subacute infarction right paramedian gianfranco and left parietal lobe, suspicious for cardioembolic stroke  Referred to cardiology for loop monitoring after placing on aspirin, statin  CTA stroke -no acute intracranial bleed, no significant carotid obstruction, mild to moderate stenosis of bilateral cervical vertebral arteries   TTE -LVEF 55%, grade 1 diastolic dysfunction, mild MR/TR/AI  2/11/25 - initial OV with me  No chest pain or shortness of breath  No major complaints of palpitations  Vision is reportedly back to baseline, and is free of any weakness or residual stroke symptoms  Impression  Recent stroke suspicious for embolic etiology, but no clear symptoms to suggest A-fib  Plan  Extensively counseled regarding variety of stroke etiologies, possibility of A-fib causing his stroke and need to pursue cardiac monitoring for this  Will start off with the 2 weeks ZIO AT monitor  If no A-fib on this, then he will need further evaluation and continued cardiac monitoring with a loop recorder implant -counseled regarding the same and he is agreeable  Follow-up with neurologist  Continue aspirin, statin  Cerebral infarction, unspecified mechanism (HCC)  See above  Hyperlipidemia associated with type 2  diabetes mellitus  (Formerly [facility])    Lab Results   Component Value Date    HGBA1C 7.6 (H) 01/11/2025   He has triglyceride elevations over the years  Counseled regarding diet modifications to lower carbohydrate and added sugar intake  Increase cardio-exercises, pursue weight loss  Continue atorvastatin 40 mg daily    Primary hypertension  Well-controlled, 132/70  Continue amlodipine 5 mg daily, lisinopril 20 mg daily    Type 2 diabetes mellitus with diabetic microalbuminuria, with long-term current use of insulin (Formerly [facility])    Lab Results   Component Value Date    HGBA1C 7.6 (H) 01/11/2025   Counseled regarding need to optimize diabetes control  Close follow-up with PCP is recommended  PVC (premature ventricular contraction)  Occasional PVCs have been noted on several ECGs over the past couple of years  No palpitations or symptoms and appears to be quite rare  Monitor clinically for now  Class 2 severe obesity due to excess calories with serious comorbidity and body mass index (BMI) of 35.0 to 35.9 in adult (Formerly [facility])        No results found for this visit on 02/11/25.    Orders Placed This Encounter   Procedures   • Zio AT       No follow-ups on file.      History of Present Illness   74 y.o. male comes in as a new patient for consultation regarding cardiac evaluation for recent acute stroke, after being referred by neurologist from the hospital.    He reports no active complaints of chest pain, shortness of breath, palpitations or dizziness.  No known prior history of coronary artery disease or heart failure.  No known major cardiac hospitalizations.    In January 2025, he reports having had sudden onset blurry vision symptoms which are occurring intermittently for a day and then subsequently persisted, prompting him to seek evaluation in the hospital.  He was evaluated through the stroke pathway and an MRI brain subsequently confirmed acute strokes in regions.  Based on neurology assessment, this was felt to be possible embolic  stroke and he was recommended outpatient cardiac monitoring due to lack of other clear sources of emboli including carotid stenosis.    Over the last 1 month, he has done well and remains free of any chest pain, palpitations or recurrent stroke symptoms.  He is now here for further cardiac monitoring.    Historical Information   Past Medical History:   Diagnosis Date   • Arthritis    • Diabetes mellitus (HCC)    • Hyperlipidemia    • Hypertension    • Left rotator cuff tear    • Stroke ([facility]) [id number]   • Tobacco abuse    • Wears glasses      Past Surgical History:   Procedure Laterality Date   • COLONOSCOPY     • KNEE SURGERY     • NE SURGICAL ARTHROSCOPY SHOULDER W/ROTATOR CUFF RPR Left 08/10/2022    Procedure: Left Shoulder Arthroscopy, supraspinatus and Subscapularis Repair, Bicep Tenotomy, acromioplasty, extensive debridement;  Surgeon: Otilio Mendez DO;  Location: [facility] OR;  Service: Orthopedics   • TONSILLECTOMY  1955     Family History   Problem Relation Age of Onset   • Alzheimer's disease Mother    • Diabetes Mother    • Heart block Father    • Heart disease Father      Current Outpatient Medications   Medication Instructions   • acetaminophen (TYLENOL) 500 mg, Every 6 hours PRN   • amLODIPine (NORVASC) 5 mg, Oral, Daily   • aspirin 81 mg, Oral, Daily   • atorvastatin (LIPITOR) 40 mg, Oral, Every evening   • Blood Glucose Monitoring Suppl (OneTouch Verio Reflect) w/Device KIT Check blood sugars three times daily. Please substitute with appropriate alternative as covered by patient's insurance. Dx: E11.65   • cholecalciferol (VITAMIN D3) 1,000 Units, Oral, Daily   • clotrimazole-betamethasone (LOTRISONE) 1-0.05 % cream Topical, 2 times daily   • Continuous Glucose Sensor (Dexcom G7 Sensor) 1 Device, Does not apply, Every 10 days   • fluticasone (FLONASE) 50 mcg/act nasal spray 1 spray, Nasal, Daily   • gabapentin (NEURONTIN) 300 mg, Oral, 2 times daily   • glipiZIDE (GLUCOTROL XL) 10 mg, Oral, Daily    • glucose blood (OneTouch Verio) test strip Check blood sugars three times daily. Please substitute with appropriate alternative as covered by patient's insurance. Dx: E11.65   • insulin glargine (LANTUS) 20 Units, Subcutaneous, Daily at bedtime   • Insulin Pen Needle (CareOne Unifine Pentips Plus) 32G X 4 MM MISC USE DAILY AS INSTRUCTED WITH INSULIN PEN   • lisinopril (ZESTRIL) 20 mg, Oral, Daily   • meloxicam (MOBIC) 15 mg tablet TAKE ONE TABLET BY MOUTH EVERY DAY AS NEEDED FOR MODERATE PAIN   • metFORMIN (GLUCOPHAGE-XR) 1,000 mg, Oral, 2 times daily   • nicotine (NICODERM CQ) 14 mg/24hr TD 24 hr patch 1 patch, Every 24 hours   • OneTouch Delica Lancets 33G MISC Check blood sugars three times daily. Please substitute with appropriate alternative as covered by patient's insurance. Dx: E11.65      No Known Allergies  Social History     Socioeconomic History   • Marital status: /Civil Union     Spouse name: None   • Number of children: None   • Years of education: None   • Highest education level: None   Occupational History   • None   Tobacco Use   • Smoking status: Every Day     Current packs/day: 0.50     Average packs/day: 0.6 packs/day for 85.6 years (55.2 ttl pk-yrs)     Types: Cigarettes     Start date: 4/26/1964   • Smokeless tobacco: Never   Vaping Use   • Vaping status: Never Used   Substance and Sexual Activity   • Alcohol use: Yes     Comment: rarely   • Drug use: No   • Sexual activity: Not Currently     Partners: Female     Birth control/protection: Female Sterilization   Other Topics Concern   • None   Social History Narrative   • None     Social Drivers of Health     Financial Resource Strain: Medium Risk (10/6/2023)    Overall Financial Resource Strain (CARDIA)    • Difficulty of Paying Living Expenses: Somewhat hard   Food Insecurity: No Food Insecurity (1/10/2025)    Nursing - Inadequate Food Risk Classification    • Worried About Running Out of Food in the Last Year: Never true    • Ran  "Out of Food in the Last Year: Never true    • Ran Out of Food in the Last Year: Never true   Transportation Needs: No Transportation Needs (1/10/2025)    Nursing - Transportation Risk Classification    • Lack of Transportation: Not on file    • Lack of Transportation: No   Physical Activity: Inactive (2021)    Exercise Vital Sign    • Days of Exercise per Week: 0 days    • Minutes of Exercise per Session: 0 min   Stress: Stress Concern Present (2021)    [employer] - Occupational Stress Questionnaire    • Feeling of Stress : Rather much   Social Connections: Unknown (2024)    Received from [employer]    • How often do you feel lonely or isolated from those around you? (Adult - for ages 18 years and over): Not on file   Intimate Partner Violence: Unknown (1/10/2025)    Nursing IPS    • Feels Physically and Emotionally Safe: Not on file    • Physically Hurt by Someone: Not on file    • Humiliated or Emotionally Abused by Someone: Not on file    • Physically Hurt by Someone: No    • Hurt or Threatened by Someone: No   Housing Stability: Unknown (1/10/2025)    Nursing: Inadequate Housing Risk Classification    • Has Housing: Not on file    • Worried About Losing Housing: Not on file    • Unable to Get Utilities: Not on file    • Unable to Pay for Housing in the Last Year: No    • Has Housin        Review of Systems   All other systems reviewed and are negative.        Vitals:  Vitals:    25 1132   BP: 132/70   Pulse: 68   Weight: 103 kg (226 lb 12.8 oz)   Height: 5' 7\" (1.702 m)     BMI - Body mass index is 35.52 kg/m².  Wt Readings from Last 7 Encounters:   25 103 kg (226 lb 12.8 oz)   25 103 kg (226 lb)   25 103 kg (227 lb 1.2 oz)   10/18/24 103 kg (226 lb 12.8 oz)   24 103 kg (227 lb)   24 103 kg (227 lb)   24 103 kg (227 lb)       Physical Exam  Vitals and nursing note reviewed.   Constitutional:       " General: He is not in acute distress.     Appearance: He is well-developed. He is obese. He is not ill-appearing or diaphoretic.   HENT:      Head: Normocephalic and atraumatic.      Nose: No congestion.   Eyes:      General: No scleral icterus.     Conjunctiva/sclera: Conjunctivae normal.   Neck:      Vascular: No carotid bruit or JVD.   Cardiovascular:      Rate and Rhythm: Normal rate and regular rhythm.      Heart sounds: Normal heart sounds. No murmur heard.     No friction rub. No gallop.   Pulmonary:      Effort: Pulmonary effort is normal. No respiratory distress.      Breath sounds: Normal breath sounds. No wheezing or rales.   Chest:      Chest wall: No tenderness.   Abdominal:      General: There is no distension.      Palpations: Abdomen is soft.      Tenderness: There is no abdominal tenderness.   Musculoskeletal:         General: No swelling, tenderness or deformity.      Cervical back: Neck supple. No muscular tenderness.      Right lower leg: No edema.      Left lower leg: No edema.   Skin:     General: Skin is warm.   Neurological:      General: No focal deficit present.      Mental Status: He is alert and oriented to person, place, and time. Mental status is at baseline.   Psychiatric:         Mood and Affect: Mood normal.         Behavior: Behavior normal.         Thought Content: Thought content normal.           Labs:  CBC:   Lab Results   Component Value Date    WBC 9.03 01/11/2025    RBC 4.52 01/11/2025    HGB 12.9 01/11/2025    HCT 39.2 01/11/2025    MCV 87 01/11/2025     01/11/2025    RDW 12.6 01/11/2025       CMP:   Lab Results   Component Value Date    K 4.1 01/11/2025     01/11/2025    CO2 25 01/11/2025    BUN 21 01/11/2025    CREATININE 0.93 01/11/2025    EGFR 81 01/11/2025    CALCIUM 8.8 01/11/2025    AST 14 10/16/2024    ALT 15 10/16/2024    ALKPHOS 71 10/16/2024       Magnesium:  Lab Results   Component Value Date    MG 1.6 04/25/2018       Lipid Profile:   Lab Results  "  Component Value Date    HDL 38 (L) 2025    TRIG 168 (H) 2025    LDLCALC 57 2025       Thyroid Studies:   Lab Results   Component Value Date    RGD9WYTMULGI 2.774 2018    FREET4 1.10 2020       A1c:  No components found for: \"HGA1C\"    INR:  Lab Results   Component Value Date    INR 1.08 01/10/2025    INR 1.06 2018   5    Cardiac testing:   Results for orders placed during the hospital encounter of 17    Echo complete with contrast if indicated    [address]  [place] [id number] [phone]    Transthoracic Echocardiogram  2D, M-mode, Doppler, and Color Doppler    Study date:  05-Sep-2017    Patient: SUNIL JIMENES  MR number: [id number]  Account number: [id number]  : 1950  Age: 67 years  Gender: Male  Status: Outpatient  Location: Emergency room  Height: 67 in  Weight: 205.9 lb  BP: 172/ 77 mmHg    Indications: Transient Ischemic Attack    Diagnoses: G45.9 - Transient cerebral ischemic attack, unspecified    Sonographer:  CLEMENTE Perez,RDCS  Interpreting Physician:  Nanci Horowitz MD  Referring Physician:  Fatuma Aparicio PA-C  Group:  [facility]    SUMMARY    LEFT VENTRICLE:  Ejection fraction was estimated to be 60 %.  There were no regional wall motion abnormalities.  Wall thickness was mildly increased.    MITRAL VALVE:  There was trace regurgitation.    AORTIC VALVE:  There was trace regurgitation.    TRICUSPID VALVE:  There was trace regurgitation.    HISTORY: PRIOR HISTORY: Orthostatic Lightheadedness, Type 2 Diabetes Mellitus, Tobacco Abuse    PROCEDURE: The procedure was performed in the emergency room. This was a routine study. The transthoracic approach was used. The study included complete 2D imaging, M-mode, complete spectral Doppler, and color Doppler. The heart rate was  90 bpm, at the start of the study. Images were obtained from the parasternal, apical, " subcostal, and suprasternal notch acoustic windows. Echocardiographic views were limited due to poor acoustic window availability, decreased penetration,  and lung interference. This was a technically difficult study.    LEFT VENTRICLE: Size was normal. Ejection fraction was estimated to be 60 %. There were no regional wall motion abnormalities. Wall thickness was mildly increased. DOPPLER: There was an increased relative contribution of atrial contraction  to ventricular filling.    RIGHT VENTRICLE: The size was normal. Systolic function was normal. Wall thickness was normal.    LEFT ATRIUM: Size was normal.    RIGHT ATRIUM: Size was normal.    MITRAL VALVE: Valve structure was normal. There was normal leaflet separation. DOPPLER: The transmitral velocity was within the normal range. There was no evidence for stenosis. There was trace regurgitation.    AORTIC VALVE: The valve was trileaflet. Leaflets exhibited mildly increased thickness and mild calcification. DOPPLER: There was trace regurgitation.    TRICUSPID VALVE: The valve structure was normal. There was normal leaflet separation. DOPPLER: The transtricuspid velocity was within the normal range. There was no evidence for stenosis. There was trace regurgitation.    PULMONIC VALVE: Leaflets exhibited normal thickness, no calcification, and normal cuspal separation. DOPPLER: The transpulmonic velocity was within the normal range. There was no regurgitation.    PERICARDIUM: There was no pericardial effusion. The pericardium was normal in appearance.    AORTA: The root exhibited normal size.    SYSTEM MEASUREMENT TABLES    2D  %FS: 37.6 %  Ao Diam: 3.3 cm  EDV(Teich): 105.8 ml  EF(Teich): 67.6 %  ESV(Teich): 34.3 ml  IVSd: 1.1 cm  LA Area: 12.9 cm2  LA Diam: 2.6 cm  LVEDV MOD A4C: 128.3 ml  LVEF MOD A4C: 56.9 %  LVESV MOD A4C: 55.3 ml  LVIDd: 4.8 cm  LVIDs: 3 cm  LVLd A4C: 8.5 cm  LVLs A4C: 7.3 cm  LVPWd: 0.9 cm  RA Area: 11.5 cm2  RVIDd: 2.8 cm  SV MOD A4C: 73  ml  SV(Teich): 71.5 ml    CW  AR Dec Blaine: 2.2 m/s2  AR Dec Time: 1671.7 ms  AR PHT: 484.8 ms  AR Vmax: 3.7 m/s  AR maxP.9 mmHg    MM  TAPSE: 2.3 cm    PW  E': 0.1 m/s  E/E': 8.7  MV A Antonio: 0.8 m/s  MV Dec Blaine: 3.6 m/s2  MV DecT: 174 ms  MV E Antonio: 0.6 m/s  MV E/A Ratio: 0.8  MV PHT: 50.5 ms  MVA By PHT: 4.4 cm2    [facility] Accredited [facility]chocardi[facility]    Prepared and electronically signed by    Nanci Horowitz MD  Signed 05-Sep-2017 17:27:56    No results found for this or any previous visit.    No results found for this or any previous visit.    No results found for this or any previous visit.       VAS VENOUS DUPLEX - LOWER LIMB BILATERAL     [facility] REPORT  CLINICAL:  Indications:  Patient presents with elevated D-dimer, ordering physician wants to determine  the source.  Operative History:  no prior cardiovascular surgeries  Risk Factors  The patient has history of Obesity, HTN, Diabetes (IDDM), Hyperlipidemia and  smoking (current) 0.5 ppd.        CONCLUSION:  Impression:  RIGHT LOWER LIMB:  No evidence of acute or chronic deep vein thrombosis.  No evidence of superficial thrombophlebitis noted.  Doppler evaluation shows a normal response to augmentation maneuvers..  Popliteal, posterior tibial and anterior tibial arterial Doppler waveforms are  triphasic.     LEFT LOWER LIMB:  No evidence of acute or chronic deep vein thrombosis.  No evidence of superficial thrombophlebitis noted.  Doppler evaluation shows a normal response to augmentation maneuvers.  Popliteal, posterior tibial and anterior tibial arterial Doppler waveforms are  triphasic.     SIGNATURE:  Electronically Signed by: SABINO ONEIL MD [facility] on 2025 08:19:56 AM

## 2025-02-11 NOTE — ASSESSMENT & PLAN NOTE
Lab Results   Component Value Date    HGBA1C 7.6 (H) 01/11/2025   Counseled regarding need to optimize diabetes control  Close follow-up with PCP is recommended

## 2025-02-19 DIAGNOSIS — R07.81 RIB PAIN: ICD-10-CM

## 2025-02-20 RX ORDER — MELOXICAM 15 MG/1
15 TABLET ORAL DAILY PRN
Qty: 30 TABLET | Refills: 1 | Status: SHIPPED | OUTPATIENT
Start: 2025-02-20

## 2025-03-10 LAB
CV ZIO BASELINE AVG BPM: 72 BPM
CV ZIO BASELINE BPM HIGH: 176 BPM
CV ZIO BASELINE BPM LOW: 50 BPM
CV ZIO DEVICE ANALYSIS TIME: NORMAL
CV ZIO ECT SVE COUNT: 9274 EPISODES
CV ZIO ECT SVE CPLT COUNT: 6024 EPISODES
CV ZIO ECT SVE CPLT FREQ: NORMAL
CV ZIO ECT SVE FREQ: NORMAL
CV ZIO ECT SVE TPLT COUNT: 745 EPISODES
CV ZIO ECT SVE TPLT FREQ: NORMAL
CV ZIO ECT VE COUNT: 2274 EPISODES
CV ZIO ECT VE CPLT COUNT: 65 EPISODES
CV ZIO ECT VE CPLT FREQ: NORMAL
CV ZIO ECT VE FREQ: NORMAL
CV ZIO ECT VE TPLT COUNT: 0 EPISODES
CV ZIO ECT VE TPLT FREQ: 0
CV ZIO ECTOPIC SVE COUPLET RAW PERCENT: 0.94 %
CV ZIO ECTOPIC SVE ISOLATED PERCENT: 0.72 %
CV ZIO ECTOPIC SVE TRIPLET RAW PERCENT: 0.17 %
CV ZIO ECTOPIC VE COUPLET RAW PERCENT: 0.01 %
CV ZIO ECTOPIC VE ISOLATED PERCENT: 0.18 %
CV ZIO ECTOPIC VE TRIPLET RAW PERCENT: 0 %
CV ZIO ENROLLMENT END: NORMAL
CV ZIO ENROLLMENT START: NORMAL
CV ZIO L TRIGEMINY DUR: 16.9 SEC
CV ZIO L TRIGEMINY END: NORMAL
CV ZIO L TRIGEMINY START: NORMAL
CV ZIO PATIENT EVENTS DIARIES: 0
CV ZIO PATIENT EVENTS TRIGGERS: 4
CV ZIO PAUSE COUNT: 0
CV ZIO PRESCRIPTION STATUS: NORMAL
CV ZIO SVT AVG BPM: 120 BPM
CV ZIO SVT BPM HIGH: 176 BPM
CV ZIO SVT BPM LOW: 57 BPM
CV ZIO SVT COUNT: 113
CV ZIO SVT F EPI AVG BPM: 145 BPM
CV ZIO SVT F EPI BEATS: 13 BEATS
CV ZIO SVT F EPI BPM HIGH: 176 BPM
CV ZIO SVT F EPI BPM LOW: 103 BPM
CV ZIO SVT F EPI DUR: 5.6 SEC
CV ZIO SVT F EPI END: NORMAL
CV ZIO SVT F EPI START: NORMAL
CV ZIO SVT L EPI AVG BPM: 105 BPM
CV ZIO SVT L EPI BEATS: 22 BEATS
CV ZIO SVT L EPI BPM HIGH: 145 BPM
CV ZIO SVT L EPI BPM LOW: 57 BPM
CV ZIO SVT L EPI DUR: 13.2 SEC
CV ZIO SVT L EPI END: NORMAL
CV ZIO SVT L EPI START: NORMAL
CV ZIO TOTAL  ENROLLMENT PERIOD: NORMAL
CV ZIO VT COUNT: 0

## 2025-03-15 DIAGNOSIS — R07.81 RIB PAIN: ICD-10-CM

## 2025-03-15 DIAGNOSIS — I10 PRIMARY HYPERTENSION: ICD-10-CM

## 2025-03-15 DIAGNOSIS — E11.69 TYPE 2 DIABETES MELLITUS WITH OTHER SPECIFIED COMPLICATION, WITHOUT LONG-TERM CURRENT USE OF INSULIN (HCC): ICD-10-CM

## 2025-03-16 RX ORDER — AMLODIPINE BESYLATE 5 MG/1
5 TABLET ORAL DAILY
Qty: 90 TABLET | Refills: 1 | Status: SHIPPED | OUTPATIENT
Start: 2025-03-16

## 2025-03-16 RX ORDER — METFORMIN HYDROCHLORIDE 500 MG/1
1000 TABLET, EXTENDED RELEASE ORAL 2 TIMES DAILY
Qty: 360 TABLET | Refills: 1 | Status: SHIPPED | OUTPATIENT
Start: 2025-03-16

## 2025-03-16 RX ORDER — MELOXICAM 15 MG/1
15 TABLET ORAL DAILY PRN
Qty: 30 TABLET | Refills: 0 | Status: SHIPPED | OUTPATIENT
Start: 2025-03-16

## 2025-03-18 ENCOUNTER — PROCEDURE VISIT (OUTPATIENT)
Dept: NEUROLOGY | Facility: CLINIC | Age: 75
End: 2025-03-18
Payer: MEDICARE

## 2025-03-18 DIAGNOSIS — R29.898 HAND WEAKNESS: ICD-10-CM

## 2025-03-18 PROCEDURE — 95886 MUSC TEST DONE W/N TEST COMP: CPT | Performed by: PHYSICAL MEDICINE & REHABILITATION

## 2025-03-18 PROCEDURE — 95910 NRV CNDJ TEST 7-8 STUDIES: CPT | Performed by: PHYSICAL MEDICINE & REHABILITATION

## 2025-03-21 ENCOUNTER — TELEPHONE (OUTPATIENT)
Age: 75
End: 2025-03-21

## 2025-03-21 NOTE — TELEPHONE ENCOUNTER
Pt stated missed call from office. No notations in the chart. Confirmed pt upcoming appt on 3/27/25.

## 2025-03-25 NOTE — PROGRESS NOTES
Pain Medicine Follow-Up Note    Assessment:  1. Carpal tunnel syndrome of right wrist    2. Radiculopathy, cervical region    3. Ulnar neuropathy of right upper extremity    4. Myofascial pain syndrome    5. Spasm of thoracic back muscle    6. Cervical radiculopathy        Plan:  Orders Placed This Encounter   Procedures   • Ambulatory referral to Orthopedic Surgery     Standing Status:   Future     Expiration Date:   3/27/2026     Referral Priority:   Routine     Referral Type:   Consult - AMB     Referral Reason:   Specialty Services Required     Referred to Provider:   Jackelyn Granados MD     Requested Specialty:   Hand Surgery     Number of Visits Requested:   1     Expiration Date:   3/27/2026   • Ambulatory referral to Physical Therapy     Standing Status:   Future     Expiration Date:   3/27/2026     Referral Priority:   Routine     Referral Type:   Physical Therapy     Referral Reason:   Specialty Services Required     Requested Specialty:   Physical Therapy     Number of Visits Requested:   1     Expiration Date:   3/27/2026       New Medications Ordered This Visit   Medications   • gabapentin (Neurontin) 600 MG tablet     Sig: Take 1 tablet (600 mg total) by mouth 2 (two) times a day     Dispense:  60 tablet     Refill:  2       My impressions and treatment recommendations were discussed in detail with the patient who verbalized understanding and had no further questions.      Patient returns to the office stating that his pain is the same with a pain score of 5 out of 10 on the verbal numeric pain scale.  Patient did have a cervical epidural steroid injection on 1/7/2025 that provided him minimal pain relief.  Patient has since had an EMG study done on his right arm on 3/18/2025.  EMG reviewed with patient.  EMG shows severe severe median nerve compression consistent with carpal tunnel syndrome along with severe ulnar neuropathy and cervical radiculopathy at C7-C8.  I recommend that the patient see a  hand specialist regarding possible carpal tunnel and cubital tunnel syndromes.  Patient continues to use gabapentin 300 mg twice daily he does find this medication somewhat helpful but still continues to have moderate to severe pain at times, patient denies any side effects, I recommend that he increase this medication to 600 mg twice daily.  I instructed the patient to do this slowly by taking 300 mg in the morning and 600 mg in the evening for 3 days before proceeding with 600 mg twice daily.    Patient also has a pneumatic cervical traction collar that unfortunately he is unable to use due to his right hand weakness.  I recommend the patient return to physical therapy for cervical traction and suggested that physical therapist might be able to help with the patient with the pneumatic traction collar.    Patient is also complaining of right upper back pain.  Patient has noted trigger points in his right rhomboid muscle area, I recommend the patient have trigger point injections. Complete risks and benefits including bleeding, infection, tissue reaction, nerve injury and allergic reaction were discussed. The approach was demonstrated using models and literature was provided. Verbal and written consent was obtained.    Follow-up is planned in 4 to 6 weeks time or sooner as warranted.  Discharge instructions were provided. I personally saw and examined the patient and I agree with the above discussed plan of care.    History of Present Illness:    Tc Stovall is a 74 y.o. male who presents to [facility] for interval re-evaluation of the above stated pain complaints. The patient has a past medical and chronic pain history as outlined in the assessment section. He was last seen on 1/7/2025.    At today's visit patient states that their pain symptoms are the same with a pain score of 5/10 on the verbal numeric pain scale.  The patient's pain is worse in the evening.  The patient's pain is  intermittent in nature.  And the quality of the patient's pain is described as burning, sharp, shooting, numbness, and pins-and-needles.  The patient's pain is located in the right-sided posterior head/neck radiating down his right arm as well as medial to his scapula.  Patient states the amount of pain relief he is obtaining from his current pain relievers which consist of gabapentin 300 mg twice daily is enough to make a difference in his life.  Patient denies any side effects using gabapentin.    Other than as stated above, the patient denies any interval changes in medications, medical condition, mental condition, symptoms, or allergies since the last office visit.         Review of Systems:    Review of Systems   Respiratory:  Negative for shortness of breath.    Cardiovascular:  Negative for chest pain.   Gastrointestinal:  Negative for constipation, diarrhea, nausea and vomiting.   Musculoskeletal:  Positive for arthralgias and myalgias. Negative for gait problem and joint swelling.        DROM  Swelling righthand   Skin:  Negative for rash.   Neurological:  Negative for dizziness, seizures and weakness.   All other systems reviewed and are negative.        Past Medical History:   Diagnosis Date   • Arthritis    • Diabetes mellitus (Formerly [facility])    • Hyperlipidemia    • Hypertension    • Left rotator cuff tear    • Stroke (Formerly [facility]) [id number]   • Tobacco abuse    • Wears glasses        Past Surgical History:   Procedure Laterality Date   • COLONOSCOPY     • KNEE SURGERY     • UT SURGICAL ARTHROSCOPY SHOULDER W/ROTATOR CUFF RPR Left 08/10/2022    Procedure: Left Shoulder Arthroscopy, supraspinatus and Subscapularis Repair, Bicep Tenotomy, acromioplasty, extensive debridement;  Surgeon: Otilio Mendez DO;  Location: [facility];  Service: Orthopedics   • TONSILLECTOMY  1955       Family History   Problem Relation Age of Onset   • Alzheimer's disease Mother    • Diabetes Mother    • Heart block Father    • Heart disease  Father        Social History     Occupational History   • Not on file   Tobacco Use   • Smoking status: Every Day     Current packs/day: 0.50     Average packs/day: 0.6 packs/day for 85.7 years (55.3 ttl pk-yrs)     Types: Cigarettes     Start date: 4/26/1964   • Smokeless tobacco: Never   Vaping Use   • Vaping status: Never Used   Substance and Sexual Activity   • Alcohol use: Yes     Comment: rarely   • Drug use: No   • Sexual activity: Not Currently     Partners: Female     Birth control/protection: Female Sterilization         Current Outpatient Medications:   •  acetaminophen (TYLENOL) 500 mg tablet, Take 500 mg by mouth every 6 (six) hours as needed for mild pain, Disp: , Rfl:   •  amLODIPine (NORVASC) 5 mg tablet, Take 1 tablet (5 mg total) by mouth daily, Disp: 90 tablet, Rfl: 1  •  aspirin 81 mg chewable tablet, Chew 1 tablet (81 mg total) daily, Disp: , Rfl:   •  atorvastatin (LIPITOR) 40 mg tablet, Take 1 tablet (40 mg total) by mouth every evening, Disp: 30 tablet, Rfl: 0  •  Blood Glucose Monitoring Suppl (OneTouch Verio Reflect) w/Device KIT, Check blood sugars three times daily. Please substitute with appropriate alternative as covered by patient's insurance. Dx: E11.65, Disp: 1 kit, Rfl: 0  •  cholecalciferol (VITAMIN D3) 1,000 units tablet, Take 1 tablet (1,000 Units total) by mouth daily, Disp: 90 tablet, Rfl: 3  •  clotrimazole-betamethasone (LOTRISONE) 1-0.05 % cream, Apply topically 2 (two) times a day, Disp: 30 g, Rfl: 1  •  Continuous Glucose Sensor (Dexcom G7 Sensor), Use 1 Device every 10 days, Disp: 9 each, Rfl: 1  •  fluticasone (FLONASE) 50 mcg/act nasal spray, 1 spray into each nostril daily for 14 days, Disp: 9.9 mL, Rfl: 0  •  gabapentin (Neurontin) 600 MG tablet, Take 1 tablet (600 mg total) by mouth 2 (two) times a day, Disp: 60 tablet, Rfl: 2  •  glipiZIDE (GLUCOTROL XL) 10 mg 24 hr tablet, Take 1 tablet (10 mg total) by mouth daily, Disp: 90 tablet, Rfl: 1  •  glucose blood  "(OneTouch Verio) test strip, Check blood sugars three times daily. Please substitute with appropriate alternative as covered by patient's insurance. Dx: E11.65, Disp: 300 each, Rfl: 3  •  insulin glargine (LANTUS) 100 units/mL subcutaneous injection, Inject 20 Units under the skin daily at bedtime (Patient taking differently: Inject 20 Units under the skin daily at bedtime If needed), Disp: 10 mL, Rfl: 0  •  Insulin Pen Needle (CareOne Unifine Pentips Plus) 32G X 4 MM MISC, USE DAILY AS INSTRUCTED WITH INSULIN PEN, Disp: 100 each, Rfl: 1  •  lisinopril (ZESTRIL) 20 mg tablet, TAKE ONE TABLET BY MOUTH DAILY, Disp: 90 tablet, Rfl: 1  •  meloxicam (MOBIC) 15 mg tablet, Take 1 tablet (15 mg total) by mouth daily as needed for moderate pain, Disp: 30 tablet, Rfl: 0  •  metFORMIN (GLUCOPHAGE-XR) 500 mg 24 hr tablet, Take 2 tablets (1,000 mg total) by mouth 2 (two) times a day, Disp: 360 tablet, Rfl: 1  •  nicotine (NICODERM CQ) 14 mg/24hr TD 24 hr patch, Place 1 patch on the skin every 24 hours, Disp: , Rfl:   •  OneTouch Delica Lancets 33G MISC, Check blood sugars three times daily. Please substitute with appropriate alternative as covered by patient's insurance. Dx: E11.65, Disp: 300 each, Rfl: 0    No Known Allergies    Physical Exam:    Ht 5' 7\" (1.702 m)   Wt 105 kg (232 lb)   BMI 36.34 kg/m²     Constitutional:normal, well developed, well nourished, alert, in no distress and non-toxic and no overt pain behavior. and obese  Eyes:anicteric  HEENT:grossly intact  Neck:supple, symmetric, trachea midline and no masses , trigger points noted in right upper back/rhomboid area  Pulmonary:even and unlabored  Cardiovascular:No edema or pitting edema present  Skin:Normal without rashes or lesions and well hydrated  Psychiatric:Mood and affect appropriate  Neurologic:Cranial Nerves II-XII grossly intact  Musculoskeletal:normal gait, tenderness to palpation in right upper back    Orders Placed This Encounter   Procedures   • " Ambulatory referral to Orthopedic Surgery   • Ambulatory referral to Physical Therapy       This document was created using speech voice recognition software.   Grammatical errors, random word insertions, pronoun errors, and incomplete sentences are an occasional consequence of this system due to software limitations, ambient noise, and hardware issues.   Any formal questions or concerns about content, text, or information contained within the body of this dictation should be directly addressed to the provider for clarification.

## 2025-03-27 ENCOUNTER — TELEPHONE (OUTPATIENT)
Age: 75
End: 2025-03-27

## 2025-03-27 ENCOUNTER — OFFICE VISIT (OUTPATIENT)
Dept: PAIN MEDICINE | Facility: CLINIC | Age: 75
End: 2025-03-27
Payer: MEDICARE

## 2025-03-27 VITALS — HEIGHT: 67 IN | BODY MASS INDEX: 36.41 KG/M2 | WEIGHT: 232 LBS

## 2025-03-27 DIAGNOSIS — M54.12 RADICULOPATHY, CERVICAL REGION: ICD-10-CM

## 2025-03-27 DIAGNOSIS — M62.830 SPASM OF THORACIC BACK MUSCLE: ICD-10-CM

## 2025-03-27 DIAGNOSIS — G56.21 ULNAR NEUROPATHY OF RIGHT UPPER EXTREMITY: ICD-10-CM

## 2025-03-27 DIAGNOSIS — G56.01 CARPAL TUNNEL SYNDROME OF RIGHT WRIST: Primary | ICD-10-CM

## 2025-03-27 DIAGNOSIS — M79.18 MYOFASCIAL PAIN SYNDROME: ICD-10-CM

## 2025-03-27 DIAGNOSIS — M54.12 CERVICAL RADICULOPATHY: ICD-10-CM

## 2025-03-27 PROCEDURE — 99214 OFFICE O/P EST MOD 30 MIN: CPT

## 2025-03-27 RX ORDER — GABAPENTIN 600 MG/1
600 TABLET ORAL 2 TIMES DAILY
Qty: 60 TABLET | Refills: 2 | Status: SHIPPED | OUTPATIENT
Start: 2025-03-27

## 2025-03-27 NOTE — PATIENT INSTRUCTIONS
Trigger Point Injection   WHAT YOU NEED TO KNOW:   What do I need to know about a trigger point injection?  A trigger point injection is used to relax a muscle knot. This helps relieve pain. You may be able to have more than one trigger point treated during a session.  How do I prepare for a trigger point injection?   Your healthcare provider will tell you how to prepare. Arrange to have someone drive you home after the injection.    Tell your provider about all medicines you take, including pain medicine, blood thinners, and muscle relaxers. He or she will tell you if you need to stop any medicine for the injection, and when to stop. He or she will tell you which medicines to take or not take on the day of the injection.    Tell your provider about all your allergies, including to any pain medicine.    What will happen during a trigger point injection?   You may be sitting or lying, depending on where the trigger point is located. Your healthcare provider will feel for a knot in the muscle. He or she may uday your skin over the knot.    Your provider will put a needle through your skin and into the trigger point. Saline (salt solution), pain relievers, or other medicines may be pushed through the needle into the trigger point. Your provider may use only a dry needle (no medicine). He or she will pull the needle almost all the way out and then push it in again. He or she will repeat this several times until the muscle stops twitching or feels relaxed.    Your provider will remove the needle and stretch the muscle area. He or she may apply pressure to the area for 2 minutes. A bandage will be put over the injection site to prevent bleeding or an infection.    What should I expect after a trigger point injection?   You may feel pain relief right away. It is normal for some pain to start again 2 hours later. An ice pack or over-the-counter pain medicine can help lower the pain.    You may feel sore in the injection  site for a few days. If you need another injection in the same area, wait until the area is not sore.    Your healthcare provider may give you specific activity instructions to follow at home or recommend physical therapy. In general, you should try to stay active. Avoid strenuous activity for the first 3 or 4 days after the injection.    Do not have more injections if you still have trigger point pain after 2 or 3 injections.    What are the risks of a trigger point injection?  You may have a severe allergic reaction to pain medicine injected. The injection may be painful, or you may be sore where you got the injection. You may bleed, bruise, or develop an infection in the injection area. The injection may cause you to feel faint. Rarely, the needle may cause muscle or blood vessel damage or your lung may collapse if you get the injection near your chest.  CARE AGREEMENT:   You have the right to help plan your care. Learn about your health condition and how it may be treated. Discuss treatment options with your healthcare providers to decide what care you want to receive. You always have the right to refuse treatment. The above information is an  only. It is not intended as medical advice for individual conditions or treatments. Talk to your doctor, nurse or pharmacist before following any medical regimen to see if it is safe and effective for you.  © Copyright [employer] 2022 Information is for End User's use only and may not be sold, redistributed or otherwise used for commercial purposes. All illustrations and images included in CareNotes® are the copyrighted property of [employer], [employer] or [employer]   Gabapentin (By mouth)   Gabapentin (yoon-a-PEN-tin)  Treats seizures and pain caused by shingles.   Brand Name(s): FusePaq Fanatrex, Neurontin   There may be other brand names for this medicine.  When This Medicine Should Not Be Used:   This medicine is not right for everyone. Do not use  it if you had an allergic reaction to gabapentin.  How to Use This Medicine:   Capsule, Liquid, Tablet  Take your medicine as directed. Your dose may need to be changed several times to find what works best for you. If you have epilepsy, do not allow more than 12 hours to pass between doses.  Capsule: Swallow the capsule whole with plenty of water. Do not open, crush, or chew it.  Gralise® tablet: Swallow the tablet whole . Do not crush, break, or chew it.  Neurontin® tablet: If you break a tablet into 2 pieces, use the second half as your next dose. Do not use the half-tablet if the whole tablet has been cut or broken after 28 days.  Oral liquid: Measure the oral liquid medicine with a marked measuring spoon, oral syringe, or medicine cup.  This medicine should come with a Medication Guide. Ask your pharmacist for a copy if you do not have one.  Missed dose: Take a dose as soon as you remember. If it is almost time for your next dose, wait until then and take a regular dose. Do not take extra medicine to make up for a missed dose.  Store the medicine in a closed container at room temperature, away from heat, moisture, and direct light. Store the Neurontin® oral liquid in the refrigerator. Do not freeze.  Drugs and Foods to Avoid:   Ask your doctor or pharmacist before using any other medicine, including over-the-counter medicines, vitamins, and herbal products.  Some medicines can affect how gabapentin works. Tell your doctor if you also using hydrocodone or morphine.  If you take an antacid, wait at least 2 hours before you take gabapentin.  Do not drink alcohol while you are using this medicine.  Tell your doctor if you use anything else that makes you sleepy. Some examples are allergy medicine, narcotic pain medicine, and alcohol. Tell your doctor if you are also using lorazepam, oxycodone, or zolpidem.  Warnings While Using This Medicine:   Tell your doctor if you are pregnant or breastfeeding, or if you have  kidney problems (including patients receiving dialysis) or lung problems. Tell your doctor if you have a history of depression or mental health problems.  This medicine may cause the following problems:  Drug reaction with eosinophilia and systemic symptoms (DRESS) or multiorgan hypersensitivity, which may damage the liver, kidney, blood, heart, or muscles  Changes in mood or behavior, including suicidal thoughts or behavior  Respiratory depression (serious breathing problem that can be life-threatening), when used with narcotic pain medicines  Do not stop using this medicine suddenly. Your doctor will need to slowly decrease your dose before you stop it completely.  This medicine may make you dizzy or drowsy. Do not drive or do anything else that could be dangerous until you know how this medicine affects you.  Tell any doctor or dentist who treats you that you are using this medicine. This medicine may affect certain medical test results.  Your doctor will check your progress and the effects of this medicine at regular visits. Keep all appointments.  Keep all medicine out of the reach of children. Never share your medicine with anyone.  Possible Side Effects While Using This Medicine:   Call your doctor right away if you notice any of these side effects:  Allergic reaction: Itching or hives, swelling in your face or hands, swelling or tingling in your mouth or throat, chest tightness, trouble breathing  Behavior problems, aggression, restlessness, trouble concentrating, moodiness (especially in children)  Blistering, peeling, red skin rash  Blue lips, fingernails, or skin, chest pain, fast heartbeat, trouble breathing  Change in how much or how often you urinate, bloody or cloudy urine  Dark urine or pale stools, nausea, vomiting, loss of appetite, stomach pain, yellow skin or eyes  Fever, chills, cough, sore throat, body aches  Problems with coordination, shakiness, unsteadiness, unusual eye movement  Rapid  weight gain, swelling in your hands, ankles, or feet  Rash, swollen or tender glands in the neck, armpit, or groin  Unusual moods or behaviors, thoughts of hurting yourself, feeling depressed  If you notice these less serious side effects, talk with your doctor:   Dizziness, drowsiness, sleepiness, tiredness  If you notice other side effects that you think are caused by this medicine, tell your doctor.   Call your doctor for medical advice about side effects. You may report side effects to FDA at 0-753-FDA-3208    © Copyright [employer] 2022 Information is for End User's use only and may not be sold, redistributed or otherwise used for commercial purposes.  The above information is an  only. It is not intended as medical advice for individual conditions or treatments. Talk to your doctor, nurse or pharmacist before following any medical regimen to see if it is safe and effective for you.

## 2025-04-01 ENCOUNTER — RESULTS FOLLOW-UP (OUTPATIENT)
Dept: CARDIOLOGY CLINIC | Facility: CLINIC | Age: 75
End: 2025-04-01

## 2025-04-01 NOTE — RESULT ENCOUNTER NOTE
Left message for Tc. Let him know to call us back if he would like to proceed with the loop prior to his appointment.

## 2025-04-01 NOTE — TELEPHONE ENCOUNTER
Patient called back. Informed him of notes from Dr. Lujan. He expressed understanding and would like to discuss further at his next office visit.

## 2025-04-02 ENCOUNTER — EVALUATION (OUTPATIENT)
Dept: PHYSICAL THERAPY | Facility: CLINIC | Age: 75
End: 2025-04-02
Payer: MEDICARE

## 2025-04-02 DIAGNOSIS — R07.81 RIB PAIN: ICD-10-CM

## 2025-04-02 DIAGNOSIS — M54.12 RADICULOPATHY, CERVICAL REGION: ICD-10-CM

## 2025-04-02 PROCEDURE — 97161 PT EVAL LOW COMPLEX 20 MIN: CPT | Performed by: PHYSICAL THERAPIST

## 2025-04-02 PROCEDURE — 97140 MANUAL THERAPY 1/> REGIONS: CPT | Performed by: PHYSICAL THERAPIST

## 2025-04-02 RX ORDER — MELOXICAM 15 MG/1
15 TABLET ORAL DAILY PRN
Qty: 30 TABLET | Refills: 1 | Status: SHIPPED | OUTPATIENT
Start: 2025-04-02

## 2025-04-02 NOTE — PROGRESS NOTES
PT Evaluation     Today's date: 2025  Patient name: Tc Stovall  : 1950  MRN: [id number]  Referring provider: Jade Finnegan CRNP  Dx:   Encounter Diagnosis     ICD-10-CM    1. Radiculopathy, cervical region  M54.12 Ambulatory referral to Physical Therapy          Start Time: 09  Stop Time: 1010  Total time in clinic (min): 35 minutes    Assessment  Impairments: abnormal or restricted ROM, activity intolerance, impaired physical strength, lacks appropriate home exercise program, pain with function, poor posture , poor body mechanics, unable to perform ADL, participation limitations and activity limitations    Assessment details: Patient is a 74 y.o. male who presents to physical therapy with c/o chronic neck pain with right sided radiculopathy. Patient presents to evaluation with pain, decreased range of motion, decreased strength, and decreased tolerance to activity. Patient demonstrates good tolerance to treatment focusing on manual cervical traction with significant reduction in pain/radicular sx, deferred formal exercises this date due to time constraints but will initiate next visit as symptoms allow. I discussed risks, benefits, and alternatives to treatment, and answered all patient questions to patient satisfaction. Patient presents with baseline FOTO score of 59 indicating limited tolerance/ability to complete ADLs. Patient is an appropriate candidate for skilled PT and would benefit from skilled PT services to address the aforementioned impairments, achieve goals, maximize function, and improve quality of life. Pt is in agreement with this plan.    Patient Education: activity modifications as needed, pacing of activities, importance of HEP compliance, PT prognosis/POC    Barriers to therapy: Chronicity of sx    Goals  ST weeks  Pt will restore full and pain-free cervical spine AROM to allow return to normal ADLs  Pt will demonstrate centralization of symptoms with repeated movements  and/or traction of cervical spine  Pt will decrease neck pain to 3-4/10  Pt will demonstrate good understanding and compliance with HEP   Pt will demonstrate improved postural awareness and ability to self-correct without reliance on external cues    LT weeks  Pt will demonstrate abolished radicular symptoms for 2 weeks  Pt will decrease neck pain to 1-2/10  Pt will exhibit proper lifting mechanics without reliance on external cues for correction of form    Pt will be able to tolerate prolonged standing/sitting activities > 30 minutes without provocation of neck pain   Pt will improve FOTO score to > or = to 68 to indicate improved functional abilities       Plan  Patient would benefit from: PT eval and skilled physical therapy  Planned modality interventions: cryotherapy and thermotherapy: hydrocollator packs    Planned therapy interventions: ADL training, activity modification, joint mobilization, manual therapy, body mechanics training, neuromuscular re-education, postural training, patient/caregiver education, self care, strengthening, stretching, therapeutic activities, therapeutic exercise, home exercise program, graded exercise, graded activity, functional ROM exercises and flexibility    Frequency: 2x week  Duration in weeks: 8  Plan of Care beginning date: 2025  Plan of Care expiration date: 2025  Treatment plan discussed with: patient        Subjective Evaluation    History of Present Illness  Mechanism of injury: Pt reports to PT with c/o chronic neck pain that started about 1-2 years ago and has hx of prior course of PT with some improvement reported. Pt has been working with pain management who prescribed gabapentin and discussed possible neck injections in the future. Pt admits to good symptom improvement with gabapentin. Pt admits to using home traction unit with recent difficulty with setting up himself due to issues right sided ulnar and median nerve issues. Pt admits to intermittent  tingling into right arm that travels to his hand that increases with certain neck movements. Pt reports increased pain/limitation with cervical mobility all planes, prolonged positioning, and sleeping at night. Pt also admits limitations with heavy lifting/carrying but feels this is more related to right ulnar/median nerve issues and has scheduled appointment with Dr. Granados for evaluation for elbow/hand issues later this month. Pt denies surgical hx of cervical spine.     Pt denies hx of cancer, unrelenting night pain, saddle anesthesia, unexplained weight loss, changes in B&B function, changes in balance/gait, recent fever, hx of IV drug use, prolonged corticosteroid use, hx of osteoporosis, recent trauma.    Pt denies nausea, dizziness, diplopia, drop attacks, difficulty with speech or swallowing.    Imaging: MRI reveals:  -Multilevel cervical spondylosis  -Right paracentral disc protrusion is noted at C7-T1 with near abutment of the right ventral aspect of the cord. Moderate right foraminal narrowing is present at this level.    Aggravating factors: cervical mobility all planes, prolonged positioning, and sleeping at night    Easing Factors: Rest, gabapentin, tylenol     PLOF:  Hx of neck pain for > 2 years    PMH: Hx of TIA x 2, HTN (controlled w/ meds), DM (controlled w/ meds)  Patient Goals  Patient goals for therapy: decreased pain, increased motion, increased strength, independence with ADLs/IADLs and return to sport/leisure activities    Pain  Current pain ratin  At best pain ratin  At worst pain rating: 10  Quality: dull ache, sharp, radiating, knife-like and throbbing  Relieving factors: rest and change in position    Hand dominance: right          Objective     General Comments:      Cervical/Thoracic Comments  Posture: FHP, RS, increased thoracic kyphosis      Cervical AROM:   Flex: 45*  Ext: 45* (pain, peripheralization of sx into right arm to fingers)  Left ROT: 65*  Right ROT: 60*   Left  Side-Bendin*  Right Side-Bendin*    UE Dermatomes:  C2: Intact/symmetrical  C3: Intact/symmetrical  C4: Intact/symmetrical  C5: Intact/symmetrical  C6: Intact/symmetrical  C7: Intact/symmetrical  C8: Intact/symmetrical  T1: Intact/symmetrical  T2: Intact/symmetrical    UE Myotomes:  Cervical Lateral Flexion C3: Left 5/5, Right 5/5  Scapular Elevation C4: Left 5/5, Right 5/5  Shoulder Abduction C5: Left 5/5, Right 5/5  Elbow Flexion C6: Left 5/5, Right 5/5  Elbow Extension C7: Left 5/5, Right 5/5  Thumb Extension C8: Left 5/5, Right 4/5  Finger Abduction T1: Left 5/5, Right 4/5     Strength: Left 70#; Right 20#    DTRs:  Biceps Brachii (C5): Left 1+, Right 1+  Brachioradialis (C6): Left 1+, Right 1+  Triceps (C7): Left 0, Right 0    Spurling compression: (+)  Cervical distraction: (+)  ULTT-A (median n.): (+)  Quadrant: (+)    Palpation: minimal TTP along cervical paraspinals, sub-occipital mm    FOTO: 59                 POC expires Unit limit Auth Expiration date PT/OT/ST + Visit Limit?   25 BOMN 25 BOMN                           Visit/Unit Tracking  AUTH Status:  Date               No Auth Required Used                Remaining                    Diagnosis: Chronic neck pain   Precautions: Hx of TIA x 2, HTN (controlled w/ meds), DM (controlled w/ meds)   POC Expires: 25   Re-evaluation Date: 25   FOTO Scores/Date: Goal - 68;  - 59   Visit Count 1/10       Manuals 4/2       Cervical traction KD                       Ther Ex /2       Seated thoracic ext NV       Cervical ROT w/ towel NV                                                               Neuro Re-Ed /2       Median nerve glides NV       Prone scapular retraction/depression NV       Prone scapular retraction/depression w/ lift off NV       Rows/pulldowns NV                                      Ther Act                                                                                 Modalities

## 2025-04-07 ENCOUNTER — OFFICE VISIT (OUTPATIENT)
Dept: PHYSICAL THERAPY | Facility: CLINIC | Age: 75
End: 2025-04-07
Payer: MEDICARE

## 2025-04-07 DIAGNOSIS — M54.12 RADICULOPATHY, CERVICAL REGION: Primary | ICD-10-CM

## 2025-04-07 PROCEDURE — 97110 THERAPEUTIC EXERCISES: CPT | Performed by: PHYSICAL THERAPIST

## 2025-04-07 PROCEDURE — 97112 NEUROMUSCULAR REEDUCATION: CPT | Performed by: PHYSICAL THERAPIST

## 2025-04-07 PROCEDURE — 97140 MANUAL THERAPY 1/> REGIONS: CPT | Performed by: PHYSICAL THERAPIST

## 2025-04-07 NOTE — PROGRESS NOTES
"Daily Note     Today's date: 2025  Patient name: Tc Stovall  : 1950  MRN: [id number]  Referring provider: Jade Finnegan CRNP  Dx:   Encounter Diagnosis     ICD-10-CM    1. Radiculopathy, cervical region  M54.12           Start Time: 1500  Stop Time: 1539  Total time in clinic (min): 39 minutes    Subjective: Pt reports mild radiating pain into right arm currently. Pt denies any increase in pain after evaluation.      Objective: See treatment diary below      Assessment: Pt responds well to cervical traction which was performed pre and post tx with good reduction in neck pain and radicular sx post manual tx. Pt was able to introduce gentle cervical/thoracic mobility with good tolerance, notes some right hand weakness and more difficulty with towel pull while working into right cervical rotation but no pain. Initiate scapular re-education and strengthening exercises per flowsheet with cues required for correction of scapular mechanics without increase in neck pain but did note mild peripheralization of sx with TB pulldowns, however dissipated with repeating cervical traction post tx. Will assess pt status next visit and progress as able.       Plan: Continue per plan of care.  Progress treatment as tolerated.         POC expires Unit limit Auth Expiration date PT/OT/ST + Visit Limit?   25 BOMN 25 BOMN                           Visit/Unit Tracking  AUTH Status:  Date               No Auth Required Used                Remaining                    Diagnosis: Chronic neck pain   Precautions: Hx of TIA x 2, HTN (controlled w/ meds), DM (controlled w/ meds)   POC Expires: 25   Re-evaluation Date: 25   FOTO Scores/Date: Goal - 68;  - 59   Visit Count 1/10 2/10      Manuals       Cervical traction KD KD pre/post tx                      Ther Ex       Seated thoracic ext NV 10x3\"      Cervical ROT w/ towel NV 10x3\" ea                                                      Pt " "education  KD      Neuro Re-Ed 4/2 4/7      Median nerve glides NV 20x ea      Prone scapular retraction/depression NV 10x10\"      Prone scapular retraction/depression w/ lift off NV 10x10\"      Robbery  Blue 10x5\"       Rows/pulldowns NV Grn 2x10 ea                                     Ther Act                                                                                 Modalities                                            "

## 2025-04-09 DIAGNOSIS — I10 PRIMARY HYPERTENSION: Chronic | ICD-10-CM

## 2025-04-09 RX ORDER — LISINOPRIL 20 MG/1
20 TABLET ORAL DAILY
Qty: 90 TABLET | Refills: 0 | Status: SHIPPED | OUTPATIENT
Start: 2025-04-09

## 2025-04-10 ENCOUNTER — OFFICE VISIT (OUTPATIENT)
Dept: PHYSICAL THERAPY | Facility: CLINIC | Age: 75
End: 2025-04-10
Payer: MEDICARE

## 2025-04-10 DIAGNOSIS — M54.12 RADICULOPATHY, CERVICAL REGION: Primary | ICD-10-CM

## 2025-04-10 PROCEDURE — 97140 MANUAL THERAPY 1/> REGIONS: CPT

## 2025-04-10 PROCEDURE — 97112 NEUROMUSCULAR REEDUCATION: CPT

## 2025-04-10 PROCEDURE — 97110 THERAPEUTIC EXERCISES: CPT

## 2025-04-10 NOTE — PROGRESS NOTES
"Daily Note     Today's date: 4/10/2025  Patient name: Tc Stovall  : 1950  MRN: [id number]  Referring provider: Jade Finnegan CRNP  Dx:   Encounter Diagnosis     ICD-10-CM    1. Radiculopathy, cervical region  M54.12           Start Time: 0802  Stop Time: 08  Total time in clinic (min): 40 minutes    Subjective: patient reports no new complaints.  Reports feeling better after last therapy session.        Objective: See treatment diary below      Assessment: patient demonstrates fair control of postural and scapular muscularity requires small cues including tactile and demonstration for corrective setting, positioning, and mechanics to ensure proper muscular recruitment without compensation.  Reports R sided radicular symptoms with performance of traction post exercise in neutral position that improved with performance in slight neck flexion and with upper trap stretch      Plan: Continue per plan of care.  Progress treatment as tolerated.         POC expires Unit limit Auth Expiration date PT/OT/ST + Visit Limit?   25 BOMN 25 BOMN                           Visit/Unit Tracking  AUTH Status:  Date               No Auth Required Used                Remaining                    Diagnosis: Chronic neck pain   Precautions: Hx of TIA x 2, HTN (controlled w/ meds), DM (controlled w/ meds)   POC Expires: 25   Re-evaluation Date: 25   FOTO Scores/Date: Goal - 68;  - 59   Visit Count 1/10 2/10 3/10     Manuals  4/10     Cervical traction KD KD pre/post tx EARLE pre/post                      Ther Ex  4/10     Seated thoracic ext NV 10x3\" 3\"x10      Cervical ROT w/ towel NV 10x3\" ea 3\" x10 ea                                                     Pt education  KD      Neuro Re-Ed  4/10     Median nerve glides NV 20x ea X20 ea      Prone scapular retraction/depression NV 10x10\" 10\"x10      Prone scapular retraction/depression w/ lift off NV 10x10\" 10\"x10      Robbery  Blue " "10x5\"  Blue 5\"x10      Rows/pulldowns NV Grn 2x10 ea Grb 2x10 ea                                     Ther Act                                                                                 Modalities                                              "

## 2025-04-14 ENCOUNTER — OFFICE VISIT (OUTPATIENT)
Dept: PHYSICAL THERAPY | Facility: CLINIC | Age: 75
End: 2025-04-14
Payer: MEDICARE

## 2025-04-14 ENCOUNTER — RA CDI HCC (OUTPATIENT)
Dept: OTHER | Facility: HOSPITAL | Age: 75
End: 2025-04-14

## 2025-04-14 DIAGNOSIS — M54.12 RADICULOPATHY, CERVICAL REGION: Primary | ICD-10-CM

## 2025-04-14 PROCEDURE — 97112 NEUROMUSCULAR REEDUCATION: CPT

## 2025-04-14 PROCEDURE — 97140 MANUAL THERAPY 1/> REGIONS: CPT

## 2025-04-14 PROCEDURE — 97110 THERAPEUTIC EXERCISES: CPT

## 2025-04-14 NOTE — PROGRESS NOTES
"Daily Note     Today's date: 2025  Patient name: Tc Stovall  : 1950  MRN: [id number]  Referring provider: Jade Finnegan CRNP  Dx:   Encounter Diagnosis     ICD-10-CM    1. Radiculopathy, cervical region  M54.12           Start Time: 1501  Stop Time: 1544  Total time in clinic (min): 43 minutes    Subjective: patient reports no new complaints or incidents.  Reports improved R sided radicular symptoms.        Objective: See treatment diary below      Assessment: patient continues to respond well to manual interventions reporting relief with performance of manual traction.  Demonstrates improving control of postural muscularity being able to progress in resistance with rows/ext with small cues on upper trap compensations.  Introduced horizontal abd to improved scapular strength with cues on elbow and upper trap compensations.    Plan: Continue per plan of care.  Progress treatment as tolerated.         POC expires Unit limit Auth Expiration date PT/OT/ST + Visit Limit?   25 BOMN 25 BOMN                           Visit/Unit Tracking  AUTH Status:  Date               No Auth Required Used                Remaining                    Diagnosis: Chronic neck pain   Precautions: Hx of TIA x 2, HTN (controlled w/ meds), DM (controlled w/ meds)   POC Expires: 25   Re-evaluation Date: 25   FOTO Scores/Date: Goal - 68;  - 59   Visit Count 1/10 2/10 3/10 4/10    Manuals 4/2 4/7 4/10 4/14    Cervical traction KD KD pre/post tx EARLE pre/post  EARLE                    Ther Ex 4/2 4/7 4/10 4/14    Seated thoracic ext NV 10x3\" 3\"x10  3\"x10     Cervical ROT w/ towel NV 10x3\" ea 3\" x10 ea 3\"x10 ea     BL Horizontal Abd    GTB 2x10                                            Pt education  KD      Neuro Re-Ed 4/2 4/7 4/10 4/14    Median nerve glides NV 20x ea X20 ea  X20 ea    Prone scapular retraction/depression NV 10x10\" 10\"x10  10\"x10     Prone scapular retraction/depression w/ lift off NV 10x10\" " "10\"x10  10\"x10    Robbery  Blue 10x5\"  Blue 5\"x10  Blue 5\"x10     Rows/pulldowns NV Grn 2x10 ea Grb 2x10 ea  GTB/BTB 2x10 ea                                    Ther Act                                                                                 Modalities                                                "

## 2025-04-14 NOTE — PROGRESS NOTES
HCC coding opportunities          Chart Reviewed number of suggestions sent to Provider: 3     Patients Insurance   E11.59, E11.51 and [id number]  Medicare Insurance: Medicare

## 2025-04-16 ENCOUNTER — OFFICE VISIT (OUTPATIENT)
Dept: OBGYN CLINIC | Facility: CLINIC | Age: 75
End: 2025-04-16
Payer: MEDICARE

## 2025-04-16 VITALS — BODY MASS INDEX: 35.31 KG/M2 | HEIGHT: 67 IN | WEIGHT: 225 LBS

## 2025-04-16 DIAGNOSIS — G56.01 CARPAL TUNNEL SYNDROME OF RIGHT WRIST: ICD-10-CM

## 2025-04-16 DIAGNOSIS — E11.29 TYPE 2 DIABETES MELLITUS WITH DIABETIC MICROALBUMINURIA, WITH LONG-TERM CURRENT USE OF INSULIN (HCC): ICD-10-CM

## 2025-04-16 DIAGNOSIS — Z79.4 TYPE 2 DIABETES MELLITUS WITH DIABETIC MICROALBUMINURIA, WITH LONG-TERM CURRENT USE OF INSULIN (HCC): ICD-10-CM

## 2025-04-16 DIAGNOSIS — Z01.812 PRE-PROCEDURE LAB EXAM: ICD-10-CM

## 2025-04-16 DIAGNOSIS — R80.9 TYPE 2 DIABETES MELLITUS WITH DIABETIC MICROALBUMINURIA, WITH LONG-TERM CURRENT USE OF INSULIN (HCC): ICD-10-CM

## 2025-04-16 DIAGNOSIS — G56.21 ULNAR NEUROPATHY OF RIGHT UPPER EXTREMITY: ICD-10-CM

## 2025-04-16 DIAGNOSIS — G56.21 CUBITAL TUNNEL SYNDROME ON RIGHT: Primary | ICD-10-CM

## 2025-04-16 PROCEDURE — 99205 OFFICE O/P NEW HI 60 MIN: CPT | Performed by: SURGERY

## 2025-04-16 RX ORDER — SODIUM CHLORIDE 9 MG/ML
75 INJECTION, SOLUTION INTRAVENOUS CONTINUOUS
OUTPATIENT
Start: 2025-04-16

## 2025-04-16 RX ORDER — CHLORHEXIDINE GLUCONATE ORAL RINSE 1.2 MG/ML
15 SOLUTION DENTAL ONCE
OUTPATIENT
Start: 2025-04-16 | End: 2025-04-16

## 2025-04-16 NOTE — ASSESSMENT & PLAN NOTE
Updated hemoglobin A1c will be obtained prior to surgery  Lab Results   Component Value Date    HGBA1C 7.6 (H) 01/11/2025       Orders:    Ambulatory referral to Family Practice; Future    Basic metabolic panel; Future    HEMOGLOBIN A1C W/ EAG ESTIMATION; Future    EKG 12 lead; Future

## 2025-04-16 NOTE — ASSESSMENT & PLAN NOTE
EMG of right upper extremity was reviewed with the patient noting severe carpal tunnel syndrome and severe cubital tunnel syndrome with C7-8 radiculopathy.  On exam patient has severe atrophy in the thenar eminence and hypothenar eminence with intrinsics.  Patient has loss of motor function to the thumb and the ability to abduct the fingers and adduct the fingers.  Discussed surgical intervention with the patient which would help with the progression of his motor symptoms, but due to the extensive compression along with the cervical spine component it is difficult to tell how much he will gain back.  To help starting formal occupational therapy postop day #5 will help with strengthening  The anatomy and physiology of carpal tunnel syndrome was discussed with the patient today.  Increase pressure localized under the transverse carpal ligament can cause pain, numbness, tingling, or dysesthesias within the median nerve distribution as well as feelings of fatigue, clumsiness, or awkwardness.  These symptoms typically occur at night and worse in the morning upon waking.  Eventually, untreated carpal tunnel syndrome can result in weakness and permanent loss of muscle within the thenar compartment of the hand.  Treatment options were discussed with the patient.  Conservative treatment includes nocturnal resting splints to keep the nerve in a neutral position, ergonomic changes within the work or home environment, activity modification, and tendon gliding exercises.  Steroid injections within the carpal canal can help a majority of patients, however this is often self-limited in a majority of patients.  Surgical intervention to divide the transverse carpal ligament typically results in a long-lasting relief of the patient's complaints, with the recurrence rate of less than 1%.  Patient wishes to proceed with a right cubital tunnel release with possible transposition with adipose pedicle flap, right carpal tunnel release  outpatient at [place].  Postop dressing will be on for 5 days postoperatively with no heavy lifting, can perform activities of daily living such as cutting food, brushing teeth and combing hair.  Needs to cover for showering/bathing.  After 5 days postop dressings can come off and patient can wash hands with warm water, soap and pat dry but still no submerging.  Sutures will come out between 10 and 14 days postoperatively.  Please allow additional 3 to 5 days for suture holes to close up before submerging in any body of water.  Patient experienced pillar pain that is located over the incision area approximately 6 to 8 months after surgery.  To help decrease this pain, recommended to start scar massage after the first postop visit.  Nighttime symptoms usually resolve first with daytime symptoms following behind.  At 18 months the symptoms you has will remain.     Orders:    Ambulatory referral to Orthopedic Surgery    Ambulatory Referral to PT/OT Hand Therapy; Future    Case request operating room: RELEASE CUBITAL TUNNEL WITH POSSIBLE TRANSPOSITION WITH ADIPOSE PEDICLE FLAP, RELEASE CARPAL TUNNEL; Standing    Ambulatory referral to Family Practice; Future    Basic metabolic panel; Future    HEMOGLOBIN A1C W/ EAG ESTIMATION; Future    EKG 12 lead; Future

## 2025-04-16 NOTE — PROGRESS NOTES
Jackelyn Granados M.D.  Attending, Orthopaedic Surgery  Hand, Wrist, and Elbow Surgery  [facility]      ORTHOPAEDIC HAND, WRIST, AND ELBOW OFFICE  VISIT       ASSESSMENT/PLAN:        Assessment & Plan  Carpal tunnel syndrome of right wrist  EMG of right upper extremity was reviewed with the patient noting severe carpal tunnel syndrome and severe cubital tunnel syndrome with C7-8 radiculopathy.  On exam patient has severe atrophy in the thenar eminence and hypothenar eminence with intrinsics.  Patient has loss of motor function to the thumb and the ability to abduct the fingers and adduct the fingers.  Discussed surgical intervention with the patient which would help with the progression of his motor symptoms, but due to the extensive compression along with the cervical spine component it is difficult to tell how much he will gain back.  To help starting formal occupational therapy postop day #5 will help with strengthening  The anatomy and physiology of carpal tunnel syndrome was discussed with the patient today.  Increase pressure localized under the transverse carpal ligament can cause pain, numbness, tingling, or dysesthesias within the median nerve distribution as well as feelings of fatigue, clumsiness, or awkwardness.  These symptoms typically occur at night and worse in the morning upon waking.  Eventually, untreated carpal tunnel syndrome can result in weakness and permanent loss of muscle within the thenar compartment of the hand.  Treatment options were discussed with the patient.  Conservative treatment includes nocturnal resting splints to keep the nerve in a neutral position, ergonomic changes within the work or home environment, activity modification, and tendon gliding exercises.  Steroid injections within the carpal canal can help a majority of patients, however this is often self-limited in a majority of patients.  Surgical intervention to divide the transverse carpal ligament  typically results in a long-lasting relief of the patient's complaints, with the recurrence rate of less than 1%.  Patient wishes to proceed with a right cubital tunnel release with possible transposition with adipose pedicle flap, right carpal tunnel release outpatient at [place].  Postop dressing will be on for 5 days postoperatively with no heavy lifting, can perform activities of daily living such as cutting food, brushing teeth and combing hair.  Needs to cover for showering/bathing.  After 5 days postop dressings can come off and patient can wash hands with warm water, soap and pat dry but still no submerging.  Sutures will come out between 10 and 14 days postoperatively.  Please allow additional 3 to 5 days for suture holes to close up before submerging in any body of water.  Patient experienced pillar pain that is located over the incision area approximately 6 to 8 months after surgery.  To help decrease this pain, recommended to start scar massage after the first postop visit.  Nighttime symptoms usually resolve first with daytime symptoms following behind.  At 18 months the symptoms you has will remain.     Orders:    Ambulatory referral to Orthopedic Surgery    Ambulatory Referral to PT/OT Hand Therapy; Future    Case request operating room: RELEASE CUBITAL TUNNEL WITH POSSIBLE TRANSPOSITION WITH ADIPOSE PEDICLE FLAP, RELEASE CARPAL TUNNEL; Standing    Ambulatory referral to Family Practice; Future    Basic metabolic panel; Future    HEMOGLOBIN A1C W/ EAG ESTIMATION; Future    EKG 12 lead; Future    Ulnar neuropathy of right upper extremity    Orders:    Ambulatory referral to Orthopedic Surgery    Ambulatory referral to Family Practice; Future    Basic metabolic panel; Future    HEMOGLOBIN A1C W/ EAG ESTIMATION; Future    EKG 12 lead; Future    Cubital tunnel syndrome on right  EMG of right upper extremity was reviewed with the patient noting severe carpal tunnel syndrome and cubital tunnel syndrome  with C7-8 radiculopathy.  On exam patient has severe atrophy in the thenar eminence and hypothenar eminence with intrinsics.  Patient has loss of motor function to the thumb and the ability to abduct the fingers and adduct the fingers.  Discussed surgical intervention with the patient which would help with the progression of his motor symptoms, but due to the extensive compression along with the cervical spine component it is difficult to tell how much he will gain back.  To help starting formal occupational therapy postop day #5 will help with strengthening.  The anatomy and physiology of cubital tunnel syndrome were discussed with the patient today in the office.  Typically, increased elbow flexion activities decrease blood flow within the intraneural spaces, resulting in a feeling of numbness, tingling, weakness, or clumsiness within the hand and fingers.  Occasionally, anatomic structures such as medial elbow osteophytes, the medial head of the triceps, were subluxing ulnar nerve may result in increased pressure or aggravation at the cubital tunnel.  Typical signs and symptoms usually include numbness and tingling within the ring and small finger, weakness with , and weakness with pinch.  Conservative treatment and includes nocturnal bracing to keep the elbow in a semi-extended position, activity modification, therapy, and avoiding excessive elbow flexion activities.  A majority of patients typically respond to conservative treatment over a period of approximately 3-6 months.  EMG/NCV testing of the ulnar nerve at the elbow is not as reliable as carpal tunnel syndrome.  Surgical intervention in the form of in situ release of the ulnar nerve at the elbow or ulnar nerve transposition may be required in up to 20% of patients.   Patient wishes to proceed with a right cubital tunnel release with possible transposition with adipose pedicle flap, right carpal tunnel release outpatient at [facility].  Postop dressing  will be on for 5 days postoperatively with no heavy lifting, can perform activities of daily living such as cutting food, brushing teeth and combing hair.  Needs to cover for showering/bathing.  After 5 days postop dressings can come off and patient can wash hands with warm water, soap and pat dry but still no submerging.  Sutures will come out between 10 and 14 days postoperatively.  Please allow additional 3 to 5 days for suture holes to close up before submerging in any body of water.  Patient shows understanding and agrees with this plan of care.  Will follow-up the patient postoperatively  Orders:    Ambulatory Referral to PT/OT Hand Therapy; Future    Case request operating room: RELEASE CUBITAL TUNNEL WITH POSSIBLE TRANSPOSITION WITH ADIPOSE PEDICLE FLAP, RELEASE CARPAL TUNNEL; Standing    Ambulatory referral to Family Practice; Future    Basic metabolic panel; Future    HEMOGLOBIN A1C W/ EAG ESTIMATION; Future    EKG 12 lead; Future    Type 2 diabetes mellitus with diabetic microalbuminuria, with long-term current use of insulin (HCC)  Updated hemoglobin A1c will be obtained prior to surgery  Lab Results   Component Value Date    HGBA1C 7.6 (H) 01/11/2025       Orders:    Ambulatory referral to Family Practice; Future    Basic metabolic panel; Future    HEMOGLOBIN A1C W/ EAG ESTIMATION; Future    EKG 12 lead; Future          The patient verbalized understanding of exam findings and treatment plan. We engaged in the shared decision-making process and treatment options were discussed at length with the patient. Surgical and conservative management discussed today along with risks and benefits.    Diagnoses and all orders for this visit:    Cubital tunnel syndrome on right  -     Ambulatory Referral to PT/OT Hand Therapy; Future    Carpal tunnel syndrome of right wrist  -     Ambulatory referral to Orthopedic Surgery  -     Ambulatory Referral to PT/OT Hand Therapy; Future    Ulnar neuropathy of right upper  extremity  -     Ambulatory referral to Orthopedic Surgery    Type 2 diabetes mellitus with diabetic microalbuminuria, with long-term current use of insulin (HCC)        Follow Up:  Return for Follow up post op 10-14 days.    To Do Next Visit:  Re-evaluation of current issue      Operative Discussions:  Cubital Tunnel Release:   The anatomy and physiology of cubital tunnel syndrome were discussed with the patient today in the office.  Typically, increased elbow flexion activities decrease blood flow within the intraneural spaces, resulting in a feeling of numbness, tingling, weakness, or clumsiness within the hand and fingers.  Occasionally, anatomic structures such as medial elbow osteophytes, the medial head of the triceps, were subluxing ulnar nerve may result in increased pressure or aggravation at the cubital tunnel.  Typical signs and symptoms usually include numbness and tingling within the ring and small finger, weakness with , and weakness with pinch.  Conservative treatment and includes nocturnal bracing to keep the elbow in a semi-extended position, activity modification, therapy, and avoiding excessive elbow flexion activities.  A majority of patients typically respond to conservative treatment over a period of approximately 3-6 months.  Vitamin B6 one tablet daily over the counter may helpful to reduce symptoms.  EMG/NCV testing of the ulnar nerve at the elbow is not as reliable as carpal tunnel syndrome.  Surgical intervention in the form of in situ release of the ulnar nerve at the elbow or ulnar nerve transposition may be required in up to 20% of patients. The patient has elected to undergo cubital tunnel release.  The possibility of converting to a subcutaneous or submuscular ulnar nerve transposition depending on the nerve stability was discussed with the patient.  Typically, in the postoperative period, light activities are allowed immediately, driving is allowed when narcotic medications have  stopped, and the incision may get wet after 5 days.  Heavy activities will be allowed after follow up appointment in 1-2 weeks.  While the pain within the ring and small finger of the hands generally improves rapidly, the numbness and tingling, as well as the strength, will slowly improve over a period of weeks to months.  Total recovery can take up to 18 months from the time of surgery.  Numbness and tingling near the incision, or near the medial aspect of the forearm was discussed with the patient.  The patient has an understanding of the above mentioned discussion. The risks and benefits of the procedure were explained to the patient, which include, but are not limited to: Bleeding, infection, recurrence, pain, scar, damage to tendons, damage to nerves, and damage to blood vessels, failure to give desired results and complications related to anesthesia.  These risks, along with alternative conservative treatment options, and postoperative protocols were voiced back and understood by the patient.  All questions were answered to the patient's satisfaction.  The patient agrees to comply with a standard postoperative protocol, and is willing to proceed.  Education was provided via written and auditory forms.  There were no barriers to learning. Written handouts regarding wound care, incision and scar care, and general preoperative information was provided to the patient.  Prior to surgery, the patient may be requested to stop all anti-inflammatory medications.  Prophylactic aspirin, Plavix, and Coumadin may be allowed to be continued.  Medications including vitamin E., ginkgo, and fish oil are requested to be stopped approximately one week prior to surgery.  Hypertensive medications and beta blockers, if taken, should be continued. Vitamin B6 one tablet daily over the counter may helpful to reduce symptoms.  , Open Carpal Tunnel Release:   The anatomy and physiology of carpal tunnel syndrome was discussed with the  patient today.  Increase pressure localized under the transverse carpal ligament can cause pain, numbness, tingling, or dysesthesias within the median nerve distribution as well as feelings of fatigue, clumsiness, or awkwardness.  These symptoms typically occur at night and worse in the morning upon waking.  Eventually, untreated carpal tunnel syndrome can result in weakness and permanent loss of muscle within the thenar compartment of the hand.  Treatment options were discussed with the patient.  Conservative treatment includes nocturnal resting splints to keep the nerve in a neutral position, ergonomic changes within the work or home environment, activity modification, and tendon gliding exercises.  Vitamin B6 one tablet daily over the counter may helpful to reduce symptoms.  Steroid injections within the carpal canal can help a majority of patients, however this is often self-limited in a majority of patients.  Surgical intervention to divide the transverse carpal ligament typically results in a long-lasting relief of the patient's complaints, with the recurrence rate of less than 1%. The patient has elected to undergo an open carpal tunnel release.  The palmar incision technique was discussed in the office with the patient today.   In the postoperative period, light activities are allowed immediately, driving is allowed when narcotic medication has stopped, and the bandages may be removed and incision may get wet after 2 days.  Heavy activities (lifting more than approximately 10 pounds) will be allowed after follow up appointment in 1-2 weeks.  While night symptoms (waking from sleep, pain, and discomfort in the hands) generally improves rapidly, the numbness and tingling as well as the strength will slowly improve over weeks to months depending on the chronicity and severity of the carpal tunnel syndrome.  Pillar pain and scar discomfort were discussed with the patient which are self-limiting conditions.  The  risks of bleeding and infection from the surgery are less than 1%.  Risk of recurrence is approximately 0.5%.  The risks of nerve injury or nerve damage or damage to the blood vessels is approximately 1 in 1200. The patient has an understanding of the above mentioned discussion.The risks and benefits of the procedure were explained to the patient, which include, but are not limited to: Bleeding, infection, recurrence, pain, scar, damage to tendons, damage to nerves, and damage to blood vessels, failure to give desired results and complications related to anesthesia.  These risks, along with alternative conservative treatment options, and postoperative protocols were voiced back and understood by the patient.  All questions were answered to the patient's satisfaction.  The patient agrees to comply with a standard postoperative protocol, and is willing to proceed.  Education was provided via written and auditory forms.  There were no barriers to learning. Written handouts regarding wound care, incision and scar care, and general preoperative information was provided to the patient.  Prior to surgery, the patient may be requested to stop all anti-inflammatory medications.  Prophylactic aspirin, Plavix, and Coumadin may be allowed to be continued.  Medications including vitamin E., ginkgo, and fish oil are requested to be stopped approximately one week prior to surgery.  Hypertensive medications and beta blockers, if taken, s, and Diabetes:   The risks of diabetes were discussed with the patient. These risks include increased risk of infection, delayed wound healing, increased swelling, increased stiffness, and increased scar formation.  While these risks are generally increased in all diabetics, keeping one's blood sugar under strict control can help decrease problems after surgery. If blood sugar levels are too high, or hemoglobin A1c levels are too high, surgery may be delayed or  canceled.      ____________________________________________________________________________________________________________________________________________      CHIEF COMPLAINT:  Chief Complaint   Patient presents with    Right Hand - Numbness, Pain     EMG in chart       SUBJECTIVE:  Tc Stovall is a 74 y.o. year old RHD male who presents today for consultation for his right hand numbness and tingling referred by nurse practitioner Jade Finnegan.  Patient has an EMG to be reviewed at today's visit. He states that he had a 'pinched nerve' in his neck effecting his right side at the end of February 2025. He does treat with Dr. Hobson for his cervical spine with ESIs.   Patient is a retired shoemaker and .  Is a type II diabetic last hemoglobin A1c of 7.6 on 1/11/2025.   Pain/symptom timing:  Worse during the day when active  Pain/symptom context:  Worse with activites and work  Pain/symptom modifying factors:  Rest makes better, activities make worse  Pain/symptom associated signs/symptoms: none    Prior treatment   NSAIDsYes   Injections No   Bracing/Orthotics No    Physical Therapy No     I have personally reviewed all the relevant PMH, PSH, SH, FH, Medications and allergies      PAST MEDICAL HISTORY:  Past Medical History:   Diagnosis Date    Arthritis     Diabetes mellitus (HCC)     Hyperlipidemia     Hypertension     Left rotator cuff tear     Stroke ([facility]) [id number]    Tobacco abuse     Wears glasses        PAST SURGICAL HISTORY:  Past Surgical History:   Procedure Laterality Date    COLONOSCOPY      KNEE SURGERY      [facility] SURGICAL ARTHROSCOPY SHOULDER W/ROTATOR CUFF RPR Left 08/10/2022    Procedure: Left Shoulder Arthroscopy, supraspinatus and Subscapularis Repair, Bicep Tenotomy, acromioplasty, extensive debridement;  Surgeon: Otilio Mendez DO;  Location: [facility] [place] OR;  Service: Orthopedics    TONSILLECTOMY  1955       FAMILY HISTORY:  Family History   Problem Relation Age of Onset    Alzheimer's  disease Mother     Diabetes Mother     Heart block Father     Heart disease Father        SOCIAL HISTORY:  Social History     Tobacco Use    Smoking status: Every Day     Current packs/day: 0.50     Average packs/day: 0.6 packs/day for 85.8 years (55.3 ttl pk-yrs)     Types: Cigarettes     Start date: 4/26/1964    Smokeless tobacco: Never   Vaping Use    Vaping status: Never Used   Substance Use Topics    Alcohol use: Yes     Comment: rarely    Drug use: No       MEDICATIONS:    Current Outpatient Medications:     acetaminophen (TYLENOL) 500 mg tablet, Take 500 mg by mouth every 6 (six) hours as needed for mild pain, Disp: , Rfl:     amLODIPine (NORVASC) 5 mg tablet, Take 1 tablet (5 mg total) by mouth daily, Disp: 90 tablet, Rfl: 1    aspirin 81 mg chewable tablet, Chew 1 tablet (81 mg total) daily, Disp: , Rfl:     atorvastatin (LIPITOR) 40 mg tablet, Take 1 tablet (40 mg total) by mouth every evening, Disp: 30 tablet, Rfl: 0    Blood Glucose Monitoring Suppl (OneTouch Verio Reflect) w/Device KIT, Check blood sugars three times daily. Please substitute with appropriate alternative as covered by patient's insurance. Dx: E11.65, Disp: 1 kit, Rfl: 0    cholecalciferol (VITAMIN D3) 1,000 units tablet, Take 1 tablet (1,000 Units total) by mouth daily, Disp: 90 tablet, Rfl: 3    clotrimazole-betamethasone (LOTRISONE) 1-0.05 % cream, Apply topically 2 (two) times a day, Disp: 30 g, Rfl: 1    Continuous Glucose Sensor (Dexcom G7 Sensor), Use 1 Device every 10 days, Disp: 9 each, Rfl: 1    fluticasone (FLONASE) 50 mcg/act nasal spray, 1 spray into each nostril daily for 14 days, Disp: 9.9 mL, Rfl: 0    gabapentin (Neurontin) 600 MG tablet, Take 1 tablet (600 mg total) by mouth 2 (two) times a day, Disp: 60 tablet, Rfl: 2    glipiZIDE (GLUCOTROL XL) 10 mg 24 hr tablet, Take 1 tablet (10 mg total) by mouth daily, Disp: 90 tablet, Rfl: 1    glucose blood (OneTouch Verio) test strip, Check blood sugars three times daily.  Please substitute with appropriate alternative as covered by patient's insurance. Dx: E11.65, Disp: 300 each, Rfl: 3    insulin glargine (LANTUS) 100 units/mL subcutaneous injection, Inject 20 Units under the skin daily at bedtime, Disp: 10 mL, Rfl: 0    Insulin Pen Needle (CareOne Unifine Pentips Plus) 32G X 4 MM MISC, USE DAILY AS INSTRUCTED WITH INSULIN PEN, Disp: 100 each, Rfl: 1    lisinopril (ZESTRIL) 20 mg tablet, Take 1 tablet (20 mg total) by mouth daily, Disp: 90 tablet, Rfl: 0    meloxicam (MOBIC) 15 mg tablet, Take 1 tablet (15 mg total) by mouth daily as needed for moderate pain, Disp: 30 tablet, Rfl: 1    metFORMIN (GLUCOPHAGE-XR) 500 mg 24 hr tablet, Take 2 tablets (1,000 mg total) by mouth 2 (two) times a day, Disp: 360 tablet, Rfl: 1    nicotine (NICODERM CQ) 14 mg/24hr TD 24 hr patch, Place 1 patch on the skin every 24 hours, Disp: , Rfl:     OneTouch Delica Lancets 33G MISC, Check blood sugars three times daily. Please substitute with appropriate alternative as covered by patient's insurance. Dx: E11.65, Disp: 300 each, Rfl: 0    ALLERGIES:  No Known Allergies        REVIEW OF SYSTEMS:  Review of Systems   Constitutional:  Negative for chills, fever and unexpected weight change.   HENT:  Negative for hearing loss, nosebleeds and sore throat.    Eyes:  Negative for pain, redness and visual disturbance.   Respiratory:  Negative for cough, shortness of breath and wheezing.    Cardiovascular:  Negative for chest pain, palpitations and leg swelling.   Gastrointestinal:  Negative for abdominal pain, nausea and vomiting.   Endocrine: Negative for polydipsia and polyuria.   Genitourinary:  Negative for dysuria and hematuria.   Skin:  Negative for rash and wound.   Neurological:  Positive for weakness and numbness. Negative for dizziness, light-headedness and headaches.   Psychiatric/Behavioral:  Negative for decreased concentration, dysphoric mood and suicidal ideas. The patient is not nervous/anxious.         VITALS:  There were no vitals filed for this visit.    LABS:      _____________________________________________________  PHYSICAL EXAMINATION:  General: well developed and well nourished, alert, oriented times 3, and appears comfortable  Psychiatric: Normal  HEENT: Normocephalic, Atraumatic Trachea Midline, No torticollis  Pulmonary: No audible wheezing or respiratory distress   Abdomen/GI: Non tender, non distended   Cardiovascular: No pitting edema, 2+ radial pulse   Skin: No masses, erythema, lacerations, fluctation, ulcerations  Neurovascular: Sensation Intact to the Radial Nerve, Decreased Sensation to  the Median Nerve, Decreased Sensation to  the Ulnar Nerve, Motor Intact to the Median, Ulnar, Radial Nerve, and Pulses Intact  Musculoskeletal: Normal, except as noted in detailed exam and in HPI.      MUSCULOSKELETAL EXAMINATION:    Right Carpal Tunnel Exam:    Positive thenar atrophy. Positive phalen's test. Positive carpal tunnel compression. Positive tinels over median nerve at the wrist.  Opposition strength 3/5.  Abduction strength 3/5.      Thumb retropulsion not intact  AIN not intact    Right Ulnar Nerve Exam:    Positive intrinsic atrophy.  Negative  deformity at the elbow.   Full range of motion with flexion and extension of the elbow.   Negative ulnar nerve compression test at the elbow. Positive tinels over the ulnar nerve at the elbow.  Positive cross finger test in the index and long fingers.   FDP strength is 4/5 to the ring finger. FDP strength is 4/5 to the small finger.  Intrinsic strength 3/5 .    ___________________________________________________  STUDIES REVIEWED:  EMG right upper extremity reviewed from 3/18/2025:  Impression:  Severe median nerve compression neuropathy at the wrist with ongoing denervation, consistent with carpal tunnel syndrome.  Severe ulnar neuropathy, localized best across the elbow with ongoing denervation.  Chronic coexistent C7-8 radiculopathy with ongoing  denervation.  LABS REVIEWED:    HgA1c:   Lab Results   Component Value Date    HGBA1C 7.6 (H) 01/11/2025     BMP:   Lab Results   Component Value Date    CALCIUM 8.8 01/11/2025    K 4.1 01/11/2025    CO2 25 01/11/2025     01/11/2025    BUN 21 01/11/2025    CREATININE 0.93 01/11/2025           PROCEDURES PERFORMED:  Procedures  No Procedures performed today    _____________________________________________________      Scribe Attestation      I,:  Yane Fournier am acting as a scribe while in the presence of the attending physician.:       I,:  Jackelyn Granados MD personally performed [email or web address] services described in this documentation    as scribed in my presence.:

## 2025-04-16 NOTE — H&P
Jackelyn Granados M.D.  Attending, Orthopaedic Surgery  Hand, Wrist, and Elbow Surgery  [facility]      ORTHOPAEDIC HAND, WRIST, AND ELBOW OFFICE  VISIT       ASSESSMENT/PLAN:        Assessment & Plan  Carpal tunnel syndrome of right wrist  EMG of right upper extremity was reviewed with the patient noting severe carpal tunnel syndrome and severe cubital tunnel syndrome with C7-8 radiculopathy.  On exam patient has severe atrophy in the thenar eminence and hypothenar eminence with intrinsics.  Patient has loss of motor function to the thumb and the ability to abduct the fingers and adduct the fingers.  Discussed surgical intervention with the patient which would help with the progression of his motor symptoms, but due to the extensive compression along with the cervical spine component it is difficult to tell how much he will gain back.  To help starting formal occupational therapy postop day #5 will help with strengthening  The anatomy and physiology of carpal tunnel syndrome was discussed with the patient today.  Increase pressure localized under the transverse carpal ligament can cause pain, numbness, tingling, or dysesthesias within the median nerve distribution as well as feelings of fatigue, clumsiness, or awkwardness.  These symptoms typically occur at night and worse in the morning upon waking.  Eventually, untreated carpal tunnel syndrome can result in weakness and permanent loss of muscle within the thenar compartment of the hand.  Treatment options were discussed with the patient.  Conservative treatment includes nocturnal resting splints to keep the nerve in a neutral position, ergonomic changes within the work or home environment, activity modification, and tendon gliding exercises.  Steroid injections within the carpal canal can help a majority of patients, however this is often self-limited in a majority of patients.  Surgical intervention to divide the transverse carpal ligament  typically results in a long-lasting relief of the patient's complaints, with the recurrence rate of less than 1%.  Patient wishes to proceed with a right cubital tunnel release with possible transposition with adipose pedicle flap, right carpal tunnel release outpatient at [place].  Postop dressing will be on for 5 days postoperatively with no heavy lifting, can perform activities of daily living such as cutting food, brushing teeth and combing hair.  Needs to cover for showering/bathing.  After 5 days postop dressings can come off and patient can wash hands with warm water, soap and pat dry but still no submerging.  Sutures will come out between 10 and 14 days postoperatively.  Please allow additional 3 to 5 days for suture holes to close up before submerging in any body of water.  Patient experienced pillar pain that is located over the incision area approximately 6 to 8 months after surgery.  To help decrease this pain, recommended to start scar massage after the first postop visit.  Nighttime symptoms usually resolve first with daytime symptoms following behind.  At 18 months the symptoms you has will remain.     Orders:    Ambulatory referral to Orthopedic Surgery    Ambulatory Referral to PT/OT Hand Therapy; Future    Case request operating room: RELEASE CUBITAL TUNNEL WITH POSSIBLE TRANSPOSITION WITH ADIPOSE PEDICLE FLAP, RELEASE CARPAL TUNNEL; Standing    Ambulatory referral to Family Practice; Future    Basic metabolic panel; Future    HEMOGLOBIN A1C W/ EAG ESTIMATION; Future    EKG 12 lead; Future    Ulnar neuropathy of right upper extremity    Orders:    Ambulatory referral to Orthopedic Surgery    Ambulatory referral to Family Practice; Future    Basic metabolic panel; Future    HEMOGLOBIN A1C W/ EAG ESTIMATION; Future    EKG 12 lead; Future    Cubital tunnel syndrome on right  EMG of right upper extremity was reviewed with the patient noting severe carpal tunnel syndrome and cubital tunnel syndrome  with C7-8 radiculopathy.  On exam patient has severe atrophy in the thenar eminence and hypothenar eminence with intrinsics.  Patient has loss of motor function to the thumb and the ability to abduct the fingers and adduct the fingers.  Discussed surgical intervention with the patient which would help with the progression of his motor symptoms, but due to the extensive compression along with the cervical spine component it is difficult to tell how much he will gain back.  To help starting formal occupational therapy postop day #5 will help with strengthening.  The anatomy and physiology of cubital tunnel syndrome were discussed with the patient today in the office.  Typically, increased elbow flexion activities decrease blood flow within the intraneural spaces, resulting in a feeling of numbness, tingling, weakness, or clumsiness within the hand and fingers.  Occasionally, anatomic structures such as medial elbow osteophytes, the medial head of the triceps, were subluxing ulnar nerve may result in increased pressure or aggravation at the cubital tunnel.  Typical signs and symptoms usually include numbness and tingling within the ring and small finger, weakness with , and weakness with pinch.  Conservative treatment and includes nocturnal bracing to keep the elbow in a semi-extended position, activity modification, therapy, and avoiding excessive elbow flexion activities.  A majority of patients typically respond to conservative treatment over a period of approximately 3-6 months.  EMG/NCV testing of the ulnar nerve at the elbow is not as reliable as carpal tunnel syndrome.  Surgical intervention in the form of in situ release of the ulnar nerve at the elbow or ulnar nerve transposition may be required in up to 20% of patients.   Patient wishes to proceed with a right cubital tunnel release with possible transposition with adipose pedicle flap, right carpal tunnel release outpatient at [facility].  Postop dressing  will be on for 5 days postoperatively with no heavy lifting, can perform activities of daily living such as cutting food, brushing teeth and combing hair.  Needs to cover for showering/bathing.  After 5 days postop dressings can come off and patient can wash hands with warm water, soap and pat dry but still no submerging.  Sutures will come out between 10 and 14 days postoperatively.  Please allow additional 3 to 5 days for suture holes to close up before submerging in any body of water.  Patient shows understanding and agrees with this plan of care.  Will follow-up the patient postoperatively  Orders:    Ambulatory Referral to PT/OT Hand Therapy; Future    Case request operating room: RELEASE CUBITAL TUNNEL WITH POSSIBLE TRANSPOSITION WITH ADIPOSE PEDICLE FLAP, RELEASE CARPAL TUNNEL; Standing    Ambulatory referral to Family Practice; Future    Basic metabolic panel; Future    HEMOGLOBIN A1C W/ EAG ESTIMATION; Future    EKG 12 lead; Future    Type 2 diabetes mellitus with diabetic microalbuminuria, with long-term current use of insulin (HCC)  Updated hemoglobin A1c will be obtained prior to surgery  Lab Results   Component Value Date    HGBA1C 7.6 (H) 01/11/2025       Orders:    Ambulatory referral to Family Practice; Future    Basic metabolic panel; Future    HEMOGLOBIN A1C W/ EAG ESTIMATION; Future    EKG 12 lead; Future          The patient verbalized understanding of exam findings and treatment plan. We engaged in the shared decision-making process and treatment options were discussed at length with the patient. Surgical and conservative management discussed today along with risks and benefits.    Diagnoses and all orders for this visit:    Cubital tunnel syndrome on right  -     Ambulatory Referral to PT/OT Hand Therapy; Future    Carpal tunnel syndrome of right wrist  -     Ambulatory referral to Orthopedic Surgery  -     Ambulatory Referral to PT/OT Hand Therapy; Future    Ulnar neuropathy of right upper  extremity  -     Ambulatory referral to Orthopedic Surgery    Type 2 diabetes mellitus with diabetic microalbuminuria, with long-term current use of insulin (HCC)        Follow Up:  Return for Follow up post op 10-14 days.    To Do Next Visit:  Re-evaluation of current issue      Operative Discussions:  Cubital Tunnel Release:   The anatomy and physiology of cubital tunnel syndrome were discussed with the patient today in the office.  Typically, increased elbow flexion activities decrease blood flow within the intraneural spaces, resulting in a feeling of numbness, tingling, weakness, or clumsiness within the hand and fingers.  Occasionally, anatomic structures such as medial elbow osteophytes, the medial head of the triceps, were subluxing ulnar nerve may result in increased pressure or aggravation at the cubital tunnel.  Typical signs and symptoms usually include numbness and tingling within the ring and small finger, weakness with , and weakness with pinch.  Conservative treatment and includes nocturnal bracing to keep the elbow in a semi-extended position, activity modification, therapy, and avoiding excessive elbow flexion activities.  A majority of patients typically respond to conservative treatment over a period of approximately 3-6 months.  Vitamin B6 one tablet daily over the counter may helpful to reduce symptoms.  EMG/NCV testing of the ulnar nerve at the elbow is not as reliable as carpal tunnel syndrome.  Surgical intervention in the form of in situ release of the ulnar nerve at the elbow or ulnar nerve transposition may be required in up to 20% of patients. The patient has elected to undergo cubital tunnel release.  The possibility of converting to a subcutaneous or submuscular ulnar nerve transposition depending on the nerve stability was discussed with the patient.  Typically, in the postoperative period, light activities are allowed immediately, driving is allowed when narcotic medications have  stopped, and the incision may get wet after 5 days.  Heavy activities will be allowed after follow up appointment in 1-2 weeks.  While the pain within the ring and small finger of the hands generally improves rapidly, the numbness and tingling, as well as the strength, will slowly improve over a period of weeks to months.  Total recovery can take up to 18 months from the time of surgery.  Numbness and tingling near the incision, or near the medial aspect of the forearm was discussed with the patient.  The patient has an understanding of the above mentioned discussion. The risks and benefits of the procedure were explained to the patient, which include, but are not limited to: Bleeding, infection, recurrence, pain, scar, damage to tendons, damage to nerves, and damage to blood vessels, failure to give desired results and complications related to anesthesia.  These risks, along with alternative conservative treatment options, and postoperative protocols were voiced back and understood by the patient.  All questions were answered to the patient's satisfaction.  The patient agrees to comply with a standard postoperative protocol, and is willing to proceed.  Education was provided via written and auditory forms.  There were no barriers to learning. Written handouts regarding wound care, incision and scar care, and general preoperative information was provided to the patient.  Prior to surgery, the patient may be requested to stop all anti-inflammatory medications.  Prophylactic aspirin, Plavix, and Coumadin may be allowed to be continued.  Medications including vitamin E., ginkgo, and fish oil are requested to be stopped approximately one week prior to surgery.  Hypertensive medications and beta blockers, if taken, should be continued. Vitamin B6 one tablet daily over the counter may helpful to reduce symptoms.  , Open Carpal Tunnel Release:   The anatomy and physiology of carpal tunnel syndrome was discussed with the  patient today.  Increase pressure localized under the transverse carpal ligament can cause pain, numbness, tingling, or dysesthesias within the median nerve distribution as well as feelings of fatigue, clumsiness, or awkwardness.  These symptoms typically occur at night and worse in the morning upon waking.  Eventually, untreated carpal tunnel syndrome can result in weakness and permanent loss of muscle within the thenar compartment of the hand.  Treatment options were discussed with the patient.  Conservative treatment includes nocturnal resting splints to keep the nerve in a neutral position, ergonomic changes within the work or home environment, activity modification, and tendon gliding exercises.  Vitamin B6 one tablet daily over the counter may helpful to reduce symptoms.  Steroid injections within the carpal canal can help a majority of patients, however this is often self-limited in a majority of patients.  Surgical intervention to divide the transverse carpal ligament typically results in a long-lasting relief of the patient's complaints, with the recurrence rate of less than 1%. The patient has elected to undergo an open carpal tunnel release.  The palmar incision technique was discussed in the office with the patient today.   In the postoperative period, light activities are allowed immediately, driving is allowed when narcotic medication has stopped, and the bandages may be removed and incision may get wet after 2 days.  Heavy activities (lifting more than approximately 10 pounds) will be allowed after follow up appointment in 1-2 weeks.  While night symptoms (waking from sleep, pain, and discomfort in the hands) generally improves rapidly, the numbness and tingling as well as the strength will slowly improve over weeks to months depending on the chronicity and severity of the carpal tunnel syndrome.  Pillar pain and scar discomfort were discussed with the patient which are self-limiting conditions.  The  risks of bleeding and infection from the surgery are less than 1%.  Risk of recurrence is approximately 0.5%.  The risks of nerve injury or nerve damage or damage to the blood vessels is approximately 1 in 1200. The patient has an understanding of the above mentioned discussion.The risks and benefits of the procedure were explained to the patient, which include, but are not limited to: Bleeding, infection, recurrence, pain, scar, damage to tendons, damage to nerves, and damage to blood vessels, failure to give desired results and complications related to anesthesia.  These risks, along with alternative conservative treatment options, and postoperative protocols were voiced back and understood by the patient.  All questions were answered to the patient's satisfaction.  The patient agrees to comply with a standard postoperative protocol, and is willing to proceed.  Education was provided via written and auditory forms.  There were no barriers to learning. Written handouts regarding wound care, incision and scar care, and general preoperative information was provided to the patient.  Prior to surgery, the patient may be requested to stop all anti-inflammatory medications.  Prophylactic aspirin, Plavix, and Coumadin may be allowed to be continued.  Medications including vitamin E., ginkgo, and fish oil are requested to be stopped approximately one week prior to surgery.  Hypertensive medications and beta blockers, if taken, s, and Diabetes:   The risks of diabetes were discussed with the patient. These risks include increased risk of infection, delayed wound healing, increased swelling, increased stiffness, and increased scar formation.  While these risks are generally increased in all diabetics, keeping one's blood sugar under strict control can help decrease problems after surgery. If blood sugar levels are too high, or hemoglobin A1c levels are too high, surgery may be delayed or  canceled.      ____________________________________________________________________________________________________________________________________________      CHIEF COMPLAINT:  Chief Complaint   Patient presents with    Right Hand - Numbness, Pain     EMG in chart       SUBJECTIVE:  Tc Stovall is a 74 y.o. year old RHD male who presents today for consultation for his right hand numbness and tingling referred by nurse practitioner Jade Finnegan.  Patient has an EMG to be reviewed at today's visit. He states that he had a 'pinched nerve' in his neck effecting his right side at the end of February 2025. He does treat with Dr. Hobson for his cervical spine with ESIs.   Patient is a retired shoemaker and .  Is a type II diabetic last hemoglobin A1c of 7.6 on 1/11/2025.   Pain/symptom timing:  Worse during the day when active  Pain/symptom context:  Worse with activites and work  Pain/symptom modifying factors:  Rest makes better, activities make worse  Pain/symptom associated signs/symptoms: none    Prior treatment   NSAIDsYes   Injections No   Bracing/Orthotics No    Physical Therapy No     I have personally reviewed all the relevant PMH, PSH, SH, FH, Medications and allergies      PAST MEDICAL HISTORY:  Past Medical History:   Diagnosis Date    Arthritis     Diabetes mellitus (HCC)     Hyperlipidemia     Hypertension     Left rotator cuff tear     Stroke ([facility]) [id number]    Tobacco abuse     Wears glasses        PAST SURGICAL HISTORY:  Past Surgical History:   Procedure Laterality Date    COLONOSCOPY      KNEE SURGERY      MT SURGICAL ARTHROSCOPY SHOULDER W/ROTATOR CUFF RPR Left 08/10/2022    Procedure: Left Shoulder Arthroscopy, supraspinatus and Subscapularis Repair, Bicep Tenotomy, acromioplasty, extensive debridement;  Surgeon: Otilio Mendez DO;  Location: [facility] OR;  Service: Orthopedics    TONSILLECTOMY  1955       FAMILY HISTORY:  Family History   Problem Relation Age of Onset    Alzheimer's  disease Mother     Diabetes Mother     Heart block Father     Heart disease Father        SOCIAL HISTORY:  Social History     Tobacco Use    Smoking status: Every Day     Current packs/day: 0.50     Average packs/day: 0.6 packs/day for 85.8 years (55.3 ttl pk-yrs)     Types: Cigarettes     Start date: 4/26/1964    Smokeless tobacco: Never   Vaping Use    Vaping status: Never Used   Substance Use Topics    Alcohol use: Yes     Comment: rarely    Drug use: No       MEDICATIONS:    Current Outpatient Medications:     acetaminophen (TYLENOL) 500 mg tablet, Take 500 mg by mouth every 6 (six) hours as needed for mild pain, Disp: , Rfl:     amLODIPine (NORVASC) 5 mg tablet, Take 1 tablet (5 mg total) by mouth daily, Disp: 90 tablet, Rfl: 1    aspirin 81 mg chewable tablet, Chew 1 tablet (81 mg total) daily, Disp: , Rfl:     atorvastatin (LIPITOR) 40 mg tablet, Take 1 tablet (40 mg total) by mouth every evening, Disp: 30 tablet, Rfl: 0    Blood Glucose Monitoring Suppl (OneTouch Verio Reflect) w/Device KIT, Check blood sugars three times daily. Please substitute with appropriate alternative as covered by patient's insurance. Dx: E11.65, Disp: 1 kit, Rfl: 0    cholecalciferol (VITAMIN D3) 1,000 units tablet, Take 1 tablet (1,000 Units total) by mouth daily, Disp: 90 tablet, Rfl: 3    clotrimazole-betamethasone (LOTRISONE) 1-0.05 % cream, Apply topically 2 (two) times a day, Disp: 30 g, Rfl: 1    Continuous Glucose Sensor (Dexcom G7 Sensor), Use 1 Device every 10 days, Disp: 9 each, Rfl: 1    fluticasone (FLONASE) 50 mcg/act nasal spray, 1 spray into each nostril daily for 14 days, Disp: 9.9 mL, Rfl: 0    gabapentin (Neurontin) 600 MG tablet, Take 1 tablet (600 mg total) by mouth 2 (two) times a day, Disp: 60 tablet, Rfl: 2    glipiZIDE (GLUCOTROL XL) 10 mg 24 hr tablet, Take 1 tablet (10 mg total) by mouth daily, Disp: 90 tablet, Rfl: 1    glucose blood (OneTouch Verio) test strip, Check blood sugars three times daily.  Please substitute with appropriate alternative as covered by patient's insurance. Dx: E11.65, Disp: 300 each, Rfl: 3    insulin glargine (LANTUS) 100 units/mL subcutaneous injection, Inject 20 Units under the skin daily at bedtime, Disp: 10 mL, Rfl: 0    Insulin Pen Needle (CareOne Unifine Pentips Plus) 32G X 4 MM MISC, USE DAILY AS INSTRUCTED WITH INSULIN PEN, Disp: 100 each, Rfl: 1    lisinopril (ZESTRIL) 20 mg tablet, Take 1 tablet (20 mg total) by mouth daily, Disp: 90 tablet, Rfl: 0    meloxicam (MOBIC) 15 mg tablet, Take 1 tablet (15 mg total) by mouth daily as needed for moderate pain, Disp: 30 tablet, Rfl: 1    metFORMIN (GLUCOPHAGE-XR) 500 mg 24 hr tablet, Take 2 tablets (1,000 mg total) by mouth 2 (two) times a day, Disp: 360 tablet, Rfl: 1    nicotine (NICODERM CQ) 14 mg/24hr TD 24 hr patch, Place 1 patch on the skin every 24 hours, Disp: , Rfl:     OneTouch Delica Lancets 33G MISC, Check blood sugars three times daily. Please substitute with appropriate alternative as covered by patient's insurance. Dx: E11.65, Disp: 300 each, Rfl: 0    ALLERGIES:  No Known Allergies        REVIEW OF SYSTEMS:  Review of Systems   Constitutional:  Negative for chills, fever and unexpected weight change.   HENT:  Negative for hearing loss, nosebleeds and sore throat.    Eyes:  Negative for pain, redness and visual disturbance.   Respiratory:  Negative for cough, shortness of breath and wheezing.    Cardiovascular:  Negative for chest pain, palpitations and leg swelling.   Gastrointestinal:  Negative for abdominal pain, nausea and vomiting.   Endocrine: Negative for polydipsia and polyuria.   Genitourinary:  Negative for dysuria and hematuria.   Skin:  Negative for rash and wound.   Neurological:  Positive for weakness and numbness. Negative for dizziness, light-headedness and headaches.   Psychiatric/Behavioral:  Negative for decreased concentration, dysphoric mood and suicidal ideas. The patient is not nervous/anxious.         VITALS:  There were no vitals filed for this visit.    LABS:      _____________________________________________________  PHYSICAL EXAMINATION:  General: well developed and well nourished, alert, oriented times 3, and appears comfortable  Psychiatric: Normal  HEENT: Normocephalic, Atraumatic Trachea Midline, No torticollis  Pulmonary: No audible wheezing or respiratory distress   Abdomen/GI: Non tender, non distended   Cardiovascular: No pitting edema, 2+ radial pulse   Skin: No masses, erythema, lacerations, fluctation, ulcerations  Neurovascular: Sensation Intact to the Radial Nerve, Decreased Sensation to  the Median Nerve, Decreased Sensation to  the Ulnar Nerve, Motor Intact to the Median, Ulnar, Radial Nerve, and Pulses Intact  Musculoskeletal: Normal, except as noted in detailed exam and in HPI.      MUSCULOSKELETAL EXAMINATION:    Right Carpal Tunnel Exam:    Positive thenar atrophy. Positive phalen's test. Positive carpal tunnel compression. Positive tinels over median nerve at the wrist.  Opposition strength 3/5.  Abduction strength 3/5.      Thumb retropulsion not intact  AIN not intact    Right Ulnar Nerve Exam:    Positive intrinsic atrophy.  Negative  deformity at the elbow.   Full range of motion with flexion and extension of the elbow.   Negative ulnar nerve compression test at the elbow. Positive tinels over the ulnar nerve at the elbow.  Positive cross finger test in the index and long fingers.   FDP strength is 4/5 to the ring finger. FDP strength is 4/5 to the small finger.  Intrinsic strength 3/5 .    ___________________________________________________  STUDIES REVIEWED:  EMG right upper extremity reviewed from 3/18/2025:  Impression:  Severe median nerve compression neuropathy at the wrist with ongoing denervation, consistent with carpal tunnel syndrome.  Severe ulnar neuropathy, localized best across the elbow with ongoing denervation.  Chronic coexistent C7-8 radiculopathy with ongoing  denervation.  LABS REVIEWED:    HgA1c:   Lab Results   Component Value Date    HGBA1C 7.6 (H) 01/11/2025     BMP:   Lab Results   Component Value Date    CALCIUM 8.8 01/11/2025    K 4.1 01/11/2025    CO2 25 01/11/2025     01/11/2025    BUN 21 01/11/2025    CREATININE 0.93 01/11/2025           PROCEDURES PERFORMED:  Procedures  No Procedures performed today    _____________________________________________________      Scribe Attestation      I,:  Yane Fournier am acting as a scribe while in the presence of the attending physician.:       I,:  Jackelyn Granados MD personally performed the services described in this documentation    as scribed in my presence.:

## 2025-04-16 NOTE — ASSESSMENT & PLAN NOTE
EMG of right upper extremity was reviewed with the patient noting severe carpal tunnel syndrome and cubital tunnel syndrome with C7-8 radiculopathy.  On exam patient has severe atrophy in the thenar eminence and hypothenar eminence with intrinsics.  Patient has loss of motor function to the thumb and the ability to abduct the fingers and adduct the fingers.  Discussed surgical intervention with the patient which would help with the progression of his motor symptoms, but due to the extensive compression along with the cervical spine component it is difficult to tell how much he will gain back.  To help starting formal occupational therapy postop day #5 will help with strengthening.  The anatomy and physiology of cubital tunnel syndrome were discussed with the patient today in the office.  Typically, increased elbow flexion activities decrease blood flow within the intraneural spaces, resulting in a feeling of numbness, tingling, weakness, or clumsiness within the hand and fingers.  Occasionally, anatomic structures such as medial elbow osteophytes, the medial head of the triceps, were subluxing ulnar nerve may result in increased pressure or aggravation at the cubital tunnel.  Typical signs and symptoms usually include numbness and tingling within the ring and small finger, weakness with , and weakness with pinch.  Conservative treatment and includes nocturnal bracing to keep the elbow in a semi-extended position, activity modification, therapy, and avoiding excessive elbow flexion activities.  A majority of patients typically respond to conservative treatment over a period of approximately 3-6 months.  EMG/NCV testing of the ulnar nerve at the elbow is not as reliable as carpal tunnel syndrome.  Surgical intervention in the form of in situ release of the ulnar nerve at the elbow or ulnar nerve transposition may be required in up to 20% of patients.   Patient wishes to proceed with a right cubital tunnel release  with possible transposition with adipose pedicle flap, right carpal tunnel release outpatient at [place].  Postop dressing will be on for 5 days postoperatively with no heavy lifting, can perform activities of daily living such as cutting food, brushing teeth and combing hair.  Needs to cover for showering/bathing.  After 5 days postop dressings can come off and patient can wash hands with warm water, soap and pat dry but still no submerging.  Sutures will come out between 10 and 14 days postoperatively.  Please allow additional 3 to 5 days for suture holes to close up before submerging in any body of water.  Patient shows understanding and agrees with this plan of care.  Will follow-up the patient postoperatively  Orders:    Ambulatory Referral to PT/OT Hand Therapy; Future    Case request operating room: RELEASE CUBITAL TUNNEL WITH POSSIBLE TRANSPOSITION WITH ADIPOSE PEDICLE FLAP, RELEASE CARPAL TUNNEL; Standing    Ambulatory referral to Family Practice; Future    Basic metabolic panel; Future    HEMOGLOBIN A1C W/ EAG ESTIMATION; Future    EKG 12 lead; Future

## 2025-04-17 ENCOUNTER — APPOINTMENT (OUTPATIENT)
Dept: PHYSICAL THERAPY | Facility: CLINIC | Age: 75
End: 2025-04-17
Payer: MEDICARE

## 2025-04-18 ENCOUNTER — TELEPHONE (OUTPATIENT)
Age: 75
End: 2025-04-18

## 2025-04-18 ENCOUNTER — OFFICE VISIT (OUTPATIENT)
Age: 75
End: 2025-04-18
Payer: MEDICARE

## 2025-04-18 VITALS
TEMPERATURE: 98.6 F | OXYGEN SATURATION: 95 % | HEART RATE: 84 BPM | RESPIRATION RATE: 18 BRPM | HEIGHT: 67 IN | WEIGHT: 228 LBS | SYSTOLIC BLOOD PRESSURE: 126 MMHG | DIASTOLIC BLOOD PRESSURE: 72 MMHG | BODY MASS INDEX: 35.79 KG/M2

## 2025-04-18 DIAGNOSIS — E11.69 HYPERLIPIDEMIA ASSOCIATED WITH TYPE 2 DIABETES MELLITUS  (HCC): Primary | Chronic | ICD-10-CM

## 2025-04-18 DIAGNOSIS — G56.21 CUBITAL TUNNEL SYNDROME ON RIGHT: ICD-10-CM

## 2025-04-18 DIAGNOSIS — E78.5 HYPERLIPIDEMIA ASSOCIATED WITH TYPE 2 DIABETES MELLITUS  (HCC): Primary | Chronic | ICD-10-CM

## 2025-04-18 DIAGNOSIS — E11.65 TYPE 2 DIABETES MELLITUS WITH HYPERGLYCEMIA, WITHOUT LONG-TERM CURRENT USE OF INSULIN (HCC): ICD-10-CM

## 2025-04-18 DIAGNOSIS — J20.9 ACUTE BRONCHITIS, UNSPECIFIED ORGANISM: ICD-10-CM

## 2025-04-18 PROBLEM — Z86.73 HISTORY OF LACUNAR CEREBROVASCULAR ACCIDENT (CVA): Chronic | Status: ACTIVE | Noted: 2025-01-10

## 2025-04-18 PROBLEM — E11.9 DM2 (DIABETES MELLITUS, TYPE 2) (HCC): Status: RESOLVED | Noted: 2024-06-12 | Resolved: 2025-04-18

## 2025-04-18 PROCEDURE — G2211 COMPLEX E/M VISIT ADD ON: HCPCS | Performed by: INTERNAL MEDICINE

## 2025-04-18 PROCEDURE — 99214 OFFICE O/P EST MOD 30 MIN: CPT | Performed by: INTERNAL MEDICINE

## 2025-04-18 RX ORDER — BENZONATATE 100 MG/1
100 CAPSULE ORAL 3 TIMES DAILY PRN
Qty: 60 CAPSULE | Refills: 1 | Status: SHIPPED | OUTPATIENT
Start: 2025-04-18

## 2025-04-18 RX ORDER — AZITHROMYCIN 250 MG/1
TABLET, FILM COATED ORAL
Qty: 6 TABLET | Refills: 0 | Status: SHIPPED | OUTPATIENT
Start: 2025-04-18 | End: 2025-04-23

## 2025-04-18 NOTE — PATIENT INSTRUCTIONS
Pre-operative Medication Instructions    Avoid herbs or non-directed vitamins one week prior to surgery  Avoid aspirin containing medications or non-steroidal anti-inflammatory drugs one week preceding surgery  May take tylenol for pain up until the night before surgery    ACE Inhibitors or ARBs       Medication Name     lisinopril (ZESTRIL) 20 mg tablet        Continue this medication up to the evening before surgery/procedure, but do not take the morning of the day of surgery.    Seizure Medication       Medication Name     gabapentin (Neurontin) 600 MG tablet        Continue to take this medication on your normal schedule.  If this is an oral medication and you take it in the morning, then you may take this medicine with a sip of water.    Calcium Channel Blockers       Medication Name     amLODIPine (NORVASC) 5 mg tablet        Continue to take this heart medication on your normal schedule.  If this is an oral medication and you take it in the morning, then you may take this medicine with a sip of water.    Cholesterol lowering meds       Medication Name     atorvastatin (LIPITOR) 40 mg tablet        Continue to take this medication on your normal schedule.  If this is an oral medication and you take it in the morning, then you may take this medicine with a sip of water.    Diabetic Medications       Medication Name     glipiZIDE (GLUCOTROL XL) 10 mg 24 hr tablet     insulin glargine (LANTUS) 100 units/mL subcutaneous injection     metFORMIN (GLUCOPHAGE-XR) 500 mg 24 hr tablet          Medicine Instructions for Adults with Diabetes (NO Bowel Prep)    Follow these instructions when a BOWEL PREP is NOT required for your procedure or surgery!    NOTE:  GLP Agonists taken weekly: do not take in the 7 days before your procedure. **Bariatric surgery: do not take 4 weeks prior to your procedure.    SGLT-2 Inhibitors: do not take in the 4 days before your procedure    On the Day Before Surgery/Procedure  If you are  having a procedure (e.g., Colonoscopy) or surgery which DOES NOT require a bowel prep, follow the directions below based on the type of medicine you take for your diabetes.  Type of Medicine You Take Examples What to Do   Pre-Mixed Insulin Intermediate  Qlpmrpm89/25, Jjjqzsj99/30, Novolog 70/30, Regular Insulin Take 1/2 your regular dose the evening before our procedure.   Rapid/Fast Acting  Insulin and/or Long-Acting Insulin Humalog U200, NovoLog, Apidra,  Lantus, Levemir, Tresiba, Toujeo,  Fias, Basaglar Take your FULL regular dose the day before procedure.   Oral Diabetic Medicines (sulfonylurea) Glipizide/Glimepiride/  Glucotrol Take your regular dose with dinner the evening before your procedure.   Other Oral Diabetic Medicines Metformin, Glucophage, Glucophage  XR, Riomet, Glumetza, Actose,  Avandia, Gl set, Prandin Take your regular dose with dinner the evening before your procedure   GLP Agonists Adlyxin, Byetta, Bydureon,  Ozempic, Soliqua, Tanzeum,  Trulicity, Victoza, Saxenda,  Rybelsus, Wegovy, Mounjaro, Zepbound If taken daily, take as normal  If taken weekly, do not take this medicine for 7 days before your procedure including the day of the procedure (resume taking after the procedure). **Bariatric surgery: do not take 4 weeks prior to procedure   SGLT-2 Inhibitors Jardiance, Invokana, Farxiga, Steglatro, Brenzavvy, Qtern, Segluromet Glyxambi, Synjardy, Synjardy XR, Invokamet, InvokametXR, Trijary XR, Xigduo X Do not take for 4 days before your procedure including the day of the procedure (resume taking after the procedure)   This educational material has been approved by the Patient Education Advisory Committee.    On the Day of Surgery/Procedure  Follow the directions below based on the type of medicine you take for your diabetes.  Type of Medicine You Take  Examples What to Do   Long-Acting Insulin Lantus, Levemir, Tresiba,  Toujeo, Basaglar, Semglee If you normally take your Long Acting Insulin in  the morning, take the full dose as scheduled.   GLP-I Agonists Adlyxin, Byetta, Bydureon,  Ozempic, Soliqua, Tanzeum,  Trulicity, Victoza, Saxenda,  Rybelsus, Mounjaro Do NOT take this medicine on the day of your procedure (resume taking after the procedure)   Except for the morning Long-Acting Insulin, DO NOT take ANY diabetic medicine on the day of your procedure unless you were instructed by the doctor who manages your diabetes medicines.  Continue to check your blood sugars.  If you have an insulin pump, ask your endocrinologist for instructions at least 3 days before your procedure. NOTE: If you are not able to ask your endocrinologist in advance, on the day of the procedure set your insulin pump to your basal rate only. Bring your insulin pump supplies to the hospital.    If you have any questions about taking your diabetes medicines prior to your procedure, please contact the doctor who manages your diabetes medicines.

## 2025-04-18 NOTE — TELEPHONE ENCOUNTER
Caller: Tc WHEELER    Doctor: Dr. Smart     Reason for call: Pt is sick CX procedure     Call back#: [phone]

## 2025-04-18 NOTE — ASSESSMENT & PLAN NOTE
Lab Results   Component Value Date    HGBA1C 7.6 (H) 01/11/2025     Check labs before surgery and I will review them. Reviewed his DEXCOM G7 data which looks fairly good. Could still make diet and lifestyle modifications to get his weight down. Repeat labs before next appt.    Orders:  •  CBC; Future  •  Lipid Panel with Direct LDL reflex; Future  •  Hemoglobin A1C; Future  •  Comprehensive metabolic panel; Future  •  Albumin / creatinine urine ratio; Future  •  TSH, 3rd generation with Free T4 reflex; Future

## 2025-04-18 NOTE — PROGRESS NOTES
Name: Tc Stovall      : 1950      MRN: [id number]  Encounter Provider: Lester Roth DO  Encounter Date: 2025   Encounter department: [facility] [place]  :  Assessment & Plan  Hyperlipidemia associated with type 2 diabetes mellitus  (HCC)    Lab Results   Component Value Date    HGBA1C 7.6 (H) 2025     Check labs before surgery and I will review them. Reviewed his DEXCOM G7 data which looks fairly good. Could still make diet and lifestyle modifications to get his weight down. Repeat labs before next appt.    Orders:  •  CBC; Future  •  Lipid Panel with Direct LDL reflex; Future  •  Hemoglobin A1C; Future  •  Comprehensive metabolic panel; Future  •  Albumin / creatinine urine ratio; Future  •  TSH, 3rd generation with Free T4 reflex; Future    Type 2 diabetes mellitus with hyperglycemia, without long-term current use of insulin (HCC)    Plan as above. No changes in medication were made today.    Acute bronchitis, unspecified organism    Discussed cough medicine and antibiotics. Needs to quit smoking.    Orders:  •  azithromycin (Zithromax) 250 mg tablet; Take 2 tablets (500 mg total) by mouth daily for 1 day, THEN 1 tablet (250 mg total) daily for 4 days.  •  benzonatate (TESSALON PERLES) 100 mg capsule; Take 1 capsule (100 mg total) by mouth 3 (three) times a day as needed for cough    Cubital tunnel syndrome on right    Scheduled for RELEASE CUBITAL TUNNEL WITH POSSIBLE TRANSPOSITION WITH ADIPOSE PEDICLE FLAP and RELEASE CARPAL TUNNEL on . Asked him to get labs and EKG done 2 weeks before surgery. I anticipate being able to clear him. I put pre-op medication recommendations in his AVS.      Falls Plan of Care: balance, strength, and gait training instructions were provided.     Tobacco Cessation Counseling: Tobacco cessation counseling was provided. The patient is sincerely urged to quit consumption of tobacco. He is not ready to quit tobacco.       History of Present  "Katy Montez presents for follow-up. He is scheduled for future surgery on his right wrist for cubital tunnel/carpal tunnel. He has severe disease on EMG. He started getting sick about 3 days ago. Has cough/congestion/sore throat. No difficulty with breathing. Feels better when he is outside in the fresh. His DEXCOM G7 says his average A1c in past 14 days is 7.3%. He has to get labs done before surgery. Last A1c was 7.6% in Jan 2025.      Review of Systems   Constitutional:  Positive for fatigue. Negative for chills and fever.   HENT:  Positive for congestion and sore throat.    Respiratory:  Positive for cough. Negative for chest tightness, shortness of breath and wheezing.    Cardiovascular: Negative.    Gastrointestinal: Negative.    Musculoskeletal:  Positive for arthralgias and back pain.   Neurological:  Positive for numbness.     Objective   /72 (BP Location: Left arm, Patient Position: Sitting, Cuff Size: Large)   Pulse 84   Temp 98.6 °F (37 °C) (Tympanic)   Resp 18   Ht 5' 7\" (1.702 m)   Wt 103 kg (228 lb)   SpO2 95%   BMI 35.71 kg/m²      Physical Exam  Constitutional:       General: He is not in acute distress.     Appearance: He is obese. He is not ill-appearing.   Cardiovascular:      Rate and Rhythm: Normal rate and regular rhythm.      Pulses: no weak pulses.           Dorsalis pedis pulses are 2+ on the right side and 2+ on the left side.        Posterior tibial pulses are 2+ on the right side and 2+ on the left side.      Heart sounds: No murmur heard.  Pulmonary:      Effort: Pulmonary effort is normal. No respiratory distress.      Breath sounds: No wheezing.   Abdominal:      General: Bowel sounds are normal. There is no distension.      Tenderness: There is no abdominal tenderness.   Musculoskeletal:      Right lower leg: No edema.      Left lower leg: No edema.   Feet:      Right foot:      Skin integrity: Callus and dry skin present. No ulcer, skin breakdown, erythema or " warmth.      Left foot:      Skin integrity: Callus and dry skin present. No ulcer, skin breakdown, erythema or warmth.   Neurological:      Mental Status: He is alert.       Diabetic Foot Exam    Patient's shoes and socks removed.    Right Foot/Ankle   Right Foot Inspection  Skin Exam: skin normal, skin intact, dry skin, callus and callus. No warmth, no erythema, no maceration, no abnormal color, no pre-ulcer and no ulcer.     Toe Exam: ROM and strength within normal limits.     Sensory   Monofilament testing: intact    Vascular  Capillary refills: < 3 seconds  The right DP pulse is 2+. The right PT pulse is 2+.     Left Foot/Ankle  Left Foot Inspection  Skin Exam: skin normal, skin intact, dry skin and callus. No warmth, no erythema, no maceration, normal color, no pre-ulcer and no ulcer.     Toe Exam: ROM and strength within normal limits.     Sensory   Monofilament testing: intact    Vascular  Capillary refills: < 3 seconds  The left DP pulse is 2+. The left PT pulse is 2+.     Assign Risk Category  Deformity present  No loss of protective sensation  No weak pulses  Risk: 0

## 2025-04-18 NOTE — ASSESSMENT & PLAN NOTE
Scheduled for RELEASE CUBITAL TUNNEL WITH POSSIBLE TRANSPOSITION WITH ADIPOSE PEDICLE FLAP and RELEASE CARPAL TUNNEL on 5/27. Asked him to get labs and EKG done 2 weeks before surgery. I anticipate being able to clear him. I put pre-op medication recommendations in his AVS.

## 2025-04-21 ENCOUNTER — OFFICE VISIT (OUTPATIENT)
Dept: PHYSICAL THERAPY | Facility: CLINIC | Age: 75
End: 2025-04-21
Payer: MEDICARE

## 2025-04-21 DIAGNOSIS — M54.12 RADICULOPATHY, CERVICAL REGION: Primary | ICD-10-CM

## 2025-04-21 PROCEDURE — 97110 THERAPEUTIC EXERCISES: CPT | Performed by: PHYSICAL THERAPIST

## 2025-04-21 PROCEDURE — 97112 NEUROMUSCULAR REEDUCATION: CPT | Performed by: PHYSICAL THERAPIST

## 2025-04-21 PROCEDURE — 97140 MANUAL THERAPY 1/> REGIONS: CPT | Performed by: PHYSICAL THERAPIST

## 2025-04-21 NOTE — PROGRESS NOTES
Daily Note     Today's date: 2025  Patient name: Tc Stovall  : 1950  MRN: [id number]  Referring provider: Jade Finnegan CRNP  Dx:   Encounter Diagnosis     ICD-10-CM    1. Radiculopathy, cervical region  M54.12           Start Time: 1500  Stop Time: 1541  Total time in clinic (min): 41 minutes    Subjective: Pt denies any current pain, still having intermittent tingling into right arm which is currently traveling to his fingers.       Objective: See treatment diary below      Assessment: Pt continues to respond favorably to manual interventions with ability to fully abolish right sided radicular symptoms. Overall good tolerance noted with ROM exercises with more c/o stiffness versus pain, cues issued for proper form with seated thoracic extension to ensure hinging through thoracic spine versus excessive cervical extension with good improvement in form. Good form noted with prone scapular re-education exercises with occasional cues for correction of scapular mechanics but less reliant on cues for correction of form compared to previous sessions. Pt remains easily fatigued with scapular strengthening exercises and able to further challenge with bilateral ER with cues for form but no pain noted, only fatigue. HEP was updated and reviewed.       Plan: Continue per plan of care.  Progress treatment as tolerated.         POC expires Unit limit Auth Expiration date PT/OT/ST + Visit Limit?   25 BOMN 25 BOMN                           Visit/Unit Tracking  AUTH Status:  Date               No Auth Required Used                Remaining                    Diagnosis: Chronic neck pain   Precautions: Hx of TIA x 2, HTN (controlled w/ meds), DM (controlled w/ meds)   POC Expires: 25   Re-evaluation Date: 25   FOTO Scores/Date: Goal - 68;  -    Visit Count 1/10 2/10 3/10 4/10 5/10   Manuals 4/2 4/7 4/10 4/14 4/21   Cervical traction KD KD pre/post tx EARLE pre/post  EARLE KD pre/post tx          "          Ther Ex 4/2 4/7 4/10 4/14 4/21   Seated thoracic ext NV 10x3\" 3\"x10  3\"x10  10x3\"    Cervical ROT w/ towel NV 10x3\" ea 3\" x10 ea 3\"x10 ea  10x3\" ea   BL Horizontal Abd    GTB 2x10 Grn 2x10   Bilateral ER     Grn 2x10                                   Pt education  KD   KD   Neuro Re-Ed 4/2 4/7 4/10 4/14 4/21   Median nerve glides NV 20x ea X20 ea  X20 ea X20 ea   Prone scapular retraction/depression NV 10x10\" 10\"x10  10\"x10  10x10\"    Prone scapular retraction/depression w/ lift off NV 10x10\" 10\"x10  10\"x10 10x10\"   Robbery  Blue 10x5\"  Blue 5\"x10  Blue 5\"x10  Blue 10x5\"    Rows/pulldowns NV Grn 2x10 ea Grb 2x10 ea  GTB/BTB 2x10 ea  Blue 2x10 ea                                  Ther Act                                                                                 Modalities                                                  "

## 2025-04-24 ENCOUNTER — OFFICE VISIT (OUTPATIENT)
Dept: PHYSICAL THERAPY | Facility: CLINIC | Age: 75
End: 2025-04-24
Payer: MEDICARE

## 2025-04-24 DIAGNOSIS — M54.12 RADICULOPATHY, CERVICAL REGION: Primary | ICD-10-CM

## 2025-04-24 PROCEDURE — 97110 THERAPEUTIC EXERCISES: CPT

## 2025-04-24 PROCEDURE — 97140 MANUAL THERAPY 1/> REGIONS: CPT

## 2025-04-24 PROCEDURE — 97112 NEUROMUSCULAR REEDUCATION: CPT

## 2025-04-24 NOTE — PROGRESS NOTES
"Daily Note     Today's date: 2025  Patient name: Tc Stovall  : 1950  MRN: [id number]  Referring provider: Jade Finnegan CRNP  Dx:   Encounter Diagnosis     ICD-10-CM    1. Radiculopathy, cervical region  M54.12                      Subjective: pt reports feeling pretty sore when getting up this morning.       Objective: See treatment diary below      Assessment: Tolerated treatment well. Patient would benefit from continued PT. Some increased numbness with traction but decreased some t/o. Cueing on posture and sequencing t/o session.       Plan: Continue per plan of care.        POC expires Unit limit Auth Expiration date PT/OT/ST + Visit Limit?   25 BOMN 25 BOMN                           Visit/Unit Tracking  AUTH Status:  Date               No Auth Required Used                Remaining                    Diagnosis: Chronic neck pain   Precautions: Hx of TIA x 2, HTN (controlled w/ meds), DM (controlled w/ meds)   POC Expires: 25   Re-evaluation Date: 25   FOTO Scores/Date: Goal - 68;  -    Visit Count 6/10 2/10 3/10 4/10 5/10   Manuals 4/24 4/7 4/10 4/14 4/21   Cervical traction JH pre/post tx KD pre/post tx EARLE pre/post  EARLE KD pre/post tx                   Ther Ex 4/24 4/7 4/10 4/14 4/21   Seated thoracic ext 10x3\"  10x3\" 3\"x10  3\"x10  10x3\"    Cervical ROT w/ towel 10x3\" ea 10x3\" ea 3\" x10 ea 3\"x10 ea  10x3\" ea   BL Horizontal Abd Grn 2x10   GTB 2x10 Grn 2x10   Bilateral ER Grn 2x10    Grn 2x10                                   Pt education  KD   KD   Neuro Re-Ed 4/24 4/7 4/10 4/14 4/21   Median nerve glides X20 ea 20x ea X20 ea  X20 ea X20 ea   Prone scapular retraction/depression 10x10\"  10x10\" 10\"x10  10\"x10  10x10\"    Prone scapular retraction/depression w/ lift off Unable p! 10x10\" 10\"x10  10\"x10 10x10\"   Robbery Blue 10x5\"  Blue 10x5\"  Blue 5\"x10  Blue 5\"x10  Blue 10x5\"    Rows/pulldowns Blue 2x10 ea Grn 2x10 ea Grb 2x10 ea  GTB/BTB 2x10 ea  Blue 2x10 ea         "                         Ther Act                                                                                 Modalities

## 2025-04-28 ENCOUNTER — OFFICE VISIT (OUTPATIENT)
Dept: PHYSICAL THERAPY | Facility: CLINIC | Age: 75
End: 2025-04-28
Payer: MEDICARE

## 2025-04-28 DIAGNOSIS — M54.12 RADICULOPATHY, CERVICAL REGION: Primary | ICD-10-CM

## 2025-04-28 PROCEDURE — 97112 NEUROMUSCULAR REEDUCATION: CPT | Performed by: PHYSICAL THERAPIST

## 2025-04-28 PROCEDURE — 97110 THERAPEUTIC EXERCISES: CPT | Performed by: PHYSICAL THERAPIST

## 2025-04-28 PROCEDURE — 97140 MANUAL THERAPY 1/> REGIONS: CPT | Performed by: PHYSICAL THERAPIST

## 2025-04-28 NOTE — PROGRESS NOTES
Daily Note     Today's date: 2025  Patient name: Tc Stovall  : 1950  MRN: [id number]  Referring provider: Jade Finnegan CRNP  Dx:   Encounter Diagnosis     ICD-10-CM    1. Radiculopathy, cervical region  M54.12           Start Time: 0800  Stop Time: 08  Total time in clinic (min): 42 minutes    Subjective: Pt denies any current pain, slight tingling noted into right arm. Pt reports increased hand pain after last visit from having to wrap band around hand due to poor .      Objective: See treatment diary below      Assessment: Pt continues to respond well to cervical traction with ability to abolished right sided radicular sx. Improved scapular mechanics noted in prone with less reliance on external cues for correction of form. TB exercises modified today to use bands at wall with handles to improve gripping tolerance with adjusting to unilateral ER versus bilateral ER with good tolerance and no increase in hand discomfort. Introduced med ball roll up wall with notable fatigue but no pain, only fatigue. Cervical traction performed again post tx to assist in reducing mild neck discomfort provoked with new exercise. HEP was updated and reviewed, will continue to progress next visit as symptoms allow.       Plan: Continue per plan of care.  Progress treatment as tolerated.         POC expires Unit limit Auth Expiration date PT/OT/ST + Visit Limit?   25 BOMN 25 BOMN                           Visit/Unit Tracking  AUTH Status:  Date               No Auth Required Used                Remaining                    Diagnosis: Chronic neck pain   Precautions: Hx of TIA x 2, HTN (controlled w/ meds), DM (controlled w/ meds)   POC Expires: 25   Re-evaluation Date: 25   FOTO Scores/Date: Goal - 68;  -    Visit Count 610 7/10 3/10 4/10 5/10   Manuals 4/24 4/28 4/10 4/14 4/21   Cervical traction JH pre/post tx KD pre/post tx EARLE pre/post  EARLE KD pre/post tx                   Ther Ex  "4/24 4/28 4/10 4/14 4/21   Seated thoracic ext 10x3\"  10x3\" 3\"x10  3\"x10  10x3\"    Cervical ROT w/ towel 10x3\" ea 10x3\" ea 3\" x10 ea 3\"x10 ea  10x3\" ea   BL Horizontal Abd Grn 2x10 Blue 2x10 (TB at wall with handle)  GTB 2x10 Grn 2x10   Bilateral ER Grn 2x10 Blue 2x10 (unilaterally w/ TB at wall with handle)   Grn 2x10   Ball roll up wall  5# 2x10                              Pt education  KD   KD   Neuro Re-Ed 4/24 4/28 4/10 4/14 4/21   Median nerve glides X20 ea 20x ea X20 ea  X20 ea X20 ea   Prone scapular retraction/depression 10x10\"  10x10\" 10\"x10  10\"x10  10x10\"    Prone scapular retraction/depression w/ lift off Unable p! 10x10\" 10\"x10  10\"x10 10x10\"   Robbery Blue 10x5\"  Blue 10x5\"  Blue 5\"x10  Blue 5\"x10  Blue 10x5\"    Rows/pulldowns Blue 2x10 ea Blue 2x10 ea Grb 2x10 ea  GTB/BTB 2x10 ea  Blue 2x10 ea                                 Ther Act                                                                                 Modalities                                                      "

## 2025-05-01 ENCOUNTER — OFFICE VISIT (OUTPATIENT)
Dept: PHYSICAL THERAPY | Facility: CLINIC | Age: 75
End: 2025-05-01
Payer: MEDICARE

## 2025-05-01 DIAGNOSIS — M54.12 RADICULOPATHY, CERVICAL REGION: Primary | ICD-10-CM

## 2025-05-01 PROCEDURE — 97110 THERAPEUTIC EXERCISES: CPT

## 2025-05-01 NOTE — PROGRESS NOTES
"Daily Note     Today's date: 2025  Patient name: Tc Stovall  : 1950  MRN: [id number]  Referring provider: Jade Finnegan CRNP  Dx:   Encounter Diagnosis     ICD-10-CM    1. Radiculopathy, cervical region  M54.12                      Subjective: pt reports his neck is feeling pretty good.       Objective: See treatment diary below      Assessment: Tolerated treatment well. Patient would benefit from continued PT. No pain reported t/o session. Minimal to no radicular sx t/o session reported.       Plan: Continue per plan of care.        POC expires Unit limit Auth Expiration date PT/OT/ST + Visit Limit?   25 BOMN 25 BOMN                           Visit/Unit Tracking  AUTH Status:  Date               No Auth Required Used                Remaining                    Diagnosis: Chronic neck pain   Precautions: Hx of TIA x 2, HTN (controlled w/ meds), DM (controlled w/ meds)   POC Expires: 25   Re-evaluation Date: 25   FOTO Scores/Date: Goal - 68;  -    Visit Count 6/10 7/10 8/10 4/10 5/10   Manuals    Cervical traction JH pre/post tx KD pre/post tx JH pre EARLE KD pre/post tx                   Ther Ex    Seated thoracic ext 10x3\"  10x3\" 3\"x10  3\"x10  10x3\"    Cervical ROT w/ towel 10x3\" ea 10x3\" ea 3\" x10 ea 3\"x10 ea  10x3\" ea   BL Horizontal Abd Grn 2x10 Blue 2x10 (TB at wall with handle) Blue 2x10 (TB at wall with handle) GTB 2x10 Grn 2x10   Bilateral ER Grn 2x10 Blue 2x10 (unilaterally w/ TB at wall with handle) Blue 2x10 (unilaterally w/ TB at wall with handle)  Grn 2x10   Ball roll up wall  5# 2x10 5# 2x10                             Pt education  KD   KD   Neuro Re-Ed    Median nerve glides X20 ea 20x ea X20 ea  X20 ea X20 ea   Prone scapular retraction/depression 10x10\"  10x10\" 10\"x10  10\"x10  10x10\"    Prone scapular retraction/depression w/ lift off Unable p! 10x10\" 10\"x10  10\"x10 10x10\"   Robbery Blue " "10x5\"  Blue 10x5\"  Blue 5\"x10  Blue 5\"x10  Blue 10x5\"    Rows/pulldowns Blue 2x10 ea Blue 2x10 ea blue 2x10 ea  GTB/BTB 2x10 ea  Blue 2x10 ea                                 Ther Act                                                                                 Modalities                                                        "

## 2025-05-05 ENCOUNTER — OFFICE VISIT (OUTPATIENT)
Dept: PHYSICAL THERAPY | Facility: CLINIC | Age: 75
End: 2025-05-05
Payer: MEDICARE

## 2025-05-05 DIAGNOSIS — M54.12 RADICULOPATHY, CERVICAL REGION: Primary | ICD-10-CM

## 2025-05-05 PROCEDURE — 97110 THERAPEUTIC EXERCISES: CPT | Performed by: PHYSICAL THERAPIST

## 2025-05-05 PROCEDURE — 97140 MANUAL THERAPY 1/> REGIONS: CPT | Performed by: PHYSICAL THERAPIST

## 2025-05-05 NOTE — PROGRESS NOTES
PT Discharge    Today's date: 2025  Patient name: Tc Stovall  : 1950  MRN: [id number]  Referring provider: Jade Finnegan CRNP  Dx:   Encounter Diagnosis     ICD-10-CM    1. Radiculopathy, cervical region  M54.12           Start Time: 1016  Stop Time: 1048  Total time in clinic (min): 32 minutes    Assessment    Assessment details: Pt has been seen for 9 visits and has made excellent subjective/objective improvements since enrolling in therapy with improved FOTO score from 59 to 78 since initial evaluation. Pt has returned all normal ADLs and recreational activities without pain or limitation and is appropriate for discharge to home exercise program at this time. Pt encouraged to continue with HEP on regular basis per tolerance and was educated on how to progress/regress exercises per symptoms/tolerance. Pt is in agreement with plan.        Goals  ST weeks  Pt will restore full and pain-free cervical spine AROM to allow return to normal ADLs MET  Pt will demonstrate centralization of symptoms with repeated movements and/or traction of cervical spine MET  Pt will decrease neck pain to 3-4/10 MET  Pt will demonstrate good understanding and compliance with HEP  MET  Pt will demonstrate improved postural awareness and ability to self-correct without reliance on external cues MET    LT weeks  Pt will demonstrate abolished radicular symptoms for 2 weeks MET  Pt will decrease neck pain to 1-2/10 MET  Pt will exhibit proper lifting mechanics without reliance on external cues for correction of form MET  Pt will be able to tolerate prolonged standing/sitting activities > 30 minutes without provocation of neck pain  MET  Pt will improve FOTO score to > or = to 68 to indicate improved functional abilities MET      Plan  Patient would benefit from: PT eval and skilled physical therapy  Planned modality interventions: cryotherapy and thermotherapy: hydrocollator packs    Planned therapy interventions: ADL  training, activity modification, joint mobilization, manual therapy, body mechanics training, neuromuscular re-education, postural training, patient/caregiver education, self care, strengthening, stretching, therapeutic activities, therapeutic exercise, home exercise program, graded exercise, graded activity, functional ROM exercises and flexibility    Plan of Care beginning date: 4/2/2025  Plan of Care expiration date: 5/28/2025  Treatment plan discussed with: patient  Plan details: Discharge to [facility]        Subjective Evaluation    History of Present Illness  Mechanism of injury: DISCHARGE (5/5/25):  Pt has completed 9 visits to date and notes good improvement in pain and radicular sx. Pt reports he continues to have most limitations with right hand and awaiting surgery at the end of the month for cubital/carpal tunnel release. Pt feels he has returned to normal ADLs in regards to his neck and is ready to transition to a home program.      IE:  Pt reports to PT with c/o chronic neck pain that started about 1-2 years ago and has hx of prior course of PT with some improvement reported. Pt has been working with pain management who prescribed gabapentin and discussed possible neck injections in the future. Pt admits to good symptom improvement with gabapentin. Pt admits to using home traction unit with recent difficulty with setting up himself due to issues right sided ulnar and median nerve issues. Pt admits to intermittent tingling into right arm that travels to his hand that increases with certain neck movements. Pt reports increased pain/limitation with cervical mobility all planes, prolonged positioning, and sleeping at night. Pt also admits limitations with heavy lifting/carrying but feels this is more related to right ulnar/median nerve issues and has scheduled appointment with Dr. Granados for evaluation for elbow/hand issues later this month. Pt denies surgical hx of cervical spine.     Pt denies hx of cancer,  unrelenting night pain, saddle anesthesia, unexplained weight loss, changes in B&B function, changes in balance/gait, recent fever, hx of IV drug use, prolonged corticosteroid use, hx of osteoporosis, recent trauma.    Pt denies nausea, dizziness, diplopia, drop attacks, difficulty with speech or swallowing.    Imaging: MRI reveals:  -Multilevel cervical spondylosis  -Right paracentral disc protrusion is noted at C7-T1 with near abutment of the right ventral aspect of the cord. Moderate right foraminal narrowing is present at this level.    Aggravating factors: cervical mobility all planes, prolonged positioning, and sleeping at night    Easing Factors: Rest, gabapentin, tylenol     PLOF:  Hx of neck pain for > 2 years    PMH: Hx of TIA x 2, HTN (controlled w/ meds), DM (controlled w/ meds)  Pain  Current pain ratin  At best pain ratin  At worst pain ratin  Quality: dull ache    Hand dominance: right          Objective     General Comments:      Cervical/Thoracic Comments  Cervical AROM:   Flex: 50*  Ext: 50*  Left ROT: 70*  Right ROT: 70*   Left Side-Bendin*  Right Side-Bendin*    UE Dermatomes:  C2: Intact/symmetrical  C3: Intact/symmetrical  C4: Intact/symmetrical  C5: Intact/symmetrical  C6: Intact/symmetrical  C7: Intact/symmetrical  C8: Intact/symmetrical  T1: Intact/symmetrical  T2: Intact/symmetrical    UE Myotomes:  Cervical Lateral Flexion C3: Left 5/5, Right 5/5  Scapular Elevation C4: Left 5/5, Right 5/5  Shoulder Abduction C5: Left 5/5, Right 5/5  Elbow Flexion C6: Left 5/5, Right 5/5  Elbow Extension C7: Left 5/5, Right 5/5  Thumb Extension C8: Left 5/5, Right 4/5  Finger Abduction T1: Left 5/5, Right 4/5     Strength: Left 73#; Right 20#    Palpation: no TTP about cervical spine    FOTO: 78 (improved from 59 at IE)                 POC expires Unit limit Auth Expiration date PT/OT/ST + Visit Limit?   25 BOMN 25 BOMN                           Visit/Unit  "Tracking  AUTH Status:  Date               No Auth Required Used                Remaining                    Diagnosis: Chronic neck pain   Precautions: Hx of TIA x 2, HTN (controlled w/ meds), DM (controlled w/ meds)   POC Expires: 5/28/25   Re-evaluation Date: 5/28/25   FOTO Scores/Date: Goal - 68; 4/2 - 59; 5/5 - 78   Visit Count 6/10 7/10 8/10 1/10 5/10   Manuals 4/24 4/28 5/1 5/5 4/21   Cervical traction JH pre/post tx KD pre/post tx JH pre KD KD pre/post tx                   Ther Ex 4/24 4/28 5/1 5/5 4/21   Seated thoracic ext 10x3\"  10x3\" 3\"x10  3\"x10  10x3\"    Cervical ROT w/ towel 10x3\" ea 10x3\" ea 3\" x10 ea 3\"x10 ea  10x3\" ea   BL Horizontal Abd Grn 2x10 Blue 2x10 (TB at wall with handle) Blue 2x10 (TB at wall with handle)  Grn 2x10   Bilateral ER Grn 2x10 Blue 2x10 (unilaterally w/ TB at wall with handle) Blue 2x10 (unilaterally w/ TB at wall with handle)  Grn 2x10   Ball roll up wall  5# 2x10 5# 2x10                             Pt education  KD  Re-assessed cervical spine and FOTO; HEP creation/instruction, educated on importance of continued HEP compliance, how to progress/regress exercises as needed KD   Neuro Re-Ed 4/24 4/28 5/1 5/5 4/21   Median nerve glides X20 ea 20x ea X20 ea   X20 ea   Prone scapular retraction/depression 10x10\"  10x10\" 10\"x10  10\"x10  10x10\"    Prone scapular retraction/depression w/ lift off Unable p! 10x10\" 10\"x10  10\"x10 10x10\"   Robbery Blue 10x5\"  Blue 10x5\"  Blue 5\"x10   Blue 10x5\"    Rows/pulldowns Blue 2x10 ea Blue 2x10 ea blue 2x10 ea   Blue 2x10 ea                                 Ther Act                                                                                 Modalities                                            "

## 2025-05-06 ENCOUNTER — TELEPHONE (OUTPATIENT)
Age: 75
End: 2025-05-06

## 2025-05-06 DIAGNOSIS — I63.9 STROKE (HCC): ICD-10-CM

## 2025-05-06 DIAGNOSIS — Z79.4 TYPE 2 DIABETES MELLITUS WITH DIABETIC MICROALBUMINURIA, WITH LONG-TERM CURRENT USE OF INSULIN (HCC): ICD-10-CM

## 2025-05-06 DIAGNOSIS — R80.9 TYPE 2 DIABETES MELLITUS WITH DIABETIC MICROALBUMINURIA, WITH LONG-TERM CURRENT USE OF INSULIN (HCC): ICD-10-CM

## 2025-05-06 DIAGNOSIS — Z72.0 TOBACCO ABUSE: ICD-10-CM

## 2025-05-06 DIAGNOSIS — E11.29 TYPE 2 DIABETES MELLITUS WITH DIABETIC MICROALBUMINURIA, WITH LONG-TERM CURRENT USE OF INSULIN (HCC): ICD-10-CM

## 2025-05-06 RX ORDER — ATORVASTATIN CALCIUM 40 MG/1
40 TABLET, FILM COATED ORAL EVERY EVENING
Qty: 90 TABLET | Refills: 3 | Status: ON HOLD | OUTPATIENT
Start: 2025-05-06

## 2025-05-06 NOTE — TELEPHONE ENCOUNTER
Philly mojicas called hiral avila on line from [facility] pharmacy in [place] asking do you still want patient. To be on atorvastatin 40 mg if so send refill to [facility]

## 2025-05-09 ENCOUNTER — TELEPHONE (OUTPATIENT)
Dept: CARDIOLOGY CLINIC | Facility: CLINIC | Age: 75
End: 2025-05-09

## 2025-05-09 ENCOUNTER — OFFICE VISIT (OUTPATIENT)
Dept: CARDIOLOGY CLINIC | Facility: CLINIC | Age: 75
End: 2025-05-09
Payer: MEDICARE

## 2025-05-09 VITALS
BODY MASS INDEX: 34.78 KG/M2 | HEIGHT: 67 IN | HEART RATE: 75 BPM | DIASTOLIC BLOOD PRESSURE: 70 MMHG | SYSTOLIC BLOOD PRESSURE: 122 MMHG | WEIGHT: 221.6 LBS

## 2025-05-09 DIAGNOSIS — I10 PRIMARY HYPERTENSION: Chronic | ICD-10-CM

## 2025-05-09 DIAGNOSIS — E66.09 CLASS 1 OBESITY DUE TO EXCESS CALORIES WITH SERIOUS COMORBIDITY AND BODY MASS INDEX (BMI) OF 34.0 TO 34.9 IN ADULT: ICD-10-CM

## 2025-05-09 DIAGNOSIS — I49.3 PVC (PREMATURE VENTRICULAR CONTRACTION): ICD-10-CM

## 2025-05-09 DIAGNOSIS — I47.19 PAROXYSMAL ATRIAL TACHYCARDIA (HCC): Primary | ICD-10-CM

## 2025-05-09 DIAGNOSIS — E66.811 CLASS 1 OBESITY DUE TO EXCESS CALORIES WITH SERIOUS COMORBIDITY AND BODY MASS INDEX (BMI) OF 34.0 TO 34.9 IN ADULT: ICD-10-CM

## 2025-05-09 DIAGNOSIS — E78.5 HYPERLIPIDEMIA ASSOCIATED WITH TYPE 2 DIABETES MELLITUS  (HCC): ICD-10-CM

## 2025-05-09 DIAGNOSIS — E11.69 HYPERLIPIDEMIA ASSOCIATED WITH TYPE 2 DIABETES MELLITUS  (HCC): ICD-10-CM

## 2025-05-09 DIAGNOSIS — I63.40 CEREBROVASCULAR ACCIDENT (CVA) DUE TO EMBOLISM OF CEREBRAL ARTERY (HCC): ICD-10-CM

## 2025-05-09 PROCEDURE — 93000 ELECTROCARDIOGRAM COMPLETE: CPT | Performed by: INTERNAL MEDICINE

## 2025-05-09 PROCEDURE — 99214 OFFICE O/P EST MOD 30 MIN: CPT | Performed by: INTERNAL MEDICINE

## 2025-05-09 NOTE — TELEPHONE ENCOUNTER
----- Message from Deonte Lujan MD sent at 5/9/2025 11:07 AM EDT -----  Regarding: Loop recorder scheduling at [place]  Patient needs to be setup for a loop recorder implant for stroke. Can you please help arrange this at [facility]

## 2025-05-09 NOTE — PROGRESS NOTES
[facility] [address] [address] [phone]                                              Cardiology Office Follow up  Tc Stovall, 75 y.o. male  YOB: 1950  MRN: [id number] Encounter: [id number]      PCP - Lester Roth DO  Referring Provider - Self, Referral    Chief Complaint   Patient presents with   • Follow-up     Discuss loop    • Pre-op Clearance     Carpal tunnel surgery 5/27/25       Assessment  Acute stroke  PVC  Hyperlipidemia  Hypertension  Diabetes Mellitus Type 2  Current smoker  Obesity, Body mass index is 34.71 kg/m².     Plan  Assessment & Plan  Paroxysmal atrial tachycardia (HCC)  Overview  2/2025: Zio-AT - done for stroke evaluation  113 short runs of SVT/AT, upto 13.2 seconds - asymptomatic to patient  Rare PACs and PVCs  No A-fib  Patient triggered events noted to be accidental by patient, showed normal sinus rhythm  5/2025: No palpitations  Plan  With him being asymptomatic we will continue to monitor  If he needs medical therapy, he would benefit from switching from amlodipine to Cardizem in the future - has occasional wheezing on exam  Will arrange loop recorder implant to help evaluate for possibility of A-fib due to his recent stroke  Counseled regarding need to cut down and quit smoking  Minimize caffeine/alcohol  Weight loss recommended  PVC (premature ventricular contraction)  Noted to be infrequent/rare on the Zio patch monitor recently  No current symptoms  Monitor  Limit caffeine/alcohol intake  Cerebrovascular accident (CVA) due to embolism of cerebral artery (HCC)  Overview  1/2025: [facility]: blurred vision  MRi brain -subacute infarction right paramedian gianfranco and left parietal lobe, suspicious for cardioembolic stroke  Referred to cardiology for loop monitoring after placing on aspirin, statin  CTA stroke -no acute intracranial bleed, no significant carotid  obstruction, mild to moderate stenosis of bilateral cervical vertebral arteries   TTE -LVEF 55%, grade 1 diastolic dysfunction, mild MR/TR/AI  2/11/25 - initial OV with me  No chest pain or shortness of breath  No major complaints of palpitations  Vision is reportedly back to baseline, and is free of any weakness or residual stroke symptoms  5/2025: No further vision issues, Zio patch completed and noted to be without any A-fib but he does have frequent short runs of SVT/atrial tachycardia  Impression  Recent stroke suspicious for embolic etiology, but no clear symptoms to suggest A-fib  Plan  Recommend proceeding with loop recorder implant to evaluate for A-fib  Counseled regarding same and he is agreeable to proceed   Continue aspirin 81 mg daily  Continue atorvastatin 40 mg daily  He has not yet followed up with neurologist and does not have an appointment  Provided referral to see neurologist   Hyperlipidemia associated with type 2 diabetes mellitus  (HCC)    Lab Results   Component Value Date    HGBA1C 7.6 (H) 01/11/2025           Lab Results   Component Value Date     HGBA1C 7.6 (H) 01/11/2025   I am not sure regarding need for tetraploid physicians to lower triglyceride levels  Tolerating atorvastatin without problem  Continue atorvastatin 40 mg daily  Follow-up with PCP regarding diabetes  Primary hypertension  Well-controlled, 122/70  Continue amlodipine 5 mg daily, lisinopril 20 mg daily  Class 1 obesity due to excess calories with serious comorbidity and body mass index (BMI) of 34.0 to 34.9 in adult  Body mass index is 34.71 kg/m².   Modifications, weight loss recommended      No results found for this visit on 05/09/25.    Orders Placed This Encounter   Procedures   • Ambulatory referral to Neurology   • POCT ECG       Return in about 6 months (around 11/9/2025), or if symptoms worsen or fail to improve.      History of Present Illness   75 y.o. male comes in as a new patient for consultation regarding  cardiac evaluation for recent acute stroke, after being referred by neurologist from the hospital.    He reports no active complaints of chest pain, shortness of breath, palpitations or dizziness.  No known prior history of coronary artery disease or heart failure.  No known major cardiac hospitalizations.    In January 2025, he reports having had sudden onset blurry vision symptoms which are occurring intermittently for a day and then subsequently persisted, prompting him to seek evaluation in the hospital.  He was evaluated through the stroke pathway and an MRI brain subsequently confirmed acute strokes in regions.  Based on neurology assessment, this was felt to be possible embolic stroke and he was recommended outpatient cardiac monitoring due to lack of other clear sources of emboli including carotid stenosis.    Over the last 1 month, he has done well and remains free of any chest pain, palpitations or recurrent stroke symptoms.  He is now here for further cardiac monitoring.    Interval history - 5/9/2025  Returns for follow-up after 4 months.  He completed Zio patch monitor and this revealed multiple runs of atrial tachycardia, but there was no A-fib.  He remains free of chest pain, shortness of breath, dizziness and is active      Historical Information   Past Medical History:   Diagnosis Date   • Arthritis    • Cerebellar infarction (HCC)    • Cerebrovascular accident (CVA) due to embolism of cerebral artery (HCC)    • Diabetes mellitus (HCC)    • Hyperlipidemia    • Hypertension    • Left rotator cuff tear    • Stroke (HCC) [id number]   • Tobacco abuse    • Wears glasses      Past Surgical History:   Procedure Laterality Date   • COLONOSCOPY     • KNEE SURGERY     • NE SURGICAL ARTHROSCOPY SHOULDER W/ROTATOR CUFF RPR Left 08/10/2022    Procedure: Left Shoulder Arthroscopy, supraspinatus and Subscapularis Repair, Bicep Tenotomy, acromioplasty, extensive debridement;  Surgeon: Otilio Mendez DO;  Location:  [facility];  Service: Orthopedics   • TONSILLECTOMY  1955     Family History   Problem Relation Age of Onset   • Alzheimer's disease Mother    • Diabetes Mother    • Heart block Father    • Heart disease Father      Current Outpatient Medications   Medication Instructions   • acetaminophen (TYLENOL) 500 mg, Every 6 hours PRN   • amLODIPine (NORVASC) 5 mg, Oral, Daily   • aspirin 81 mg, Oral, Daily   • atorvastatin (LIPITOR) 40 mg, Oral, Every evening   • benzonatate (TESSALON PERLES) 100 mg, Oral, 3 times daily PRN   • Blood Glucose Monitoring Suppl (OneTouch Verio Reflect) w/Device KIT Check blood sugars three times daily. Please substitute with appropriate alternative as covered by patient's insurance. Dx: E11.65   • cholecalciferol (VITAMIN D3) 1,000 Units, Oral, Daily   • Continuous Glucose Sensor (Dexcom G7 Sensor) 1 Device, Does not apply, Every 10 days   • fluticasone (FLONASE) 50 mcg/act nasal spray 1 spray, Nasal, Daily   • gabapentin (NEURONTIN) 600 mg, Oral, 2 times daily   • glipiZIDE (GLUCOTROL XL) 10 mg, Oral, Daily   • glucose blood (OneTouch Verio) test strip Check blood sugars three times daily. Please substitute with appropriate alternative as covered by patient's insurance. Dx: E11.65   • insulin glargine (LANTUS) 20 Units, Subcutaneous, Daily at bedtime   • Insulin Pen Needle (CareOne Unifine Pentips Plus) 32G X 4 MM MISC USE DAILY AS INSTRUCTED WITH INSULIN PEN   • lisinopril (ZESTRIL) 20 mg, Oral, Daily   • meloxicam (MOBIC) 15 mg, Oral, Daily PRN   • metFORMIN (GLUCOPHAGE-XR) 1,000 mg, Oral, 2 times daily   • nicotine (NICODERM CQ) 14 mg/24hr TD 24 hr patch 1 patch, Every 24 hours   • OneTouch Delica Lancets 33G MISC Check blood sugars three times daily. Please substitute with appropriate alternative as covered by patient's insurance. Dx: E11.65      No Known Allergies  Social History     Socioeconomic History   • Marital status: /Civil Union     Spouse name: None   • Number of  children: None   • Years of education: None   • Highest education level: None   Occupational History   • None   Tobacco Use   • Smoking status: Every Day     Current packs/day: 0.50     Average packs/day: 0.6 packs/day for 85.8 years (55.3 ttl pk-yrs)     Types: Cigarettes     Start date: 4/26/1964   • Smokeless tobacco: Never   Vaping Use   • Vaping status: Never Used   Substance and Sexual Activity   • Alcohol use: Yes     Comment: rarely   • Drug use: No   • Sexual activity: Not Currently     Partners: Female     Birth control/protection: Female Sterilization   Other Topics Concern   • None   Social History Narrative   • None     Social Drivers of Health     Financial Resource Strain: Medium Risk (10/6/2023)    Overall Financial Resource Strain (CARDIA)    • Difficulty of Paying Living Expenses: Somewhat hard   Food Insecurity: No Food Insecurity (1/10/2025)    Nursing - Inadequate Food Risk Classification    • Worried About Running Out of Food in the Last Year: Never true    • Ran Out of Food in the Last Year: Never true    • Ran Out of Food in the Last Year: Never true   Transportation Needs: No Transportation Needs (1/10/2025)    Nursing - Transportation Risk Classification    • Lack of Transportation: Not on file    • Lack of Transportation: No   Physical Activity: Inactive (11/12/2021)    Exercise Vital Sign    • Days of Exercise per Week: 0 days    • Minutes of Exercise per Session: 0 min   Stress: Stress Concern Present (11/12/2021)    [employer] - Occupational Stress Questionnaire    • Feeling of Stress : Rather much   Social Connections: Unknown (6/18/2024)    Received from [facility]    Social Connections    • How often do you feel lonely or isolated from those around you? (Adult - for ages 18 years and over): Not on file   Intimate Partner Violence: Unknown (1/10/2025)    Nursing IPS    • Feels Physically and Emotionally Safe: Not on file    • Physically Hurt by Someone:  "Not on file    • Humiliated or Emotionally Abused by Someone: Not on file    • Physically Hurt by Someone: No    • Hurt or Threatened by Someone: No   Housing Stability: Unknown (1/10/2025)    Nursing: Inadequate Housing Risk Classification    • Has Housing: Not on file    • Worried About Losing Housing: Not on file    • Unable to Get Utilities: Not on file    • Unable to Pay for Housing in the Last Year: No    • Has Housin        Review of Systems   All other systems reviewed and are negative.        Vitals:  Vitals:    25 1051   BP: 122/70   Pulse: 75   Weight: 101 kg (221 lb 9.6 oz)   Height: 5' 7\" (1.702 m)       BMI - Body mass index is 34.71 kg/m².  Wt Readings from Last 7 Encounters:   25 101 kg (221 lb 9.6 oz)   25 103 kg (228 lb)   25 102 kg (225 lb)   25 105 kg (232 lb)   25 103 kg (226 lb 12.8 oz)   25 103 kg (226 lb)   25 103 kg (227 lb 1.2 oz)       Physical Exam  Vitals and nursing note reviewed.   Constitutional:       General: He is not in acute distress.     Appearance: He is well-developed. He is obese. He is not ill-appearing or diaphoretic.   HENT:      Head: Normocephalic and atraumatic.      Nose: No congestion.   Eyes:      General: No scleral icterus.     Conjunctiva/sclera: Conjunctivae normal.   Neck:      Vascular: No carotid bruit or JVD.   Cardiovascular:      Rate and Rhythm: Normal rate and regular rhythm.      Heart sounds: Normal heart sounds. No murmur heard.     No friction rub. No gallop.   Pulmonary:      Effort: Pulmonary effort is normal. No respiratory distress.      Breath sounds: Normal breath sounds. No wheezing or rales.   Chest:      Chest wall: No tenderness.   Abdominal:      General: There is no distension.      Palpations: Abdomen is soft.      Tenderness: There is no abdominal tenderness.   Musculoskeletal:         General: No swelling, tenderness or deformity.      Cervical back: Neck supple. No muscular " "tenderness.      Right lower leg: No edema.      Left lower leg: No edema.   Skin:     General: Skin is warm.   Neurological:      General: No focal deficit present.      Mental Status: He is alert and oriented to person, place, and time. Mental status is at baseline.   Psychiatric:         Mood and Affect: Mood normal.         Behavior: Behavior normal.         Thought Content: Thought content normal.           Labs:  CBC:   Lab Results   Component Value Date    WBC 9.03 2025    RBC 4.52 2025    HGB 12.9 2025    HCT 39.2 2025    MCV 87 2025     2025    RDW 12.6 2025       CMP:   Lab Results   Component Value Date    K 4.1 2025     2025    CO2 25 2025    BUN 21 2025    CREATININE 0.93 2025    EGFR 81 2025    CALCIUM 8.8 2025    AST 14 10/16/2024    ALT 15 10/16/2024    ALKPHOS 71 10/16/2024       Magnesium:  Lab Results   Component Value Date    MG 1.6 2018       Lipid Profile:   Lab Results   Component Value Date    HDL 38 (L) 2025    TRIG 168 (H) 2025    LDLCALC 57 2025       Thyroid Studies:   Lab Results   Component Value Date    YRG0AEGVRGQY 2.774 2018    FREET4 1.10 2020       A1c:  No components found for: \"HGA1C\"    INR:  Lab Results   Component Value Date    INR 1.08 01/10/2025    INR 1.06 2018   5    Cardiac testing:   Results for orders placed during the hospital encounter of 17    Echo complete with contrast if indicated    Narrative  [address] [address] [phone]    Transthoracic Echocardiogram  2D, M-mode, Doppler, and Color Doppler    Study date:  05-Sep-2017    Patient: SUNIL JIMENES  MR number: [id number]  Account number: [id number]  : 1950  Age: 67 years  Gender: Male  Status: Outpatient  Location: Emergency room  Height: 67 in  Weight: 205.9 lb  BP: 172/ 77 mmHg    Indications: " Transient Ischemic Attack    Diagnoses: G45.9 - Transient cerebral ischemic attack, unspecified    Sonographer:  CLEMENTE Perez,RDCS  Interpreting Physician:  Nanci Horowitz MD  Referring Physician:  Fatuma Aparicio PA-C  Group:  [facility]    SUMMARY    LEFT VENTRICLE:  Ejection fraction was estimated to be 60 %.  There were no regional wall motion abnormalities.  Wall thickness was mildly increased.    MITRAL VALVE:  There was trace regurgitation.    AORTIC VALVE:  There was trace regurgitation.    TRICUSPID VALVE:  There was trace regurgitation.    HISTORY: PRIOR HISTORY: Orthostatic Lightheadedness, Type 2 Diabetes Mellitus, Tobacco Abuse    PROCEDURE: The procedure was performed in the emergency room. This was a routine study. The transthoracic approach was used. The study included complete 2D imaging, M-mode, complete spectral Doppler, and color Doppler. The heart rate was  90 bpm, at the start of the study. Images were obtained from the parasternal, apical, subcostal, and suprasternal notch acoustic windows. Echocardiographic views were limited due to poor acoustic window availability, decreased penetration,  and lung interference. This was a technically difficult study.    LEFT VENTRICLE: Size was normal. Ejection fraction was estimated to be 60 %. There were no regional wall motion abnormalities. Wall thickness was mildly increased. DOPPLER: There was an increased relative contribution of atrial contraction  to ventricular filling.    RIGHT VENTRICLE: The size was normal. Systolic function was normal. Wall thickness was normal.    LEFT ATRIUM: Size was normal.    RIGHT ATRIUM: Size was normal.    MITRAL VALVE: Valve structure was normal. There was normal leaflet separation. DOPPLER: The transmitral velocity was within the normal range. There was no evidence for stenosis. There was trace regurgitation.    AORTIC VALVE: The valve was trileaflet. Leaflets exhibited mildly  increased thickness and mild calcification. DOPPLER: There was trace regurgitation.    TRICUSPID VALVE: The valve structure was normal. There was normal leaflet separation. DOPPLER: The transtricuspid velocity was within the normal range. There was no evidence for stenosis. There was trace regurgitation.    PULMONIC VALVE: Leaflets exhibited normal thickness, no calcification, and normal cuspal separation. DOPPLER: The transpulmonic velocity was within the normal range. There was no regurgitation.    PERICARDIUM: There was no pericardial effusion. The pericardium was normal in appearance.    AORTA: The root exhibited normal size.    SYSTEM MEASUREMENT TABLES    2D  %FS: 37.6 %  Ao Diam: 3.3 cm  EDV(Teich): 105.8 ml  EF(Teich): 67.6 %  ESV(Teich): 34.3 ml  IVSd: 1.1 cm  LA Area: 12.9 cm2  LA Diam: 2.6 cm  LVEDV MOD A4C: 128.3 ml  LVEF MOD A4C: 56.9 %  LVESV MOD A4C: 55.3 ml  LVIDd: 4.8 cm  LVIDs: 3 cm  LVLd A4C: 8.5 cm  LVLs A4C: 7.3 cm  LVPWd: 0.9 cm  RA Area: 11.5 cm2  RVIDd: 2.8 cm  SV MOD A4C: 73 ml  SV(Teich): 71.5 ml    CW  AR Dec Maury: 2.2 m/s2  AR Dec Time: 1671.7 ms  AR PHT: 484.8 ms  AR Vmax: 3.7 m/s  AR maxP.9 mmHg    MM  TAPSE: 2.3 cm    PW  E': 0.1 m/s  E/E': 8.7  MV A Antonio: 0.8 m/s  MV Dec Maury: 3.6 m/s2  MV DecT: 174 ms  MV E Antonio: 0.6 m/s  MV E/A Ratio: 0.8  MV PHT: 50.5 ms  MVA By PHT: 4.4 cm2    [employer] Accredited Echocardiography Laboratory    Prepared and electronically signed by    Nanci Horowitz MD  Signed 05-Sep-2017 17:27:56    No results found for this or any previous visit.    No results found for this or any previous visit.    No results found for this or any previous visit.      Zio AT  14-day ZIO AT monitor 2025 to 2025    Impression  Predominantly sinus rhythm with average rate of 72 bpm  Rare PACs and PVCs  Frequent short runs of SVT with an average rate of 120 bpm, longest of   which lasted 13.2 seconds.  Some of these episodes were atrial tachycardia    with variable block.  4 patient triggered events.  Correlation with tracing showed normal sinus   rhythm    Findings  Patient had a min HR of 50 bpm, max HR of 176 bpm, and avg HR of 72 bpm.   Predominant underlying rhythm was Sinus Rhythm. First Degree AV Block was   present.     113 Supraventricular Tachycardia runs occurred, the run with the fastest   interval lasting 13 beats with a max rate of 176 bpm, the longest lasting   13.2 secs with an avg rate of 105 bpm. Some episodes of Supraventricular   Tachycardia may be possible Atrial Tachycardia with variable block.     Isolated SVEs were rare (<1.0%), SVE Couplets were rare (<1.0%), and SVE   Triplets were rare (<1.0%).     Isolated VEs were rare (<1.0%), VE Couplets were rare (<1.0%), and no VE   Triplets were present. Ventricular Trigeminy was present.

## 2025-05-09 NOTE — ASSESSMENT & PLAN NOTE
Lab Results   Component Value Date    HGBA1C 7.6 (H) 01/11/2025           Lab Results   Component Value Date     HGBA1C 7.6 (H) 01/11/2025   I am not sure regarding need for tetraploid physicians to lower triglyceride levels  Tolerating atorvastatin without problem  Continue atorvastatin 40 mg daily  Follow-up with PCP regarding diabetes

## 2025-05-12 DIAGNOSIS — Z79.4 TYPE 2 DIABETES MELLITUS WITH DIABETIC MICROALBUMINURIA, WITH LONG-TERM CURRENT USE OF INSULIN (HCC): ICD-10-CM

## 2025-05-12 DIAGNOSIS — I63.9 STROKE (HCC): ICD-10-CM

## 2025-05-12 DIAGNOSIS — R80.9 TYPE 2 DIABETES MELLITUS WITH DIABETIC MICROALBUMINURIA, WITH LONG-TERM CURRENT USE OF INSULIN (HCC): ICD-10-CM

## 2025-05-12 DIAGNOSIS — E11.29 TYPE 2 DIABETES MELLITUS WITH DIABETIC MICROALBUMINURIA, WITH LONG-TERM CURRENT USE OF INSULIN (HCC): ICD-10-CM

## 2025-05-12 DIAGNOSIS — I10 PRIMARY HYPERTENSION: Chronic | ICD-10-CM

## 2025-05-12 DIAGNOSIS — Z72.0 TOBACCO ABUSE: ICD-10-CM

## 2025-05-12 DIAGNOSIS — E11.3293 TYPE 2 DIABETES MELLITUS WITH BOTH EYES AFFECTED BY MILD NONPROLIFERATIVE RETINOPATHY WITHOUT MACULAR EDEMA, WITHOUT LONG-TERM CURRENT USE OF INSULIN (HCC): Chronic | ICD-10-CM

## 2025-05-13 ENCOUNTER — OFFICE VISIT (OUTPATIENT)
Dept: LAB | Facility: HOSPITAL | Age: 75
End: 2025-05-13
Attending: SURGERY
Payer: MEDICARE

## 2025-05-13 ENCOUNTER — ANESTHESIA EVENT (OUTPATIENT)
Dept: ANESTHESIOLOGY | Facility: HOSPITAL | Age: 75
End: 2025-05-13

## 2025-05-13 ENCOUNTER — ANESTHESIA (OUTPATIENT)
Dept: ANESTHESIOLOGY | Facility: HOSPITAL | Age: 75
End: 2025-05-13

## 2025-05-13 ENCOUNTER — APPOINTMENT (OUTPATIENT)
Dept: LAB | Facility: HOSPITAL | Age: 75
End: 2025-05-13
Attending: SURGERY
Payer: MEDICARE

## 2025-05-13 DIAGNOSIS — Z01.812 PRE-PROCEDURE LAB EXAM: ICD-10-CM

## 2025-05-13 DIAGNOSIS — R80.9 TYPE 2 DIABETES MELLITUS WITH DIABETIC MICROALBUMINURIA, WITH LONG-TERM CURRENT USE OF INSULIN (HCC): ICD-10-CM

## 2025-05-13 DIAGNOSIS — Z79.4 TYPE 2 DIABETES MELLITUS WITH DIABETIC MICROALBUMINURIA, WITH LONG-TERM CURRENT USE OF INSULIN (HCC): ICD-10-CM

## 2025-05-13 DIAGNOSIS — G56.21 CUBITAL TUNNEL SYNDROME ON RIGHT: ICD-10-CM

## 2025-05-13 DIAGNOSIS — G56.21 ULNAR NEUROPATHY OF RIGHT UPPER EXTREMITY: ICD-10-CM

## 2025-05-13 DIAGNOSIS — E11.29 TYPE 2 DIABETES MELLITUS WITH DIABETIC MICROALBUMINURIA, WITH LONG-TERM CURRENT USE OF INSULIN (HCC): ICD-10-CM

## 2025-05-13 DIAGNOSIS — G56.01 CARPAL TUNNEL SYNDROME OF RIGHT WRIST: ICD-10-CM

## 2025-05-13 LAB
ANION GAP SERPL CALCULATED.3IONS-SCNC: 8 MMOL/L (ref 4–13)
ATRIAL RATE: 78 BPM
BUN SERPL-MCNC: 27 MG/DL (ref 5–25)
CALCIUM SERPL-MCNC: 9.1 MG/DL (ref 8.4–10.2)
CHLORIDE SERPL-SCNC: 105 MMOL/L (ref 96–108)
CO2 SERPL-SCNC: 25 MMOL/L (ref 21–32)
CREAT SERPL-MCNC: 0.98 MG/DL (ref 0.6–1.3)
EST. AVERAGE GLUCOSE BLD GHB EST-MCNC: 174 MG/DL
GFR SERPL CREATININE-BSD FRML MDRD: 75 ML/MIN/1.73SQ M
GLUCOSE P FAST SERPL-MCNC: 165 MG/DL (ref 65–99)
HBA1C MFR BLD: 7.7 %
P AXIS: 61 DEGREES
POTASSIUM SERPL-SCNC: 4 MMOL/L (ref 3.5–5.3)
PR INTERVAL: 208 MS
QRS AXIS: 57 DEGREES
QRSD INTERVAL: 70 MS
QT INTERVAL: 368 MS
QTC INTERVAL: 420 MS
SODIUM SERPL-SCNC: 138 MMOL/L (ref 135–147)
T WAVE AXIS: 45 DEGREES
VENTRICULAR RATE: 78 BPM

## 2025-05-13 PROCEDURE — 36415 COLL VENOUS BLD VENIPUNCTURE: CPT

## 2025-05-13 PROCEDURE — 80048 BASIC METABOLIC PNL TOTAL CA: CPT

## 2025-05-13 PROCEDURE — 93010 ELECTROCARDIOGRAM REPORT: CPT | Performed by: INTERNAL MEDICINE

## 2025-05-13 PROCEDURE — 83036 HEMOGLOBIN GLYCOSYLATED A1C: CPT

## 2025-05-13 PROCEDURE — 93005 ELECTROCARDIOGRAM TRACING: CPT

## 2025-05-13 RX ORDER — ATORVASTATIN CALCIUM 40 MG/1
40 TABLET, FILM COATED ORAL EVERY EVENING
Qty: 90 TABLET | Refills: 0 | OUTPATIENT
Start: 2025-05-13

## 2025-05-13 RX ORDER — GLIPIZIDE 10 MG/1
10 TABLET, FILM COATED, EXTENDED RELEASE ORAL DAILY
Qty: 90 TABLET | Refills: 1 | Status: SHIPPED | OUTPATIENT
Start: 2025-05-13 | End: 2025-05-23

## 2025-05-13 RX ORDER — LISINOPRIL 20 MG/1
20 TABLET ORAL DAILY
Qty: 90 TABLET | Refills: 1 | Status: SHIPPED | OUTPATIENT
Start: 2025-05-13 | End: 2025-05-23

## 2025-05-14 ENCOUNTER — HOSPITAL ENCOUNTER (OUTPATIENT)
Facility: HOSPITAL | Age: 75
Setting detail: OBSERVATION
End: 2025-05-15
Payer: MEDICARE

## 2025-05-14 ENCOUNTER — APPOINTMENT (EMERGENCY)
Dept: CT IMAGING | Facility: HOSPITAL | Age: 75
End: 2025-05-14
Payer: MEDICARE

## 2025-05-14 DIAGNOSIS — R07.9 CHEST PAIN: Primary | ICD-10-CM

## 2025-05-14 DIAGNOSIS — I21.4 NSTEMI (NON-ST ELEVATED MYOCARDIAL INFARCTION) (HCC): ICD-10-CM

## 2025-05-14 PROBLEM — Z86.73 HISTORY OF CVA (CEREBROVASCULAR ACCIDENT): Status: ACTIVE | Noted: 2025-01-10

## 2025-05-14 LAB
2HR DELTA HS TROPONIN: 342 NG/L
ALBUMIN SERPL BCG-MCNC: 4.1 G/DL (ref 3.5–5)
ALP SERPL-CCNC: 76 U/L (ref 34–104)
ALT SERPL W P-5'-P-CCNC: 15 U/L (ref 7–52)
ANION GAP SERPL CALCULATED.3IONS-SCNC: 10 MMOL/L (ref 4–13)
APTT PPP: 34 SECONDS (ref 23–34)
AST SERPL W P-5'-P-CCNC: 13 U/L (ref 13–39)
BASOPHILS # BLD AUTO: 0.08 THOUSANDS/ÂΜL (ref 0–0.1)
BASOPHILS NFR BLD AUTO: 1 % (ref 0–1)
BILIRUB SERPL-MCNC: 0.53 MG/DL (ref 0.2–1)
BNP SERPL-MCNC: 19 PG/ML (ref 0–100)
BUN SERPL-MCNC: 28 MG/DL (ref 5–25)
CALCIUM SERPL-MCNC: 9.6 MG/DL (ref 8.4–10.2)
CARDIAC TROPONIN I PNL SERPL HS: 2146 NG/L (ref 8–18)
CARDIAC TROPONIN I PNL SERPL HS: 392 NG/L (ref ?–50)
CARDIAC TROPONIN I PNL SERPL HS: 50 NG/L (ref ?–50)
CHLORIDE SERPL-SCNC: 104 MMOL/L (ref 96–108)
CO2 SERPL-SCNC: 25 MMOL/L (ref 21–32)
CREAT SERPL-MCNC: 1.02 MG/DL (ref 0.6–1.3)
EOSINOPHIL # BLD AUTO: 0.37 THOUSAND/ÂΜL (ref 0–0.61)
EOSINOPHIL NFR BLD AUTO: 4 % (ref 0–6)
ERYTHROCYTE [DISTWIDTH] IN BLOOD BY AUTOMATED COUNT: 12.2 % (ref 11.6–15.1)
ERYTHROCYTE [DISTWIDTH] IN BLOOD BY AUTOMATED COUNT: 12.3 % (ref 11.6–15.1)
GFR SERPL CREATININE-BSD FRML MDRD: 72 ML/MIN/1.73SQ M
GLUCOSE SERPL-MCNC: 289 MG/DL (ref 65–140)
GLUCOSE SERPL-MCNC: 295 MG/DL (ref 65–140)
GLUCOSE SERPL-MCNC: 416 MG/DL (ref 65–140)
HCT VFR BLD AUTO: 35.2 % (ref 36.5–49.3)
HCT VFR BLD AUTO: 36.2 % (ref 36.5–49.3)
HGB BLD-MCNC: 11.6 G/DL (ref 12–17)
HGB BLD-MCNC: 12 G/DL (ref 12–17)
IMM GRANULOCYTES # BLD AUTO: 0.07 THOUSAND/UL (ref 0–0.2)
IMM GRANULOCYTES NFR BLD AUTO: 1 % (ref 0–2)
INR PPP: 1 (ref 0.85–1.19)
LIPASE SERPL-CCNC: 40 U/L (ref 11–82)
LYMPHOCYTES # BLD AUTO: 2.57 THOUSANDS/ÂΜL (ref 0.6–4.47)
LYMPHOCYTES NFR BLD AUTO: 28 % (ref 14–44)
MCH RBC QN AUTO: 28.6 PG (ref 26.8–34.3)
MCH RBC QN AUTO: 28.7 PG (ref 26.8–34.3)
MCHC RBC AUTO-ENTMCNC: 33 G/DL (ref 31.4–37.4)
MCHC RBC AUTO-ENTMCNC: 33.1 G/DL (ref 31.4–37.4)
MCV RBC AUTO: 87 FL (ref 82–98)
MCV RBC AUTO: 87 FL (ref 82–98)
MONOCYTES # BLD AUTO: 0.81 THOUSAND/ÂΜL (ref 0.17–1.22)
MONOCYTES NFR BLD AUTO: 9 % (ref 4–12)
NEUTROPHILS # BLD AUTO: 5.17 THOUSANDS/ÂΜL (ref 1.85–7.62)
NEUTS SEG NFR BLD AUTO: 57 % (ref 43–75)
NRBC BLD AUTO-RTO: 0 /100 WBCS
PLATELET # BLD AUTO: 314 THOUSANDS/UL (ref 149–390)
PLATELET # BLD AUTO: 340 THOUSANDS/UL (ref 149–390)
PMV BLD AUTO: 10 FL (ref 8.9–12.7)
PMV BLD AUTO: 10.2 FL (ref 8.9–12.7)
POTASSIUM SERPL-SCNC: 4.2 MMOL/L (ref 3.5–5.3)
PROT SERPL-MCNC: 7 G/DL (ref 6.4–8.4)
PROTHROMBIN TIME: 13.9 SECONDS (ref 12.3–15)
RBC # BLD AUTO: 4.05 MILLION/UL (ref 3.88–5.62)
RBC # BLD AUTO: 4.18 MILLION/UL (ref 3.88–5.62)
SODIUM SERPL-SCNC: 139 MMOL/L (ref 135–147)
WBC # BLD AUTO: 11.3 THOUSAND/UL (ref 4.31–10.16)
WBC # BLD AUTO: 9.07 THOUSAND/UL (ref 4.31–10.16)

## 2025-05-14 PROCEDURE — 85027 COMPLETE CBC AUTOMATED: CPT

## 2025-05-14 PROCEDURE — 85610 PROTHROMBIN TIME: CPT

## 2025-05-14 PROCEDURE — 99285 EMERGENCY DEPT VISIT HI MDM: CPT

## 2025-05-14 PROCEDURE — 82948 REAGENT STRIP/BLOOD GLUCOSE: CPT

## 2025-05-14 PROCEDURE — 84484 ASSAY OF TROPONIN QUANT: CPT | Performed by: STUDENT IN AN ORGANIZED HEALTH CARE EDUCATION/TRAINING PROGRAM

## 2025-05-14 PROCEDURE — 85025 COMPLETE CBC W/AUTO DIFF WBC: CPT

## 2025-05-14 PROCEDURE — 80053 COMPREHEN METABOLIC PANEL: CPT

## 2025-05-14 PROCEDURE — 83690 ASSAY OF LIPASE: CPT

## 2025-05-14 PROCEDURE — 96375 TX/PRO/DX INJ NEW DRUG ADDON: CPT

## 2025-05-14 PROCEDURE — 84484 ASSAY OF TROPONIN QUANT: CPT | Performed by: PHYSICIAN ASSISTANT

## 2025-05-14 PROCEDURE — 85730 THROMBOPLASTIN TIME PARTIAL: CPT

## 2025-05-14 PROCEDURE — 96374 THER/PROPH/DIAG INJ IV PUSH: CPT

## 2025-05-14 PROCEDURE — 93005 ELECTROCARDIOGRAM TRACING: CPT

## 2025-05-14 PROCEDURE — 83880 ASSAY OF NATRIURETIC PEPTIDE: CPT

## 2025-05-14 PROCEDURE — 99223 1ST HOSP IP/OBS HIGH 75: CPT | Performed by: STUDENT IN AN ORGANIZED HEALTH CARE EDUCATION/TRAINING PROGRAM

## 2025-05-14 PROCEDURE — 85730 THROMBOPLASTIN TIME PARTIAL: CPT | Performed by: STUDENT IN AN ORGANIZED HEALTH CARE EDUCATION/TRAINING PROGRAM

## 2025-05-14 PROCEDURE — 84484 ASSAY OF TROPONIN QUANT: CPT

## 2025-05-14 PROCEDURE — 71275 CT ANGIOGRAPHY CHEST: CPT

## 2025-05-14 PROCEDURE — 36415 COLL VENOUS BLD VENIPUNCTURE: CPT

## 2025-05-14 PROCEDURE — 74174 CTA ABD&PLVS W/CONTRAST: CPT

## 2025-05-14 RX ORDER — NICOTINE 21 MG/24HR
1 PATCH, TRANSDERMAL 24 HOURS TRANSDERMAL DAILY
Status: DISCONTINUED | OUTPATIENT
Start: 2025-05-15 | End: 2025-05-15 | Stop reason: HOSPADM

## 2025-05-14 RX ORDER — INSULIN LISPRO 100 [IU]/ML
1-5 INJECTION, SOLUTION INTRAVENOUS; SUBCUTANEOUS
Status: DISCONTINUED | OUTPATIENT
Start: 2025-05-14 | End: 2025-05-15

## 2025-05-14 RX ORDER — LISINOPRIL 20 MG/1
20 TABLET ORAL DAILY
Status: DISCONTINUED | OUTPATIENT
Start: 2025-05-15 | End: 2025-05-15 | Stop reason: HOSPADM

## 2025-05-14 RX ORDER — CLOPIDOGREL 300 MG/1
600 TABLET, FILM COATED ORAL ONCE
Status: COMPLETED | OUTPATIENT
Start: 2025-05-14 | End: 2025-05-14

## 2025-05-14 RX ORDER — INSULIN LISPRO 100 [IU]/ML
1-6 INJECTION, SOLUTION INTRAVENOUS; SUBCUTANEOUS
Status: DISCONTINUED | OUTPATIENT
Start: 2025-05-14 | End: 2025-05-15

## 2025-05-14 RX ORDER — HEPARIN SODIUM 1000 [USP'U]/ML
4000 INJECTION, SOLUTION INTRAVENOUS; SUBCUTANEOUS ONCE
Status: COMPLETED | OUTPATIENT
Start: 2025-05-14 | End: 2025-05-14

## 2025-05-14 RX ORDER — ATORVASTATIN CALCIUM 40 MG/1
40 TABLET, FILM COATED ORAL EVERY EVENING
Status: DISCONTINUED | OUTPATIENT
Start: 2025-05-14 | End: 2025-05-15 | Stop reason: HOSPADM

## 2025-05-14 RX ORDER — CLOPIDOGREL BISULFATE 75 MG/1
300 TABLET ORAL ONCE
Status: DISCONTINUED | OUTPATIENT
Start: 2025-05-14 | End: 2025-05-14

## 2025-05-14 RX ORDER — HEPARIN SODIUM 1000 [USP'U]/ML
2000 INJECTION, SOLUTION INTRAVENOUS; SUBCUTANEOUS EVERY 6 HOURS PRN
Status: DISCONTINUED | OUTPATIENT
Start: 2025-05-14 | End: 2025-05-15 | Stop reason: HOSPADM

## 2025-05-14 RX ORDER — ASPIRIN 81 MG/1
81 TABLET, CHEWABLE ORAL DAILY
Status: DISCONTINUED | OUTPATIENT
Start: 2025-05-15 | End: 2025-05-15 | Stop reason: HOSPADM

## 2025-05-14 RX ORDER — GABAPENTIN 300 MG/1
600 CAPSULE ORAL 2 TIMES DAILY
Status: DISCONTINUED | OUTPATIENT
Start: 2025-05-14 | End: 2025-05-15 | Stop reason: HOSPADM

## 2025-05-14 RX ORDER — FENTANYL CITRATE 50 UG/ML
50 INJECTION, SOLUTION INTRAMUSCULAR; INTRAVENOUS ONCE
Refills: 0 | Status: COMPLETED | OUTPATIENT
Start: 2025-05-14 | End: 2025-05-14

## 2025-05-14 RX ORDER — INSULIN GLARGINE 100 [IU]/ML
20 INJECTION, SOLUTION SUBCUTANEOUS
Status: DISCONTINUED | OUTPATIENT
Start: 2025-05-14 | End: 2025-05-15 | Stop reason: HOSPADM

## 2025-05-14 RX ORDER — HEPARIN SODIUM 10000 [USP'U]/100ML
3-20 INJECTION, SOLUTION INTRAVENOUS
Status: DISCONTINUED | OUTPATIENT
Start: 2025-05-14 | End: 2025-05-15 | Stop reason: HOSPADM

## 2025-05-14 RX ORDER — MAGNESIUM HYDROXIDE/ALUMINUM HYDROXICE/SIMETHICONE 120; 1200; 1200 MG/30ML; MG/30ML; MG/30ML
30 SUSPENSION ORAL ONCE
Status: COMPLETED | OUTPATIENT
Start: 2025-05-14 | End: 2025-05-14

## 2025-05-14 RX ORDER — ASPIRIN 325 MG
325 TABLET ORAL ONCE
Status: DISCONTINUED | OUTPATIENT
Start: 2025-05-14 | End: 2025-05-14

## 2025-05-14 RX ORDER — CLOPIDOGREL BISULFATE 75 MG/1
75 TABLET ORAL DAILY
Status: DISCONTINUED | OUTPATIENT
Start: 2025-05-15 | End: 2025-05-15

## 2025-05-14 RX ORDER — SUCRALFATE 1 G/1
1 TABLET ORAL ONCE
Status: COMPLETED | OUTPATIENT
Start: 2025-05-14 | End: 2025-05-14

## 2025-05-14 RX ORDER — HEPARIN SODIUM 1000 [USP'U]/ML
4000 INJECTION, SOLUTION INTRAVENOUS; SUBCUTANEOUS EVERY 6 HOURS PRN
Status: DISCONTINUED | OUTPATIENT
Start: 2025-05-14 | End: 2025-05-15 | Stop reason: HOSPADM

## 2025-05-14 RX ADMIN — NITROGLYCERIN 0.5 INCH: 20 OINTMENT TOPICAL at 16:27

## 2025-05-14 RX ADMIN — HEPARIN SODIUM 4000 UNITS: 1000 INJECTION, SOLUTION INTRAVENOUS; SUBCUTANEOUS at 17:30

## 2025-05-14 RX ADMIN — ALUMINUM HYDROXIDE, MAGNESIUM HYDROXIDE, AND DIMETHICONE 30 ML: 200; 20; 200 SUSPENSION ORAL at 16:24

## 2025-05-14 RX ADMIN — SUCRALFATE 1 G: 1 TABLET ORAL at 16:24

## 2025-05-14 RX ADMIN — INSULIN LISPRO 6 UNITS: 100 INJECTION, SOLUTION INTRAVENOUS; SUBCUTANEOUS at 21:16

## 2025-05-14 RX ADMIN — HEPARIN SODIUM 11.1 UNITS/KG/HR: 10000 INJECTION, SOLUTION INTRAVENOUS at 17:33

## 2025-05-14 RX ADMIN — ATORVASTATIN CALCIUM 40 MG: 40 TABLET, FILM COATED ORAL at 19:44

## 2025-05-14 RX ADMIN — FENTANYL CITRATE 50 MCG: 50 INJECTION INTRAMUSCULAR; INTRAVENOUS at 16:25

## 2025-05-14 RX ADMIN — IOHEXOL 100 ML: 350 INJECTION, SOLUTION INTRAVENOUS at 16:38

## 2025-05-14 RX ADMIN — CLOPIDOGREL BISULFATE 600 MG: 300 TABLET, FILM COATED ORAL at 20:40

## 2025-05-14 RX ADMIN — GABAPENTIN 600 MG: 300 CAPSULE ORAL at 19:44

## 2025-05-14 RX ADMIN — MORPHINE SULFATE 2 MG: 2 INJECTION, SOLUTION INTRAMUSCULAR; INTRAVENOUS at 19:44

## 2025-05-14 RX ADMIN — INSULIN GLARGINE 20 UNITS: 100 INJECTION, SOLUTION SUBCUTANEOUS at 21:16

## 2025-05-14 NOTE — PLAN OF CARE
Problem: PAIN - ADULT  Goal: Verbalizes/displays adequate comfort level or baseline comfort level  Description: Interventions:  - Encourage patient to monitor pain and request assistance  - Assess pain using appropriate pain scale  - Administer analgesics as ordered based on type and severity of pain and evaluate response  - Implement non-pharmacological measures as appropriate and evaluate response  - Consider cultural and social influences on pain and pain management  - Notify physician/advanced practitioner if interventions unsuccessful or patient reports new pain  - Educate patient/family on pain management process including their role and importance of  reporting pain   - Provide non-pharmacologic/complimentary pain relief interventions  5/14/2025 1942 by Felipa Bryan RN  Outcome: Progressing  5/14/2025 1942 by Felipa Bryan RN  Outcome: Progressing     Problem: CARDIOVASCULAR - ADULT  Goal: Maintains optimal cardiac output and hemodynamic stability  Description: INTERVENTIONS:  - Monitor I/O, vital signs and rhythm  - Monitor for S/S and trends of decreased cardiac output  - Administer and titrate ordered vasoactive medications to optimize hemodynamic stability  - Assess quality of pulses, skin color and temperature  - Assess for signs of decreased coronary artery perfusion  - Instruct patient to report change in severity of symptoms  Outcome: Progressing  Goal: Absence of cardiac dysrhythmias or at baseline rhythm  Description: INTERVENTIONS:  - Continuous cardiac monitoring, vital signs, obtain 12 lead EKG if ordered  - Administer antiarrhythmic and heart rate control medications as ordered  - Monitor electrolytes and administer replacement therapy as ordered  Outcome: Progressing

## 2025-05-14 NOTE — ASSESSMENT & PLAN NOTE
75-year-old male patient of medical history of diabetes, hypertension, hyperlipidemia, obesity, CVA presents to [facility] on room with complaining of chest pain.    HS trop 50 --> 392  EKG noted with normal sinus rhythm, first-degree AV block, anteroseptal T wave inversion.  Patient was given nitroglycerin, fentanyl, started on IV heparin drip in the emergency room.    Currently patient is complaining of 6 out of 10 chest pain.  Previously chest pain was 10 out of 10 and now improved.  Currently on encounter patient appears comfortable not in distress.  Patient had received loading dose of aspirin via EMS per Pt.  Ordered loading dose of Plavix 600mg.   Continue aspirin 81 mg daily, Plavix 75 mg daily starting tomorrow  continue statin, continue heparin drip.  Continue telemetry monitoring.  Transdermal nitroglycerin patch.  Started on IV morphine as needed.  Nicotine patch ordered.  Counseled on smoking cessation.  Cardiology consult ordered.

## 2025-05-14 NOTE — ASSESSMENT & PLAN NOTE
Lab Results   Component Value Date    HGBA1C 7.7 (H) 05/13/2025       Recent Labs     05/14/25  1748   POCGLU 295*       Blood Sugar Average: Last 72 hrs:  (P) 295  Present on admission history of diabetes per  A1c 7.7  Reasonably controlled.  Home regimen includes glipizide, metformin, Lantus 20 units nightly  Home dose of p.o. agents.  Continue with home dose of Lantus.  Continue diabetic diet.  Continue with sliding scale coverage.

## 2025-05-14 NOTE — ED PROVIDER NOTES
Time reflects when diagnosis was documented in both MDM as applicable and the Disposition within this note       Time User Action Codes Description Comment    5/14/2025  5:34 PM Randy Carlsonmil Add [R07.9] Chest pain     5/14/2025  7:24 PM Tutu Lemon Add [I21.4] NSTEMI (non-ST elevated myocardial infarction) ([facility])     5/15/2025  8:27 AM Ketan Masterson Modify [I21.4] NSTEMI (non-ST elevated myocardial infarction) ([facility])           ED Disposition       ED Disposition   Admit    Condition   Stable    Date/Time   Wed May 14, 2025  5:34 PM    Comment   Case was discussed with jill and the patient's admission status was agreed to be Admission Status: observation status to the service of Dr. Lemon .               Assessment & Plan       Medical Decision Making  75y M p/w chest pain    Received ASA/NTG via EMS    Ddx includes acs, dissection, ptx, metabolic derangement, anemia, GERD    Plan: bloodwork, imaging    ECG shows concerning changes with depressions inferiorly and possible lateral changes.  Discussed with cardiology, no STEMI criteria met, given elevated blood pressure and description of pain stat CTA dissection was obtained which was negative for acute dissection, ptx.  Received maalox, carafate, fentanyl, NTG paste with ongoing chest pain. Per cardiology recommendations heparin was ordered.  He remained stable in the emergency department.  Discussed case with internal medicine, will admit to their service for further evaluation.    Amount and/or Complexity of Data Reviewed  Labs: ordered. Decision-making details documented in ED Course.  Radiology: ordered.    Risk  OTC drugs.  Prescription drug management.  Decision regarding hospitalization.        ED Course as of 05/16/25 0708   Wed May 14, 2025   1606 Just received ECG. Concerning STD in II, III, aVF, possible lateral changes, will discuss with cardiology   1614 Repeat ECG drawn at request of cardiology.  Concern for lateral ST changes.   1615  Discussed with cardiology, concern for possible lateral ST changes however does not meet STEMI criteria.  Will obtain stat CT, begin heparin if normal, control blood pressure   1617 hs TnI 0hr(!): 50  Will obtain STAT CT, messaged Ct tech. If normal plan on heparinization       Medications   sucralfate (CARAFATE) tablet 1 g (1 g Oral Given 5/14/25 1624)   aluminum-magnesium hydroxide-simethicone (MAALOX) oral suspension 30 mL (30 mL Oral Given 5/14/25 1624)   fentaNYL injection 50 mcg (50 mcg Intravenous Given 5/14/25 1625)   nitroglycerin (NITRO-BID) 2 % TD ointment 0.5 inch (0.5 inches Topical Given 5/14/25 1627)   iohexol (OMNIPAQUE) 350 MG/ML injection (MULTI-DOSE) 100 mL (100 mL Intravenous Given 5/14/25 1638)   heparin (porcine) injection 4,000 Units (4,000 Units Intravenous Given 5/14/25 1730)       ED Risk Strat Scores                    No data recorded        SBIRT 20yo+      Flowsheet Row Most Recent Value   DILIP: How many times in the past year have you...    Used an illegal drug or used a prescription medication for non-medical reasons? Never Filed at: 05/14/2025 1535          PAOLO Risk Score      Flowsheet Row Most Recent Value   Age >= 65 1 Filed at: 05/15/2025 0841   Known CAD (stenosis >= 50%) 1 Filed at: 05/15/2025 0841   Recent (<=24 hrs) Service Angina 1 Filed at: 05/15/2025 0841   ST Deviation >= 0.5 mm 0 Filed at: 05/15/2025 0841   3+ CAD Risk Factors (FHx, HTN, HLP, DM, Smoker) 1 Filed at: 05/15/2025 0841   Aspirin Use Past 7 Days 1 Filed at: 05/15/2025 0841   Elevated Cardiac Markers 1 Filed at: 05/15/2025 0841   PAOLO Risk Score (Calculated) 6 Filed at: 05/15/2025 0841                          History of Present Illness       Chief Complaint   Patient presents with    Chest Pain     Pt BIB EMS c/o sudden onset chest pain that radiates to shoulders denies cardiac Hx. But Hx HTN        Past Medical History:   Diagnosis Date    Arthritis     Cerebellar infarction (HCC)     Cerebrovascular  accident (CVA) due to embolism of cerebral artery (HCC)     Diabetes mellitus (HCC)     Hyperlipidemia     Hypertension     Left rotator cuff tear     Stroke ([facility]) [id number]    Tobacco abuse     Wears glasses       Past Surgical History:   Procedure Laterality Date    COLONOSCOPY      KNEE SURGERY      NE SURGICAL ARTHROSCOPY SHOULDER W/ROTATOR CUFF RPR Left 08/10/2022    Procedure: Left Shoulder Arthroscopy, supraspinatus and Subscapularis Repair, Bicep Tenotomy, acromioplasty, extensive debridement;  Surgeon: Otilio Mendez DO;  Location: [facility] OR;  Service: Orthopedics    TONSILLECTOMY  1955      Family History   Problem Relation Age of Onset    Alzheimer's disease Mother     Diabetes Mother     Heart block Father     Heart disease Father       Social History[1]   E-Cigarette/Vaping    E-Cigarette Use Never User       E-Cigarette/Vaping Substances    Nicotine No     THC No     CBD No     Flavoring No     Other No     Unknown No       I have reviewed and agree with the history as documented.     Patient is a 75-year-old male with a past medical history significant for hypertension, dyslipidemia, diabetes, current smoker, family history of cardiac death of immediate family member in the 50s presenting to emergency department for evaluation of chest pain.  He reports he ate some [employer] for lunch.  Around 2:30 PM he developed severe 8 out of 10 chest pain radiating to his back.  He called 911 and EMS found him to have a blood pressure of 200/110.  He received aspirin and nitroglycerin with improvement in his discomfort.  He describes as a stabbing pain while he was driving in the middle of his chest that was constant.  He notes reproduction with palpation of his chest.  Denies any nausea or vomiting, denies fever, cough, chills, numbness, weakness, tingling.  Denies any current abdominal pain.        Review of Systems   Constitutional:  Negative for chills and fever.   HENT:  Negative for ear pain and sore  throat.    Eyes:  Negative for pain and visual disturbance.   Respiratory:  Negative for cough and shortness of breath.    Cardiovascular:  Positive for chest pain. Negative for palpitations.   Gastrointestinal:  Negative for abdominal pain and vomiting.   Genitourinary:  Negative for dysuria and hematuria.   Musculoskeletal:  Negative for arthralgias and back pain.   Skin:  Negative for color change and rash.   Neurological:  Negative for seizures and syncope.   All other systems reviewed and are negative.          Objective       ED Triage Vitals   Temperature Pulse Blood Pressure Respirations SpO2 Patient Position - Orthostatic VS   05/14/25 1533 05/14/25 1533 05/14/25 1533 05/14/25 1533 05/14/25 1533 05/14/25 1533   98.3 °F (36.8 °C) 89 151/86 18 99 % Lying      Temp Source Heart Rate Source BP Location FiO2 (%) Pain Score    05/14/25 1533 05/14/25 1533 05/14/25 1533 -- 05/14/25 1625    Oral Monitor Right arm  7      Vitals      Date and Time Temp Pulse SpO2 Resp BP Pain Score FACES Pain Rating User   05/15/25 1844 -- -- -- -- -- 4 -- MC   05/15/25 1508 -- 60 96 % -- 107/66 -- -- DII   05/15/25 1503 -- 60 98 % -- 107/66 -- -- DII   05/15/25 1440 -- 59 97 % -- 123/65 -- -- DII   05/15/25 1319 -- 61 -- -- -- -- -- SS   05/15/25 1319 -- -- 95 % -- 121/70 -- -- DII   05/15/25 1153 98 °F (36.7 °C) 70 96 % 17 148/77 -- -- JV   05/15/25 1122 -- -- -- -- 152/78 -- -- MC   05/15/25 1052 -- 64 98 % -- 135/67 -- -- DII   05/15/25 1022 -- 58 98 % -- 127/59 -- -- DII   05/15/25 1011 -- 60 98 % -- 114/52 -- -- DII   05/15/25 1001 -- 61 98 % -- 114/52 -- -- DII   05/15/25 1000 -- -- 98 % -- 114/52 No Pain -- MC   05/15/25 0935 -- -- -- -- 120/61 -- -- DII   05/15/25 0912 -- -- -- -- -- No Pain -- AF   05/15/25 0850 -- -- -- -- -- No Pain -- AF   05/15/25 0836 98.6 °F (37 °C) 70 100 % 20 174/75 No Pain -- DB   05/15/25 0800 97.8 °F (36.6 °C) 59 94 % 17 128/76 -- -- JV   05/15/25 0747 -- 65 95 % 18 145/77 -- -- DII   05/15/25  0315 -- -- -- 16 -- No Pain --    05/15/25 0315 -- 75 96 % -- 136/77 -- -- [facility]   05/14/25 2353 -- -- -- -- -- No Pain pt stating no chest pain/pressure at this justice --    05/14/25 2310 -- 95 95 % -- 148/68 -- -- [facility]   05/14/25 2000 97.8 °F (36.6 °C) 92 97 % 17 167/96 -- -- OW   05/14/25 1944 -- 92 97 % -- 167/96 5 --    05/14/25 1932 -- -- -- 17 -- 5 --    05/14/25 1930 -- -- -- -- -- 5 --    05/14/25 1829 97.8 °F (36.6 °C) 94 97 % 17 145/93 -- -- [facility]   05/14/25 1829 97.8 °F (36.6 °C) 87 97 % 17 145/93 -- -- [facility]   05/14/25 1828 97.8 °F (36.6 °C) 87 97 % 17 145/93 -- -- [facility]   05/14/25 1625 -- -- -- -- -- 7 --    05/14/25 1533 98.3 °F (36.8 °C) 89 99 % 18 151/86 -- -- MARK            Physical Exam  Vitals and nursing note reviewed.   Constitutional:       General: He is not in acute distress.     Appearance: Normal appearance. He is not ill-appearing, toxic-appearing or diaphoretic.      Comments: Patient sitting in bed no acute distress   HENT:      Head: Normocephalic and atraumatic.     Eyes:      General: No scleral icterus.        Right eye: No discharge.         Left eye: No discharge.      Conjunctiva/sclera: Conjunctivae normal.       Cardiovascular:      Rate and Rhythm: Normal rate.      Pulses: Normal pulses.      Comments: 2+ radial pulse BL  Pulmonary:      Effort: Pulmonary effort is normal. No respiratory distress.      Breath sounds: Normal breath sounds. No stridor. No wheezing, rhonchi or rales.      Comments: No wheezing, rales, rhonchi  Abdominal:      General: Abdomen is flat. Bowel sounds are normal. There is no distension.      Palpations: Abdomen is soft.      Tenderness: There is no abdominal tenderness. There is no guarding or rebound.      Comments: NTTP     Musculoskeletal:         General: Normal range of motion.      Cervical back: Normal range of motion. No rigidity.     Skin:     General: Skin is warm and dry.      Capillary Refill: Capillary refill takes less than 2 seconds.       Coloration: Skin is not jaundiced.      Findings: No bruising or lesion.     Neurological:      General: No focal deficit present.      Mental Status: He is alert and oriented to person, place, and time. Mental status is at baseline.     Psychiatric:         Mood and Affect: Mood normal.         Behavior: Behavior normal.         Thought Content: Thought content normal.         Judgment: Judgment normal.         Results Reviewed       Procedure Component Value Units Date/Time    APTT [[id number]]  (Abnormal) Collected: 05/14/25 2353    Lab Status: Final result Specimen: Blood from Hand, Right Updated: 05/15/25 0033     PTT 56 seconds     HS Troponin I 2hr [[id number]]  (Abnormal) Collected: 05/14/25 1724    Lab Status: Final result Specimen: Blood from Arm, Left Updated: 05/14/25 1815     hs TnI 2hr 392 ng/L      Delta 2hr hsTnI 342 ng/L     APTT six (6) hours after Heparin bolus/drip initiation or dosing change [[id number]]  (Normal) Collected: 05/14/25 1724    Lab Status: Final result Specimen: Blood from Arm, Left Updated: 05/14/25 1757     PTT 34 seconds     Protime-INR [[id number]]  (Normal) Collected: 05/14/25 1724    Lab Status: Final result Specimen: Blood from Arm, Left Updated: 05/14/25 1757     Protime 13.9 seconds      INR 1.00    Narrative:      INR Therapeutic Range    Indication                                             INR Range      Atrial Fibrillation                                               2.0-3.0  Hypercoagulable State                                    2.0.2.3  Left Ventricular Asist Device                            2.0-3.0  Mechanical Heart Valve                                  -    Aortic(with afib, MI, embolism, HF, LA enlargement,    and/or coagulopathy)                                     2.0-3.0 (2.5-3.5)     Mitral                                                             2.5-3.5  Prosthetic/Bioprosthetic Heart Valve               2.0-3.0  Venous thromboembolism (VTE: VT, PE         2.0-3.0    Fingerstick Glucose (POCT) [[id number]]  (Abnormal) Collected: 05/14/25 1748    Lab Status: Final result Specimen: Blood Updated: 05/14/25 1748     POC Glucose 295 mg/dl     CBC [[id number]]  (Abnormal) Collected: 05/14/25 1724    Lab Status: Final result Specimen: Blood from Arm, Left Updated: 05/14/25 1729     WBC 11.30 Thousand/uL      RBC 4.05 Million/uL      Hemoglobin 11.6 g/dL      Hematocrit 35.2 %      MCV 87 fL      MCH 28.6 pg      MCHC 33.0 g/dL      RDW 12.2 %      Platelets 314 Thousands/uL      MPV 10.0 fL     B-Type Natriuretic Peptide(BNP) [[id number]]  (Normal) Collected: 05/14/25 1543    Lab Status: Final result Specimen: Blood from Arm, Right Updated: 05/14/25 1642     BNP 19 pg/mL     HS Troponin 0hr (reflex protocol) [[id number]]  (Abnormal) Collected: 05/14/25 1543    Lab Status: Final result Specimen: Blood from Arm, Right Updated: 05/14/25 1615     hs TnI 0hr 50 ng/L     Comprehensive metabolic panel [[id number]]  (Abnormal) Collected: 05/14/25 1543    Lab Status: Final result Specimen: Blood from Arm, Right Updated: 05/14/25 1607     Sodium 139 mmol/L      Potassium 4.2 mmol/L      Chloride 104 mmol/L      CO2 25 mmol/L      ANION GAP 10 mmol/L      BUN 28 mg/dL      Creatinine 1.02 mg/dL      Glucose 289 mg/dL      Calcium 9.6 mg/dL      AST 13 U/L      ALT 15 U/L      Alkaline Phosphatase 76 U/L      Total Protein 7.0 g/dL      Albumin 4.1 g/dL      Total Bilirubin 0.53 mg/dL      eGFR 72 ml/min/1.73sq m     Narrative:      National Kid[employer] Disease Foundation guidelines for Chronic Kidney Disease (CKD):     Stage 1 with normal or high GFR (GFR > 90 mL/min/1.73 square meters)    Stage 2 Mild CKD (GFR = 60-89 mL/min/1.73 square meters)    Stage 3A Moderate CKD (GFR = 45-59 mL/min/1.73 square meters)    Stage 3B Moderate CKD (GFR = 30-44 mL/min/1.73 square meters)    Stage 4 Severe CKD (GFR = 15-29 mL/min/1.73 square meters)    Stage 5 End Stage CKD (GFR <15 mL/min/1.73 square  meters)  Note: GFR calculation is accurate only with a steady state creatinine    Lipase [[id number]]  (Normal) Collected: 05/14/25 1543    Lab Status: Final result Specimen: Blood from Arm, Right Updated: 05/14/25 1607     Lipase 40 u/L     CBC and differential [[id number]]  (Abnormal) Collected: 05/14/25 1543    Lab Status: Final result Specimen: Blood from Arm, Right Updated: 05/14/25 1552     WBC 9.07 Thousand/uL      RBC 4.18 Million/uL      Hemoglobin 12.0 g/dL      Hematocrit 36.2 %      MCV 87 fL      MCH 28.7 pg      MCHC 33.1 g/dL      RDW 12.3 %      MPV 10.2 fL      Platelets 340 Thousands/uL      nRBC 0 /100 WBCs      Segmented % 57 %      Immature Grans % 1 %      Lymphocytes % 28 %      Monocytes % 9 %      Eosinophils Relative 4 %      Basophils Relative 1 %      Absolute Neutrophils 5.17 Thousands/µL      Absolute Immature Grans 0.07 Thousand/uL      Absolute Lymphocytes 2.57 Thousands/µL      Absolute Monocytes 0.81 Thousand/µL      Eosinophils Absolute 0.37 Thousand/µL      Basophils Absolute 0.08 Thousands/µL             VAS lower limb vein mapping bypass graft   Final Interpretation by Juancarlos Alvarez MD (05/15 1650)      VAS carotid complete study   Final Interpretation by Juancarlos Alvarez MD (05/15 1650)      CTA dissection protocol chest/abdomen/pelvis   Final Interpretation by E. Alec Schoenberger, MD (05/14 1705)   No aortic aneurysm or dissection.   Several tiny nodular opacities in the right upper lobe with adjacent tree-in-bud opacities favored to be infectious/inflammatory.   No acute intra-abdominal pathology.         Workstation performed: [id number]             Procedures    ED Medication and Procedure Management   Prior to Admission Medications   Prescriptions Last Dose Informant Patient Reported? Taking?   Blood Glucose Monitoring Suppl (OneTouch Verio Reflect) w/Device KIT  Self No No   Sig: Check blood sugars three times daily. Please substitute with appropriate alternative as  covered by patient's insurance. Dx: E11.65   Continuous Glucose Sensor (Dexcom G7 Sensor) 2025 Self No Yes   Sig: Use 1 Device every 10 days   Insulin Pen Needle (CareOne Unifine Pentips Plus) 32G X 4 MM MISC 2025 Self No Yes   Sig: USE DAILY AS INSTRUCTED WITH INSULIN PEN   OneTouch Delica Lancets 33G MISC 2025 Self No Yes   Sig: Check blood sugars three times daily. Please substitute with appropriate alternative as covered by patient's insurance. Dx: E11.65   acetaminophen (TYLENOL) 500 mg tablet  Self Yes No   Sig: Take 500 mg by mouth every 6 (six) hours as needed for mild pain   amLODIPine (NORVASC) 5 mg tablet 2025 Self No Yes   Sig: Take 1 tablet (5 mg total) by mouth daily   aspirin 81 mg chewable tablet 2025 Evening Self No Yes   Sig: Chew 1 tablet (81 mg total) daily   atorvastatin (LIPITOR) 40 mg tablet 2025  No Yes   Sig: Take 1 tablet (40 mg total) by mouth every evening   cholecalciferol (VITAMIN D3) 1,000 units tablet 2025 Self No Yes   Sig: Take 1 tablet (1,000 Units total) by mouth daily   fluticasone (FLONASE) 50 mcg/act nasal spray Unknown Self No No   Si spray into each nostril daily for 14 days   gabapentin (Neurontin) 600 MG tablet 2025 Self No Yes   Sig: Take 1 tablet (600 mg total) by mouth 2 (two) times a day   glipiZIDE (GLUCOTROL XL) 10 mg 24 hr tablet   No No   Sig: Take 1 tablet (10 mg total) by mouth daily   glucose blood (OneTouch Verio) test strip 2025 Self No Yes   Sig: Check blood sugars three times daily. Please substitute with appropriate alternative as covered by patient's insurance. Dx: E11.65   insulin glargine (LANTUS) 100 units/mL subcutaneous injection Unknown Self No No   Sig: Inject 20 Units under the skin daily at bedtime   lisinopril (ZESTRIL) 20 mg tablet 2025  No Yes   Sig: Take 1 tablet (20 mg total) by mouth daily   meloxicam (MOBIC) 15 mg tablet 2025 Self No Yes   Sig: Take 1 tablet (15 mg total) by mouth  daily as needed for moderate pain   metFORMIN (GLUCOPHAGE-XR) 500 mg 24 hr tablet 5/13/2025 Evening Self No Yes   Sig: Take 2 tablets (1,000 mg total) by mouth 2 (two) times a day   nicotine (NICODERM CQ) 14 mg/24hr TD 24 hr patch Not Taking Self Yes No   Sig: Place 1 patch on the skin every 24 hours   Patient not taking: Reported on 5/9/2025      Facility-Administered Medications: None     Discharge Medication List as of 5/15/2025  7:56 PM        CONTINUE these medications which have NOT CHANGED    Details   amLODIPine (NORVASC) 5 mg tablet Take 1 tablet (5 mg total) by mouth daily, Starting Sun 3/16/2025, Normal      aspirin 81 mg chewable tablet Chew 1 tablet (81 mg total) daily, Starting Tue 7/16/2024, No Print      atorvastatin (LIPITOR) 40 mg tablet Take 1 tablet (40 mg total) by mouth every evening, Starting Tue 5/6/2025, Normal      cholecalciferol (VITAMIN D3) 1,000 units tablet Take 1 tablet (1,000 Units total) by mouth daily, Starting Sun 1/21/2024, Normal      Continuous Glucose Sensor (Dexcom G7 Sensor) Use 1 Device every 10 days, Starting Mon 9/23/2024, Normal      gabapentin (Neurontin) 600 MG tablet Take 1 tablet (600 mg total) by mouth 2 (two) times a day, Starting Thu 3/27/2025, Normal      glucose blood (OneTouch Verio) test strip Check blood sugars three times daily. Please substitute with appropriate alternative as covered by patient's insurance. Dx: E11.65, Normal      Insulin Pen Needle (CareOne Unifine Pentips Plus) 32G X 4 MM MISC USE DAILY AS INSTRUCTED WITH INSULIN PEN, Normal      lisinopril (ZESTRIL) 20 mg tablet Take 1 tablet (20 mg total) by mouth daily, Starting Tue 5/13/2025, Normal      meloxicam (MOBIC) 15 mg tablet Take 1 tablet (15 mg total) by mouth daily as needed for moderate pain, Starting Wed 4/2/2025, Normal      metFORMIN (GLUCOPHAGE-XR) 500 mg 24 hr tablet Take 2 tablets (1,000 mg total) by mouth 2 (two) times a day, Starting Sun 3/16/2025, Normal      OneTouch Delica  Lancets 33G MISC Check blood sugars three times daily. Please substitute with appropriate alternative as covered by patient's insurance. Dx: E11.65, Normal      acetaminophen (TYLENOL) 500 mg tablet Take 500 mg by mouth every 6 (six) hours as needed for mild pain, Historical Med      Blood Glucose Monitoring Suppl (OneTouch Verio Reflect) w/Device KIT Check blood sugars three times daily. Please substitute with appropriate alternative as covered by patient's insurance. Dx: E11.65, Normal      fluticasone (FLONASE) 50 mcg/act nasal spray 1 spray into each nostril daily for 14 days, Starting Tue 4/16/2024, Normal      glipiZIDE (GLUCOTROL XL) 10 mg 24 hr tablet Take 1 tablet (10 mg total) by mouth daily, Starting Tue 5/13/2025, Normal      insulin glargine (LANTUS) 100 units/mL subcutaneous injection Inject 20 Units under the skin daily at bedtime, Starting Tue 1/14/2025, Normal      nicotine (NICODERM CQ) 14 mg/24hr TD 24 hr patch Place 1 patch on the skin every 24 hours, Historical [facility]             ED SEPSIS DOCUMENTATION   Time reflects when diagnosis was documented in both MDM as applicable and the Disposition within this note       Time User Action Codes Description Comment    5/14/2025  5:34 PM Gudelia Carlson Add [R07.9] Chest pain     5/14/2025  7:24 PM Tutu Lemon Add [I21.4] NSTEMI (non-ST elevated myocardial infarction) (HCC)     5/15/2025  8:27 AM Ketan Masterson Modify [I21.4] NSTEMI (non-ST elevated myocardial infarction) (HCC)                      [1]   Social History  Tobacco Use    Smoking status: Every Day     Current packs/day: 0.50     Average packs/day: 0.6 packs/day for 85.9 years (55.3 ttl pk-yrs)     Types: Cigarettes     Start date: 4/26/1964    Smokeless tobacco: Never   Vaping Use    Vaping status: Never Used   Substance Use Topics    Alcohol use: Yes     Comment: rarely    Drug use: No        Gudelia Carlson DO  05/16/25 0708

## 2025-05-14 NOTE — H&P
H&P - Hospitalist   Name: Tc Stovall 75 y.o. male I MRN: [id number]  Unit/Bed#: -01 I Date of Admission: 5/14/2025   Date of Service: 5/14/2025 I Hospital Day: 0     Assessment & Plan  NSTEMI (non-ST elevated myocardial infarction) (HCC)  75-year-old male patient of medical history of diabetes, hypertension, hyperlipidemia, obesity, CVA presents to [facility] on room with complaining of chest pain.    HS trop 50 --> 392  EKG noted with normal sinus rhythm, first-degree AV block, anteroseptal T wave inversion.  Patient was given nitroglycerin, fentanyl, started on IV heparin drip in the emergency room.    Currently patient is complaining of 6 out of 10 chest pain.  Previously chest pain was 10 out of 10 and now improved.  Currently on encounter patient appears comfortable not in distress.  Patient had received loading dose of aspirin via EMS per Pt.  Ordered loading dose of Plavix 600mg.   Continue aspirin 81 mg daily, Plavix 75 mg daily starting tomorrow  continue statin, continue heparin drip.  Continue telemetry monitoring.  Transdermal nitroglycerin patch.  Started on IV morphine as needed.  Nicotine patch ordered.  Counseled on smoking cessation.  Cardiology consult ordered.      Primary hypertension  Blood pressure controlled.  Resume lisinopril and Norvasc    HLD (hyperlipidemia)  Continue home dose of Lipitor 40 mg at bedtime.  Type 2 diabetes mellitus with hyperglycemia, without long-term current use of insulin (HCC)  Lab Results   Component Value Date    HGBA1C 7.7 (H) 05/13/2025       Recent Labs     05/14/25  1748   POCGLU 295*       Blood Sugar Average: Last 72 hrs:  (P) 295  Present on admission history of diabetes per  A1c 7.7  Reasonably controlled.  Home regimen includes glipizide, metformin, Lantus 20 units nightly  Home dose of p.o. agents.  Continue with home dose of Lantus.  Continue diabetic diet.  Continue with sliding scale coverage.  Obesity, morbid (HCC)  Present with obesity  BMI  34.  Counseled on weight loss, lifestyle modification.  History of CVA (cerebrovascular accident)  Present on admission history of CVA.  Admitted outpatient Zio patch noted with episodes of paroxysmal atrial tachycardia without evidence of A-fib or a flutter.  Currently on aspirin, statin.  Ongoing discussion with primary cardiology for possible loop  Outpatient follow-up.      VTE Pharmacologic Prophylaxis:   Moderate Risk (Score 3-4) - Pharmacological DVT Prophylaxis Ordered: heparin drip.  Code Status: Level 1 - Full Code   Discussion with family: Spoke with Son Mateusz over the phone.     Anticipated Length of Stay: Patient will be admitted on an inpatient basis with an anticipated length of stay of greater than 2 midnights secondary to NSTEMI.    History of Present Illness   Chief Complaint: Chest pain and back pain.    Tc Stovall is a 75 y.o. male with a PMH of CVA, hypertension, hyperlipidemia, paroxysmal atrial tachycardia, obesity, diabetes, current smoker, does have history of cervical radiculopathy, who presents with complaining of chest pain.  Patient was driving after eating [employer] around 2:30 in the afternoon and developed 10 out of 10 sharp chest pain radiating to his back.  He does have chronic back pain due to cervical radiculopathy.  Patient had called 911 and blood pressure time was 200/110 and received nitroglycerin, aspirin and reported improvement in his pain but still had mid retrosternal chest pain radiating to his back.  Patient had received dose of Carafate, transdermal nitroglycerin along with fentanyl, heparin drip in the emergency room.  Currently encounter patient appears comfortable not in distress.  Reports chest pain has improved to 6 out of 10.  Blood pressure is also improved.  Currently denies fever, chills, nausea, vomiting, diaphoresis, abdominal pain, any other comments.  Does report smoking a pack a day for many years.  Reports father had massive MI at age of 58.  Currently  he denies any other new comments.  No other events noted.      Review of Systems   Constitutional:  Negative for chills, diaphoresis, fatigue and fever.   Eyes:  Negative for visual disturbance.   Respiratory:  Negative for cough and shortness of breath.    Cardiovascular:  Positive for chest pain. Negative for palpitations and leg swelling.   Gastrointestinal:  Negative for abdominal pain, nausea and vomiting.   Endocrine: Negative for polyuria.   Genitourinary:  Negative for hematuria.   Psychiatric/Behavioral:  Negative for agitation.        Historical Information   Past Medical History:   Diagnosis Date    Arthritis     Cerebellar infarction (HCC)     Cerebrovascular accident (CVA) due to embolism of cerebral artery (HCC)     Diabetes mellitus (HCC)     Hyperlipidemia     Hypertension     Left rotator cuff tear     Stroke (HCC) [id number]    Tobacco abuse     Wears glasses      Past Surgical History:   Procedure Laterality Date    COLONOSCOPY      KNEE SURGERY      [place] SURGICAL ARTHROSCOPY SHOULDER W/ROTATOR CUFF RPR Left 08/10/2022    Procedure: Left Shoulder Arthroscopy, supraspinatus and Subscapularis Repair, Bicep Tenotomy, acromioplasty, extensive debridement;  Surgeon: Otilio Mendez DO;  Location: [facility];  Service: Orthopedics    TONSILLECTOMY  1955     Social History     Tobacco Use    Smoking status: Every Day     Current packs/day: 0.50     Average packs/day: 0.6 packs/day for 85.8 years (55.3 ttl pk-yrs)     Types: Cigarettes     Start date: 4/26/1964    Smokeless tobacco: Never   Vaping Use    Vaping status: Never Used   Substance and Sexual Activity    Alcohol use: Yes     Comment: rarely    Drug use: No    Sexual activity: Not Currently     Partners: Female     Birth control/protection: Female Sterilization     E-Cigarette/Vaping    E-Cigarette Use Never User      E-Cigarette/Vaping Substances    Nicotine No     THC No     CBD No     Flavoring No     Other No     Unknown No      Family History    Problem Relation Age of Onset    Alzheimer's disease Mother     Diabetes Mother     Heart block Father     Heart disease Father      Social History:  Marital Status: /Civil Union     Meds/Allergies   I have reviewed home medications with patient personally.  Prior to Admission medications    Medication Sig Start Date End Date Taking? Authorizing Provider   acetaminophen (TYLENOL) 500 mg tablet Take 500 mg by mouth every 6 (six) hours as needed for mild pain    Historical Provider, MD   amLODIPine (NORVASC) 5 mg tablet Take 1 tablet (5 mg total) by mouth daily 3/16/25   Lester Roth, DO   aspirin 81 mg chewable tablet Chew 1 tablet (81 mg total) daily 7/16/24 April Charmaine Valenzuela PA-C   atorvastatin (LIPITOR) 40 mg tablet Take 1 tablet (40 mg total) by mouth every evening 5/6/25   Lester Roth, DO   Blood Glucose Monitoring Suppl (OneTouch Verio Reflect) w/Device KIT Check blood sugars three times daily. Please substitute with appropriate alternative as covered by patient's insurance. Dx: E11.65 6/12/23   Lester Roth, DO   cholecalciferol (VITAMIN D3) 1,000 units tablet Take 1 tablet (1,000 Units total) by mouth daily 1/21/24   Lester Roth, DO   Continuous Glucose Sensor (Dexcom G7 Sensor) Use 1 Device every 10 days 9/23/24 April Charmaine Valenzuela PA-C   fluticasone (FLONASE) 50 mcg/act nasal spray 1 spray into each nostril daily for 14 days 4/16/24   Amanda Valenzuela PA-C   gabapentin (Neurontin) 600 MG tablet Take 1 tablet (600 mg total) by mouth 2 (two) times a day 3/27/25   JANET Dos Santos   glipiZIDE (GLUCOTROL XL) 10 mg 24 hr tablet Take 1 tablet (10 mg total) by mouth daily 5/13/25   Lester Roth, DO   glucose blood (OneTouch Verio) test strip Check blood sugars three times daily. Please substitute with appropriate alternative as covered by patient's insurance. Dx: E11.65 6/12/23   Lester Roth, DO   insulin glargine (LANTUS) 100 units/mL subcutaneous injection Inject 20  Units under the skin daily at bedtime 1/14/25   Mac Crawford MD   Insulin Pen Needle (CareOne Unifine Pentips Plus) 32G X 4 MM MISC USE DAILY AS INSTRUCTED WITH INSULIN PEN 1/2/25   Lester Roth,    lisinopril (ZESTRIL) 20 mg tablet Take 1 tablet (20 mg total) by mouth daily 5/13/25   Lester Roth, DO   meloxicam (MOBIC) 15 mg tablet Take 1 tablet (15 mg total) by mouth daily as needed for moderate pain 4/2/25   Lester Roth, DO   metFORMIN (GLUCOPHAGE-XR) 500 mg 24 hr tablet Take 2 tablets (1,000 mg total) by mouth 2 (two) times a day 3/16/25   Lester Roth, DO   nicotine (NICODERM CQ) 14 mg/24hr TD 24 hr patch Place 1 patch on the skin every 24 hours  Patient not taking: Reported on 5/9/2025    Historical Provider, MD   OneTouch Delica Lancets 33G MISC Check blood sugars three times daily. Please substitute with appropriate alternative as covered by patient's insurance. Dx: E11.65 1/30/24   Lester Roth, DO   benzonatate (TESSALON PERLES) 100 mg capsule Take 1 capsule (100 mg total) by mouth 3 (three) times a day as needed for cough 4/18/25 5/14/25  Lester Roth, DO     No Known Allergies    Objective :  Temp:  [97.8 °F (36.6 °C)-98.3 °F (36.8 °C)] 97.8 °F (36.6 °C)  HR:  [87-94] 94  BP: (145-151)/(86-93) 145/93  Resp:  [17-18] 17  SpO2:  [97 %-99 %] 97 %  O2 Device: None (Room air)    Physical Exam  Constitutional:       General: He is not in acute distress.     Appearance: Normal appearance. He is not ill-appearing, toxic-appearing or diaphoretic.     Cardiovascular:      Rate and Rhythm: Normal rate.      Pulses: Normal pulses.   Pulmonary:      Effort: Pulmonary effort is normal. No respiratory distress.      Breath sounds: Normal breath sounds. No wheezing.   Abdominal:      General: There is no distension.      Tenderness: There is no abdominal tenderness.     Musculoskeletal:      Right lower leg: No edema.      Left lower leg: No edema.     Neurological:      Mental Status: He  is alert and oriented to person, place, and time. Mental status is at baseline.          Lines/Drains:            Lab Results: I have reviewed the following results:  Results from last 7 days   Lab Units 05/14/25  1724 05/14/25  1543   WBC Thousand/uL 11.30* 9.07   HEMOGLOBIN g/dL 11.6* 12.0   HEMATOCRIT % 35.2* 36.2*   PLATELETS Thousands/uL 314 340   SEGS PCT %  --  57   LYMPHO PCT %  --  28   MONO PCT %  --  9   EOS PCT %  --  4     Results from last 7 days   Lab Units 05/14/25  1543   SODIUM mmol/L 139   POTASSIUM mmol/L 4.2   CHLORIDE mmol/L 104   CO2 mmol/L 25   BUN mg/dL 28*   CREATININE mg/dL 1.02   ANION GAP mmol/L 10   CALCIUM mg/dL 9.6   ALBUMIN g/dL 4.1   TOTAL BILIRUBIN mg/dL 0.53   ALK PHOS U/L 76   ALT U/L 15   AST U/L 13   GLUCOSE RANDOM mg/dL 289*     Results from last 7 days   Lab Units 05/14/25  1724   INR  1.00     Results from last 7 days   Lab Units 05/14/25  1748   POC GLUCOSE mg/dl 295*     Lab Results   Component Value Date    HGBA1C 7.7 (H) 05/13/2025    HGBA1C 7.6 (H) 01/11/2025    HGBA1C 7.1 (H) 10/16/2024           Imaging Results Review: I reviewed radiology reports from this admission including: CTA dissection study report reviewed noted negative for coronary..  Other Study Results Review: EKG was personally reviewed and my interpretation is: EKG noted with normal sinus rhythm, first-degree AV block, anteroseptal T wave inversion..    Administrative Statements   I have spent a total time of   minutes in caring for this patient on the day of the visit/encounter including Diagnostic results, Prognosis, Risks and benefits of tx options, Instructions for management, Patient and family education, Importance of tx compliance, Risk factor reductions, Impressions, Counseling / Coordination of care, Documenting in the medical record, Reviewing/placing orders in the medical record (including tests, medications, and/or procedures), and Obtaining or reviewing history  .    ** Please Note: This  note has been constructed using a voice recognition system. **

## 2025-05-14 NOTE — ASSESSMENT & PLAN NOTE
Present on admission history of CVA.  Admitted outpatient Zio patch noted with episodes of paroxysmal atrial tachycardia without evidence of A-fib or a flutter.  Currently on aspirin, statin.  Ongoing discussion with primary cardiology for possible loop  Outpatient follow-up.

## 2025-05-15 ENCOUNTER — APPOINTMENT (OUTPATIENT)
Dept: VASCULAR ULTRASOUND | Facility: HOSPITAL | Age: 75
End: 2025-05-15
Payer: MEDICARE

## 2025-05-15 ENCOUNTER — APPOINTMENT (OUTPATIENT)
Dept: NON INVASIVE DIAGNOSTICS | Facility: HOSPITAL | Age: 75
End: 2025-05-15
Payer: MEDICARE

## 2025-05-15 ENCOUNTER — HOSPITAL ENCOUNTER (INPATIENT)
Facility: HOSPITAL | Age: 75
LOS: 8 days | Discharge: NURSING, REHAB OR LONG-TERM CARE | DRG: 236 | End: 2025-05-23
Attending: INTERNAL MEDICINE | Admitting: INTERNAL MEDICINE
Payer: MEDICARE

## 2025-05-15 VITALS
OXYGEN SATURATION: 96 % | DIASTOLIC BLOOD PRESSURE: 66 MMHG | SYSTOLIC BLOOD PRESSURE: 107 MMHG | HEIGHT: 67 IN | BODY MASS INDEX: 34.6 KG/M2 | RESPIRATION RATE: 17 BRPM | HEART RATE: 60 BPM | TEMPERATURE: 98 F | WEIGHT: 220.46 LBS

## 2025-05-15 DIAGNOSIS — Z95.1 S/P CABG X 3: ICD-10-CM

## 2025-05-15 DIAGNOSIS — I25.10 CAD, MULTIPLE VESSEL: Primary | ICD-10-CM

## 2025-05-15 DIAGNOSIS — E11.65 TYPE 2 DIABETES MELLITUS WITH HYPERGLYCEMIA, WITHOUT LONG-TERM CURRENT USE OF INSULIN (HCC): ICD-10-CM

## 2025-05-15 DIAGNOSIS — I21.4 NSTEMI (NON-ST ELEVATED MYOCARDIAL INFARCTION) (HCC): ICD-10-CM

## 2025-05-15 DIAGNOSIS — I47.19 PAROXYSMAL ATRIAL TACHYCARDIA (HCC): ICD-10-CM

## 2025-05-15 DIAGNOSIS — E11.3291 TYPE 2 DIABETES MELLITUS WITH RIGHT EYE AFFECTED BY MILD NONPROLIFERATIVE RETINOPATHY WITHOUT MACULAR EDEMA, WITHOUT LONG-TERM CURRENT USE OF INSULIN (HCC): Chronic | ICD-10-CM

## 2025-05-15 DIAGNOSIS — E11.9 TYPE 2 DIABETES MELLITUS (HCC): ICD-10-CM

## 2025-05-15 PROBLEM — R79.89 ELEVATED TROPONIN: Status: ACTIVE | Noted: 2025-05-14

## 2025-05-15 PROBLEM — R07.89 CHEST DISCOMFORT: Status: ACTIVE | Noted: 2025-05-15

## 2025-05-15 PROBLEM — I49.3 PVC'S (PREMATURE VENTRICULAR CONTRACTIONS): Status: ACTIVE | Noted: 2025-05-15

## 2025-05-15 PROBLEM — Z72.0 TOBACCO USE: Status: ACTIVE | Noted: 2025-05-15

## 2025-05-15 LAB
2HR DELTA HS TROPONIN: ABNORMAL NG/L
4HR DELTA HS TROPONIN: ABNORMAL NG/L
ABO GROUP BLD: NORMAL
ABO GROUP BLD: NORMAL
ANION GAP SERPL CALCULATED.3IONS-SCNC: 6 MMOL/L (ref 4–13)
AORTIC ROOT: 3.6 CM
APTT PPP: 39 SECONDS (ref 23–34)
APTT PPP: 56 SECONDS (ref 23–34)
APTT PPP: 86 SECONDS (ref 23–34)
ATRIAL RATE: 77 BPM
ATRIAL RATE: 81 BPM
BLD GP AB SCN SERPL QL: NEGATIVE
BSA FOR ECHO PROCEDURE: 2.11 M2
BUN SERPL-MCNC: 20 MG/DL (ref 5–25)
CALCIUM SERPL-MCNC: 8.7 MG/DL (ref 8.4–10.2)
CARDIAC TROPONIN I PNL SERPL HS: ABNORMAL NG/L (ref 8–18)
CARDIAC TROPONIN I PNL SERPL HS: ABNORMAL NG/L (ref ?–50)
CHLORIDE SERPL-SCNC: 107 MMOL/L (ref 96–108)
CHOLEST SERPL-MCNC: 115 MG/DL (ref ?–200)
CO2 SERPL-SCNC: 26 MMOL/L (ref 21–32)
CREAT SERPL-MCNC: 0.89 MG/DL (ref 0.6–1.3)
ERYTHROCYTE [DISTWIDTH] IN BLOOD BY AUTOMATED COUNT: 12.2 % (ref 11.6–15.1)
FRACTIONAL SHORTENING: 29 (ref 28–44)
GFR SERPL CREATININE-BSD FRML MDRD: 84 ML/MIN/1.73SQ M
GLUCOSE SERPL-MCNC: 105 MG/DL (ref 65–140)
GLUCOSE SERPL-MCNC: 127 MG/DL (ref 65–140)
GLUCOSE SERPL-MCNC: 149 MG/DL (ref 65–140)
GLUCOSE SERPL-MCNC: 156 MG/DL (ref 65–140)
GLUCOSE SERPL-MCNC: 171 MG/DL (ref 65–140)
HCT VFR BLD AUTO: 36.8 % (ref 36.5–49.3)
HDLC SERPL-MCNC: 36 MG/DL
HGB BLD-MCNC: 11.9 G/DL (ref 12–17)
INTERVENTRICULAR SEPTUM IN DIASTOLE (PARASTERNAL SHORT AXIS VIEW): 0.8 CM
INTERVENTRICULAR SEPTUM: 0.8 CM (ref 0.6–1.1)
LDLC SERPL CALC-MCNC: 63 MG/DL (ref 0–100)
LEFT ATRIUM SIZE: 3.1 CM
LEFT INTERNAL DIMENSION IN SYSTOLE: 3.5 CM (ref 2.1–4)
LEFT VENTRICULAR INTERNAL DIMENSION IN DIASTOLE: 4.9 CM (ref 3.5–6)
LEFT VENTRICULAR POSTERIOR WALL IN END DIASTOLE: 0.7 CM
LEFT VENTRICULAR STROKE VOLUME: 61 ML
LV EF US.2D.A4C+ESTIMATED: 46 %
LVSV (TEICH): 61 ML
MCH RBC QN AUTO: 28.5 PG (ref 26.8–34.3)
MCHC RBC AUTO-ENTMCNC: 32.3 G/DL (ref 31.4–37.4)
MCV RBC AUTO: 88 FL (ref 82–98)
P AXIS: 83 DEGREES
P AXIS: 85 DEGREES
PLATELET # BLD AUTO: 306 THOUSANDS/UL (ref 149–390)
PMV BLD AUTO: 9.9 FL (ref 8.9–12.7)
POTASSIUM SERPL-SCNC: 4 MMOL/L (ref 3.5–5.3)
PR INTERVAL: 216 MS
PR INTERVAL: 218 MS
QRS AXIS: 47 DEGREES
QRS AXIS: 62 DEGREES
QRSD INTERVAL: 68 MS
QRSD INTERVAL: 72 MS
QT INTERVAL: 364 MS
QT INTERVAL: 374 MS
QTC INTERVAL: 422 MS
QTC INTERVAL: 423 MS
RBC # BLD AUTO: 4.17 MILLION/UL (ref 3.88–5.62)
RH BLD: POSITIVE
RH BLD: POSITIVE
SL CV LV EF: 52
SL CV PED ECHO LEFT VENTRICLE DIASTOLIC VOLUME (MOD BIPLANE) 2D: 113 ML
SL CV PED ECHO LEFT VENTRICLE SYSTOLIC VOLUME (MOD BIPLANE) 2D: 52 ML
SODIUM SERPL-SCNC: 139 MMOL/L (ref 135–147)
SPECIMEN EXPIRATION DATE: NORMAL
T WAVE AXIS: 14 DEGREES
T WAVE AXIS: 29 DEGREES
TRIGL SERPL-MCNC: 79 MG/DL (ref ?–150)
VENTRICULAR RATE: 77 BPM
VENTRICULAR RATE: 81 BPM
WBC # BLD AUTO: 12.31 THOUSAND/UL (ref 4.31–10.16)

## 2025-05-15 PROCEDURE — 76937 US GUIDE VASCULAR ACCESS: CPT | Performed by: INTERNAL MEDICINE

## 2025-05-15 PROCEDURE — 93010 ELECTROCARDIOGRAM REPORT: CPT | Performed by: INTERNAL MEDICINE

## 2025-05-15 PROCEDURE — 85730 THROMBOPLASTIN TIME PARTIAL: CPT | Performed by: FAMILY MEDICINE

## 2025-05-15 PROCEDURE — 87081 CULTURE SCREEN ONLY: CPT | Performed by: PHYSICIAN ASSISTANT

## 2025-05-15 PROCEDURE — 86901 BLOOD TYPING SEROLOGIC RH(D): CPT | Performed by: PHYSICIAN ASSISTANT

## 2025-05-15 PROCEDURE — C1769 GUIDE WIRE: HCPCS | Performed by: INTERNAL MEDICINE

## 2025-05-15 PROCEDURE — 82948 REAGENT STRIP/BLOOD GLUCOSE: CPT

## 2025-05-15 PROCEDURE — 85027 COMPLETE CBC AUTOMATED: CPT | Performed by: STUDENT IN AN ORGANIZED HEALTH CARE EDUCATION/TRAINING PROGRAM

## 2025-05-15 PROCEDURE — 99152 MOD SED SAME PHYS/QHP 5/>YRS: CPT | Performed by: INTERNAL MEDICINE

## 2025-05-15 PROCEDURE — 93880 EXTRACRANIAL BILAT STUDY: CPT

## 2025-05-15 PROCEDURE — 93458 L HRT ARTERY/VENTRICLE ANGIO: CPT | Performed by: INTERNAL MEDICINE

## 2025-05-15 PROCEDURE — 93321 DOPPLER ECHO F-UP/LMTD STD: CPT | Performed by: INTERNAL MEDICINE

## 2025-05-15 PROCEDURE — 93308 TTE F-UP OR LMTD: CPT | Performed by: INTERNAL MEDICINE

## 2025-05-15 PROCEDURE — 86900 BLOOD TYPING SEROLOGIC ABO: CPT | Performed by: PHYSICIAN ASSISTANT

## 2025-05-15 PROCEDURE — 99153 MOD SED SAME PHYS/QHP EA: CPT | Performed by: INTERNAL MEDICINE

## 2025-05-15 PROCEDURE — 80048 BASIC METABOLIC PNL TOTAL CA: CPT | Performed by: STUDENT IN AN ORGANIZED HEALTH CARE EDUCATION/TRAINING PROGRAM

## 2025-05-15 PROCEDURE — 93308 TTE F-UP OR LMTD: CPT

## 2025-05-15 PROCEDURE — 86850 RBC ANTIBODY SCREEN: CPT | Performed by: PHYSICIAN ASSISTANT

## 2025-05-15 PROCEDURE — 93005 ELECTROCARDIOGRAM TRACING: CPT

## 2025-05-15 PROCEDURE — NC001 PR NO CHARGE: Performed by: INTERNAL MEDICINE

## 2025-05-15 PROCEDURE — 99232 SBSQ HOSP IP/OBS MODERATE 35: CPT | Performed by: PHYSICIAN ASSISTANT

## 2025-05-15 PROCEDURE — 99215 OFFICE O/P EST HI 40 MIN: CPT | Performed by: INTERNAL MEDICINE

## 2025-05-15 PROCEDURE — 84484 ASSAY OF TROPONIN QUANT: CPT | Performed by: PHYSICIAN ASSISTANT

## 2025-05-15 PROCEDURE — 93325 DOPPLER ECHO COLOR FLOW MAPG: CPT | Performed by: INTERNAL MEDICINE

## 2025-05-15 PROCEDURE — 84484 ASSAY OF TROPONIN QUANT: CPT

## 2025-05-15 PROCEDURE — 85730 THROMBOPLASTIN TIME PARTIAL: CPT | Performed by: STUDENT IN AN ORGANIZED HEALTH CARE EDUCATION/TRAINING PROGRAM

## 2025-05-15 PROCEDURE — C1887 CATHETER, GUIDING: HCPCS | Performed by: INTERNAL MEDICINE

## 2025-05-15 PROCEDURE — 93880 EXTRACRANIAL BILAT STUDY: CPT | Performed by: INTERNAL MEDICINE

## 2025-05-15 PROCEDURE — 99223 1ST HOSP IP/OBS HIGH 75: CPT | Performed by: INTERNAL MEDICINE

## 2025-05-15 PROCEDURE — 80061 LIPID PANEL: CPT | Performed by: STUDENT IN AN ORGANIZED HEALTH CARE EDUCATION/TRAINING PROGRAM

## 2025-05-15 PROCEDURE — 93971 EXTREMITY STUDY: CPT

## 2025-05-15 PROCEDURE — C1894 INTRO/SHEATH, NON-LASER: HCPCS | Performed by: INTERNAL MEDICINE

## 2025-05-15 PROCEDURE — 93970 EXTREMITY STUDY: CPT | Performed by: INTERNAL MEDICINE

## 2025-05-15 RX ORDER — HEPARIN SODIUM 10000 [USP'U]/100ML
3-20 INJECTION, SOLUTION INTRAVENOUS
Status: DISCONTINUED | OUTPATIENT
Start: 2025-05-15 | End: 2025-05-19

## 2025-05-15 RX ORDER — INSULIN GLARGINE 100 [IU]/ML
20 INJECTION, SOLUTION SUBCUTANEOUS
Status: CANCELLED | OUTPATIENT
Start: 2025-05-15

## 2025-05-15 RX ORDER — ASPIRIN 81 MG/1
81 TABLET, CHEWABLE ORAL DAILY
Status: CANCELLED | OUTPATIENT
Start: 2025-05-16

## 2025-05-15 RX ORDER — NITROGLYCERIN 0.4 MG/1
0.4 TABLET SUBLINGUAL
Status: DISCONTINUED | OUTPATIENT
Start: 2025-05-15 | End: 2025-05-19

## 2025-05-15 RX ORDER — VERAPAMIL HCL 2.5 MG/ML
AMPUL (ML) INTRAVENOUS CODE/TRAUMA/SEDATION MEDICATION
Status: DISCONTINUED | OUTPATIENT
Start: 2025-05-15 | End: 2025-05-15 | Stop reason: HOSPADM

## 2025-05-15 RX ORDER — FENTANYL CITRATE 50 UG/ML
INJECTION, SOLUTION INTRAMUSCULAR; INTRAVENOUS CODE/TRAUMA/SEDATION MEDICATION
Status: DISCONTINUED | OUTPATIENT
Start: 2025-05-15 | End: 2025-05-15 | Stop reason: HOSPADM

## 2025-05-15 RX ORDER — MIDAZOLAM HYDROCHLORIDE 2 MG/2ML
INJECTION, SOLUTION INTRAMUSCULAR; INTRAVENOUS CODE/TRAUMA/SEDATION MEDICATION
Status: DISCONTINUED | OUTPATIENT
Start: 2025-05-15 | End: 2025-05-15 | Stop reason: HOSPADM

## 2025-05-15 RX ORDER — HEPARIN SODIUM 1000 [USP'U]/ML
2000 INJECTION, SOLUTION INTRAVENOUS; SUBCUTANEOUS EVERY 6 HOURS PRN
Status: CANCELLED | OUTPATIENT
Start: 2025-05-15

## 2025-05-15 RX ORDER — INSULIN GLARGINE 100 [IU]/ML
20 INJECTION, SOLUTION SUBCUTANEOUS
Status: DISCONTINUED | OUTPATIENT
Start: 2025-05-15 | End: 2025-05-17

## 2025-05-15 RX ORDER — HEPARIN SODIUM 1000 [USP'U]/ML
4000 INJECTION, SOLUTION INTRAVENOUS; SUBCUTANEOUS EVERY 6 HOURS PRN
Status: CANCELLED | OUTPATIENT
Start: 2025-05-15

## 2025-05-15 RX ORDER — NITROGLYCERIN 20 MG/100ML
INJECTION INTRAVENOUS CODE/TRAUMA/SEDATION MEDICATION
Status: DISCONTINUED | OUTPATIENT
Start: 2025-05-15 | End: 2025-05-15 | Stop reason: HOSPADM

## 2025-05-15 RX ORDER — METOPROLOL TARTRATE 25 MG/1
25 TABLET, FILM COATED ORAL EVERY 12 HOURS SCHEDULED
Status: DISCONTINUED | OUTPATIENT
Start: 2025-05-15 | End: 2025-05-16

## 2025-05-15 RX ORDER — LABETALOL HYDROCHLORIDE 5 MG/ML
10 INJECTION, SOLUTION INTRAVENOUS EVERY 6 HOURS PRN
Status: DISCONTINUED | OUTPATIENT
Start: 2025-05-15 | End: 2025-05-15 | Stop reason: HOSPADM

## 2025-05-15 RX ORDER — NICOTINE 21 MG/24HR
1 PATCH, TRANSDERMAL 24 HOURS TRANSDERMAL DAILY
Status: DISCONTINUED | OUTPATIENT
Start: 2025-05-16 | End: 2025-05-18

## 2025-05-15 RX ORDER — METOPROLOL TARTRATE 25 MG/1
25 TABLET, FILM COATED ORAL EVERY 12 HOURS SCHEDULED
Status: DISCONTINUED | OUTPATIENT
Start: 2025-05-15 | End: 2025-05-15 | Stop reason: HOSPADM

## 2025-05-15 RX ORDER — INSULIN LISPRO 100 [IU]/ML
1-6 INJECTION, SOLUTION INTRAVENOUS; SUBCUTANEOUS
Status: DISCONTINUED | OUTPATIENT
Start: 2025-05-16 | End: 2025-05-19

## 2025-05-15 RX ORDER — SODIUM CHLORIDE 9 MG/ML
75 INJECTION, SOLUTION INTRAVENOUS CONTINUOUS
Status: DISPENSED | OUTPATIENT
Start: 2025-05-15 | End: 2025-05-15

## 2025-05-15 RX ORDER — ATORVASTATIN CALCIUM 40 MG/1
40 TABLET, FILM COATED ORAL EVERY EVENING
Status: CANCELLED | OUTPATIENT
Start: 2025-05-15

## 2025-05-15 RX ORDER — INSULIN LISPRO 100 [IU]/ML
1-6 INJECTION, SOLUTION INTRAVENOUS; SUBCUTANEOUS
Status: DISCONTINUED | OUTPATIENT
Start: 2025-05-15 | End: 2025-05-15 | Stop reason: HOSPADM

## 2025-05-15 RX ORDER — NICOTINE 21 MG/24HR
1 PATCH, TRANSDERMAL 24 HOURS TRANSDERMAL DAILY
Status: CANCELLED | OUTPATIENT
Start: 2025-05-16

## 2025-05-15 RX ORDER — ASPIRIN 81 MG/1
81 TABLET, CHEWABLE ORAL DAILY
Status: DISCONTINUED | OUTPATIENT
Start: 2025-05-16 | End: 2025-05-19

## 2025-05-15 RX ORDER — SODIUM CHLORIDE 9 MG/ML
75 INJECTION, SOLUTION INTRAVENOUS CONTINUOUS
Status: CANCELLED | OUTPATIENT
Start: 2025-05-15 | End: 2025-05-15

## 2025-05-15 RX ORDER — HEPARIN SODIUM 10000 [USP'U]/100ML
3-20 INJECTION, SOLUTION INTRAVENOUS
Status: CANCELLED | OUTPATIENT
Start: 2025-05-15

## 2025-05-15 RX ORDER — LABETALOL HYDROCHLORIDE 5 MG/ML
10 INJECTION, SOLUTION INTRAVENOUS EVERY 6 HOURS PRN
Status: CANCELLED | OUTPATIENT
Start: 2025-05-15

## 2025-05-15 RX ORDER — LIDOCAINE WITH 8.4% SOD BICARB 0.9%(10ML)
SYRINGE (ML) INJECTION CODE/TRAUMA/SEDATION MEDICATION
Status: DISCONTINUED | OUTPATIENT
Start: 2025-05-15 | End: 2025-05-15 | Stop reason: HOSPADM

## 2025-05-15 RX ORDER — METOPROLOL TARTRATE 25 MG/1
25 TABLET, FILM COATED ORAL EVERY 12 HOURS SCHEDULED
Status: CANCELLED | OUTPATIENT
Start: 2025-05-15

## 2025-05-15 RX ORDER — ATORVASTATIN CALCIUM 40 MG/1
40 TABLET, FILM COATED ORAL EVERY EVENING
Status: DISCONTINUED | OUTPATIENT
Start: 2025-05-16 | End: 2025-05-19

## 2025-05-15 RX ORDER — HEPARIN SODIUM 1000 [USP'U]/ML
4000 INJECTION, SOLUTION INTRAVENOUS; SUBCUTANEOUS EVERY 6 HOURS PRN
Status: DISCONTINUED | OUTPATIENT
Start: 2025-05-15 | End: 2025-05-19

## 2025-05-15 RX ORDER — LISINOPRIL 20 MG/1
20 TABLET ORAL DAILY
Status: CANCELLED | OUTPATIENT
Start: 2025-05-16

## 2025-05-15 RX ORDER — GABAPENTIN 300 MG/1
600 CAPSULE ORAL 2 TIMES DAILY
Status: DISCONTINUED | OUTPATIENT
Start: 2025-05-16 | End: 2025-05-23 | Stop reason: HOSPADM

## 2025-05-15 RX ORDER — HEPARIN SODIUM 1000 [USP'U]/ML
INJECTION, SOLUTION INTRAVENOUS; SUBCUTANEOUS CODE/TRAUMA/SEDATION MEDICATION
Status: DISCONTINUED | OUTPATIENT
Start: 2025-05-15 | End: 2025-05-15 | Stop reason: HOSPADM

## 2025-05-15 RX ORDER — IODIXANOL 320 MG/ML
INJECTION, SOLUTION INTRAVASCULAR CODE/TRAUMA/SEDATION MEDICATION
Status: DISCONTINUED | OUTPATIENT
Start: 2025-05-15 | End: 2025-05-15 | Stop reason: HOSPADM

## 2025-05-15 RX ORDER — CHLORHEXIDINE GLUCONATE ORAL RINSE 1.2 MG/ML
15 SOLUTION DENTAL EVERY 12 HOURS SCHEDULED
Status: DISCONTINUED | OUTPATIENT
Start: 2025-05-15 | End: 2025-05-15 | Stop reason: HOSPADM

## 2025-05-15 RX ORDER — LABETALOL HYDROCHLORIDE 5 MG/ML
10 INJECTION, SOLUTION INTRAVENOUS EVERY 6 HOURS PRN
Status: DISCONTINUED | OUTPATIENT
Start: 2025-05-15 | End: 2025-05-19

## 2025-05-15 RX ORDER — HEPARIN SODIUM 1000 [USP'U]/ML
2000 INJECTION, SOLUTION INTRAVENOUS; SUBCUTANEOUS EVERY 6 HOURS PRN
Status: DISCONTINUED | OUTPATIENT
Start: 2025-05-15 | End: 2025-05-19

## 2025-05-15 RX ORDER — GABAPENTIN 300 MG/1
600 CAPSULE ORAL 2 TIMES DAILY
Status: CANCELLED | OUTPATIENT
Start: 2025-05-15

## 2025-05-15 RX ORDER — LISINOPRIL 20 MG/1
20 TABLET ORAL DAILY
Status: DISCONTINUED | OUTPATIENT
Start: 2025-05-16 | End: 2025-05-16

## 2025-05-15 RX ORDER — INSULIN LISPRO 100 [IU]/ML
1-6 INJECTION, SOLUTION INTRAVENOUS; SUBCUTANEOUS
Status: CANCELLED | OUTPATIENT
Start: 2025-05-15

## 2025-05-15 RX ADMIN — NITROGLYCERIN 0.4 MG: 0.4 TABLET SUBLINGUAL at 22:31

## 2025-05-15 RX ADMIN — MORPHINE SULFATE 2 MG: 2 INJECTION, SOLUTION INTRAMUSCULAR; INTRAVENOUS at 18:44

## 2025-05-15 RX ADMIN — NITROGLYCERIN 0.4 MG: 0.4 TABLET SUBLINGUAL at 22:49

## 2025-05-15 RX ADMIN — NITROGLYCERIN 0.4 MG: 0.4 TABLET SUBLINGUAL at 22:38

## 2025-05-15 RX ADMIN — METOPROLOL TARTRATE 25 MG: 25 TABLET, FILM COATED ORAL at 20:59

## 2025-05-15 RX ADMIN — LISINOPRIL 20 MG: 20 TABLET ORAL at 08:27

## 2025-05-15 RX ADMIN — METOPROLOL TARTRATE 25 MG: 25 TABLET, FILM COATED ORAL at 11:22

## 2025-05-15 RX ADMIN — NICOTINE 1 PATCH: 21 PATCH, EXTENDED RELEASE TRANSDERMAL at 08:28

## 2025-05-15 RX ADMIN — CLOPIDOGREL 75 MG: 75 TABLET ORAL at 08:27

## 2025-05-15 RX ADMIN — HEPARIN SODIUM 13.1 UNITS/KG/HR: 10000 INJECTION, SOLUTION INTRAVENOUS at 20:38

## 2025-05-15 RX ADMIN — SODIUM CHLORIDE 75 ML/HR: 0.9 INJECTION, SOLUTION INTRAVENOUS at 10:24

## 2025-05-15 RX ADMIN — ATORVASTATIN CALCIUM 40 MG: 40 TABLET, FILM COATED ORAL at 18:00

## 2025-05-15 RX ADMIN — GABAPENTIN 600 MG: 300 CAPSULE ORAL at 18:00

## 2025-05-15 RX ADMIN — GABAPENTIN 600 MG: 300 CAPSULE ORAL at 08:27

## 2025-05-15 RX ADMIN — ASPIRIN 81 MG: 81 TABLET, CHEWABLE ORAL at 08:27

## 2025-05-15 RX ADMIN — INSULIN GLARGINE 20 UNITS: 100 INJECTION, SOLUTION SUBCUTANEOUS at 21:00

## 2025-05-15 RX ADMIN — CHLORHEXIDINE GLUCONATE 0.12% ORAL RINSE 15 ML: 1.2 LIQUID ORAL at 16:45

## 2025-05-15 RX ADMIN — HEPARIN SODIUM 2000 UNITS: 1000 INJECTION, SOLUTION INTRAVENOUS; SUBCUTANEOUS at 00:43

## 2025-05-15 RX ADMIN — INSULIN LISPRO 1 UNITS: 100 INJECTION, SOLUTION INTRAVENOUS; SUBCUTANEOUS at 18:01

## 2025-05-15 NOTE — ASSESSMENT & PLAN NOTE
Lab Results   Component Value Date    HGBA1C 7.7 (H) 05/13/2025       Recent Labs     05/14/25  1748 05/14/25  2054 05/15/25  0813   POCGLU 295* 416* 105       Blood Sugar Average: Last 72 hrs:  (P) 272  Management per primary team

## 2025-05-15 NOTE — QUICK NOTE
Cardiac surgery has been notified of inpatient consultation ordered for Tc Stovall, for evaluation of Coronary artery disease. Arrangements will be made for transfer to [facility] for surgical evaluation with Jose Bethea In the interim, remote work up will be initiated in the form of carotid artery ultrasound, vein mapping, echocardiogram, MRSA nasal swab, type and screen, and . Additionally, nasal Mupirocin and Chlorhexadine oral rinse will be started for routine surgical prophylaxis.    Mariana Street PA-C  05/15/25  12:00 PM

## 2025-05-15 NOTE — ASSESSMENT & PLAN NOTE
Chronic, uncontrolled this am but improved post-cath   Blood Pressure: 120/61   Continue lisinopril and Norvasc  IV labetalol prn persistent elevation

## 2025-05-15 NOTE — ASSESSMENT & PLAN NOTE
Secondary stroke prevention: BP and BG control, continue antiplatelet with aspirin  Ongoing discussion with primary cardiology for possible loop  Outpatient follow-up

## 2025-05-15 NOTE — CONSULTS
Consultation - Cardiology   Tc Stovall 75 y.o. male MRN: [id number]  Unit/Bed#: [facility] CATH LAB ROOM Encounter: [id number]  05/15/25  9:51 AM    Assessment/ Plan:   Assessment & Plan  Chest discomfort  Troponins elevated to >22,973.  EKG with dynamic ST-T wave changes in leads I, aVL, V2, with dynamic ST-T wave depressions in inferior leads.  Plan for further evaluation with cardiac catheterization today.  Discussed the indications, alternatives, risks and benefit of cardiac catheterization and possible PCI. The procedure risks, benefits, and complications (including but not limited to bleeding, infection, arrhythmia, nephrotoxicity, vessel injury, myocardial infarction, CVA, and death) were reviewed.  Patient is alert and oriented x3 and wishes to proceed. All questions answered.   Continue heparin GTT, aspirin, Plavix (he is s/p Plavix load), Lipitor.    Elevated troponin  Plan per above    Primary hypertension  Continue lisinopril 20 mg daily    HLD (hyperlipidemia)  Last LDL 63.  Continue Lipitor 40 mg daily    History of CVA (cerebrovascular accident)  Multiple acute to subacute lacunar infarcts 1/2025.  Continue to follow with cardiology outpatient given concerns for possible cardioembolic etiology.  Recommend establishing with outpatient neurology.  Further management per primary team.    Type 2 diabetes mellitus with hyperglycemia, without long-term current use of insulin (HCC)  Lab Results   Component Value Date    HGBA1C 7.7 (H) 05/13/2025       Recent Labs     05/14/25  1748 05/14/25  2054 05/15/25  0813   POCGLU 295* 416* 105       Blood Sugar Average: Last 72 hrs:  (P) 272  Management per primary team    Paroxysmal atrial tachycardia (HCC)  Continue telemetry    PVC's (premature ventricular contractions)  Continue telemetry    Tobacco use  Recommend cessation          Update: Patient underwent cardiac catheterization which demonstrated multivessel CAD.  Further management with CABG was recommended by ICU  Dr. Huertas.  This was discussed with Dr. Velazquez, who is agreeable to transfer to [place] for CT surgery eval for consideration of CABG.  Patient was advised to this, however was groggy after his catheterization.  Patient's son Mateusz was called by myself, and cardiac plan of care discussed at length.  He is agreeable to transfer on his father's behalf.  Primary team made aware.  Discontinue Plavix to allow for washout period prior to CABG.  Continue aspirin, Lipitor, lisinopril.  Continue heparin GTT after TR band has been off for 2 hours.  Begin Lopressor 25 mg twice daily.  Continue telemetry.  Cardiology will continue to follow.        History of Present Illness   Physician Requesting Consult: Santhosh Gómez MD    Reason for Consult / Principal Problem: Chest pain, elevated troponins    HPI: Tc Stovall is a 75 y.o. year old male with PMHx of multiple CVAs, hypertension, hyperlipidemia, paroxysmal atrial tachycardia, PVCs, tobacco use who presents with chest pain.    He presented to [facility] yesterday for sudden onset of severe substernal chest pain at rest.  He reports the symptom started at approximately 2-2:30 PM yesterday, at rest when he was driving after eating [employer].  He noted that the pain did radiate around to his back.  He did call 911, and was found to have blood pressure reportedly of 200/110, and reportedly received nitroglycerin, aspirin with some improvement in pain.  He received loading dose of Plavix, heparin GTT, fentanyl, and transdermal nitroglycerin in the ED with eventual resolution of his symptoms.  He was noted to have elevated troponins: -[id number]->22,973.  EKG demonstrated dynamic ST-T wave changes in leads I and aVL, as well as V2, with dynamic ST depressions in inferior leads.  CTA was negative for aortic dissection or intramural hematoma, or aortic aneurysm.  He is noted to have mild stenosis of the proximal left subclavian artery, mild stenosis of the left external iliac  artery, coronary artery calcifications.      He was hospitalized January 2025 for CVA.  He was found to have multiple subacute infarcts with concern for embolic etiology.  He has been following with Dr. Lujan to evaluate for potential cardioembolic source of CVA.  He is pending ILR implant.    He follows with Dr. Lujan as his outpatient cardiologist.  He does note a family history of father with MI at the age of 58.  Echocardiogram 1/13/2025: EF 55%, normal wall motion, grade 1 diastolic dysfunction, mild AR, mild MR, mild TR, mild TN  Event monitor 2/2025: Predominantly sinus rhythm with average heart rate of 72 bpm.  Rare PACs and PVCs.  Frequent short runs of SVT, some of which were atrial tachycardia with variable block.    Inpatient consult to Cardiology  Consult performed by: Ann-Marie Borges PA-C  Consult ordered by: Tutu MCKEON MD          EKG: Sinus rhythm, dynamic ST-T wave changes in leads I and aVL, and V2; dynamic ST depressions in inferior leads.    Review of Systems   Constitutional:  Negative for fever and unexpected weight change.   HENT:  Negative for ear pain and sore throat.    Eyes:  Negative for pain and redness.   Respiratory:  Negative for cough and shortness of breath.    Cardiovascular:  Positive for chest pain. Negative for palpitations and leg swelling.   Gastrointestinal:  Negative for abdominal pain and vomiting.   Genitourinary:  Negative for dysuria.   Musculoskeletal:  Positive for back pain.   Skin:  Negative for color change and rash.   Neurological:  Negative for dizziness, syncope and light-headedness.   Hematological:  Does not bruise/bleed easily.   Psychiatric/Behavioral:  Negative for agitation and behavioral problems.    All other systems reviewed and are negative.      Historical Information   Past Medical History:   Diagnosis Date    Arthritis     Cerebellar infarction (HCC)     Cerebrovascular accident (CVA) due to embolism of cerebral artery (HCC)      "Diabetes mellitus (HCC)     Hyperlipidemia     Hypertension     Left rotator cuff tear     Stroke ([facility]) [id number]    Tobacco abuse     Wears glasses      Past Surgical History:   Procedure Laterality Date    COLONOSCOPY      KNEE SURGERY      [place] SURGICAL ARTHROSCOPY SHOULDER W/ROTATOR CUFF RPR Left 08/10/2022    Procedure: Left Shoulder Arthroscopy, supraspinatus and Subscapularis Repair, Bicep Tenotomy, acromioplasty, extensive debridement;  Surgeon: Otilio Mendez DO;  Location: [facility];  Service: Orthopedics    TONSILLECTOMY       Social History     Substance and Sexual Activity   Alcohol Use Yes    Comment: rarely     Social History     Substance and Sexual Activity   Drug Use No     Tobacco Use History[1]    Family History:   Family History   Problem Relation Age of Onset    Alzheimer's disease Mother     Diabetes Mother     Heart block Father     Heart disease Father        Meds/Allergies   all current active meds have been reviewed  Allergies[2]    Objective   Vitals: Blood pressure 120/61, pulse 70, temperature 98.6 °F (37 °C), temperature source Temporal, resp. rate 20, height 5' 7\" (1.702 m), weight 100 kg (221 lb), SpO2 100%., Body mass index is 34.61 kg/m².,   Orthostatic Blood Pressures      Flowsheet Row Most Recent Value   Blood Pressure 120/61 filed at 05/15/2025 0935   Patient Position - Orthostatic VS Lying filed at 2025 1533            Systolic (24hrs), Av , Min:120 , Max:174     Diastolic (24hrs), Av, Min:61, Max:96        Intake/Output Summary (Last 24 hours) at 5/15/2025 0951  Last data filed at 5/15/2025 0825  Gross per 24 hour   Intake --   Output 400 ml   Net -400 ml       Invasive Devices       Peripheral Intravenous Line  Duration             Peripheral IV 25 Right Antecubital <1 day                        Physical Exam:    GEN: Alert and oriented x 3, in no acute distress.  Well appearing and well nourished.   HEENT: Sclera anicteric, conjunctivae pink, " mucous membranes moist. Oropharynx clear.   NECK: Supple, no significant JVD. Trachea midline.   HEART: Regular rhythm, normal S1 and S2, no murmurs.   LUNGS: Clear to auscultation bilaterally; no wheezes, rales, or rhonchi. No increased work of breathing or signs of respiratory distress.   ABDOMEN: Soft, nontender, non-distended.   EXTREMITIES: Skin warm and well perfused, no clubbing, cyanosis, or edema.  SKIN: Normal without suspicious lesions on exposed skin.      Lab Results:     Troponins:   Results from last 7 days   Lab Units 05/15/25  0357 05/15/25  0204 05/14/25  2353 05/14/25  1724 05/14/25  1543   HS TNI 0HR ng/L  --   --  12,246*  --  50*   HS TNI 2HR ng/L  --  >22,973*  --  392*  --    HS TNI 4HR ng/L >22,973*  --   --   --   --    HSTNI D4 ng/L >10,727*  --   --   --   --        CBC with diff:   Results from last 7 days   Lab Units 05/15/25  0614 05/14/25  1724 05/14/25  1543   WBC Thousand/uL 12.31* 11.30* 9.07   HEMOGLOBIN g/dL 11.9* 11.6* 12.0   HEMATOCRIT % 36.8 35.2* 36.2*   MCV fL 88 87 87   PLATELETS Thousands/uL 306 314 340   RBC Million/uL 4.17 4.05 4.18   MCH pg 28.5 28.6 28.7   MCHC g/dL 32.3 33.0 33.1   RDW % 12.2 12.2 12.3   MPV fL 9.9 10.0 10.2   NRBC AUTO /100 WBCs  --   --  0         CMP:   Results from last 7 days   Lab Units 05/15/25  0614 05/14/25  1543 05/13/25  0640   POTASSIUM mmol/L 4.0 4.2 4.0   CHLORIDE mmol/L 107 104 105   CO2 mmol/L 26 25 25   BUN mg/dL 20 28* 27*   CREATININE mg/dL 0.89 1.02 0.98   CALCIUM mg/dL 8.7 9.6 9.1   AST U/L  --  13  --    ALT U/L  --  15  --    ALK PHOS U/L  --  76  --    EGFR ml/min/1.73sq m 84 72 75       ** Please Note: Fluency DirectDictation voice to text software may have been used in the creation of this document. **         [1]   Social History  Tobacco Use   Smoking Status Every Day    Current packs/day: 0.50    Average packs/day: 0.6 packs/day for 85.9 years (55.3 ttl pk-yrs)    Types: Cigarettes    Start date: 4/26/1964   Smokeless  Tobacco Never   [2] No Known Allergies

## 2025-05-15 NOTE — NURSING NOTE
Patient reflex troponins noted to be rapidly increasing, 0 hr reflex being 12,246 and then 2 hr reflex being >22,973. [facility] provider Diane CLARKE made aware via Epic chat by this RN. Patient denies chest pain or back pain at this time, stated he feels much better and that his pain has resolved. Vitals taken /77 HR 76 Pulse ox 94% on RA, Resps at 16. No new orders received, pt remains on heparin gtt per order. Per [facility] provider, patient awaiting cardiology consult.

## 2025-05-15 NOTE — ASSESSMENT & PLAN NOTE
Most recent stroke in 1/2025 the MRI brain showed subacute infarction right paramedian gianfranco and left parietal lobe, suspicious for cardioembolic stroke.  CTA that time showed mild to moderate stenosis of bilateral cervical vertebral arteries.  No clear symptoms suggest A-fib. Outpatient Zio patch noted with episodes of paroxysmal atrial tachycardia without evidence of A-fib or a flutter.     Continue aspirin and statin were inpatient.

## 2025-05-15 NOTE — ASSESSMENT & PLAN NOTE
Patient presents 5/14 with c/o chest pain in setting of known CAD. PAOLO 6    hsTnI initially 50 --> >49767  EKG: NSR, 1* AVB, anteroseptal Tw inversion  CTA dissection c/a/p (5/14): no aneurysm/dissection; several tiny nodular opacities in the right upper lobe with adjacent tree-in-bud opacities favored to be infectious/inflammatory  Patient had received loading dose of aspirin, Plavix  Continue tele monitor  Continue ASA, plavix, statin, heparin drip    Cardiology consulted,  Catheterization today 5/15 showing severe multivessel disease  Request transfer to [facility] for CABG evaluation with cardiac surgery team  Nitroglycerin prn

## 2025-05-15 NOTE — PLAN OF CARE
Problem: CARDIOVASCULAR - ADULT  Goal: Maintains optimal cardiac output and hemodynamic stability  Description: INTERVENTIONS:  - Monitor I/O, vital signs and rhythm  - Monitor for S/S and trends of decreased cardiac output  - Administer and titrate ordered vasoactive medications to optimize hemodynamic stability  - Assess quality of pulses, skin color and temperature  - Assess for signs of decreased coronary artery perfusion  - Instruct patient to report change in severity of symptoms  Outcome: Progressing     Problem: Knowledge Deficit  Goal: Patient/family/caregiver demonstrates understanding of disease process, treatment plan, medications, and discharge instructions  Description: Complete learning assessment and assess knowledge base.  Interventions:  - Provide teaching at level of understanding  - Provide teaching via preferred learning methods  Outcome: Progressing

## 2025-05-15 NOTE — QUICK NOTE
F/u:    HS trop uptrending 2146.  Currently patient does report chest pain improving from 3/10.  EKG without ST elevation.  Currently on IV heparin drip, continue NSTEMI treatment as per earlier note.

## 2025-05-15 NOTE — ASSESSMENT & PLAN NOTE
Troponins elevated to >22,973.  EKG with dynamic ST-T wave changes in leads I, aVL, V2, with dynamic ST-T wave depressions in inferior leads.  Plan for further evaluation with cardiac catheterization today.  Discussed the indications, alternatives, risks and benefit of cardiac catheterization and possible PCI. The procedure risks, benefits, and complications (including but not limited to bleeding, infection, arrhythmia, nephrotoxicity, vessel injury, myocardial infarction, CVA, and death) were reviewed.  Patient is alert and oriented x3 and wishes to proceed. All questions answered.   Continue heparin GTT, aspirin, Plavix (he is s/p Plavix load), Lipitor.

## 2025-05-15 NOTE — ASSESSMENT & PLAN NOTE
Home regimen amlodipine 25 mg daily lisinopril 20 mg daily    Plan:  - Currently on Lopressor 25 mg twice daily and lisinopril 20 mg daily  - Consider titrating up Lopressor if needed given may have to hold lisinopril prior to his CABG  - PRN IV labetalol

## 2025-05-15 NOTE — ASSESSMENT & PLAN NOTE
Initially presented to [facility] on 5/14 for 10 out of 10 sharp chest pain into his back which started few hours prior to arrival when he was eating [employer].  In ED, troponin was 50 and peaked at 22,000s.   EKG demonstrated dynamic ST-T wave changes in leads I and aVL, as well as V2, with dynamic ST depressions in inferior leads.    Received loading dose of Plavix and heparin infusion    CTA was negative for aortic dissection or intramural hematoma, or aortic aneurysm. Noted to have mild stenosis of the proximal left subclavian artery, mild stenosis of the left external iliac artery, coronary artery calcifications.      Status post L HC on 5/15 showed multivessel CAD (LAD 75% stenosis, mid circumflex and distal circumflex 90% stenosis, and right PDA 1 and 2 75% stenosis and 90%  stenosis respectively).  Transferred to [facility] for CABG eval with CT surgery.    Plan:  - CT surgery consulted, appreciate recs  - Cardiology consulted, appreciate recs  - Continue to monitor on telemetry  - Continue heparin infusion  - Continue aspirin   - Hold Plavix to allow washout period prior to CABG  - Continue Lipitor

## 2025-05-15 NOTE — ASSESSMENT & PLAN NOTE
Body mass index is 34.61 kg/m².  Recommend incorporating a more whole foods plant-predominant diet along with decreasing consumption of red meats and processed foods  Per AHA guidelines, recommend moderate-vigorous intensity exercise for 30 minutes a day for 5 days a week or a total of 150 min/week

## 2025-05-15 NOTE — ASSESSMENT & PLAN NOTE
Multiple acute to subacute lacunar infarcts 1/2025.  Continue to follow with cardiology outpatient given concerns for possible cardioembolic etiology.  Recommend establishing with outpatient neurology.  Further management per primary team.

## 2025-05-15 NOTE — ASSESSMENT & PLAN NOTE
Lab Results   Component Value Date    HGBA1C 7.7 (H) 05/13/2025       Recent Labs     05/14/25  1748 05/14/25  2054 05/15/25  0813 05/15/25  1049   POCGLU 295* 416* 105 149*       Blood Sugar Average: Last 72 hrs:    Home regimen of glipizide 10 mg daily, metformin 1 g BID, and Lantus 20 units at bedtime    Fasting glucose 127    Plan:  - Continue Lantus 20 units at bedtime plus SSI  - Carb controlled diet  - Hypoglycemia protocol  - BG target 140-180

## 2025-05-15 NOTE — H&P
H&P - Internal Medicine   Name: Tc Stovall 75 y.o. male I MRN: [id number]  Unit/Bed#:  I Date of Admission: (Not on file)   Date of Service: 5/15/2025 I Hospital Day: 0     Assessment & Plan  CAD, multiple vessel  Initially presented to [facility] on 5/14 for 10 out of 10 sharp chest pain into his back which started few hours prior to arrival when he was eating [employer].  In ED, troponin was 50 and peaked at 22,000s.   EKG demonstrated dynamic ST-T wave changes in leads I and aVL, as well as V2, with dynamic ST depressions in inferior leads.    Received loading dose of Plavix and heparin infusion    CTA was negative for aortic dissection or intramural hematoma, or aortic aneurysm. Noted to have mild stenosis of the proximal left subclavian artery, mild stenosis of the left external iliac artery, coronary artery calcifications.      Status post L HC on 5/15 showed multivessel CAD (LAD 75% stenosis, mid circumflex and distal circumflex 90% stenosis, and right PDA 1 and 2 75% stenosis and 90%  stenosis respectively).  Transferred to [facility] for CABG eval with CT surgery.    Plan:  - CT surgery consulted, appreciate recs  - Cardiology consulted, appreciate recs  - Continue to monitor on telemetry  - Continue heparin infusion  - Continue aspirin   - Hold Plavix to allow washout period prior to CABG  - Continue Lipitor  NSTEMI (non-ST elevated myocardial infarction) (HCC)  As above     Elevated troponin  2/2 ischemic cardiomyopathy  Primary hypertension  Home regimen amlodipine 25 mg daily lisinopril 20 mg daily    Plan:  - Currently on Lopressor 25 mg twice daily and lisinopril 20 mg daily  - Consider titrating up Lopressor if needed given may have to hold lisinopril prior to his CABG  - PRN IV labetalol   Hyperlipidemia  Lipid panel 5/15/25: , TG 79, HDL 36    Plan:  - Continue Lipitor  Type 2 diabetes mellitus (HCC)  Lab Results   Component Value Date    HGBA1C 7.7 (H) 05/13/2025       Recent Labs      05/14/25  1748 05/14/25 2054 05/15/25  0813 05/15/25  1049   POCGLU 295* 416* 105 149*       Blood Sugar Average: Last 72 hrs:    Home regimen of glipizide 10 mg daily, metformin 1 g BID, and Lantus 20 units at bedtime    Fasting glucose 127    Plan:  - Continue Lantus 20 units at bedtime plus SSI  - Carb controlled diet  - Hypoglycemia protocol  - BG target 140-180  History of CVA (cerebrovascular accident)  Most recent stroke in 1/2025 the MRI brain showed subacute infarction right paramedian gianfranco and left parietal lobe, suspicious for cardioembolic stroke.  CTA that time showed mild to moderate stenosis of bilateral cervical vertebral arteries.  No clear symptoms suggest A-fib. Outpatient Zio patch noted with episodes of paroxysmal atrial tachycardia without evidence of A-fib or a flutter.     Continue aspirin and statin were inpatient.    Tobacco use  Smoker about half a pack a day with 55-pack-year history  Nicotine patch when patient  Paroxysmal atrial tachycardia (HCC)  Continue to monitor on telemetry  PVC's (premature ventricular contractions)  Continue to monitor on telemetry    Code Status: Level 1 - Full Code  Admission Status: INPATIENT   Disposition: Patient requires Med Surg with Telemetry    Admit to team: SOD TEAM C    History of Present Illness   75-year-old male with a past medical history of multiple CVAs, hypertension, hyperlipidemia, tobacco use, 2 diabetes, and paroxysmal atrial tachycardia with PVCs, and family history of early MI (father ~50s) initially presented to [facility] on 5/14 for 10 out of 10 sharp chest pain into his back which started few hours prior to arrival when he was eating [employer]. No prior MI or CP. Patient is an active smoker. EMS was called and noted his /110.  Has been improved with nitroglycerin and aspirin.  In the ED, vital signs stable. Received loading dose of Plavix, heparin GTT, fentanyl, and transdermal nitroglycerin in the ED with eventual resolution  of his symptoms. Troponin from 50s to 22k.  EKG demonstrated dynamic ST-T wave changes in leads I and aVL, as well as V2, with dynamic ST depressions in inferior leads.  CTA was negative for aortic dissection or intramural hematoma, or aortic aneurysm.  He is noted to have mild stenosis of the proximal left subclavian artery, mild stenosis of the left external iliac artery, coronary artery calcifications.  Status post L HC on 5/15 showed multivessel CAD (LAD 75% stenosis, mid circumflex and distal circumflex 90% stenosis, and right PDA 1 and 2 75% stenosis and 90%  stenosis respectively).  Case discussed with Dr. Bethea at [facility] and recommended transfer to [facility] for CABG evaluation.    Upon arrival, patient states that his symptoms have resolved. Pt is able to ambulate to and from the bathroom. Denies changes in weight leading up to this event. Denies worsening JOHNSON. No orthopnea. No leg swelling or abdominal distension.       Review of Systems   Constitutional:  Positive for appetite change. Negative for chills, fever and unexpected weight change.   Eyes:  Negative for visual disturbance.   Respiratory:  Negative for shortness of breath.    Cardiovascular:  Negative for chest pain (resolved) and leg swelling.   Gastrointestinal:  Negative for abdominal pain.   Genitourinary:  Negative for dysuria.   Skin:  Negative for color change.     Medical History Review: I have reviewed the patient's PMH, PSH, Social History, Family History, Meds, and Allergies     Objective :  Temp:  [97.8 °F (36.6 °C)-98.6 °F (37 °C)] 98.6 °F (37 °C)  HR:  [58-95] 64  BP: (114-174)/(52-96) 135/67  Resp:  [16-20] 20  SpO2:  [94 %-100 %] 98 %  O2 Device: EtCO2 nasal cannula    Intake & Output:  I/O       None          Weights:        There is no height or weight on file to calculate BMI.  Weight (last 2 days)       None          Physical Exam  Vitals reviewed.   Constitutional:       General: He is not in acute distress.     Appearance: Normal  appearance. He is well-developed. He is obese. He is not ill-appearing.   HENT:      Head: Normocephalic and atraumatic.      Mouth/Throat:      Mouth: Mucous membranes are moist.     Eyes:      Conjunctiva/sclera: Conjunctivae normal.     Neck:      Comments: No JVD  Cardiovascular:      Rate and Rhythm: Normal rate and regular rhythm.      Heart sounds: No murmur heard.  Pulmonary:      Effort: Pulmonary effort is normal. No respiratory distress.      Breath sounds: Rhonchi present.   Abdominal:      Palpations: Abdomen is soft.      Tenderness: There is no abdominal tenderness.     Musculoskeletal:         General: No swelling.      Cervical back: Neck supple.     Skin:     General: Skin is warm and dry.      Capillary Refill: Capillary refill takes less than 2 seconds.     Neurological:      Mental Status: He is alert and oriented to person, place, and time.     Psychiatric:         Mood and Affect: Mood normal.           Lab Results: I have reviewed the following results:  Recent Labs     05/14/25  1543 05/14/25  1543 05/14/25  1724 05/14/25  2353 05/15/25  0204 05/15/25  0614   WBC 9.07  --  11.30*  --   --  12.31*   HGB 12.0  --  11.6*  --   --  11.9*   HCT 36.2*  --  35.2*  --   --  36.8     --  314  --   --  306   SODIUM 139  --   --   --   --  139   K 4.2  --   --   --   --  4.0     --   --   --   --  107   CO2 25  --   --   --   --  26   BUN 28*  --   --   --   --  20   CREATININE 1.02  --   --   --   --  0.89   GLUC 289*  --   --   --   --  127   AST 13  --   --   --   --   --    ALT 15  --   --   --   --   --    ALB 4.1  --   --   --   --   --    TBILI 0.53  --   --   --   --   --    ALKPHOS 76  --   --   --   --   --    PTT  --    < > 34 56*  --  86*   INR  --   --  1.00  --   --   --    HSTNI0 50*  --   --  12,246*  --   --    HSTNI2  --    < > 392*  --  >22,973*  --    BNP 19  --   --   --   --   --     < > = values in this interval not displayed.     Micro:  No results found for:  "\"BLOODCX\", \"URINECX\", \"WOUNDCULT\", \"SPUTUMCULTUR\"    Imaging Results Review: I reviewed radiology reports from this admission including: CT chest, CT abdomen/pelvis, procedure reports, and Ultrasound(s).  Other Study Results Review: EKG was reviewed.     Currently Ordered Meds: No current facility-administered medications for this encounter.    Facility-Administered Medications Ordered in Other Encounters:     aspirin chewable tablet 81 mg, Daily    atorvastatin (LIPITOR) tablet 40 mg, QPM    gabapentin (NEURONTIN) capsule 600 mg, BID    heparin (porcine) 25,000 units in 0.45% NaCl 250 mL infusion (premix), Titrated, Last Rate: 13.1 Units/kg/hr (05/15/25 0044)    heparin (porcine) injection 2,000 Units, Q6H PRN    heparin (porcine) injection 4,000 Units, Q6H PRN    insulin glargine (LANTUS) subcutaneous injection 20 Units 0.2 mL, HS    insulin lispro (HumALOG/ADMELOG) 100 units/mL subcutaneous injection 1-6 Units, TID AC **AND** Fingerstick Glucose (POCT), TID AC    labetalol (NORMODYNE) injection 10 mg, Q6H PRN    lisinopril (ZESTRIL) tablet 20 mg, Daily    metoprolol tartrate (LOPRESSOR) tablet 25 mg, Q12H LEONELA    [Transfer Hold] morphine injection 2 mg, Q4H PRN    nicotine (NICODERM CQ) 21 mg/24 hr TD 24 hr patch 1 patch, Daily    sodium chloride 0.9 % infusion, Continuous, Last Rate: 75 mL/hr (05/15/25 1024)  VTE Pharmacologic Prophylaxis: VTE covered by:    None    Facility-Administered Medications Ordered in Other Encounters (Includes Only Anticoagulants)  heparin (porcine), Intravenous, 13.1 Units/kg/hr at 05/15/25 0044  heparin (porcine), Intravenous, 2,000 Units at 05/15/25 0043  heparin (porcine), Intravenous     VTE Mechanical Prophylaxis: sequential compression device    Administrative Statements     Portions of the record may have been created with voice recognition software.  Occasional wrong word or \"sound a like\" substitutions may have occurred due to the inherent limitations of voice recognition " software.  Read the chart carefully and recognize, using context, where substitutions have occurred.  ==      Richmond Blood MD  Internal Medicine PGY-2  [facility]

## 2025-05-15 NOTE — ASSESSMENT & PLAN NOTE
Continue lipitor  Lab Results   Component Value Date    LDLCALC 63 05/15/2025    HDL 36 (L) 05/15/2025    TRIG 79 05/15/2025    CHOLESTEROL 115 05/15/2025

## 2025-05-15 NOTE — PROGRESS NOTES
Progress Note - Hospitalist   Name: Tc Stovall 75 y.o. male I MRN: [id number]  Unit/Bed#: [facility] CATH LAB ROOM I Date of Admission: 5/14/2025   Date of Service: 5/15/2025 I Hospital Day: 0    Assessment & Plan  Type 2 diabetes mellitus with hyperglycemia, without long-term current use of insulin ([facility])  Blood Sugar Average: Last 72 hrs: (P) 272  Somewhat uncontrolled a/e/b A1c 7.7% in setting of CAD   Hold [glipizide, metformin]  Continue home dose lantus 20u HS   Humalog SSI # 3   [facility] diet   Blood glucose monitoring ACHS  Hypoglycemia protocol  NSTEMI (non-ST elevated myocardial infarction) ([facility])  Patient presents 5/14 with c/o chest pain in setting of known CAD. PAOLO 6    hsTnI initially 50 --> >54136  EKG: NSR, 1* AVB, anteroseptal Tw inversion  CTA dissection c/a/p (5/14): no aneurysm/dissection; several tiny nodular opacities in the right upper lobe with adjacent tree-in-bud opacities favored to be infectious/inflammatory  Patient had received loading dose of aspirin, Plavix  Continue tele monitor  Continue ASA, plavix, statin, heparin drip    Cardiology consulted,  Catheterization today 5/15 showing severe multivessel disease  Request transfer to [facility] for CABG evaluation with cardiac surgery team  Nitroglycerin prn  Obesity, morbid ([facility])  Body mass index is 34.61 kg/m².  Recommend incorporating a more whole foods plant-predominant diet along with decreasing consumption of red meats and processed foods  Per AHA guidelines, recommend moderate-vigorous intensity exercise for 30 minutes a day for 5 days a week or a total of 150 min/week  Primary hypertension  Chronic, uncontrolled this am but improved post-cath   Blood Pressure: 120/61   Continue lisinopril and Norvasc  IV labetalol prn persistent elevation  HLD (hyperlipidemia)  Continue lipitor  Lab Results   Component Value Date    LDLCALC 63 05/15/2025    HDL 36 (L) 05/15/2025    TRIG 79 05/15/2025    CHOLESTEROL 115 05/15/2025     History of CVA (cerebrovascular  accident)  Secondary stroke prevention: BP and BG control, continue antiplatelet with aspirin  Ongoing discussion with primary cardiology for possible loop  Outpatient follow-up    VTE Pharmacologic Prophylaxis: VTE Score: 4 Moderate Risk (Score 3-4) - Pharmacological DVT Prophylaxis Ordered: heparin drip.    Mobility:   Basic Mobility Inpatient Raw Score: 22  JH-HLM Goal: 7: Walk 25 feet or more  JH-HLM Achieved: 7: Walk 25 feet or more  JH-HLM Goal achieved. Continue to encourage appropriate mobility.    Patient Centered Rounds: I performed bedside rounds with nursing staff today.   Discussions with Specialists or Other Care Team Provider: ANA MARIA sanabria     Education and Discussions with Family / Patient: Updated  (son) via phone. Mateusz briefly on phone when I entered room, aware of findings and recommendations     Current Length of Stay: 0 day(s)  Current Patient Status: Observation   Certification Statement: The patient, admitted on an observation basis, will now require > 2 midnight hospital stay due to NSTEMI management, s/p cath today   Discharge Plan: Anticipate discharge in 24-48 hrs to transfer to B for CABG evaluation    Code Status: Level 1 - Full Code    Subjective   Doing well post-cath, no c/o chest pain presently. Reports pain subsided around midnight. No wrist pain presently. Agreeable to transfer to B for further evaluation.     Objective :  Temp:  [97.8 °F (36.6 °C)-98.6 °F (37 °C)] 98.6 °F (37 °C)  HR:  [65-95] 70  BP: (136-174)/(68-96) 174/75  Resp:  [16-20] 20  SpO2:  [95 %-100 %] 100 %  O2 Device: EtCO2 nasal cannula    Body mass index is 34.61 kg/m².     Input and Output Summary (last 24 hours):     Intake/Output Summary (Last 24 hours) at 5/15/2025 0904  Last data filed at 5/15/2025 0825  Gross per 24 hour   Intake --   Output 400 ml   Net -400 ml       Physical Exam  Vitals and nursing note reviewed.   Constitutional:       General: He is awake. He is not in acute distress.      Appearance: Normal appearance. He is well-developed. He is obese. He is not ill-appearing, toxic-appearing or diaphoretic.   HENT:      Head: Normocephalic and atraumatic.      Mouth/Throat:      Mouth: Mucous membranes are dry.     Eyes:      General: No scleral icterus.      Cardiovascular:      Rate and Rhythm: Normal rate and regular rhythm.      Heart sounds: Normal heart sounds. No murmur heard.     No gallop.   Pulmonary:      Effort: Pulmonary effort is normal. No respiratory distress.      Breath sounds: Normal breath sounds. No rales.   Abdominal:      General: Bowel sounds are normal. There is no distension.      Palpations: Abdomen is soft.      Tenderness: There is no abdominal tenderness.     Musculoskeletal:      Right lower leg: No edema.      Left lower leg: No edema.     Skin:     General: Skin is warm and dry.      Coloration: Skin is not jaundiced or pale.     Neurological:      General: No focal deficit present.      Mental Status: He is alert and oriented to person, place, and time. Mental status is at baseline.      Cranial Nerves: No cranial nerve deficit.     Psychiatric:         Mood and Affect: Mood normal.         Behavior: Behavior normal. Behavior is cooperative.         Thought Content: Thought content normal.         Judgment: Judgment normal.            Lines/Drains:        Telemetry:  Telemetry Orders (From admission, onward)               24 Hour Telemetry Monitoring  Continuous x 24 Hours (Telem)        Expiring   Question:  Reason for 24 Hour Telemetry  Answer:  Patients with richmond/nahun/endocarditis; cardiac contusion                     Telemetry Reviewed: Normal Sinus Rhythm  Indication for Continued Telemetry Use: Awaiting PCI/EP Study/CABG               Lab Results: I have reviewed the following results:   Results from last 7 days   Lab Units 05/15/25  0614 05/14/25  1724 05/14/25  1543   WBC Thousand/uL 12.31*   < > 9.07   HEMOGLOBIN g/dL 11.9*   < > 12.0   HEMATOCRIT % 36.8    < > 36.2*   PLATELETS Thousands/uL 306   < > 340   SEGS PCT %  --   --  57   LYMPHO PCT %  --   --  28   MONO PCT %  --   --  9   EOS PCT %  --   --  4    < > = values in this interval not displayed.     Results from last 7 days   Lab Units 05/15/25  0614 05/14/25  1543   SODIUM mmol/L 139 139   POTASSIUM mmol/L 4.0 4.2   CHLORIDE mmol/L 107 104   CO2 mmol/L 26 25   BUN mg/dL 20 28*   CREATININE mg/dL 0.89 1.02   ANION GAP mmol/L 6 10   CALCIUM mg/dL 8.7 9.6   ALBUMIN g/dL  --  4.1   TOTAL BILIRUBIN mg/dL  --  0.53   ALK PHOS U/L  --  76   ALT U/L  --  15   AST U/L  --  13   GLUCOSE RANDOM mg/dL 127 289*     Results from last 7 days   Lab Units 05/14/25  1724   INR  1.00     Results from last 7 days   Lab Units 05/15/25  0813 05/14/25  2054 05/14/25  1748   POC GLUCOSE mg/dl 105 416* 295*     Results from last 7 days   Lab Units 05/13/25  0640   HEMOGLOBIN A1C % 7.7*           Recent Cultures (last 7 days):         Imaging Results Review: No pertinent imaging studies reviewed.  Other Study Results Review: No additional pertinent studies reviewed.    Last 24 Hours Medication List:     Current Facility-Administered Medications:     aspirin chewable tablet 81 mg, Daily    atorvastatin (LIPITOR) tablet 40 mg, QPM    [COMPLETED] clopidogrel (PLAVIX) tablet 600 mg, Once **FOLLOWED BY** clopidogrel (PLAVIX) tablet 75 mg, Daily    fentaNYL injection, Code/Trauma/Sedation Med    gabapentin (NEURONTIN) capsule 600 mg, BID    heparin (porcine) 25,000 units in 0.45% NaCl 250 mL infusion (premix), Titrated, Last Rate: 13.1 Units/kg/hr (05/15/25 0044)    [Transfer Hold] heparin (porcine) injection 2,000 Units, Q6H PRN    [Transfer Hold] heparin (porcine) injection 4,000 Units, Q6H PRN    heparin (porcine) injection, Code/Trauma/Sedation Med    insulin glargine (LANTUS) subcutaneous injection 20 Units 0.2 mL, HS    insulin lispro (HumALOG/ADMELOG) 100 units/mL subcutaneous injection 1-6 Units, TID AC **AND** Fingerstick Glucose  (POCT), TID AC    labetalol (NORMODYNE) injection 10 mg, Q6H PRN    lidocaine 1% buffered, Code/Trauma/Sedation Med    lisinopril (ZESTRIL) tablet 20 mg, Daily    midazolam (VERSED) injection, Code/Trauma/Sedation Med    [Transfer Hold] morphine injection 2 mg, Q4H PRN    nicotine (NICODERM CQ) 21 mg/24 hr TD 24 hr patch 1 patch, Daily    nitroGLYcerin 200 mcg/mL IA bolus, Code/Trauma/Sedation Med    verapamil (ISOPTIN) injection, Code/Trauma/Sedation Med    Administrative Statements   Today, Patient Was Seen By: Fatuma Aparicio PA-C  I have spent a total time of 45 minutes in caring for this patient on the day of the visit/encounter including Risks and benefits of tx options, Impressions, Counseling / Coordination of care, Documenting in the medical record, Reviewing/placing orders in the medical record (including tests, medications, and/or procedures), and Communicating with other healthcare professionals .    **Please Note: This note may have been constructed using a voice recognition system.**

## 2025-05-16 ENCOUNTER — TRANSITIONAL CARE MANAGEMENT (OUTPATIENT)
Age: 75
End: 2025-05-16

## 2025-05-16 ENCOUNTER — PREP FOR PROCEDURE (OUTPATIENT)
Dept: CARDIAC SURGERY | Facility: CLINIC | Age: 75
End: 2025-05-16

## 2025-05-16 ENCOUNTER — APPOINTMENT (INPATIENT)
Dept: RADIOLOGY | Facility: HOSPITAL | Age: 75
DRG: 236 | End: 2025-05-16
Payer: MEDICARE

## 2025-05-16 DIAGNOSIS — I25.10 CAD, MULTIPLE VESSEL: ICD-10-CM

## 2025-05-16 DIAGNOSIS — I47.19 PAROXYSMAL ATRIAL TACHYCARDIA (HCC): ICD-10-CM

## 2025-05-16 DIAGNOSIS — I21.4 NSTEMI (NON-ST ELEVATED MYOCARDIAL INFARCTION) (HCC): Primary | ICD-10-CM

## 2025-05-16 PROBLEM — R50.9 FEVER: Status: ACTIVE | Noted: 2025-05-16

## 2025-05-16 LAB
2HR DELTA HS TROPONIN: 228 NG/L
4HR DELTA HS TROPONIN: 920 NG/L
ANION GAP SERPL CALCULATED.3IONS-SCNC: 7 MMOL/L (ref 4–13)
APTT PPP: 53 SECONDS (ref 23–34)
APTT PPP: 70 SECONDS (ref 23–34)
APTT PPP: 74 SECONDS (ref 23–34)
ATRIAL RATE: 66 BPM
ATRIAL RATE: 85 BPM
ATRIAL RATE: 88 BPM
BACTERIA UR QL AUTO: ABNORMAL /HPF
BASOPHILS # BLD AUTO: 0.11 THOUSANDS/ÂΜL (ref 0–0.1)
BASOPHILS NFR BLD AUTO: 1 % (ref 0–1)
BILIRUB UR QL STRIP: NEGATIVE
BNP SERPL-MCNC: 328 PG/ML (ref 0–100)
BUN SERPL-MCNC: 17 MG/DL (ref 5–25)
CALCIUM SERPL-MCNC: 8.6 MG/DL (ref 8.4–10.2)
CARDIAC TROPONIN I PNL SERPL HS: 6238 NG/L (ref ?–50)
CARDIAC TROPONIN I PNL SERPL HS: 6466 NG/L (ref ?–50)
CARDIAC TROPONIN I PNL SERPL HS: 7158 NG/L (ref ?–50)
CHLORIDE SERPL-SCNC: 104 MMOL/L (ref 96–108)
CLARITY UR: ABNORMAL
CO2 SERPL-SCNC: 25 MMOL/L (ref 21–32)
COLOR UR: ABNORMAL
CREAT SERPL-MCNC: 1 MG/DL (ref 0.6–1.3)
EOSINOPHIL # BLD AUTO: 0.47 THOUSAND/ÂΜL (ref 0–0.61)
EOSINOPHIL NFR BLD AUTO: 4 % (ref 0–6)
ERYTHROCYTE [DISTWIDTH] IN BLOOD BY AUTOMATED COUNT: 12.4 % (ref 11.6–15.1)
FLUAV RNA RESP QL NAA+PROBE: NEGATIVE
FLUBV RNA RESP QL NAA+PROBE: NEGATIVE
GFR SERPL CREATININE-BSD FRML MDRD: 73 ML/MIN/1.73SQ M
GLUCOSE SERPL-MCNC: 131 MG/DL (ref 65–140)
GLUCOSE SERPL-MCNC: 144 MG/DL (ref 65–140)
GLUCOSE SERPL-MCNC: 212 MG/DL (ref 65–140)
GLUCOSE SERPL-MCNC: 224 MG/DL (ref 65–140)
GLUCOSE SERPL-MCNC: 279 MG/DL (ref 65–140)
GLUCOSE UR STRIP-MCNC: ABNORMAL MG/DL
HCT VFR BLD AUTO: 36.3 % (ref 36.5–49.3)
HGB BLD-MCNC: 11.7 G/DL (ref 12–17)
HGB UR QL STRIP.AUTO: NEGATIVE
HYALINE CASTS #/AREA URNS LPF: ABNORMAL /LPF
IMM GRANULOCYTES # BLD AUTO: 0.05 THOUSAND/UL (ref 0–0.2)
IMM GRANULOCYTES NFR BLD AUTO: 0 % (ref 0–2)
KETONES UR STRIP-MCNC: NEGATIVE MG/DL
LACTATE SERPL-SCNC: 1.7 MMOL/L (ref 0.5–2)
LEUKOCYTE ESTERASE UR QL STRIP: NEGATIVE
LYMPHOCYTES # BLD AUTO: 2.85 THOUSANDS/ÂΜL (ref 0.6–4.47)
LYMPHOCYTES NFR BLD AUTO: 23 % (ref 14–44)
MCH RBC QN AUTO: 28.6 PG (ref 26.8–34.3)
MCHC RBC AUTO-ENTMCNC: 32.2 G/DL (ref 31.4–37.4)
MCV RBC AUTO: 89 FL (ref 82–98)
MONOCYTES # BLD AUTO: 1.4 THOUSAND/ÂΜL (ref 0.17–1.22)
MONOCYTES NFR BLD AUTO: 11 % (ref 4–12)
MUCOUS THREADS UR QL AUTO: ABNORMAL
NEUTROPHILS # BLD AUTO: 7.74 THOUSANDS/ÂΜL (ref 1.85–7.62)
NEUTS SEG NFR BLD AUTO: 61 % (ref 43–75)
NITRITE UR QL STRIP: NEGATIVE
NON-SQ EPI CELLS URNS QL MICRO: ABNORMAL /HPF
NRBC BLD AUTO-RTO: 0 /100 WBCS
P AXIS: 37 DEGREES
P AXIS: 56 DEGREES
P AXIS: 70 DEGREES
PH UR STRIP.AUTO: 5 [PH]
PLATELET # BLD AUTO: 277 THOUSANDS/UL (ref 149–390)
PMV BLD AUTO: 10.1 FL (ref 8.9–12.7)
POTASSIUM SERPL-SCNC: 4.1 MMOL/L (ref 3.5–5.3)
PR INTERVAL: 210 MS
PR INTERVAL: 216 MS
PR INTERVAL: 218 MS
PROCALCITONIN SERPL-MCNC: 0.08 NG/ML
PROT UR STRIP-MCNC: ABNORMAL MG/DL
QRS AXIS: 15 DEGREES
QRS AXIS: 49 DEGREES
QRS AXIS: 70 DEGREES
QRSD INTERVAL: 68 MS
QRSD INTERVAL: 68 MS
QRSD INTERVAL: 78 MS
QT INTERVAL: 358 MS
QT INTERVAL: 400 MS
QT INTERVAL: 424 MS
QTC INTERVAL: 434 MS
QTC INTERVAL: 445 MS
QTC INTERVAL: 476 MS
RBC # BLD AUTO: 4.09 MILLION/UL (ref 3.88–5.62)
RBC #/AREA URNS AUTO: ABNORMAL /HPF
RSV RNA RESP QL NAA+PROBE: NEGATIVE
SARS-COV-2 RNA RESP QL NAA+PROBE: NEGATIVE
SODIUM SERPL-SCNC: 136 MMOL/L (ref 135–147)
SP GR UR STRIP.AUTO: 1.01 (ref 1–1.03)
T WAVE AXIS: 126 DEGREES
T WAVE AXIS: 130 DEGREES
T WAVE AXIS: 133 DEGREES
UROBILINOGEN UR STRIP-ACNC: <2 MG/DL
VENTRICULAR RATE: 66 BPM
VENTRICULAR RATE: 85 BPM
VENTRICULAR RATE: 88 BPM
WBC # BLD AUTO: 12.62 THOUSAND/UL (ref 4.31–10.16)
WBC #/AREA URNS AUTO: ABNORMAL /HPF

## 2025-05-16 PROCEDURE — 82948 REAGENT STRIP/BLOOD GLUCOSE: CPT

## 2025-05-16 PROCEDURE — 93010 ELECTROCARDIOGRAM REPORT: CPT | Performed by: INTERNAL MEDICINE

## 2025-05-16 PROCEDURE — 94640 AIRWAY INHALATION TREATMENT: CPT

## 2025-05-16 PROCEDURE — 83880 ASSAY OF NATRIURETIC PEPTIDE: CPT

## 2025-05-16 PROCEDURE — 94664 DEMO&/EVAL PT USE INHALER: CPT

## 2025-05-16 PROCEDURE — 94760 N-INVAS EAR/PLS OXIMETRY 1: CPT

## 2025-05-16 PROCEDURE — 71045 X-RAY EXAM CHEST 1 VIEW: CPT

## 2025-05-16 PROCEDURE — 85025 COMPLETE CBC W/AUTO DIFF WBC: CPT | Performed by: INTERNAL MEDICINE

## 2025-05-16 PROCEDURE — 99232 SBSQ HOSP IP/OBS MODERATE 35: CPT | Performed by: INTERNAL MEDICINE

## 2025-05-16 PROCEDURE — 93005 ELECTROCARDIOGRAM TRACING: CPT

## 2025-05-16 PROCEDURE — 94010 BREATHING CAPACITY TEST: CPT

## 2025-05-16 PROCEDURE — 81001 URINALYSIS AUTO W/SCOPE: CPT

## 2025-05-16 PROCEDURE — 0241U HB NFCT DS VIR RESP RNA 4 TRGT: CPT

## 2025-05-16 PROCEDURE — 85730 THROMBOPLASTIN TIME PARTIAL: CPT | Performed by: INTERNAL MEDICINE

## 2025-05-16 PROCEDURE — 84484 ASSAY OF TROPONIN QUANT: CPT

## 2025-05-16 PROCEDURE — 84145 PROCALCITONIN (PCT): CPT

## 2025-05-16 PROCEDURE — 87040 BLOOD CULTURE FOR BACTERIA: CPT

## 2025-05-16 PROCEDURE — 80048 BASIC METABOLIC PNL TOTAL CA: CPT | Performed by: INTERNAL MEDICINE

## 2025-05-16 PROCEDURE — 99223 1ST HOSP IP/OBS HIGH 75: CPT | Performed by: STUDENT IN AN ORGANIZED HEALTH CARE EDUCATION/TRAINING PROGRAM

## 2025-05-16 PROCEDURE — 83605 ASSAY OF LACTIC ACID: CPT

## 2025-05-16 RX ORDER — NITROGLYCERIN 20 MG/100ML
5-200 INJECTION INTRAVENOUS
Status: DISCONTINUED | OUTPATIENT
Start: 2025-05-16 | End: 2025-05-19

## 2025-05-16 RX ORDER — COLCHICINE 0.6 MG/1
0.6 TABLET ORAL 2 TIMES DAILY
Status: DISCONTINUED | OUTPATIENT
Start: 2025-05-16 | End: 2025-05-23 | Stop reason: HOSPADM

## 2025-05-16 RX ORDER — METOPROLOL TARTRATE 50 MG
50 TABLET ORAL EVERY 12 HOURS SCHEDULED
Status: DISCONTINUED | OUTPATIENT
Start: 2025-05-16 | End: 2025-05-19

## 2025-05-16 RX ORDER — METOPROLOL TARTRATE 25 MG/1
25 TABLET, FILM COATED ORAL ONCE
Status: COMPLETED | OUTPATIENT
Start: 2025-05-16 | End: 2025-05-16

## 2025-05-16 RX ORDER — LEVALBUTEROL INHALATION SOLUTION 0.63 MG/3ML
0.31 SOLUTION RESPIRATORY (INHALATION) ONCE
Status: COMPLETED | OUTPATIENT
Start: 2025-05-16 | End: 2025-05-16

## 2025-05-16 RX ORDER — AMLODIPINE BESYLATE 5 MG/1
5 TABLET ORAL DAILY
Status: DISCONTINUED | OUTPATIENT
Start: 2025-05-16 | End: 2025-05-19

## 2025-05-16 RX ORDER — FUROSEMIDE 10 MG/ML
40 INJECTION INTRAMUSCULAR; INTRAVENOUS ONCE
Status: COMPLETED | OUTPATIENT
Start: 2025-05-16 | End: 2025-05-16

## 2025-05-16 RX ORDER — ACETAMINOPHEN 325 MG/1
975 TABLET ORAL EVERY 8 HOURS PRN
Status: DISCONTINUED | OUTPATIENT
Start: 2025-05-16 | End: 2025-05-19

## 2025-05-16 RX ADMIN — ACETAMINOPHEN 975 MG: 325 TABLET ORAL at 21:52

## 2025-05-16 RX ADMIN — NITROGLYCERIN 0.4 MG: 0.4 TABLET SUBLINGUAL at 16:41

## 2025-05-16 RX ADMIN — INSULIN GLARGINE 20 UNITS: 100 INJECTION, SOLUTION SUBCUTANEOUS at 21:54

## 2025-05-16 RX ADMIN — METOPROLOL TARTRATE 50 MG: 50 TABLET, FILM COATED ORAL at 21:53

## 2025-05-16 RX ADMIN — GABAPENTIN 600 MG: 300 CAPSULE ORAL at 17:09

## 2025-05-16 RX ADMIN — ASPIRIN 81 MG CHEWABLE TABLET 81 MG: 81 TABLET CHEWABLE at 08:45

## 2025-05-16 RX ADMIN — FUROSEMIDE 40 MG: 10 INJECTION, SOLUTION INTRAMUSCULAR; INTRAVENOUS at 06:15

## 2025-05-16 RX ADMIN — LEVALBUTEROL HYDROCHLORIDE 0.31 MG: 0.63 SOLUTION RESPIRATORY (INHALATION) at 02:10

## 2025-05-16 RX ADMIN — METOPROLOL TARTRATE 25 MG: 25 TABLET, FILM COATED ORAL at 09:24

## 2025-05-16 RX ADMIN — HEPARIN SODIUM 15.1 UNITS/KG/HR: 10000 INJECTION, SOLUTION INTRAVENOUS at 12:32

## 2025-05-16 RX ADMIN — COLCHICINE 0.6 MG: 0.6 TABLET ORAL at 12:32

## 2025-05-16 RX ADMIN — HEPARIN SODIUM 2000 UNITS: 1000 INJECTION INTRAVENOUS; SUBCUTANEOUS at 03:15

## 2025-05-16 RX ADMIN — ACETAMINOPHEN 975 MG: 325 TABLET ORAL at 12:32

## 2025-05-16 RX ADMIN — NITROGLYCERIN 0.4 MG: 0.4 TABLET SUBLINGUAL at 04:55

## 2025-05-16 RX ADMIN — NITROGLYCERIN 0.4 MG: 0.4 TABLET SUBLINGUAL at 04:50

## 2025-05-16 RX ADMIN — GABAPENTIN 600 MG: 300 CAPSULE ORAL at 08:45

## 2025-05-16 RX ADMIN — NITROGLYCERIN 10 MCG/MIN: 20 INJECTION INTRAVENOUS at 06:03

## 2025-05-16 RX ADMIN — METOPROLOL TARTRATE 25 MG: 25 TABLET, FILM COATED ORAL at 08:45

## 2025-05-16 RX ADMIN — MORPHINE SULFATE 2 MG: 2 INJECTION, SOLUTION INTRAMUSCULAR; INTRAVENOUS at 04:47

## 2025-05-16 RX ADMIN — NITROGLYCERIN 0.4 MG: 0.4 TABLET SUBLINGUAL at 17:03

## 2025-05-16 RX ADMIN — INSULIN LISPRO 4 UNITS: 100 INJECTION, SOLUTION INTRAVENOUS; SUBCUTANEOUS at 13:04

## 2025-05-16 RX ADMIN — INSULIN LISPRO 2 UNITS: 100 INJECTION, SOLUTION INTRAVENOUS; SUBCUTANEOUS at 16:41

## 2025-05-16 RX ADMIN — NITROGLYCERIN 0.4 MG: 0.4 TABLET SUBLINGUAL at 05:01

## 2025-05-16 RX ADMIN — IPRATROPIUM BROMIDE 0.5 MG: 0.5 SOLUTION RESPIRATORY (INHALATION) at 02:10

## 2025-05-16 RX ADMIN — LISINOPRIL 20 MG: 20 TABLET ORAL at 08:45

## 2025-05-16 RX ADMIN — AMLODIPINE BESYLATE 5 MG: 5 TABLET ORAL at 09:01

## 2025-05-16 RX ADMIN — COLCHICINE 0.6 MG: 0.6 TABLET ORAL at 17:09

## 2025-05-16 RX ADMIN — ATORVASTATIN CALCIUM 40 MG: 40 TABLET, FILM COATED ORAL at 17:09

## 2025-05-16 NOTE — ASSESSMENT & PLAN NOTE
Tmax 102.2 (5/16)  Denies all infectious symptoms    CTA dissection protocol C/A/P (5/14) Several tiny nodular opacities in the right upper lobe with adjacent tree-in-bud opacities favored to be infectious/inflammatory. Mild basilar atelectasis.  No acute intra-abdominal pathology  CXR (5/16) study limited by low lung volumes.  Prominent pulmonary vasculature which could be accentuated by technique versus mild vascular congestion    Unclear etiology at this time.  Pericarditis versus atelectasis versus infection    Plan  - Will initiate infectious workup  - Follow-up UA, blood cultures, lactic acid, Pro-Cam  - Will repeat EKG and troponin

## 2025-05-16 NOTE — ASSESSMENT & PLAN NOTE
Most recent stroke in 1/2025. MRI brain showed subacute infarction of right paramedian gianfranco and left parietal lobe, suspicious for cardioembolic stroke.  CTA that time showed mild to moderate stenosis of bilateral cervical vertebral arteries.  No clear symptoms to suggest A-fib. Outpatient Zio patch noted with episodes of paroxysmal atrial tachycardia without evidence of A-fib or a flutter.  Outpatient plans for loop recorder implant    Continue home aspirin and statin .

## 2025-05-16 NOTE — PROGRESS NOTES
Progress Note - Internal Medicine   Name: Tc Stovall 75 y.o. male I MRN: [id number]  Unit/Bed#: [facility] 511-01 I Date of Admission: 5/15/2025   Date of Service: 5/16/2025 I Hospital Day: 1    Assessment & Plan  CAD, multiple vessel  Initially presented to [facility] for sudden onset, severe substernal chest pain with radiation to his back.  Troponin peaked at >22k.  EKG demonstrated dynamic ST-T wave changes in leads I and aVL, V2, with dynamic ST depressions in inferior leads.  S/p L HC on 5/15 which showed MVCAD. Transferred to [facility] for CABG evaluation.    Cardiac cath (5/14) Ramus lesion 90% stenosis, 1st Diag lesion 99% stenosis, Mid LAD 75% stenosis, Mid Cx to Dist Cx 90% stenosis, RPDA-1 75% stenosis, RPDA-2 90% stenosis  TTE (15) EF 50% and no major valve disease     Plan:  - CT surgery and cardiology consulted, recommendations appreciated  - Plan for CABG early next week  - Continue to monitor on telemetry  - Continue heparin gtt  - Continue aspirin   - Hold Plavix to allow washout period prior to CABG. S/p Plavix load  - Continue Lipitor  - Continue nitro gtt for anginal symptoms  NSTEMI (non-ST elevated myocardial infarction) ([facility])  See above     Primary hypertension  Home regimen: amlodipine 5 mg daily and lisinopril 20 mg daily    Plan:  - Continue home amlodipine 5 mg daily   - Hold home lisinopril 20 mg daily in preparation for CABG  - Continue Lopressor 50 mg twice daily  Hyperlipidemia  Lipid panel 5/15/25: , TG 79, HDL 36    Plan:  - Continue Lipitor  Type 2 diabetes mellitus (HCC)  Lab Results   Component Value Date    HGBA1C 7.7 (H) 05/13/2025       Recent Labs     05/15/25  1049 05/15/25  1657 05/15/25  2036 05/16/25  0627   POCGLU 149* 156* 171* 144*       Blood Sugar Average: Last 72 hrs:  (P) 157.5  Home regimen: glipizide 10 mg daily, metformin 1 g BID, and Lantus 20 units at bedtime    Plan:  - Continue Lantus 20 units at bedtime, SSI  - Carb controlled diet  - Hypoglycemia protocol  -  BG target 140-180  History of CVA (cerebrovascular accident)  Most recent stroke in 1/2025. MRI brain showed subacute infarction of right paramedian gianfranco and left parietal lobe, suspicious for cardioembolic stroke.  CTA that time showed mild to moderate stenosis of bilateral cervical vertebral arteries.  No clear symptoms to suggest A-fib. Outpatient Zio patch noted with episodes of paroxysmal atrial tachycardia without evidence of A-fib or a flutter.  Outpatient plans for loop recorder implant    Continue home aspirin and statin .  Tobacco use  Reports smoking half a pack a day since 1964  Nicotine patch while patient  Encourage smoking cessation  Paroxysmal atrial tachycardia (HCC)  Continue to monitor on telemetry  PVC's (premature ventricular contractions)  Continue to monitor on telemetry  Fever  Tmax 102.2 (5/16)  Denies all infectious symptoms    CTA dissection protocol C/A/P (5/14) Several tiny nodular opacities in the right upper lobe with adjacent tree-in-bud opacities favored to be infectious/inflammatory. Mild basilar atelectasis.  No acute intra-abdominal pathology  CXR (5/16) study limited by low lung volumes.  Prominent pulmonary vasculature which could be accentuated by technique versus mild vascular congestion    Unclear etiology at this time.  Pericarditis versus atelectasis versus infection    Plan  - Will initiate infectious workup  - Follow-up UA, blood cultures, lactic acid, Pro-Cam  - Will repeat EKG and troponin    Disposition: Continue inpatient management.  Awaiting CABG    Team: SOD TEAM C    Subjective   Patient seen and examined.  Overnight, patient complained of 8/10 chest pressure.  Given sublingual nitroglycerin and morphine without relief.  Cardiology notified.  EKG and CXR without significant changes.  Troponins downtrending from admission.  BNP elevated at 328 and patient given x 1 dose Lasix 40 mg.  Started on nitro drip.  Continues to endorse chest pressure this  morning.    Objective :  Temp:  [97.7 °F (36.5 °C)-98.8 °F (37.1 °C)] 98.8 °F (37.1 °C)  HR:  [] 113  BP: (107-174)/(52-89) 146/82  Resp:  [17-20] 18  SpO2:  [91 %-100 %] 95 %  O2 Device: None (Room air)    I/O         05/14 0701  05/15 0700 05/15 0701 05/16 0700 05/16 0701 05/17 0700    Urine (mL/kg/hr)  1275     Total Output  1275     Net  -1275                  Weights:   IBW (Ideal Body Weight): 66.1 kg    Body mass index is 34.77 kg/m².  Weight (last 2 days)       Date/Time Weight    05/15/25 2011 101 (222)            Physical Exam  Vitals reviewed.   Constitutional:       General: He is not in acute distress.     Appearance: He is well-developed. He is not ill-appearing.   HENT:      Head: Normocephalic and atraumatic.      Mouth/Throat:      Mouth: Mucous membranes are moist.     Eyes:      Conjunctiva/sclera: Conjunctivae normal.       Cardiovascular:      Rate and Rhythm: Normal rate and regular rhythm.      Heart sounds: No murmur heard.  Pulmonary:      Effort: Pulmonary effort is normal. No respiratory distress.   Abdominal:      General: There is no distension.      Palpations: Abdomen is soft.      Tenderness: There is no abdominal tenderness.     Musculoskeletal:         General: No swelling.      Cervical back: Neck supple.     Skin:     General: Skin is warm and dry.      Capillary Refill: Capillary refill takes less than 2 seconds.     Neurological:      Mental Status: He is alert and oriented to person, place, and time.     Psychiatric:         Mood and Affect: Mood normal.           Lab Results: I have reviewed the following results:  Recent Labs     05/14/25  1543 05/14/25  1543 05/14/25  1724 05/14/25  2353 05/15/25  0204 05/15/25  0614 05/16/25  0232 05/16/25  0245 05/16/25  0556   WBC 9.07  --  11.30*  --   --    < >  --  12.62*  --    HGB 12.0  --  11.6*  --   --    < >  --  11.7*  --    HCT 36.2*  --  35.2*  --   --    < >  --  36.3*  --      --  314  --   --    < >  --  277   --    SODIUM 139  --   --   --   --    < > 136  --   --    K 4.2  --   --   --   --    < > 4.1  --   --      --   --   --   --    < > 104  --   --    CO2 25  --   --   --   --    < > 25  --   --    BUN 28*  --   --   --   --    < > 17  --   --    CREATININE 1.02  --   --   --   --    < > 1.00  --   --    GLUC 289*  --   --   --   --    < > 131  --   --    AST 13  --   --   --   --   --   --   --   --    ALT 15  --   --   --   --   --   --   --   --    ALB 4.1  --   --   --   --   --   --   --   --    TBILI 0.53  --   --   --   --   --   --   --   --    ALKPHOS 76  --   --   --   --   --   --   --   --    PTT  --    < > 34 56*  --    < > 53*  --   --    INR  --   --  1.00  --   --   --   --   --   --    HSTNI0 50*  --   --  12,246*  --   --   --   --   --    HSTNI2  --    < > 392*  --  >22,973*  --   --   --   --    BNP 19  --   --   --   --   --   --   --  328*    < > = values in this interval not displayed.             Currently Ordered Meds:   Current Facility-Administered Medications:     aspirin chewable tablet 81 mg, Daily    atorvastatin (LIPITOR) tablet 40 mg, QPM    gabapentin (NEURONTIN) capsule 600 mg, BID    heparin (porcine) 25,000 units in 0.45% NaCl 250 mL infusion (premix), Titrated, Last Rate: 15.1 Units/kg/hr (05/16/25 0316)    heparin (porcine) injection 2,000 Units, Q6H PRN    heparin (porcine) injection 4,000 Units, Q6H PRN    insulin glargine (LANTUS) subcutaneous injection 20 Units 0.2 mL, HS    insulin lispro (HumALOG/ADMELOG) 100 units/mL subcutaneous injection 1-6 Units, TID AC **AND** Fingerstick Glucose (POCT), TID AC    labetalol (NORMODYNE) injection 10 mg, Q6H PRN    lisinopril (ZESTRIL) tablet 20 mg, Daily    metoprolol tartrate (LOPRESSOR) tablet 25 mg, Q12H LEONELA    morphine injection 2 mg, Q4H PRN    nicotine (NICODERM CQ) 21 mg/24 hr TD 24 hr patch 1 patch, Daily    nitroglycerin (NITROSTAT) SL tablet 0.4 mg, Q5 Min PRN    nitroGLYcerin (TRIDIL) 50 mg in 250 ml infusion  (premix), Titrated, Last Rate: 25 mcg/min (05/16/25 7887)  VTE Pharmacologic Prophylaxis: VTE covered by:  heparin (porcine), Intravenous, 15.1 Units/kg/hr at 05/16/25 0316  heparin (porcine), Intravenous, 2,000 Units at 05/16/25 0315  heparin (porcine), Intravenous     VTE Mechanical Prophylaxis: sequential compression device    Administrative Statements     Portions of the record may have been created with voice recognition software.

## 2025-05-16 NOTE — QUICK NOTE
Informed by RN of returning chest pressure 7/10.SL NTG x 2 attempted w/o relief.   EKG obtained, concern for worsening lateral infarct. Cardiology fellow messaged given concern for worsening infarction.   Patient seen at bedside by myself and cardiology fellow. Lungs CTA, cap refill ~ 2 seconds.   Patient reports pain started after coughing episode. NTG drip restarted. If chest pain or pressure returns we will obtain EKG and utilize low threshold for alerting cardiology for escalation of intervention.

## 2025-05-16 NOTE — PROGRESS NOTES
Progress Note - Cardiology   Name: Tc Stovall 75 y.o. male I MRN: [id number]  Unit/Bed#: [facility] 511-01 I Date of Admission: 5/15/2025   Date of Service: 5/16/2025 I Hospital Day: 1     Assessment & Plan  CAD, multiple vessel  -Troponins elevated to >22,973. EKG with dynamic ST-T wave changes in leads I, aVL, V2, with dynamic ST-T wave depressions in inferior leads.   -Patient underwent cardiac catheterization which demonstrated multivessel CAD.  Further management with CABG was recommended by ICU Dr. Huertas.  This was discussed with Dr. Velazquez, who is agreeable to transfer to [place] for CT surgery eval for consideration of CABG.   - Transfer to [facility] on 5/15.     NSTEMI (non-ST elevated myocardial infarction) (HCC)  This was the initial reason for presentation.   Type 2 diabetes mellitus (HCC)  Lab Results   Component Value Date    HGBA1C 7.7 (H) 05/13/2025       Recent Labs     05/15/25  1049 05/15/25  1657 05/15/25  2036 05/16/25  0627   POCGLU 149* 156* 171* 144*       Blood Sugar Average: Last 72 hrs:  (P) 157.5    Primary hypertension  Continue lisinopril 20 mg daily   Hyperlipidemia  Last LDL 63. Continue Lipitor 40 mg daily   History of CVA (cerebrovascular accident)  Multiple acute to subacute lacunar infarcts 1/2025. Continue to follow with cardiology outpatient given concerns for possible cardioembolic etiology. Recommend establishing with outpatient neurology.   Elevated troponin  From Nstemi.   Paroxysmal atrial tachycardia (HCC)    PVC's (premature ventricular contractions)  Continue telemetry   Tobacco use        Plan:    Patient is now transferred to [facility] for CABG evaluation.  Patient had chest heaviness overnight with new oxygen requirement of 2 L.  . Elevated LVEDP on C yesterday.  BNP elevated.    Follow-up on CXR.  Will give IV Lasix 40 mg once.  Continue with baby aspirin, Lipitor and metoprolol.  Patient started on nitroglycerin drip overnight for recurrent chest pain.  EKG with  "no significant changes from before.  Rest of the care per primary team.        Subjective     Patient was seen and examined overnight.  Patient was complaining of heaviness in the chest and also had new oxygen requirement.  No significant leg swelling.  BP was high with systolic in the 150s.  Objective     Vitals: Temp (24hrs), Av.2 °F (36.8 °C), Min:97.7 °F (36.5 °C), Max:98.8 °F (37.1 °C)  Current: Temperature: 98.8 °F (37.1 °C)  Patient Vitals for the past 24 hrs:   BP Temp Temp src Pulse Resp SpO2 Height Weight   25 0639 149/85 -- -- 94 -- 92 % -- --   25 0619 145/86 -- -- 91 -- 95 % -- --   25 0610 150/85 -- -- 90 -- 95 % -- --   25 0447 -- 98.8 °F (37.1 °C) -- -- -- -- -- --   25 0442 152/89 -- -- 87 -- 95 % -- --   25 0231 150/77 98.7 °F (37.1 °C) -- 80 18 96 % -- --   25 0210 -- -- -- -- -- 98 % -- --   25 0000 154/84 98.1 °F (36.7 °C) Oral 69 20 96 % -- --   05/15/25 2215 158/84 98.1 °F (36.7 °C) -- 69 20 96 % -- --   05/15/25 2011 157/72 97.7 °F (36.5 °C) Oral 68 20 99 % 5' 7\" (1.702 m) 101 kg (222 lb 0.1 oz)    Body mass index is 34.77 kg/m².  Physical Exam:  Physical Exam  Vitals reviewed.   Constitutional:       General: He is not in acute distress.     Appearance: He is well-developed. He is obese. He is not diaphoretic.     Cardiovascular:      Rate and Rhythm: Normal rate and regular rhythm.      Heart sounds: Normal heart sounds. No murmur heard.     No friction rub.   Pulmonary:      Effort: Pulmonary effort is normal. No respiratory distress.      Breath sounds: No stridor. Rales present. No wheezing.   Abdominal:      General: Bowel sounds are normal.         Invasive Devices       Peripheral Intravenous Line  Duration             Peripheral IV 25 Left Antecubital 2 days                        Labs:   Results from last 7 days   Lab Units 25  0245 05/15/25  0614 25  1724 25  1543   WBC Thousand/uL 12.62* 12.31* 11.30* 9.07 " "  HEMOGLOBIN g/dL 11.7* 11.9* 11.6* 12.0   HEMATOCRIT % 36.3* 36.8 35.2* 36.2*   PLATELETS Thousands/uL 277 306 314 340   SEGS PCT % 61  --   --  57   MONO PCT % 11  --   --  9   EOS PCT % 4  --   --  4      Results from last 7 days   Lab Units 05/16/25  0232 05/15/25  1252 05/15/25  0614 05/14/25  2353 05/14/25  1724 05/14/25  1543 05/13/25  0640   SODIUM mmol/L 136  --  139  --   --  139 138   POTASSIUM mmol/L 4.1  --  4.0  --   --  4.2 4.0   CHLORIDE mmol/L 104  --  107  --   --  104 105   CO2 mmol/L 25  --  26  --   --  25 25   BUN mg/dL 17  --  20  --   --  28* 27*   CREATININE mg/dL 1.00  --  0.89  --   --  1.02 0.98   CALCIUM mg/dL 8.6  --  8.7  --   --  9.6 9.1   ALK PHOS U/L  --   --   --   --   --  76  --    ALT U/L  --   --   --   --   --  15  --    AST U/L  --   --   --   --   --  13  --    INR   --   --   --   --  1.00  --   --    PTT seconds 53* 39* 86* 56* 34  --   --    EGFR ml/min/1.73sq m 73  --  84  --   --  72 75     Results from last 7 days   Lab Units 05/16/25  0232 05/15/25  1252 05/15/25  0614 05/14/25  2353 05/14/25  1724   INR   --   --   --   --  1.00   PTT seconds 53* 39* 86* 56* 34             No results found for: \"PHART\", \"FHG2LOC\", \"PO2ART\", \"TOB5NOS\", \"O4EOGPEP\", \"BEART\", \"SOURCE\"  No components found for: \"HIV1X2\"  Lab Results   Component Value Date    HEPCAB Non-reactive 08/10/2021     No results found for: \"SPEP\", \"UPEP\"   Lab Results   Component Value Date    HGBA1C 7.7 (H) 05/13/2025    HGBA1C 7.6 (H) 01/11/2025    HGBA1C 7.1 (H) 10/16/2024     No results found for: \"CHOL\"   Lab Results   Component Value Date    HDL 36 (L) 05/15/2025    HDL 38 (L) 01/11/2025    HDL 39 (L) 10/16/2024      Lab Results   Component Value Date    LDLCALC 63 05/15/2025    LDLCALC 57 01/11/2025    LDLCALC 71 10/16/2024      Lab Results   Component Value Date    TRIG 79 05/15/2025    TRIG 168 (H) 01/11/2025    TRIG 122 10/16/2024     No components found for: \"PROCAL\"  Results from last 7 days   Lab " "Units 05/16/25  0556   BNP pg/mL 328*     Micro:      Urinalysis:  No results found for: \"AMPHETUR\", \"BDZUR\", \"COCAINEUR\", \"OPIATEUR\", \"PCPUR\", \"THCUR\", \"ETOH\", \"ACTMNPHEN\", \"SALICYLATE\"       Invalid input(s): \"URIBILINOGEN\"        Intake and Outputs:  I/O         05/14 0701  05/15 0700 05/15 0701  05/16 0700 05/16 0701 05/17 0700    Urine (mL/kg/hr)  1275     Total Output  1275     Net  -1275                  Nutrition:  Diet Cardiovascular; Cardiac  Radiology Results:   XR chest portable    (Results Pending)     Scheduled Medications:  aspirin, 81 mg, Daily  atorvastatin, 40 mg, QPM  gabapentin, 600 mg, BID  insulin glargine, 20 Units, HS  insulin lispro, 1-6 Units, TID AC  lisinopril, 20 mg, Daily  metoprolol tartrate, 25 mg, Q12H LEONELA  nicotine, 1 patch, Daily      PRN MEDS:  heparin (porcine), 2,000 Units, Q6H PRN  heparin (porcine), 4,000 Units, Q6H PRN  labetalol, 10 mg, Q6H PRN  morphine injection, 2 mg, Q4H PRN  nitroglycerin, 0.4 mg, Q5 Min PRN      Last 24 Hour Meds: :   Medication Administration - last 24 hours from 05/15/2025 0717 to 05/16/2025 0717         Date/Time Order Dose Route Action Action by     05/16/2025 0316 EDT heparin (porcine) 25,000 units in 0.45% NaCl 250 mL infusion (premix) 15.1 Units/kg/hr Intravenous Rate/Dose Change Sailaja Lilly RN     05/15/2025 2038 EDT heparin (porcine) 25,000 units in 0.45% NaCl 250 mL infusion (premix) 13.1 Units/kg/hr Intravenous New Bag Sailaja Lilly RN     05/16/2025 0315 EDT heparin (porcine) injection 2,000 Units 2,000 Units Intravenous Given Sailaja Lilly RN     05/15/2025 2100 EDT insulin glargine (LANTUS) subcutaneous injection 20 Units 0.2 mL 20 Units Subcutaneous Given Sailaja Lilly RN     05/16/2025 0447 EDT morphine injection 2 mg 2 mg Intravenous Given Sailaja Lilly RN     05/16/2025 0705 EDT insulin lispro (HumALOG/ADMELOG) 100 units/mL subcutaneous injection 1-6 Units -- Subcutaneous Not Given Yosef Barrera RN     05/15/2025 " 2059 EDT metoprolol tartrate (LOPRESSOR) tablet 25 mg 25 mg Oral Given Sailaja Lilly, RN     05/16/2025 0501 EDT nitroglycerin (NITROSTAT) SL tablet 0.4 mg 0.4 mg Sublingual Given Sailaja Lilly, RN     05/16/2025 0455 EDT nitroglycerin (NITROSTAT) SL tablet 0.4 mg 0.4 mg Sublingual Given Sailaja Lilly, RN     05/16/2025 0450 EDT nitroglycerin (NITROSTAT) SL tablet 0.4 mg 0.4 mg Sublingual Given Sailaja Lilly, RN     05/15/2025 2249 EDT nitroglycerin (NITROSTAT) SL tablet 0.4 mg 0.4 mg Sublingual Given Sailaja Lilly, RN     05/15/2025 2238 EDT nitroglycerin (NITROSTAT) SL tablet 0.4 mg 0.4 mg Sublingual Given Sailaja Lilly, RN     05/15/2025 2231 EDT nitroglycerin (NITROSTAT) SL tablet 0.4 mg 0.4 mg Sublingual Given Sailaja Lilly, RN     05/16/2025 0210 EDT levalbuterol (XOPENEX) inhalation solution 0.31 mg 0.31 mg Nebulization Given Marylou Mccray, RT     05/16/2025 0210 EDT ipratropium (ATROVENT) 0.02 % inhalation solution 0.5 mg 0.5 mg Nebulization Given Marylou Mccray, RT     05/16/2025 0639 EDT nitroGLYcerin (TRIDIL) 50 mg in 250 ml infusion (premix) 20 mcg/min Intravenous Rate/Dose Change Sailaja Lilly, CELE     05/16/2025 0619 EDT nitroGLYcerin (TRIDIL) 50 mg in 250 ml infusion (premix) 15 mcg/min Intravenous Rate/Dose Change Saialja Lilly, CELE     05/16/2025 0603 EDT nitroGLYcerin (TRIDIL) 50 mg in 250 ml infusion (premix) 10 mcg/min Intravenous New Bag Sailaja Lilly, RN     05/16/2025 0615 EDT furosemide (LASIX) injection 40 mg 40 mg Intravenous Given Sailaja Lilly RN            PLEASE NOTE:  This encounter was completed utilizing the [employer] Direct Speech Voice Recognition Software. Grammatical errors, random word insertions, pronoun errors and incomplete sentences are occasional consequences of the system due to software limitations, ambient noise and hardware issues.These may be missed by proof reading prior to affixing electronic signature. Any questions or concerns about  the content, text or information contained within the body of this dictation should be directly addressed to the physician for clarification. Please do not hesitate to call me directly if you have any any questions or concerns.

## 2025-05-16 NOTE — PLAN OF CARE
Problem: PAIN - ADULT  Goal: Verbalizes/displays adequate comfort level or baseline comfort level  Description: Interventions:  - Encourage patient to monitor pain and request assistance  - Assess pain using appropriate pain scale  - Administer analgesics as ordered based on type and severity of pain and evaluate response  - Implement non-pharmacological measures as appropriate and evaluate response  - Consider cultural and social influences on pain and pain management  - Notify physician/advanced practitioner if interventions unsuccessful or patient reports new pain  - Educate patient/family on pain management process including their role and importance of  reporting pain   - Provide non-pharmacologic/complimentary pain relief interventions  Outcome: Progressing     Problem: INFECTION - ADULT  Goal: Absence or prevention of progression during hospitalization  Description: INTERVENTIONS:  - Assess and monitor for signs and symptoms of infection  - Monitor lab/diagnostic results  - Monitor all insertion sites, i.e. indwelling lines, tubes, and drains  - Monitor endotracheal if appropriate and nasal secretions for changes in amount and color  - [place] appropriate cooling/warming therapies per order  - Administer medications as ordered  - Instruct and encourage patient and family to use good hand hygiene technique  - Identify and instruct in appropriate isolation precautions for identified infection/condition  Outcome: Progressing  Goal: Absence of fever/infection during neutropenic period  Description: INTERVENTIONS:  - Monitor WBC  - Perform strict hand hygiene  - Limit to healthy visitors only  - No plants, dried, fresh or silk flowers with up in patient room  Outcome: Progressing     Problem: SAFETY ADULT  Goal: Patient will remain free of falls  Description: INTERVENTIONS:  - Educate patient/family on patient safety including physical limitations  - Instruct patient to call for assistance with activity   -  Consider consulting OT/PT to assist with strengthening/mobility based on AM PAC & JH-HLM score  - Consult OT/PT to assist with strengthening/mobility   - Keep Call bell within reach  - Keep bed low and locked with side rails adjusted as appropriate  - Keep care items and personal belongings within reach  - Initiate and maintain comfort rounds  - Make Fall Risk Sign visible to staff  - Offer Toileting every  Hours, in advance of need  - Initiate/Maintain alarm  - Obtain necessary fall risk management equipment:   - Apply yellow socks and bracelet for high fall risk patients  - Consider moving patient to room near nurses station  Outcome: Progressing  Goal: Maintain or return to baseline ADL function  Description: INTERVENTIONS:  -  Assess patient's ability to carry out ADLs; assess patient's baseline for ADL function and identify physical deficits which impact ability to perform ADLs (bathing, care of mouth/teeth, toileting, grooming, dressing, etc.)  - Assess/evaluate cause of self-care deficits   - Assess range of motion  - Assess patient's mobility; develop plan if impaired  - Assess patient's need for assistive devices and provide as appropriate  - Encourage maximum independence but intervene and supervise when necessary  - Involve family in performance of ADLs  - Assess for home care needs following discharge   - Consider OT consult to assist with ADL evaluation and planning for discharge  - Provide patient education as appropriate  - Monitor functional capacity and physical performance, use of AM PAC & JH-HLM   - Monitor gait, balance and fatigue with ambulation    Outcome: Progressing  Goal: Maintains/Returns to pre admission functional level  Description: INTERVENTIONS:  - Perform AM-PAC 6 Click Basic Mobility/ Daily Activity assessment daily.  - Set and communicate daily mobility goal to care team and patient/family/caregiver.   - Collaborate with rehabilitation services on mobility goals if consulted  -  Perform Range of Motion  times a day.  - Reposition patient every  hours.  - Dangle patient  times a day  - Stand patient times a day  - Ambulate patient  times a day  - Out of bed to chair  times a day   - Out of bed for meals  times a day  - Out of bed for toileting  - Record patient progress and toleration of activity level   Outcome: Progressing     Problem: DISCHARGE PLANNING  Goal: Discharge to home or other facility with appropriate resources  Description: INTERVENTIONS:  - Identify barriers to discharge w/patient and caregiver  - Arrange for needed discharge resources and transportation as appropriate  - Identify discharge learning needs (meds, wound care, etc.)  - Arrange for interpretive services to assist at discharge as needed  - Refer to Case Management Department for coordinating discharge planning if the patient needs post-hospital services based on physician/advanced practitioner order or complex needs related to functional status, cognitive ability, or social support system  Outcome: Progressing     Problem: Knowledge Deficit  Goal: Patient/family/caregiver demonstrates understanding of disease process, treatment plan, medications, and discharge instructions  Description: Complete learning assessment and assess knowledge base.  Interventions:  - Provide teaching at level of understanding  - Provide teaching via preferred learning methods  Outcome: Progressing

## 2025-05-16 NOTE — ASSESSMENT & PLAN NOTE
-Troponins elevated to >22,973. EKG with dynamic ST-T wave changes in leads I, aVL, V2, with dynamic ST-T wave depressions in inferior leads.   -Patient underwent cardiac catheterization which demonstrated multivessel CAD.  Further management with CABG was recommended by ICU Dr. Huertas.  This was discussed with Dr. Velazquez, who is agreeable to transfer to [place] for CT surgery eval for consideration of CABG.   - Transfer to [facility] on 5/15.

## 2025-05-16 NOTE — ASSESSMENT & PLAN NOTE
Reports smoking half a pack a day since 1964  Nicotine patch while patient  Encourage smoking cessation

## 2025-05-16 NOTE — ASSESSMENT & PLAN NOTE
Lab Results   Component Value Date    HGBA1C 7.7 (H) 05/13/2025       Recent Labs     05/15/25  1049 05/15/25  1657 05/15/25  2036 05/16/25  0627   POCGLU 149* 156* 171* 144*       Blood Sugar Average: Last 72 hrs:  (P) 157.5

## 2025-05-16 NOTE — ASSESSMENT & PLAN NOTE
Initially presented to [facility] for sudden onset, severe substernal chest pain with radiation to his back.  Troponin peaked at >22k.  EKG demonstrated dynamic ST-T wave changes in leads I and aVL, V2, with dynamic ST depressions in inferior leads.  S/p L HC on 5/15 which showed MVCAD. Transferred to [facility] for CABG evaluation.    Cardiac cath (5/14) Ramus lesion 90% stenosis, 1st Diag lesion 99% stenosis, Mid LAD 75% stenosis, Mid Cx to Dist Cx 90% stenosis, RPDA-1 75% stenosis, RPDA-2 90% stenosis  TTE (15) EF 50% and no major valve disease     Plan:  - CT surgery and cardiology consulted, recommendations appreciated  - Plan for CABG early next week  - Continue to monitor on telemetry  - Continue heparin gtt  - Continue aspirin   - Hold Plavix to allow washout period prior to CABG. S/p Plavix load  - Continue Lipitor  - Continue nitro gtt for anginal symptoms

## 2025-05-16 NOTE — PROGRESS NOTES
Patient:    MRN:  [id number]    Kris Request ID:  [id number]    Level of care reserved:  Home Health Agency    Partner Reserved:  [facility], [place] [address] [phone]    Clinical needs requested:    Geography searched:  [phone]    Start of Service:    Request sent:  12:10pm EDT on 5/16/2025 by Kaleigh Freitas    Partner reserved:  3:20pm EDT on 5/16/2025 by Kaleigh Freitas    Choice list shared:  3:20pm EDT on 5/16/2025 by Kaleigh Freitas

## 2025-05-16 NOTE — CONSULTS
Consultation - Cardiac Surgery   Tc Stovall 75 y.o. male MRN: [id number]  Unit/Bed#: [facility] 511-01 Encounter: [id number]    Physician Requesting Consult: Moreno Fischer MD    Reason for Consult / Principal Problem: CABG eval    Inpatient consult to Cardiac Surgery  Consult performed by: Mary Ann Villegas PA-C  Consult ordered by: Jessi Keller MD        History of Present Illness: Tc Stovall is a 75 y.o. male with a PMH of HTN, HLD, multiple CVAs (has not followed with neurology, planning for ILR implant), PAT, PVCs, 1st degree AVB-mild, current tobacco use, IDDM2 (A1c- 7.7) who presented to  [facility] on 5/14 with complaints of sudden onset severe substernal CP at rest with radiation to his back. EMS called, found to have SBP of 200, nitro and ASA given with some improvement in pain. CTA dissection protocol performed revealing no acute findings. Troponins found to be elevated (max 22,973), ST depressions in inferior leads. Patient was started on heparin gtt, cardiology consulted, loaded with Plavix, underwent cardiac cath on 5/15 revealing MV CAD, started on BB, recommended for transfer to [facility] for CABG evaluation. Of note, recently admitted on 1/2025 for CVA, found with multiple subacute infarcts with concern for embolic etiology which he was following Dr. Lujan for with plans for loop recorder implant.      Upon examination today, he reports 6/10 chest pain. On heparin and nitro gtt, Nitro gtt being titrated and BB being titrated. Also c/o SOB. Denies  palpitations, LE edema b/l, orthopnea, PND, numbness/tinlging/paresthesias in UE or LE bilaterally, HA, lightheadeness, dizziness, presyncopal symptoms, hx syncopal events, N/V/D, hemoptysis, hematemesis, hematochezia, melena. Denies hx cancer w/ chest wall radiation, hx blood loss anemia, hx varicose veins or vein stripping.    Patient is retired. Does not use an assist device for ambulation. Drives. , 1 kids. Wife currently in [place]  manner. Smoker up until admission, states he quit on Tuesday, 1/2 PPD since 1964. No ETOH, or drug use. NKDA. Father had an MI at 58      Past Medical History:  Past Medical History:   Diagnosis Date    Arthritis     Cerebellar infarction (HCC)     Cerebrovascular accident (CVA) due to embolism of cerebral artery (HCC)     Diabetes mellitus (HCC)     Hyperlipidemia     Hypertension     Left rotator cuff tear     Stroke (HCC) [id number]    Tobacco abuse     Wears glasses          Past Surgical History:   Past Surgical History:   Procedure Laterality Date    CARDIAC CATHETERIZATION N/A 5/15/2025    Procedure: Cardiac Catheterization;  Surgeon: Mo Huertas MD;  Location: [facility] CARD[facility];  Service: Cardiology    CARDIAC CATHETERIZATION N/A 5/15/2025    Procedure: Cardiac Coronary Angiogram;  Surgeon: Mo Huertas MD;  Location: [facility] CARD[facility];  Service: Cardiology    CARDIAC CATHETERIZATION Left 5/15/2025    Procedure: Cardiac Left Heart Cath;  Surgeon: Mo Huertas MD;  Location: [facility] CARDIAC CATH [facility];  Service: Cardiology    COLONOSCOPY      KNEE SURGERY      MD SURGICAL ARTHROSCOPY SHOULDER W/ROTATOR CUFF RPR Left 08/10/2022    Procedure: Left Shoulder Arthroscopy, supraspinatus and Subscapularis Repair, Bicep Tenotomy, acromioplasty, extensive debridement;  Surgeon: Otilio Mendez DO;  Location: [facility] MAIN OR;  Service: Orthopedics    TONSILLECTOMY  1954         Family History:  Family History   Problem Relation Age of Onset    Alzheimer's disease Mother     Diabetes Mother     Heart block Father     Heart disease Father          Social History:  Social History     Substance and Sexual Activity   Alcohol Use Yes    Comment: rarely     Social History     Substance and Sexual Activity   Drug Use No     Tobacco Use History[1]  Marital Status: /Civil Union      Home Medications:   Prior to Admission medications    Medication Sig Start Date End Date Taking? Authorizing Provider    acetaminophen (TYLENOL) 500 mg tablet Take 500 mg by mouth every 6 (six) hours as needed for mild pain    Historical Provider, MD   amLODIPine (NORVASC) 5 mg tablet Take 1 tablet (5 mg total) by mouth daily 3/16/25   Lester Roth, DO   aspirin 81 mg chewable tablet Chew 1 tablet (81 mg total) daily 7/16/24 April Charmaine Valenzuela PA-C   atorvastatin (LIPITOR) 40 mg tablet Take 1 tablet (40 mg total) by mouth every evening 5/6/25   Lester Roth, DO   Blood Glucose Monitoring Suppl (OneTouch Verio Reflect) w/Device KIT Check blood sugars three times daily. Please substitute with appropriate alternative as covered by patient's insurance. Dx: E11.65 6/12/23   Lester Roth, DO   cholecalciferol (VITAMIN D3) 1,000 units tablet Take 1 tablet (1,000 Units total) by mouth daily 1/21/24   Lester Roth, DO   Continuous Glucose Sensor (Dexcom G7 Sensor) Use 1 Device every 10 days 9/23/24 April Charmaine Valenzuela PA-C   fluticasone (FLONASE) 50 mcg/act nasal spray 1 spray into each nostril daily for 14 days 4/16/24 April Charmaine Valenzuela PA-C   gabapentin (Neurontin) 600 MG tablet Take 1 tablet (600 mg total) by mouth 2 (two) times a day 3/27/25   JANET Dos Santos   glipiZIDE (GLUCOTROL XL) 10 mg 24 hr tablet Take 1 tablet (10 mg total) by mouth daily 5/13/25   Lester Roth, DO   glucose blood (OneTouch Verio) test strip Check blood sugars three times daily. Please substitute with appropriate alternative as covered by patient's insurance. Dx: E11.65 6/12/23   Lester Roth, DO   insulin glargine (LANTUS) 100 units/mL subcutaneous injection Inject 20 Units under the skin daily at bedtime 1/14/25   Mac Crawford MD   Insulin Pen Needle (CareOne Unifine Pentips Plus) 32G X 4 MM MISC USE DAILY AS INSTRUCTED WITH INSULIN PEN 1/2/25   Lester Roth, DO   lisinopril (ZESTRIL) 20 mg tablet Take 1 tablet (20 mg total) by mouth daily 5/13/25   Lester Roth DO   meloxicam (MOBIC) 15 mg tablet Take 1 tablet  (15 mg total) by mouth daily as needed for moderate pain 4/2/25   Lester Roth DO   metFORMIN (GLUCOPHAGE-XR) 500 mg 24 hr tablet Take 2 tablets (1,000 mg total) by mouth 2 (two) times a day 3/16/25   Lester Roth DO   nicotine (NICODERM CQ) 14 mg/24hr TD 24 hr patch Place 1 patch on the skin every 24 hours  Patient not taking: Reported on 5/9/2025    Historical Provider, MD   OneTouch Delica Lancets 33G MISC Check blood sugars three times daily. Please substitute with appropriate alternative as covered by patient's insurance. Dx: E11.65 1/30/24   Lester Roth DO       Inpatient Medications:  Scheduled Meds:   Current Facility-Administered Medications   Medication Dose Route Frequency Provider Last Rate    aspirin  81 mg Oral Daily Fatuma E Alessandra, PA-C      atorvastatin  40 mg Oral QPM Fatuma E Alessandra, PA-C      gabapentin  600 mg Oral BID Fatumamitchell Aparicio, PA-C      heparin (porcine)  3-20 Units/kg/hr (Order-Specific) Intravenous Titrated Fatuma E Alessandra, PA-C 15.1 Units/kg/hr (05/16/25 0316)    heparin (porcine)  2,000 Units Intravenous Q6H PRN Fatuma E Alessandra, PA-C      heparin (porcine)  4,000 Units Intravenous Q6H PRN Fatuma E Alessandra, PA-C      insulin glargine  20 Units Subcutaneous HS Fatuma E Alessandra, PA-C      insulin lispro  1-6 Units Subcutaneous TID AC Fatumamitchell Aparicio, PA-C      labetalol  10 mg Intravenous Q6H PRN Fatuma E Alessandra, PA-C      lisinopril  20 mg Oral Daily Fatuma E Alessandra, PA-C      metoprolol tartrate  25 mg Oral Q12H LEONELA Fatuma E Alessandra, PA-C      morphine injection  2 mg Intravenous Q4H PRN Fatuma E Alessandra, PA-C      nicotine  1 patch Transdermal Daily Fatumamitchell Aparicio PA-C      nitroglycerin  0.4 mg Sublingual Q5 Min PRN Jessi Keller MD      nitroGLYcerin  5-200 mcg/min Intravenous Titrated [place] Blood MD 25 mcg/min (05/16/25 8017)     Continuous Infusions: heparin (porcine), 3-20 Units/kg/hr (Order-Specific), Last Rate: 15.1 Units/kg/hr (05/16/25 0316)  nitroGLYcerin, 5-200 mcg/min, Last  Rate: 25 mcg/min (05/16/25 0757)      PRN Meds:  heparin (porcine), 2,000 Units, Q6H PRN  heparin (porcine), 4,000 Units, Q6H PRN  labetalol, 10 mg, Q6H PRN  morphine injection, 2 mg, Q4H PRN  nitroglycerin, 0.4 mg, Q5 Min PRN        Allergies:  Allergies[2]    Review of Systems:  Review of Systems   Constitutional:  Negative for chills and fever.   HENT:  Negative for ear pain and sore throat.    Eyes:  Negative for pain and visual disturbance.   Respiratory:  Positive for shortness of breath. Negative for cough.    Cardiovascular:  Positive for chest pain. Negative for palpitations and leg swelling.   Gastrointestinal:  Negative for abdominal pain, blood in stool, constipation, diarrhea, nausea and vomiting.   Genitourinary:  Negative for dysuria and hematuria.   Musculoskeletal:  Negative for arthralgias and back pain.   Skin:  Negative for color change and rash.   Neurological:  Negative for seizures and syncope.   All other systems reviewed and are negative.      Vital Signs:     Vitals:    05/16/25 0619 05/16/25 0639 05/16/25 0650 05/16/25 0750   BP: 145/86 149/85 154/84 146/82   Pulse: 91 94 96 (!) 113   Resp:       Temp:       TempSrc:       SpO2: 95% 92% 91% 95%   Weight:       Height:         Invasive Devices       Peripheral Intravenous Line  Duration             Peripheral IV 05/14/25 Left Antecubital 2 days                    Physical Exam:  Physical Exam  Vitals reviewed.   Constitutional:       General: He is not in acute distress.     Appearance: Normal appearance. He is obese.   HENT:      Head: Normocephalic and atraumatic.      Mouth/Throat:      Mouth: Mucous membranes are moist.      Pharynx: Oropharynx is clear.     Eyes:      Extraocular Movements: Extraocular movements intact.      Conjunctiva/sclera: Conjunctivae normal.      Pupils: Pupils are equal, round, and reactive to light.       Cardiovascular:      Rate and Rhythm: Normal rate and regular rhythm.      Heart sounds: Normal heart  sounds. No murmur heard.  Pulmonary:      Effort: Pulmonary effort is normal.      Breath sounds: Normal breath sounds.   Abdominal:      General: Abdomen is flat. Bowel sounds are normal.      Palpations: Abdomen is soft.     Musculoskeletal:         General: No swelling. Normal range of motion.      Cervical back: Normal range of motion and neck supple.     Skin:     General: Skin is warm and dry.     Neurological:      General: No focal deficit present.      Mental Status: He is alert and oriented to person, place, and time.     Psychiatric:         Mood and Affect: Mood normal.         Behavior: Behavior normal.         Lab Results:     Results from last 7 days   Lab Units 05/16/25  0245 05/15/25  0614 05/14/25  1724   WBC Thousand/uL 12.62* 12.31* 11.30*   HEMOGLOBIN g/dL 11.7* 11.9* 11.6*   HEMATOCRIT % 36.3* 36.8 35.2*   PLATELETS Thousands/uL 277 306 314     Results from last 7 days   Lab Units 05/16/25  0232 05/15/25  0614 05/14/25  1543   POTASSIUM mmol/L 4.1 4.0 4.2   CHLORIDE mmol/L 104 107 104   CO2 mmol/L 25 26 25   BUN mg/dL 17 20 28*   CREATININE mg/dL 1.00 0.89 1.02   CALCIUM mg/dL 8.6 8.7 9.6     Results from last 7 days   Lab Units 05/16/25  0232 05/15/25  1252 05/15/25  0614 05/14/25  2353 05/14/25  1724   INR   --   --   --   --  1.00   PTT seconds 53* 39* 86*   < > 34    < > = values in this interval not displayed.     Lab Results   Component Value Date    HGBA1C 7.7 (H) 05/13/2025     Lab Results   Component Value Date    TROPONINI <0.02 04/25/2018       Imaging Studies:     Cardiac Cath: 5/15- ramus 90%, 1st Diag 99%- consistent with infarct artery, mLAD 75%, mCx-dCx 90%, RPDA-1 75%, RPDA-2 90%     Echo: 1/13/2025: EF 55%, normal wall motion, grade 1 diastolic dysfunction, mild AR, mild MR, mild TR, mild DC     Echo: EF 52%, no valvular dysfunction     Carotid US: <50% ICA stenosis b/l, antegrade flow b/l, no SCD b/l     Vein Mapping: formal read adequate b/l prox thigh to mid thigh --> PA  read & measurements on Vascubase should be adequate b/l prox thigh to knee     CTA Dissection: 5/14 Several tiny nodular opacities in the right upper lobe with adjacent tree-in-bud opacities favored to be infectious/inflammatory.     EKG: SR w 1st degree AVB     Results Review Statement: I personally reviewed the following image studies in PACS and associated radiology reports: as above. My interpretation of the radiology images/reports is: as above.    Assessment:  Principal Problem:    CAD, multiple vessel  Active Problems:    Type 2 diabetes mellitus (HCC)    Primary hypertension    Hyperlipidemia    History of CVA (cerebrovascular accident)    Elevated troponin    NSTEMI (non-ST elevated myocardial infarction) (HCC)    Paroxysmal atrial tachycardia (HCC)    PVC's (premature ventricular contractions)    Tobacco use    Severe coronary artery disease; Ongoing CABG workup    Plan:  Risks and benefits of coronary artery bypass grafting were discussed in detail today with the patient.  They understand and wish to proceed with further workup and ultimately surgical intervention.  We have ordered routine preoperative laboratory and vascular studies.  Pending the results of these tests, they will be scheduled for surgery  with Dr. Jose Bethea. Timing of surgery TBD, Monday or Wednesday of next week unless continues to have worsening chest pain, may need to go this weekend. Currently nitro being up titrated, will monitor closely.     Tc Stovall was comfortable with our recommendations, and their questions were answered to their satisfaction.  We will continue to evaluate the patient daily with further recommendations as work up is completed.  Thank you for allowing us to participate in the care of this patient.     SIGNATURE: Mary Ann Villegas PA-C  DATE: May 16, 2025  TIME: 8:21 AM    * This note was completed in part utilizing m-[employer] fluency direct voice recognition software.   Grammatical errors, random word  insertion, spelling mistakes, and incomplete sentences may be an occasional consequence of the system secondary to software limitations, ambient noise and hardware issues. At the time of dictation, efforts were made to edit, clarify and /or correct errors. Please read the chart carefully and recognize, using context, where substitutions have occurred.  If you have any questions or concerns about the context, text or information contained within the body of this dictation, please contact myself, the provider, for further clarification.          [1]   Social History  Tobacco Use   Smoking Status Every Day    Current packs/day: 0.50    Average packs/day: 0.6 packs/day for 85.9 years (55.3 ttl pk-yrs)    Types: Cigarettes    Start date: 4/26/1964   Smokeless Tobacco Never   [2] No Known Allergies

## 2025-05-16 NOTE — QUICK NOTE
Notified by RN that temp is 100.8. At this time I asked for repeat rectal temp which was 102.2 with /63 from 139/74 and HR of 84 (did receive lopressor 25 mg this morning) satting well on RA. I came to assess bedside and patient denies all infectious symptoms. Does endorse cough but is no different than his chronic cough which he attributes to his smoking. Does endorse 4/10 chest pain and that's with the nitro drip. Also endorses fatigue but reports he feels better than earlier this morning. On exam his upper extremities are warm on exam, distal LE are cool to touch, lung exam diffuse rhonci. Cardiology team notified. Will get lactate, UA, procal, blood cultures, troponin and repeat ECG.    Luis Alberto Jang, DO  PGY-2, Internal Medicine  [facility]

## 2025-05-16 NOTE — QUICK NOTE
"SOD notified by RN that pt is endorsing 2/10 CP. Repeat random trop 11K, improved from peak >22K. ECG no significant changes though new T-wave inversions seen in contiguous lateral leads. S/p 3 sublingual nitro which relieved his symptoms. No events seen on tele. VSS.     Plan to continue with conservative management pending CABG eval.     Update 5/16/2025, 0630. Alerted of ongoing CP. 8/10 s/p total 6 sublingual and morphine. States that it's more \"pressure-like\" than his previous stabbing-like pain prior to transfer. Initially had some wheezing but BS improved after neb. Re-ordered ECG which potentially show ST elevation in lateral leads, and T-wave inversion. CXR and BNP ordered. Cardiology notified. Plan to start nitro gtt, titrate up until CP is relieved.   "

## 2025-05-16 NOTE — ASSESSMENT & PLAN NOTE
Multiple acute to subacute lacunar infarcts 1/2025. Continue to follow with cardiology outpatient given concerns for possible cardioembolic etiology. Recommend establishing with outpatient neurology.

## 2025-05-16 NOTE — CASE MANAGEMENT
Case Management Assessment & Discharge Planning Note    Patient name Tc Stovall  [facility] [id number]  : 1950 Date 2025       Current Admission Date: 5/15/2025  Current Admission Diagnosis:CAD, multiple vessel   Patient Active Problem List    Diagnosis Date Noted    Fever 2025    NSTEMI (non-ST elevated myocardial infarction) ([facility]) 05/15/2025    Paroxysmal atrial tachycardia ([facility]) 05/15/2025    PVC's (premature ventricular contractions) 05/15/2025    Tobacco use 05/15/2025    CAD, multiple vessel 05/15/2025    Elevated troponin 2025    Cubital tunnel syndrome on right 2025    Carpal tunnel syndrome of right wrist 2025    History of CVA (cerebrovascular accident) 01/10/2025    Radiculopathy, cervical region 2024    Foraminal stenosis of lumbar region 2024    Protrusion of cervical intervertebral disc 2024    Cervical radiculopathy 2024    Obesity, morbid ([facility]) 2024    Type 2 diabetes mellitus with hyperglycemia, without long-term current use of insulin ([facility]) 2023    Primary hypertension 10/04/2021    Hyperlipidemia 10/04/2021    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 edema, without long-term current use of insulin ([facility]) 2021    Type 2 diabetes mellitus ([facility]) 2017    Dependence on nicotine from cigarettes 2017      LOS (days): 1  Geometric Mean LOS (GMLOS) (days): 1.7  Days to GMLOS:1     OBJECTIVE:    Risk of Unplanned Readmission Score: 9.47         Current admission status: Inpatient       Preferred Pharmacy:   [id number] - [place], [place] - [address]  [address]  [facility] [address]  Phone: [phone] Fax: [phone]    Primary Care Provider: Lester Roth DO    Primary Insurance: MEDICARE  Secondary Insurance: AARP    ASSESSMENT:  Active Health Care Proxies       Mateusz Stovall [facility] Care Representative - Son   Primary Phone:  [phone] (Mobile)                 Advance Directives  Does patient have a Health Care POA?: No  Was patient offered paperwork?: Yes (declined)  Does patient currently have a Health Care decision maker?: Yes, please see Health Care Proxy section  Does patient have Advance Directives?: No  Was patient offered paperwork?: Yes (declined)  Primary Contact: sonMateusz         Readmission Root Cause  30 Day Readmission: No    Patient Information  Admitted from:: Facility ([facility])  Mental Status: Alert  During Assessment patient was accompanied by: Not accompanied during assessment  Assessment information provided by:: Patient  Primary Caregiver: Self  Support Systems: Son  What city do you live in?: [place]  Living Arrangements: Lives Alone  Is patient a ?: No    Activities of Daily Living Prior to Admission  Functional Status: Independent  Completes ADLs independently?: Yes  Ambulates independently?: Yes  Does patient use assisted devices?: No  Does patient currently own DME?: No  Does patient have a history of Outpatient Therapy (PT/OT)?: No  Does the patient have a history of Short-Term Rehab?: No  Does patient have a history of HHC?: No    Current Home Health Care  Home Health Agency Name:: Other    Patient Information Continued  Income Source: Pension/USP  Does patient have prescription coverage?: Yes  Can the patient afford their medications and any related supplies (such as glucometers or test strips)?: Yes  Does patient receive dialysis treatments?: No  Does patient have a history of substance abuse?: No  Does patient have a history of Mental Health Diagnosis?: No         Means of Transportation  Means of Transport to Appts:: Drives Self          DISCHARGE DETAILS:    Discharge planning discussed with:: patient  Freedom of Choice: Yes  Comments - Freedom of Choice: CT surgeons recommend home VN. Discussed w/ pt and he is agreeable to blanket referrals. [facility] [employer]  CM contacted  family/caregiver?: No- see comments (declined)  Were Treatment Team discharge recommendations reviewed with patient/caregiver?: Yes          Contacts  Patient Contacts: sonMateusz (wife is in a nursing home)  Reason/Outcome: Continuity of Care, Emergency Contact, Referral, Discharge Planning    Requested Home Health Care         Is the patient interested in HHC at discharge?: Yes  Home Health Discipline requested:: Nursing  Home Health Agency Name:: Other  Home Health Follow-Up Provider:: PCP  Home Health Services Needed:: Post-Op Care and Assessment  Homebound Criteria Met:: Requires the Assistance of Another Person for Safe Ambulation or to Leave the Home  Supporting Clincal Findings:: Limited Endurance         Other Referral/Resources/Interventions Provided:  Interventions: HHC    Additional Comments: CM met w pt to introduce CM role, obtain baseline assessment information and discuss DCP. Pt lives alone. IPTA. No DME. No prior HHC or STR. Denies h/o MH or SA treatment. Son, /Mateusz is HCR. Pt reports wife is in a nursing home. [facility] ref sent for VN f/u post d/c.    *[facility] reserved. [facility] eligible

## 2025-05-16 NOTE — H&P (VIEW-ONLY)
Consultation - Cardiac Surgery   Tc Stovall 75 y.o. male MRN: [id number]  Unit/Bed#: [facility] 511-01 Encounter: [id number]    Physician Requesting Consult: Moreno Fischer MD    Reason for Consult / Principal Problem: CABG eval    Inpatient consult to Cardiac Surgery  Consult performed by: Mary Ann Villegas PA-C  Consult ordered by: Jessi Keller MD        History of Present Illness: Tc Stovall is a 75 y.o. male with a PMH of HTN, HLD, multiple CVAs (has not followed with neurology, planning for ILR implant), PAT, PVCs, 1st degree AVB-mild, current tobacco use, IDDM2 (A1c- 7.7) who presented to  [facility] on 5/14 with complaints of sudden onset severe substernal CP at rest with radiation to his back. EMS called, found to have SBP of 200, nitro and ASA given with some improvement in pain. CTA dissection protocol performed revealing no acute findings. Troponins found to be elevated (max 22,973), ST depressions in inferior leads. Patient was started on heparin gtt, cardiology consulted, loaded with Plavix, underwent cardiac cath on 5/15 revealing MV CAD, started on BB, recommended for transfer to [facility] for CABG evaluation. Of note, recently admitted on 1/2025 for CVA, found with multiple subacute infarcts with concern for embolic etiology which he was following Dr. Lujan for with plans for loop recorder implant.      Upon examination today, he reports 6/10 chest pain. On heparin and nitro gtt, Nitro gtt being titrated and BB being titrated. Also c/o SOB. Denies  palpitations, LE edema b/l, orthopnea, PND, numbness/tinlging/paresthesias in UE or LE bilaterally, HA, lightheadeness, dizziness, presyncopal symptoms, hx syncopal events, N/V/D, hemoptysis, hematemesis, hematochezia, melena. Denies hx cancer w/ chest wall radiation, hx blood loss anemia, hx varicose veins or vein stripping.    Patient is retired. Does not use an assist device for ambulation. Drives. , 1 kids. Wife currently in [place]  manner. Smoker up until admission, states he quit on Tuesday, 1/2 PPD since 1964. No ETOH, or drug use. NKDA. Father had an MI at 58      Past Medical History:  Past Medical History:   Diagnosis Date    Arthritis     Cerebellar infarction (HCC)     Cerebrovascular accident (CVA) due to embolism of cerebral artery (HCC)     Diabetes mellitus (HCC)     Hyperlipidemia     Hypertension     Left rotator cuff tear     Stroke (HCC) [id number]    Tobacco abuse     Wears glasses          Past Surgical History:   Past Surgical History:   Procedure Laterality Date    CARDIAC CATHETERIZATION N/A 5/15/2025    Procedure: Cardiac Catheterization;  Surgeon: Mo Huertas MD;  Location: [facility];  Service: Cardiology    CARDIAC CATHETERIZATION N/A 5/15/2025    Procedure: Cardiac Coronary Angiogram;  Surgeon: Mo Huertas MD;  Location: [facility];  Service: Cardiology    CARDIAC CATHETERIZATION Left 5/15/2025    Procedure: Cardiac Left Heart Cath;  Surgeon: Mo Huertas MD;  Location: [facility] CARD[facility] C[facility];  Service: Cardiology    COLONOSCOPY      KNEE SURGERY      WI SURGICAL ARTHROSCOPY SHOULDER W/ROTATOR CUFF RPR Left 08/10/2022    Procedure: Left Shoulder Arthroscopy, supraspinatus and Subscapularis Repair, Bicep Tenotomy, acromioplasty, extensive debridement;  Surgeon: Otilio Mendez DO;  Location: [facility] MAIN OR;  Service: Orthopedics    TONSILLECTOMY  1954         Family History:  Family History   Problem Relation Age of Onset    Alzheimer's disease Mother     Diabetes Mother     Heart block Father     Heart disease Father          Social History:  Social History     Substance and Sexual Activity   Alcohol Use Yes    Comment: rarely     Social History     Substance and Sexual Activity   Drug Use No     Tobacco Use History[1]  Marital Status: /Civil Union      Home Medications:   Prior to Admission medications    Medication Sig Start Date End Date Taking? Authorizing Provider    acetaminophen (TYLENOL) 500 mg tablet Take 500 mg by mouth every 6 (six) hours as needed for mild pain    Historical Provider, MD   amLODIPine (NORVASC) 5 mg tablet Take 1 tablet (5 mg total) by mouth daily 3/16/25   Lester Roth, DO   aspirin 81 mg chewable tablet Chew 1 tablet (81 mg total) daily 7/16/24 April Charmaine Valenzuela PA-C   atorvastatin (LIPITOR) 40 mg tablet Take 1 tablet (40 mg total) by mouth every evening 5/6/25   Lester Roth, DO   Blood Glucose Monitoring Suppl (OneTouch Verio Reflect) w/Device KIT Check blood sugars three times daily. Please substitute with appropriate alternative as covered by patient's insurance. Dx: E11.65 6/12/23   Lester Roth, DO   cholecalciferol (VITAMIN D3) 1,000 units tablet Take 1 tablet (1,000 Units total) by mouth daily 1/21/24   Lester Roth, DO   Continuous Glucose Sensor (Dexcom G7 Sensor) Use 1 Device every 10 days 9/23/24 April Charmaine Valenzuela PA-C   fluticasone (FLONASE) 50 mcg/act nasal spray 1 spray into each nostril daily for 14 days 4/16/24 April Charmaine Valenzuela PA-C   gabapentin (Neurontin) 600 MG tablet Take 1 tablet (600 mg total) by mouth 2 (two) times a day 3/27/25   JANET Dos Santos   glipiZIDE (GLUCOTROL XL) 10 mg 24 hr tablet Take 1 tablet (10 mg total) by mouth daily 5/13/25   Lester Roth, DO   glucose blood (OneTouch Verio) test strip Check blood sugars three times daily. Please substitute with appropriate alternative as covered by patient's insurance. Dx: E11.65 6/12/23   Lester Roth, DO   insulin glargine (LANTUS) 100 units/mL subcutaneous injection Inject 20 Units under the skin daily at bedtime 1/14/25   Mac Crawford MD   Insulin Pen Needle (CareOne Unifine Pentips Plus) 32G X 4 MM MISC USE DAILY AS INSTRUCTED WITH INSULIN PEN 1/2/25   Lester Roth, DO   lisinopril (ZESTRIL) 20 mg tablet Take 1 tablet (20 mg total) by mouth daily 5/13/25   Lester Roth DO   meloxicam (MOBIC) 15 mg tablet Take 1 tablet  (15 mg total) by mouth daily as needed for moderate pain 4/2/25   Lester Roth DO   metFORMIN (GLUCOPHAGE-XR) 500 mg 24 hr tablet Take 2 tablets (1,000 mg total) by mouth 2 (two) times a day 3/16/25   Lester Roth DO   nicotine (NICODERM CQ) 14 mg/24hr TD 24 hr patch Place 1 patch on the skin every 24 hours  Patient not taking: Reported on 5/9/2025    Historical Provider, MD   OneTouch Delica Lancets 33G MISC Check blood sugars three times daily. Please substitute with appropriate alternative as covered by patient's insurance. Dx: E11.65 1/30/24   Lester Roth DO       Inpatient Medications:  Scheduled Meds:   Current Facility-Administered Medications   Medication Dose Route Frequency Provider Last Rate    aspirin  81 mg Oral Daily Fatuma E Alessandra, PA-C      atorvastatin  40 mg Oral QPM Fatuma E Alessandra, PA-C      gabapentin  600 mg Oral BID Fatumamitchell Aparicio, PA-C      heparin (porcine)  3-20 Units/kg/hr (Order-Specific) Intravenous Titrated Fatuma E Alessandra, PA-C 15.1 Units/kg/hr (05/16/25 0316)    heparin (porcine)  2,000 Units Intravenous Q6H PRN Fatuma E Alessandra, PA-C      heparin (porcine)  4,000 Units Intravenous Q6H PRN Fatuma E Alessandra, PA-C      insulin glargine  20 Units Subcutaneous HS Fatuma E Alessandra, PA-C      insulin lispro  1-6 Units Subcutaneous TID AC Fatumamitchell Aparicio, PA-C      labetalol  10 mg Intravenous Q6H PRN Fatuma E Alessandra, PA-C      lisinopril  20 mg Oral Daily Fatuma E Alessandra, PA-C      metoprolol tartrate  25 mg Oral Q12H LEONELA Fatuma E Alessandra, PA-C      morphine injection  2 mg Intravenous Q4H PRN Fatuma E Alessandra, PA-C      nicotine  1 patch Transdermal Daily Fatumamitchell Aparicio PA-C      nitroglycerin  0.4 mg Sublingual Q5 Min PRN Jessi Keller MD      nitroGLYcerin  5-200 mcg/min Intravenous Titrated Richmond Blood MD 25 mcg/min (05/16/25 6447)     Continuous Infusions: heparin (porcine), 3-20 Units/kg/hr (Order-Specific), Last Rate: 15.1 Units/kg/hr (05/16/25 0316)  nitroGLYcerin, 5-200 mcg/min, Last  Rate: 25 mcg/min (05/16/25 0757)      PRN Meds:  heparin (porcine), 2,000 Units, Q6H PRN  heparin (porcine), 4,000 Units, Q6H PRN  labetalol, 10 mg, Q6H PRN  morphine injection, 2 mg, Q4H PRN  nitroglycerin, 0.4 mg, Q5 Min PRN        Allergies:  Allergies[2]    Review of Systems:  Review of Systems   Constitutional:  Negative for chills and fever.   HENT:  Negative for ear pain and sore throat.    Eyes:  Negative for pain and visual disturbance.   Respiratory:  Positive for shortness of breath. Negative for cough.    Cardiovascular:  Positive for chest pain. Negative for palpitations and leg swelling.   Gastrointestinal:  Negative for abdominal pain, blood in stool, constipation, diarrhea, nausea and vomiting.   Genitourinary:  Negative for dysuria and hematuria.   Musculoskeletal:  Negative for arthralgias and back pain.   Skin:  Negative for color change and rash.   Neurological:  Negative for seizures and syncope.   All other systems reviewed and are negative.      Vital Signs:     Vitals:    05/16/25 0619 05/16/25 0639 05/16/25 0650 05/16/25 0750   BP: 145/86 149/85 154/84 146/82   Pulse: 91 94 96 (!) 113   Resp:       Temp:       TempSrc:       SpO2: 95% 92% 91% 95%   Weight:       Height:         Invasive Devices       Peripheral Intravenous Line  Duration             Peripheral IV 05/14/25 Left Antecubital 2 days                    Physical Exam:  Physical Exam  Vitals reviewed.   Constitutional:       General: He is not in acute distress.     Appearance: Normal appearance. He is obese.   HENT:      Head: Normocephalic and atraumatic.      Mouth/Throat:      Mouth: Mucous membranes are moist.      Pharynx: Oropharynx is clear.     Eyes:      Extraocular Movements: Extraocular movements intact.      Conjunctiva/sclera: Conjunctivae normal.      Pupils: Pupils are equal, round, and reactive to light.       Cardiovascular:      Rate and Rhythm: Normal rate and regular rhythm.      Heart sounds: Normal heart  sounds. No murmur heard.  Pulmonary:      Effort: Pulmonary effort is normal.      Breath sounds: Normal breath sounds.   Abdominal:      General: Abdomen is flat. Bowel sounds are normal.      Palpations: Abdomen is soft.     Musculoskeletal:         General: No swelling. Normal range of motion.      Cervical back: Normal range of motion and neck supple.     Skin:     General: Skin is warm and dry.     Neurological:      General: No focal deficit present.      Mental Status: He is alert and oriented to person, place, and time.     Psychiatric:         Mood and Affect: Mood normal.         Behavior: Behavior normal.         Lab Results:     Results from last 7 days   Lab Units 05/16/25  0245 05/15/25  0614 05/14/25  1724   WBC Thousand/uL 12.62* 12.31* 11.30*   HEMOGLOBIN g/dL 11.7* 11.9* 11.6*   HEMATOCRIT % 36.3* 36.8 35.2*   PLATELETS Thousands/uL 277 306 314     Results from last 7 days   Lab Units 05/16/25  0232 05/15/25  0614 05/14/25  1543   POTASSIUM mmol/L 4.1 4.0 4.2   CHLORIDE mmol/L 104 107 104   CO2 mmol/L 25 26 25   BUN mg/dL 17 20 28*   CREATININE mg/dL 1.00 0.89 1.02   CALCIUM mg/dL 8.6 8.7 9.6     Results from last 7 days   Lab Units 05/16/25  0232 05/15/25  1252 05/15/25  0614 05/14/25  2353 05/14/25  1724   INR   --   --   --   --  1.00   PTT seconds 53* 39* 86*   < > 34    < > = values in this interval not displayed.     Lab Results   Component Value Date    HGBA1C 7.7 (H) 05/13/2025     Lab Results   Component Value Date    TROPONINI <0.02 04/25/2018       Imaging Studies:     Cardiac Cath: 5/15- ramus 90%, 1st Diag 99%- consistent with infarct artery, mLAD 75%, mCx-dCx 90%, RPDA-1 75%, RPDA-2 90%     Echo: 1/13/2025: EF 55%, normal wall motion, grade 1 diastolic dysfunction, mild AR, mild MR, mild TR, mild OK     Echo: EF 52%, no valvular dysfunction     Carotid US: <50% ICA stenosis b/l, antegrade flow b/l, no SCD b/l     Vein Mapping: formal read adequate b/l prox thigh to mid thigh --> PA  read & measurements on Vascubase should be adequate b/l prox thigh to knee     CTA Dissection: 5/14 Several tiny nodular opacities in the right upper lobe with adjacent tree-in-bud opacities favored to be infectious/inflammatory.     EKG: SR w 1st degree AVB     Results Review Statement: I personally reviewed the following image studies in PACS and associated radiology reports: as above. My interpretation of the radiology images/reports is: as above.    Assessment:  Principal Problem:    CAD, multiple vessel  Active Problems:    Type 2 diabetes mellitus (HCC)    Primary hypertension    Hyperlipidemia    History of CVA (cerebrovascular accident)    Elevated troponin    NSTEMI (non-ST elevated myocardial infarction) (HCC)    Paroxysmal atrial tachycardia (HCC)    PVC's (premature ventricular contractions)    Tobacco use    Severe coronary artery disease; Ongoing CABG workup    Plan:  Risks and benefits of coronary artery bypass grafting were discussed in detail today with the patient.  They understand and wish to proceed with further workup and ultimately surgical intervention.  We have ordered routine preoperative laboratory and vascular studies.  Pending the results of these tests, they will be scheduled for surgery  with Dr. Jose Bethea. Timing of surgery TBD, Monday or Wednesday of next week unless continues to have worsening chest pain, may need to go this weekend. Currently nitro being up titrated, will monitor closely.     Tc Stovall was comfortable with our recommendations, and their questions were answered to their satisfaction.  We will continue to evaluate the patient daily with further recommendations as work up is completed.  Thank you for allowing us to participate in the care of this patient.     SIGNATURE: Mary Ann Villegas PA-C  DATE: May 16, 2025  TIME: 8:21 AM    * This note was completed in part utilizing m[employer] fluency direct voice recognition software.   Grammatical errors, random word  insertion, spelling mistakes, and incomplete sentences may be an occasional consequence of the system secondary to software limitations, ambient noise and hardware issues. At the time of dictation, efforts were made to edit, clarify and /or correct errors. Please read the chart carefully and recognize, using context, where substitutions have occurred.  If you have any questions or concerns about the context, text or information contained within the body of this dictation, please contact myself, the provider, for further clarification.          [1]   Social History  Tobacco Use   Smoking Status Every Day    Current packs/day: 0.50    Average packs/day: 0.6 packs/day for 85.9 years (55.3 ttl pk-yrs)    Types: Cigarettes    Start date: 4/26/1964   Smokeless Tobacco Never   [2] No Known Allergies

## 2025-05-16 NOTE — SEPSIS NOTE
Sepsis Note   Tc Stovall 75 y.o. male MRN: [id number]  Unit/Bed#: [facility] 511-01 Encounter: [id number]       Initial Sepsis Screening       Row Name 05/16/25 1202 05/16/25 1130             Is the patient's history suggestive of a new or worsening infection? No  -BN No  -BN                 User Key  (r) = Recorded By, (t) = Taken By, (c) = Cosigned By      Initials Name Provider Type    LOLLY Lainez MD Resident                   Initial Sepsis Screening       Row Name 05/16/25 1202 05/16/25 1130                Initial Sepsis Assessment    Is the patient's history suggestive of a new or worsening infection? No  -BN No  -BN                 User Key  (r) = Recorded By, (t) = Taken By, (c) = Cosigned By      Initials Name Provider Type    LOLLY Lainez MD Resident                         Body mass index is 34.77 kg/m².  Wt Readings from Last 1 Encounters:   05/15/25 101 kg (222 lb 0.1 oz)     IBW (Ideal Body Weight): 66.1 kg    Ideal body weight: 66.1 kg (145 lb 11.6 oz)  Adjusted ideal body weight: 79.9 kg (176 lb 3.8 oz)    Sepsis alert called for temp 102.2.  .  WBC 12.60  Initially presented to [facility] due to NSTEMI and transferred to [facility] for CABG eval  Recent CTA C/A/P without evidence of acute intrathoracic or intra-abdominal pathology.  CXR from today without clear evidence of focal consolidations.  Patient denies all infectious symptoms.  On exam extremities are warm, lungs with diffuse rhonchi on auscultation.  Low suspicion for infection at this time.  However, will initiate infectious workup with UA, blood cultures, lactic acid, Pro-Cam.  Will also repeat EKG and troponins

## 2025-05-16 NOTE — ASSESSMENT & PLAN NOTE
Lab Results   Component Value Date    HGBA1C 7.7 (H) 05/13/2025       Recent Labs     05/15/25  1049 05/15/25  1657 05/15/25  2036 05/16/25  0627   POCGLU 149* 156* 171* 144*       Blood Sugar Average: Last 72 hrs:  (P) 157.5  Home regimen: glipizide 10 mg daily, metformin 1 g BID, and Lantus 20 units at bedtime    Plan:  - Continue Lantus 20 units at bedtime, SSI  - Carb controlled diet  - Hypoglycemia protocol  - BG target 140-180

## 2025-05-16 NOTE — ASSESSMENT & PLAN NOTE
Home regimen: amlodipine 5 mg daily and lisinopril 20 mg daily    Plan:  - Continue home amlodipine 5 mg daily   - Hold home lisinopril 20 mg daily in preparation for CABG  - Continue Lopressor 50 mg twice daily

## 2025-05-17 ENCOUNTER — ANESTHESIA EVENT (INPATIENT)
Dept: PERIOP | Facility: HOSPITAL | Age: 75
DRG: 236 | End: 2025-05-17
Payer: MEDICARE

## 2025-05-17 LAB
ANION GAP SERPL CALCULATED.3IONS-SCNC: 10 MMOL/L (ref 4–13)
APTT PPP: 35 SECONDS (ref 23–34)
APTT PPP: 51 SECONDS (ref 23–34)
APTT PPP: 69 SECONDS (ref 23–34)
APTT PPP: 97 SECONDS (ref 23–34)
BASOPHILS # BLD AUTO: 0.08 THOUSANDS/ÂΜL (ref 0–0.1)
BASOPHILS NFR BLD AUTO: 1 % (ref 0–1)
BUN SERPL-MCNC: 25 MG/DL (ref 5–25)
CALCIUM SERPL-MCNC: 8.5 MG/DL (ref 8.4–10.2)
CHLORIDE SERPL-SCNC: 101 MMOL/L (ref 96–108)
CO2 SERPL-SCNC: 24 MMOL/L (ref 21–32)
CREAT SERPL-MCNC: 1.27 MG/DL (ref 0.6–1.3)
EOSINOPHIL # BLD AUTO: 0.22 THOUSAND/ÂΜL (ref 0–0.61)
EOSINOPHIL NFR BLD AUTO: 2 % (ref 0–6)
ERYTHROCYTE [DISTWIDTH] IN BLOOD BY AUTOMATED COUNT: 12.5 % (ref 11.6–15.1)
GFR SERPL CREATININE-BSD FRML MDRD: 55 ML/MIN/1.73SQ M
GLUCOSE SERPL-MCNC: 205 MG/DL (ref 65–140)
GLUCOSE SERPL-MCNC: 214 MG/DL (ref 65–140)
GLUCOSE SERPL-MCNC: 228 MG/DL (ref 65–140)
GLUCOSE SERPL-MCNC: 253 MG/DL (ref 65–140)
GLUCOSE SERPL-MCNC: 263 MG/DL (ref 65–140)
HCT VFR BLD AUTO: 34.3 % (ref 36.5–49.3)
HGB BLD-MCNC: 11.3 G/DL (ref 12–17)
IMM GRANULOCYTES # BLD AUTO: 0.11 THOUSAND/UL (ref 0–0.2)
IMM GRANULOCYTES NFR BLD AUTO: 1 % (ref 0–2)
LYMPHOCYTES # BLD AUTO: 2.45 THOUSANDS/ÂΜL (ref 0.6–4.47)
LYMPHOCYTES NFR BLD AUTO: 17 % (ref 14–44)
MCH RBC QN AUTO: 29.3 PG (ref 26.8–34.3)
MCHC RBC AUTO-ENTMCNC: 32.9 G/DL (ref 31.4–37.4)
MCV RBC AUTO: 89 FL (ref 82–98)
MONOCYTES # BLD AUTO: 1.95 THOUSAND/ÂΜL (ref 0.17–1.22)
MONOCYTES NFR BLD AUTO: 14 % (ref 4–12)
MRSA NOSE QL CULT: NORMAL
NEUTROPHILS # BLD AUTO: 9.28 THOUSANDS/ÂΜL (ref 1.85–7.62)
NEUTS SEG NFR BLD AUTO: 65 % (ref 43–75)
NRBC BLD AUTO-RTO: 0 /100 WBCS
PLATELET # BLD AUTO: 242 THOUSANDS/UL (ref 149–390)
PMV BLD AUTO: 10.8 FL (ref 8.9–12.7)
POTASSIUM SERPL-SCNC: 3.7 MMOL/L (ref 3.5–5.3)
PROCALCITONIN SERPL-MCNC: 0.18 NG/ML
RBC # BLD AUTO: 3.86 MILLION/UL (ref 3.88–5.62)
SODIUM SERPL-SCNC: 135 MMOL/L (ref 135–147)
WBC # BLD AUTO: 14.09 THOUSAND/UL (ref 4.31–10.16)

## 2025-05-17 PROCEDURE — 94760 N-INVAS EAR/PLS OXIMETRY 1: CPT

## 2025-05-17 PROCEDURE — 84145 PROCALCITONIN (PCT): CPT

## 2025-05-17 PROCEDURE — 99232 SBSQ HOSP IP/OBS MODERATE 35: CPT | Performed by: INTERNAL MEDICINE

## 2025-05-17 PROCEDURE — 85730 THROMBOPLASTIN TIME PARTIAL: CPT | Performed by: INTERNAL MEDICINE

## 2025-05-17 PROCEDURE — 94640 AIRWAY INHALATION TREATMENT: CPT

## 2025-05-17 PROCEDURE — NC001 PR NO CHARGE: Performed by: PHYSICIAN ASSISTANT

## 2025-05-17 PROCEDURE — 82948 REAGENT STRIP/BLOOD GLUCOSE: CPT

## 2025-05-17 PROCEDURE — 85025 COMPLETE CBC W/AUTO DIFF WBC: CPT

## 2025-05-17 PROCEDURE — 80048 BASIC METABOLIC PNL TOTAL CA: CPT

## 2025-05-17 RX ORDER — LEVALBUTEROL INHALATION SOLUTION 0.63 MG/3ML
0.31 SOLUTION RESPIRATORY (INHALATION) EVERY 8 HOURS PRN
Status: DISCONTINUED | OUTPATIENT
Start: 2025-05-17 | End: 2025-05-17

## 2025-05-17 RX ORDER — LEVALBUTEROL INHALATION SOLUTION 0.63 MG/3ML
SOLUTION RESPIRATORY (INHALATION)
Status: COMPLETED
Start: 2025-05-17 | End: 2025-05-17

## 2025-05-17 RX ORDER — ALBUTEROL SULFATE 0.83 MG/ML
2.5 SOLUTION RESPIRATORY (INHALATION) EVERY 6 HOURS PRN
Status: DISCONTINUED | OUTPATIENT
Start: 2025-05-17 | End: 2025-05-19

## 2025-05-17 RX ORDER — INSULIN GLARGINE 100 [IU]/ML
25 INJECTION, SOLUTION SUBCUTANEOUS
Status: DISCONTINUED | OUTPATIENT
Start: 2025-05-17 | End: 2025-05-19

## 2025-05-17 RX ORDER — LEVALBUTEROL INHALATION SOLUTION 0.63 MG/3ML
0.63 SOLUTION RESPIRATORY (INHALATION) EVERY 8 HOURS PRN
Status: DISCONTINUED | OUTPATIENT
Start: 2025-05-17 | End: 2025-05-17

## 2025-05-17 RX ADMIN — ATORVASTATIN CALCIUM 40 MG: 40 TABLET, FILM COATED ORAL at 17:00

## 2025-05-17 RX ADMIN — HEPARIN SODIUM 15.1 UNITS/KG/HR: 10000 INJECTION, SOLUTION INTRAVENOUS at 05:30

## 2025-05-17 RX ADMIN — COLCHICINE 0.6 MG: 0.6 TABLET ORAL at 08:18

## 2025-05-17 RX ADMIN — INSULIN LISPRO 3 UNITS: 100 INJECTION, SOLUTION INTRAVENOUS; SUBCUTANEOUS at 16:57

## 2025-05-17 RX ADMIN — GABAPENTIN 600 MG: 300 CAPSULE ORAL at 17:00

## 2025-05-17 RX ADMIN — COLCHICINE 0.6 MG: 0.6 TABLET ORAL at 17:00

## 2025-05-17 RX ADMIN — INSULIN LISPRO 2 UNITS: 100 INJECTION, SOLUTION INTRAVENOUS; SUBCUTANEOUS at 11:29

## 2025-05-17 RX ADMIN — METOPROLOL TARTRATE 50 MG: 50 TABLET, FILM COATED ORAL at 21:42

## 2025-05-17 RX ADMIN — NICOTINE 1 PATCH: 21 PATCH, EXTENDED RELEASE TRANSDERMAL at 08:21

## 2025-05-17 RX ADMIN — INSULIN LISPRO 2 UNITS: 100 INJECTION, SOLUTION INTRAVENOUS; SUBCUTANEOUS at 07:18

## 2025-05-17 RX ADMIN — ASPIRIN 81 MG CHEWABLE TABLET 81 MG: 81 TABLET CHEWABLE at 08:18

## 2025-05-17 RX ADMIN — LEVALBUTEROL HYDROCHLORIDE 0.63 MG: 0.63 SOLUTION RESPIRATORY (INHALATION) at 04:25

## 2025-05-17 RX ADMIN — AMLODIPINE BESYLATE 5 MG: 5 TABLET ORAL at 08:19

## 2025-05-17 RX ADMIN — LEVALBUTEROL HYDROCHLORIDE 0.63 MG: 0.63 SOLUTION RESPIRATORY (INHALATION) at 04:29

## 2025-05-17 RX ADMIN — METOPROLOL TARTRATE 50 MG: 50 TABLET, FILM COATED ORAL at 08:18

## 2025-05-17 RX ADMIN — HEPARIN SODIUM 2000 UNITS: 1000 INJECTION INTRAVENOUS; SUBCUTANEOUS at 21:59

## 2025-05-17 RX ADMIN — GABAPENTIN 600 MG: 300 CAPSULE ORAL at 08:19

## 2025-05-17 RX ADMIN — INSULIN GLARGINE 25 UNITS: 100 INJECTION, SOLUTION SUBCUTANEOUS at 21:42

## 2025-05-17 NOTE — PROGRESS NOTES
Progress Note - Cardiac Surgery   Tc Stovall 75 y.o. male MRN: [id number]  Unit/Bed#: [facility]320-01 Encounter: [id number]    24 Hour Events: No events overnight.  No complaints.     Medications:   Scheduled Meds:  Current Facility-Administered Medications   Medication Dose Route Frequency Provider Last Rate    acetaminophen  975 mg Oral Q8H PRN Luis Alberto Jang,       albuterol  2.5 mg Nebulization Q6H PRN Moreno Fischer MD      amLODIPine  5 mg Oral Daily Ji Bethea DO      aspirin  81 mg Oral Daily Fatuma E Alessandra, PA-C      atorvastatin  40 mg Oral QPM Fatumamitchell Aparicio, PA-C      colchicine  0.6 mg Oral BID Nic Alvarado MD      gabapentin  600 mg Oral BID Fatuma E Alessandra, PA-C      heparin (porcine)  3-20 Units/kg/hr (Order-Specific) Intravenous Titrated Fatuma E Alessandra, PA-C 13.1 Units/kg/hr (05/17/25 0800)    heparin (porcine)  2,000 Units Intravenous Q6H PRN Fatuma E Alessandra, PA-C      heparin (porcine)  4,000 Units Intravenous Q6H PRN Fatuma E Alessandra, PA-C      insulin glargine  20 Units Subcutaneous HS Fatumamitchell Aparicio, PA-C      insulin lispro  1-6 Units Subcutaneous TID AC Fatuma E Alessandra, PA-C      labetalol  10 mg Intravenous Q6H PRN Fatuma E Alessandra, PA-C      metoprolol tartrate  50 mg Oral Q12H [facility] Bethea, DO      nicotine  1 patch Transdermal Daily Fatumamitchell Aparicio, PA-C      nitroglycerin  0.4 mg Sublingual Q5 Min PRN Jessi Keller MD      nitroGLYcerin  5-200 mcg/min Intravenous Titrated Richmond Blood MD Stopped (05/17/25 0020)     Continuous Infusions:heparin (porcine), 3-20 Units/kg/hr (Order-Specific), Last Rate: 13.1 Units/kg/hr (05/17/25 0800)  nitroGLYcerin, 5-200 mcg/min, Last Rate: Stopped (05/17/25 0020)        Results:   Results from last 7 days   Lab Units 05/17/25  0447 05/16/25  0245 05/15/25  0614   WBC Thousand/uL 14.09* 12.62* 12.31*   HEMOGLOBIN g/dL 11.3* 11.7* 11.9*   HEMATOCRIT % 34.3* 36.3* 36.8   PLATELETS Thousands/uL 242 277 306     Results from last 7 days   Lab Units  05/17/25  0447 05/16/25  0232 05/15/25  0614   POTASSIUM mmol/L 3.7 4.1 4.0   CHLORIDE mmol/L 101 104 107   CO2 mmol/L 24 25 26   BUN mg/dL 25 17 20   CREATININE mg/dL 1.27 1.00 0.89   CALCIUM mg/dL 8.5 8.6 8.7     Results from last 7 days   Lab Units 05/17/25  0447 05/16/25  1521 05/16/25  0921 05/14/25  2353 05/14/25  1724   INR   --   --   --   --  1.00   PTT seconds 97* 74* 70*   < > 34    < > = values in this interval not displayed.       Studies:     Cardiac Catheterization:   Impression         Ramus lesion is 90% stenosed.    1st Diag lesion is 99% stenosed, consistent with the infarct artery, small vessel with PAOLO-3 flow..    Mid LAD lesion is 75% stenosed.    Mid Cx to Dist Cx lesion is 90% stenosed.    RPDA-1 lesion is 75% stenosed.    RPDA-2 lesion is 90% stenosed.     Severe diabetic three-vessel coronary artery disease with small vessel distal diabetic disease.    Echocardiogram:     Interpretation Summary  Show Result Comparison     Left Ventricle: Left ventricular cavity size is normal. Wall thickness is normal. The left ventricular ejection fraction is 55%. Systolic function is normal. Wall motion is normal. Diastolic function is mildly abnormal, consistent with grade I (abnormal) relaxation.    Right Ventricle: Right ventricular cavity size is normal. Systolic function is normal.    Aortic Valve: There is mild regurgitation.    Mitral Valve: There is mild regurgitation.    Tricuspid Valve: There is mild regurgitation. The right ventricular systolic pressure is normal.    Pulmonic Valve: There is mild regurgitation.    Carotid artery duplex:   RIGHT:   There is no evidence of arterial disease throughout the extracranial carotid system.   Vertebral artery flow is antegrade. There is no significant subclavian artery disease.       LEFT:   There is <50% stenosis noted in the internal carotid artery. Plaque is homogenous and smooth.   Vertebral artery flow is antegrade. There is no significant  subclavian artery disease.     Greater saphenous vein mapping: bilateral thighs for conduit    Above reviewed with patient     Vitals:   Vitals:    05/17/25 0100 05/17/25 0246 05/17/25 0700 05/17/25 0818   BP: 102/55 106/56 102/60 116/64   Pulse: 63 66 70 75   Resp:  15 16    Temp:   98.2 °F (36.8 °C)    TempSrc:       SpO2: 95% 94% 95%    Weight:       Height:           Physical Exam:    General: No acute distress, Alert, and Normal appearance  HEENT/NECK:  Normocephalic. Atraumatic.  No jugular venous distention.    Cardiac: Regular rate and rhythm  Pulmonary:  Breath sounds clear bilaterally  Abdomen:  Non-tender, Non-distended, and Normal bowel sounds  Extremities: Extremities warm/dry  Neuro: Alert and oriented X 3  Skin: Warm/Dry, without rashes or lesions.      Assessment:  MVCAD    Plan:  Patient agreeable to proceed with surgery; Informed consent signed    Carotid ultrasound completed, and acceptable    Vein mapping completed, and acceptable    Continue Mupirocin 2% nasal ointment q 12 hrs    Continue topical chlorhexidine bath and mouth rise    coronary artery bypass grafting scheduled for Monday with Dr. Martin Polo M.D.    SIGNATURE: Laurie Ybarra PA-C  DATE: May 17, 2025  TIME: 9:27 AM    * This note was completed in part utilizing [employer] direct voice recognition software.   Grammatical errors, random word insertion, spelling mistakes, and incomplete sentences may be an occasional consequence of the system secondary to software limitations, ambient noise and hardware issues. At the time of dictation, efforts were made to edit, clarify and /or correct errors. Please read the chart carefully and recognize, using context, where substitutions have occurred.  If you have any questions or concerns about the context, text or information contained within the body of this dictation, please contact myself, the provider, for further clarification.

## 2025-05-17 NOTE — ASSESSMENT & PLAN NOTE
Lab Results   Component Value Date    HGBA1C 7.7 (H) 05/13/2025       Recent Labs     05/16/25  1534 05/16/25  2146 05/17/25  0534 05/17/25  1103   POCGLU 224* 212* 214* 228*       Blood Sugar Average: Last 72 hrs:  (P) [id number]  Home regimen: glipizide 10 mg daily, metformin 1 g BID, and Lantus 20 units at bedtime    Plan:  - Increase Lantus from 20 to 25 units at bedtime, SSI  - Carb controlled diet  - Hypoglycemia protocol  - BG target 140-180

## 2025-05-17 NOTE — ASSESSMENT & PLAN NOTE
Initially presented to [facility] for sudden onset, severe substernal chest pain with radiation to his back.  Troponin peaked at >22k.  EKG demonstrated dynamic ST-T wave changes in leads I and aVL, V2, with dynamic ST depressions in inferior leads.  S/p L HC on 5/15 which showed MVCAD. Transferred to [facility] for CABG evaluation.    Cardiac cath (5/14) Ramus lesion 90% stenosis, 1st Diag lesion 99% stenosis, Mid LAD 75% stenosis, Mid Cx to Dist Cx 90% stenosis, RPDA-1 75% stenosis, RPDA-2 90% stenosis  TTE (15) EF 50% and no major valve disease     Plan:  - CT surgery and cardiology consulted, recommendations appreciated  - Plan for CABG Monday  - Continue to monitor on telemetry  - Continue heparin gtt  - Continue aspirin   - Hold Plavix to allow washout period prior to CABG. S/p Plavix load  - Continue Lipitor  - Continue nitro gtt prn for anginal symptoms  - Colchicine for possible pericarditis

## 2025-05-17 NOTE — ASSESSMENT & PLAN NOTE
Tmax 102.2 (5/16)  Denies all infectious symptoms    CTA dissection protocol C/A/P (5/14) Several tiny nodular opacities in the right upper lobe with adjacent tree-in-bud opacities favored to be infectious/inflammatory. Mild basilar atelectasis.  No acute intra-abdominal pathology  CXR (5/16) study limited by low lung volumes.  Prominent pulmonary vasculature which could be accentuated by technique versus mild vascular congestion    Unclear etiology at this time.  Pericarditis versus atelectasis versus infection    Plan  - Infectious workup negative  - fever resolved  - Other than chest pain, patient does not have any infectious symptoms currently  - Monitor off of abx

## 2025-05-17 NOTE — PROGRESS NOTES
"Cardiology Progress Note - Tc Stovall 75 y.o. male MRN: [id number]    Unit/Bed#: PPHP-320-01 Encounter: [id number]  Assessment and plan  #1 non-STEMI type I  #2 multivessel coronary disease awaiting CABG  #3 hypertension  #4 hyperlipidemia   #5  Low normal left ventricular ejection fraction  #6 type 2 diabetes mellitus  #7 history of CVA  #8 tobacco abuse    Recommendations: Patient has had intermittent angina currently controlled on IV nitro infusion.  Continue aspirin, beta-blocker, IV heparin, amlodipine for antianginal regimen.  Continue high intensity statin.  Colchicine was added in case there was any early pericardial inflammation post MI.  Await CT surgery timing on procedure.        Subjective:    No significant events overnight.  Chest pain improved this morning.  Denies any shortness of breath or palpitations.  No events on telemetry.    ROS    Objective:   Vitals: Blood pressure 102/60, pulse 70, temperature 98.2 °F (36.8 °C), resp. rate 16, height 5' 7\" (1.702 m), weight 101 kg (222 lb 0.1 oz), SpO2 95%., Body mass index is 34.77 kg/m².,   Orthostatic Blood Pressures      Flowsheet Row Most Recent Value   Blood Pressure 102/60 filed at 2025 0700   Patient Position - Orthostatic VS Lying filed at 2025 1911           Systolic (24hrs), Av , Min:89 , Max:149     Diastolic (24hrs), Av, Min:48, Max:95      Intake/Output Summary (Last 24 hours) at 2025 0806  Last data filed at 2025 0747  Gross per 24 hour   Intake 420 ml   Output 1775 ml   Net -1355 ml     Weight (last 2 days)       Date/Time Weight    05/15/25 2011 101 (222)              Telemetry Review: No significant arrhythmias seen on telemetry review.   EKG personally reviewed by Ji Bethea DO.     Physical Exam  Vitals and nursing note reviewed.   Constitutional:       General: He is not in acute distress.     Appearance: He is well-developed.   HENT:      Head: Normocephalic and atraumatic.     Eyes:      " Conjunctiva/sclera: Conjunctivae normal.      Pupils: Pupils are equal, round, and reactive to light.       Cardiovascular:      Rate and Rhythm: Normal rate and regular rhythm.      Pulses: Normal pulses.      Heart sounds: Normal heart sounds. No murmur heard.     No friction rub.   Pulmonary:      Effort: Pulmonary effort is normal. No respiratory distress.      Breath sounds: Normal breath sounds. No wheezing or rales.   Abdominal:      General: Bowel sounds are normal. There is no distension.      Palpations: Abdomen is soft.      Tenderness: There is no abdominal tenderness. There is no rebound.     Musculoskeletal:         General: No tenderness or deformity. Normal range of motion.      Cervical back: Neck supple.      Right lower leg: No edema.      Left lower leg: No edema.     Skin:     General: Skin is warm and dry.      Findings: No erythema.     Neurological:      Mental Status: He is alert and oriented to person, place, and time.      Cranial Nerves: No cranial nerve deficit.           Laboratory Results:        CBC with diff:   Results from last 7 days   Lab Units 05/17/25 0447 05/16/25  0245 05/15/25  0614 05/14/25  1724 05/14/25  1543   WBC Thousand/uL 14.09* 12.62* 12.31* 11.30* 9.07   HEMOGLOBIN g/dL 11.3* 11.7* 11.9* 11.6* 12.0   HEMATOCRIT % 34.3* 36.3* 36.8 35.2* 36.2*   MCV fL 89 89 88 87 87   PLATELETS Thousands/uL 242 277 306 314 340   RBC Million/uL 3.86* 4.09 4.17 4.05 4.18   MCH pg 29.3 28.6 28.5 28.6 28.7   MCHC g/dL 32.9 32.2 32.3 33.0 33.1   RDW % 12.5 12.4 12.2 12.2 12.3   MPV fL 10.8 10.1 9.9 10.0 10.2   NRBC AUTO /100 WBCs 0 0  --   --  0         CMP:  Results from last 7 days   Lab Units 05/17/25  0447 05/16/25  0232 05/15/25  0614 05/14/25  1543 05/13/25  0640   POTASSIUM mmol/L 3.7 4.1 4.0 4.2 4.0   CHLORIDE mmol/L 101 104 107 104 105   CO2 mmol/L 24 25 26 25 25   BUN mg/dL 25 17 20 28* 27*   CREATININE mg/dL 1.27 1.00 0.89 1.02 0.98   CALCIUM mg/dL 8.5 8.6 8.7 9.6 9.1   AST  U/L  --   --   --  13  --    ALT U/L  --   --   --  15  --    ALK PHOS U/L  --   --   --  76  --    EGFR ml/min/1.73sq m 55 73 84 72 75         BMP:  Results from last 7 days   Lab Units 25  0447 25  0232 05/15/25  0614 25  1543 25  0640   POTASSIUM mmol/L 3.7 4.1 4.0 4.2 4.0   CHLORIDE mmol/L 101 104 107 104 105   CO2 mmol/L 24 25 26 25 25   BUN mg/dL 25 17 20 28* 27*   CREATININE mg/dL 1.27 1.00 0.89 1.02 0.98   CALCIUM mg/dL 8.5 8.6 8.7 9.6 9.1       BNP:   Recent Labs     25  1543 25  0556   BNP 19 328*       Magnesium:       Coags:   Results from last 7 days   Lab Units 25  0447 25  1521 25  0921 25  0232 05/15/25  1252 05/15/25  0614 25  2353 25  1724   PTT seconds 97* 74* 70* 53* 39* 86* 56* 34   INR   --   --   --   --   --   --   --  1.00       TSH:        Hemoglobin A1C   Results from last 7 days   Lab Units 25  0640   HEMOGLOBIN A1C % 7.7*       Lipid Profile:   Results from last 7 days   Lab Units 05/15/25  0614   TRIGLYCERIDES mg/dL 79   HDL mg/dL 36*       Cardiac testing:   Results for orders placed during the hospital encounter of 17    Echo complete with contrast if indicated    [address] [address] [phone]    Transthoracic Echocardiogram  2D, M-mode, Doppler, and Color Doppler    Study date:  05-Sep-2017    Patient: SUNIL JIMENES  MR number: [id number]  Account number: [id number]  : 1950  Age: 67 years  Gender: Male  Status: Outpatient  Location: Emergency room  Height: 67 in  Weight: 205.9 lb  BP: 172/ 77 mmHg    Indications: Transient Ischemic Attack    Diagnoses: G45.9 - Transient cerebral ischemic attack, unspecified    Sonographer:  CLEMENTE Perez,RDCS  Interpreting Physician:  Nanci Horowitz MD  Referring Physician:  Fatuma Aparicio PA-C  Group:  [facility]    SUMMARY    LEFT  VENTRICLE:  Ejection fraction was estimated to be 60 %.  There were no regional wall motion abnormalities.  Wall thickness was mildly increased.    MITRAL VALVE:  There was trace regurgitation.    AORTIC VALVE:  There was trace regurgitation.    TRICUSPID VALVE:  There was trace regurgitation.    HISTORY: PRIOR HISTORY: Orthostatic Lightheadedness, Type 2 Diabetes Mellitus, Tobacco Abuse    PROCEDURE: The procedure was performed in the emergency room. This was a routine study. The transthoracic approach was used. The study included complete 2D imaging, M-mode, complete spectral Doppler, and color Doppler. The heart rate was  90 bpm, at the start of the study. Images were obtained from the parasternal, apical, subcostal, and suprasternal notch acoustic windows. Echocardiographic views were limited due to poor acoustic window availability, decreased penetration,  and lung interference. This was a technically difficult study.    LEFT VENTRICLE: Size was normal. Ejection fraction was estimated to be 60 %. There were no regional wall motion abnormalities. Wall thickness was mildly increased. DOPPLER: There was an increased relative contribution of atrial contraction  to ventricular filling.    RIGHT VENTRICLE: The size was normal. Systolic function was normal. Wall thickness was normal.    LEFT ATRIUM: Size was normal.    RIGHT ATRIUM: Size was normal.    MITRAL VALVE: Valve structure was normal. There was normal leaflet separation. DOPPLER: The transmitral velocity was within the normal range. There was no evidence for stenosis. There was trace regurgitation.    AORTIC VALVE: The valve was trileaflet. Leaflets exhibited mildly increased thickness and mild calcification. DOPPLER: There was trace regurgitation.    TRICUSPID VALVE: The valve structure was normal. There was normal leaflet separation. DOPPLER: The transtricuspid velocity was within the normal range. There was no evidence for stenosis. There was trace  regurgitation.    PULMONIC VALVE: Leaflets exhibited normal thickness, no calcification, and normal cuspal separation. DOPPLER: The transpulmonic velocity was within the normal range. There was no regurgitation.    PERICARDIUM: There was no pericardial effusion. The pericardium was normal in appearance.    AORTA: The root exhibited normal size.    SYSTEM MEASUREMENT TABLES    2D  %FS: 37.6 %  Ao Diam: 3.3 cm  EDV(Teich): 105.8 ml  EF(Teich): 67.6 %  ESV(Teich): 34.3 ml  IVSd: 1.1 cm  LA Area: 12.9 cm2  LA Diam: 2.6 cm  LVEDV MOD A4C: 128.3 ml  LVEF MOD A4C: 56.9 %  LVESV MOD A4C: 55.3 ml  LVIDd: 4.8 cm  LVIDs: 3 cm  LVLd A4C: 8.5 cm  LVLs A4C: 7.3 cm  LVPWd: 0.9 cm  RA Area: 11.5 cm2  RVIDd: 2.8 cm  SV MOD A4C: 73 ml  SV(Teich): 71.5 ml    CW  AR Dec [place]: 2.2 m/s2  AR Dec Time: 1671.7 ms  AR PHT: 484.8 ms  AR Vmax: 3.7 m/s  AR maxP.9 mmHg    MM  TAPSE: 2.3 cm    PW  E': 0.1 m/s  E/E': 8.7  MV A [place]: 0.8 m/s  MV Dec [place]: 3.6 m/s2  MV DecT: 174 ms  MV E Antonio: 0.6 m/s  MV E/A Ratio: 0.8  MV PHT: 50.5 ms  MVA By PHT: 4.4 cm2    [facility] Accredited [facility]    Prepared and electronically signed by    Nanci Horowitz MD  Signed 05-Sep-2017 17:27:56    No results found for this or any previous visit.    No results found for this or any previous visit.    No results found for this or any previous visit.      Meds/Allergies   all current active meds have been reviewed    Medications Prior to Admission:     acetaminophen (TYLENOL) 500 mg tablet    amLODIPine (NORVASC) 5 mg tablet    aspirin 81 mg chewable tablet    atorvastatin (LIPITOR) 40 mg tablet    Blood Glucose Monitoring Suppl (OneTouch Verio Reflect) w/Device KIT    cholecalciferol (VITAMIN D3) 1,000 units tablet    Continuous Glucose Sensor (Dexcom G7 Sensor)    fluticasone (FLONASE) 50 mcg/act nasal spray    gabapentin (Neurontin) 600 MG tablet    glipiZIDE (GLUCOTROL XL) 10 mg 24 hr tablet    glucose blood (OneTouch  Verio) test strip    insulin glargine (LANTUS) 100 units/mL subcutaneous injection    Insulin Pen Needle (CareOne Unifine Pentips Plus) 32G X 4 MM MISC    lisinopril (ZESTRIL) 20 mg tablet    meloxicam (MOBIC) 15 mg tablet    metFORMIN (GLUCOPHAGE-XR) 500 mg 24 hr tablet    nicotine (NICODERM CQ) 14 mg/24hr TD 24 hr patch    OneTouch Delica Lancets 33G MISC    heparin (porcine), 3-20 Units/kg/hr (Order-Specific), Last Rate: 13.1 Units/kg/hr (05/17/25 0613)  nitroGLYcerin, 5-200 mcg/min, Last Rate: Stopped (05/17/25 0020)      Assessment:  Principal Problem:    CAD, multiple vessel  Active Problems:    Type 2 diabetes mellitus (HCC)    Primary hypertension    Hyperlipidemia    History of CVA (cerebrovascular accident)    Elevated troponin    NSTEMI (non-ST elevated myocardial infarction) (HCC)    Paroxysmal atrial tachycardia (HCC)    PVC's (premature ventricular contractions)    Tobacco use    Fever            Counseling / Coordination of Care  Total floor / unit time spent today 25 minutes.  Greater than 50% of total time was spent with the patient and / or family counseling and / or coordination of care.  A description of the counseling / coordination of care: .

## 2025-05-17 NOTE — PROGRESS NOTES
Progress Note - Internal Medicine   Name: Tc Stovall 75 y.o. male I MRN: [id number]  Unit/Bed#: PPHP-320-01 I Date of Admission: 5/15/2025   Date of Service: 5/17/2025 I Hospital Day: 2    Assessment & Plan  CAD, multiple vessel  Initially presented to [facility] for sudden onset, severe substernal chest pain with radiation to his back.  Troponin peaked at >22k.  EKG demonstrated dynamic ST-T wave changes in leads I and aVL, V2, with dynamic ST depressions in inferior leads.  S/p L HC on 5/15 which showed MVCAD. Transferred to [facility] for CABG evaluation.    Cardiac cath (5/14) Ramus lesion 90% stenosis, 1st Diag lesion 99% stenosis, Mid LAD 75% stenosis, Mid Cx to Dist Cx 90% stenosis, RPDA-1 75% stenosis, RPDA-2 90% stenosis  TTE (15) EF 50% and no major valve disease     Plan:  - CT surgery and cardiology consulted, recommendations appreciated  - Plan for CABG Monday  - Continue to monitor on telemetry  - Continue heparin gtt  - Continue aspirin   - Hold Plavix to allow washout period prior to CABG. S/p Plavix load  - Continue Lipitor  - Continue nitro gtt prn for anginal symptoms  - Colchicine for possible pericarditis  NSTEMI (non-ST elevated myocardial infarction) ([facility])  See above     Primary hypertension  Home regimen: amlodipine 5 mg daily and lisinopril 20 mg daily    Plan:  - Continue home amlodipine 5 mg daily   - Hold home lisinopril 20 mg daily in preparation for CABG  - Continue Lopressor 50 mg twice daily  Hyperlipidemia  Lipid panel 5/15/25: , TG 79, HDL 36    Plan:  - Continue Lipitor  Type 2 diabetes mellitus (HCC)  Lab Results   Component Value Date    HGBA1C 7.7 (H) 05/13/2025       Recent Labs     05/16/25  1534 05/16/25  2146 05/17/25  0534 05/17/25  1103   POCGLU 224* 212* 214* 228*       Blood Sugar Average: Last 72 hrs:  (P) [id number]  Home regimen: glipizide 10 mg daily, metformin 1 g BID, and Lantus 20 units at bedtime    Plan:  - Increase Lantus from 20 to 25 units at  bedtime, SSI  - Carb controlled diet  - Hypoglycemia protocol  - BG target 140-180  History of CVA (cerebrovascular accident)  Most recent stroke in 1/2025. MRI brain showed subacute infarction of right paramedian gianfranco and left parietal lobe, suspicious for cardioembolic stroke.  CTA that time showed mild to moderate stenosis of bilateral cervical vertebral arteries.  No clear symptoms to suggest A-fib. Outpatient Zio patch noted with episodes of paroxysmal atrial tachycardia without evidence of A-fib or a flutter.  Outpatient plans for loop recorder implant    Continue home aspirin and statin .  Tobacco use  Reports smoking half a pack a day since 1964  Nicotine patch while patient  Encourage smoking cessation  Paroxysmal atrial tachycardia (HCC)  Continue to monitor on telemetry  PVC's (premature ventricular contractions)  Continue to monitor on telemetry  Fever  Tmax 102.2 (5/16)  Denies all infectious symptoms    CTA dissection protocol C/A/P (5/14) Several tiny nodular opacities in the right upper lobe with adjacent tree-in-bud opacities favored to be infectious/inflammatory. Mild basilar atelectasis.  No acute intra-abdominal pathology  CXR (5/16) study limited by low lung volumes.  Prominent pulmonary vasculature which could be accentuated by technique versus mild vascular congestion    Unclear etiology at this time.  Pericarditis versus atelectasis versus infection    Plan  - Infectious workup negative  - fever resolved  - Other than chest pain, patient does not have any infectious symptoms currently  - Monitor off of abx    Disposition: pending CABG     Team: SOD TEAM C    Subjective   Patient seen and examined. Chest pain yesterday afternoon and evening. Started on nitro gtt. Also started on colchicine for possible pericarditis. This morning, patient is resting in bedside chair comfortably. He is relatively pain free at this time with the nitro gtt off. He does have pain with deep inspiration.      Objective :  Temp:  [97.7 °F (36.5 °C)-101 °F (38.3 °C)] 98.3 °F (36.8 °C)  HR:  [61-87] 63  BP: ()/(48-80) 88/48  Resp:  [15-18] 15  SpO2:  [91 %-96 %] 94 %  O2 Device: None (Room air)    I/O         05/15 0701 05/16 0700 05/16 0701 05/17 0700 05/17 0701  05/18 0700    P.O.  240 298    I.V. (mL/kg)   569.3 (5.6)    Total Intake(mL/kg)  240 (2.4) 867.3 (8.6)    Urine (mL/kg/hr) 1275 1775 (0.7)     Total Output 1275 1775     Net -1275 -1535 +867.3                 Weights:   IBW (Ideal Body Weight): 66.1 kg    Body mass index is 34.77 kg/m².  Weight (last 2 days)       Date/Time Weight    05/15/25 2011 101 (222)            Physical Exam  Vitals and nursing note reviewed.   Constitutional:       General: He is not in acute distress.     Appearance: He is well-developed.   HENT:      Head: Normocephalic and atraumatic.     Eyes:      Conjunctiva/sclera: Conjunctivae normal.       Cardiovascular:      Rate and Rhythm: Normal rate and regular rhythm.      Heart sounds: No murmur heard.  Pulmonary:      Effort: Pulmonary effort is normal. No respiratory distress.      Breath sounds: Normal breath sounds.   Abdominal:      Palpations: Abdomen is soft.      Tenderness: There is no abdominal tenderness.     Musculoskeletal:         General: No swelling.      Cervical back: Neck supple.     Skin:     General: Skin is warm and dry.      Capillary Refill: Capillary refill takes less than 2 seconds.     Neurological:      Mental Status: He is alert and oriented to person, place, and time.     Psychiatric:         Mood and Affect: Mood normal.           Lab Results: I have reviewed the following results:  Recent Labs     05/14/25  1543 05/14/25  1724 05/14/25  2353 05/16/25  0556 05/16/25  0921 05/16/25  1202 05/16/25  1208 05/16/25  1415 05/16/25  1521 05/17/25  0447   WBC 9.07 11.30*   < >  --   --   --   --   --   --  14.09*   HGB 12.0 11.6*   < >  --   --   --   --   --   --  11.3*   HCT 36.2* 35.2*   < >  --   --    --   --   --   --  34.3*    314   < >  --   --   --   --   --   --  242   SODIUM 139  --    < >  --   --   --   --   --   --  135   K 4.2  --    < >  --   --   --   --   --   --  3.7     --    < >  --   --   --   --   --   --  101   CO2 25  --    < >  --   --   --   --   --   --  24   BUN 28*  --    < >  --   --   --   --   --   --  25   CREATININE 1.02  --    < >  --   --   --   --   --   --  1.27   GLUC 289*  --    < >  --   --   --   --   --   --  205*   AST 13  --   --   --   --   --   --   --   --   --    ALT 15  --   --   --   --   --   --   --   --   --    ALB 4.1  --   --   --   --   --   --   --   --   --    TBILI 0.53  --   --   --   --   --   --   --   --   --    ALKPHOS 76  --   --   --   --   --   --   --   --   --    PTT  --  34   < >  --    < >  --   --   --    < > 97*   INR  --  1.00  --   --   --   --   --   --   --   --    HSTNI0 50*  --    < >  --   --   --  6,238*  --   --   --    HSTNI2  --  392*   < >  --   --   --   --  6,466*  --   --    BNP 19  --   --  328*  --   --   --   --   --   --    LACTICACID  --   --   --   --   --  1.7  --   --   --   --     < > = values in this interval not displayed.             Currently Ordered Meds:   Current Facility-Administered Medications:     acetaminophen (TYLENOL) tablet 975 mg, Q8H PRN    albuterol inhalation solution 2.5 mg, Q6H PRN    amLODIPine (NORVASC) tablet 5 mg, Daily    aspirin chewable tablet 81 mg, Daily    atorvastatin (LIPITOR) tablet 40 mg, QPM    colchicine (COLCRYS) tablet 0.6 mg, BID    gabapentin (NEURONTIN) capsule 600 mg, BID    heparin (porcine) 25,000 units in 0.45% NaCl 250 mL infusion (premix), Titrated, Last Rate: 13.1 Units/kg/hr (05/17/25 0800)    heparin (porcine) injection 2,000 Units, Q6H PRN    heparin (porcine) injection 4,000 Units, Q6H PRN    insulin glargine (LANTUS) subcutaneous injection 25 Units 0.25 mL, HS    insulin lispro (HumALOG/ADMELOG) 100 units/mL subcutaneous injection 1-6 Units, TID AC  **AND** Fingerstick Glucose (POCT), TID AC    labetalol (NORMODYNE) injection 10 mg, Q6H PRN    metoprolol tartrate (LOPRESSOR) tablet 50 mg, Q12H LEONELA    nicotine (NICODERM CQ) 21 mg/24 hr TD 24 hr patch 1 patch, Daily    nitroglycerin (NITROSTAT) SL tablet 0.4 mg, Q5 Min PRN    nitroGLYcerin (TRIDIL) 50 mg in 250 ml infusion (premix), Titrated, Last Rate: Stopped (05/17/25 0020)  VTE Pharmacologic Prophylaxis: Heparin  VTE Mechanical Prophylaxis: sequential compression device    Administrative Statements     Portions of the record may have been created with voice recognition software.

## 2025-05-18 LAB
ABO GROUP BLD: NORMAL
ANION GAP SERPL CALCULATED.3IONS-SCNC: 10 MMOL/L (ref 4–13)
APTT PPP: 62 SECONDS (ref 23–34)
APTT PPP: 72 SECONDS (ref 23–34)
ATRIAL RATE: 79 BPM
BASOPHILS # BLD AUTO: 0.08 THOUSANDS/ÂΜL (ref 0–0.1)
BASOPHILS NFR BLD AUTO: 1 % (ref 0–1)
BLD GP AB SCN SERPL QL: NEGATIVE
BUN SERPL-MCNC: 25 MG/DL (ref 5–25)
CALCIUM SERPL-MCNC: 8.3 MG/DL (ref 8.4–10.2)
CHLORIDE SERPL-SCNC: 101 MMOL/L (ref 96–108)
CO2 SERPL-SCNC: 23 MMOL/L (ref 21–32)
CREAT SERPL-MCNC: 1.09 MG/DL (ref 0.6–1.3)
EOSINOPHIL # BLD AUTO: 0.51 THOUSAND/ÂΜL (ref 0–0.61)
EOSINOPHIL NFR BLD AUTO: 5 % (ref 0–6)
ERYTHROCYTE [DISTWIDTH] IN BLOOD BY AUTOMATED COUNT: 12.5 % (ref 11.6–15.1)
GFR SERPL CREATININE-BSD FRML MDRD: 66 ML/MIN/1.73SQ M
GLUCOSE SERPL-MCNC: 180 MG/DL (ref 65–140)
GLUCOSE SERPL-MCNC: 186 MG/DL (ref 65–140)
GLUCOSE SERPL-MCNC: 204 MG/DL (ref 65–140)
GLUCOSE SERPL-MCNC: 219 MG/DL (ref 65–140)
GLUCOSE SERPL-MCNC: 269 MG/DL (ref 65–140)
HCT VFR BLD AUTO: 33 % (ref 36.5–49.3)
HGB BLD-MCNC: 10.7 G/DL (ref 12–17)
IMM GRANULOCYTES # BLD AUTO: 0.07 THOUSAND/UL (ref 0–0.2)
IMM GRANULOCYTES NFR BLD AUTO: 1 % (ref 0–2)
LYMPHOCYTES # BLD AUTO: 3.04 THOUSANDS/ÂΜL (ref 0.6–4.47)
LYMPHOCYTES NFR BLD AUTO: 27 % (ref 14–44)
MCH RBC QN AUTO: 28.5 PG (ref 26.8–34.3)
MCHC RBC AUTO-ENTMCNC: 32.4 G/DL (ref 31.4–37.4)
MCV RBC AUTO: 88 FL (ref 82–98)
MONOCYTES # BLD AUTO: 1.15 THOUSAND/ÂΜL (ref 0.17–1.22)
MONOCYTES NFR BLD AUTO: 10 % (ref 4–12)
NEUTROPHILS # BLD AUTO: 6.58 THOUSANDS/ÂΜL (ref 1.85–7.62)
NEUTS SEG NFR BLD AUTO: 56 % (ref 43–75)
NRBC BLD AUTO-RTO: 0 /100 WBCS
P AXIS: 68 DEGREES
PLATELET # BLD AUTO: 236 THOUSANDS/UL (ref 149–390)
PMV BLD AUTO: 10.9 FL (ref 8.9–12.7)
POTASSIUM SERPL-SCNC: 3.5 MMOL/L (ref 3.5–5.3)
PR INTERVAL: 204 MS
QRS AXIS: 80 DEGREES
QRSD INTERVAL: 76 MS
QT INTERVAL: 420 MS
QTC INTERVAL: 482 MS
RBC # BLD AUTO: 3.75 MILLION/UL (ref 3.88–5.62)
RH BLD: POSITIVE
SODIUM SERPL-SCNC: 134 MMOL/L (ref 135–147)
SPECIMEN EXPIRATION DATE: NORMAL
T WAVE AXIS: 120 DEGREES
VENTRICULAR RATE: 79 BPM
WBC # BLD AUTO: 11.43 THOUSAND/UL (ref 4.31–10.16)

## 2025-05-18 PROCEDURE — 85025 COMPLETE CBC W/AUTO DIFF WBC: CPT

## 2025-05-18 PROCEDURE — 86900 BLOOD TYPING SEROLOGIC ABO: CPT | Performed by: PHYSICIAN ASSISTANT

## 2025-05-18 PROCEDURE — 86901 BLOOD TYPING SEROLOGIC RH(D): CPT | Performed by: PHYSICIAN ASSISTANT

## 2025-05-18 PROCEDURE — NC001 PR NO CHARGE: Performed by: PHYSICIAN ASSISTANT

## 2025-05-18 PROCEDURE — 85730 THROMBOPLASTIN TIME PARTIAL: CPT | Performed by: INTERNAL MEDICINE

## 2025-05-18 PROCEDURE — 99232 SBSQ HOSP IP/OBS MODERATE 35: CPT | Performed by: INTERNAL MEDICINE

## 2025-05-18 PROCEDURE — 86920 COMPATIBILITY TEST SPIN: CPT

## 2025-05-18 PROCEDURE — 86850 RBC ANTIBODY SCREEN: CPT | Performed by: PHYSICIAN ASSISTANT

## 2025-05-18 PROCEDURE — 82948 REAGENT STRIP/BLOOD GLUCOSE: CPT

## 2025-05-18 PROCEDURE — 93010 ELECTROCARDIOGRAM REPORT: CPT | Performed by: INTERNAL MEDICINE

## 2025-05-18 PROCEDURE — 80048 BASIC METABOLIC PNL TOTAL CA: CPT

## 2025-05-18 RX ORDER — CHLORHEXIDINE GLUCONATE ORAL RINSE 1.2 MG/ML
15 SOLUTION DENTAL EVERY 12 HOURS SCHEDULED
Status: DISCONTINUED | OUTPATIENT
Start: 2025-05-18 | End: 2025-05-19

## 2025-05-18 RX ORDER — CEFAZOLIN SODIUM 2 G/50ML
2000 SOLUTION INTRAVENOUS ONCE
Status: DISCONTINUED | OUTPATIENT
Start: 2025-05-18 | End: 2025-05-19

## 2025-05-18 RX ADMIN — MUPIROCIN 1 APPLICATION: 20 OINTMENT TOPICAL at 21:17

## 2025-05-18 RX ADMIN — GABAPENTIN 600 MG: 300 CAPSULE ORAL at 08:05

## 2025-05-18 RX ADMIN — COLCHICINE 0.6 MG: 0.6 TABLET ORAL at 17:00

## 2025-05-18 RX ADMIN — GABAPENTIN 600 MG: 300 CAPSULE ORAL at 17:00

## 2025-05-18 RX ADMIN — METOPROLOL TARTRATE 50 MG: 50 TABLET, FILM COATED ORAL at 21:17

## 2025-05-18 RX ADMIN — INSULIN LISPRO 2 UNITS: 100 INJECTION, SOLUTION INTRAVENOUS; SUBCUTANEOUS at 16:59

## 2025-05-18 RX ADMIN — METOPROLOL TARTRATE 50 MG: 50 TABLET, FILM COATED ORAL at 08:04

## 2025-05-18 RX ADMIN — AMLODIPINE BESYLATE 5 MG: 5 TABLET ORAL at 08:05

## 2025-05-18 RX ADMIN — HEPARIN SODIUM 15.1 UNITS/KG/HR: 10000 INJECTION, SOLUTION INTRAVENOUS at 18:15

## 2025-05-18 RX ADMIN — INSULIN LISPRO 1 UNITS: 100 INJECTION, SOLUTION INTRAVENOUS; SUBCUTANEOUS at 06:27

## 2025-05-18 RX ADMIN — INSULIN LISPRO 2 UNITS: 100 INJECTION, SOLUTION INTRAVENOUS; SUBCUTANEOUS at 11:54

## 2025-05-18 RX ADMIN — ASPIRIN 81 MG CHEWABLE TABLET 81 MG: 81 TABLET CHEWABLE at 08:04

## 2025-05-18 RX ADMIN — HEPARIN SODIUM 15.1 UNITS/KG/HR: 10000 INJECTION, SOLUTION INTRAVENOUS at 00:44

## 2025-05-18 RX ADMIN — ATORVASTATIN CALCIUM 40 MG: 40 TABLET, FILM COATED ORAL at 17:00

## 2025-05-18 RX ADMIN — INSULIN GLARGINE 25 UNITS: 100 INJECTION, SOLUTION SUBCUTANEOUS at 21:23

## 2025-05-18 RX ADMIN — COLCHICINE 0.6 MG: 0.6 TABLET ORAL at 08:05

## 2025-05-18 RX ADMIN — NICOTINE 1 PATCH: 21 PATCH, EXTENDED RELEASE TRANSDERMAL at 08:03

## 2025-05-18 RX ADMIN — CHLORHEXIDINE GLUCONATE 15 ML: 1.2 SOLUTION ORAL at 21:17

## 2025-05-18 NOTE — ANESTHESIA PREPROCEDURE EVALUATION
Procedure:  CORONARY ARTERY BYPASS GRAFT (CABG) 3- VESSELS (Chest)    Relevant Problems   CARDIO   (+) CAD, multiple vessel   (+) Hyperlipidemia   (+) NSTEMI (non-ST elevated myocardial infarction) ([facility])   (+) PVC's (premature ventricular contractions)   (+) Paroxysmal atrial tachycardia ([facility])   (+) Primary hypertension      ENDO   (+) Type 2 diabetes mellitus (HCC)      Behavioral Health   (+) Tobacco use      Neurology/Sleep   (+) History of CVA (cerebrovascular accident)      Orthopedic/Musculoskeletal   (+) Cervical radiculopathy      EKG: ST-T wave changes in leads I and aVL, V2; ST depressions in inferior leads.     Cardiac cath:    - Ramus lesion is 90% stenosed.    1st Diag lesion is 99% stenosed, consistent with the infarct artery, small vessel with PAOLO-3 flow..    Mid LAD lesion is 75% stenosed.    Mid Cx to Dist Cx lesion is 90% stenosed.    RPDA-1 lesion is 75% stenosed.    RPDA-2 lesion is 90% stenosed.     ECHO:   Left Ventricle: Left ventricular cavity size is normal. Wall thickness is normal. The left ventricular ejection fraction is 55%. Systolic function is normal. Wall motion is normal. Diastolic function is mildly abnormal, consistent with grade I (abnormal) relaxation.    Right Ventricle: Right ventricular cavity size is normal. Systolic function is normal.    Aortic Valve: There is mild regurgitation.    Mitral Valve: There is mild regurgitation.    Tricuspid Valve: There is mild regurgitation. The right ventricular systolic pressure is normal.    Pulmonic Valve: There is mild regurgitation.  Physical Exam    Airway     Mallampati score: II  TM Distance: >3 FB  Neck ROM: full  Mouth opening: >= 4 cm      Cardiovascular      Dental   No notable dental hx     Pulmonary      Neurological      Other Findings        Anesthesia Plan  ASA Score- 4     Anesthesia Type- general with ASA Monitors.         Additional Monitors: arterial line, central veinous line and pulmonary artery catheter.    Airway  Plan: Oral ETT.    Comment: GA with ETT, IV, Kimberly, TLC/PAC, ARSEN, blood products, postop mechanical ventilation discussed.       Plan Factors-    Chart reviewed. EKG reviewed. Imaging results reviewed. Existing labs reviewed. Patient summary reviewed.    Patient is a current smoker.  Patient did not smoke on day of surgery.        Intended use of anticholinesterase.    Induction- intravenous and rapid sequence.    Postoperative Plan- .   Monitoring Plan - Monitoring plan - standard ASA monitoring, ARSEN, pulmonary artery catheter, cerebral oximetry, arterial line kit, central line kit and electrophysiologic monitoring      Perioperative Resuscitation Plan - Level 1 - Full Code.       Informed Consent- Anesthetic plan and risks discussed with patient.        NPO Status:  No vitals data found for the desired time range.

## 2025-05-18 NOTE — RESPIRATORY THERAPY NOTE
RT Protocol Note  Tc Stovall 75 y.o. male MRN: [id number]  Unit/Bed#: PPHP-320-01 Encounter: [id number]    Assessment    Principal Problem:    CAD, multiple vessel  Active Problems:    Type 2 diabetes mellitus (HCC)    Primary hypertension    Hyperlipidemia    History of CVA (cerebrovascular accident)    Elevated troponin    NSTEMI (non-ST elevated myocardial infarction) (HCC)    Paroxysmal atrial tachycardia (HCC)    PVC's (premature ventricular contractions)    Tobacco use    Fever      Home Pulmonary Medications:  none       Past Medical History:   Diagnosis Date    Arthritis     Cerebellar infarction (HCC)     Cerebrovascular accident (CVA) due to embolism of cerebral artery (HCC)     Diabetes mellitus (HCC)     Hyperlipidemia     Hypertension     Left rotator cuff tear     Stroke (HCC) [id number]    Tobacco abuse     Wears glasses      Social History     Socioeconomic History    Marital status: /Civil Union     Spouse name: Not on file    Number of children: Not on file    Years of education: Not on file    Highest education level: Not on file   Occupational History    Not on file   Tobacco Use    Smoking status: Every Day     Current packs/day: 0.50     Average packs/day: 0.6 packs/day for 85.9 years (55.3 ttl pk-yrs)     Types: Cigarettes     Start date: 4/26/1964    Smokeless tobacco: Never   Vaping Use    Vaping status: Never Used   Substance and Sexual Activity    Alcohol use: Yes     Comment: rarely    Drug use: No    Sexual activity: Not Currently     Partners: Female     Birth control/protection: Female Sterilization   Other Topics Concern    Not on file   Social History Narrative    Not on file     Social Drivers of Health     Financial Resource Strain: Medium Risk (10/6/2023)    Overall Financial Resource Strain (CARDIA)     Difficulty of Paying Living Expenses: Somewhat hard   Food Insecurity: No Food Insecurity (5/14/2025)    Nursing - Inadequate Food Risk Classification     Worried About  "Running Out of Food in the Last Year: Never true     Ran Out of Food in the Last Year: Never true     Ran Out of Food in the Last Year: Never true   Transportation Needs: No Transportation Needs (2025)    Nursing - Transportation Risk Classification     Lack of Transportation: Not on file     Lack of Transportation: No   Physical Activity: Inactive (2021)    Exercise Vital Sign     Days of Exercise per Week: 0 days     Minutes of Exercise per Session: 0 min   Stress: Stress Concern Present (2021)    Citizen of [place] [employer] - Occupational Stress Questionnaire     Feeling of Stress : Rather much   Social Connections: Unknown (2024)    Received from [facility]     How often do you feel lonely or isolated from those around you? (Adult - for ages 18 years and over): Not on file   Intimate Partner Violence: Unknown (2025)    Nursing IPS     Feels Physically and Emotionally Safe: Not on file     Physically Hurt by Someone: Not on file     Humiliated or Emotionally Abused by Someone: Not on file     Physically Hurt by Someone: No     Hurt or Threatened by Someone: No   Housing Stability: Unknown (2025)    Nursing: Inadequate Housing Risk Classification     Has Housing: Not on file     Worried About Losing Housing: Not on file     Unable to Get Utilities: Not on file     Unable to Pay for Housing in the Last Year: No     Has Housin       Subjective         Objective    Physical Exam:   Assessment Type: Assess only  General Appearance: Awake, Alert  Respiratory Pattern: Dyspnea with exertion  Chest Assessment: Chest expansion symmetrical  Bilateral Breath Sounds: Clear  R Breath Sounds: Rhonchi, Coarse  L Breath Sounds: Clear  Cough: Productive  O2 Device: room air    Vitals:  Blood pressure 126/62, pulse 57, temperature (!) 97.4 °F (36.3 °C), resp. rate 16, height 5' 7\" (1.702 m), weight 101 kg (222 lb 0.1 oz), SpO2 98%.          Imaging and other " "studies: Results Review Statement: I reviewed radiology reports from this admission including: chest xray.    O2 Device: room air     Plan    Respiratory Plan: No distress/Pulmonary history        Resp Comments: 74 y/o male who [facility] for sudden onset, severe substernal chest pain with radiation to his back. Cardiac cath (5/14) Ramus lesion 90% stenosis, 1st Diag lesion 99% stenosis, Mid LAD 75% stenosis, Mid Cx to Dist Cx 90% stenosis, RPDA-1 75% stenosis, RPDA-2 90% stenosis  TTE (15) EF 50% and no major valve disease.  Scheduled for CABG on 5-19-25.  Currently doing well on room air and able to move 2 liters on his incentive spirometry.  61.4 pk yr hs with cigarettes (unsure if / when quit). Bedside PFT results completed 2 days ago. Radiology results: \"Low lung volumes with accentuation of the pulmonary vasculature and bibasilar vascular crowding.     No pneumothorax. Cannot exclude trace left pleural effusion.\" No prior pulm dx's or hs of pulm meds taken at home.  Will follow post op   "

## 2025-05-18 NOTE — NURSING NOTE
..Educated Patient  on the following:    Open-Heart Surgery - What to expect    How do I get ready for surgery?  The following instructions are important for you to be fully prepared for your surgery    Six days before your surgery:   the nasal ointment prescribed by your surgeon at your pharmacy    Five days before your surgery:  Apply the nasal ointment to both nostrils twice a day (morning and bedtime) until your surgery    Two nights before your surgery:  Take a shower following the Pre-Operative Showering Instructions in this booklet  Use a clean towel and washcloth. Wear clean pajamas. Sleep on clean sheets.  These precautions help prevent post-operative infections    One day before your surgery:  The hospital will call you to confirm your arrival time and location. The call is usually made between 2:00 - 8:00 PM.  If your surgery is on a Monday, the hospital will call you the Friday before.  You may pack a small overnight bag for toiletries and other small items that you may want with you during your hospital stay. Avoid bringing valuables such as money, electronics, or jewelry.    One night before your surgery:  Take a shower following the Pre-Operative Showering Instructions in this booklet  Use a clean towel and washcloth. Wear clean pajamas. Sleep on clean sheets.  These precautions help prevent post-operative infections.  DO NOT eat any junk foods  DO NOT smoke  DO NOT drink alcohol  DO NOT drink or eat ANYTHING after midnight, including water, gum, or lozenges.    Remember to take your regular medications until the day of surgery, unless your surgeon instructs you otherwise. Certain medications may be stopped prior to surgery. Your surgeon will tell you which medications to stop and when to stop them.    If you develop a cold, sore throat, cough, fever, or other illness seven days before your surgery, call the surgeons office.      What happens the day of surgery?  If you have diabetes, DO NOT  take any of your diabetes medication the morning of surgery, including insulin.  Bring a list of the medications you normally take.  Bring a list of your previous surgery and other medical history, including when you last had the pneumococcal vaccine and flu vaccine.  Bring your overnight bag.  Bring your insurance cards.  Bring a valid form of identification, such as a drivers license or passport.  Do not drive yourself to the hospital. Have someone drive you.    What happens when I arrive at the hospital?  You will be greeted and registered.  A nurse will ask you questions about your medical history.  You will bath with a special antiseptic soap and change into a clean hospital gown.  You will be taken by stretcher to the Pre-Op Holding Area. You will meet your Anesthesiologist. You will be asked to confirm the surgery you are scheduled for. Your Consent form will be reviewed. A nurse will place an IV in your arm and you will be given medication to help you relax.   You will be taken by stretcher to the Operating Room when it is time for your surgery.    What should I expect immediately after surgery?  You will wake up in the ICU on PPHP 4.  You will have a breathing tube in place until you are fully awake. Once you are able to move your arms and legs, the breathing tube will be removed.  You will have chest tubes, IV's in your arms and neck, and a catheter to drain urine.  You will have a lot of equipment around your bed to monitor your heart and vital signs.  Your nurses will give you pain medication to help with discomfort from the surgery.  Your loved ones will be able to visit you as soon as you have been settled into your room.  The nursing staff will be providing you with care, including:  Asking you about your pain level  Helping you stay comfortable in bed  Monitoring your vital signs, heart rhythm, and incisions  Assisting you with bathing, toileting, and mouth care  Obtaining lab  specimens    Surgeon/Physician Assistant/Nurse Rounds  Your surgeon, PA, and nurse will round on you together daily to discuss your progress and discuss your plan of care for the day.  They will review your weight, lab work, vital signs, incisions, and medications.  This is a good time to ask questions and be involved in your plan of care.     Transition of Care  Most patients stay in the ICU for 1 or 2 days.  You will be helped out of bed into a chair the morning after surgery. You may be moved out of the ICU to the stepdown unit later that day.  In the stepdown unit, your vital signs will still be monitored, but it will be less frequent and less invasive. This allows you to regain your independence and gives you more quiet time.  Most patients are discharged from the hospital in 3-5 days after surgery.    Pain Management  Pain medicine will be ordered to reduce your level of pain. There are different medications ordered for different levels of pain. The medications can be given through your IV or taken by mouth.   Too much pain medication at once can be dangerous. There is a limit on the amount of pain medication you can take at a time, so you might still experience some discomfort after taking the medication.   You will be given a splinting pillow to help brace your incision. This helps reduce pain caused by deep breathing or coughing.    Incentive Spirometer (IS)  Taking deep breaths after surgery can be uncomfortable, but it is important to do so. Your lungs need help recovering once the breathing tube is taken out after surgery.  You will be given a device for breathing exercises called an incentive spirometer (IS). This helps to exercise your lungs. It also helps prevent pneumonia.  You must use the IS at least 10 times every hour.   Use the splinting pillow during breathing exercises to reduce discomfort.  The instructions sheet in this booklet reviews how to use the IS  correctly.    Activity/Mobility  Short, frequent walks after surgery help you regain your strength, exercise your lungs, and move your bowels.  You will be helped out of bed and into a chair every morning. You will stay out of bed for most of the day.  You will walk with a staff member in the hallway 3 to 4 times a day.  Physical Therapists and Occupational Therapists will evaluate your functional abilities and teach you safe exercises to do in your free time.  Nurses will assist you with bathing and mouth care if needed.  You will be weighed early each morning.    Diet and Fluid Intake  A decreased appetite is normal for a couple days after surgery. It will likely return to normal after 3 days.  A diet high in protein and low in sodium, carbohydrates, and fat will speed up the healing process.  Information will be given on a Heart Healthy diet. You can ask your surgeon about specific food items.  After surgery, your body will retain fluid. This puts extra strain on your heart and causes it to work harder. Because of this, you will be given a limited amount of fluid each day. You will also be given medication that makes you urinate more frequently.    Preparing for discharge  It is normal to have questions and concerns when you are getting ready to leave the hospital.  Staff will spend every day of your hospital stay teaching what you need to know to care for yourself at home, and ways to recognize if there are complications.  On the day of discharge, your nurse will give you documents with discharge instructions. They will review it with you and you will be able to ask questions.  Teaching topics may include:  New medications, what they are for, how to take them, and when to take them. Most patients will have new heart medications after surgery.  Insulin, if prescribed for diabetes. Many patients with diabetes may temporarily need a change to their diabetic medications for a short time after surgery. If you have  not previously taken insulin, your nurse will teach you how to use it and how to check your blood sugars.  How to care for your incision and recognize signs of infection.  Heart Healthy Diet, including limiting your sodium and monitoring your fluid intake.  Physical activity, including frequent short walks, rest periods, and slowly increasing your activity level.      Care for Chest tube puncture sites and incision  These instructions will aide in your recovery. Please follow them carefully. Ask your surgeon or physician assistant if you have any questions.  On your day of discharge, your nurse will give you a special antiseptic soap called chlorhexidine (CHG) to use once you get home.  Shower daily, using the CHG soap for 5 days.  Use a clean washcloth each day. It is ok to use disposable washcloths.   During your shower, gently wash all puncture sites and incisions. Do not scrub vigorously.  Rinse thoroughly with clean water.  Use a clean towel to dry off after each shower. Pat the incision area dry. Do not rub with the towel.  Do not apply any lotions, powders, oils, ointments, or creams to the puncture sites or incision.  Leave them open to air. A small amount of drainage may come from your puncture sites. If this happens, you may apply a sterile gauze dressing to the puncture sites using tape to secure it. Remove the dressing before your next shower. Do not leave the same gauze on for more than a day. Stop using gauze once there is no more drainage.  Look at your puncture sites and incisions daily. Look for signs of infection. Call the surgeon's office immediately if you have any of the following: Increased pain, swelling, redness, chills or fever (temp over 100.5F), foul odor, or green/yellow drainage from puncture sites or incision.  Do not pick at or touch the puncture sites or incisions. Scabs will come off on their own. It is normal to have scabbing even after one month from surgery.      Follow-up with  your doctors   You will be seen in the surgeon's office one month after discharge from the hospital. At that time, they will make sure everything is healing appropriately.   At that time, you may be cleared to drive again. Do not drive before being seen by the surgeon.   You may also be cleared to attend cardiac rehab. A prescription will be given to you in the office.   You should see your PCP and Cardiologist before seeing the surgeon. The cardiologist will take over management of your heart medications.  This will likely be the last time you need to be seen by the surgeon.      PRE-OP SHOWERING INSTRUCTIONS    Before your operation, you play an important role in decreasing your risk for infection. Washing your body thoroughly in order to reduce bacteria on the skin can help prevent infections at the surgical site.    Please read the following directions THE WEEK BEFORE SURGERY so you are ready!  WEEK BEFORE SURGERY  If your surgeon instructs you to use a special antiseptic soap containing chlorhexidine gluconate (CHG) prior to surgery, you should obtain this soap at least one week before your operation.  Common brand names include: Aplicare, Endure, and Hibiclens.  CHG soap is available from some doctor's offices, [facility] or most retail pharmacies and the [facility].  If you are allergic or sensitive to CHG, please let your doctor know so another anticeptic soap can be suggested.  If you are unable to purchase soap containing CHG, use regular soap as instructed below.  Notify the nurse on arrival the day of surgery.  DAY BEFORE SURGERY  You will need to shower the night before AND the morning of your surgery. You will need to prepare your bed and clothing so they are clean and ready after your showers.  Place clean linens (sheets) on your bed; you should sleep on clean sheets after your evening shower.  Get CLEAN towel and washcloths ready - you will need enough for two showers.  Set aside  clean underwear, pajamas, and clothing to wear after your showers.    EVENING BEFORE SURGERY  The evening before your operation, take your first shower.  Shower 1:  First, shampoo your hair with regular shampoo and rinse it completely before you wash your body.  Next, wash your body from head to toe with soap and a clean washcloth.  If you were instructed by your surgeon to wash with CHG soap:  Use ½ of the bottle of soap for this shower (you will use the other ½ of the bottle for you shower in the morning).  Do not get CHG in your eyes, ears, nose, mouth or vaginal area.  If you are using regular soap, try to use a new bar if possible.  Pay special attention to the area where your incision will be; lather this area well with the CHG soap for about 2 minutes.  DO NOT use any other soap or body rinse on your skin during or after the antiseptic soap.   Rinse yourself completely with running water.  Use a clean towel to dry off.  Put on clean underwear and clothing/pajamas.  Sleep on clean bed sheets/linens.  REMINDERS:  DO NOT use lotion, powder, deodorant, or perfume/aftershave of any kind on your skin after your antiseptic shower.  DO NOT shave any body parts in the 24hours/the day before your operation.  DAY OF SURGERY  The morning of your operation take your second shower.  Shower 2:  Follow the same steps and Shower 1.  If you were instructed by your surgeon to wash with CHG soap, use the second ½ of the CHG soap.      HOW TO USE AN INCENTIVE SPIROMETER    WHAT YOU NEED TO KNOW:  What is an incentive spirometer? An incentive spirometer is a device that measures how deeply you can inhale (breathe in). It helps you take slow, deep breaths to expand and fill your lungs with air. This helps prevent lung problems, such as pneumonia. The incentive spirometer is made up of a breathing tube, an air chamber. The breathing tube is connected to the air chamber and has a mouth piece at the end. The indicator is found inside  the device.  Why do I need to use an incentive spirometer? An incentive spirometer is most commonly used after surgery. People who are at an increased risk of airway or breathing problems may also use one. These include people who smoke or have lung disease. This may also include people who are not active or cannot move well.  How do I use an incentive spirometer? Sit up as straight as possible. Do not bend your head forward or backward. Hold the incentive spirometer in an upright position. Place the target pointer to the level that you need to reach. Exhale (breathe out) normally and then do the following:  Put the mouthpiece in your mouth and close your lips tightly around it. Do not block the mouthpiece with your tongue.  Inhale slowly and deeply through the mouthpiece to raise the indicator. Try to make the indicator rise up to the level of the goal marker.  When you cannot inhale any longer, remove the mouthpiece and hold your breath for at least 3 seconds.  Exhale normally.  Repeat these steps 10-12 times every hour when you are awake, or as often as directed.  Clean the mouthpiece with soap and water after each use. Do not use a disposable mouthpiece for longer than 24 hours.  Keep a log of the highest level you are able to reach each time. This will help healthcare providers see If your lung function improves.

## 2025-05-18 NOTE — PROGRESS NOTES
Progress Note - Internal Medicine   Name: Tc Stovall 75 y.o. male I MRN: [id number]  Unit/Bed#: PPHP-320-01 I Date of Admission: 5/15/2025   Date of Service: 5/18/2025 I Hospital Day: 3    Assessment & Plan  CAD, multiple vessel  Initially presented to [facility] for sudden onset, severe substernal chest pain with radiation to his back.  Troponin peaked at >22k.  EKG demonstrated dynamic ST-T wave changes in leads I and aVL, V2, with dynamic ST depressions in inferior leads.  S/p L HC on 5/15 which showed MVCAD. Transferred to [facility] for CABG evaluation.    Cardiac cath (5/14) Ramus lesion 90% stenosis, 1st Diag lesion 99% stenosis, Mid LAD 75% stenosis, Mid Cx to Dist Cx 90% stenosis, RPDA-1 75% stenosis, RPDA-2 90% stenosis  TTE (15) EF 50% and no major valve disease     Plan:  - CT surgery and cardiology consulted, recommendations appreciated  - Plan for CABG tomorrow  - Continue to monitor on telemetry  - Continue heparin gtt  - Continue aspirin   - Hold Plavix to allow washout period prior to CABG. S/p Plavix load  - Continue Lipitor  - Colchicine for possible nahun-infarct pericarditis  NSTEMI (non-ST elevated myocardial infarction) (HCC)  See above     Primary hypertension  Home regimen: amlodipine 5 mg daily and lisinopril 20 mg daily    Plan:  - Continue home amlodipine 5 mg daily   - Hold home lisinopril 20 mg daily in preparation for CABG  - Continue Lopressor 50 mg twice daily  Hyperlipidemia  Lipid panel 5/15/25: , TG 79, HDL 36  Continue Lipitor  Type 2 diabetes mellitus (HCC)  Lab Results   Component Value Date    HGBA1C 7.7 (H) 05/13/2025       Recent Labs     05/17/25  1555 05/17/25  2107 05/18/25  0540 05/18/25  1056   POCGLU 263* 253* 180* 204*       Blood Sugar Average: Last 72 hrs:  (P) [id number]  Home regimen: glipizide 10 mg daily, metformin 1 g BID, and Lantus 20 units at bedtime    Plan:  - Continue Lantus 25 units at bedtime, SSI  - Carb controlled diet  - Hypoglycemia  protocol  - BG target 140-180  History of CVA (cerebrovascular accident)  Most recent stroke in 1/2025. MRI brain showed subacute infarction of right paramedian gianfranco and left parietal lobe, suspicious for cardioembolic stroke.  CTA that time showed mild to moderate stenosis of bilateral cervical vertebral arteries.  No clear symptoms to suggest A-fib. Outpatient Zio patch noted with episodes of paroxysmal atrial tachycardia without evidence of A-fib or a flutter.  Outpatient plans for loop recorder implant    Continue home aspirin and statin .  Tobacco use  Reports smoking half a pack a day since 1964  Nicotine patch while patient  Encourage smoking cessation  Paroxysmal atrial tachycardia (HCC)  Continue to monitor on telemetry  PVC's (premature ventricular contractions)  Continue to monitor on telemetry  Fever  Tmax 102.2 (5/16)  Infectious workup negative  DDx pericarditis versus atelectasis   Resolved.  Monitor off antibiotics    Disposition: Pending CABG tomorrow    Team: [facility] TEAM C    Subjective   Patient seen and examined. No acute events overnight.  Off nitro drip.  Denies further chest pain at rest or with ambulation.  States colchicine helped.    Objective :  Temp:  [97.4 °F (36.3 °C)-98.7 °F (37.1 °C)] 97.4 °F (36.3 °C)  HR:  [57-88] 57  BP: (119-139)/(59-90) 126/62  Resp:  [16] 16  SpO2:  [92 %-98 %] 98 %    I/O         05/16 0701  05/17 0700 05/17 0701  05/18 0700 05/18 0701  05/19 0700    P.O. 240 598 300    I.V. (mL/kg)  569.3 (5.6) 300.9 (3)    Total Intake(mL/kg) 240 (2.4) 1167.3 (11.6) 600.9 (5.9)    Urine (mL/kg/hr) 1775 (0.7) 1270 (0.5) 250 (0.5)    Total Output 1775 1270 250    Net -1535 -102.7 +350.9                 Weights:   IBW (Ideal Body Weight): 66.1 kg    Body mass index is 34.77 kg/m².  Weight (last 2 days)       None            Physical Exam  Vitals and nursing note reviewed.   Constitutional:       General: He is not in acute distress.     Appearance: He is well-developed.   HENT:       Head: Normocephalic and atraumatic.      Mouth/Throat:      Mouth: Mucous membranes are moist.      Pharynx: Oropharynx is clear.     Eyes:      Conjunctiva/sclera: Conjunctivae normal.       Cardiovascular:      Rate and Rhythm: Normal rate and regular rhythm.      Heart sounds: No murmur heard.  Pulmonary:      Effort: Pulmonary effort is normal. No respiratory distress.      Breath sounds: Normal breath sounds.   Abdominal:      Palpations: Abdomen is soft.      Tenderness: There is no abdominal tenderness.     Musculoskeletal:         General: No swelling.      Cervical back: Neck supple.     Skin:     General: Skin is warm and dry.      Capillary Refill: Capillary refill takes less than 2 seconds.     Neurological:      Mental Status: He is alert.     Psychiatric:         Mood and Affect: Mood normal.           Lab Results: I have reviewed the following results:  Recent Labs     05/16/25  0556 05/16/25  0921 05/16/25  1202 05/16/25  1208 05/16/25  1415 05/16/25  1521 05/18/25  0503 05/18/25  1120   WBC  --   --   --   --   --    < > 11.43*  --    HGB  --   --   --   --   --    < > 10.7*  --    HCT  --   --   --   --   --    < > 33.0*  --    PLT  --   --   --   --   --    < > 236  --    SODIUM  --   --   --   --   --    < > 134*  --    K  --   --   --   --   --    < > 3.5  --    CL  --   --   --   --   --    < > 101  --    CO2  --   --   --   --   --    < > 23  --    BUN  --   --   --   --   --    < > 25  --    CREATININE  --   --   --   --   --    < > 1.09  --    GLUC  --   --   --   --   --    < > 186*  --    PTT  --    < >  --   --   --    < > 62* 72*   HSTNI0  --   --   --  6,238*  --   --   --   --    HSTNI2  --   --   --   --  6,466*  --   --   --    *  --   --   --   --   --   --   --    LACTICACID  --   --  1.7  --   --   --   --   --     < > = values in this interval not displayed.             Currently Ordered Meds:   Current Facility-Administered Medications:     acetaminophen (TYLENOL) tablet  975 mg, Q8H PRN    albuterol inhalation solution 2.5 mg, Q6H PRN    amLODIPine (NORVASC) tablet 5 mg, Daily    aspirin chewable tablet 81 mg, Daily    atorvastatin (LIPITOR) tablet 40 mg, QPM    colchicine (COLCRYS) tablet 0.6 mg, BID    gabapentin (NEURONTIN) capsule 600 mg, BID    heparin (porcine) 25,000 units in 0.45% NaCl 250 mL infusion (premix), Titrated, Last Rate: 15.1 Units/kg/hr (05/18/25 0044)    heparin (porcine) injection 2,000 Units, Q6H PRN    heparin (porcine) injection 4,000 Units, Q6H PRN    insulin glargine (LANTUS) subcutaneous injection 25 Units 0.25 mL, HS    insulin lispro (HumALOG/ADMELOG) 100 units/mL subcutaneous injection 1-6 Units, TID AC **AND** Fingerstick Glucose (POCT), TID AC    labetalol (NORMODYNE) injection 10 mg, Q6H PRN    [START ON 5/19/2025] metoprolol tartrate (LOPRESSOR) partial tablet 12.5 mg, Once    metoprolol tartrate (LOPRESSOR) tablet 50 mg, Q12H LEONELA    nitroglycerin (NITROSTAT) SL tablet 0.4 mg, Q5 Min PRN    nitroGLYcerin (TRIDIL) 50 mg in 250 ml infusion (premix), Titrated, Last Rate: Stopped (05/17/25 0020)  VTE Pharmacologic Prophylaxis: VTE covered by:  heparin (porcine), Intravenous, 15.1 Units/kg/hr at 05/18/25 1100  heparin (porcine), Intravenous, 2,000 Units at 05/17/25 2159  heparin (porcine), Intravenous     VTE Mechanical Prophylaxis: sequential compression device    Administrative Statements     Portions of the record may have been created with voice recognition software.

## 2025-05-18 NOTE — PROGRESS NOTES
Progress Note - Cardiac Surgery   Tc Stovall 75 y.o. male MRN: [id number]  Unit/Bed#: PPHP-320-01 Encounter: [id number]      24 Hour Events: No events/complaints.     Medications:   Scheduled Meds:  Current Facility-Administered Medications   Medication Dose Route Frequency Provider Last Rate    acetaminophen  975 mg Oral Q8H PRN Luis Alberto Jang, DO      albuterol  2.5 mg Nebulization Q6H PRN Moreno Fischer MD      amLODIPine  5 mg Oral Daily Ji Bethea, DO      aspirin  81 mg Oral Daily TRICIA Schafer-AGUSTO      atorvastatin  40 mg Oral QPM TRICIA Schafer-AGUSTO      colchicine  0.6 mg Oral BID Nic Alvarado MD      gabapentin  600 mg Oral BID TRICIA Schafer-AGUSTO      heparin (porcine)  3-20 Units/kg/hr (Order-Specific) Intravenous Titrated TRICIA Stein-C 15.1 Units/kg/hr (05/18/25 0044)    heparin (porcine)  2,000 Units Intravenous Q6H PRN TRICIA Schafer-C      heparin (porcine)  4,000 Units Intravenous Q6H PRN TRICIA Schafer-C      insulin glargine  25 Units Subcutaneous HS Kp Kinsey MD      insulin lispro  1-6 Units Subcutaneous TID AC TRICIA Schafer-AGUSTO      labetalol  10 mg Intravenous Q6H PRN TRICIA Schafer-AGUSTO      [START ON 5/19/2025] metoprolol tartrate  12.5 mg Oral Once TRICIA Stein-AGUSTO      metoprolol tartrate  50 mg Oral Q12H LEONELA Ji Bethea DO      nitroglycerin  0.4 mg Sublingual Q5 Min PRN Jessi Keller MD      nitroGLYcerin  5-200 mcg/min Intravenous Titrated [place] Blood MD Stopped (05/17/25 0020)     Continuous Infusions:heparin (porcine), 3-20 Units/kg/hr (Order-Specific), Last Rate: 15.1 Units/kg/hr (05/18/25 0044)  nitroGLYcerin, 5-200 mcg/min, Last Rate: Stopped (05/17/25 0020)        Results:   Results from last 7 days   Lab Units 05/18/25  0503 05/17/25  0447 05/16/25  0245   WBC Thousand/uL 11.43* 14.09* 12.62*   HEMOGLOBIN g/dL 10.7* 11.3* 11.7*   HEMATOCRIT % 33.0* 34.3* 36.3*   PLATELETS Thousands/uL 236 242 277     Results from last 7  days   Lab Units 05/18/25  0503 05/17/25  0447 05/16/25  0232   POTASSIUM mmol/L 3.5 3.7 4.1   CHLORIDE mmol/L 101 101 104   CO2 mmol/L 23 24 25   BUN mg/dL 25 25 17   CREATININE mg/dL 1.09 1.27 1.00   CALCIUM mg/dL 8.3* 8.5 8.6     Results from last 7 days   Lab Units 05/18/25  0503 05/17/25  2133 05/17/25  1930 05/14/25  2353 05/14/25  1724   INR   --   --   --   --  1.00   PTT seconds 62* 51* 35*   < > 34    < > = values in this interval not displayed.       Studies:     Cardiac Catheterization:   Impression          Ramus lesion is 90% stenosed.    1st Diag lesion is 99% stenosed, consistent with the infarct artery, small vessel with PAOLO-3 flow..    Mid LAD lesion is 75% stenosed.    Mid Cx to Dist Cx lesion is 90% stenosed.    RPDA-1 lesion is 75% stenosed.    RPDA-2 lesion is 90% stenosed.     Severe diabetic three-vessel coronary artery disease with small vessel distal diabetic disease.     Echocardiogram:     Interpretation Summary  Show Result Comparison     Left Ventricle: Left ventricular cavity size is normal. Wall thickness is normal. The left ventricular ejection fraction is 55%. Systolic function is normal. Wall motion is normal. Diastolic function is mildly abnormal, consistent with grade I (abnormal) relaxation.    Right Ventricle: Right ventricular cavity size is normal. Systolic function is normal.    Aortic Valve: There is mild regurgitation.    Mitral Valve: There is mild regurgitation.    Tricuspid Valve: There is mild regurgitation. The right ventricular systolic pressure is normal.    Pulmonic Valve: There is mild regurgitation.     Carotid artery duplex:              RIGHT:   There is no evidence of arterial disease throughout the extracranial carotid system.   Vertebral artery flow is antegrade. There is no significant subclavian artery disease.       LEFT:   There is <50% stenosis noted in the internal carotid artery. Plaque is homogenous and smooth.   Vertebral artery flow is antegrade.  There is no significant subclavian artery disease.      Greater saphenous vein mapping: bilateral thighs for conduit     Above reviewed with patient     Vitals:   Vitals:    05/17/25 2315 05/18/25 0258 05/18/25 0737 05/18/25 1059   BP: 119/59 125/65 130/72 126/62   Pulse: 66 66 74 57   Resp: 16 16     Temp: 98.7 °F (37.1 °C) 98 °F (36.7 °C) 98.3 °F (36.8 °C) (!) 97.4 °F (36.3 °C)   TempSrc:       SpO2: 92% 95% 95% 98%   Weight:       Height:           Physical Exam:    General: No acute distress, Alert, and Normal appearance  HEENT/NECK:  Normocephalic. Atraumatic.  No jugular venous distention.    Cardiac: Regular rate and rhythm  Pulmonary:  Breath sounds clear bilaterally  Abdomen:  Non-tender and Non-distended  Extremities: Extremities warm/dry  Neuro: Alert and oriented X 3  Skin: Warm/Dry, without rashes or lesions.    Assessment:  MVCAD     Plan:  Patient agreeable to proceed with surgery; Informed consent signed     Carotid ultrasound completed, and acceptable     Vein mapping completed, and acceptable     Continue Mupirocin 2% nasal ointment q 12 hrs     Continue topical chlorhexidine bath and mouth rise   Iv heparin end tomorrow at 0600  coronary artery bypass grafting scheduled for Monday with Dr. Martin Polo M.D.    SIGNATURE: Laurie Ybarra PA-C  DATE: May 18, 2025  TIME: 11:38 AM    * This note was completed in part utilizing [employer] direct voice recognition software.   Grammatical errors, random word insertion, spelling mistakes, and incomplete sentences may be an occasional consequence of the system secondary to software limitations, ambient noise and hardware issues. At the time of dictation, efforts were made to edit, clarify and /or correct errors. Please read the chart carefully and recognize, using context, where substitutions have occurred.  If you have any questions or concerns about the context, text or information contained within the body of this dictation, please  contact myself, the provider, for further clarification.

## 2025-05-18 NOTE — ASSESSMENT & PLAN NOTE
Tmax 102.2 (5/16)  Infectious workup negative  DDx pericarditis versus atelectasis   Resolved.  Monitor off antibiotics

## 2025-05-18 NOTE — PROGRESS NOTES
"Cardiology Progress Note - Tc Stovall 75 y.o. male MRN: [id number]    Unit/Bed#: PPHP-320-01 Encounter: [id number]  Assessment and plan  #1 non-STEMI type I  #2 multivessel coronary disease awaiting CABG  #3 hypertension  #4 hyperlipidemia   #5  Low normal left ventricular ejection fraction  #6 type 2 diabetes mellitus  #7 history of CVA  #8 tobacco abuse    Recommendations: No further chest pain nitro drip has been turned off.  Continue aspirin, beta-blocker, IV heparin, amlodipine for antianginal regimen.  Continue high intensity statin.  Colchicine was added in case there was any early pericardial inflammation post MI.  Awaiting CABG tomorrow.        Subjective:    No significant events overnight.  Denies any chest pain, shortness of breath, palpitations.     ROS    Objective:   Vitals: Blood pressure 130/72, pulse 74, temperature 98.3 °F (36.8 °C), resp. rate 16, height 5' 7\" (1.702 m), weight 101 kg (222 lb 0.1 oz), SpO2 95%., Body mass index is 34.77 kg/m².,   Orthostatic Blood Pressures      Flowsheet Row Most Recent Value   Blood Pressure 130/72 filed at 2025 0737   Patient Position - Orthostatic VS Lying filed at 2025 1911           Systolic (24hrs), Av , Min:88 , Max:139     Diastolic (24hrs), Av, Min:48, Max:90      Intake/Output Summary (Last 24 hours) at 2025 0834  Last data filed at 2025 0825  Gross per 24 hour   Intake 598 ml   Output 1270 ml   Net -672 ml     Weight (last 2 days)       None              Telemetry Review: No significant arrhythmias seen on telemetry review.   EKG personally reviewed by Ji Bethea DO.     Physical Exam:  Vital signs reviewed  General:  Alert and cooperative, appears stated age, no acute distress  HEENT:  PERRLA, EOMI, no scleral icterus, no conjunctival pallor  Neck:  No lymphadenopathy, no thyromegaly, no carotid bruits, no elevated JVP  Heart:  Regular rate and rhythm, normal S1/S2, no S3/S4, no murmur, rubs or gallops.  " PMI nondisplaced  Lungs:  Clear to auscultation bilaterally, no wheezes rales or rhonchi  Abdomen:  Soft, non-tender, positive bowel sounds, no rebound or guarding,   no organomegaly   Extremities:  Normal range of motion.  No clubbing, cyanosis or edema   Vascular:  2+ pedal pulses  Skin:  No rashes or lesions on exposed skin  Neurologic:  Cranial nerves II-XII grossly intact without focal deficits  Psych:  Normal mood and affect        Laboratory Results:        CBC with diff:   Results from last 7 days   Lab Units 05/18/25  0503 05/17/25 0447 05/16/25  0245 05/15/25  0614 05/14/25  1724 05/14/25  1543   WBC Thousand/uL 11.43* 14.09* 12.62* 12.31* 11.30* 9.07   HEMOGLOBIN g/dL 10.7* 11.3* 11.7* 11.9* 11.6* 12.0   HEMATOCRIT % 33.0* 34.3* 36.3* 36.8 35.2* 36.2*   MCV fL 88 89 89 88 87 87   PLATELETS Thousands/uL 236 242 277 306 314 340   RBC Million/uL 3.75* 3.86* 4.09 4.17 4.05 4.18   MCH pg 28.5 29.3 28.6 28.5 28.6 28.7   MCHC g/dL 32.4 32.9 32.2 32.3 33.0 33.1   RDW % 12.5 12.5 12.4 12.2 12.2 12.3   MPV fL 10.9 10.8 10.1 9.9 10.0 10.2   NRBC AUTO /100 WBCs 0 0 0  --   --  0         CMP:  Results from last 7 days   Lab Units 05/18/25  0503 05/17/25  0447 05/16/25  0232 05/15/25  0614 05/14/25  1543 05/13/25  0640   POTASSIUM mmol/L 3.5 3.7 4.1 4.0 4.2 4.0   CHLORIDE mmol/L 101 101 104 107 104 105   CO2 mmol/L 23 24 25 26 25 25   BUN mg/dL 25 25 17 20 28* 27*   CREATININE mg/dL 1.09 1.27 1.00 0.89 1.02 0.98   CALCIUM mg/dL 8.3* 8.5 8.6 8.7 9.6 9.1   AST U/L  --   --   --   --  13  --    ALT U/L  --   --   --   --  15  --    ALK PHOS U/L  --   --   --   --  76  --    EGFR ml/min/1.73sq m 66 55 73 84 72 75         BMP:  Results from last 7 days   Lab Units 05/18/25  0503 05/17/25  0447 05/16/25  0232 05/15/25  0614 05/14/25  1543 05/13/25  0640   POTASSIUM mmol/L 3.5 3.7 4.1 4.0 4.2 4.0   CHLORIDE mmol/L 101 101 104 107 104 105   CO2 mmol/L 23 24 25 26 25 25   BUN mg/dL 25 25 17 20 28* 27*   CREATININE mg/dL 1.09  1.27 1.00 0.89 1.02 0.98   CALCIUM mg/dL 8.3* 8.5 8.6 8.7 9.6 9.1       BNP:   Recent Labs     25  0556   *       Magnesium:       Coags:   Results from last 7 days   Lab Units 25  0503 25  2133 25  1930 25  1319 25  0447 25  1521 25  0921 25  2353 25  1724   PTT seconds 62* 51* 35* 69* 97* 74* 70*   < > 34   INR   --   --   --   --   --   --   --   --  1.00    < > = values in this interval not displayed.       TSH:        Hemoglobin A1C   Results from last 7 days   Lab Units 25  0640   HEMOGLOBIN A1C % 7.7*       Lipid Profile:   Results from last 7 days   Lab Units 05/15/25  0614   TRIGLYCERIDES mg/dL 79   HDL mg/dL 36*       Cardiac testing:   Results for orders placed during the hospital encounter of 17    Echo complete with contrast if indicated    [address] [address] [phone]    Transthoracic Echocardiogram  2D, M-mode, Doppler, and Color Doppler    Study date:  05-Sep-2017    Patient: SUNIL JIMENES  MR number: [id number]  Account number: [id number]  : 1950  Age: 67 years  Gender: Male  Status: Outpatient  Location: Emergency room  Height: 67 in  Weight: 205.9 lb  BP: 172/ 77 mmHg    Indications: Transient Ischemic Attack    Diagnoses: G45.9 - Transient cerebral ischemic attack, unspecified    Sonographer:  CLEMENTE Perez,RDCS  Interpreting Physician:  Nanci Horowitz MD  Referring Physician:  Fatuma Aparicio PA-C  Group:  [facility]    SUMMARY    LEFT VENTRICLE:  Ejection fraction was estimated to be 60 %.  There were no regional wall motion abnormalities.  Wall thickness was mildly increased.    MITRAL VALVE:  There was trace regurgitation.    AORTIC VALVE:  There was trace regurgitation.    TRICUSPID VALVE:  There was trace regurgitation.    HISTORY: PRIOR HISTORY: Orthostatic Lightheadedness, Type 2 Diabetes  Mellitus, Tobacco Abuse    PROCEDURE: The procedure was performed in the emergency room. This was a routine study. The transthoracic approach was used. The study included complete 2D imaging, M-mode, complete spectral Doppler, and color Doppler. The heart rate was  90 bpm, at the start of the study. Images were obtained from the parasternal, apical, subcostal, and suprasternal notch acoustic windows. Echocardiographic views were limited due to poor acoustic window availability, decreased penetration,  and lung interference. This was a technically difficult study.    LEFT VENTRICLE: Size was normal. Ejection fraction was estimated to be 60 %. There were no regional wall motion abnormalities. Wall thickness was mildly increased. DOPPLER: There was an increased relative contribution of atrial contraction  to ventricular filling.    RIGHT VENTRICLE: The size was normal. Systolic function was normal. Wall thickness was normal.    LEFT ATRIUM: Size was normal.    RIGHT ATRIUM: Size was normal.    MITRAL VALVE: Valve structure was normal. There was normal leaflet separation. DOPPLER: The transmitral velocity was within the normal range. There was no evidence for stenosis. There was trace regurgitation.    AORTIC VALVE: The valve was trileaflet. Leaflets exhibited mildly increased thickness and mild calcification. DOPPLER: There was trace regurgitation.    TRICUSPID VALVE: The valve structure was normal. There was normal leaflet separation. DOPPLER: The transtricuspid velocity was within the normal range. There was no evidence for stenosis. There was trace regurgitation.    PULMONIC VALVE: Leaflets exhibited normal thickness, no calcification, and normal cuspal separation. DOPPLER: The transpulmonic velocity was within the normal range. There was no regurgitation.    PERICARDIUM: There was no pericardial effusion. The pericardium was normal in appearance.    AORTA: The root exhibited normal size.    SYSTEM MEASUREMENT  TABLES    2D  %FS: 37.6 %  Ao Diam: 3.3 cm  EDV(Teich): 105.8 ml  EF(Teich): 67.6 %  ESV(Teich): 34.3 ml  IVSd: 1.1 cm  LA Area: 12.9 cm2  LA Diam: 2.6 cm  LVEDV MOD A4C: 128.3 ml  LVEF MOD A4C: 56.9 %  LVESV MOD A4C: 55.3 ml  LVIDd: 4.8 cm  LVIDs: 3 cm  LVLd A4C: 8.5 cm  LVLs A4C: 7.3 cm  LVPWd: 0.9 cm  RA Area: 11.5 cm2  RVIDd: 2.8 cm  SV MOD A4C: 73 ml  SV(Teich): 71.5 ml    CW  AR Dec [place]: 2.2 m/s2  AR Dec Time: 1671.7 ms  AR PHT: 484.8 ms  AR Vmax: 3.7 m/s  AR maxP.9 mmHg    MM  TAPSE: 2.3 cm    PW  E': 0.1 m/s  E/E': 8.7  MV A Antonoi: 0.8 m/s  MV Dec [place]: 3.6 m/s2  MV DecT: 174 ms  MV E Antonio: 0.6 m/s  MV E/A Ratio: 0.8  MV PHT: 50.5 ms  MVA By PHT: 4.4 cm2    [facility]redited [facility]ch[facility]    Prepared and electronically signed by    Nanci Horowitz MD  Signed 05-Sep-2017 17:27:56    No results found for this or any previous visit.    No results found for this or any previous visit.    No results found for this or any previous visit.      Meds/Allergies   all current active meds have been reviewed    Medications Prior to Admission:     acetaminophen (TYLENOL) 500 mg tablet    amLODIPine (NORVASC) 5 mg tablet    aspirin 81 mg chewable tablet    atorvastatin (LIPITOR) 40 mg tablet    Blood Glucose Monitoring Suppl (OneTouch Verio Reflect) w/Device KIT    cholecalciferol (VITAMIN D3) 1,000 units tablet    Continuous Glucose Sensor (Dexcom G7 Sensor)    fluticasone (FLONASE) 50 mcg/act nasal spray    gabapentin (Neurontin) 600 MG tablet    glipiZIDE (GLUCOTROL XL) 10 mg 24 hr tablet    glucose blood (OneTouch Verio) test strip    insulin glargine (LANTUS) 100 units/mL subcutaneous injection    Insulin Pen Needle (CareOne Unifine Pentips Plus) 32G X 4 MM MISC    lisinopril (ZESTRIL) 20 mg tablet    meloxicam (MOBIC) 15 mg tablet    metFORMIN (GLUCOPHAGE-XR) 500 mg 24 hr tablet    nicotine (NICODERM CQ) 14 mg/24hr TD 24 hr patch    OneTouch Delica Lancets 33G MISC    heparin  (porcine), 3-20 Units/kg/hr (Order-Specific), Last Rate: 15.1 Units/kg/hr (05/18/25 0044)  nitroGLYcerin, 5-200 mcg/min, Last Rate: Stopped (05/17/25 0020)      Assessment:  Principal Problem:    CAD, multiple vessel  Active Problems:    Type 2 diabetes mellitus (HCC)    Primary hypertension    Hyperlipidemia    History of CVA (cerebrovascular accident)    Elevated troponin    NSTEMI (non-ST elevated myocardial infarction) (HCC)    Paroxysmal atrial tachycardia (HCC)    PVC's (premature ventricular contractions)    Tobacco use    Fever            Counseling / Coordination of Care  Total floor / unit time spent today 25 minutes.  Greater than 50% of total time was spent with the patient and / or family counseling and / or coordination of care.  A description of the counseling / coordination of care: .

## 2025-05-18 NOTE — ASSESSMENT & PLAN NOTE
Lab Results   Component Value Date    HGBA1C 7.7 (H) 05/13/2025       Recent Labs     05/17/25  1555 05/17/25  2107 05/18/25  0540 05/18/25  1056   POCGLU 263* 253* 180* 204*       Blood Sugar Average: Last 72 hrs:  (P) [id number]  Home regimen: glipizide 10 mg daily, metformin 1 g BID, and Lantus 20 units at bedtime    Plan:  - Continue Lantus 25 units at bedtime, SSI  - Carb controlled diet  - Hypoglycemia protocol  - BG target 140-180

## 2025-05-18 NOTE — ASSESSMENT & PLAN NOTE
Initially presented to [facility] for sudden onset, severe substernal chest pain with radiation to his back.  Troponin peaked at >22k.  EKG demonstrated dynamic ST-T wave changes in leads I and aVL, V2, with dynamic ST depressions in inferior leads.  S/p L HC on 5/15 which showed MVCAD. Transferred to [facility] for CABG evaluation.    Cardiac cath (5/14) Ramus lesion 90% stenosis, 1st Diag lesion 99% stenosis, Mid LAD 75% stenosis, Mid Cx to Dist Cx 90% stenosis, RPDA-1 75% stenosis, RPDA-2 90% stenosis  TTE (15) EF 50% and no major valve disease     Plan:  - CT surgery and cardiology consulted, recommendations appreciated  - Plan for CABG tomorrow  - Continue to monitor on telemetry  - Continue heparin gtt  - Continue aspirin   - Hold Plavix to allow washout period prior to CABG. S/p Plavix load  - Continue Lipitor  - Colchicine for possible nahun-infarct pericarditis

## 2025-05-19 ENCOUNTER — ANESTHESIA (INPATIENT)
Dept: PERIOP | Facility: HOSPITAL | Age: 75
DRG: 236 | End: 2025-05-19
Payer: MEDICARE

## 2025-05-19 ENCOUNTER — APPOINTMENT (INPATIENT)
Dept: RADIOLOGY | Facility: HOSPITAL | Age: 75
DRG: 236 | End: 2025-05-19
Payer: MEDICARE

## 2025-05-19 ENCOUNTER — APPOINTMENT (INPATIENT)
Dept: NON INVASIVE DIAGNOSTICS | Facility: HOSPITAL | Age: 75
DRG: 236 | End: 2025-05-19
Payer: MEDICARE

## 2025-05-19 LAB
ANION GAP SERPL CALCULATED.3IONS-SCNC: 11 MMOL/L (ref 4–13)
ANION GAP SERPL CALCULATED.3IONS-SCNC: 7 MMOL/L (ref 4–13)
APTT PPP: 87 SECONDS (ref 23–34)
BASE EXCESS BLDA CALC-SCNC: -1 MMOL/L (ref -2–3)
BASE EXCESS BLDA CALC-SCNC: -2 MMOL/L (ref -2–3)
BASE EXCESS BLDA CALC-SCNC: -3 MMOL/L (ref -2–3)
BASE EXCESS BLDA CALC-SCNC: -4 MMOL/L (ref -2–3)
BASE EXCESS BLDA CALC-SCNC: -5 MMOL/L (ref -2–3)
BASE EXCESS BLDA CALC-SCNC: -5 MMOL/L (ref -2–3)
BASOPHILS # BLD AUTO: 0.08 THOUSANDS/ÂΜL (ref 0–0.1)
BASOPHILS NFR BLD AUTO: 1 % (ref 0–1)
BUN SERPL-MCNC: 19 MG/DL (ref 5–25)
BUN SERPL-MCNC: 22 MG/DL (ref 5–25)
CA-I BLD-SCNC: 1.02 MMOL/L (ref 1.12–1.32)
CA-I BLD-SCNC: 1.03 MMOL/L (ref 1.12–1.32)
CA-I BLD-SCNC: 1.17 MMOL/L (ref 1.12–1.32)
CA-I BLD-SCNC: 1.17 MMOL/L (ref 1.12–1.32)
CA-I BLD-SCNC: 1.18 MMOL/L (ref 1.12–1.32)
CA-I BLD-SCNC: 1.21 MMOL/L (ref 1.12–1.32)
CALCIUM SERPL-MCNC: 8.1 MG/DL (ref 8.4–10.2)
CALCIUM SERPL-MCNC: 8.7 MG/DL (ref 8.4–10.2)
CHLORIDE SERPL-SCNC: 105 MMOL/L (ref 96–108)
CHLORIDE SERPL-SCNC: 109 MMOL/L (ref 96–108)
CO2 SERPL-SCNC: 23 MMOL/L (ref 21–32)
CO2 SERPL-SCNC: 24 MMOL/L (ref 21–32)
CREAT SERPL-MCNC: 0.95 MG/DL (ref 0.6–1.3)
CREAT SERPL-MCNC: 0.99 MG/DL (ref 0.6–1.3)
EOSINOPHIL # BLD AUTO: 0.78 THOUSAND/ÂΜL (ref 0–0.61)
EOSINOPHIL NFR BLD AUTO: 8 % (ref 0–6)
ERYTHROCYTE [DISTWIDTH] IN BLOOD BY AUTOMATED COUNT: 12.5 % (ref 11.6–15.1)
FIO2 GAS DIL.REBREATH: 100 L
GFR SERPL CREATININE-BSD FRML MDRD: 74 ML/MIN/1.73SQ M
GFR SERPL CREATININE-BSD FRML MDRD: 78 ML/MIN/1.73SQ M
GLUCOSE SERPL-MCNC: 104 MG/DL (ref 65–140)
GLUCOSE SERPL-MCNC: 114 MG/DL (ref 65–140)
GLUCOSE SERPL-MCNC: 126 MG/DL (ref 65–140)
GLUCOSE SERPL-MCNC: 150 MG/DL (ref 65–140)
GLUCOSE SERPL-MCNC: 152 MG/DL (ref 65–140)
GLUCOSE SERPL-MCNC: 160 MG/DL (ref 65–140)
GLUCOSE SERPL-MCNC: 160 MG/DL (ref 65–140)
GLUCOSE SERPL-MCNC: 167 MG/DL (ref 65–140)
GLUCOSE SERPL-MCNC: 168 MG/DL (ref 65–140)
GLUCOSE SERPL-MCNC: 168 MG/DL (ref 65–140)
GLUCOSE SERPL-MCNC: 171 MG/DL (ref 65–140)
GLUCOSE SERPL-MCNC: 174 MG/DL (ref 65–140)
GLUCOSE SERPL-MCNC: 182 MG/DL (ref 65–140)
GLUCOSE SERPL-MCNC: 204 MG/DL (ref 65–140)
HCO3 BLDA-SCNC: 20.9 MMOL/L (ref 22–28)
HCO3 BLDA-SCNC: 21.2 MMOL/L (ref 22–28)
HCO3 BLDA-SCNC: 21.9 MMOL/L (ref 22–28)
HCO3 BLDA-SCNC: 22.1 MMOL/L (ref 22–28)
HCO3 BLDA-SCNC: 23 MMOL/L (ref 24–30)
HCO3 BLDA-SCNC: 24.1 MMOL/L (ref 22–28)
HCT VFR BLD AUTO: 32.7 % (ref 36.5–49.3)
HCT VFR BLD AUTO: 34.5 % (ref 36.5–49.3)
HCT VFR BLD CALC: 23 % (ref 36.5–49.3)
HCT VFR BLD CALC: 24 % (ref 36.5–49.3)
HCT VFR BLD CALC: 25 % (ref 36.5–49.3)
HCT VFR BLD CALC: 30 % (ref 36.5–49.3)
HCT VFR BLD CALC: 31 % (ref 36.5–49.3)
HCT VFR BLD CALC: 37 % (ref 36.5–49.3)
HGB BLD-MCNC: 10.9 G/DL (ref 12–17)
HGB BLD-MCNC: 11.5 G/DL (ref 12–17)
HGB BLDA-MCNC: 10.2 G/DL (ref 12–17)
HGB BLDA-MCNC: 10.5 G/DL (ref 12–17)
HGB BLDA-MCNC: 12.6 G/DL (ref 12–17)
HGB BLDA-MCNC: 7.8 G/DL (ref 12–17)
HGB BLDA-MCNC: 8.2 G/DL (ref 12–17)
HGB BLDA-MCNC: 8.5 G/DL (ref 12–17)
IMM GRANULOCYTES # BLD AUTO: 0.09 THOUSAND/UL (ref 0–0.2)
IMM GRANULOCYTES NFR BLD AUTO: 1 % (ref 0–2)
KCT BLD-ACNC: 130 SEC (ref 89–137)
KCT BLD-ACNC: 154 SEC (ref 89–137)
KCT BLD-ACNC: 415 SEC (ref 89–137)
KCT BLD-ACNC: 549 SEC (ref 89–137)
KCT BLD-ACNC: 597 SEC (ref 89–137)
LYMPHOCYTES # BLD AUTO: 2.62 THOUSANDS/ÂΜL (ref 0.6–4.47)
LYMPHOCYTES NFR BLD AUTO: 27 % (ref 14–44)
MCH RBC QN AUTO: 29.3 PG (ref 26.8–34.3)
MCHC RBC AUTO-ENTMCNC: 33.3 G/DL (ref 31.4–37.4)
MCV RBC AUTO: 88 FL (ref 82–98)
MONOCYTES # BLD AUTO: 0.94 THOUSAND/ÂΜL (ref 0.17–1.22)
MONOCYTES NFR BLD AUTO: 10 % (ref 4–12)
NEUTROPHILS # BLD AUTO: 5.15 THOUSANDS/ÂΜL (ref 1.85–7.62)
NEUTS SEG NFR BLD AUTO: 53 % (ref 43–75)
NRBC BLD AUTO-RTO: 0 /100 WBCS
PCO2 BLD: 22 MMOL/L (ref 21–32)
PCO2 BLD: 23 MMOL/L (ref 21–32)
PCO2 BLD: 24 MMOL/L (ref 21–32)
PCO2 BLD: 25 MMOL/L (ref 21–32)
PCO2 BLD: 35.6 MM HG (ref 36–44)
PCO2 BLD: 39.2 MM HG (ref 36–44)
PCO2 BLD: 39.4 MM HG (ref 42–50)
PCO2 BLD: 43.2 MM HG (ref 36–44)
PCO2 BLD: 44.5 MM HG (ref 36–44)
PCO2 BLD: 47.4 MM HG (ref 36–44)
PH BLD: 7.27 [PH] (ref 7.35–7.45)
PH BLD: 7.29 [PH] (ref 7.35–7.45)
PH BLD: 7.35 [PH] (ref 7.35–7.45)
PH BLD: 7.36 [PH] (ref 7.35–7.45)
PH BLD: 7.37 [PH] (ref 7.3–7.4)
PH BLD: 7.38 [PH] (ref 7.35–7.45)
PLATELET # BLD AUTO: 197 THOUSANDS/UL (ref 149–390)
PLATELET # BLD AUTO: 238 THOUSANDS/UL (ref 149–390)
PLATELET # BLD AUTO: 254 THOUSANDS/UL (ref 149–390)
PMV BLD AUTO: 10.6 FL (ref 8.9–12.7)
PMV BLD AUTO: 10.7 FL (ref 8.9–12.7)
PMV BLD AUTO: 10.8 FL (ref 8.9–12.7)
PO2 BLD: 114 MM HG (ref 75–129)
PO2 BLD: 179 MM HG (ref 75–129)
PO2 BLD: 214 MM HG (ref 75–129)
PO2 BLD: 316 MM HG (ref 75–129)
PO2 BLD: 40 MM HG (ref 35–45)
PO2 BLD: 90 MM HG (ref 75–129)
POTASSIUM BLD-SCNC: 3.6 MMOL/L (ref 3.5–5.3)
POTASSIUM BLD-SCNC: 3.6 MMOL/L (ref 3.5–5.3)
POTASSIUM BLD-SCNC: 3.9 MMOL/L (ref 3.5–5.3)
POTASSIUM BLD-SCNC: 4.2 MMOL/L (ref 3.5–5.3)
POTASSIUM BLD-SCNC: 4.4 MMOL/L (ref 3.5–5.3)
POTASSIUM BLD-SCNC: 4.4 MMOL/L (ref 3.5–5.3)
POTASSIUM SERPL-SCNC: 3.7 MMOL/L (ref 3.5–5.3)
POTASSIUM SERPL-SCNC: 3.8 MMOL/L (ref 3.5–5.3)
POTASSIUM SERPL-SCNC: 5.1 MMOL/L (ref 3.5–5.3)
RBC # BLD AUTO: 3.92 MILLION/UL (ref 3.88–5.62)
SAO2 % BLD FROM PO2: 100 % (ref 60–85)
SAO2 % BLD FROM PO2: 74 % (ref 60–85)
SAO2 % BLD FROM PO2: 97 % (ref 60–85)
SAO2 % BLD FROM PO2: 98 % (ref 60–85)
SODIUM BLD-SCNC: 137 MMOL/L (ref 136–145)
SODIUM BLD-SCNC: 138 MMOL/L (ref 136–145)
SODIUM BLD-SCNC: 138 MMOL/L (ref 136–145)
SODIUM BLD-SCNC: 139 MMOL/L (ref 136–145)
SODIUM BLD-SCNC: 140 MMOL/L (ref 136–145)
SODIUM BLD-SCNC: 141 MMOL/L (ref 136–145)
SODIUM SERPL-SCNC: 139 MMOL/L (ref 135–147)
SODIUM SERPL-SCNC: 140 MMOL/L (ref 135–147)
SPECIMEN SOURCE: ABNORMAL
SPECIMEN SOURCE: NORMAL
WBC # BLD AUTO: 9.66 THOUSAND/UL (ref 4.31–10.16)

## 2025-05-19 PROCEDURE — 85018 HEMOGLOBIN: CPT | Performed by: PHYSICIAN ASSISTANT

## 2025-05-19 PROCEDURE — 85014 HEMATOCRIT: CPT

## 2025-05-19 PROCEDURE — 02100A9 BYPASS CORONARY ARTERY, ONE ARTERY FROM LEFT INTERNAL MAMMARY WITH AUTOLOGOUS ARTERIAL TISSUE, OPEN APPROACH: ICD-10-PCS | Performed by: THORACIC SURGERY (CARDIOTHORACIC VASCULAR SURGERY)

## 2025-05-19 PROCEDURE — 30233N0 TRANSFUSION OF AUTOLOGOUS RED BLOOD CELLS INTO PERIPHERAL VEIN, PERCUTANEOUS APPROACH: ICD-10-PCS | Performed by: THORACIC SURGERY (CARDIOTHORACIC VASCULAR SURGERY)

## 2025-05-19 PROCEDURE — 85347 COAGULATION TIME ACTIVATED: CPT

## 2025-05-19 PROCEDURE — 82803 BLOOD GASES ANY COMBINATION: CPT

## 2025-05-19 PROCEDURE — 33517 CABG ARTERY-VEIN SINGLE: CPT | Performed by: THORACIC SURGERY (CARDIOTHORACIC VASCULAR SURGERY)

## 2025-05-19 PROCEDURE — 82948 REAGENT STRIP/BLOOD GLUCOSE: CPT

## 2025-05-19 PROCEDURE — 94760 N-INVAS EAR/PLS OXIMETRY 1: CPT

## 2025-05-19 PROCEDURE — 84132 ASSAY OF SERUM POTASSIUM: CPT | Performed by: PHYSICIAN ASSISTANT

## 2025-05-19 PROCEDURE — 84295 ASSAY OF SERUM SODIUM: CPT

## 2025-05-19 PROCEDURE — 99232 SBSQ HOSP IP/OBS MODERATE 35: CPT | Performed by: STUDENT IN AN ORGANIZED HEALTH CARE EDUCATION/TRAINING PROGRAM

## 2025-05-19 PROCEDURE — 06BQ4ZZ EXCISION OF LEFT SAPHENOUS VEIN, PERCUTANEOUS ENDOSCOPIC APPROACH: ICD-10-PCS | Performed by: THORACIC SURGERY (CARDIOTHORACIC VASCULAR SURGERY)

## 2025-05-19 PROCEDURE — 02HV33Z INSERTION OF INFUSION DEVICE INTO SUPERIOR VENA CAVA, PERCUTANEOUS APPROACH: ICD-10-PCS | Performed by: ANESTHESIOLOGY

## 2025-05-19 PROCEDURE — 99223 1ST HOSP IP/OBS HIGH 75: CPT | Performed by: ANESTHESIOLOGY

## 2025-05-19 PROCEDURE — 82947 ASSAY GLUCOSE BLOOD QUANT: CPT

## 2025-05-19 PROCEDURE — 021009W BYPASS CORONARY ARTERY, ONE ARTERY FROM AORTA WITH AUTOLOGOUS VENOUS TISSUE, OPEN APPROACH: ICD-10-PCS | Performed by: THORACIC SURGERY (CARDIOTHORACIC VASCULAR SURGERY)

## 2025-05-19 PROCEDURE — 94664 DEMO&/EVAL PT USE INHALER: CPT

## 2025-05-19 PROCEDURE — 80048 BASIC METABOLIC PNL TOTAL CA: CPT | Performed by: PHYSICIAN ASSISTANT

## 2025-05-19 PROCEDURE — 84132 ASSAY OF SERUM POTASSIUM: CPT

## 2025-05-19 PROCEDURE — 94002 VENT MGMT INPAT INIT DAY: CPT

## 2025-05-19 PROCEDURE — 85049 AUTOMATED PLATELET COUNT: CPT | Performed by: PHYSICIAN ASSISTANT

## 2025-05-19 PROCEDURE — 80048 BASIC METABOLIC PNL TOTAL CA: CPT

## 2025-05-19 PROCEDURE — 82330 ASSAY OF CALCIUM: CPT

## 2025-05-19 PROCEDURE — A7041 WATER SEAL DRAIN CONTAINER: HCPCS | Performed by: THORACIC SURGERY (CARDIOTHORACIC VASCULAR SURGERY)

## 2025-05-19 PROCEDURE — 5A1223Z PERFORMANCE OF CARDIAC PACING, CONTINUOUS: ICD-10-PCS | Performed by: THORACIC SURGERY (CARDIOTHORACIC VASCULAR SURGERY)

## 2025-05-19 PROCEDURE — 85025 COMPLETE CBC W/AUTO DIFF WBC: CPT

## 2025-05-19 PROCEDURE — 93005 ELECTROCARDIOGRAM TRACING: CPT

## 2025-05-19 PROCEDURE — 33508 ENDOSCOPIC VEIN HARVEST: CPT | Performed by: PHYSICIAN ASSISTANT

## 2025-05-19 PROCEDURE — 85730 THROMBOPLASTIN TIME PARTIAL: CPT | Performed by: INTERNAL MEDICINE

## 2025-05-19 PROCEDURE — 71045 X-RAY EXAM CHEST 1 VIEW: CPT

## 2025-05-19 PROCEDURE — 5A1221Z PERFORMANCE OF CARDIAC OUTPUT, CONTINUOUS: ICD-10-PCS | Performed by: THORACIC SURGERY (CARDIOTHORACIC VASCULAR SURGERY)

## 2025-05-19 PROCEDURE — 33533 CABG ARTERIAL SINGLE: CPT | Performed by: PHYSICIAN ASSISTANT

## 2025-05-19 PROCEDURE — 93355 ECHO TRANSESOPHAGEAL (TEE): CPT

## 2025-05-19 PROCEDURE — 33533 CABG ARTERIAL SINGLE: CPT | Performed by: THORACIC SURGERY (CARDIOTHORACIC VASCULAR SURGERY)

## 2025-05-19 PROCEDURE — B24BZZ4 ULTRASONOGRAPHY OF HEART WITH AORTA, TRANSESOPHAGEAL: ICD-10-PCS | Performed by: ANESTHESIOLOGY

## 2025-05-19 PROCEDURE — 85049 AUTOMATED PLATELET COUNT: CPT | Performed by: THORACIC SURGERY (CARDIOTHORACIC VASCULAR SURGERY)

## 2025-05-19 PROCEDURE — 85014 HEMATOCRIT: CPT | Performed by: PHYSICIAN ASSISTANT

## 2025-05-19 PROCEDURE — 33517 CABG ARTERY-VEIN SINGLE: CPT | Performed by: PHYSICIAN ASSISTANT

## 2025-05-19 PROCEDURE — 33508 ENDOSCOPIC VEIN HARVEST: CPT | Performed by: THORACIC SURGERY (CARDIOTHORACIC VASCULAR SURGERY)

## 2025-05-19 DEVICE — MARKER CORONARY BYPASS VOSS GRAFT: Type: IMPLANTABLE DEVICE | Site: AORTA | Status: FUNCTIONAL

## 2025-05-19 RX ORDER — MIDAZOLAM HYDROCHLORIDE 2 MG/2ML
INJECTION, SOLUTION INTRAMUSCULAR; INTRAVENOUS AS NEEDED
Status: DISCONTINUED | OUTPATIENT
Start: 2025-05-19 | End: 2025-05-19

## 2025-05-19 RX ORDER — IBUPROFEN 200 MG
CAPSULE ORAL AS NEEDED
Status: DISCONTINUED | OUTPATIENT
Start: 2025-05-19 | End: 2025-05-19

## 2025-05-19 RX ORDER — CALCIUM GLUCONATE 20 MG/ML
2 INJECTION, SOLUTION INTRAVENOUS ONCE AS NEEDED
Status: DISCONTINUED | OUTPATIENT
Start: 2025-05-19 | End: 2025-05-20

## 2025-05-19 RX ORDER — AMIODARONE HYDROCHLORIDE 200 MG/1
200 TABLET ORAL EVERY 8 HOURS SCHEDULED
Status: DISCONTINUED | OUTPATIENT
Start: 2025-05-19 | End: 2025-05-23 | Stop reason: HOSPADM

## 2025-05-19 RX ORDER — NEOSTIGMINE METHYLSULFATE 1 MG/ML
INJECTION INTRAVENOUS AS NEEDED
Status: DISCONTINUED | OUTPATIENT
Start: 2025-05-19 | End: 2025-05-19

## 2025-05-19 RX ORDER — ATORVASTATIN CALCIUM 80 MG/1
80 TABLET, FILM COATED ORAL
Status: DISCONTINUED | OUTPATIENT
Start: 2025-05-19 | End: 2025-05-23 | Stop reason: HOSPADM

## 2025-05-19 RX ORDER — HYDROMORPHONE HCL/PF 1 MG/ML
0.5 SYRINGE (ML) INJECTION EVERY 2 HOUR PRN
Status: DISCONTINUED | OUTPATIENT
Start: 2025-05-19 | End: 2025-05-20

## 2025-05-19 RX ORDER — POTASSIUM CHLORIDE 29.8 MG/ML
40 INJECTION INTRAVENOUS ONCE AS NEEDED
Status: COMPLETED | OUTPATIENT
Start: 2025-05-19 | End: 2025-05-19

## 2025-05-19 RX ORDER — PROTAMINE SULFATE 10 MG/ML
INJECTION, SOLUTION INTRAVENOUS AS NEEDED
Status: DISCONTINUED | OUTPATIENT
Start: 2025-05-19 | End: 2025-05-19

## 2025-05-19 RX ORDER — OXYCODONE HYDROCHLORIDE 5 MG/1
5 TABLET ORAL EVERY 4 HOURS PRN
Status: DISCONTINUED | OUTPATIENT
Start: 2025-05-19 | End: 2025-05-23 | Stop reason: HOSPADM

## 2025-05-19 RX ORDER — FENTANYL CITRATE 50 UG/ML
50 INJECTION, SOLUTION INTRAMUSCULAR; INTRAVENOUS
Status: DISCONTINUED | OUTPATIENT
Start: 2025-05-19 | End: 2025-05-20

## 2025-05-19 RX ORDER — MANNITOL 250 MG/ML
INJECTION, SOLUTION INTRAVENOUS AS NEEDED
Status: DISCONTINUED | OUTPATIENT
Start: 2025-05-19 | End: 2025-05-19

## 2025-05-19 RX ORDER — ALBUMIN HUMAN 50 G/1000ML
12.5 SOLUTION INTRAVENOUS ONCE
Status: COMPLETED | OUTPATIENT
Start: 2025-05-19 | End: 2025-05-20

## 2025-05-19 RX ORDER — GLYCOPYRROLATE 0.2 MG/ML
INJECTION INTRAMUSCULAR; INTRAVENOUS AS NEEDED
Status: DISCONTINUED | OUTPATIENT
Start: 2025-05-19 | End: 2025-05-19

## 2025-05-19 RX ORDER — EPHEDRINE SULFATE 50 MG/ML
INJECTION INTRAVENOUS AS NEEDED
Status: DISCONTINUED | OUTPATIENT
Start: 2025-05-19 | End: 2025-05-19

## 2025-05-19 RX ORDER — PANTOPRAZOLE SODIUM 40 MG/1
40 TABLET, DELAYED RELEASE ORAL DAILY
Status: DISCONTINUED | OUTPATIENT
Start: 2025-05-20 | End: 2025-05-23 | Stop reason: HOSPADM

## 2025-05-19 RX ORDER — SODIUM CHLORIDE 9 MG/ML
INJECTION, SOLUTION INTRAVENOUS CONTINUOUS PRN
Status: DISCONTINUED | OUTPATIENT
Start: 2025-05-19 | End: 2025-05-19

## 2025-05-19 RX ORDER — ROCURONIUM BROMIDE 10 MG/ML
INJECTION, SOLUTION INTRAVENOUS AS NEEDED
Status: DISCONTINUED | OUTPATIENT
Start: 2025-05-19 | End: 2025-05-19

## 2025-05-19 RX ORDER — ONDANSETRON 2 MG/ML
4 INJECTION INTRAMUSCULAR; INTRAVENOUS EVERY 6 HOURS PRN
Status: DISCONTINUED | OUTPATIENT
Start: 2025-05-19 | End: 2025-05-23 | Stop reason: HOSPADM

## 2025-05-19 RX ORDER — VANCOMYCIN HYDROCHLORIDE 1 G/20ML
INJECTION, POWDER, LYOPHILIZED, FOR SOLUTION INTRAVENOUS AS NEEDED
Status: DISCONTINUED | OUTPATIENT
Start: 2025-05-19 | End: 2025-05-19 | Stop reason: HOSPADM

## 2025-05-19 RX ORDER — CALCIUM CHLORIDE 100 MG/ML
INJECTION INTRAVENOUS; INTRAVENTRICULAR AS NEEDED
Status: DISCONTINUED | OUTPATIENT
Start: 2025-05-19 | End: 2025-05-19

## 2025-05-19 RX ORDER — ASPIRIN 325 MG
TABLET ORAL
Status: COMPLETED
Start: 2025-05-19 | End: 2025-05-19

## 2025-05-19 RX ORDER — CHLORHEXIDINE GLUCONATE ORAL RINSE 1.2 MG/ML
15 SOLUTION DENTAL 2 TIMES DAILY
Status: DISCONTINUED | OUTPATIENT
Start: 2025-05-19 | End: 2025-05-23 | Stop reason: HOSPADM

## 2025-05-19 RX ORDER — PHENYLEPHRINE HCL IN 0.9% NACL 1 MG/10 ML
200-2000 SYRINGE (ML) INTRAVENOUS ONCE
Status: DISCONTINUED | OUTPATIENT
Start: 2025-05-19 | End: 2025-05-19 | Stop reason: HOSPADM

## 2025-05-19 RX ORDER — SODIUM CHLORIDE, SODIUM LACTATE, POTASSIUM CHLORIDE, CALCIUM CHLORIDE 600; 310; 30; 20 MG/100ML; MG/100ML; MG/100ML; MG/100ML
INJECTION, SOLUTION INTRAVENOUS CONTINUOUS PRN
Status: DISCONTINUED | OUTPATIENT
Start: 2025-05-19 | End: 2025-05-19

## 2025-05-19 RX ORDER — HEPARIN SODIUM 1000 [USP'U]/ML
400 INJECTION, SOLUTION INTRAVENOUS; SUBCUTANEOUS ONCE
Status: COMPLETED | OUTPATIENT
Start: 2025-05-19 | End: 2025-05-19

## 2025-05-19 RX ORDER — ASPIRIN 325 MG
325 TABLET ORAL DAILY
Status: DISCONTINUED | OUTPATIENT
Start: 2025-05-19 | End: 2025-05-23 | Stop reason: HOSPADM

## 2025-05-19 RX ORDER — FLUTICASONE PROPIONATE 50 MCG
1 SPRAY, SUSPENSION (ML) NASAL DAILY
Status: DISCONTINUED | OUTPATIENT
Start: 2025-05-20 | End: 2025-05-23 | Stop reason: HOSPADM

## 2025-05-19 RX ORDER — SODIUM CHLORIDE 450 MG/100ML
20 INJECTION, SOLUTION INTRAVENOUS CONTINUOUS
Status: DISCONTINUED | OUTPATIENT
Start: 2025-05-19 | End: 2025-05-22

## 2025-05-19 RX ORDER — CEFAZOLIN SODIUM 2 G/50ML
2000 SOLUTION INTRAVENOUS EVERY 8 HOURS
Status: COMPLETED | OUTPATIENT
Start: 2025-05-19 | End: 2025-05-20

## 2025-05-19 RX ORDER — POTASSIUM CHLORIDE 14.9 MG/ML
20 INJECTION INTRAVENOUS ONCE AS NEEDED
Status: DISCONTINUED | OUTPATIENT
Start: 2025-05-19 | End: 2025-05-20

## 2025-05-19 RX ORDER — FENTANYL CITRATE 50 UG/ML
INJECTION, SOLUTION INTRAMUSCULAR; INTRAVENOUS AS NEEDED
Status: DISCONTINUED | OUTPATIENT
Start: 2025-05-19 | End: 2025-05-19

## 2025-05-19 RX ORDER — BISACODYL 10 MG
10 SUPPOSITORY, RECTAL RECTAL DAILY PRN
Status: DISCONTINUED | OUTPATIENT
Start: 2025-05-19 | End: 2025-05-23 | Stop reason: HOSPADM

## 2025-05-19 RX ORDER — FENTANYL CITRATE 50 UG/ML
50 INJECTION, SOLUTION INTRAMUSCULAR; INTRAVENOUS ONCE
Status: DISCONTINUED | OUTPATIENT
Start: 2025-05-19 | End: 2025-05-20

## 2025-05-19 RX ORDER — MAGNESIUM HYDROXIDE 1200 MG/15ML
LIQUID ORAL AS NEEDED
Status: DISCONTINUED | OUTPATIENT
Start: 2025-05-19 | End: 2025-05-19 | Stop reason: HOSPADM

## 2025-05-19 RX ORDER — LIDOCAINE HCL/PF 100 MG/5ML
SYRINGE (ML) INJECTION AS NEEDED
Status: DISCONTINUED | OUTPATIENT
Start: 2025-05-19 | End: 2025-05-19

## 2025-05-19 RX ORDER — CEFAZOLIN SODIUM 1 G/3ML
INJECTION, POWDER, FOR SOLUTION INTRAMUSCULAR; INTRAVENOUS AS NEEDED
Status: DISCONTINUED | OUTPATIENT
Start: 2025-05-19 | End: 2025-05-19

## 2025-05-19 RX ORDER — POLYETHYLENE GLYCOL 3350 17 G/17G
17 POWDER, FOR SOLUTION ORAL DAILY
Status: DISCONTINUED | OUTPATIENT
Start: 2025-05-20 | End: 2025-05-23 | Stop reason: HOSPADM

## 2025-05-19 RX ORDER — HEPARIN SODIUM 1000 [USP'U]/ML
10000 INJECTION, SOLUTION INTRAVENOUS; SUBCUTANEOUS ONCE
Status: COMPLETED | OUTPATIENT
Start: 2025-05-19 | End: 2025-05-19

## 2025-05-19 RX ORDER — SODIUM CHLORIDE, SODIUM GLUCONATE, SODIUM ACETATE, POTASSIUM CHLORIDE, MAGNESIUM CHLORIDE, SODIUM PHOSPHATE, DIBASIC, AND POTASSIUM PHOSPHATE .53; .5; .37; .037; .03; .012; .00082 G/100ML; G/100ML; G/100ML; G/100ML; G/100ML; G/100ML; G/100ML
INJECTION, SOLUTION INTRAVENOUS AS NEEDED
Status: DISCONTINUED | OUTPATIENT
Start: 2025-05-19 | End: 2025-05-19

## 2025-05-19 RX ORDER — INDOMETHACIN 25 MG/1
CAPSULE ORAL AS NEEDED
Status: DISCONTINUED | OUTPATIENT
Start: 2025-05-19 | End: 2025-05-19

## 2025-05-19 RX ORDER — LIDOCAINE HYDROCHLORIDE 10 MG/ML
INJECTION, SOLUTION INFILTRATION; PERINEURAL AS NEEDED
Status: DISCONTINUED | OUTPATIENT
Start: 2025-05-19 | End: 2025-05-19

## 2025-05-19 RX ORDER — ACETAMINOPHEN 325 MG/1
975 TABLET ORAL
Status: DISCONTINUED | OUTPATIENT
Start: 2025-05-19 | End: 2025-05-23 | Stop reason: HOSPADM

## 2025-05-19 RX ORDER — LIDOCAINE HYDROCHLORIDE 10 MG/ML
INJECTION, SOLUTION EPIDURAL; INFILTRATION; INTRACAUDAL; PERINEURAL
Status: COMPLETED | OUTPATIENT
Start: 2025-05-19 | End: 2025-05-19

## 2025-05-19 RX ORDER — ETOMIDATE 2 MG/ML
INJECTION INTRAVENOUS AS NEEDED
Status: DISCONTINUED | OUTPATIENT
Start: 2025-05-19 | End: 2025-05-19

## 2025-05-19 RX ORDER — MAGNESIUM SULFATE HEPTAHYDRATE 40 MG/ML
2 INJECTION, SOLUTION INTRAVENOUS ONCE
Status: COMPLETED | OUTPATIENT
Start: 2025-05-19 | End: 2025-05-19

## 2025-05-19 RX ORDER — FONDAPARINUX SODIUM 2.5 MG/.5ML
2.5 INJECTION SUBCUTANEOUS DAILY
Status: DISCONTINUED | OUTPATIENT
Start: 2025-05-20 | End: 2025-05-23 | Stop reason: HOSPADM

## 2025-05-19 RX ORDER — PROPOFOL 10 MG/ML
INJECTION, EMULSION INTRAVENOUS CONTINUOUS PRN
Status: DISCONTINUED | OUTPATIENT
Start: 2025-05-19 | End: 2025-05-19

## 2025-05-19 RX ORDER — POTASSIUM CHLORIDE 29.8 MG/ML
40 INJECTION INTRAVENOUS ONCE AS NEEDED
Status: DISCONTINUED | OUTPATIENT
Start: 2025-05-19 | End: 2025-05-20

## 2025-05-19 RX ADMIN — PHENYLEPHRINE HYDROCHLORIDE 250 MCG: 50 INJECTION INTRAVENOUS at 15:27

## 2025-05-19 RX ADMIN — MANNITOL 25 G: 12.5 INJECTION, SOLUTION INTRAVENOUS at 13:12

## 2025-05-19 RX ADMIN — PHENYLEPHRINE HYDROCHLORIDE 30 MCG/MIN: 50 INJECTION INTRAVENOUS at 15:59

## 2025-05-19 RX ADMIN — LIDOCAINE HYDROCHLORIDE 50 MG: 10 INJECTION, SOLUTION EPIDURAL; INFILTRATION; INTRACAUDAL; PERINEURAL at 12:47

## 2025-05-19 RX ADMIN — AMIODARONE HYDROCHLORIDE 150 MG: 50 INJECTION, SOLUTION INTRAVENOUS at 16:04

## 2025-05-19 RX ADMIN — ROCURONIUM BROMIDE 50 MG: 10 INJECTION, SOLUTION INTRAVENOUS at 13:46

## 2025-05-19 RX ADMIN — PHENYLEPHRINE HYDROCHLORIDE 250 MCG: 50 INJECTION INTRAVENOUS at 16:00

## 2025-05-19 RX ADMIN — LIDOCAINE HYDROCHLORIDE 1 ML: 10 INJECTION, SOLUTION EPIDURAL; INFILTRATION; INTRACAUDAL; PERINEURAL at 12:42

## 2025-05-19 RX ADMIN — FENTANYL CITRATE 100 MCG: 0.05 INJECTION, SOLUTION INTRAMUSCULAR; INTRAVENOUS at 13:31

## 2025-05-19 RX ADMIN — BACITRACIN ZINC, NEOMYCIN, POLYMYXIN B 1 APPLICATION: 400; 3.5; 5 OINTMENT TOPICAL at 12:21

## 2025-05-19 RX ADMIN — FENTANYL CITRATE 50 MCG: 0.05 INJECTION, SOLUTION INTRAMUSCULAR; INTRAVENOUS at 12:40

## 2025-05-19 RX ADMIN — MAGNESIUM SULFATE HEPTAHYDRATE 2 G: 40 INJECTION, SOLUTION INTRAVENOUS at 17:56

## 2025-05-19 RX ADMIN — MAGNESIUM SULFATE HEPTAHYDRATE 2 G: 500 INJECTION, SOLUTION INTRAMUSCULAR; INTRAVENOUS at 15:59

## 2025-05-19 RX ADMIN — ETOMIDATE 20 MG: 2 INJECTION INTRAVENOUS at 12:47

## 2025-05-19 RX ADMIN — COLCHICINE 0.6 MG: 0.6 TABLET ORAL at 18:41

## 2025-05-19 RX ADMIN — SODIUM CHLORIDE 2 ML/HR: 9 INJECTION, SOLUTION INTRAVENOUS at 15:01

## 2025-05-19 RX ADMIN — POTASSIUM CHLORIDE 40 MEQ: 29.8 INJECTION, SOLUTION INTRAVENOUS at 19:07

## 2025-05-19 RX ADMIN — ALBUMIN (HUMAN) 12.5 G: 12.5 INJECTION, SOLUTION INTRAVENOUS at 20:02

## 2025-05-19 RX ADMIN — CALCIUM CHLORIDE 1 G: 100 INJECTION INTRAVENOUS; INTRAVENTRICULAR at 16:10

## 2025-05-19 RX ADMIN — HEPARIN SODIUM 10000 UNITS: 1000 INJECTION INTRAVENOUS; SUBCUTANEOUS at 15:04

## 2025-05-19 RX ADMIN — INSULIN LISPRO 1 UNITS: 100 INJECTION, SOLUTION INTRAVENOUS; SUBCUTANEOUS at 06:40

## 2025-05-19 RX ADMIN — MIDAZOLAM 2 MG: 1 INJECTION INTRAMUSCULAR; INTRAVENOUS at 12:28

## 2025-05-19 RX ADMIN — PROTAMINE SULFATE 200 MG: 10 INJECTION, SOLUTION INTRAVENOUS at 16:15

## 2025-05-19 RX ADMIN — PHENYLEPHRINE HYDROCHLORIDE 250 MCG: 50 INJECTION INTRAVENOUS at 15:57

## 2025-05-19 RX ADMIN — PHENYLEPHRINE HYDROCHLORIDE 250 MCG: 50 INJECTION INTRAVENOUS at 15:26

## 2025-05-19 RX ADMIN — MUPIROCIN 1 APPLICATION: 20 OINTMENT TOPICAL at 21:35

## 2025-05-19 RX ADMIN — NICARDIPINE HYDROCHLORIDE 3 MG/HR: 25 INJECTION, SOLUTION INTRAVENOUS at 13:30

## 2025-05-19 RX ADMIN — ACETAMINOPHEN 975 MG: 325 TABLET ORAL at 21:34

## 2025-05-19 RX ADMIN — PHENYLEPHRINE HYDROCHLORIDE 250 MCG: 50 INJECTION INTRAVENOUS at 15:40

## 2025-05-19 RX ADMIN — EPHEDRINE SULFATE 5 MG: 50 INJECTION INTRAVENOUS at 12:53

## 2025-05-19 RX ADMIN — FENTANYL CITRATE 250 MCG: 0.05 INJECTION, SOLUTION INTRAMUSCULAR; INTRAVENOUS at 14:58

## 2025-05-19 RX ADMIN — GLYCOPYRROLATE 0.4 MG: 0.2 INJECTION, SOLUTION INTRAMUSCULAR; INTRAVENOUS at 17:18

## 2025-05-19 RX ADMIN — NEOSTIGMINE METHYLSULFATE 3 MG: 1 INJECTION INTRAVENOUS at 17:18

## 2025-05-19 RX ADMIN — ASPIRIN 325 MG ORAL TABLET 325 MG: 325 PILL ORAL at 18:40

## 2025-05-19 RX ADMIN — Medication 1 EACH: at 13:11

## 2025-05-19 RX ADMIN — CHLORHEXIDINE GLUCONATE 15 ML: 1.2 SOLUTION ORAL at 08:37

## 2025-05-19 RX ADMIN — SODIUM CHLORIDE: 0.9 INJECTION, SOLUTION INTRAVENOUS at 13:22

## 2025-05-19 RX ADMIN — GABAPENTIN 600 MG: 300 CAPSULE ORAL at 08:37

## 2025-05-19 RX ADMIN — EPHEDRINE SULFATE 5 MG: 50 INJECTION INTRAVENOUS at 13:05

## 2025-05-19 RX ADMIN — SODIUM CHLORIDE, SODIUM LACTATE, POTASSIUM CHLORIDE, AND CALCIUM CHLORIDE: .6; .31; .03; .02 INJECTION, SOLUTION INTRAVENOUS at 16:33

## 2025-05-19 RX ADMIN — MIDAZOLAM 1 MG: 1 INJECTION INTRAMUSCULAR; INTRAVENOUS at 13:45

## 2025-05-19 RX ADMIN — SODIUM CHLORIDE 20 ML/HR: 0.45 INJECTION, SOLUTION INTRAVENOUS at 18:16

## 2025-05-19 RX ADMIN — SODIUM CHLORIDE, SODIUM LACTATE, POTASSIUM CHLORIDE, AND CALCIUM CHLORIDE 500 ML: .6; .31; .03; .02 INJECTION, SOLUTION INTRAVENOUS at 18:07

## 2025-05-19 RX ADMIN — ROCURONIUM BROMIDE 50 MG: 10 INJECTION, SOLUTION INTRAVENOUS at 12:47

## 2025-05-19 RX ADMIN — EPINEPHRINE 2 MCG/MIN: 1 INJECTION INTRAMUSCULAR; INTRAVENOUS; SUBCUTANEOUS at 15:59

## 2025-05-19 RX ADMIN — FENTANYL CITRATE 100 MCG: 0.05 INJECTION, SOLUTION INTRAMUSCULAR; INTRAVENOUS at 12:49

## 2025-05-19 RX ADMIN — PHENYLEPHRINE HYDROCHLORIDE 500 MCG: 50 INJECTION INTRAVENOUS at 16:07

## 2025-05-19 RX ADMIN — AMLODIPINE BESYLATE 5 MG: 5 TABLET ORAL at 08:37

## 2025-05-19 RX ADMIN — MIDAZOLAM 3 MG: 1 INJECTION INTRAMUSCULAR; INTRAVENOUS at 16:14

## 2025-05-19 RX ADMIN — CEFAZOLIN SODIUM 2000 MG: 2 SOLUTION INTRAVENOUS at 19:23

## 2025-05-19 RX ADMIN — AMINOCAPROIC ACID 5 G: 250 INJECTION, SOLUTION INTRAVENOUS at 13:36

## 2025-05-19 RX ADMIN — Medication 750 ML: at 15:26

## 2025-05-19 RX ADMIN — METOPROLOL TARTRATE 50 MG: 50 TABLET, FILM COATED ORAL at 08:37

## 2025-05-19 RX ADMIN — COLCHICINE 0.6 MG: 0.6 TABLET ORAL at 08:37

## 2025-05-19 RX ADMIN — HEPARIN SODIUM 40400 UNITS: 1000 INJECTION INTRAVENOUS; SUBCUTANEOUS at 14:50

## 2025-05-19 RX ADMIN — Medication 12.5 MG: at 05:28

## 2025-05-19 RX ADMIN — SODIUM CHLORIDE, SODIUM GLUCONATE, SODIUM ACETATE, POTASSIUM CHLORIDE, MAGNESIUM CHLORIDE, SODIUM PHOSPHATE, DIBASIC, AND POTASSIUM PHOSPHATE 500 ML: .53; .5; .37; .037; .03; .012; .00082 INJECTION, SOLUTION INTRAVENOUS at 13:11

## 2025-05-19 RX ADMIN — FENTANYL CITRATE 250 MCG: 0.05 INJECTION, SOLUTION INTRAMUSCULAR; INTRAVENOUS at 13:45

## 2025-05-19 RX ADMIN — FENTANYL CITRATE 250 MCG: 0.05 INJECTION, SOLUTION INTRAMUSCULAR; INTRAVENOUS at 14:59

## 2025-05-19 RX ADMIN — AMIODARONE HYDROCHLORIDE 200 MG: 200 TABLET ORAL at 21:34

## 2025-05-19 RX ADMIN — ALBUMIN (HUMAN) 12.5 G: 12.5 INJECTION, SOLUTION INTRAVENOUS at 23:11

## 2025-05-19 RX ADMIN — HEPARIN SODIUM 10000 UNITS: 1000 INJECTION INTRAVENOUS; SUBCUTANEOUS at 13:12

## 2025-05-19 RX ADMIN — ASPIRIN 81 MG CHEWABLE TABLET 81 MG: 81 TABLET CHEWABLE at 08:37

## 2025-05-19 RX ADMIN — MUPIROCIN 1 APPLICATION: 20 OINTMENT TOPICAL at 08:37

## 2025-05-19 RX ADMIN — LIDOCAINE HYDROCHLORIDE 100 MG: 20 INJECTION INTRAVENOUS at 16:07

## 2025-05-19 RX ADMIN — FENTANYL CITRATE 250 MCG: 0.05 INJECTION, SOLUTION INTRAMUSCULAR; INTRAVENOUS at 16:14

## 2025-05-19 RX ADMIN — PROPOFOL 50 MCG/KG/MIN: 10 INJECTION, EMULSION INTRAVENOUS at 14:16

## 2025-05-19 RX ADMIN — CEFAZOLIN 2000 MG: 1 INJECTION, POWDER, FOR SOLUTION INTRAMUSCULAR; INTRAVENOUS at 13:33

## 2025-05-19 RX ADMIN — SODIUM CHLORIDE, SODIUM LACTATE, POTASSIUM CHLORIDE, AND CALCIUM CHLORIDE: .6; .31; .03; .02 INJECTION, SOLUTION INTRAVENOUS at 12:19

## 2025-05-19 RX ADMIN — PHENYLEPHRINE HYDROCHLORIDE 250 MCG: 50 INJECTION INTRAVENOUS at 15:45

## 2025-05-19 RX ADMIN — SODIUM BICARBONATE 50 MEQ: 84 INJECTION, SOLUTION INTRAVENOUS at 13:12

## 2025-05-19 NOTE — PLAN OF CARE
Problem: PAIN - ADULT  Goal: Verbalizes/displays adequate comfort level or baseline comfort level  Description: Interventions:  - Encourage patient to monitor pain and request assistance  - Assess pain using appropriate pain scale  - Administer analgesics as ordered based on type and severity of pain and evaluate response  - Implement non-pharmacological measures as appropriate and evaluate response  - Consider cultural and social influences on pain and pain management  - Notify physician/advanced practitioner if interventions unsuccessful or patient reports new pain  - Educate patient/family on pain management process including their role and importance of  reporting pain   - Provide non-pharmacologic/complimentary pain relief interventions  Outcome: Progressing     Problem: SAFETY ADULT  Goal: Maintain or return to baseline ADL function  Description: INTERVENTIONS:  -  Assess patient's ability to carry out ADLs; assess patient's baseline for ADL function and identify physical deficits which impact ability to perform ADLs (bathing, care of mouth/teeth, toileting, grooming, dressing, etc.)  - Assess/evaluate cause of self-care deficits   - Assess range of motion  - Assess patient's mobility; develop plan if impaired  - Assess patient's need for assistive devices and provide as appropriate  - Encourage maximum independence but intervene and supervise when necessary  - Involve family in performance of ADLs  - Assess for home care needs following discharge   - Consider OT consult to assist with ADL evaluation and planning for discharge  - Provide patient education as appropriate  - Monitor functional capacity and physical performance, use of AM PAC & JH-HLM   - Monitor gait, balance and fatigue with ambulation    Outcome: Progressing     Problem: Knowledge Deficit  Goal: Patient/family/caregiver demonstrates understanding of disease process, treatment plan, medications, and discharge instructions  Description:  Complete learning assessment and assess knowledge base.  Interventions:  - Provide teaching at level of understanding  - Provide teaching via preferred learning methods  Outcome: Progressing

## 2025-05-19 NOTE — ASSESSMENT & PLAN NOTE
Multiple acute to subacute lacunar infarcts 1/2025. Continue to follow with cardiology outpatient given concerns for possible cardioembolic etiology. Recommend establishing with outpatient neurology.   Continue aspirin, high intensity statin as prescribed

## 2025-05-19 NOTE — CONSULTS
Consultation - Critical Care/ICU   Name: Tc Stovall 75 y.o. male I MRN: [id number]  Unit/Bed#: PPHP-314-01 I Date of Admission: 5/15/2025   Date of Service: 5/19/2025 I Hospital Day: 4   Consults  Physician Requesting Evaluation: SHREE Polo MD   Reason for Evaluation / Principal Problem:         Assessment & Plan   Impressions:  MVCAD s/p CABG x 2 (LIMA > LAD, SVG > ramus intermedius)  NSTEMI  HTN  HLD  CVA  DM2    Neuro:   Discontinue continuous sedation, precedex to facilitate extubation  ATC tylenol, PRN oxycodone for pain  Trend neuro exam  Delirium precautions    CV:   Goals:   Cardiac Surgery Hemodynamic Monitoring goals: MAP goal >65 CI >2.2 Continue pulmonary artery catheter for hemodynamic monitoring  Continue central line due to vasoactive medications Continue arterial line for blood pressure monitoring and/or frequent blood gas draws  Volume resuscitation as needed.   Epicardial pacing wire plan : Epicardial pacing wires in place. Will pace as needed for bradycardia or cardiac output  Maintain for pacing needs  Monitor rhythm on telemetry.    Amiodarone 200mg q8hrs  Wean phenylephrine to target MAP > 65  Continue atorvastatin 80mg dialy   Continue ASA 325mg daily        Lung:   Check STAT post-op ABG and CXR  Wean vent with spontaneous breathing trial with goal to extubate today  Incentive spirometry once extubated    GI:   GI prophylaxis with PPI  Bowel regimen  Zofran PRN for nausea.     FEN:   NPO Replenish K >4.0, mag >2.0 and calcium >7.0.     :   Check STAT post-op BMP  Koehler in place.   Monitor UOP with goal >0.5cc/kg/hour.  Lasix versus volume resuscitate as needed depending on hemodynamics and volume status.    ID:   Prophylactic post-op abx. Maintain normothermia. Trend temps.     Heme:   Check STAT post-op H/H and platelets.  Monitor incision site, invasive lines, and chest tube outputs for bleeding. Send coag panel if needed.  Arixtra DVT ppx    Endo:   Insulin gtt for blood  sugar control.     Disposition: ICU Care     History of Present Illness   HPI: Tc Stovall is a 76 y/o F hx HTN, HLD, CVA, DM2 who presented 5/14 to [facility] with CP and was admitted for NSTEMI with troponin peak > 22k.  Cath revealed MVCAD (LAD, circumflex, RPDA, and ramus) and echo showed LVEF 52%. He was subsequently [facility]. He presents 5/19 s/p CABG x 2 (LIMA > LAD, SVG > ramus intermedius).      Intra-op ARSEN LVEF 50%  Post-op medications: Critical Care Infusions : Neosynephrine 30 mcg/min    ROS unable to be obtained due to patient being intubated and sedated.   History obtained from chart review due to patient being intubated and sedated.   Historical Information   Past Medical History:  No date: Arthritis  No date: Cerebellar infarction (HCC)  No date: Cerebrovascular accident (CVA) due to embolism of cerebral   artery (HCC)  No date: Diabetes mellitus (HCC)  No date: Hyperlipidemia  No date: Hypertension  No date: Left rotator cuff tear  [id number]: Stroke (HCC)  No date: Tobacco abuse  No date: Wears glasses Past Surgical History:  5/15/2025: CARDIAC CATHETERIZATION; N/A      Comment:  Procedure: Cardiac Catheterization;  Surgeon: Mo Huertas MD;  Location: [facility] CARDIAC [facility];  Service:                Cardiology  5/15/2025: CARDIAC CATHETERIZATION; N/A      Comment:  Procedure: Cardiac Coronary Angiogram;  Surgeon: Mo Huertas MD;  Location: [facility];  Service:               Cardiology  5/15/2025: CARDIAC CATHETERIZATION; Left      Comment:  Procedure: Cardiac Left Heart Cath;  Surgeon: Mo Huertas MD;  Location: [facility]ARDI[facility];  Service:                Cardiology  No date: COLONOSCOPY  No date: KNEE SURGERY  08/10/2022: CA SURGICAL ARTHROSCOPY SHOULDER W/ROTATOR CUFF RPR; Left      Comment:  Procedure: Left Shoulder Arthroscopy, supraspinatus and                Subscapularis Repair, Bicep  Tenotomy, acromioplasty,                extensive debridement;  Surgeon: Otilio Mendez DO;                 Location: [facility] MAIN [facility];  Service: Orthopedics  1955: TONSILLECTOMY   Current Outpatient Medications   Medication Instructions    acetaminophen (TYLENOL) 500 mg, Every 6 hours PRN    amLODIPine (NORVASC) 5 mg, Oral, Daily    aspirin 81 mg, Oral, Daily    atorvastatin (LIPITOR) 40 mg, Oral, Every evening    Blood Glucose Monitoring Suppl (OneTouch Verio Reflect) w/Device KIT Check blood sugars three times daily. Please substitute with appropriate alternative as covered by patient's insurance. Dx: E11.65    cholecalciferol (VITAMIN D3) 1,000 Units, Oral, Daily    Continuous Glucose Sensor (Dexcom G7 Sensor) 1 Device, Does not apply, Every 10 days    fluticasone (FLONASE) 50 mcg/act nasal spray 1 spray, Nasal, Daily    gabapentin (NEURONTIN) 600 mg, Oral, 2 times daily    glipiZIDE (GLUCOTROL XL) 10 mg, Oral, Daily    glucose blood (OneTouch Verio) test strip Check blood sugars three times daily. Please substitute with appropriate alternative as covered by patient's insurance. Dx: E11.65    insulin glargine (LANTUS) 20 Units, Subcutaneous, Daily at bedtime    Insulin Pen Needle (CareOne Unifine Pentips Plus) 32G X 4 MM MISC USE DAILY AS INSTRUCTED WITH INSULIN PEN    lisinopril (ZESTRIL) 20 mg, Oral, Daily    meloxicam (MOBIC) 15 mg, Oral, Daily PRN    metFORMIN (GLUCOPHAGE-XR) 1,000 mg, Oral, 2 times daily    OneTouch Delica Lancets 33G MISC Check blood sugars three times daily. Please substitute with appropriate alternative as covered by patient's insurance. Dx: E11.65    Allergies[1]   Social History[2] Family History   Problem Relation Age of Onset    Alzheimer's disease Mother     Diabetes Mother     Heart block Father     Heart disease Father             Objective  :     Vitals: Invasive Monitoring      /90   Pulse 86   Resp 15   O2 Sat 100 %   O2 Device Ventilator   Temp 97.5 °F (36.4 °C)    Arterial  Line  Kimberly /61  Arterial Line BP  Min: 127/61  Max: 127/61   MAP 83 mmHg  Arterial Line MAP (mmHg)  Min: 83 mmHg  Max: 83 mmHg     PA Catheter   Most Recent  Min/Max in 24hrs    PAP 33/19 PAP  Min: 33/19  Max: 33/19   CVP 13 mmHg CVP (mean)  Min: 13 mmHg  Max: 13 mmHg   CI 2.2 L/min/m2  CI (L/min/m2)  Min: 2.2 L/min/m2  Max: 2.2 L/min/m2   SVR 1319 (dyne*sec)/cm5 SVR (dyne*sec)/cm5  Min: 1319 (dyne*sec)/cm5  Max: 1319 (dyne*sec)/cm5          Physical Exam   Physical Exam  Skin:     General: Skin is warm and dry.      Capillary Refill: Capillary refill takes less than 2 seconds.   HENT:      Head: Normocephalic and atraumatic.   Neck:      Vascular: Central line present.   Cardiovascular:      Rate and Rhythm: Normal rate and regular rhythm.      Pulses: Normal pulses.      Heart sounds: Normal heart sounds.   Musculoskeletal:      Right lower leg: No edema.      Left lower leg: No edema.   Abdominal: General: There is no distension.      Palpations: Abdomen is soft.   Constitutional:       Appearance: He is well-developed and well-nourished.   Pulmonary:      Breath sounds: Normal breath sounds.      Comments: Mechanical breath sounds, mediastinal and pleural chest tubes in place  Neurological:      Comments: Sedated   Genitourinary/Anorectal:  Koehler present.        Diagnostic Studies      05/19/25 CXR: ETT appropriately positioned, lines in appropriate position, no PTX, no effusion   This was personally reviewed by myself in PACS.  05/19/25 EKG: NSR w/ first degree AV block rate 80s.   This was personally reviewed by myself.     Medications:  Scheduled PRN   acetaminophen, 975 mg, Q6H While awake  amiodarone, 200 mg, Q8H LEONELA  [START ON 5/20/2025] aspirin, 325 mg, Daily  atorvastatin, 80 mg, Daily With Dinner  cefazolin, 2,000 mg, Q8H  chlorhexidine, 15 mL, BID  [START ON 5/20/2025] cholecalciferol, 1,000 Units, Daily  colchicine, 0.6 mg, BID  fentaNYL, 50 mcg, Once  [START ON 5/20/2025] fluticasone, 1 spray,  Daily  [START ON 5/20/2025] fondaparinux, 2.5 mg, Daily  gabapentin, 600 mg, BID  magnesium sulfate, 2 g, Once  mupirocin, 1 Application, Q12H LEONELA  [START ON 5/20/2025] pantoprazole, 40 mg, Daily  [START ON 5/20/2025] polyethylene glycol, 17 g, Daily      acetaminophen, 650 mg, Q4H PRN  bisacodyl, 10 mg, Daily PRN  calcium gluconate, 2 g, Once PRN  fentaNYL, 50 mcg, Q1H PRN  HYDROmorphone, 0.5 mg, Q2H PRN  lactated ringers, 500 mL, Once PRN  ondansetron, 4 mg, Q6H PRN  oxyCODONE, 2.5 mg, Q4H PRN   Or  oxyCODONE, 5 mg, Q4H PRN  potassium chloride, 20 mEq, Once PRN  potassium chloride, 40 mEq, Once PRN  potassium chloride, 40 mEq, Once PRN       Continuous    EPINEPHrine 5,000 mcg (STANDARD CONCENTRATION) IV in sodium chloride 0.9% 250 mL, 1-10 mcg/min, Last Rate: Stopped (05/19/25 1614)  insulin regular (HumuLIN R,NovoLIN R) 1 Units/mL in sodium chloride 0.9 % 100 mL infusion, 0.3-21 Units/hr, Last Rate: 4 Units/hr (05/19/25 1715)  insulin regular 100 units in sodium chloride 0.9% 100 mL, 100 Units, Last Rate: 4 mL/hr (05/19/25 1609)  niCARdipine (CARDENE) 25 mg (STANDARD CONCENTRATION) in sodium chloride 0.9% 250 mL, 1-15 mg/hr, Last Rate: Stopped (05/19/25 1505)  niCARdipine, 2.5-15 mg/hr  phenylephrine (DORETHA-SYNEPHRINE) 50 mg (STANDARD CONCENTRATION) in sodium chloride 0.9% 250 mL,  mcg/min, Last Rate: 20 mcg/min (05/19/25 1753)  phenylephine,  mcg/min  sodium chloride, 20 mL/hr         Labs:  CBC  Recent Labs     05/18/25  0503 05/19/25  0453 05/19/25  1252 05/19/25  1536 05/19/25  1558 05/19/25  1720 05/19/25  1737   WBC 11.43* 9.66  --   --   --   --   --    HGB 10.7* 11.5*   < >  --    < > 10.9* 12.6   HCT 33.0* 34.5*   < >  --    < > 32.7* 37    254  --  197  --  238  --     < > = values in this interval not displayed.     BMP  Recent Labs     05/19/25  0453 05/19/25  1252 05/19/25  1720 05/19/25  1737   SODIUM 140  --  139  --    K 3.8  --  3.7  --      --  109*  --    CO2 24   < > 23  23   AGAP 11  --  7  --    BUN 22  --  19  --    CREATININE 0.95  --  0.99  --    CALCIUM 8.7  --  8.1*  --     < > = values in this interval not displayed.       Coags  Recent Labs     05/18/25  1120 05/19/25  0453   PTT 72* 87*        Additional Electrolytes  Recent Labs     05/19/25  1626 05/19/25  1737   CAIONIZED 1.21 1.17          Blood Gas  No recent results  No recent results LFTs  No recent results    Infectious  No recent results     No recent results Glucose  Recent Labs     05/18/25  0503 05/19/25  0453 05/19/25  1720   GLUC 186* 160* 174*        Invasive lines and devices:  Invasive Devices       Central Venous Catheter Line  Duration             CVC Central Lines 05/19/25 <1 day    Introducer 05/19/25 <1 day              Peripheral Intravenous Line  Duration             Peripheral IV 05/16/25 Dorsal (posterior);Left Forearm 2 days    Peripheral IV 05/19/25 Right Antecubital <1 day              Arterial Line  Duration             Arterial Line 05/19/25 Right Radial <1 day              Line  Duration             Pacer Wires <1 day    Pacer Wires <1 day              Drain  Duration             Chest Tube 1 Left Pleural 32 Fr. <1 day    Chest Tube 2 Posterior;Mediastinal 32 Fr. <1 day    Chest Tube 3 Mediastinal;Anterior 32 Fr. <1 day    Urethral Catheter Non-latex 16 Fr. <1 day              Airway  Duration             ETT  Hi-Lo;Cuffed;Oral 8 mm <1 day                       [1] No Known Allergies  [2]   Social History  Tobacco Use    Smoking status: Every Day     Current packs/day: 0.50     Average packs/day: 0.6 packs/day for 85.9 years (55.3 ttl pk-yrs)     Types: Cigarettes     Start date: 4/26/1964    Smokeless tobacco: Never   Vaping Use    Vaping status: Never Used   Substance Use Topics    Alcohol use: Yes     Comment: rarely    Drug use: No

## 2025-05-19 NOTE — PROGRESS NOTES
"Progress Note - Cardiology   Name: Tc Stovall 75 y.o. male I MRN: [id number]  Unit/Bed#: PPHP-320-01 I Date of Admission: 5/15/2025   Date of Service: 2025 I Hospital Day: 4     Assessment & Plan  NSTEMI (non-ST elevated myocardial infarction) (HCC)  I personally reviewed patient's left heart cath from 5/15/2025.  He had severe multivessel coronary artery disease with significant stenosis in his ramus intermedius, first diagonal (culprit), mid LAD, mid to distal circumflex, RPDA.  I reviewed his follow-up transthoracic echocardiogram from 2025 showing low normal LVEF of approximately 52% without regional wall motion abnormalities.  Plan for CABG today  Continue aspirin, high intensity statin, beta-blocker, IV heparin as prescribed.  Continue amlodipine for antianginal.  Continue colchicine for possible pericardial inflammation post MI  CAD, multiple vessel  As above  Type 2 diabetes mellitus (HCC)  Per primary  SGLT2 and GLP-1 are cost prohibitive  Primary hypertension  Continue amlodipine, metoprolol as prescribed  Hyperlipidemia  Continue atorvastatin as prescribed  History of CVA (cerebrovascular accident)  Multiple acute to subacute lacunar infarcts 2025. Continue to follow with cardiology outpatient given concerns for possible cardioembolic etiology. Recommend establishing with outpatient neurology.   Continue aspirin, high intensity statin as prescribed  Paroxysmal atrial tachycardia (HCC)  Continue beta blocker  Tobacco use  Smoking cessation recommended    -----        Subjective:     Overnight events reviewed.   No events.     Review of Systems   Cardiovascular:  Negative for chest pain.        Current Medications[1]      Objective:     Vitals:   Blood pressure 128/63, pulse 62, temperature 97.9 °F (36.6 °C), resp. rate 16, height 5' 7\" (1.702 m), weight 99.4 kg (219 lb 2.2 oz), SpO2 97%.  Body mass index is 34.32 kg/m².  Systolic (24hrs), Av , Min:118 , Max:149     Diastolic (24hrs), " Av, Min:57, Max:96      Intake/Output Summary (Last 24 hours) at 2025 1048  Last data filed at 2025 0800  Gross per 24 hour   Intake 1252.4 ml   Output 2075 ml   Net -822.6 ml     Weight (last 2 days)       Date/Time Weight    25 0633 99.4 (219.14)              Physical Exam  Vitals reviewed.   Constitutional:       General: He is not in acute distress.     Appearance: Normal appearance. He is well-developed.   HENT:      Head: Normocephalic and atraumatic.     Cardiovascular:      Rate and Rhythm: Normal rate and regular rhythm.      Heart sounds: No murmur heard.  Pulmonary:      Effort: Pulmonary effort is normal. No respiratory distress.      Breath sounds: Normal breath sounds.   Abdominal:      General: There is no distension.     Musculoskeletal:         General: No swelling.     Neurological:      Mental Status: He is alert.     Psychiatric:         Mood and Affect: Mood normal.           Labs & Results:    Lab Results   Component Value Date    SODIUM 140 2025    K 3.8 2025     2025    CO2 24 2025    BUN 22 2025    CREATININE 0.95 2025    GLUC 160 (H) 2025    CALCIUM 8.7 2025     Lab Results   Component Value Date    WBC 9.66 2025    RBC 3.92 2025    HGB 11.5 (L) 2025    HCT 34.5 (L) 2025    MCV 88 2025    MCH 29.3 2025    RDW 12.5 2025     2025     Results from last 7 days   Lab Units 25  0453 25  1120 25  0503 25  2353 25  1724   PTT seconds 87* 72* 62*   < > 34   INR   --   --   --   --  1.00    < > = values in this interval not displayed.       Available cardiology studies, imaging and lab results independently reviewed today.    Jade Márquez MD, PhD  Cardiology  [facility]    This note was completed in part utilizing voice recognition software.   Grammatical errors, random word insertion, spelling mistakes,  "occasional wrong word or \"sound-alike\" substitutions and incomplete sentences may be an occasional consequence of the system secondary to software limitations, ambient noise and hardware issues. At the time of dictation, efforts were made to edit, clarify and /or correct errors.  Please read the chart carefully and recognize, using context, where substitutions have occurred.  If you have any questions or concerns about the context, text or information contained within the body of this dictation, please contact myself, the provider, for further clarification.          [1]   Current Facility-Administered Medications:     acetaminophen (TYLENOL) tablet 975 mg, 975 mg, Oral, Q8H PRN, Sahu Ragab, DO, 975 mg at 05/16/25 2152    albuterol inhalation solution 2.5 mg, 2.5 mg, Nebulization, Q6H PRN, Moreno Fischer MD    amLODIPine (NORVASC) tablet 5 mg, 5 mg, Oral, Daily, Ji Bethea, DO, 5 mg at 05/19/25 0837    aspirin chewable tablet 81 mg, 81 mg, Oral, Daily, TRICIA Schafer-C, 81 mg at 05/19/25 0837    atorvastatin (LIPITOR) tablet 40 mg, 40 mg, Oral, QPM, TRICIA Schafer-C, 40 mg at 05/18/25 1700    ceFAZolin (ANCEF) IVPB (premix in dextrose) 2,000 mg 50 mL, 2,000 mg, Intravenous, Once, Laurie Ybarra PA-C    chlorhexidine (PERIDEX) 0.12 % oral rinse 15 mL, 15 mL, Swish & Spit, Q12H LEONELA, TRICIA Stein-C, 15 mL at 05/19/25 0837    colchicine (COLCRYS) tablet 0.6 mg, 0.6 mg, Oral, BID, Nic Alvarado MD, 0.6 mg at 05/19/25 0837    gabapentin (NEURONTIN) capsule 600 mg, 600 mg, Oral, BID, Fatuma Aparicio PA-C, 600 mg at 05/19/25 0837    heparin (porcine) injection 2,000 Units, 2,000 Units, Intravenous, Q6H PRN, Fatuma Aparicio PA-C, 2,000 Units at 05/17/25 2159    heparin (porcine) injection 4,000 Units, 4,000 Units, Intravenous, Q6H PRN, Fatuma Aparicio PA-C    insulin glargine (LANTUS) subcutaneous injection 25 Units 0.25 mL, 25 Units, Subcutaneous, HS, Kp Kinsey MD, 25 Units at " 05/18/25 2123    insulin lispro (HumALOG/ADMELOG) 100 units/mL subcutaneous injection 1-6 Units, 1-6 Units, Subcutaneous, TID AC, 1 Units at 05/19/25 0640 **AND** Fingerstick Glucose (POCT), , , TID AC, Fatuma Aparicio PA-C    labetalol (NORMODYNE) injection 10 mg, 10 mg, Intravenous, Q6H PRN, Fatuma Aparicio PA-C    metoprolol tartrate (LOPRESSOR) tablet 50 mg, 50 mg, Oral, Q12H LEONELA, Ji Bethea DO, 50 mg at 05/19/25 0837    mupirocin (BACTROBAN) 2 % nasal ointment, , Nasal, Q12H LEONELA, Laurie Ybarra PA-C, 1 Application at 05/19/25 0837    nitroglycerin (NITROSTAT) SL tablet 0.4 mg, 0.4 mg, Sublingual, Q5 Min PRN, Jessi Keller MD, 0.4 mg at 05/16/25 1703    nitroGLYcerin (TRIDIL) 50 mg in 250 ml infusion (premix), 5-200 mcg/min, Intravenous, Titrated, Richmond Blood MD, Held at 05/17/25 0020

## 2025-05-19 NOTE — ANESTHESIA PROCEDURE NOTES
Introducer/[place]ina    Performed by: Carolin Michel MD  Authorized by: Carolin Michel MD    Date/Time: 5/19/2025 1:22 PM  Consent: Written consent obtained  Risks and benefits: risks, benefits and alternatives were discussed  Consent given by: patient  Patient understanding: patient states understanding of the procedure being performed  Required items: required blood products, implants, devices, and special equipment available  Patient identity confirmed: arm band  Indications: vascular access and central pressure monitoring  Location details: right internal jugular  Catheter size: 7 Fr  Patient position: Trendelenburg  Anesthesia method: under gA.    Sedation:  Patient sedated: under GA.    Assessment: blood return through all ports and free fluid flow  Preparation: skin prepped with ChloraPrep  Sterile barriers: all five maximum sterile barriers used - cap, mask, sterile gown, sterile gloves, and large sterile sheet  Hand hygiene: hand hygiene performed prior to central venous catheter insertion  Ultrasound guidance: yes  ultrasound permanent image saved  Pre-procedure: landmarks identified  Number of attempts: 1  Successful placement: yes  Post-procedure: line sutured and dressing applied  Patient tolerance: patient tolerated the procedure well with no immediate complications  Comments: Ultrasound guidance  Ultrasound pic in MEDIA section of Epic  Performed postinduction to anesthesia [id number]

## 2025-05-19 NOTE — INTERVAL H&P NOTE
Vitals:    05/19/25 0711   BP: 144/74   Pulse: 56   Resp:    Temp: 97.9 °F (36.6 °C)   SpO2: 97%         H&P reviewed. After examining the patient I find no changes in the patients condition since the H&P was completed.    Plan for CABG.    Preoperative Beta Blocker: indicated.    Anticipated Length of Stay: Patient will be admitted on an inpatient basis with an anticipated length of stay of grater than 2 midnights.  Justification for Hospital StaY: Post surgical recovery following open heart surgery.      SHREE Polo MD  05/19/25  7:32 AM

## 2025-05-19 NOTE — ANESTHESIA POSTPROCEDURE EVALUATION
Post-Op Assessment Note    CV Status:  Stable  Pain scale: intubated, sedated.    Pain control: intubated, sedated.       Post-procedure mental status: intubated, sedated.   Hydration Status:  Stable   PONV status: intubated, sedated.   Airway patency: intubated, sedated.  Intubated: intubated, sedated.     Post Op Vitals Reviewed: Yes    No anethesia notable event occurred.    Staff: Anesthesiologist     Reason for prolonged intubation > 24 hours:  Trasnsferred to CICU on phenylephrine and insulin gtts. VSS.      Last Filed PACU Vitals:  Vitals Value Taken Time   Temp     Pulse 90 05/19/25 17:17   BP     Resp 12 05/19/25 17:17   SpO2 100 % 05/19/25 17:17   Vitals shown include unfiled device data.

## 2025-05-19 NOTE — OP NOTE
OPERATIVE REPORT  PATIENT NAME: Tc Stovall    :  1950  MRN: [id number]  Pt Location: [facility] OR ROOM 16    SURGERY DATE: 2025    SURGEON: SHREE Polo MD    ASSISTANT: Nay Sahu PA-C    ADDITIONAL ASSISTANT: None    PREOPERATIVE DIAGNOSIS:  Multivessel coronary artery disease    POSTOPERATIVE DIAGNOSIS:  Multivessel coronary artery disease    NYHA Class: 4    CCS Class: 4    PROCEDURE: Coronary artery bypass grafting x 2 with left internal mammary artery to left anterior descending, saphenous vein graft to ramus intermedius.     ANESTHESIA: General endotracheal anesthesia with transesophageal echocardiogram guidance, Dr. Carolin Michel     CARDIOPULMONARY BYPASS TIME: 49 minutes.     CROSSCLAMP TIME: 41 minutes.    PACKS/TUBES/DRAINS: Chest tubes x 3.     MATERIALS: Pacing wires: A x1. V x1.     TRANSFUSION: None.     SPECIMENS: None.     ESTIMATED BLOOD LOSS: 200 mL    OPERATIVE TECHNIQUE:    The patient was taken to the operating room and placed supine on the operating table. Following the satisfactory induction of general anesthesia and placement of monitoring lines, the patient was prepped and draped in the usual sterile fashion. A time-out procedure was performed.    The patient underwent median sternotomy, LIMA harvest, endoscopic left greater saphenous vein harvest, systemic heparinization and conduit preparation. The patient underwent pericardiotomy and epiaortic ultrasound was used to evaluate the ascending aorta, which was found to be free of significant atheromatous disease. The patient underwent aortic and right atrial cannulation and was initiated on bypass. The ascending aorta was crossclamped. Antegrade [place] N[place] cardioplegia was delivered with an excellent arrest.    The PDA was known in advance not to be a suitable bypass target due to a high grade stenotic lesion in a distal location.  The OM2 was evaluated but was too small for grafting. The saphenous vein was anastomosed to  the ramus intermedius in end-to-side fashion using running 7-0 Prolene suture. The quality of the graft was excellent. The left internal mammary artery was anastomosed to the left anterior descending in end-to-side fashion using running 7-0 Prolene suture. A total of 1 proximal anastomosis was completed on the ascending aorta in end-to-side fashion using running 5-0 Prolene suture. The heart was de-aired and the crossclamp was removed. Atrial and ventricular pacing wires were placed. Following a period of reperfusion, the patient was weaned from cardiopulmonary bypass and decannulated. Protamine was administered with normalization of the ACT. Hemostasis was confirmed in all fields. Thoracostomy tubes were placed. The sternum was closed with fiber tapes. The fascia, subdermis and skin were closed with multiple layers of running absorbable suture.    As the attending surgeon, I was present and scrubbed for all critical portions of this procedure. Sponge, needle and instrument counts were reported as correct by the nursing staff. There is no cardiac residency program at this facility and for complex procedures such as this, it is vital to have a physician assistant experienced in cardiac surgery.  The assistance of Nya Sahu PA-C was necessary for endoscopic saphenous vein harvesting, retraction of the heart and coronary conduit with proper tissue handling techniques, and following of the suture with correct tension during construction of the proximal and distal coronary anastomoses.    Final transesophageal echocardiogram demonstrated improved LV function.        SIGNATURE: SHREE Polo MD  DATE: May 19, 2025  TIME: 5:02 PM   ab

## 2025-05-19 NOTE — ANESTHESIA PROCEDURE NOTES
Central Line Insertion    Performed by: Carolin Michel MD  Authorized by: Carolin Michel MD    Date/Time: 5/19/2025 1:22 PM  Catheter Type:  triple lumen  Consent: Written consent obtained  Risks and benefits: risks, benefits and alternatives were discussed  Consent given by: patient  Patient understanding: patient states understanding of the procedure being performed  Required items: required blood products, implants, devices, and special equipment available  Patient identity confirmed: arm band  Indications: vascular access and central pressure monitoring  Catheter size: 7 Fr  Patient position: Trendelenburg  Anesthesia method: under GA.    Sedation:  Patient sedated: under GA.    Assessment: blood return through all ports and free fluid flow  Preparation: skin prepped with ChloraPrep  Skin prep agent dried: skin prep agent completely dried prior to procedure  Sterile barriers: all five maximum sterile barriers used - cap, mask, sterile gown, sterile gloves, and large sterile sheet  Hand hygiene: hand hygiene performed prior to central venous catheter insertion  sterile gel and probe cover used in ultrasound-guided central venous catheter insertionultrasound permanent image saved  Pre-procedure: landmarks identified  Number of attempts: 1  Successful placement: yes  Post-procedure: dressing applied and line sutured  Patient tolerance: patient tolerated the procedure well with no immediate complications  Comments: Ultrasound guidance  Ultrasound pic in MEDIA section of Epic  Performed postinduction to anesthesia [id number]

## 2025-05-19 NOTE — ASSESSMENT & PLAN NOTE
I personally reviewed patient's left heart cath from 5/15/2025.  He had severe multivessel coronary artery disease with significant stenosis in his ramus intermedius, first diagonal (culprit), mid LAD, mid to distal circumflex, RPDA.  I reviewed his follow-up transthoracic echocardiogram from 5/5/2025 showing low normal LVEF of approximately 52% without regional wall motion abnormalities.  Plan for CABG today  Continue aspirin, high intensity statin, beta-blocker, IV heparin as prescribed.  Continue amlodipine for antianginal.  Continue colchicine for possible pericardial inflammation post MI

## 2025-05-19 NOTE — ANESTHESIA PROCEDURE NOTES
Procedure Performed: ARSEN Anesthesia  Start Time:  5/19/2025 1:43 PM        Preanesthesia Checklist    Patient identified, IV assessed, risks and benefits discussed, monitors and equipment assessed, procedure being performed at surgeon's request and anesthesia consent obtained.      Procedure    Diagnostic Indications for ARSEN:  assessment of ascending aorta, assessment of surgical repair and hemodynamic monitoring. Type of ARSEN: interventional ARSEN with real time guidance of intracardiac procedure. Images Saved: ultrasound permanent image saved. Physician Requesting Echo: SHREE Polo MD.  Location performed: OR. Intubated. Bite block not placed.   Heart visualized. Insertion of ARSEN Probe:  Atraumatic. Probe Type:  Multiplane. Modalities:  3D, color flow mapping, continuous wave Doppler and pulse wave Doppler.      Echocardiographic and Doppler Measurements    PREPROCEDURE    LEFT VENTRICLE:  Systolic Function: mildly impaired. Ejection Fraction: 50%. Cavity size: normal.   Regional Wall Motion Abnormalities: anterior wall akinesis,apical view.    RIGHT VENTRICLE:  Systolic Function: normal.  Cavity size normal. No hypertrophy              AORTIC VALVE:  Leaflets: normal and trileaflet. Leaflet motions normal and normal. Stenosis: none. Mean Gradient: 3 mmHg.    Regurgitation: central jet and trace.      MITRAL VALVE:  Leaflets: normal. Leaflet Motions: normal. Regurgitation: trace.   Stenosis: none. Mean Gradient: 1 mmHg.      TRICUSPID VALVE:  Leaflets: normal. Leaflet Motions: normal. Stenosis: none. Regurgitation: trace.      PULMONIC VALVE:  Leaflets: normal. Regurgitation: none. Stenosis: none.        ASCENDING AORTA:  Size:  normal.        AORTIC ARCH:  Size:  normal.   Grade 3: atheroma protruding < 0.5 cm into lumen.    DESCENDING AORTA:  Size: normal.   Grade 3: atheroma protruding < 0.5 cm into lumen.        RIGHT ATRIUM:  Size:  normal. No spontaneous echo contrast.    LEFT ATRIUM:  Size: normal.  No spontaneous echo contrast.    LEFT ATRIAL APPENDAGE:  Size: normal. No spontaneous echo contrast         ATRIAL SEPTUM:  Intra-atrial septal morphology: lipomatous hypertrophy.          VENTRICULAR SEPTUM:  Intra-ventricular septum morphology: normal.         EPIAORTIC:  Plaque Thickness: 0-5 mm.    OTHER FINDINGS:  Pericardium:  normal. Pleural Effusion:  none.        POSTPROCEDURE    LEFT VENTRICLE: Unchanged .  Systolic Function: mildly depressed. Ejection Fraction: 50 %. Cavity Size: normal. Regional Wall Motion Abnormalities: anterior wall at apex akinesis persists.    RIGHT VENTRICLE: Unchanged .  Systolic Function: normal. Cavity Size: normal.        AORTIC VALVE: Unchanged .  Leaflets: native. Stenosis: none. Mean Gradient: 3 mmHg. Regurgitation: central jet and trace.      MITRAL VALVE: Unchanged .  Leaflets: native. Regurgitation: trace. Stenosis: none. Mean Gradient: 1.    TRICUSPID VALVE: Unchanged .  Leaflets: native. Regurgitation: trace. Stenosis: none.    PULMONIC VALVE: Unchanged   Leaflets: native. Regurgitation: none. Stenosis: none.        ATRIA: Unchanged .  Left Atrial Appendage Ligate: No. Residual Flow in Left Atrial Appendage by Color Flow Doppler: No.       AORTA: Unchanged .  Dissection: Dissection not present.      REMOVAL:  Probe Removal: atraumatic.

## 2025-05-19 NOTE — PLAN OF CARE
Problem: PAIN - ADULT  Goal: Verbalizes/displays adequate comfort level or baseline comfort level  Description: Interventions:  - Encourage patient to monitor pain and request assistance  - Assess pain using appropriate pain scale  - Administer analgesics as ordered based on type and severity of pain and evaluate response  - Implement non-pharmacological measures as appropriate and evaluate response  - Consider cultural and social influences on pain and pain management  - Notify physician/advanced practitioner if interventions unsuccessful or patient reports new pain  - Educate patient/family on pain management process including their role and importance of  reporting pain   - Provide non-pharmacologic/complimentary pain relief interventions  Outcome: Progressing     Problem: INFECTION - ADULT  Goal: Absence or prevention of progression during hospitalization  Description: INTERVENTIONS:  - Assess and monitor for signs and symptoms of infection  - Monitor lab/diagnostic results  - Monitor all insertion sites, i.e. indwelling lines, tubes, and drains  - Monitor endotracheal if appropriate and nasal secretions for changes in amount and color  - Sparks appropriate cooling/warming therapies per order  - Administer medications as ordered  - Instruct and encourage patient and family to use good hand hygiene technique  - Identify and instruct in appropriate isolation precautions for identified infection/condition  Outcome: Progressing  Goal: Absence of fever/infection during neutropenic period  Description: INTERVENTIONS:  - Monitor WBC  - Perform strict hand hygiene  - Limit to healthy visitors only  - No plants, dried, fresh or silk flowers with up in patient room  Outcome: Progressing     Goal: Maintain or return to baseline ADL function  Description: INTERVENTIONS:  -  Assess patient's ability to carry out ADLs; assess patient's baseline for ADL function and identify physical deficits which impact ability to  perform ADLs (bathing, care of mouth/teeth, toileting, grooming, dressing, etc.)  - Assess/evaluate cause of self-care deficits   - Assess range of motion  - Assess patient's mobility; develop plan if impaired  - Assess patient's need for assistive devices and provide as appropriate  - Encourage maximum independence but intervene and supervise when necessary  - Involve family in performance of ADLs  - Assess for home care needs following discharge   - Consider OT consult to assist with ADL evaluation and planning for discharge  - Provide patient education as appropriate  - Monitor functional capacity and physical performance, use of AM PAC & JH-HLM   - Monitor gait, balance and fatigue with ambulation      Problem: DISCHARGE PLANNING  Goal: Discharge to home or other facility with appropriate resources  Description: INTERVENTIONS:  - Identify barriers to discharge w/patient and caregiver  - Arrange for needed discharge resources and transportation as appropriate  - Identify discharge learning needs (meds, wound care, etc.)  - Arrange for interpretive services to assist at discharge as needed  - Refer to Case Management Department for coordinating discharge planning if the patient needs post-hospital services based on physician/advanced practitioner order or complex needs related to functional status, cognitive ability, or social support system  Outcome: Progressing     Problem: Knowledge Deficit  Goal: Patient/family/caregiver demonstrates understanding of disease process, treatment plan, medications, and discharge instructions  Description: Complete learning assessment and assess knowledge base.  Interventions:  - Provide teaching at level of understanding  - Provide teaching via preferred learning methods  Outcome: Progressing     Problem: CARDIOVASCULAR - ADULT  Goal: Maintains optimal cardiac output and hemodynamic stability  Description: INTERVENTIONS:  - Monitor I/O, vital signs and rhythm  - Monitor for S/S  and trends of decreased cardiac output  - Administer and titrate ordered vasoactive medications to optimize hemodynamic stability  - Assess quality of pulses, skin color and temperature  - Assess for signs of decreased coronary artery perfusion  - Instruct patient to report change in severity of symptoms  Outcome: Progressing  Goal: Absence of cardiac dysrhythmias or at baseline rhythm  Description: INTERVENTIONS:  - Continuous cardiac monitoring, vital signs, obtain 12 lead EKG if ordered  - Administer antiarrhythmic and heart rate control medications as ordered  - Monitor electrolytes and administer replacement therapy as ordered  Outcome: Progressing

## 2025-05-19 NOTE — RESPIRATORY THERAPY NOTE
RT Protocol Note  Tc Stovall 75 y.o. male MRN: [id number]  Unit/Bed#: PPHP-314-01 Encounter: [id number]    Assessment    Principal Problem:    CAD, multiple vessel  Active Problems:    Type 2 diabetes mellitus (HCC)    Primary hypertension    Hyperlipidemia    History of CVA (cerebrovascular accident)    Elevated troponin    NSTEMI (non-ST elevated myocardial infarction) (HCC)    Paroxysmal atrial tachycardia (HCC)    PVC's (premature ventricular contractions)    Tobacco use    Fever      Home Pulmonary Medications:         Past Medical History:   Diagnosis Date    Arthritis     Cerebellar infarction (HCC)     Cerebrovascular accident (CVA) due to embolism of cerebral artery (HCC)     Diabetes mellitus (HCC)     Hyperlipidemia     Hypertension     Left rotator cuff tear     Stroke (HCC) [id number]    Tobacco abuse     Wears glasses      Social History     Socioeconomic History    Marital status: /Civil Union     Spouse name: Not on file    Number of children: Not on file    Years of education: Not on file    Highest education level: Not on file   Occupational History    Not on file   Tobacco Use    Smoking status: Every Day     Current packs/day: 0.50     Average packs/day: 0.6 packs/day for 85.9 years (55.3 ttl pk-yrs)     Types: Cigarettes     Start date: 4/26/1964    Smokeless tobacco: Never   Vaping Use    Vaping status: Never Used   Substance and Sexual Activity    Alcohol use: Yes     Comment: rarely    Drug use: No    Sexual activity: Not Currently     Partners: Female     Birth control/protection: Female Sterilization   Other Topics Concern    Not on file   Social History Narrative    Not on file     Social Drivers of Health     Financial Resource Strain: Medium Risk (10/6/2023)    Overall Financial Resource Strain (CARDIA)     Difficulty of Paying Living Expenses: Somewhat hard   Food Insecurity: No Food Insecurity (5/14/2025)    Nursing - Inadequate Food Risk Classification     Worried About  "Running Out of Food in the Last Year: Never true     Ran Out of Food in the Last Year: Never true     Ran Out of Food in the Last Year: Never true   Transportation Needs: No Transportation Needs (2025)    Nursing - Transportation Risk Classification     Lack of Transportation: Not on file     Lack of Transportation: No   Physical Activity: Inactive (2021)    Exercise Vital Sign     Days of Exercise per Week: 0 days     Minutes of Exercise per Session: 0 min   Stress: Stress Concern Present (2021)    [employer] - Occupational Stress Questionnaire     Feeling of Stress : Rather much   Social Connections: Unknown (2024)    Received from [employer]     How often do you feel lonely or isolated from those around you? (Adult - for ages 18 years and over): Not on file   Intimate Partner Violence: Unknown (2025)    Nursing IPS     Feels Physically and Emotionally Safe: Not on file     Physically Hurt by Someone: Not on file     Humiliated or Emotionally Abused by Someone: Not on file     Physically Hurt by Someone: No     Hurt or Threatened by Someone: No   Housing Stability: Unknown (2025)    Nursing: Inadequate Housing Risk Classification     Has Housing: Not on file     Worried About Losing Housing: Not on file     Unable to Get Utilities: Not on file     Unable to Pay for Housing in the Last Year: No     Has Housin       Subjective         Objective    Physical Exam:   Assessment Type: During-treatment  General Appearance: Sedated  Respiratory Pattern: Assisted  Chest Assessment: Chest expansion symmetrical  Bilateral Breath Sounds: Diminished  Cough: None  O2 Device: vent    Vitals:  Blood pressure 107/90, pulse 86, temperature 97.5 °F (36.4 °C), temperature source Core, resp. rate 15, height 5' 7\" (1.702 m), weight 99.4 kg (219 lb 2.2 oz), SpO2 100%.          Imaging and other studies:     O2 Device: vent     Plan    Respiratory " Plan: Vent/NIV/HFNC  Airway Clearance Plan: Incentive Spirometer     Resp Comments: pt received post op from cabg x2 procedure. pt bs clear. ett 8.0 24 @ lip secure. pt placed on cmv settings tolerating well. vital stable, ABG run on I STAT. CXR will be reviewed. chart reviewed per RCP and ACP. pt is a current smoker.prn bronchodilator ordered.  IS will initate post extubation. will continue to monitor per protocol

## 2025-05-20 ENCOUNTER — APPOINTMENT (INPATIENT)
Dept: RADIOLOGY | Facility: HOSPITAL | Age: 75
DRG: 236 | End: 2025-05-20
Payer: MEDICARE

## 2025-05-20 PROBLEM — Z95.1 S/P CABG X 2: Status: ACTIVE | Noted: 2025-05-20

## 2025-05-20 LAB
ABO GROUP BLD BPU: NORMAL
ANION GAP SERPL CALCULATED.3IONS-SCNC: 11 MMOL/L (ref 4–13)
ATRIAL RATE: 91 BPM
ATRIAL RATE: 92 BPM
BPU ID: NORMAL
BUN SERPL-MCNC: 20 MG/DL (ref 5–25)
CALCIUM SERPL-MCNC: 8 MG/DL (ref 8.4–10.2)
CHLORIDE SERPL-SCNC: 109 MMOL/L (ref 96–108)
CO2 SERPL-SCNC: 20 MMOL/L (ref 21–32)
CREAT SERPL-MCNC: 1.06 MG/DL (ref 0.6–1.3)
CROSSMATCH: NORMAL
ERYTHROCYTE [DISTWIDTH] IN BLOOD BY AUTOMATED COUNT: 12.7 % (ref 11.6–15.1)
GFR SERPL CREATININE-BSD FRML MDRD: 68 ML/MIN/1.73SQ M
GLUCOSE SERPL-MCNC: 109 MG/DL (ref 65–140)
GLUCOSE SERPL-MCNC: 140 MG/DL (ref 65–140)
GLUCOSE SERPL-MCNC: 155 MG/DL (ref 65–140)
GLUCOSE SERPL-MCNC: 166 MG/DL (ref 65–140)
GLUCOSE SERPL-MCNC: 182 MG/DL (ref 65–140)
GLUCOSE SERPL-MCNC: 191 MG/DL (ref 65–140)
GLUCOSE SERPL-MCNC: 193 MG/DL (ref 65–140)
GLUCOSE SERPL-MCNC: 202 MG/DL (ref 65–140)
GLUCOSE SERPL-MCNC: 203 MG/DL (ref 65–140)
GLUCOSE SERPL-MCNC: 229 MG/DL (ref 65–140)
GLUCOSE SERPL-MCNC: 244 MG/DL (ref 65–140)
GLUCOSE SERPL-MCNC: 275 MG/DL (ref 65–140)
GLUCOSE SERPL-MCNC: 276 MG/DL (ref 65–140)
HCT VFR BLD AUTO: 31.7 % (ref 36.5–49.3)
HCT VFR BLD AUTO: 32.3 % (ref 36.5–49.3)
HGB BLD-MCNC: 10.3 G/DL (ref 12–17)
HGB BLD-MCNC: 10.5 G/DL (ref 12–17)
MAGNESIUM SERPL-MCNC: 2.4 MG/DL (ref 1.9–2.7)
MCH RBC QN AUTO: 29 PG (ref 26.8–34.3)
MCHC RBC AUTO-ENTMCNC: 31.9 G/DL (ref 31.4–37.4)
MCV RBC AUTO: 91 FL (ref 82–98)
P AXIS: 50 DEGREES
P AXIS: 56 DEGREES
PLATELET # BLD AUTO: 204 THOUSANDS/UL (ref 149–390)
PMV BLD AUTO: 10.9 FL (ref 8.9–12.7)
POTASSIUM SERPL-SCNC: 4.7 MMOL/L (ref 3.5–5.3)
POTASSIUM SERPL-SCNC: 4.8 MMOL/L (ref 3.5–5.3)
PR INTERVAL: 236 MS
PR INTERVAL: 246 MS
QRS AXIS: 123 DEGREES
QRS AXIS: 139 DEGREES
QRSD INTERVAL: 76 MS
QRSD INTERVAL: 84 MS
QT INTERVAL: 370 MS
QT INTERVAL: 400 MS
QTC INTERVAL: 457 MS
QTC INTERVAL: 492 MS
RBC # BLD AUTO: 3.55 MILLION/UL (ref 3.88–5.62)
SODIUM SERPL-SCNC: 140 MMOL/L (ref 135–147)
T WAVE AXIS: 118 DEGREES
T WAVE AXIS: 55 DEGREES
UNIT DISPENSE STATUS: NORMAL
UNIT PRODUCT CODE: NORMAL
UNIT PRODUCT VOLUME: 350 ML
UNIT RH: NORMAL
VENTRICULAR RATE: 91 BPM
VENTRICULAR RATE: 92 BPM
WBC # BLD AUTO: 14.36 THOUSAND/UL (ref 4.31–10.16)

## 2025-05-20 PROCEDURE — 99232 SBSQ HOSP IP/OBS MODERATE 35: CPT | Performed by: ANESTHESIOLOGY

## 2025-05-20 PROCEDURE — 84132 ASSAY OF SERUM POTASSIUM: CPT | Performed by: PHYSICIAN ASSISTANT

## 2025-05-20 PROCEDURE — 94664 DEMO&/EVAL PT USE INHALER: CPT

## 2025-05-20 PROCEDURE — 85027 COMPLETE CBC AUTOMATED: CPT | Performed by: PHYSICIAN ASSISTANT

## 2025-05-20 PROCEDURE — 93010 ELECTROCARDIOGRAM REPORT: CPT | Performed by: STUDENT IN AN ORGANIZED HEALTH CARE EDUCATION/TRAINING PROGRAM

## 2025-05-20 PROCEDURE — 80048 BASIC METABOLIC PNL TOTAL CA: CPT | Performed by: PHYSICIAN ASSISTANT

## 2025-05-20 PROCEDURE — 83735 ASSAY OF MAGNESIUM: CPT | Performed by: PHYSICIAN ASSISTANT

## 2025-05-20 PROCEDURE — 97163 PT EVAL HIGH COMPLEX 45 MIN: CPT

## 2025-05-20 PROCEDURE — 85018 HEMOGLOBIN: CPT | Performed by: PHYSICIAN ASSISTANT

## 2025-05-20 PROCEDURE — 71045 X-RAY EXAM CHEST 1 VIEW: CPT

## 2025-05-20 PROCEDURE — 85014 HEMATOCRIT: CPT | Performed by: PHYSICIAN ASSISTANT

## 2025-05-20 PROCEDURE — 99222 1ST HOSP IP/OBS MODERATE 55: CPT | Performed by: INTERNAL MEDICINE

## 2025-05-20 PROCEDURE — 82948 REAGENT STRIP/BLOOD GLUCOSE: CPT

## 2025-05-20 PROCEDURE — 93005 ELECTROCARDIOGRAM TRACING: CPT

## 2025-05-20 PROCEDURE — 97167 OT EVAL HIGH COMPLEX 60 MIN: CPT

## 2025-05-20 PROCEDURE — 99232 SBSQ HOSP IP/OBS MODERATE 35: CPT | Performed by: STUDENT IN AN ORGANIZED HEALTH CARE EDUCATION/TRAINING PROGRAM

## 2025-05-20 RX ORDER — TEMAZEPAM 15 MG/1
15 CAPSULE ORAL
Status: DISCONTINUED | OUTPATIENT
Start: 2025-05-20 | End: 2025-05-23 | Stop reason: HOSPADM

## 2025-05-20 RX ORDER — METOPROLOL TARTRATE 25 MG/1
25 TABLET, FILM COATED ORAL EVERY 12 HOURS SCHEDULED
Status: DISCONTINUED | OUTPATIENT
Start: 2025-05-20 | End: 2025-05-21

## 2025-05-20 RX ORDER — ACETAMINOPHEN 325 MG/1
650 TABLET ORAL EVERY 6 HOURS PRN
Status: DISCONTINUED | OUTPATIENT
Start: 2025-05-20 | End: 2025-05-23 | Stop reason: HOSPADM

## 2025-05-20 RX ORDER — DOCUSATE SODIUM 100 MG/1
100 CAPSULE, LIQUID FILLED ORAL 2 TIMES DAILY
Status: DISCONTINUED | OUTPATIENT
Start: 2025-05-20 | End: 2025-05-23 | Stop reason: HOSPADM

## 2025-05-20 RX ORDER — FUROSEMIDE 10 MG/ML
40 INJECTION INTRAMUSCULAR; INTRAVENOUS
Status: DISCONTINUED | OUTPATIENT
Start: 2025-05-20 | End: 2025-05-21

## 2025-05-20 RX ORDER — MAGNESIUM SULFATE HEPTAHYDRATE 40 MG/ML
2 INJECTION, SOLUTION INTRAVENOUS ONCE
Status: DISCONTINUED | OUTPATIENT
Start: 2025-05-20 | End: 2025-05-23 | Stop reason: HOSPADM

## 2025-05-20 RX ORDER — POTASSIUM CHLORIDE 1500 MG/1
20 TABLET, EXTENDED RELEASE ORAL 2 TIMES DAILY
Status: DISCONTINUED | OUTPATIENT
Start: 2025-05-20 | End: 2025-05-23 | Stop reason: HOSPADM

## 2025-05-20 RX ADMIN — ASPIRIN 325 MG ORAL TABLET 325 MG: 325 PILL ORAL at 18:05

## 2025-05-20 RX ADMIN — Medication 1000 UNITS: at 08:15

## 2025-05-20 RX ADMIN — PANTOPRAZOLE SODIUM 40 MG: 40 TABLET, DELAYED RELEASE ORAL at 08:15

## 2025-05-20 RX ADMIN — DOCUSATE SODIUM 100 MG: 100 CAPSULE, LIQUID FILLED ORAL at 18:05

## 2025-05-20 RX ADMIN — ACETAMINOPHEN 975 MG: 325 TABLET ORAL at 20:10

## 2025-05-20 RX ADMIN — AMIODARONE HYDROCHLORIDE 200 MG: 200 TABLET ORAL at 05:52

## 2025-05-20 RX ADMIN — METOPROLOL TARTRATE 25 MG: 25 TABLET, FILM COATED ORAL at 08:15

## 2025-05-20 RX ADMIN — POLYETHYLENE GLYCOL 3350 17 G: 17 POWDER, FOR SOLUTION ORAL at 08:15

## 2025-05-20 RX ADMIN — GABAPENTIN 600 MG: 300 CAPSULE ORAL at 18:05

## 2025-05-20 RX ADMIN — ACETAMINOPHEN 975 MG: 325 TABLET ORAL at 13:50

## 2025-05-20 RX ADMIN — HYDROMORPHONE HYDROCHLORIDE 0.5 MG: 1 INJECTION, SOLUTION INTRAMUSCULAR; INTRAVENOUS; SUBCUTANEOUS at 05:05

## 2025-05-20 RX ADMIN — CEFAZOLIN SODIUM 2000 MG: 2 SOLUTION INTRAVENOUS at 02:54

## 2025-05-20 RX ADMIN — COLCHICINE 0.6 MG: 0.6 TABLET ORAL at 08:15

## 2025-05-20 RX ADMIN — MUPIROCIN 1 APPLICATION: 20 OINTMENT TOPICAL at 20:10

## 2025-05-20 RX ADMIN — CHLORHEXIDINE GLUCONATE 15 ML: 1.2 SOLUTION ORAL at 18:06

## 2025-05-20 RX ADMIN — AMIODARONE HYDROCHLORIDE 200 MG: 200 TABLET ORAL at 13:50

## 2025-05-20 RX ADMIN — GABAPENTIN 600 MG: 300 CAPSULE ORAL at 08:15

## 2025-05-20 RX ADMIN — CHLORHEXIDINE GLUCONATE 15 ML: 1.2 SOLUTION ORAL at 08:15

## 2025-05-20 RX ADMIN — METOPROLOL TARTRATE 25 MG: 25 TABLET, FILM COATED ORAL at 20:10

## 2025-05-20 RX ADMIN — POTASSIUM CHLORIDE 20 MEQ: 1500 TABLET, EXTENDED RELEASE ORAL at 18:05

## 2025-05-20 RX ADMIN — FUROSEMIDE 40 MG: 10 INJECTION, SOLUTION INTRAMUSCULAR; INTRAVENOUS at 08:14

## 2025-05-20 RX ADMIN — DOCUSATE SODIUM 100 MG: 100 CAPSULE, LIQUID FILLED ORAL at 08:15

## 2025-05-20 RX ADMIN — OXYCODONE HYDROCHLORIDE 5 MG: 5 TABLET ORAL at 08:15

## 2025-05-20 RX ADMIN — AMIODARONE HYDROCHLORIDE 200 MG: 200 TABLET ORAL at 21:59

## 2025-05-20 RX ADMIN — FUROSEMIDE 40 MG: 10 INJECTION, SOLUTION INTRAMUSCULAR; INTRAVENOUS at 16:17

## 2025-05-20 RX ADMIN — FONDAPARINUX SODIUM 2.5 MG: 2.5 INJECTION, SOLUTION SUBCUTANEOUS at 08:15

## 2025-05-20 RX ADMIN — COLCHICINE 0.6 MG: 0.6 TABLET ORAL at 18:05

## 2025-05-20 RX ADMIN — CEFAZOLIN SODIUM 2000 MG: 2 SOLUTION INTRAVENOUS at 10:08

## 2025-05-20 RX ADMIN — MUPIROCIN 1 APPLICATION: 20 OINTMENT TOPICAL at 08:15

## 2025-05-20 RX ADMIN — ACETAMINOPHEN 975 MG: 325 TABLET ORAL at 08:15

## 2025-05-20 RX ADMIN — POTASSIUM CHLORIDE 20 MEQ: 1500 TABLET, EXTENDED RELEASE ORAL at 08:15

## 2025-05-20 RX ADMIN — ATORVASTATIN CALCIUM 80 MG: 80 TABLET, FILM COATED ORAL at 16:17

## 2025-05-20 NOTE — PHYSICAL THERAPY NOTE
Physical Therapy Evaluation     Patient's Name: Tc Stovall    Admitting Diagnosis  NSTEMI (non-ST elevated myocardial infarction) ([facility]) [I21.4]    Problem List  Problem List[1]    Past Medical History  Past Medical History:   Diagnosis Date    Arthritis     Cerebellar infarction (HCC)     Cerebrovascular accident (CVA) due to embolism of cerebral artery (HCC)     Diabetes mellitus (HCC)     Hyperlipidemia     Hypertension     Left rotator cuff tear     Stroke ([facility]) [phone]    Tobacco abuse     Wears glasses        Past Surgical History  Past Surgical History:   Procedure Laterality Date    CARDIAC CATHETERIZATION N/A 5/15/2025    Procedure: Cardiac Catheterization;  Surgeon: Mo Huertas MD;  Location: [facility];  Service: Cardiology    CARDIAC CATHETERIZATION N/A 5/15/2025    Procedure: Cardiac Coronary Angiogram;  Surgeon: Mo Huertas MD;  [facility]: [facility];  Service: Cardiology    CARDIAC CATHETERIZATION Left 5/15/2025    Procedure: Cardiac Left Heart Cath;  Surgeon: Mo Huertas MD;  [facility]: [facility];  Service: Cardiology    COLONOSCOPY      KNEE SURGERY      HI CORONARY ARTERY BYP W/VEIN & ARTERY GRAFT 3 VEIN N/A 5/19/2025    Procedure: CORONARY ARTERY BYPASS GRAFT (CABG) X2 VESSELS  USING LEFT DENILSON-> LAD  AND LEFT GSV-> RAMUS;  Surgeon: SHREE Polo MD;  Location:  MAIN OR;  Service: Cardiac Surgery    HI SURGICAL ARTHROSCOPY SHOULDER W/ROTATOR CUFF RPR Left 08/10/2022    Procedure: Left Shoulder Arthroscopy, supraspinatus and Subscapularis Repair, Bicep Tenotomy, acromioplasty, extensive debridement;  Surgeon: Otilio Mendez DO;  Location: [facility] MAIN [facility];  Service: Orthopedics    TONSILLECTOMY  1955 05/20/25 1122   PT Last Visit   PT Visit Date 05/20/25   Note Type   Note type Evaluation   Pain Assessment   Pain Assessment Tool 0-10   Pain Score No Pain   Restrictions/Precautions   Other Precautions  "Cardiac/sternal;Impulsive;Cognitive;Chair Alarm;Telemetry;Fall Risk  (CT)   Home Living   Type of Home House   Home Layout One level;Ramped entrance   Home Equipment Walker;Cane   Prior Function   Level of [place] Independent with functional mobility  (amb w/o AD)   Lives With Alone  (spouse is at SNF (per EMR))   General   Additional Pertinent History cleared for assessment by caleb   Cognition   Overall Cognitive Status Impaired   Arousal/Participation Alert   Attention Attends with cues to redirect   Orientation Level Oriented to person;Oriented to place;Oriented to situation   Memory Decreased recall of precautions   Following Commands Follows one step commands with increased time or repetition   Subjective   Subjective Alert; in the chair; somewhat flat affect w/ occasional bouts of irritability; agreeable to mobilize   RUE Assessment   RUE Assessment X  (Decreased (R) hand AROM/strength (reports hx of \"carpal tunnel\"))   LUE Assessment   LUE Assessment WFL  (AROM)   RLE Assessment   RLE Assessment WFL  (AROM)   Strength RLE   RLE Overall Strength   (fair +/ good -)   LLE Assessment   LLE Assessment WFL  (AROM)   Strength LLE   LLE Overall Strength   (fair +/ good -)   Transfers   Sit to Stand 3  Moderate assistance   Additional items Assist x 1;Verbal cues   Stand to Sit 3  Moderate assistance   Additional items Assist x 1;Verbal cues   Ambulation/Elevation   Gait pattern Excessively slow;Short stride;Foward flexed;Inconsistent priya   Gait Assistance 3  Moderate assist   Additional items Assist x 1;Verbal cues;Tactile cues   Assistive Device Rolling walker   Distance 70 ft   Balance   Static Sitting Fair -   Dynamic Sitting Poor +   Static Standing Poor +   Dynamic Standing Poor   Ambulatory Poor   Activity Tolerance   Activity Tolerance Patient limited by fatigue   Medical Staff Made Aware Co-eval performed w/ OTR due to complexity of medical status and multiple comorbidities   Nurse Made Aware spoke to " CELE Damon   Assessment   Prognosis Good   Problem List Decreased strength;Decreased endurance;Impaired balance;Decreased mobility   Assessment Pt is 75 y.o. male admitted with hx of sudden onset severe substernal CP at rest with radiation to his back and Dx of NSTEMI; cardiac cath revealing multivessel CAD and pt underwent Coronary artery bypass grafting x 2 with left internal mammary artery to left anterior descending, saphenous vein graft to ramus intermedius on 5/19/2025. Pt 's comorbidities affecting POC include: Arthritis, Cerebellar infarction (HCC), Cerebrovascular accident (CVA) due to embolism of cerebral artery (HCC), Diabetes mellitus (HCC), Hypertension, and Tobacco abuse and personal factors of: lives alone. Pt's clinical presentation is currently unstable/unpredictable which is evident in ongoing telemetry monitoring while in a critical care unit, abnormal lab values being monitored/trending, CT in place and ongoing postop management. Pt presents w/ postop guarding, generalized weakness, incl decreased LE strength, decreased functional endurance and activity tolerance, and impaired balance w/ associated gait deviations requiring use of rw at this time. Will cont to follow pt in PT for progressive mobilization to address above functional deficits and to max level of (I), endurance, and safety. D/C recommendations are listed below; will follow   Barriers to Discharge Decreased caregiver support   Goals   Patient Goals to feel better   STG Expiration Date 05/30/25   Short Term Goal #1 7-10 days. Pt will amb 300 ft w/ least restrictive assistive device PRN, mod (I) in order to facilitate safe return to premorbid environment and community amb status. Pt will achieve mod (I) level w/ bed mob in order to facilitate safety with OOB and back to bed transitions in own living environment. Pt will perform transfers w/ mod (I) to assure (I) and safety w/ functional mobility/transitions w/ all aspects of  mobility/locomotion.  Pt will participate in LE therex and balance activities to max progression w/ mobility skills.   PT Treatment Day 0   Plan   Treatment/Interventions Functional transfer training;LE strengthening/ROM;Therapeutic exercise;Endurance training;Equipment eval/education;Bed mobility;Gait training;Spoke to nursing;Spoke to case management;OT   PT Frequency 4-6x/wk   Discharge Recommendation   Rehab Resource Intensity Level, PT II (Moderate Resource Intensity)   Equipment Recommended Walker   Walker Package Recommended Wheeled walker   AM-PAC Basic Mobility Inpatient   Turning in Flat Bed Without Bedrails 3   Lying on Back to Sitting on Edge of Flat Bed Without Bedrails 2   Moving Bed to Chair 2   Standing Up From Chair Using Arms 2   Walk in Room 2   Climb 3-5 Stairs With Railing 2   Basic Mobility Inpatient Raw Score 13   Basic Mobility Standardized Score 33.99   [facility] Level Of Mobility   -HL Goal 4: Move to chair/commode   -HLM Achieved 7: Walk 25 feet or more   Modified [place] Scale   Modified [place] Scale 4   End of Consult   Patient Position at End of Consult Bedside chair;Bed/Chair alarm activated;All needs within reach           Troy Man, PT         [1]   Patient Active Problem List  Diagnosis    Type 2 diabetes mellitus (HCC)    Dependence on nicotine from cigarettes    Primary hypertension    Hyperlipidemia    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 edema, without long-term current use of insulin (HCC)    Type 2 diabetes mellitus with hyperglycemia, without long-term current use of insulin (HCC)    Obesity, morbid (HCC)    Cervical radiculopathy    Radiculopathy, cervical region    Foraminal stenosis of lumbar region    Protrusion of cervical intervertebral disc    History of CVA (cerebrovascular accident)    Cubital tunnel syndrome on right    Carpal tunnel syndrome of right wrist    Elevated troponin    NSTEMI (non-ST elevated  myocardial infarction) (HCC)    Paroxysmal atrial tachycardia (HCC)    PVC's (premature ventricular contractions)    Tobacco use    CAD, multiple vessel    Fever    S/P CABG x 2

## 2025-05-20 NOTE — ASSESSMENT & PLAN NOTE
I personally reviewed patient's left heart cath from 5/15/2025.  He had severe multivessel coronary artery disease with significant stenosis in his ramus intermedius, first diagonal (culprit), mid LAD, mid to distal circumflex, RPDA.  I reviewed his follow-up transthoracic echocardiogram from 5/5/2025 showing low normal LVEF of approximately 52% without regional wall motion abnormalities.  S/p CABG  Continue aspirin, high intensity statin, beta-blocker as prescribed.    Continue colchicine for possible pericardial inflammation post MI  Amiodarone per CT surgery, diuresis per CT surgery

## 2025-05-20 NOTE — OCCUPATIONAL THERAPY NOTE
Occupational Therapy Evaluation     Patient Name: Tc Stovall  Today's Date: 5/20/2025  Problem List  Principal Problem:    CAD, multiple vessel  Active Problems:    Type 2 diabetes mellitus (HCC)    Primary hypertension    Hyperlipidemia    History of CVA (cerebrovascular accident)    Elevated troponin    NSTEMI (non-ST elevated myocardial infarction) (HCC)    Paroxysmal atrial tachycardia (HCC)    PVC's (premature ventricular contractions)    Tobacco use    Fever    S/P CABG x 2    Past Medical History  Past Medical History:   Diagnosis Date    Arthritis     Cerebellar infarction (HCC)     Cerebrovascular accident (CVA) due to embolism of cerebral artery (HCC)     Diabetes mellitus (HCC)     Hyperlipidemia     Hypertension     Left rotator cuff tear     Stroke ([facility]) [phone]    Tobacco abuse     Wears glasses      Past Surgical History  Past Surgical History:   Procedure Laterality Date    CARDIAC CATHETERIZATION N/A 5/15/2025    Procedure: Cardiac Catheterization;  Surgeon: Mo Huertas MD;  Location: [facility];  Service: Cardiology    CARDIAC CATHETERIZATION N/A 5/15/2025    Procedure: Cardiac Coronary Angiogram;  Surgeon: Mo Huertas MD;  [facility]: [facility];  Service: Cardiology    CARDIAC CATHETERIZATION Left 5/15/2025    Procedure: Cardiac Left Heart Cath;  Surgeon: Mo Huertas MD;  [facility]: [facility];  Service: Cardiology    COLONOSCOPY      KNEE SURGERY      VT CORONARY ARTERY BYP W/VEIN & ARTERY GRAFT 3 VEIN N/A 5/19/2025    Procedure: CORONARY ARTERY BYPASS GRAFT (CABG) X2 VESSELS  USING LEFT DENILSON-> LAD  AND LEFT GSV-> RAMUS;  Surgeon: SHREE Polo MD;  Location: [facility];  Service: Cardiac Surgery    VT SURGICAL ARTHROSCOPY SHOULDER W/ROTATOR CUFF RPR Left 08/10/2022    Procedure: Left Shoulder Arthroscopy, supraspinatus and Subscapularis Repair, Bicep Tenotomy, acromioplasty, extensive debridement;  Surgeon: Otilio Mendez DO;   "Location: [facility];  Service: Orthopedics    TONSILLECTOMY  1955 05/20/25 1123   OT Last Visit   OT Visit Date 05/20/25   Note Type   Note type Evaluation   Pain Assessment   Pain Assessment Tool 0-10   Pain Score No Pain   Restrictions/Precautions   Weight Bearing Precautions Per Order No   Other Precautions Cardiac/sternal;Cognitive;Chair Alarm;Multiple lines;Telemetry;Fall Risk;O2  (CT)   Home Living   Type of Home House   Home Layout One level;Ramped entrance;Performs ADLs on one level   Bathroom Shower/Tub Walk-in shower   Bathroom Toilet Raised   Bathroom Equipment Grab bars in shower;Grab bars around toilet;Tub transfer bench   Bathroom Accessibility Accessible   Home Equipment Walker;Cane  (not using pta)   Additional Comments Pt lives in a 1  with ramped entrance, uses a walk-in shower with GB and seat, raised toilet with GB   Prior Function   Level of [place] Independent with ADLs;Independent with functional mobility;Independent with IADLS   Lives With Alone   IADLs Independent with driving;Independent with meal prep;Independent with medication management   Falls in the last 6 months 0   Vocational Retired   Lifestyle   Autonomy Pta pt I with ADL, IADL and functional mobility, (+) .   Reciprocal Relationships spouse lives in a SNF   Service to Others retired   Intrinsic Gratification enjoys time with his dog   Subjective   Subjective \"The right hand is giving me trouble\"   ADL   Where Assessed Chair   Eating Assistance 5  Supervision/Setup   Grooming Assistance 4  Minimal Assistance   UB Bathing Assistance 4  Minimal Assistance   LB Bathing Assistance 3  Moderate Assistance   UB Dressing Assistance 4  Minimal Assistance   LB Dressing Assistance 3  Moderate Assistance   Toileting Assistance  3  Moderate Assistance   Functional Assistance 4  Minimal Assistance   Bed Mobility   Additional Comments Pt greeted and left OOB in chair with alarm on and all needs within reach.   Transfers " "  Sit to Stand 3  Moderate assistance   Additional items Assist x 1;Increased time required;Verbal cues   Stand to Sit 3  Moderate assistance   Additional items Assist x 1;Increased time required;Verbal cues;Impulsive   Additional Comments with RW, following stand to sit pt impulsively grabs RW/CT although calms   Functional Mobility   Functional Mobility 3  Moderate assistance   Additional Comments Pt performs short household distance mobility with MOD A x 1 with RW and chair follow, extra assist for lines.   Additional items Rolling walker   Balance   Static Sitting Fair   Dynamic Sitting Fair -   Static Standing Poor +   Dynamic Standing Poor   Ambulatory Poor   Activity Tolerance   Activity Tolerance Patient limited by fatigue;Patient limited by pain   Medical Staff Made Aware Co-eval with DPT due to high medical complexity, OTS.   Nurse Made Aware RN cleared for therapy.   RUE Assessment   RUE Assessment X  (pt reporting having \"carpal tunnel\" in RUE, with decreased wirst/digit AROM)   LUE Assessment   LUE Assessment WFL   Hand Function   Gross Motor Coordination Functional   Fine Motor Coordination Impaired   Sensation   Light Touch Partial deficits in the RUE;Partial deficits in the LUE   Additional Comments Reporting \"carpal tunnel\" in RUE, tingling in LUE   Cognition   Overall Cognitive Status Impaired   Arousal/Participation Cooperative;Alert   Attention Attends with cues to redirect   Orientation Level Oriented X4   Memory Decreased recall of precautions   Following Commands Follows one step commands with increased time or repetition   Comments Pt cooperative although becomes frustrated and irritable at times, with impulsivity and decreased insight often requiring vc for safety, will continue to assess.   Assessment   Limitation Decreased ADL status;Decreased Safe judgement during ADL;Decreased cognition;Decreased endurance;Decreased self-care trans;Decreased high-level ADLs   Prognosis Fair   Assessment " Pt is a 75 y.o. male who was admitted to [facility] on 5/15/2025 with CAD, multiple vessel, s/p CABG x2. Pt seen for an OT evaluation per active OT orders, CT in place.  Pt  has a past medical history of Arthritis, Cerebellar infarction (HCC), Cerebrovascular accident (CVA) due to embolism of cerebral artery (HCC), Diabetes mellitus (HCC), Hyperlipidemia, Hypertension, Left rotator cuff tear, Stroke (HCC), Tobacco abuse, and Wears glasses. Pt lives in a [facility] with ramped entrance, uses a walk-in shower with seat and GB, raised toilet with GB. Pta, pt was independent w/ ADL/IADL and functional mobility, was (+) driving and was not using any DME at baseline. Currently, pt is Min Ax1 for UB ADL, Mod Ax1 for LB ADL, and completed transfers/FM w Mod Ax1 with RW. Pt currently presents with impairments in the following categories -difficulty performing ADLS and limited insight into deficits activity tolerance, endurance, insight, safety , and judgement . These impairments, as well as pt's fatigue, SOB, JOHNSON, pain, and cardiac/sternal precautions  limit pt's ability to safely engage in all baseline areas of occupation, includingeating, grooming, bathing, dressing, toileting, and functional mobility/transfers.  The patient's raw score on the AM-PAC Daily Activity Inpatient Short Form is 15. A raw score of less than 19 suggests the patient may benefit from discharge to post-acute rehabilitation services. Please refer to the recommendation of the Occupational Therapist for safe discharge planning. Pt would benefit from continued acute OT services throughout hospital course and following D/C. Plan for OT interventions 2-3x per week. From OT standpoint, recommend level II services upon D/C. Pt was left seated in bedside chair with chair alarm on and all needs within reach.   Goals   Patient Goals to get better   LTG Time Frame 10-14   Long Term Goal #1 see below   Plan   Treatment Interventions ADL retraining;Functional  transfer training;Endurance training;Cognitive reorientation;Equipment evaluation/education;Fine motor coordination activities;Continued evaluation;Cardiac education;Energy conservation   Goal Expiration Date 06/03/25   OT Frequency 2-3x/wk   Discharge Recommendation   Rehab Resource Intensity Level, OT II (Moderate Resource Intensity)   AM-PAC Daily Activity Inpatient   Lower Body Dressing 2   Bathing 2   Toileting 2   Upper Body Dressing 3   Grooming 3   Eating 3   Daily Activity Raw Score 15   Daily Activity Standardized Score (Calc for Raw Score >=11) 34.69   AM-PAC Applied Cognition Inpatient   Following a Speech/Presentation 3   Understanding Ordinary Conversation 4   Taking Medications 3   Remembering Where Things Are Placed or Put Away 3   Remembering List of 4-5 Errands 2   Taking Care of Complicated Tasks 2   Applied Cognition Raw Score 17   Applied Cognition Standardized Score 36.52   Modified [place] Scale   Modified [place] Scale 4   End of Consult   Education Provided Yes   Patient Position at End of Consult Bedside chair;Bed/Chair alarm activated;All needs within reach   Nurse Communication Nurse aware of consult       OT Goals    - Pt will be Mod I with LB ADL by end of hospital course.    - Pt will be Mod I with UB ADL by end of hospital course.    - Pt will be Mod I with all functional transfers required for patient safety by end of hospital course.    - Pt will be Mod I with functional mobility to/from bathroom for increased independence with toileting tasks.    - Pt activity tolerance will increase to 30 minutes in order to safely engage in ADL and transfers.     - Pt standing tolerance will increase to 5 minutes  in order to promote sink side ADLs and IADL activities.    - Pt will consistently follow one-step directions with min to no vc or prompting.     - Pt will attend to functional tasks for 10 minutes with min to no vc for attention/redirection.    - Pt will attend to and complete ongoing  cognitive assessment in order to inform safe discharge planning.    - Pt will recall all cardiac precautions during OT sessions to promote safety following hospital stay.    - Pt will verbalize and demonstrate understanding to cardiac packet following education in order to promote safety following hospital stay.           TUSHAR Flores, OTR/L

## 2025-05-20 NOTE — PLAN OF CARE
Problem: PHYSICAL THERAPY ADULT  Goal: Performs mobility at highest level of function for planned discharge setting.  See evaluation for individualized goals.  Description: Treatment/Interventions: Functional transfer training, LE strengthening/ROM, Therapeutic exercise, Endurance training, Equipment eval/education, Bed mobility, Gait training, Spoke to nursing, Spoke to case management, OT  Equipment Recommended: Walker       See flowsheet documentation for full assessment, interventions and recommendations.  Note: Prognosis: Good  Problem List: Decreased strength, Decreased endurance, Impaired balance, Decreased mobility  Assessment: Pt is 75 y.o. male admitted with hx of sudden onset severe substernal CP at rest with radiation to his back and Dx of NSTEMI; cardiac cath revealing multivessel CAD and pt underwent Coronary artery bypass grafting x 2 with left internal mammary artery to left anterior descending, saphenous vein graft to ramus intermedius on 5/19/2025. Pt 's comorbidities affecting POC include: Arthritis, Cerebellar infarction (HCC), Cerebrovascular accident (CVA) due to embolism of cerebral artery (HCC), Diabetes mellitus (HCC), Hypertension, and Tobacco abuse and personal factors of: lives alone. Pt's clinical presentation is currently unstable/unpredictable which is evident in ongoing telemetry monitoring while in a critical care unit, abnormal lab values being monitored/trending, CT in place and ongoing postop management. Pt presents w/ postop guarding, generalized weakness, incl decreased LE strength, decreased functional endurance and activity tolerance, and impaired balance w/ associated gait deviations requiring use of rw at this time. Will cont to follow pt in PT for progressive mobilization to address above functional deficits and to max level of (I), endurance, and safety. D/C recommendations are listed below; will follow  Barriers to Discharge: Decreased caregiver support     Rehab  Resource Intensity Level, PT: II (Moderate Resource Intensity)    See flowsheet documentation for full assessment.

## 2025-05-20 NOTE — PROGRESS NOTES
Progress Note - Critical Care/ICU   Name: Tc Stovall 75 y.o. male I MRN: [id number]  Unit/Bed#: PPHP-314-01 I Date of Admission: 5/15/2025   Date of Service: 5/20/2025 I Hospital Day: 5       Assessment & Plan   Impressions:  MVCAD s/p CABG x 2 (LIMA > LAD, SVG > ramus intermedius)  NSTEMI  HTN  HLD  CVA  DM2    Neuro:   Cardiac Surgery Pain Control: ATC tylenol and PRN oxycodone 2.5/5mg  Delirium precautions  Regulate sleep/wake cycle  CAM-ICU daily  Trend neuro exam    CV:   Cardiac Surgery Hemodynamic Monitoring: MAP goal >65  Follow rhythm on telemetry  Critical Care Infusions : Cardene 2.5 mg/hour  Beta Blocker Plan: Start Lopressor 25 mg BID  Epicardial pacing wire plan : Epicardial pacing wires in place. Will pace as needed for bradycardia or cardiac output   Post op cardiac surgery medications:    mg QD  Statin: Lipitor 80 mg qHS  Amiodarone 200 mg PO q8 hours   Wean cardene     Lung:   Good room air saturation   Chest tube output remains persistently high; Continue chest tubes to suction today    GI:   Continue PPI for stress ulcer prophylaxis  Continue bowel regimen  Trend abdominal exam and bowel function    FEN:   Diuresis Plan: Bumex 40 mg IV BID  Nutrition plan: as tolerated  Replenish electrolytes with goals: K >4.0, Mag >2.0, and Phos >3.0    :   Matthew Plan: discontinue matthew   Trend UOP and BUN/creat  Strict I and O     ID:   Trend temps and WBC count  Maintain normothermia    Heme:   Trend hgb and plts    Endo:   Endocrine has been consulted. Continue home insulin teaching.    MSK/Skin:  Mobility goal:   PT/OT consult as medically appropriate   Frequent turning and pressure off-loading  Local wound care as needed    Disposition:  Stepdown Level 1    ICU Core Measures     A: Assess, Prevent, and Manage Pain Has pain been assessed? Yes  Need for changes to pain regimen? No   B: Both SAT/SAT  N/A   C: Choice of Sedation RASS Goal: 0 Alert and Calm  Need for changes to sedation or analgesia  regimen? No   D: Delirium CAM-ICU: Negative   E: Early Mobility  Plan for early mobility? Yes   F: Family Engagement Plan for family engagement today? Yes       Antibiotic Review: Post op requirements     Review of Invasive Devices:    Koehler Plan: Continue for accurate I/O monitoring for 48 hours  Central access plan: Medications requiring central line  Kimberly Plan: Keep arterial line for hemodynamic monitoring    Prophylaxis:  VTE VTE covered by:  fondaparinux, Subcutaneous       Stress Ulcer  covered bypantoprazole (PROTONIX) EC tablet 40 mg [[id number]]            24 Hour Events    24 Hour Events/HPI: POD # 1 s/p CABG x 2. Post-op was on phenylephrine and also received 500 LR and 500 albumin low CI and low CVP. Overnight was initiated on cardene for high SVR and PAC was removed     Critical Care Infusions : Cardene 2.5 mg/hour  Subjective   Review of Systems: Review of Systems   Constitutional: Negative.    Respiratory: Negative.     Cardiovascular:  Positive for chest pain.   Gastrointestinal: Negative.    Musculoskeletal:  Positive for arthralgias.   Neurological: Negative.    Psychiatric/Behavioral: Negative.       Objective :    Medications:  Scheduled Continuous   acetaminophen, 975 mg, Q6H While awake  amiodarone, 200 mg, Q8H LEONELA  aspirin, 325 mg, Daily  atorvastatin, 80 mg, Daily With Dinner  cefazolin, 2,000 mg, Q8H  chlorhexidine, 15 mL, BID  cholecalciferol, 1,000 Units, Daily  colchicine, 0.6 mg, BID  fluticasone, 1 spray, Daily  fondaparinux, 2.5 mg, Daily  gabapentin, 600 mg, BID  mupirocin, 1 Application, Q12H LEONELA  pantoprazole, 40 mg, Daily  polyethylene glycol, 17 g, Daily      insulin regular (HumuLIN R,NovoLIN R) 1 Units/mL in sodium chloride 0.9 % 100 mL infusion, 0.3-21 Units/hr, Last Rate: Stopped (05/20/25 0706)  insulin regular 100 units in sodium chloride 0.9% 100 mL, 100 Units, Last Rate: Stopped (05/19/25 4005)  niCARdipine, 2.5-15 mg/hr, Last Rate: Stopped (05/20/25 0706)  phenylephine,   mcg/min, Last Rate: Stopped (05/19/25 2216)  sodium chloride, 20 mL/hr, Last Rate: Stopped (05/20/25 0706)         Vitals: Invasive Monitoring       Most Recent Min/Max in 24hrs   Temp 97.5 °F (36.4 °C) Temp  Min: 97.1 °F (36.2 °C)  Max: 97.9 °F (36.6 °C)   Pulse 86 Pulse  Min: 56  Max: 94   Resp 18 Resp  Min: 15  Max: 23   /56 BP  Min: 106/56  Max: 144/74   O2 Sat 92 % SpO2  Min: 92 %  Max: 100 %   O2 Device: None (Room air) Arterial Line  Kimberly BP 94/72  Arterial Line BP  Min: 87/47  Max: 132/50   MAP 83 mmHg  Arterial Line MAP (mmHg)  Min: 63 mmHg  Max: 83 mmHg     PA Catheter   Most Recent  Min/Max in 24hrs    PAP 25/8 PAP  Min: 20/7  Max: 33/19   CVP 5 mmHg CVP (mean)  Min: 5 mmHg  Max: 13 mmHg   CI 2.5 L/min/m2  CI (L/min/m2)  Min: 1.5 L/min/m2  Max: 2.6 L/min/m2    (dyne*sec)/cm5 SVR (dyne*sec)/cm5  Min: 903 (dyne*sec)/cm5  Max: 1834 (dyne*sec)/cm5        I/O Chest tube output (if present)     Intake/Output Summary (Last 24 hours) at 5/20/2025 0709  Last data filed at 5/20/2025 0600  Gross per 24 hour   Intake 4920.26 ml   Output 2325 ml   Net 2595.26 ml     UOP: 75/hour    BM: 0 in the last 24 hours  Weight change: 3.9 kg (8 lb 9.6 oz)     Mediastinal tubes:  40 mL/8hr  165 mL/24hr   Pleural tubes: 60 mL/8hr  165 mL/24hr        Physical Exam   Physical Exam  Eyes:      Pupils: Pupils are equal, round, and reactive to light.   Skin:     General: Skin is warm and dry.      Capillary Refill: Capillary refill takes less than 2 seconds.   HENT:      Head: Normocephalic and atraumatic.   Cardiovascular:      Rate and Rhythm: Normal rate and regular rhythm.      Pulses: Normal pulses.      Heart sounds: Normal heart sounds.   Musculoskeletal:      Right lower leg: Trace Edema present.      Left lower leg: Trace Edema present.   Abdominal: General: There is no distension.      Palpations: Abdomen is soft.      Tenderness: There is no abdominal tenderness.   Constitutional:       Appearance: He is  well-developed and well-nourished.   Pulmonary:      Effort: Pulmonary effort is normal.      Breath sounds: Normal breath sounds.   Chest:      Comments: Pleural and mediastinal chest tubes in place  Neurological:      General: No focal deficit present.      Mental Status: He is alert and oriented to person, place and time. Mental status is at baseline. He is calm.      Motor: gross motor function is at baseline for patient.   Genitourinary/Anorectal:  Koehler present.        Diagnostic Studies      05/20/25 CXR: Lines appropriately placed, mild pulmonary vascular congestion, no PTX. This was personally reviewed by myself in PACS.  05/20/25 EKG: NSR rate 80s.   This was personally reviewed by myself.       Labs:  CBC  Recent Labs     05/19/25  0453 05/19/25  1252 05/19/25  1720 05/19/25 1737 05/20/25 0054 05/20/25  0416   WBC 9.66  --   --   --   --  14.36*   HGB 11.5*   < > 10.9*   < > 10.5* 10.3*   HCT 34.5*   < > 32.7*   < > 31.7* 32.3*      < > 238  --   --  204    < > = values in this interval not displayed.     BMP  Recent Labs     05/19/25  1720 05/19/25 1737 05/19/25  2058 05/20/25 0054 05/20/25  0416   SODIUM 139  --   --   --  140   K 3.7  --    < > 4.8 4.7   *  --   --   --  109*   CO2 23 23  --   --  20*   AGAP 7  --   --   --  11   BUN 19  --   --   --  20   CREATININE 0.99  --   --   --  1.06   CALCIUM 8.1*  --   --   --  8.0*    < > = values in this interval not displayed.        Baseline Creat: 1.0   Coags  Recent Labs     05/18/25  1120 05/19/25  0453   PTT 72* 87*              Additional Electrolytes  Recent Labs     05/19/25  1626 05/19/25 1737 05/20/25  0416   MG  --   --  2.4   CAIONIZED 1.21 1.17  --           Blood Gas  No recent results  No recent results LFTs  No recent results    Infectious  No recent results     No recent results Glucose  Recent Labs     05/19/25  0453 05/19/25  1720 05/20/25  0416   GLUC 160* 174* 182*      Administrative Statements     Collaborative  bedside rounds performed with cardiac surgery attending, critical care attending and bedside RN.

## 2025-05-20 NOTE — PROGRESS NOTES
"Progress Note - Cardiology   Name: Tc Stovall 75 y.o. male I MRN: [id number]  Unit/Bed#: PPHP-314-01 I Date of Admission: 5/15/2025   Date of Service: 5/20/2025 I Hospital Day: 5     Assessment & Plan  NSTEMI (non-ST elevated myocardial infarction) (HCC)  I personally reviewed patient's left heart cath from 5/15/2025.  He had severe multivessel coronary artery disease with significant stenosis in his ramus intermedius, first diagonal (culprit), mid LAD, mid to distal circumflex, RPDA.  I reviewed his follow-up transthoracic echocardiogram from 5/5/2025 showing low normal LVEF of approximately 52% without regional wall motion abnormalities.  S/p CABG  Continue aspirin, high intensity statin, beta-blocker as prescribed.    Continue colchicine for possible pericardial inflammation post MI  Amiodarone per CT surgery, diuresis per CT surgery  CAD, multiple vessel  As above  S/P CABG x 2  As above  Type 2 diabetes mellitus (HCC)  Per primary  SGLT2 and GLP-1 are cost prohibitive  Primary hypertension  Continue metoprolol as prescribed  Hyperlipidemia  Continue atorvastatin as prescribed  History of CVA (cerebrovascular accident)  Multiple acute to subacute lacunar infarcts 1/2025. Continue to follow with cardiology outpatient given concerns for possible cardioembolic etiology. Recommend establishing with outpatient neurology.   Continue aspirin, high intensity statin as prescribed  Paroxysmal atrial tachycardia (HCC)  Continue beta blocker  Tobacco use  Smoking cessation recommended      -----        Subjective:     Overnight events reviewed.   No status post CABG with LIMA to LAD, SVG to ramus  Started on Cardene drip postoperatively.  Denies chest pain this morning  Telemetry reviewed and showed sinus rhythm.        Current Medications[1]      Objective:     Vitals:   Blood pressure 144/65, pulse 88, temperature 97.5 °F (36.4 °C), resp. rate 21, height 5' 7\" (1.702 m), weight 103 kg (227 lb 11.8 oz), SpO2 97%.  Body " mass index is 35.67 kg/m².  Systolic (24hrs), Av , Min:106 , Max:144     Diastolic (24hrs), Av, Min:56, Max:65      Intake/Output Summary (Last 24 hours) at 2025 1255  Last data filed at 2025 1142  Gross per 24 hour   Intake 5470.35 ml   Output 3495 ml   Net 1975.35 ml     Weight (last 2 days)       Date/Time Weight    25 0540 103 (227.74)    25 0633 99.4 (219.14)              Physical Exam  Vitals reviewed.   Constitutional:       General: He is not in acute distress.     Appearance: Normal appearance. He is well-developed.   HENT:      Head: Normocephalic and atraumatic.     Cardiovascular:      Rate and Rhythm: Normal rate and regular rhythm.      Heart sounds: No murmur heard.  Pulmonary:      Effort: Pulmonary effort is normal. No respiratory distress.      Breath sounds: Normal breath sounds.   Chest:      Comments: Sternal incision bandaged, clean, dry, intact  Abdominal:      General: There is no distension.     Musculoskeletal:         General: No swelling.     Neurological:      Mental Status: He is alert.     Psychiatric:         Mood and Affect: Mood normal.           Labs & Results:    Lab Results   Component Value Date    SODIUM 140 2025    K 4.7 2025     (H) 2025    CO2 20 (L) 2025    BUN 20 2025    CREATININE 1.06 2025    GLUC 182 (H) 2025    CALCIUM 8.0 (L) 2025     Lab Results   Component Value Date    WBC 14.36 (H) 2025    RBC 3.55 (L) 2025    HGB 10.3 (L) 2025    HCT 32.3 (L) 2025    MCV 91 2025    MCH 29.0 2025    RDW 12.7 2025     2025     Results from last 7 days   Lab Units 25  0453 25  1120 25  0503 25  2353 25  1724   PTT seconds 87* 72* 62*   < > 34   INR   --   --   --   --  1.00    < > = values in this interval not displayed.       Available cardiology studies, imaging and lab results independently reviewed  "today.    Jade Márquez MD, PhD  Cardiology  [facility]    This note was completed in part utilizing voice recognition software.   Grammatical errors, random word insertion, spelling mistakes, occasional wrong word or \"sound-alike\" substitutions and incomplete sentences may be an occasional consequence of the system secondary to software limitations, ambient noise and hardware issues. At the time of dictation, efforts were made to edit, clarify and /or correct errors.  Please read the chart carefully and recognize, using context, where substitutions have occurred.  If you have any questions or concerns about the context, text or information contained within the body of this dictation, please contact myself, the provider, for further clarification.            [1]   Current Facility-Administered Medications:     acetaminophen (TYLENOL) tablet 650 mg, 650 mg, Oral, Q6H PRN, Akhil Merlos PA-C    acetaminophen (TYLENOL) tablet 975 mg, 975 mg, Oral, Q6H While awake, Nay Sahu PA-C, 975 mg at 05/20/25 0815    amiodarone tablet 200 mg, 200 mg, Oral, Q8H LEONELA, Nay Sahu PA-C, 200 mg at 05/20/25 0552    aspirin tablet 325 mg, 325 mg, Oral, Daily, Nay Sahu PA-C, 325 mg at 05/19/25 1840    atorvastatin (LIPITOR) tablet 80 mg, 80 mg, Oral, Daily With Dinner, Nay Sahu PA-C    bisacodyl (DULCOLAX) rectal suppository 10 mg, 10 mg, Rectal, Daily PRN, Nay Sahu PA-C    chlorhexidine (PERIDEX) 0.12 % oral rinse 15 mL, 15 mL, Mouth/Throat, BID, Nay Sahu PA-C, 15 mL at 05/20/25 0815    Cholecalciferol (VITAMIN D3) tablet 1,000 Units, 1,000 Units, Oral, Daily, Nay Sahu PA-C, 1,000 Units at 05/20/25 0815    colchicine (COLCRYS) tablet 0.6 mg, 0.6 mg, Oral, BID, Nay Sahu PA-C, 0.6 mg at 05/20/25 0815    docusate sodium (COLACE) capsule 100 mg, 100 mg, Oral, BID, Akhil Merlos PA-C, 100 mg at 05/20/25 0815    fluticasone (FLONASE) 50 mcg/act " nasal spray 1 spray, 1 spray, Nasal, Daily, Nay Sahu PA-C    fondaparinux (ARIXTRA) subcutaneous injection 2.5 mg, 2.5 mg, Subcutaneous, Daily, Nay Sahu PA-C, 2.5 mg at 05/20/25 0815    furosemide (LASIX) injection 40 mg, 40 mg, Intravenous, BID (diuretic), Akhil Merlos PA-C, 40 mg at 05/20/25 0814    gabapentin (NEURONTIN) capsule 600 mg, 600 mg, Oral, BID, Nay Sahu PA-C, 600 mg at 05/20/25 0815    insulin regular (HumuLIN R,NovoLIN R) 1 Units/mL in sodium chloride 0.9 % 100 mL infusion, 0.3-21 Units/hr, Intravenous, Titrated, Nay Sahu PA-C, Last Rate: 5 mL/hr at 05/20/25 1107, 5 Units/hr at 05/20/25 1107    magnesium sulfate 2 g/50 mL IVPB (premix) 2 g, 2 g, Intravenous, Once, Akhil Merlos PA-C    metoprolol tartrate (LOPRESSOR) tablet 25 mg, 25 mg, Oral, Q12H LEONELA, Akhil Merlos PA-C, 25 mg at 05/20/25 0815    mupirocin (BACTROBAN) 2 % nasal ointment 1 Application, 1 Application, Nasal, Q12H LEONELA, Nay Sahu PA-C, 1 Application at 05/20/25 0815    ondansetron (ZOFRAN) injection 4 mg, 4 mg, Intravenous, Q6H PRN, Nay Sahu PA-C    oxyCODONE (ROXICODONE) IR tablet 2.5 mg, 2.5 mg, Oral, Q4H PRN **OR** oxyCODONE (ROXICODONE) IR tablet 5 mg, 5 mg, Oral, Q4H PRN, Nay Sahu PA-C, 5 mg at 05/20/25 0815    pantoprazole (PROTONIX) EC tablet 40 mg, 40 mg, Oral, Daily, Nay Sahu PA-C, 40 mg at 05/20/25 0815    polyethylene glycol (MIRALAX) packet 17 g, 17 g, Oral, Daily, Nay Sahu PA-C, 17 g at 05/20/25 0815    potassium chloride (Klor-Con M20) CR tablet 20 mEq, 20 mEq, Oral, BID, Akhil Merlos PA-C, 20 mEq at 05/20/25 0815    sodium chloride infusion 0.45 %, 20 mL/hr, Intravenous, Continuous, Nay Sahu PA-C, Last Rate: 20 mL/hr at 05/20/25 1109, 20 mL/hr at 05/20/25 1109    temazepam (RESTORIL) capsule 15 mg, 15 mg, Oral, HS PRN, Akhil Merlos PA-C

## 2025-05-20 NOTE — RESPIRATORY THERAPY NOTE
Extubation Note   05/19/25 9483   Respiratory Assessment   Resp Comments Pt extubated per order. Pt suctioned pre and post extubation. Pt placed on 4 lpm nc post extubation. No stridor at this time. Pt knew where he was and why he was here post extubation.   Vent Information   Ventilator End (S)  Yes   Daily Screen   Patient safety screen outcome: Passed   Spont breathing trial % for 30 min Yes   Spont breathing trial outcome: Passed   Name of Medical Team Notified: VENTURA GONZALES   Preparing to extubate/ Notify Nurse Yes   Extubation order obtained Yes   Consider Cuff Test Yes   Patient extubated Yes   RSBI 30

## 2025-05-20 NOTE — RESTORATIVE TECHNICIAN NOTE
Restorative Technician Note      Patient Name: Tc Stovall     Restorative Tech Visit Date: 05/20/25  Note Type: Mobility  Patient Position Upon Consult: Bedside chair  Activity Performed: Ambulated  Assistive Device: Roller walker  Patient Position at End of Consult: Bed/Chair alarm activated; All needs within reach; Bedside chair

## 2025-05-20 NOTE — PLAN OF CARE
Problem: OCCUPATIONAL THERAPY ADULT  Goal: Performs self-care activities at highest level of function for planned discharge setting.  See evaluation for individualized goals.  Description: Treatment Interventions: ADL retraining, Functional transfer training, Endurance training, Cognitive reorientation, Equipment evaluation/education, Fine motor coordination activities, Continued evaluation, Cardiac education, Energy conservation          See flowsheet documentation for full assessment, interventions and recommendations.   Outcome: Progressing  Note: Limitation: Decreased ADL status, Decreased Safe judgement during ADL, Decreased cognition, Decreased endurance, Decreased self-care trans, Decreased high-level ADLs  Prognosis: Fair  Assessment: Pt is a 75 y.o. male who was admitted to [facility] on 5/15/2025 with CAD, multiple vessel, s/p CABG x2. Pt seen for an OT evaluation per active OT orders, CT in place.  Pt  has a past medical history of Arthritis, Cerebellar infarction (HCC), Cerebrovascular accident (CVA) due to embolism of cerebral artery (HCC), Diabetes mellitus (HCC), Hyperlipidemia, Hypertension, Left rotator cuff tear, Stroke (HCC), Tobacco abuse, and Wears glasses. Pt lives in a [facility] with ramped entrance, uses a walk-in shower with seat and GB, raised toilet with GB. Pta, pt was independent w/ ADL/IADL and functional mobility, was (+) driving and was not using any DME at baseline. Currently, pt is Min Ax1 for UB ADL, Mod Ax1 for LB ADL, and completed transfers/FM w Mod Ax1 with RW. Pt currently presents with impairments in the following categories -difficulty performing ADLS and limited insight into deficits activity tolerance, endurance, insight, safety , and judgement . These impairments, as well as pt's fatigue, SOB, JOHNSON, pain, and cardiac/sternal precautions  limit pt's ability to safely engage in all baseline areas of occupation, includingeating, grooming, bathing, dressing, toileting,  and functional mobility/transfers.  The patient's raw score on the AM-PAC Daily Activity Inpatient Short Form is 15. A raw score of less than 19 suggests the patient may benefit from discharge to post-acute rehabilitation services. Please refer to the recommendation of the Occupational Therapist for safe discharge planning. Pt would benefit from continued acute OT services throughout hospital course and following D/C. Plan for OT interventions 2-3x per week. From OT standpoint, recommend level II services upon D/C. Pt was left seated in bedside chair with chair alarm on and all needs within reach.     Rehab Resource Intensity Level, OT: II (Moderate Resource Intensity)           5

## 2025-05-20 NOTE — PROGRESS NOTES
Progress Note - Cardiac Surgery   Tc Stovall 75 y.o. male MRN: [id number]  Unit/Bed#: PPHP-314-01 Encounter: [id number]    Coronary artery disease. S/P coronary artery bypass grafting; POD # 2      24 Hour Events: Transferred from ICU to step down. Cardene weaned off. No telemetry events overnight.     Medications:   Scheduled Meds:  Current Facility-Administered Medications   Medication Dose Route Frequency Provider Last Rate    acetaminophen  650 mg Oral Q6H PRN Akhil Merlos PA-C      acetaminophen  975 mg Oral Q6H While awake Nay Sahu PA-C      amiodarone  200 mg Oral Q8H LEONELA Nay Sahu PA-C      aspirin  325 mg Oral Daily Nay Sahu PA-C      atorvastatin  80 mg Oral Daily With Dinner Nay Sahu PA-C      bisacodyl  10 mg Rectal Daily PRN Nay Sahu PA-C      chlorhexidine  15 mL Mouth/Throat BID Nay Sahu PA-C      cholecalciferol  1,000 Units Oral Daily Nay Sahu PA-C      colchicine  0.6 mg Oral BID Nay Sahu PA-C      docusate sodium  100 mg Oral BID Akhil Merlos PA-C      fluticasone  1 spray Nasal Daily Nay Sahu PA-C      fondaparinux  2.5 mg Subcutaneous Daily Nay Sahu PA-C      furosemide  40 mg Intravenous BID (diuretic) Akhil Merlos PA-C      gabapentin  600 mg Oral BID Nay Sahu PA-C      insulin regular (HumuLIN R,NovoLIN R) 1 Units/mL in sodium chloride 0.9 % 100 mL infusion  0.3-21 Units/hr Intravenous Titrated Nay Sahu PA-C 3 Units/hr (05/21/25 0440)    magnesium sulfate  2 g Intravenous Once Akhil Merlos PA-C      metoprolol tartrate  25 mg Oral Q12H LEONELA Akhil Merlos PA-C      mupirocin  1 Application Nasal Q12H LEONELA Nay Sahu PA-C      ondansetron  4 mg Intravenous Q6H PRN Nay Sahu PA-C      oxyCODONE  2.5 mg Oral Q4H PRN Nay Sahu PA-C      Or    oxyCODONE  5 mg Oral Q4H PRN Nay Sahu PA-C      pantoprazole  40 mg Oral Daily Nay Sahu PA-C       polyethylene glycol  17 g Oral Daily Nay Sahu PA-C      potassium chloride  20 mEq Oral BID Akhil Merlos PA-C      sodium chloride  20 mL/hr Intravenous Continuous Nay Sahu PA-C 20 mL/hr (05/20/25 1109)    temazepam  15 mg Oral HS PRN Akhil Merlos PA-C       Continuous Infusions:insulin regular (HumuLIN R,NovoLIN R) 1 Units/mL in sodium chloride 0.9 % 100 mL infusion, 0.3-21 Units/hr, Last Rate: 3 Units/hr (05/21/25 0440)  sodium chloride, 20 mL/hr, Last Rate: 20 mL/hr (05/20/25 1109)      PRN Meds:.  acetaminophen    bisacodyl    ondansetron    oxyCODONE **OR** oxyCODONE    temazepam    Vitals:   Vitals:    05/21/25 0320 05/21/25 0600 05/21/25 0628 05/21/25 0636   BP:   141/67    BP Location:   Left arm    Pulse:   87 83   Resp:   22 17   Temp: 97.9 °F (36.6 °C)  98 °F (36.7 °C)    TempSrc: Oral  Oral    SpO2:   (!) 89% 93%   Weight:  105 kg (231 lb 0.7 oz)     Height:           Telemetry: NSR; Heart Rate: 84    Respiratory:   SpO2: SpO2: 93 %, SpO2 Activity: SpO2 Activity: At Rest; Room Air    Intake/Output:     Intake/Output Summary (Last 24 hours) at 5/21/2025 0846  Last data filed at 5/21/2025 0814  Gross per 24 hour   Intake 1847.44 ml   Output 1890 ml   Net -42.56 ml        Weights:   Weight (last 2 days)       Date/Time Weight    05/21/25 0600 105 (231.04)    05/20/25 0540 103 (227.74)    05/19/25 0633 99.4 (219.14)            Chest tube Output:    Mediastinal tubes: 10 mL/8 hours  140 mL/24 hours   Pleural tubes: 30 mL/8 hours  120 mL/24 hours       Results:   Results from last 7 days   Lab Units 05/21/25  0424 05/20/25  0416 05/20/25  0054 05/19/25  1737 05/19/25  1720 05/19/25  1252 05/19/25  0453   WBC Thousand/uL 14.23* 14.36*  --   --   --   --  9.66   HEMOGLOBIN g/dL 9.4* 10.3* 10.5*  --  10.9*  --  11.5*   I STAT HEMOGLOBIN   --   --   --    < >  --    < >  --    HEMATOCRIT % 29.0* 32.3* 31.7*  --  32.7*  --  34.5*   HEMATOCRIT, ISTAT   --   --   --    < >  --    < >  --     PLATELETS Thousands/uL 214 204  --   --  238   < > 254    < > = values in this interval not displayed.     Results from last 7 days   Lab Units 05/21/25  0424 05/20/25  0416 05/20/25  0054 05/19/25  2058 05/19/25  1737 05/19/25  1720   SODIUM mmol/L 135 140  --   --   --  139   POTASSIUM mmol/L 4.1 4.7 4.8   < >  --  3.7   CHLORIDE mmol/L 103 109*  --   --   --  109*   CO2 mmol/L 24 20*  --   --   --  23   CO2, I-STAT mmol/L  --   --   --   --  23  --    BUN mg/dL 25 20  --   --   --  19   CREATININE mg/dL 1.21 1.06  --   --   --  0.99   GLUCOSE, ISTAT mg/dl  --   --   --   --  168*  --    CALCIUM mg/dL 8.1* 8.0*  --   --   --  8.1*    < > = values in this interval not displayed.     Recent Labs     05/21/25  0424   MG 1.8*     Results from last 7 days   Lab Units 05/19/25  0453 05/18/25  1120 05/18/25  0503 05/14/25  2353 05/14/25  1724   INR   --   --   --   --  1.00   PTT seconds 87* 72* 62*   < > 34    < > = values in this interval not displayed.         Point of care glucose: 140-151    Studies:  No new imaging studies    Invasive Lines/Tubes:  Invasive Devices       Central Venous Catheter Line  Duration             CVC Central Lines 05/19/25 Right Internal jugular 1 day              Peripheral Intravenous Line  Duration             Peripheral IV 05/19/25 Right Antecubital 1 day              Line  Duration             Pacer Wires 1 day    Pacer Wires 1 day              Drain  Duration             Chest Tube 1 Left Pleural 32 Fr. 1 day    Chest Tube 2 Posterior;Mediastinal 32 Fr. 1 day    Chest Tube 3 Mediastinal;Anterior 32 Fr. 1 day                    Physical Exam:    General: No acute distress, Alert, and Normal appearance  HEENT/NECK:  Normocephalic. Atraumatic.  No jugular venous distention.    Cardiac: Regular rate and rhythm and No murmurs/rubs/gallops  Pulmonary:  Breath sounds diminished in the bases bilaterally  and Mild wheeze B/L  Abdomen:  Non-tender, Non-distended, and Normal bowel  sounds  Incisions: Sternum is stable.  Incision dressed with Acticoat.  No erythema or drainage and Saphenectomy incision dressed with Acticoat.  No erythema or drainage  Extremities: Extremities warm/dry and 1+ edema B/L  Neuro: Alert and oriented X 3, Sensation is grossly intact, and No focal deficits  Skin: Warm/Dry, without rashes or lesions.    Assessment:  Principal Problem:    CAD, multiple vessel  Active Problems:    Type 2 diabetes mellitus ([facility])    Primary hypertension    Hyperlipidemia    History of CVA (cerebrovascular accident)    Elevated troponin    NSTEMI (non-ST elevated myocardial infarction) ([facility])    Paroxysmal atrial tachycardia ([facility])    PVC's (premature ventricular contractions)    Tobacco use    Fever    S/P CABG x 2       Coronary artery disease. S/P coronary artery bypass grafting; POD # 2    Plan:    Cardiac:     S/P acute preoperative NSTEMI  Normal ventricular systolic function, EF 50%    NSR; Hypertensive  Tachycardic    Increase to Lopressor, 50mg PO BID    AF prophylaxis  Continue Amiodarone, 200 mg PO TID    Continue ASA and Statin therapy    Epicardial pacing wires no longer required.  Remove today    Maintain central IV access today for lack of peripheral access    Continue Arixtra for DVT prophylaxis    Pulmonary:     Good Room air oxygen saturation; Continue incentive spirometry/Coughing/Deep breathing exercises  H/O tobacco use    Chest tube drainage diminished; D/C today    Renal:     Normal preoperative renal function  Creatinine 1.21 today, from 1.06    Intake/Output net: -  188  mL/24 hours    Wt today 231, from 227   Pre-op Wt: 226    Diuretic Regimen:  Transition to  PO Torsemide, 20 mg BID  Continue Potassium Chloride 20 mEq PO BID    Neuro:   H/O multiple CVAs (has not followed with neurology)    Neurologically intact; No active issues     Incisional pain well controlled   Continue tylenol, 975 mg PO q 8, standing dose   Continue oxycodone, 2.5 to 5 mg PO q 4 hours prn  pain    GI:    Controlled carbohydrate diet level two/Cardiac diet, with 1800 mL fluid restriction    Tolerating diet without complaint  + BM postoperatively    Continue stool softeners and prn suppository    Continue GI prophylaxis    Endo:   IDDM2 (A1c- 7.7)     Remains on continuous insulin infusion  On injectable therapy, prior to surgical intervention  Recommendation for discharge diabetes regimen pending endocrinology follow up  Endocrinology following daily    7    Hematology:     Post-operative acute blood loss anemia; Hemoglobin 9.4; trend prn    8.   Disposition:        Routine postoperative recovery to this point; Anticipated discharge date: 5/22 vs 5/23      VTE Pharmacologic Prophylaxis: VTE covered by:  fondaparinux, Subcutaneous, 2.5 mg at 05/21/25 0824     VTE Mechanical Prophylaxis: sequential compression device    Collaborative rounds completed with supervising physician  Plan of care discussed with bedside nurse    SIGNATURE: Jef Quiroz PA-C  DATE: May 21, 2025  TIME: 8:46 AM

## 2025-05-20 NOTE — ASSESSMENT & PLAN NOTE
Lab Results   Component Value Date    HGBA1C 7.7 (H) 05/13/2025       Recent Labs     05/20/25  0548 05/20/25  1100 05/20/25  1106 05/20/25  1153   POCGLU 191* 229* 276* 275*     Blood Sugar Average: Last 72 hrs:  (P) 200.7  Type 2 diabetes mellitus with hyperglycemia as evidenced by A1c of 7.7% on 5/20/2025  Home regimen: Lantus 20 units nightly, glipizide 10 mg daily  Current regimen: Insulin infusion  Blood sugars reviewed-patient with postprandial hyperglycemia while on the infusion.  He reports terrible appetite, stating that he is only eating a little bit of his meals as he does not like the food.    Plan:  At this time recommend continuing insulin infusion  Will reevaluate patient tomorrow and hopefully transition to basal bolus insulin.  Continue Accu-Cheks per infusion protocol  Monitor for hypoglycemia, treat per protocol  Goal blood sugars while in the [id number]-134-155 mg/dL  Discharge recommendations pending clinical course  Endocrinology will continue following

## 2025-05-20 NOTE — CONSULTS
Consultation - Endocrinology   Name: Tc Stovall 75 y.o. male I MRN: [id number]  Unit/Bed#: PPHP-314-01 I Date of Admission: 5/15/2025   Date of Service: 5/20/2025 I Hospital Day: 5   Inpatient consult to Endocrinology  Consult performed by: Hui Bowman MD  Consult ordered by: Akhil Merlos PA-C        Physician Requesting Evaluation: SHREE Polo MD   Reason for Evaluation / Principal Problem: Type 2 diabetes mellitus    Assessment & Plan  CAD, multiple vessel  Status post CABG x 2 on 5/19/2025  Management per primary team  Type 2 diabetes mellitus (HCC)  Lab Results   Component Value Date    HGBA1C 7.7 (H) 05/13/2025       Recent Labs     05/20/25  0548 05/20/25  1100 05/20/25  1106 05/20/25  1153   POCGLU 191* 229* 276* 275*     Blood Sugar Average: Last 72 hrs:  (P) 200.7  Type 2 diabetes mellitus with hyperglycemia as evidenced by A1c of 7.7% on 5/20/2025  Home regimen: Lantus 20 units nightly, glipizide 10 mg daily  Current regimen: Insulin infusion  Blood sugars reviewed-patient with postprandial hyperglycemia while on the infusion.  He reports terrible appetite, stating that he is only eating a little bit of his meals as he does not like the food.    Plan:  At this time recommend continuing insulin infusion  Will reevaluate patient tomorrow and hopefully transition to basal bolus insulin.  Continue Accu-Cheks per infusion protocol  Monitor for hypoglycemia, treat per protocol  Goal blood sugars while in the heefxozr-168-528 mg/dL  Discharge recommendations pending clinical course  Endocrinology will continue following  S/P CABG x 2  Management per primary team      History of Present Illness   Tc Stovall is a 75 y.o. male with a past medical history of type 2 diabetes mellitus CVA, CAD, hypertension, hyperlipidemia who is seen today in consultation for the management of hyperglycemia.  Patient originally presented to [facility] and low on 5/15 with chest pain, noted to have elevated  troponin and admitted for NSTEMI.  Underwent cardiac catheterization which demonstrated multivessel coronary artery disease.  Patient was transferred to [facility] for CT surgery evaluation and underwent CABG X2 on 5/19/2025.  At home he takes Lantus 20 units nightly and glipizide 10 mg daily.  Currently he is on insulin infusion.  Most recent A1c-7.7% on 5/13/2025.  He uses a Dexcom G7 to check sugars at home.  States the blood sugars usually range 160-210 throughout the day.  He does not follow-up with endocrinology, diabetes is managed by his primary care provider.  He endorses a family history of diabetes in his mom and possibly his son.  At the time of my evaluation, he reports feeling well, however endorses terrible appetite.  Additionally states that the food here is very dry and he has not eaten much other than fish for lunch.  Tried to the pasta, however did not like it.  Denies nausea, vomiting, signs or symptoms of hypoglycemia.    Review of Systems   Constitutional:  Negative for appetite change and unexpected weight change.   HENT:  Negative for congestion.    Eyes:  Negative for visual disturbance.   Respiratory:  Negative for cough and shortness of breath.    Cardiovascular:  Negative for chest pain, palpitations and leg swelling.   Gastrointestinal:  Negative for abdominal pain, constipation, diarrhea, nausea and vomiting.   Endocrine: Negative for polydipsia and polyuria.   Genitourinary:  Negative for frequency.   Musculoskeletal:  Negative for myalgias.   Skin:  Negative for rash.   Neurological:  Negative for dizziness, weakness, light-headedness, numbness and headaches.   Psychiatric/Behavioral:  Negative for sleep disturbance.    All other systems reviewed and are negative.    Medical History Review: I have reviewed the patient's PMH, PSH, Social History, Family History, Meds, and Allergies   Historical Information   Past Medical History:   Diagnosis Date    Arthritis     Cerebellar  infarction (HCC)     Cerebrovascular accident (CVA) due to embolism of cerebral artery (HCC)     Diabetes mellitus (HCC)     Hyperlipidemia     Hypertension     Left rotator cuff tear     Stroke (HCC) [id number]    Tobacco abuse     Wears glasses      Past Surgical History:   Procedure Laterality Date    CARDIAC CATHETERIZATION N/A 5/15/2025    Procedure: Cardiac Catheterization;  Surgeon: Mo Huertas MD;  Location: [facility];  Service: Cardiology    CARDIAC CATHETERIZATION N/A 5/15/2025    Procedure: Cardiac Coronary Angiogram;  Surgeon: Mo Huertas MD;  Location: [facility];  Service: Cardiology    CARDIAC CATHETERIZATION Left 5/15/2025    Procedure: Cardiac Left Heart Cath;  Surgeon: Mo Huertas MD;  Location: [facility];  Service: Cardiology    COLONOSCOPY      KNEE SURGERY      SC CORONARY ARTERY BYP W/VEIN & ARTERY GRAFT 3 VEIN N/A 5/19/2025    Procedure: CORONARY ARTERY BYPASS GRAFT (CABG) X2 VESSELS  USING LEFT DENILSON-> LAD  AND LEFT GSV-> RAMUS;  Surgeon: SHREE Polo MD;  Location:  MAIN OR;  Service: Cardiac Surgery    SC SURGICAL ARTHROSCOPY SHOULDER W/ROTATOR CUFF RPR Left 08/10/2022    Procedure: Left Shoulder Arthroscopy, supraspinatus and Subscapularis Repair, Bicep Tenotomy, acromioplasty, extensive debridement;  Surgeon: Otilio Mendez DO;  Location: [facility];  Service: Orthopedics    TONSILLECTOMY  1954     Social History     Tobacco Use    Smoking status: Every Day     Current packs/day: 0.50     Average packs/day: 0.6 packs/day for 85.9 years (55.3 ttl pk-yrs)     Types: Cigarettes     Start date: 4/26/1964    Smokeless tobacco: Never   Vaping Use    Vaping status: Never Used   Substance and Sexual Activity    Alcohol use: Yes     Comment: rarely    Drug use: No    Sexual activity: Not Currently     Partners: Female     Birth control/protection: Female Sterilization     E-Cigarette/Vaping    E-Cigarette Use Never User       E-Cigarette/Vaping Substances    Nicotine No     THC No     CBD No     Flavoring No     Other No     Unknown No      Family History   Problem Relation Age of Onset    Alzheimer's disease Mother     Diabetes Mother     Heart block Father     Heart disease Father      Social History     Tobacco Use    Smoking status: Every Day     Current packs/day: 0.50     Average packs/day: 0.6 packs/day for 85.9 years (55.3 ttl pk-yrs)     Types: Cigarettes     Start date: 4/26/1964    Smokeless tobacco: Never   Vaping Use    Vaping status: Never Used   Substance and Sexual Activity    Alcohol use: Yes     Comment: rarely    Drug use: No    Sexual activity: Not Currently     Partners: Female     Birth control/protection: Female Sterilization       Current Facility-Administered Medications:     acetaminophen (TYLENOL) tablet 650 mg, Q6H PRN    acetaminophen (TYLENOL) tablet 975 mg, Q6H While awake    amiodarone tablet 200 mg, Q8H LEONELA    aspirin tablet 325 mg, Daily    atorvastatin (LIPITOR) tablet 80 mg, Daily With Dinner    bisacodyl (DULCOLAX) rectal suppository 10 mg, Daily PRN    chlorhexidine (PERIDEX) 0.12 % oral rinse 15 mL, BID    Cholecalciferol (VITAMIN D3) tablet 1,000 Units, Daily    colchicine (COLCRYS) tablet 0.6 mg, BID    docusate sodium (COLACE) capsule 100 mg, BID    fluticasone (FLONASE) 50 mcg/act nasal spray 1 spray, Daily    fondaparinux (ARIXTRA) subcutaneous injection 2.5 mg, Daily    furosemide (LASIX) injection 40 mg, BID (diuretic)    gabapentin (NEURONTIN) capsule 600 mg, BID    insulin regular (HumuLIN R,NovoLIN R) 1 Units/mL in sodium chloride 0.9 % 100 mL infusion, Titrated, Last Rate: 6 Units/hr (05/20/25 1537)    magnesium sulfate 2 g/50 mL IVPB (premix) 2 g, Once    metoprolol tartrate (LOPRESSOR) tablet 25 mg, Q12H LEONELA    mupirocin (BACTROBAN) 2 % nasal ointment 1 Application, Q12H LEONELA    ondansetron (ZOFRAN) injection 4 mg, Q6H PRN    oxyCODONE (ROXICODONE) IR tablet 2.5 mg, Q4H PRN **OR**  oxyCODONE (ROXICODONE) IR tablet 5 mg, Q4H PRN    pantoprazole (PROTONIX) EC tablet 40 mg, Daily    polyethylene glycol (MIRALAX) packet 17 g, Daily    potassium chloride (Klor-Con M20) CR tablet 20 mEq, BID    sodium chloride infusion 0.45 %, Continuous, Last Rate: 20 mL/hr (25 1109)    temazepam (RESTORIL) capsule 15 mg, HS PRN  Prior to Admission Medications   Prescriptions Last Dose Informant Patient Reported? Taking?   Blood Glucose Monitoring Suppl (OneTouch Verio Reflect) w/Device KIT  Self No No   Sig: Check blood sugars three times daily. Please substitute with appropriate alternative as covered by patient's insurance. Dx: E11.65   Continuous Glucose Sensor (Dexcom G7 Sensor)  Self No No   Sig: Use 1 Device every 10 days   Insulin Pen Needle ([employer] Unifine Pentips Plus) 32G X 4 MM MISC  Self No No   Sig: USE DAILY AS INSTRUCTED WITH INSULIN PEN   OneTouch Delica Lancets 33G MISC  Self No No   Sig: Check blood sugars three times daily. Please substitute with appropriate alternative as covered by patient's insurance. Dx: E11.65   acetaminophen (TYLENOL) 500 mg tablet  Self Yes No   Sig: Take 500 mg by mouth every 6 (six) hours as needed for mild pain   amLODIPine (NORVASC) 5 mg tablet  Self No No   Sig: Take 1 tablet (5 mg total) by mouth daily   aspirin 81 mg chewable tablet  Self No No   Sig: Chew 1 tablet (81 mg total) daily   atorvastatin (LIPITOR) 40 mg tablet   No No   Sig: Take 1 tablet (40 mg total) by mouth every evening   cholecalciferol (VITAMIN D3) 1,000 units tablet  Self No No   Sig: Take 1 tablet (1,000 Units total) by mouth daily   fluticasone (FLONASE) 50 mcg/act nasal spray  Self No No   Si spray into each nostril daily for 14 days   gabapentin (Neurontin) 600 MG tablet  Self No No   Sig: Take 1 tablet (600 mg total) by mouth 2 (two) times a day   glipiZIDE (GLUCOTROL XL) 10 mg 24 hr tablet   No No   Sig: Take 1 tablet (10 mg total) by mouth daily   glucose blood (OneTouch Verio)  test strip  Self No No   Sig: Check blood sugars three times daily. Please substitute with appropriate alternative as covered by patient's insurance. Dx: E11.65   insulin glargine (LANTUS) 100 units/mL subcutaneous injection  Self No No   Sig: Inject 20 Units under the skin daily at bedtime   lisinopril (ZESTRIL) 20 mg tablet   No No   Sig: Take 1 tablet (20 mg total) by mouth daily   meloxicam (MOBIC) 15 mg tablet  Self No No   Sig: Take 1 tablet (15 mg total) by mouth daily as needed for moderate pain   metFORMIN (GLUCOPHAGE-XR) 500 mg 24 hr tablet  Self No No   Sig: Take 2 tablets (1,000 mg total) by mouth 2 (two) times a day      Facility-Administered Medications: None     Patient has no known allergies.    Objective :  Temp:  [97.1 °F (36.2 °C)-97.9 °F (36.6 °C)] 97.5 °F (36.4 °C)  HR:  [81-94] 88  BP: (106-144)/(56-65) 144/65  Resp:  [15-24] 21  SpO2:  [91 %-100 %] 97 %  O2 Device: Nasal cannula  FiO2 (%):  [40-50] 40    Physical Exam  Vitals and nursing note reviewed.   Constitutional:       General: He is not in acute distress.     Appearance: Normal appearance. He is not ill-appearing, toxic-appearing or diaphoretic.   HENT:      Head: Normocephalic and atraumatic.      Nose: Nose normal.      Mouth/Throat:      Pharynx: Oropharynx is clear.     Eyes:      General: No scleral icterus.        Right eye: No discharge.         Left eye: No discharge.      Extraocular Movements: Extraocular movements intact.      Conjunctiva/sclera: Conjunctivae normal.       Cardiovascular:      Rate and Rhythm: Normal rate and regular rhythm.   Pulmonary:      Effort: Pulmonary effort is normal. No respiratory distress.   Abdominal:      General: There is no distension.     Musculoskeletal:         General: Normal range of motion.      Cervical back: Normal range of motion and neck supple.      Comments: Chest tubes in place draining serosanguineous fluid     Skin:     General: Skin is warm and dry.      Coloration: Skin is  not jaundiced or pale.     Neurological:      Mental Status: He is alert and oriented to person, place, and time. Mental status is at baseline.     Psychiatric:         Mood and Affect: Mood normal.         Behavior: Behavior normal.         Thought Content: Thought content normal.         Judgment: Judgment normal.         Lab Results: I have reviewed the following results:CBC/BMP:   .     05/19/25  1737 05/19/25 2058 05/20/25  0416   WBC  --   --  14.36*   HGB 12.6   < > 10.3*   HCT 37   < > 32.3*   PLT  --   --  204   SODIUM  --   --  140   K  --    < > 4.7   CL  --   --  109*   CO2 23  --  20*   BUN  --   --  20   CREATININE  --   --  1.06   GLUC  --   --  182*   CAIONIZED 1.17  --   --    MG  --   --  2.4    < > = values in this interval not displayed.    , Creatinine Clearance: Estimated Creatinine Clearance: 70 mL/min (by C-G formula based on SCr of 1.06 mg/dL)., LFTs: No new results in last 24 hours.     Imaging Results Review: No pertinent imaging studies reviewed.  Other Study Results Review: No additional pertinent studies reviewed.

## 2025-05-21 LAB
ANION GAP SERPL CALCULATED.3IONS-SCNC: 8 MMOL/L (ref 4–13)
BACTERIA BLD CULT: NORMAL
BACTERIA BLD CULT: NORMAL
BUN SERPL-MCNC: 25 MG/DL (ref 5–25)
CALCIUM SERPL-MCNC: 8.1 MG/DL (ref 8.4–10.2)
CHLORIDE SERPL-SCNC: 103 MMOL/L (ref 96–108)
CO2 SERPL-SCNC: 24 MMOL/L (ref 21–32)
CREAT SERPL-MCNC: 1.21 MG/DL (ref 0.6–1.3)
ERYTHROCYTE [DISTWIDTH] IN BLOOD BY AUTOMATED COUNT: 13 % (ref 11.6–15.1)
GFR SERPL CREATININE-BSD FRML MDRD: 58 ML/MIN/1.73SQ M
GLUCOSE SERPL-MCNC: 123 MG/DL (ref 65–140)
GLUCOSE SERPL-MCNC: 140 MG/DL (ref 65–140)
GLUCOSE SERPL-MCNC: 141 MG/DL (ref 65–140)
GLUCOSE SERPL-MCNC: 143 MG/DL (ref 65–140)
GLUCOSE SERPL-MCNC: 151 MG/DL (ref 65–140)
GLUCOSE SERPL-MCNC: 155 MG/DL (ref 65–140)
GLUCOSE SERPL-MCNC: 157 MG/DL (ref 65–140)
GLUCOSE SERPL-MCNC: 159 MG/DL (ref 65–140)
GLUCOSE SERPL-MCNC: 165 MG/DL (ref 65–140)
GLUCOSE SERPL-MCNC: 170 MG/DL (ref 65–140)
GLUCOSE SERPL-MCNC: 173 MG/DL (ref 65–140)
GLUCOSE SERPL-MCNC: 193 MG/DL (ref 65–140)
HCT VFR BLD AUTO: 29 % (ref 36.5–49.3)
HGB BLD-MCNC: 9.4 G/DL (ref 12–17)
MAGNESIUM SERPL-MCNC: 1.8 MG/DL (ref 1.9–2.7)
MCH RBC QN AUTO: 29.4 PG (ref 26.8–34.3)
MCHC RBC AUTO-ENTMCNC: 32.4 G/DL (ref 31.4–37.4)
MCV RBC AUTO: 91 FL (ref 82–98)
PLATELET # BLD AUTO: 214 THOUSANDS/UL (ref 149–390)
PMV BLD AUTO: 11 FL (ref 8.9–12.7)
POTASSIUM SERPL-SCNC: 4.1 MMOL/L (ref 3.5–5.3)
RBC # BLD AUTO: 3.2 MILLION/UL (ref 3.88–5.62)
SODIUM SERPL-SCNC: 135 MMOL/L (ref 135–147)
WBC # BLD AUTO: 14.23 THOUSAND/UL (ref 4.31–10.16)

## 2025-05-21 PROCEDURE — 83735 ASSAY OF MAGNESIUM: CPT

## 2025-05-21 PROCEDURE — 99024 POSTOP FOLLOW-UP VISIT: CPT | Performed by: PHYSICIAN ASSISTANT

## 2025-05-21 PROCEDURE — 80048 BASIC METABOLIC PNL TOTAL CA: CPT

## 2025-05-21 PROCEDURE — 82948 REAGENT STRIP/BLOOD GLUCOSE: CPT

## 2025-05-21 PROCEDURE — 99232 SBSQ HOSP IP/OBS MODERATE 35: CPT | Performed by: INTERNAL MEDICINE

## 2025-05-21 PROCEDURE — 85027 COMPLETE CBC AUTOMATED: CPT

## 2025-05-21 PROCEDURE — 94664 DEMO&/EVAL PT USE INHALER: CPT

## 2025-05-21 PROCEDURE — 94760 N-INVAS EAR/PLS OXIMETRY 1: CPT

## 2025-05-21 PROCEDURE — 94640 AIRWAY INHALATION TREATMENT: CPT

## 2025-05-21 RX ORDER — LEVALBUTEROL INHALATION SOLUTION 1.25 MG/3ML
1.25 SOLUTION RESPIRATORY (INHALATION)
Status: DISCONTINUED | OUTPATIENT
Start: 2025-05-21 | End: 2025-05-23 | Stop reason: HOSPADM

## 2025-05-21 RX ORDER — INSULIN LISPRO 100 [IU]/ML
10 INJECTION, SOLUTION INTRAVENOUS; SUBCUTANEOUS
Status: DISCONTINUED | OUTPATIENT
Start: 2025-05-22 | End: 2025-05-21

## 2025-05-21 RX ORDER — INSULIN LISPRO 100 [IU]/ML
10 INJECTION, SOLUTION INTRAVENOUS; SUBCUTANEOUS
Status: DISCONTINUED | OUTPATIENT
Start: 2025-05-21 | End: 2025-05-22

## 2025-05-21 RX ORDER — METOPROLOL TARTRATE 25 MG/1
25 TABLET, FILM COATED ORAL ONCE
Status: COMPLETED | OUTPATIENT
Start: 2025-05-21 | End: 2025-05-21

## 2025-05-21 RX ORDER — INSULIN LISPRO 100 [IU]/ML
2-12 INJECTION, SOLUTION INTRAVENOUS; SUBCUTANEOUS
Status: DISCONTINUED | OUTPATIENT
Start: 2025-05-21 | End: 2025-05-21

## 2025-05-21 RX ORDER — INSULIN LISPRO 100 [IU]/ML
1-6 INJECTION, SOLUTION INTRAVENOUS; SUBCUTANEOUS
Status: DISCONTINUED | OUTPATIENT
Start: 2025-05-21 | End: 2025-05-21

## 2025-05-21 RX ORDER — METOPROLOL TARTRATE 50 MG
50 TABLET ORAL EVERY 12 HOURS SCHEDULED
Status: DISCONTINUED | OUTPATIENT
Start: 2025-05-21 | End: 2025-05-23 | Stop reason: HOSPADM

## 2025-05-21 RX ORDER — TORSEMIDE 20 MG/1
20 TABLET ORAL 2 TIMES DAILY
Status: DISCONTINUED | OUTPATIENT
Start: 2025-05-21 | End: 2025-05-23 | Stop reason: HOSPADM

## 2025-05-21 RX ORDER — INSULIN GLARGINE 100 [IU]/ML
30 INJECTION, SOLUTION SUBCUTANEOUS
Status: DISCONTINUED | OUTPATIENT
Start: 2025-05-21 | End: 2025-05-23 | Stop reason: HOSPADM

## 2025-05-21 RX ORDER — INSULIN LISPRO 100 [IU]/ML
1-6 INJECTION, SOLUTION INTRAVENOUS; SUBCUTANEOUS
Status: DISCONTINUED | OUTPATIENT
Start: 2025-05-22 | End: 2025-05-23 | Stop reason: HOSPADM

## 2025-05-21 RX ORDER — INSULIN LISPRO 100 [IU]/ML
2-12 INJECTION, SOLUTION INTRAVENOUS; SUBCUTANEOUS
Status: DISCONTINUED | OUTPATIENT
Start: 2025-05-22 | End: 2025-05-23 | Stop reason: HOSPADM

## 2025-05-21 RX ORDER — INSULIN LISPRO 100 [IU]/ML
10 INJECTION, SOLUTION INTRAVENOUS; SUBCUTANEOUS
Status: DISCONTINUED | OUTPATIENT
Start: 2025-05-21 | End: 2025-05-21

## 2025-05-21 RX ORDER — GUAIFENESIN 600 MG/1
600 TABLET, EXTENDED RELEASE ORAL EVERY 12 HOURS SCHEDULED
Status: DISCONTINUED | OUTPATIENT
Start: 2025-05-21 | End: 2025-05-23 | Stop reason: HOSPADM

## 2025-05-21 RX ADMIN — GABAPENTIN 600 MG: 300 CAPSULE ORAL at 18:08

## 2025-05-21 RX ADMIN — GUAIFENESIN 600 MG: 600 TABLET, EXTENDED RELEASE ORAL at 20:31

## 2025-05-21 RX ADMIN — METOPROLOL TARTRATE 25 MG: 25 TABLET, FILM COATED ORAL at 08:24

## 2025-05-21 RX ADMIN — AMIODARONE HYDROCHLORIDE 200 MG: 200 TABLET ORAL at 06:15

## 2025-05-21 RX ADMIN — METOPROLOL TARTRATE 25 MG: 25 TABLET, FILM COATED ORAL at 10:37

## 2025-05-21 RX ADMIN — LEVALBUTEROL HYDROCHLORIDE 1.25 MG: 1.25 SOLUTION RESPIRATORY (INHALATION) at 14:30

## 2025-05-21 RX ADMIN — FUROSEMIDE 40 MG: 10 INJECTION, SOLUTION INTRAMUSCULAR; INTRAVENOUS at 08:24

## 2025-05-21 RX ADMIN — TORSEMIDE 20 MG: 20 TABLET ORAL at 18:07

## 2025-05-21 RX ADMIN — POTASSIUM CHLORIDE 20 MEQ: 1500 TABLET, EXTENDED RELEASE ORAL at 18:07

## 2025-05-21 RX ADMIN — INSULIN LISPRO 10 UNITS: 100 INJECTION, SOLUTION INTRAVENOUS; SUBCUTANEOUS at 16:33

## 2025-05-21 RX ADMIN — ATORVASTATIN CALCIUM 80 MG: 80 TABLET, FILM COATED ORAL at 16:27

## 2025-05-21 RX ADMIN — ACETAMINOPHEN 975 MG: 325 TABLET ORAL at 20:31

## 2025-05-21 RX ADMIN — ASPIRIN 325 MG ORAL TABLET 325 MG: 325 PILL ORAL at 18:07

## 2025-05-21 RX ADMIN — MUPIROCIN 1 APPLICATION: 20 OINTMENT TOPICAL at 20:31

## 2025-05-21 RX ADMIN — AMIODARONE HYDROCHLORIDE 200 MG: 200 TABLET ORAL at 13:44

## 2025-05-21 RX ADMIN — LEVALBUTEROL HYDROCHLORIDE 1.25 MG: 1.25 SOLUTION RESPIRATORY (INHALATION) at 19:27

## 2025-05-21 RX ADMIN — POTASSIUM CHLORIDE 20 MEQ: 1500 TABLET, EXTENDED RELEASE ORAL at 08:24

## 2025-05-21 RX ADMIN — INSULIN GLARGINE 30 UNITS: 100 INJECTION, SOLUTION SUBCUTANEOUS at 22:18

## 2025-05-21 RX ADMIN — CHLORHEXIDINE GLUCONATE 15 ML: 1.2 SOLUTION ORAL at 08:23

## 2025-05-21 RX ADMIN — COLCHICINE 0.6 MG: 0.6 TABLET ORAL at 08:24

## 2025-05-21 RX ADMIN — AMIODARONE HYDROCHLORIDE 200 MG: 200 TABLET ORAL at 22:18

## 2025-05-21 RX ADMIN — DOCUSATE SODIUM 100 MG: 100 CAPSULE, LIQUID FILLED ORAL at 18:08

## 2025-05-21 RX ADMIN — POLYETHYLENE GLYCOL 3350 17 G: 17 POWDER, FOR SOLUTION ORAL at 08:23

## 2025-05-21 RX ADMIN — MUPIROCIN 1 APPLICATION: 20 OINTMENT TOPICAL at 08:24

## 2025-05-21 RX ADMIN — DOCUSATE SODIUM 100 MG: 100 CAPSULE, LIQUID FILLED ORAL at 08:25

## 2025-05-21 RX ADMIN — ACETAMINOPHEN 975 MG: 325 TABLET ORAL at 13:44

## 2025-05-21 RX ADMIN — SODIUM CHLORIDE 3 UNITS/HR: 9 INJECTION, SOLUTION INTRAVENOUS at 04:40

## 2025-05-21 RX ADMIN — Medication 1000 UNITS: at 08:24

## 2025-05-21 RX ADMIN — METOPROLOL TARTRATE 50 MG: 50 TABLET, FILM COATED ORAL at 20:31

## 2025-05-21 RX ADMIN — CHLORHEXIDINE GLUCONATE 15 ML: 1.2 SOLUTION ORAL at 18:08

## 2025-05-21 RX ADMIN — PANTOPRAZOLE SODIUM 40 MG: 40 TABLET, DELAYED RELEASE ORAL at 08:24

## 2025-05-21 RX ADMIN — ACETAMINOPHEN 975 MG: 325 TABLET ORAL at 08:23

## 2025-05-21 RX ADMIN — COLCHICINE 0.6 MG: 0.6 TABLET ORAL at 18:08

## 2025-05-21 RX ADMIN — FONDAPARINUX SODIUM 2.5 MG: 2.5 INJECTION, SOLUTION SUBCUTANEOUS at 08:24

## 2025-05-21 RX ADMIN — GABAPENTIN 600 MG: 300 CAPSULE ORAL at 08:24

## 2025-05-21 NOTE — ASSESSMENT & PLAN NOTE
Lab Results   Component Value Date    HGBA1C 7.7 (H) 05/13/2025       Recent Labs     05/21/25  0413 05/21/25  0618 05/21/25  0814 05/21/25  1035   POCGLU 143* 140 151* 155*     Blood Sugar Average: Last 72 hrs:  (P) [id number]  Type 2 diabetes mellitus with hyperglycemia as evidenced by A1c of 7.7% on 5/20/2025  Home regimen: Lantus 20 units nightly, glipizide 10 mg daily  Current regimen: Insulin infusion  Blood sugars reviewed-well-controlled while on insulin infusion.  Since appetite has improved, will transition patient to basal bolus regimen.    Plan:  Commend starting Lantus 30 units nightly  Discontinue insulin infusion 2 hours after Lantus injection  Recommend Humalog 10units before meals  Recommend correctional scale insulin algorithm 4 for meals and 3 at bedtime  Continue Accu-Cheks before meals and at bedtime  Monitor for hypoglycemia, treat per protocol  Goal blood sugars while in the [id number] mg/dL  Discharge recommendations pending clinical course  Endocrinology will continue following

## 2025-05-21 NOTE — PLAN OF CARE
Problem: PAIN - ADULT  Goal: Verbalizes/displays adequate comfort level or baseline comfort level  Description: Interventions:  - Encourage patient to monitor pain and request assistance  - Assess pain using appropriate pain scale  - Administer analgesics as ordered based on type and severity of pain and evaluate response  - Implement non-pharmacological measures as appropriate and evaluate response  - Consider cultural and social influences on pain and pain management  - Notify physician/advanced practitioner if interventions unsuccessful or patient reports new pain  - Educate patient/family on pain management process including their role and importance of  reporting pain   - Provide non-pharmacologic/complimentary pain relief interventions  Outcome: Progressing     Problem: INFECTION - ADULT  Goal: Absence or prevention of progression during hospitalization  Description: INTERVENTIONS:  - Assess and monitor for signs and symptoms of infection  - Monitor lab/diagnostic results  - Monitor all insertion sites, i.e. indwelling lines, tubes, and drains  - Monitor endotracheal if appropriate and nasal secretions for changes in amount and color  - [place] appropriate cooling/warming therapies per order  - Administer medications as ordered  - Instruct and encourage patient and family to use good hand hygiene technique  - Identify and instruct in appropriate isolation precautions for identified infection/condition  Outcome: Progressing     Problem: INFECTION - ADULT  Goal: Absence of fever/infection during neutropenic period  Description: INTERVENTIONS:  - Monitor WBC  - Perform strict hand hygiene  - Limit to healthy visitors only  - No plants, dried, fresh or silk flowers with up in patient room  Outcome: Progressing       Problem: SAFETY ADULT  Goal: Maintain or return to baseline ADL function  Description: INTERVENTIONS:  -  Assess patient's ability to carry out ADLs; assess patient's baseline for ADL function  and identify physical deficits which impact ability to perform ADLs (bathing, care of mouth/teeth, toileting, grooming, dressing, etc.)  - Assess/evaluate cause of self-care deficits   - Assess range of motion  - Assess patient's mobility; develop plan if impaired  - Assess patient's need for assistive devices and provide as appropriate  - Encourage maximum independence but intervene and supervise when necessary  - Involve family in performance of ADLs  - Assess for home care needs following discharge   - Consider OT consult to assist with ADL evaluation and planning for discharge  - Provide patient education as appropriate  - Monitor functional capacity and physical performance, use of AM PAC & JH-HLM   - Monitor gait, balance and fatigue with ambulation    Outcome: Progressing

## 2025-05-21 NOTE — PLAN OF CARE
Problem: PAIN - ADULT  Goal: Verbalizes/displays adequate comfort level or baseline comfort level  Description: Interventions:  - Encourage patient to monitor pain and request assistance  - Assess pain using appropriate pain scale  - Administer analgesics as ordered based on type and severity of pain and evaluate response  - Implement non-pharmacological measures as appropriate and evaluate response  - Consider cultural and social influences on pain and pain management  - Notify physician/advanced practitioner if interventions unsuccessful or patient reports new pain  - Educate patient/family on pain management process including their role and importance of  reporting pain   - Provide non-pharmacologic/complimentary pain relief interventions  5/21/2025 1002 by Cara Pedro, RN  Outcome: Progressing  5/21/2025 0442 by Cara Pedro, RN  Outcome: Progressing

## 2025-05-21 NOTE — PROGRESS NOTES
Progress Note - Endocrinology   Name: Tc Stovall 75 y.o. male I MRN: [id number]  Unit/Bed#: [facility]-314-01 I Date of Admission: 5/15/2025   Date of Service: 5/21/2025 I Hospital Day: 6    Assessment & Plan  CAD, multiple vessel  Status post CABG x 2 on 5/19/2025  Management per primary team  Type 2 diabetes mellitus (HCC)  Lab Results   Component Value Date    HGBA1C 7.7 (H) 05/13/2025       Recent Labs     05/21/25  0413 05/21/25  0618 05/21/25  0814 05/21/25  1035   POCGLU 143* 140 151* 155*     Blood Sugar Average: Last 72 hrs:  (P) [id number]  Type 2 diabetes mellitus with hyperglycemia as evidenced by A1c of 7.7% on 5/20/2025  Home regimen: Lantus 20 units nightly, glipizide 10 mg daily  Current regimen: Insulin infusion  Blood sugars reviewed-well-controlled while on insulin infusion.  Since appetite has improved, will transition patient to basal bolus regimen.    Plan:  Commend starting Lantus 30 units nightly  Discontinue insulin infusion 2 hours after Lantus injection  Recommend Humalog 10units before meals  Recommend correctional scale insulin algorithm 4 for meals and 3 at bedtime  Continue Accu-Cheks before meals and at bedtime  Monitor for hypoglycemia, treat per protocol  Goal blood sugars while in the [id number] mg/dL  Discharge recommendations pending clinical course  Endocrinology will continue following  S/P CABG x 2  Management per primary team    24 Hour Events : No significant overnight events  Subjective : Patient seen and examined at the bedside, not in acute distress at the time of my evaluation.  Reports feeling better today, states that he ate most of his breakfast which consisted of eggs, onions and mushrooms.    Objective :  Temp:  [97.4 °F (36.3 °C)-98 °F (36.7 °C)] 98 °F (36.7 °C)  HR:  [73-92] 81  BP: (116-150)/(56-67) 138/63  Resp:  [12-25] 18  SpO2:  [88 %-98 %] 92 %  O2 Device: None (Room air)    Physical Exam  Vitals and nursing note reviewed.   Constitutional:        General: He is not in acute distress.     Appearance: Normal appearance. He is not ill-appearing, toxic-appearing or diaphoretic.   HENT:      Head: Normocephalic and atraumatic.      Nose: Nose normal.      Mouth/Throat:      Pharynx: Oropharynx is clear.     Eyes:      General: No scleral icterus.        Right eye: No discharge.         Left eye: No discharge.      Extraocular Movements: Extraocular movements intact.      Conjunctiva/sclera: Conjunctivae normal.     Pulmonary:      Effort: Pulmonary effort is normal. No respiratory distress.   Abdominal:      General: There is no distension.     Musculoskeletal:         General: Normal range of motion.      Cervical back: Normal range of motion and neck supple.     Skin:     General: Skin is warm and dry.      Coloration: Skin is not jaundiced or pale.     Neurological:      Mental Status: He is alert and oriented to person, place, and time. Mental status is at baseline.     Psychiatric:         Mood and Affect: Mood normal.         Behavior: Behavior normal.         Thought Content: Thought content normal.         Judgment: Judgment normal.         Lab Results: I have reviewed the following results:CBC/BMP:   .     05/21/25  0424   WBC 14.23*   HGB 9.4*   HCT 29.0*      SODIUM 135   K 4.1      CO2 24   BUN 25   CREATININE 1.21   GLUC 123   MG 1.8*    , Creatinine Clearance: Estimated Creatinine Clearance: 61.9 mL/min (by C-G formula based on SCr of 1.21 mg/dL)., LFTs: No new results in last 24 hours.   Recent Labs     05/21/25  0424 05/21/25  0618 05/21/25  1220 05/21/25  1406 05/21/25  1623   POCGLU  --    < > 165* 173* 193*   GLUC 123  --   --   --   --     < > = values in this interval not displayed.      Imaging Results Review: No pertinent imaging studies reviewed.  Other Study Results Review: No additional pertinent studies reviewed.

## 2025-05-21 NOTE — PROCEDURES
05/21/25    Procedure: Epicardial Pacing Wire removal    Tc Stovall was returned to bed and informed of mandatory one hour post-procedure bed rest.  The assigned nurse was notified.  Epicardial pacing wires removed in routine fashion, without incident.  The patient tolerated the procedure well.  Vital signs ordered  q 15 minutes for one hour, as per protocol.    Procedure: Chest tube removal    Chest tubes removed in routine fashion without incident.  Insertion site dressed with Acticoat.  Tc Stovall tolerated the procedure well.  Nurse notified.    SIGNATURE: Jef Quiroz PA-C  DATE: May 21, 2025  TIME: 9:45 AM

## 2025-05-21 NOTE — PLAN OF CARE
Problem: PAIN - ADULT  Goal: Verbalizes/displays adequate comfort level or baseline comfort level  Description: Interventions:  - Encourage patient to monitor pain and request assistance  - Assess pain using appropriate pain scale  - Administer analgesics as ordered based on type and severity of pain and evaluate response  - Implement non-pharmacological measures as appropriate and evaluate response  - Consider cultural and social influences on pain and pain management  - Notify physician/advanced practitioner if interventions unsuccessful or patient reports new pain  - Educate patient/family on pain management process including their role and importance of  reporting pain   - Provide non-pharmacologic/complimentary pain relief interventions  5/21/2025 1003 by Cara Pedro RN  Outcome: Progressing  5/21/2025 1002 by Cara Pedro RN  Outcome: Progressing  5/21/2025 0442 by Cara Pedro RN  Outcome: Progressing

## 2025-05-22 ENCOUNTER — TELEPHONE (OUTPATIENT)
Dept: OBGYN CLINIC | Facility: CLINIC | Age: 75
End: 2025-05-22

## 2025-05-22 LAB
ANION GAP SERPL CALCULATED.3IONS-SCNC: 8 MMOL/L (ref 4–13)
BUN SERPL-MCNC: 22 MG/DL (ref 5–25)
CALCIUM SERPL-MCNC: 7.9 MG/DL (ref 8.4–10.2)
CHLORIDE SERPL-SCNC: 102 MMOL/L (ref 96–108)
CO2 SERPL-SCNC: 23 MMOL/L (ref 21–32)
CREAT SERPL-MCNC: 1 MG/DL (ref 0.6–1.3)
GFR SERPL CREATININE-BSD FRML MDRD: 73 ML/MIN/1.73SQ M
GLUCOSE SERPL-MCNC: 101 MG/DL (ref 65–140)
GLUCOSE SERPL-MCNC: 134 MG/DL (ref 65–140)
GLUCOSE SERPL-MCNC: 151 MG/DL (ref 65–140)
GLUCOSE SERPL-MCNC: 158 MG/DL (ref 65–140)
GLUCOSE SERPL-MCNC: 173 MG/DL (ref 65–140)
GLUCOSE SERPL-MCNC: 181 MG/DL (ref 65–140)
GLUCOSE SERPL-MCNC: 185 MG/DL (ref 65–140)
GLUCOSE SERPL-MCNC: 258 MG/DL (ref 65–140)
GLUCOSE SERPL-MCNC: 81 MG/DL (ref 65–140)
POTASSIUM SERPL-SCNC: 4.3 MMOL/L (ref 3.5–5.3)
SODIUM SERPL-SCNC: 133 MMOL/L (ref 135–147)

## 2025-05-22 PROCEDURE — 94760 N-INVAS EAR/PLS OXIMETRY 1: CPT

## 2025-05-22 PROCEDURE — 97530 THERAPEUTIC ACTIVITIES: CPT

## 2025-05-22 PROCEDURE — 94640 AIRWAY INHALATION TREATMENT: CPT

## 2025-05-22 PROCEDURE — 97116 GAIT TRAINING THERAPY: CPT

## 2025-05-22 PROCEDURE — 80048 BASIC METABOLIC PNL TOTAL CA: CPT | Performed by: PHYSICIAN ASSISTANT

## 2025-05-22 PROCEDURE — 82948 REAGENT STRIP/BLOOD GLUCOSE: CPT

## 2025-05-22 PROCEDURE — 94664 DEMO&/EVAL PT USE INHALER: CPT

## 2025-05-22 PROCEDURE — 97535 SELF CARE MNGMENT TRAINING: CPT

## 2025-05-22 PROCEDURE — 99024 POSTOP FOLLOW-UP VISIT: CPT | Performed by: PHYSICIAN ASSISTANT

## 2025-05-22 RX ORDER — INSULIN LISPRO 100 [IU]/ML
12 INJECTION, SOLUTION INTRAVENOUS; SUBCUTANEOUS
Status: DISCONTINUED | OUTPATIENT
Start: 2025-05-22 | End: 2025-05-23 | Stop reason: HOSPADM

## 2025-05-22 RX ADMIN — Medication 1000 UNITS: at 08:22

## 2025-05-22 RX ADMIN — Medication 2.5 MG: at 21:52

## 2025-05-22 RX ADMIN — METOPROLOL TARTRATE 50 MG: 50 TABLET, FILM COATED ORAL at 08:21

## 2025-05-22 RX ADMIN — ASPIRIN 325 MG ORAL TABLET 325 MG: 325 PILL ORAL at 17:35

## 2025-05-22 RX ADMIN — INSULIN LISPRO 2 UNITS: 100 INJECTION, SOLUTION INTRAVENOUS; SUBCUTANEOUS at 06:08

## 2025-05-22 RX ADMIN — ACETAMINOPHEN 975 MG: 325 TABLET ORAL at 20:41

## 2025-05-22 RX ADMIN — AMIODARONE HYDROCHLORIDE 200 MG: 200 TABLET ORAL at 21:48

## 2025-05-22 RX ADMIN — PANTOPRAZOLE SODIUM 40 MG: 40 TABLET, DELAYED RELEASE ORAL at 08:21

## 2025-05-22 RX ADMIN — Medication 2.5 MG: at 17:38

## 2025-05-22 RX ADMIN — FONDAPARINUX SODIUM 2.5 MG: 2.5 INJECTION, SOLUTION SUBCUTANEOUS at 08:21

## 2025-05-22 RX ADMIN — ACETAMINOPHEN 975 MG: 325 TABLET ORAL at 08:21

## 2025-05-22 RX ADMIN — AMIODARONE HYDROCHLORIDE 200 MG: 200 TABLET ORAL at 08:36

## 2025-05-22 RX ADMIN — ACETAMINOPHEN 975 MG: 325 TABLET ORAL at 15:06

## 2025-05-22 RX ADMIN — INSULIN LISPRO 10 UNITS: 100 INJECTION, SOLUTION INTRAVENOUS; SUBCUTANEOUS at 08:22

## 2025-05-22 RX ADMIN — INSULIN LISPRO 2 UNITS: 100 INJECTION, SOLUTION INTRAVENOUS; SUBCUTANEOUS at 17:38

## 2025-05-22 RX ADMIN — INSULIN LISPRO 12 UNITS: 100 INJECTION, SOLUTION INTRAVENOUS; SUBCUTANEOUS at 17:39

## 2025-05-22 RX ADMIN — CHLORHEXIDINE GLUCONATE 15 ML: 1.2 SOLUTION ORAL at 08:21

## 2025-05-22 RX ADMIN — COLCHICINE 0.6 MG: 0.6 TABLET ORAL at 08:21

## 2025-05-22 RX ADMIN — GUAIFENESIN 600 MG: 600 TABLET, EXTENDED RELEASE ORAL at 21:48

## 2025-05-22 RX ADMIN — ATORVASTATIN CALCIUM 80 MG: 80 TABLET, FILM COATED ORAL at 17:35

## 2025-05-22 RX ADMIN — METOPROLOL TARTRATE 50 MG: 50 TABLET, FILM COATED ORAL at 21:48

## 2025-05-22 RX ADMIN — GUAIFENESIN 600 MG: 600 TABLET, EXTENDED RELEASE ORAL at 08:22

## 2025-05-22 RX ADMIN — LEVALBUTEROL HYDROCHLORIDE 1.25 MG: 1.25 SOLUTION RESPIRATORY (INHALATION) at 14:49

## 2025-05-22 RX ADMIN — TORSEMIDE 20 MG: 20 TABLET ORAL at 08:21

## 2025-05-22 RX ADMIN — INSULIN LISPRO 10 UNITS: 100 INJECTION, SOLUTION INTRAVENOUS; SUBCUTANEOUS at 11:37

## 2025-05-22 RX ADMIN — LEVALBUTEROL HYDROCHLORIDE 1.25 MG: 1.25 SOLUTION RESPIRATORY (INHALATION) at 07:55

## 2025-05-22 RX ADMIN — TORSEMIDE 20 MG: 20 TABLET ORAL at 17:35

## 2025-05-22 RX ADMIN — AMIODARONE HYDROCHLORIDE 200 MG: 200 TABLET ORAL at 15:06

## 2025-05-22 RX ADMIN — LEVALBUTEROL HYDROCHLORIDE 1.25 MG: 1.25 SOLUTION RESPIRATORY (INHALATION) at 20:27

## 2025-05-22 RX ADMIN — COLCHICINE 0.6 MG: 0.6 TABLET ORAL at 17:35

## 2025-05-22 RX ADMIN — GABAPENTIN 600 MG: 300 CAPSULE ORAL at 17:35

## 2025-05-22 RX ADMIN — POTASSIUM CHLORIDE 20 MEQ: 1500 TABLET, EXTENDED RELEASE ORAL at 08:21

## 2025-05-22 RX ADMIN — CHLORHEXIDINE GLUCONATE 15 ML: 1.2 SOLUTION ORAL at 21:48

## 2025-05-22 RX ADMIN — GABAPENTIN 600 MG: 300 CAPSULE ORAL at 08:21

## 2025-05-22 RX ADMIN — INSULIN GLARGINE 30 UNITS: 100 INJECTION, SOLUTION SUBCUTANEOUS at 21:48

## 2025-05-22 RX ADMIN — INSULIN LISPRO 6 UNITS: 100 INJECTION, SOLUTION INTRAVENOUS; SUBCUTANEOUS at 11:37

## 2025-05-22 RX ADMIN — POTASSIUM CHLORIDE 20 MEQ: 1500 TABLET, EXTENDED RELEASE ORAL at 17:35

## 2025-05-22 NOTE — OCCUPATIONAL THERAPY NOTE
Occupational Therapy Progress Note     Patient Name: Tc Stovall  Today's Date: 5/22/2025  Problem List  Principal Problem:    CAD, multiple vessel  Active Problems:    Type 2 diabetes mellitus (HCC)    Primary hypertension    Hyperlipidemia    History of CVA (cerebrovascular accident)    Elevated troponin    NSTEMI (non-ST elevated myocardial infarction) (HCC)    Paroxysmal atrial tachycardia (HCC)    PVC's (premature ventricular contractions)    Tobacco use    Fever    S/P CABG x 2              05/22/25 1339   OT Last Visit   OT Visit Date 05/22/25   Note Type   Note Type Treatment   Pain Assessment   Pain Assessment Tool 0-10   Pain Score No Pain   Restrictions/Precautions   Weight Bearing Precautions Per Order No   Other Precautions Cardiac/sternal;Cognitive;Chair Alarm;Telemetry;Fall Risk  (RUE deficits distally from elbow)   Lifestyle   Autonomy Pta pt I with ADL, IADL and functional mobility, (+) .   Reciprocal Relationships spouse lives in a SNF   Service to Others retired   Intrinsic Gratification enjoys time with his dog   ADL   Where Assessed Standing at sink  (+chair)   Grooming Assistance 4  Minimal Assistance   Grooming Deficit Steadying   Grooming Comments Pt stands at sink to brush teeth , requiring MIN A to steady in stance.   LB Dressing Assistance 3  Moderate Assistance   LB Dressing Deficit Don/doff R sock;Don/doff L sock   Toileting Assistance  4  Minimal Assistance   Toileting Deficit Setup;Steadying;Use of bedpan/urinal setup   Toileting Comments Pt stands to void using urinal with MIN A to steady.   Functional Standing Tolerance   Time 3 minutes   Activity standing toileting and grooming   Comments Pt stands for 3 minutes first during standing toileting to void using urinal, then stands at sink to brush teeth, requiring MIN A to steady.   Bed Mobility   Additional Comments Pt greeted and left OOB in chair with alarm on and all needs within reach.   Transfers   Sit to Stand 4   Minimal assistance   Additional items Assist x 1;Increased time required;Verbal cues   Stand to Sit 5  Supervision   Additional items Increased time required;Verbal cues   Additional Comments with RW, STS x 2   Functional Mobility   Functional Mobility 4  Minimal assistance   Additional Comments Pt performs household distances with MIN A x 1 with RW.   Additional items Rolling walker   Cognition   Overall Cognitive Status WFL   Arousal/Participation Cooperative   Attention Attends with cues to redirect   Orientation Level Oriented to person;Oriented to place;Oriented to situation   Memory Decreased recall of precautions   Following Commands Follows one step commands without difficulty   Comments Pt pleasant and cooperative although continues to have some decreased insight to deficits requiring vc for safety.   Additional Activities   Additional Activities Other (Comment)  (Recovering After Cardiac Surgery education packet)   Additional Activities Comments Pt provided s/p cardiac sx education packet, reviewed w/ OT, discussed cardiac/sternal precautions, lifestyle modifications, post hospitalization IADL management, environmental temperature control, emotional regulation and management, lifting/driving restrictions, energy conservation techniques, mobility schedule, incisional management, VNA, and cardiac rehabilitation initiation upon clearance per physician at follow up. Pt reviewed education packet and acknowledged reception of such.   Activity Tolerance   Activity Tolerance Patient limited by fatigue   Medical Staff Made Aware RN cleared, OTS present.   Assessment   Assessment Pt seen for OT treatment session day 1 on this date focused on ADL retraining, functional transfers and mobility, energy conservation, functional endurance, recall of safety precautions, and patient education. Pt was greeted in chair and was cooperative throughout session. Following session, pt was left in chair with chair alarm on and all  needs within reach. Pt continues to be limited by functional status related to ADLs and transfers requiring MOD A for LB dressing, MIN A for standing toileting and grooming, although demonstrates improvements in cognition and coping skills. The patient's raw score on the AM-PAC Daily Activity Inpatient Short Form is 18. A raw score of less than 19 suggests the patient may benefit from discharge to post-acute rehabilitation services. Please refer to the recommendation of the Occupational Therapist for safe discharge planning. Pt would benefit from continued acute OT intervention with plan to continue OT treatment sessions 2-3x per week. Recommend d/c to level II services.   Plan   Treatment Interventions ADL retraining;Functional transfer training;Endurance training;Continued evaluation;Cardiac education;Energy conservation   Goal Expiration Date 06/03/25   OT Treatment Day 1   OT Frequency 2-3x/wk   Discharge Recommendation   Rehab Resource Intensity Level, OT II (Moderate Resource Intensity)   AM-PAC Daily Activity Inpatient   Lower Body Dressing 2   Bathing 3   Toileting 3   Upper Body Dressing 3   Grooming 3   Eating 4   Daily Activity Raw Score 18   Daily Activity Standardized Score (Calc for Raw Score >=11) 38.66   -PAC Applied Cognition Inpatient   Following a Speech/Presentation 4   Understanding Ordinary Conversation 4   Taking Medications 4   Remembering Where Things Are Placed or Put Away 3   Remembering List of 4-5 Errands 3   Taking Care of Complicated Tasks 3   Applied Cognition Raw Score 21   Applied Cognition Standardized Score 44.3   Modified [place] Scale   Modified [place] Scale 4   End of Consult   Education Provided Yes   Patient Position at End of Consult Bedside chair;Bed/Chair alarm activated;All needs within reach   Nurse Communication Nurse aware of consult       TUSHAR Flores, OTR/L

## 2025-05-22 NOTE — PLAN OF CARE
Problem: PAIN - ADULT  Goal: Verbalizes/displays adequate comfort level or baseline comfort level  Description: Interventions:  - Encourage patient to monitor pain and request assistance  - Assess pain using appropriate pain scale  - Administer analgesics as ordered based on type and severity of pain and evaluate response  - Implement non-pharmacological measures as appropriate and evaluate response  - Consider cultural and social influences on pain and pain management  - Notify physician/advanced practitioner if interventions unsuccessful or patient reports new pain  - Educate patient/family on pain management process including their role and importance of  reporting pain   - Provide non-pharmacologic/complimentary pain relief interventions  Outcome: Progressing     Problem: SAFETY ADULT  Goal: Maintain or return to baseline ADL function  Description: INTERVENTIONS:  -  Assess patient's ability to carry out ADLs; assess patient's baseline for ADL function and identify physical deficits which impact ability to perform ADLs (bathing, care of mouth/teeth, toileting, grooming, dressing, etc.)  - Assess/evaluate cause of self-care deficits   - Assess range of motion  - Assess patient's mobility; develop plan if impaired  - Assess patient's need for assistive devices and provide as appropriate  - Encourage maximum independence but intervene and supervise when necessary  - Involve family in performance of ADLs  - Assess for home care needs following discharge   - Consider OT consult to assist with ADL evaluation and planning for discharge  - Provide patient education as appropriate  - Monitor functional capacity and physical performance, use of AM PAC & JH-HLM   - Monitor gait, balance and fatigue with ambulation    Outcome: Progressing     Problem: DISCHARGE PLANNING  Goal: Discharge to home or other facility with appropriate resources  Description: INTERVENTIONS:  - Identify barriers to discharge w/patient and  caregiver  - Arrange for needed discharge resources and transportation as appropriate  - Identify discharge learning needs (meds, wound care, etc.)  - Arrange for interpretive services to assist at discharge as needed  - Refer to Case Management Department for coordinating discharge planning if the patient needs post-hospital services based on physician/advanced practitioner order or complex needs related to functional status, cognitive ability, or social support system  Outcome: Progressing

## 2025-05-22 NOTE — CASE MANAGEMENT
Case Management Discharge Planning Note    Patient name Tc Stovall  [facility] [id number]  : 1950 Date 2025       Current Admission Date: 5/15/2025  Current Admission Diagnosis:CAD, multiple vessel   Patient Active Problem List    Diagnosis Date Noted    S/P CABG x 2 2025    Fever 2025    NSTEMI (non-ST elevated myocardial infarction) ([facility]) 05/15/2025    Paroxysmal atrial tachycardia ([facility]) 05/15/2025    PVC's (premature ventricular contractions) 05/15/2025    Tobacco use 05/15/2025    CAD, multiple vessel 05/15/2025    Elevated troponin 2025    Cubital tunnel syndrome on right 2025    Carpal tunnel syndrome of right wrist 2025    History of CVA (cerebrovascular accident) 01/10/2025    Radiculopathy, cervical region 2024    Foraminal stenosis of lumbar region 2024    Protrusion of cervical intervertebral disc 2024    Cervical radiculopathy 2024    Obesity, morbid ([facility]) 2024    Type 2 diabetes mellitus with hyperglycemia, without long-term current use of insulin ([facility]) 2023    Primary hypertension 10/04/2021    Hyperlipidemia 10/04/2021    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 edema, without long-term current use of insulin ([facility]) 2021    Type 2 diabetes mellitus ([facility]) 2017    Dependence on nicotine from cigarettes 2017      LOS (days): 7  Geometric Mean LOS (GMLOS) (days): 8.1  Days to GMLOS:1.3     OBJECTIVE:  Risk of Unplanned Readmission Score: 13.8      Current admission status: Inpatient   Preferred Pharmacy:   [id number] - [place] - [id number] [place] [id number]  Phone: [phone] Fax: [phone]    Primary Care Provider: Lester Roth DO    Primary Insurance: MEDICARE  Secondary Insurance: AARP    DISCHARGE DETAILS:  Additional Comments: CM received email from CM management regarding patient's  family reaching out to  department for assistance with a travel insurance claim. Patient family cancelled travel plans due to patient admission. CM delivered paperwork to CTS team for their completion which was returned to this CM.    CM discussed DCP with patient bedside who shared interest in STR & requested CM to discuss with his son Mateusz. TC to Mateusz ([phone]) Mateusz gave permission for CM to email the Travel Claims paperwork to his wife, Bryanna, at [email or web address].     Mateusz agreeable to STR referrals to SNF's local to patient's area, and is agreeable to patient going to [facility], where his wife is placed, if there is a bed available. Referral sent via [employer]. CM following

## 2025-05-22 NOTE — PROGRESS NOTES
Progress Note - Cardiac Surgery   Tc Stovall 75 y.o. male MRN: [id number]  Unit/Bed#: PPHP-314-01 Encounter: [id number]    Coronary artery disease. S/P coronary artery bypass grafting; POD # 3      24 Hour Events: No telemetry events overnight. + BM.     Medications:   Scheduled Meds:  Current Facility-Administered Medications   Medication Dose Route Frequency Provider Last Rate    acetaminophen  650 mg Oral Q6H PRN Akhil Merlos PA-C      acetaminophen  975 mg Oral Q6H While awake Nay Sahu PA-C      amiodarone  200 mg Oral Q8H [facility] Nay Sahu PA-C      aspirin  325 mg Oral Daily Nay Sahu PA-C      atorvastatin  80 mg Oral Daily With Dinner Nay Sahu PA-C      bisacodyl  10 mg Rectal Daily PRN Nay Sahu PA-C      chlorhexidine  15 mL Mouth/Throat BID Nay Sahu PA-C      cholecalciferol  1,000 Units Oral Daily Nay Sahu PA-C      colchicine  0.6 mg Oral BID Nay Sahu PA-C      docusate sodium  100 mg Oral BID Akhil Merlos PA-C      fluticasone  1 spray Nasal Daily Nay Sahu PA-C      fondaparinux  2.5 mg Subcutaneous Daily Nay Sahu PA-C      gabapentin  600 mg Oral BID Nay Sahu PA-C      guaiFENesin  600 mg Oral Q12H [facility] Livia Cornejo PA-C      insulin glargine  30 Units Subcutaneous HS Hui Bowman MD      insulin lispro  1-6 Units Subcutaneous HS Hui Bowman MD      insulin lispro  10 Units Subcutaneous TID With Meals Hui Bowman MD      insulin lispro  2-12 Units Subcutaneous TID AC Hui Bowman MD      levalbuterol  1.25 mg Nebulization TID Livia Cornejo PA-C      magnesium sulfate  2 g Intravenous Once Akhil Merlos PA-C      metoprolol tartrate  50 mg Oral Q12H [facility] Jef Quiroz PA-C      ondansetron  4 mg Intravenous Q6H PRN Nay Dornseif, PA-C      oxyCODONE  2.5 mg Oral Q4H PRN Nay Sahu PA-C      Or    oxyCODONE  5 mg Oral Q4H PRN Nay Sahu PA-C      pantoprazole  40 mg  Oral Daily Nay Sahu PA-C      polyethylene glycol  17 g Oral Daily Nay Sahu PA-C      potassium chloride  20 mEq Oral BID Akhil Merlos PA-C      sodium chloride  20 mL/hr Intravenous Continuous Nay Sahu PA-C 20 mL/hr (05/22/25 0200)    temazepam  15 mg Oral HS PRN Akhil Merlos PA-C      torsemide  20 mg Oral BID Jef Quiroz PA-C       Continuous Infusions:sodium chloride, 20 mL/hr, Last Rate: 20 mL/hr (05/22/25 0200)      PRN Meds:.  acetaminophen    bisacodyl    ondansetron    oxyCODONE **OR** oxyCODONE    temazepam    Vitals:   Vitals:    05/22/25 0500 05/22/25 0600 05/22/25 0658 05/22/25 0821   BP:   132/63 127/60   BP Location:       Pulse: 80 77  71   Resp: 22 22     Temp:   97.8 °F (36.6 °C)    TempSrc:   Oral    SpO2: 92% 95%     Weight:  104 kg (228 lb 6.3 oz)     Height:           Telemetry: NSR; Heart Rate: 84    Respiratory:   SpO2: SpO2: 95 %, SpO2 Activity: SpO2 Activity: At Rest; Room Air    Intake/Output:     Intake/Output Summary (Last 24 hours) at 5/22/2025 0948  Last data filed at 5/22/2025 0600  Gross per 24 hour   Intake 826.23 ml   Output 1850 ml   Net -1023.77 ml        Weights:   Weight (last 2 days)       Date/Time Weight    05/22/25 0600 104 (228.4)    05/21/25 0600 105 (231.04)    05/20/25 0540 103 (227.74)                Results:   Results from last 7 days   Lab Units 05/21/25  0424 05/20/25  0416 05/20/25  0054 05/19/25  1737 05/19/25  1720 05/19/25  1252 05/19/25  0453   WBC Thousand/uL 14.23* 14.36*  --   --   --   --  9.66   HEMOGLOBIN g/dL 9.4* 10.3* 10.5*  --  10.9*  --  11.5*   I STAT HEMOGLOBIN   --   --   --    < >  --    < >  --    HEMATOCRIT % 29.0* 32.3* 31.7*  --  32.7*  --  34.5*   HEMATOCRIT, ISTAT   --   --   --    < >  --    < >  --    PLATELETS Thousands/uL 214 204  --   --  238   < > 254    < > = values in this interval not displayed.     Results from last 7 days   Lab Units 05/22/25  0419 05/21/25  0424 05/20/25  0416  05/19/25 2058 05/19/25  1737   SODIUM mmol/L 133* 135 140  --   --    POTASSIUM mmol/L 4.3 4.1 4.7   < >  --    CHLORIDE mmol/L 102 103 109*  --   --    CO2 mmol/L 23 24 20*  --   --    CO2, I-STAT mmol/L  --   --   --   --  23   BUN mg/dL 22 25 20  --   --    CREATININE mg/dL 1.00 1.21 1.06  --   --    GLUCOSE, ISTAT mg/dl  --   --   --   --  168*   CALCIUM mg/dL 7.9* 8.1* 8.0*  --   --     < > = values in this interval not displayed.     Recent Labs     05/21/25  0424   MG 1.8*     Results from last 7 days   Lab Units 05/19/25  0453 05/18/25  1120 05/18/25  0503   PTT seconds 87* 72* 62*         Point of care glucose: 173    Studies:  No new imaging studies    Invasive Lines/Tubes:  Invasive Devices       Central Venous Catheter Line  Duration             CVC Central Lines 05/19/25 Right Internal jugular 2 days              Peripheral Intravenous Line  Duration             Peripheral IV 05/19/25 Right Antecubital 2 days                  Physical Exam:    General: No acute distress, Alert, and Normal appearance  HEENT/NECK:  Normocephalic. Atraumatic.  No jugular venous distention.    Cardiac: Regular rate and rhythm and No murmurs/rubs/gallops  Pulmonary:  Breath sounds clear bilaterally and No rales/rhonchi/wheezes  Abdomen:  Non-tender, Non-distended, and Normal bowel sounds  Incisions: Sternum is stable.  Incision is clean, dry, and intact.  and Saphenectomy incison is clean, dry, and intact.   Extremities: Extremities warm/dry and Trace edema B/L  Neuro: Alert and oriented X 3, Sensation is grossly intact, and No focal deficits  Skin: Warm/Dry, without rashes or lesions.      Assessment:  Principal Problem:    CAD, multiple vessel  Active Problems:    Type 2 diabetes mellitus (HCC)    Primary hypertension    Hyperlipidemia    History of CVA (cerebrovascular accident)    Elevated troponin    NSTEMI (non-ST elevated myocardial infarction) ([facility])    Paroxysmal atrial tachycardia ([facility])    PVC's (premature  ventricular contractions)    Tobacco use    Fever    S/P CABG x 2       Coronary artery disease. S/P coronary artery bypass grafting; POD # 3    Plan:    Cardiac:     S/P acute preoperative NSTEMI  Normal ventricular systolic function, EF 50%    NSR  Tachycardia resolved with beta blocker increase    Continue Lopressor, 50mg PO BID    AF prophylaxis  Continue Amiodarone, 200 mg PO TID    Continue ASA and Statin therapy    Maintain central IV access today for lack of peripheral access    Continue Arixtra for DVT prophylaxis    Pulmonary:     Good Room air oxygen saturation; Continue incentive spirometry/Coughing/Deep breathing exercises  H/O tobacco use        Renal:     Normal preoperative renal function  Creatinine 1.21 today, from 1.06    Intake/Output net: -  1000  mL/24 hours    Wt today 228, from 231, from 227   Pre-op Wt: 226    Diuretic Regimen:  Continue PO Torsemide, 20 mg BID  Continue Potassium Chloride 20 mEq PO BID    Neuro:   H/O multiple CVAs (has not followed with neurology)    Neurologically intact; No active issues     Incisional pain well controlled   Continue tylenol, 975 mg PO q 8, standing dose   Continue oxycodone, 2.5 to 5 mg PO q 4 hours prn pain    GI:    Controlled carbohydrate diet level two/Cardiac diet, with 1800 mL fluid restriction    Tolerating diet without complaint  + BM postoperatively    Continue stool softeners and prn suppository    Continue GI prophylaxis    Endo:   IDDM2 (A1c- 7.7)     Remains on continuous insulin infusion  On injectable therapy, prior to surgical intervention  Recommendation for discharge diabetes regimen pending endocrinology follow up  Endocrinology following daily    7    Hematology:     Post-operative acute blood loss anemia; Hemoglobin 9.4; trend prn    8.   Disposition:        Routine postoperative recovery to this point; Anticipated discharge date: 5/22 vs 5/23      VTE Pharmacologic Prophylaxis: VTE covered by:  fondaparinux, Subcutaneous, 2.5 mg at  05/22/25 0821      VTE Mechanical Prophylaxis: sequential compression device    Collaborative rounds completed with supervising physician  Plan of care discussed with bedside nurse    SIGNATURE: Jef Quiroz PA-C  DATE: May 22, 2025  TIME: 9:48 AM

## 2025-05-22 NOTE — PLAN OF CARE
Problem: PAIN - ADULT  Goal: Verbalizes/displays adequate comfort level or baseline comfort level  Description: Interventions:  - Encourage patient to monitor pain and request assistance  - Assess pain using appropriate pain scale  - Administer analgesics as ordered based on type and severity of pain and evaluate response  - Implement non-pharmacological measures as appropriate and evaluate response  - Consider cultural and social influences on pain and pain management  - Notify physician/advanced practitioner if interventions unsuccessful or patient reports new pain  - Educate patient/family on pain management process including their role and importance of  reporting pain   - Provide non-pharmacologic/complimentary pain relief interventions  Outcome: Progressing     Problem: INFECTION - ADULT  Goal: Absence or prevention of progression during hospitalization  Description: INTERVENTIONS:  - Assess and monitor for signs and symptoms of infection  - Monitor lab/diagnostic results  - Monitor all insertion sites, i.e. indwelling lines, tubes, and drains  - Monitor endotracheal if appropriate and nasal secretions for changes in amount and color  - [place] appropriate cooling/warming therapies per order  - Administer medications as ordered  - Instruct and encourage patient and family to use good hand hygiene technique  - Identify and instruct in appropriate isolation precautions for identified infection/condition  Outcome: Progressing  Goal: Absence of fever/infection during neutropenic period  Description: INTERVENTIONS:  - Monitor WBC  - Perform strict hand hygiene  - Limit to healthy visitors only  - No plants, dried, fresh or silk flowers with up in patient room  Outcome: Progressing     Problem: SAFETY ADULT  Goal: Patient will remain free of falls  Description: INTERVENTIONS:  - Educate patient/family on patient safety including physical limitations  - Instruct patient to call for assistance with activity   -  Consider consulting OT/PT to assist with strengthening/mobility based on AM PAC & JH-HLM score  - Consult OT/PT to assist with strengthening/mobility   - Keep Call bell within reach  - Keep bed low and locked with side rails adjusted as appropriate  - Keep care items and personal belongings within reach  - Initiate and maintain comfort rounds  - Make Fall Risk Sign visible to staff  - Offer Toileting every 4 Hours, in advance of need  - Initiate/Maintain bed/chair alarm  - Obtain necessary fall risk management equipment: walker, non-skid socks  - Apply yellow socks and bracelet for high fall risk patients  - Consider moving patient to room near nurses station  Outcome: Progressing  Goal: Maintain or return to baseline ADL function  Description: INTERVENTIONS:  -  Assess patient's ability to carry out ADLs; assess patient's baseline for ADL function and identify physical deficits which impact ability to perform ADLs (bathing, care of mouth/teeth, toileting, grooming, dressing, etc.)  - Assess/evaluate cause of self-care deficits   - Assess range of motion  - Assess patient's mobility; develop plan if impaired  - Assess patient's need for assistive devices and provide as appropriate  - Encourage maximum independence but intervene and supervise when necessary  - Involve family in performance of ADLs  - Assess for home care needs following discharge   - Consider OT consult to assist with ADL evaluation and planning for discharge  - Provide patient education as appropriate  - Monitor functional capacity and physical performance, use of AM PAC & JH-HLM   - Monitor gait, balance and fatigue with ambulation    Outcome: Progressing  Goal: Maintains/Returns to pre admission functional level  Description: INTERVENTIONS:  - Perform AM-PAC 6 Click Basic Mobility/ Daily Activity assessment daily.  - Set and communicate daily mobility goal to care team and patient/family/caregiver.   - Collaborate with rehabilitation services on  mobility goals if consulted  - Perform Range of Motion 4 times a day.  - Reposition patient every 2 hours.  - Dangle patient 3 times a day  - Stand patient 3 times a day  - Ambulate patient 3 times a day  - Out of bed to chair 3 times a day   - Out of bed for meals 3 times a day  - Out of bed for toileting  - Record patient progress and toleration of activity level   Outcome: Progressing     Problem: DISCHARGE PLANNING  Goal: Discharge to home or other facility with appropriate resources  Description: INTERVENTIONS:  - Identify barriers to discharge w/patient and caregiver  - Arrange for needed discharge resources and transportation as appropriate  - Identify discharge learning needs (meds, wound care, etc.)  - Arrange for interpretive services to assist at discharge as needed  - Refer to Case Management Department for coordinating discharge planning if the patient needs post-hospital services based on physician/advanced practitioner order or complex needs related to functional status, cognitive ability, or social support system  Outcome: Progressing     Problem: Knowledge Deficit  Goal: Patient/family/caregiver demonstrates understanding of disease process, treatment plan, medications, and discharge instructions  Description: Complete learning assessment and assess knowledge base.  Interventions:  - Provide teaching at level of understanding  - Provide teaching via preferred learning methods  Outcome: Progressing

## 2025-05-22 NOTE — PLAN OF CARE
Problem: OCCUPATIONAL THERAPY ADULT  Goal: Performs self-care activities at highest level of function for planned discharge setting.  See evaluation for individualized goals.  Description: Treatment Interventions: ADL retraining, Functional transfer training, Endurance training, Cognitive reorientation, Equipment evaluation/education, Fine motor coordination activities, Continued evaluation, Cardiac education, Energy conservation          See flowsheet documentation for full assessment, interventions and recommendations.   Outcome: Progressing  Note: Limitation: Decreased ADL status, Decreased Safe judgement during ADL, Decreased cognition, Decreased endurance, Decreased self-care trans, Decreased high-level ADLs  Prognosis: Fair  Assessment: Pt seen for OT treatment session day 1 on this date focused on ADL retraining, functional transfers and mobility, energy conservation, functional endurance, recall of safety precautions, and patient education. Pt was greeted in chair and was cooperative throughout session. Following session, pt was left in chair with chair alarm on and all needs within reach. Pt continues to be limited by functional status related to ADLs and transfers requiring MOD A for LB dressing, MIN A for standing toileting and grooming, although demonstrates improvements in cognition and coping skills. The patient's raw score on the AM-PAC Daily Activity Inpatient Short Form is 18. A raw score of less than 19 suggests the patient may benefit from discharge to post-acute rehabilitation services. Please refer to the recommendation of the Occupational Therapist for safe discharge planning. Pt would benefit from continued acute OT intervention with plan to continue OT treatment sessions 2-3x per week. Recommend d/c to level II services.     Rehab Resource Intensity Level, OT: II (Moderate Resource Intensity)

## 2025-05-22 NOTE — ASSESSMENT & PLAN NOTE
I personally reviewed patient's left heart cath from 5/15/2025.  He had severe multivessel coronary artery disease with significant stenosis in his ramus intermedius, first diagonal (culprit), mid LAD, mid to distal circumflex, RPDA.  I reviewed his follow-up transthoracic echocardiogram from 5/5/2025 showing low normal LVEF of approximately 52% without regional wall motion abnormalities.  S/p CABG

## 2025-05-22 NOTE — QUICK NOTE
Blood sugar trends reviewed since transition off of insulin drip.  Fasting blood sugars are reasonable.  Blood sugars higher postprandially.  Continue Lantus 30 units nightly and increase Humalog to 12 units with meals.  Continue scale for correction.  Endocrinology Will continue to follow.    POC Glucose (mg/dl)   Date Value   05/22/2025 258 (H)   05/22/2025 173 (H)   05/22/2025 185 (H)   05/22/2025 158 (H)   05/22/2025 134   05/22/2025 81   05/21/2025 159 (H)   05/21/2025 157 (H)   05/21/2025 170 (H)   05/21/2025 193 (H)

## 2025-05-22 NOTE — CASE MANAGEMENT
Case Management Discharge Planning Note    Patient name Tc Stovall  [id number]  : 1950 Date 2025       Current Admission Date: 5/15/2025  Current Admission Diagnosis:CAD, multiple vessel   Patient Active Problem List    Diagnosis Date Noted    S/P CABG x 2 2025    Fever 2025    NSTEMI (non-ST elevated myocardial infarction) ([facility]) 05/15/2025    Paroxysmal atrial tachycardia ([facility]) 05/15/2025    PVC's (premature ventricular contractions) 05/15/2025    Tobacco use 05/15/2025    CAD, multiple vessel 05/15/2025    Elevated troponin 2025    Cubital tunnel syndrome on right 2025    Carpal tunnel syndrome of right wrist 2025    History of CVA (cerebrovascular accident) 01/10/2025    Radiculopathy, cervical region 2024    Foraminal stenosis of lumbar region 2024    Protrusion of cervical intervertebral disc 2024    Cervical radiculopathy 2024    Obesity, morbid ([facility]) 2024    Type 2 diabetes mellitus with hyperglycemia, without long-term current use of insulin ([facility]) 2023    Primary hypertension 10/04/2021    Hyperlipidemia 10/04/2021    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 edema, without long-term current use of insulin ([facility]) 2021    Type 2 diabetes mellitus ([facility]) 2017    Dependence on nicotine from cigarettes 2017      LOS (days): 7  Geometric Mean LOS (GMLOS) (days): 8.1  Days to GMLOS:1.2     OBJECTIVE:  Risk of Unplanned Readmission Score: 13.98         Current admission status: Inpatient   Preferred Pharmacy:   [id number] - [place] - [address] [place] [address]  Phone: [phone] Fax: [phone]    Primary Care Provider: Lester Roth DO    Primary Insurance: MEDICARE  Secondary Insurance: AARP    DISCHARGE DETAILS:  IMM Given (Date):: 25 (1100)  IMM Given to:: Patient

## 2025-05-22 NOTE — PHYSICAL THERAPY NOTE
Physical Therapy Treatment Note       05/22/25 0825   PT Last Visit   PT Visit Date 05/22/25   Note Type   Note Type Treatment   Pain Assessment   Pain Assessment Tool 0-10   Pain Score 4   Pain Location/Orientation Location: Chest   Patient's Stated Pain Goal No pain   Hospital Pain Intervention(s) Ambulation/increased activity   Restrictions/Precautions   Weight Bearing Precautions Per Order No   Other Precautions Cardiac/sternal;Telemetry;Multiple lines;Fall Risk;Pain;Chair Alarm;Bed Alarm  (chair alarm re-armed post session)   General   Chart Reviewed Yes   Family/Caregiver Present No   Cognition   Overall Cognitive Status Impaired   Arousal/Participation Responsive   Attention Attends with cues to redirect   Orientation Level Oriented X4   Memory Unable to assess   Following Commands Follows one step commands without difficulty   Subjective   Subjective states he feels OK.  a bit stronger, but some SOB/fatigue w/ gait   Transfers   Sit to Stand 4  Minimal assistance   Additional items Assist x 1;Increased time required;Verbal cues   Stand to Sit 5  Supervision   Additional items Assist x 1;Increased time required;Verbal cues   Ambulation/Elevation   Gait pattern   (slow, short step length, forward flexion)   Gait Assistance 4  Minimal assist   Additional items Assist x 1   Assistive Device Rolling walker   Distance 150'x1, 100'x1 w/ extended seated rest   Balance   Static Sitting Normal   Dynamic Sitting Good   Static Standing Fair -   Dynamic Standing Fair -   Ambulatory Fair -   Endurance Deficit   Endurance Deficit Yes   Endurance Deficit Description fatigue, pain   Activity Tolerance   Activity Tolerance Patient limited by fatigue;Patient limited by pain   Nurse Made Aware yes   Assessment   Prognosis Good   Problem List Decreased strength;Decreased endurance;Impaired balance;Decreased mobility;Pain   Assessment Pt seen for session for setup, transfers, gait w/ rest time, repositioning.  Pt cooperative  w/ session.  Note that pt needs less physical assist for all mobility tasks w/ improvong endurance.  cues for pacing, safe RW use.  follow for progress- currently continue to lean towards level II but could potentially progress to level III while here   Goals   Patient Goals to improve and go home   STG Expiration Date 05/30/25   Long Term Goal #2 1   Plan   Treatment/Interventions Functional transfer training;LE strengthening/ROM;Elevations;Therapeutic exercise;Endurance training;Patient/family training;Equipment eval/education;Bed mobility;Gait training   Progress Progressing toward goals   PT Frequency 4-6x/wk   Discharge Recommendation   Rehab Resource Intensity Level, PT (S)  II (Moderate Resource Intensity)  (see above for full recs)   Equipment Recommended Walker   Walker Package Recommended Wheeled walker   Change/add to Walker Package? No   AM-PAC Basic Mobility Inpatient   Turning in Flat Bed Without Bedrails 4   Lying on Back to Sitting on Edge of Flat Bed Without Bedrails 3   Moving Bed to Chair 3   Standing Up From Chair Using Arms 3   Walk in Room 3   Climb 3-5 Stairs With Railing 2   Basic Mobility Inpatient Raw Score 18   Basic Mobility Standardized Score 41.05   [facility] Highest Level Of Mobility   -HLM Goal 6: Walk 10 steps or more   -HLM Achieved 8: Walk 250 feet ot more     Sj Quigley PT, DPT, GCS, CSRS

## 2025-05-22 NOTE — RESTORATIVE TECHNICIAN NOTE
Restorative Technician Note      Patient Name: Tc Stovall     Restorative Tech Visit Date: 05/22/25  Note Type: Mobility  Patient Position Upon Consult: Bedside chair  Activity Performed: Ambulated  Assistive Device: Roller walker  Patient Position at End of Consult: Bed/Chair alarm activated; All needs within reach; Bedside chair

## 2025-05-22 NOTE — PLAN OF CARE
Problem: PHYSICAL THERAPY ADULT  Goal: Performs mobility at highest level of function for planned discharge setting.  See evaluation for individualized goals.  Description: Treatment/Interventions: Functional transfer training, LE strengthening/ROM, Therapeutic exercise, Endurance training, Equipment eval/education, Bed mobility, Gait training, Spoke to nursing, Spoke to case management, OT  Equipment Recommended: Walker       See flowsheet documentation for full assessment, interventions and recommendations.  5/22/2025 1158 by Sj Quigley, PT  Note: Prognosis: Good  Problem List: Decreased strength, Decreased endurance, Impaired balance, Decreased mobility, Pain  Assessment: Pt seen for session for setup, transfers, gait w/ rest time, repositioning.  Pt cooperative w/ session.  Note that pt needs less physical assist for all mobility tasks w/ improvong endurance.  cues for pacing, safe RW use.  follow for progress- currently continue to lean towards level II but could potentially progress to level III while here  Barriers to Discharge: Decreased caregiver support     Rehab Resource Intensity Level, PT: (S) II (Moderate Resource Intensity) (see above for full recs)    See flowsheet documentation for full assessment.

## 2025-05-23 ENCOUNTER — TELEPHONE (OUTPATIENT)
Age: 75
End: 2025-05-23

## 2025-05-23 ENCOUNTER — TELEPHONE (OUTPATIENT)
Dept: CARDIAC SURGERY | Facility: CLINIC | Age: 75
End: 2025-05-23

## 2025-05-23 VITALS
OXYGEN SATURATION: 95 % | DIASTOLIC BLOOD PRESSURE: 55 MMHG | RESPIRATION RATE: 16 BRPM | HEIGHT: 67 IN | TEMPERATURE: 97.9 F | BODY MASS INDEX: 35.19 KG/M2 | HEART RATE: 74 BPM | SYSTOLIC BLOOD PRESSURE: 114 MMHG | WEIGHT: 224.21 LBS

## 2025-05-23 LAB
GLUCOSE SERPL-MCNC: 123 MG/DL (ref 65–140)
GLUCOSE SERPL-MCNC: 141 MG/DL (ref 65–140)
GLUCOSE SERPL-MCNC: 207 MG/DL (ref 65–140)

## 2025-05-23 PROCEDURE — 99024 POSTOP FOLLOW-UP VISIT: CPT | Performed by: PHYSICIAN ASSISTANT

## 2025-05-23 PROCEDURE — 94664 DEMO&/EVAL PT USE INHALER: CPT

## 2025-05-23 PROCEDURE — 97530 THERAPEUTIC ACTIVITIES: CPT

## 2025-05-23 PROCEDURE — 94640 AIRWAY INHALATION TREATMENT: CPT

## 2025-05-23 PROCEDURE — 97116 GAIT TRAINING THERAPY: CPT

## 2025-05-23 PROCEDURE — 94760 N-INVAS EAR/PLS OXIMETRY 1: CPT

## 2025-05-23 PROCEDURE — 82948 REAGENT STRIP/BLOOD GLUCOSE: CPT

## 2025-05-23 RX ORDER — INSULIN GLARGINE 100 [IU]/ML
30 INJECTION, SOLUTION SUBCUTANEOUS
Start: 2025-05-23

## 2025-05-23 RX ORDER — METOPROLOL TARTRATE 50 MG
50 TABLET ORAL EVERY 12 HOURS SCHEDULED
Start: 2025-05-23

## 2025-05-23 RX ORDER — ATORVASTATIN CALCIUM 80 MG/1
80 TABLET, FILM COATED ORAL
Start: 2025-05-23

## 2025-05-23 RX ORDER — INSULIN LISPRO 100 [IU]/ML
12 INJECTION, SOLUTION INTRAVENOUS; SUBCUTANEOUS
Start: 2025-05-23

## 2025-05-23 RX ORDER — ACETAMINOPHEN 325 MG/1
650 TABLET ORAL EVERY 6 HOURS PRN
Start: 2025-05-23

## 2025-05-23 RX ORDER — TORSEMIDE 10 MG/1
10 TABLET ORAL DAILY
Start: 2025-05-23 | End: 2025-05-26

## 2025-05-23 RX ORDER — ASPIRIN 325 MG
325 TABLET ORAL DAILY
Start: 2025-05-23

## 2025-05-23 RX ORDER — OXYCODONE HYDROCHLORIDE 5 MG/1
TABLET ORAL
Qty: 30 TABLET | Refills: 0 | Status: SHIPPED | OUTPATIENT
Start: 2025-05-23 | End: 2025-06-02

## 2025-05-23 RX ORDER — TORSEMIDE 20 MG/1
20 TABLET ORAL DAILY
Status: CANCELLED | OUTPATIENT
Start: 2025-05-24

## 2025-05-23 RX ORDER — POTASSIUM CHLORIDE 1500 MG/1
20 TABLET, EXTENDED RELEASE ORAL DAILY
Status: CANCELLED | OUTPATIENT
Start: 2025-05-24

## 2025-05-23 RX ORDER — OMEPRAZOLE 20 MG/1
20 CAPSULE, DELAYED RELEASE ORAL DAILY
Start: 2025-05-23 | End: 2025-06-22

## 2025-05-23 RX ORDER — DOCUSATE SODIUM 100 MG/1
100 CAPSULE, LIQUID FILLED ORAL 2 TIMES DAILY
Start: 2025-05-23 | End: 2025-06-22

## 2025-05-23 RX ORDER — POTASSIUM CHLORIDE 1500 MG/1
20 TABLET, EXTENDED RELEASE ORAL DAILY
Start: 2025-05-23 | End: 2025-05-26

## 2025-05-23 RX ORDER — POLYETHYLENE GLYCOL 3350 17 G/17G
17 POWDER, FOR SOLUTION ORAL DAILY PRN
Start: 2025-05-23

## 2025-05-23 RX ADMIN — ACETAMINOPHEN 975 MG: 325 TABLET ORAL at 14:20

## 2025-05-23 RX ADMIN — ASPIRIN 325 MG ORAL TABLET 325 MG: 325 PILL ORAL at 16:20

## 2025-05-23 RX ADMIN — GABAPENTIN 600 MG: 300 CAPSULE ORAL at 16:18

## 2025-05-23 RX ADMIN — METOPROLOL TARTRATE 50 MG: 50 TABLET, FILM COATED ORAL at 09:43

## 2025-05-23 RX ADMIN — LEVALBUTEROL HYDROCHLORIDE 1.25 MG: 1.25 SOLUTION RESPIRATORY (INHALATION) at 07:17

## 2025-05-23 RX ADMIN — ACETAMINOPHEN 975 MG: 325 TABLET ORAL at 09:42

## 2025-05-23 RX ADMIN — GABAPENTIN 600 MG: 300 CAPSULE ORAL at 09:43

## 2025-05-23 RX ADMIN — AMIODARONE HYDROCHLORIDE 200 MG: 200 TABLET ORAL at 05:36

## 2025-05-23 RX ADMIN — Medication 2.5 MG: at 09:46

## 2025-05-23 RX ADMIN — Medication 2.5 MG: at 16:22

## 2025-05-23 RX ADMIN — INSULIN LISPRO 12 UNITS: 100 INJECTION, SOLUTION INTRAVENOUS; SUBCUTANEOUS at 09:48

## 2025-05-23 RX ADMIN — ATORVASTATIN CALCIUM 80 MG: 80 TABLET, FILM COATED ORAL at 16:18

## 2025-05-23 RX ADMIN — POTASSIUM CHLORIDE 20 MEQ: 1500 TABLET, EXTENDED RELEASE ORAL at 09:43

## 2025-05-23 RX ADMIN — POTASSIUM CHLORIDE 20 MEQ: 1500 TABLET, EXTENDED RELEASE ORAL at 16:19

## 2025-05-23 RX ADMIN — COLCHICINE 0.6 MG: 0.6 TABLET ORAL at 16:18

## 2025-05-23 RX ADMIN — TORSEMIDE 20 MG: 20 TABLET ORAL at 16:18

## 2025-05-23 RX ADMIN — PANTOPRAZOLE SODIUM 40 MG: 40 TABLET, DELAYED RELEASE ORAL at 09:43

## 2025-05-23 RX ADMIN — LEVALBUTEROL HYDROCHLORIDE 1.25 MG: 1.25 SOLUTION RESPIRATORY (INHALATION) at 14:11

## 2025-05-23 RX ADMIN — TORSEMIDE 20 MG: 20 TABLET ORAL at 09:43

## 2025-05-23 RX ADMIN — FONDAPARINUX SODIUM 2.5 MG: 2.5 INJECTION, SOLUTION SUBCUTANEOUS at 09:46

## 2025-05-23 RX ADMIN — GUAIFENESIN 600 MG: 600 TABLET, EXTENDED RELEASE ORAL at 09:43

## 2025-05-23 RX ADMIN — INSULIN LISPRO 12 UNITS: 100 INJECTION, SOLUTION INTRAVENOUS; SUBCUTANEOUS at 16:20

## 2025-05-23 RX ADMIN — AMIODARONE HYDROCHLORIDE 200 MG: 200 TABLET ORAL at 14:20

## 2025-05-23 RX ADMIN — COLCHICINE 0.6 MG: 0.6 TABLET ORAL at 09:43

## 2025-05-23 RX ADMIN — Medication 1000 UNITS: at 09:43

## 2025-05-23 NOTE — NURSING NOTE
Report given to [facility]. Nursing given instructions on surgical site care.

## 2025-05-23 NOTE — TELEPHONE ENCOUNTER
Caller: Patient's Son     Doctor: Dr. Granados     Reason for call: Patient is in hospital and asked to cancel surgery     Call back#: [phone]

## 2025-05-23 NOTE — DISCHARGE SUMMARY
Discharge Summary - Cardiac Surgery   Tc Stovall 75 y.o. male MRN: [id number]  Unit/Bed#: PPHP-326-01 Encounter: [id number]    Admission Date: 5/15/2025     Discharge Date: 05/23/25    Admitting Diagnosis: NSTEMI (non-ST elevated myocardial infarction) (HCC) [I21.4]    Primary Discharge Diagnosis:   Coronary artery disease. S/P coronary artery bypass grafting;    Secondary Discharge Diagnosis:   CAD, HTN, HLD, multiple CVAs (has not followed with neurology, planning for ILR implant), PAT, PVCs, 1st degree AVB-mild, current tobacco use, IDDM2 (A1c- 7.7)    Attending: Martin Polo M.D.    Consulting Physician(s):   Cardiology  Endocrinology  Medical critical care    Procedures Performed:   Procedure(s):  CORONARY ARTERY BYPASS GRAFT (CABG) X2 VESSELS  USING LEFT DENILSON-> LAD  AND LEFT GSV-> RAMUS     Hospital Course:   The patient was seen in consultation prior to this admission for evaluation of Coronary artery disease.  Risks and benefits of coronary artery bypass grafting were discussed in detail, and patient was agreeable.  Routine preoperative evaluation was completed and informed consent was obtained prior to admission.    5/16: 74 y/o male presented to  [facility] on 5/14 with complaints of sudden onset severe substernal CP at rest with radiation to his back. EMS called, found to have SBP of 200, nitro and ASA given with some improvement in pain. CTA dissection protocol performed revealing no acute findings. Troponins found to be elevated (max 22,973), ST depressions in inferior leads. Patient was started on heparin gtt, cardiology consulted, loaded with Plavix, underwent cardiac cath on 5/15 revealing MV CAD, started on BB, recommended for transfer to [facility] [place] for CABG evaluation. Of note, recently admitted on 1/2025 for CVA, found with multiple subacute infarcts with concern for embolic etiology which he was following Dr. Lujan for with plans for loop recorder implant. Pre-operative testing ordered,  timing of OR TBD. C/p 6/10 CP on nitro gtt, uptitrate otherwise may need OR sooner than next week.     5/17: Pre-operative testing complete. No more CP on nitro gtt.     5/18: No events. Pre-op orders placed. Ready for OR tomorrow w/ Dr. Polo.     5/19: CABG x2. No significant postoperative bleeding.  Transferred to ICU supported with fatoumata 70.  Wean towards extubation.      5/20: Post-op was on phenylephrine and also received 500 LR and 500 albumin low CI and low CVP. Overnight was initiated on cardene for high SVR, currently at 2.5 and PAC was removed. HR 86, /56, NSR with 1st degree AVB, on RA, CI 2.5, net + 2.6L, labs stable, hgb 10.3, plts 140k, Cr 1.06. Start lopressor 25 mg BID, wean cardene, start lasix 40 mg IV BID and d/c matthew. Transfer to step down.      5/21: Transferred from ICU to step down. Cardene weaned off. No telemetry events overnight. NSR; Hypertensive/Tachycardic; Increase to Lopressor, 50mg PO BID. D/C chest tubes and pacing wires. Intake/Output net: -  188  mL/24 hours. Wt today 231, from 227. Pre-op Wt: 226. Transition to  PO Torsemide, 20 mg BID. Update level of care to telemetry. Prn nebs started.      5/22: No telemetry events overnight. + BM. Tachycardia resolved with beta blocker increase. Glucose well-controlled with transition to SC insulin.      5/23: No events. Intake/Output net: -  1300   mL/24 hours. Wt today 224, from 228,(Pre-op Wt: 226); Decrease PO Torsemide, 20 mg QD. Fit for discharge to rehab.      Condition at Discharge:   good     Discharge Physical Exam:    Please see the documented physical exam from this morning's progress note for details.    Discharge Data:  Results from last 7 days   Lab Units 05/21/25  0424 05/20/25  0416 05/20/25  0054 05/19/25  1737 05/19/25  1720 05/19/25  1252 05/19/25  0453   WBC Thousand/uL 14.23* 14.36*  --   --   --   --  9.66   HEMOGLOBIN g/dL 9.4* 10.3* 10.5*  --  10.9*  --  11.5*   I STAT HEMOGLOBIN   --   --   --    < >  --     < >  --    HEMATOCRIT % 29.0* 32.3* 31.7*  --  32.7*  --  34.5*   HEMATOCRIT, ISTAT   --   --   --    < >  --    < >  --    PLATELETS Thousands/uL 214 204  --   --  238   < > 254    < > = values in this interval not displayed.     Results from last 7 days   Lab Units 05/22/25  0419 05/21/25  0424 05/20/25  0416 05/19/25 2058 05/19/25  1737   POTASSIUM mmol/L 4.3 4.1 4.7   < >  --    CHLORIDE mmol/L 102 103 109*  --   --    CO2 mmol/L 23 24 20*  --   --    CO2, I-STAT mmol/L  --   --   --   --  23   BUN mg/dL 22 25 20  --   --    CREATININE mg/dL 1.00 1.21 1.06  --   --    GLUCOSE, ISTAT mg/dl  --   --   --   --  168*   CALCIUM mg/dL 7.9* 8.1* 8.0*  --   --     < > = values in this interval not displayed.     Results from last 7 days   Lab Units 05/19/25  0453 05/18/25  1120 05/18/25  0503   PTT seconds 87* 72* 62*       Discharge instructions/Information to patient and family:   See after visit summary for information provided to patient and family.      Tc Stovall was educated on restrictions regarding driving and lifting, and techniques of proper incisional care.  They were specifically counselled on signs and symptoms of an incisional infection, and advised to contact our service immediately should they develop fevers, sweats, chill, redness or drainage at the site of any incisions.    Provisions for Follow-Up Care:  See after visit summary for information related to follow-up care and any pertinent home health orders.      Disposition:  Skilled nursing facility at [facility]    Planned Readmission:   No    Discharge Medications:  See after visit summary for reconciled discharge medications provided to patient and family.      Tc Stovall was provided contact information and scheduled a follow up appointment with Martin Polo M.D.  Additionally, follow up appointments have been scheduled for their primary care physician and primary cardiologist.  Contact information was  provided.    Upon presentation, NSTEMI was identified.    Tc Stovall was counseled on the importance of avoiding tobacco products.  As with all patients whom have undergone open heart surgery, tobacco cessation medication was contraindicated at the time of discharge.     ACE/ARB was Contraindicated secondary to hypotension    Beta Blocker was Prescribed at discharge    Aspirin was Prescribed at discharge    Statin was Prescribed at discharge      The patient was discharged on ongoing diuretic therapy with Torsemide, 10 mg, PO QD and Potassium Chloride 20 mEq, PO QD.  They were advised to continue these medications for 3 days, unless otherwise directed.     Narcotic pain medication was prescribed in the form of Oxycodone.  Prior to prescribing, their prescription profile was reviewed on the [facility] of health prescription drug monitoring program.    The patient was informed that following their postoperative surgical evaluation, they will be referred to outpatient cardiac rehabilitation.  They were counseled that this program is run by specialists who will help them safely strengthen their heart and prevent more heart disease.  Cardiac rehabilitation will include exercise, relaxation, stress management, and heart-healthy nutrition.  Caregivers will also check to make sure their medication regimen is working.    During this admission, the patient was questioned on their use of tobacco, alcohol, and illicit/non-prescription drug use in the  previous 24 months. During this time frame they admit to using tobacco. As such they have been counseled on the importance of cessation and abstinence.     I spent 30 minutes discharging the patient. This time was spent on the day of discharge. I had direct contact with the patient on the day of discharge. Additional documentation is required if more than 30 minutes were spent on discharge.     SIGNATURE: Liiva Cornejo PA-C  DATE: May 23, 2025  TIME: 11:19 AM

## 2025-05-23 NOTE — RESTORATIVE TECHNICIAN NOTE
Restorative Technician Note      Patient Name: Tc Stovall     Restorative Tech Visit Date: 05/23/25  Note Type: Mobility  Patient Position Upon Consult: Bedside chair  Activity Performed: Ambulated  Assistive Device: Roller walker  Patient Position at End of Consult: Bed/Chair alarm activated; All needs within reach; Bedside chair

## 2025-05-23 NOTE — NURSING NOTE
Attempt x4 to give report to [facility].    Post-surgical incision care reviewed with patient. 4x4 gauze, chlorhexidine soap, tape, disposable washcloths, and instructions sent with patient.

## 2025-05-23 NOTE — CASE MANAGEMENT
Case Management Discharge Planning Note    Patient name Tc Stovall  [facility] [id number]  : 1950 Date 2025       Current Admission Date: 5/15/2025  Current Admission Diagnosis:CAD, multiple vessel   Patient Active Problem List    Diagnosis Date Noted    S/P CABG x 2 2025    Fever 2025    NSTEMI (non-ST elevated myocardial infarction) ([facility]) 05/15/2025    Paroxysmal atrial tachycardia ([facility]) 05/15/2025    PVC's (premature ventricular contractions) 05/15/2025    Tobacco use 05/15/2025    CAD, multiple vessel 05/15/2025    Elevated troponin 2025    Cubital tunnel syndrome on right 2025    Carpal tunnel syndrome of right wrist 2025    History of CVA (cerebrovascular accident) 01/10/2025    Radiculopathy, cervical region 2024    Foraminal stenosis of lumbar region 2024    Protrusion of cervical intervertebral disc 2024    Cervical radiculopathy 2024    Obesity, morbid ([facility]) 2024    Type 2 diabetes mellitus with hyperglycemia, without long-term current use of insulin ([facility]) 2023    Primary hypertension 10/04/2021    Hyperlipidemia 10/04/2021    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 edema, without long-term current use of insulin ([facility]) 2021    Type 2 diabetes mellitus ([facility]) 2017    Dependence on nicotine from cigarettes 2017      LOS (days): 8  Geometric Mean LOS (GMLOS) (days): 8.1  Days to GMLOS:0.3     OBJECTIVE:  Risk of Unplanned Readmission Score: 15.42         Current admission status: Inpatient   Preferred Pharmacy:   [id number] - [place] - [address]  [address] [place] [address]  Phone: [phone] Fax: [phone]    Primary Care Provider: Lester Roth DO    Primary Insurance: MEDICARE  Secondary Insurance: AARP    Additional Comments: CM notified by CTS pt is clear for DC today. CM met with pt bedside to  discuss who is agreeable to DCP to STR at [facility]. Pt asked CM to call son    TC to son Mateusz ([phone]) Cm discussed facility availability for today with Mateusz Child agreeable to pt going to [facility]. Per [facility] facility can admit patient today anytime & will have private room    CM discussed that patient may be responsible for transport bill. Mateusz reports family cannot transport pt today. Mateusz aware and agreeable to transport plan. Pt has been assist x1 for transfers today. [facility] Transport scheduled for 1400 through [employer], provider & bedside RN aware. DC Envelope given to bedside RN. Facility notified of scheduled transport time    ADDENDUM  CM notified transport will now be 1800 , CM notified facility via [facility] who shares they will be able to admit tonight and that time is OK    Accepting Facility Name, City & State : [facility] ([facility]) [address] [place] [address]  Receiving Facility/Agency Phone Number: Nursing [phone]  Facility/Agency Fax Number: [phone]

## 2025-05-23 NOTE — PROGRESS NOTES
Progress Note - Cardiac Surgery   Tc Stovall 75 y.o. male MRN: [id number]  Unit/Bed#: [facility]PHP-326-01 Encounter: [id number]    Coronary artery disease. S/P coronary artery bypass grafting; POD # 4      24 Hour Events: No telemetry events overnight. + BM.     Medications:   Scheduled Meds:  Current Facility-Administered Medications   Medication Dose Route Frequency Provider Last Rate    acetaminophen  650 mg Oral Q6H PRN Akhil Merlos PA-C      acetaminophen  975 mg Oral Q6H While awake Nay Sahu PA-C      amiodarone  200 mg Oral Q8H On license of [facility] Nay Sahu PA-C      aspirin  325 mg Oral Daily Nay Sahu PA-C      atorvastatin  80 mg Oral Daily With Dinner Nay Sahu PA-C      bisacodyl  10 mg Rectal Daily PRN Nay Sahu PA-C      chlorhexidine  15 mL Mouth/Throat BID Nay Sahu PA-C      cholecalciferol  1,000 Units Oral Daily Nay Sahu PA-C      colchicine  0.6 mg Oral BID Nay Sahu PA-C      docusate sodium  100 mg Oral BID Akhil Merlos PA-C      fluticasone  1 spray Nasal Daily Nay Sahu PA-C      fondaparinux  2.5 mg Subcutaneous Daily Nay Sahu PA-C      gabapentin  600 mg Oral BID Nay Sahu PA-C      guaiFENesin  600 mg Oral Q12H On license of [facility] Livia Cornejo PA-C      insulin glargine  30 Units Subcutaneous HS Hui Bowman MD      insulin lispro  1-6 Units Subcutaneous HS Hui Bowman MD      insulin lispro  12 Units Subcutaneous TID With Meals Can Arshad DO      insulin lispro  2-12 Units Subcutaneous TID AC Hui Bowman MD      levalbuterol  1.25 mg Nebulization TID Livia Cornejo PA-C      magnesium sulfate  2 g Intravenous Once Akhil Merlos PA-C      metoprolol tartrate  50 mg Oral Q12H On license of [facility] Jef Quiroz PA-C      ondansetron  4 mg Intravenous Q6H PRN Nay Sahu PA-C      oxyCODONE  2.5 mg Oral Q4H PRN Nay Sahu PA-C      Or    oxyCODONE  5 mg Oral Q4H PRN Nay Sahu PA-C      pantoprazole  40 mg  Oral Daily Nay Sahu PA-C      polyethylene glycol  17 g Oral Daily Nay Sahu PA-C      potassium chloride  20 mEq Oral BID Akhil Merlos PA-C      temazepam  15 mg Oral HS PRN Akhil Merlos PA-C      torsemide  20 mg Oral BID Jef Quiroz PA-C       Continuous Infusions:     PRN Meds:.  acetaminophen    bisacodyl    ondansetron    oxyCODONE **OR** oxyCODONE    temazepam    Vitals:   Vitals:    05/22/25 2213 05/23/25 0257 05/23/25 0600 05/23/25 0659   BP: 123/63 116/60  114/62   BP Location:       Pulse: 78 65  74   Resp:    16   Temp: 97.9 °F (36.6 °C) 97.9 °F (36.6 °C)  97.7 °F (36.5 °C)   TempSrc:       SpO2: 95% 94%  96%   Weight:   102 kg (224 lb 3.3 oz)    Height:           Telemetry: NSR; Heart Rate: 67    Respiratory:   SpO2: SpO2: 96 %, SpO2 Activity: SpO2 Activity: At Rest; Room Air    Intake/Output:     Intake/Output Summary (Last 24 hours) at 5/23/2025 1002  Last data filed at 5/23/2025 0825  Gross per 24 hour   Intake 680 ml   Output 2250 ml   Net -1570 ml        Weights:   Weight (last 2 days)       Date/Time Weight    05/23/25 0600 102 (224.21)    05/22/25 0600 104 (228.4)    05/21/25 0600 105 (231.04)                Results:   Results from last 7 days   Lab Units 05/21/25  0424 05/20/25  0416 05/20/25  0054 05/19/25  1737 05/19/25  1720 05/19/25  1252 05/19/25  0453   WBC Thousand/uL 14.23* 14.36*  --   --   --   --  9.66   HEMOGLOBIN g/dL 9.4* 10.3* 10.5*  --  10.9*  --  11.5*   I STAT HEMOGLOBIN   --   --   --    < >  --    < >  --    HEMATOCRIT % 29.0* 32.3* 31.7*  --  32.7*  --  34.5*   HEMATOCRIT, ISTAT   --   --   --    < >  --    < >  --    PLATELETS Thousands/uL 214 204  --   --  238   < > 254    < > = values in this interval not displayed.     Results from last 7 days   Lab Units 05/22/25  0419 05/21/25  0424 05/20/25  0416 05/19/25 2058 05/19/25  1737   SODIUM mmol/L 133* 135 140  --   --    POTASSIUM mmol/L 4.3 4.1 4.7   < >  --    CHLORIDE mmol/L 102 103 109*   --   --    CO2 mmol/L 23 24 20*  --   --    CO2, I-STAT mmol/L  --   --   --   --  23   BUN mg/dL 22 25 20  --   --    CREATININE mg/dL 1.00 1.21 1.06  --   --    GLUCOSE, ISTAT mg/dl  --   --   --   --  168*   CALCIUM mg/dL 7.9* 8.1* 8.0*  --   --     < > = values in this interval not displayed.     Recent Labs     05/21/25  0424   MG 1.8*     Results from last 7 days   Lab Units 05/19/25  0453 05/18/25  1120 05/18/25  0503   PTT seconds 87* 72* 62*         Point of care glucose: 123    Studies:  No new imaging studies    Invasive Lines/Tubes:  Invasive Devices       Central Venous Catheter Line  Duration             CVC Central Lines 05/19/25 Right Internal jugular 3 days                  Physical Exam:    General: No acute distress, Alert, and Normal appearance  HEENT/NECK:  Normocephalic. Atraumatic.  No jugular venous distention.    Cardiac: Regular rate and rhythm and No murmurs/rubs/gallops  Pulmonary:  Breath sounds diminished in the bases bilaterally   Abdomen:  Non-tender, Non-distended, and Normal bowel sounds  Incisions: Sternum is stable.  Incision is clean, dry, and intact.  and Saphenectomy incison is clean, dry, and intact.   Extremities: Extremities warm/dry and Trace edema B/L  Neuro: Alert and oriented X 3  Skin: Warm/Dry, without rashes or lesions.      Assessment:  Principal Problem:    CAD, multiple vessel  Active Problems:    Type 2 diabetes mellitus ([facility])    Primary hypertension    Hyperlipidemia    History of CVA (cerebrovascular accident)    Elevated troponin    NSTEMI (non-ST elevated myocardial infarction) ([facility])    Paroxysmal atrial tachycardia ([facility])    PVC's (premature ventricular contractions)    Tobacco use    Fever    S/P CABG x 2       Coronary artery disease. S/P coronary artery bypass grafting; POD # 4    Plan:    Cardiac:     S/P acute preoperative NSTEMI  Normal ventricular systolic function, EF 50%    NSR  Tachycardia resolved with beta blocker increase    Continue Lopressor,  50mg PO BID    AF prophylaxis  Continue Amiodarone, 200 mg PO TID    Continue ASA and Statin therapy    Maintain central IV access today for lack of peripheral access    Continue Arixtra for DVT prophylaxis    Pulmonary:     Good Room air oxygen saturation; Continue incentive spirometry/Coughing/Deep breathing exercises  H/O tobacco use  Prn mininebs started        Renal:     Normal preoperative renal function  Creatinine 1.21 today, from 1.06    Intake/Output net: -  1300   mL/24 hours    Wt today 224, from 228, from 231, from 227   Pre-op Wt: 226    Diuretic Regimen:  Decrease PO Torsemide, 20 mg QD  Decrease  Potassium Chloride 20 mEq PO QD    Neuro:   H/O multiple CVAs (has not followed with neurology)    Neurologically intact; No active issues     Incisional pain well controlled   Continue tylenol, 975 mg PO q 8, standing dose   Continue oxycodone, 2.5 to 5 mg PO q 4 hours prn pain    GI:    Controlled carbohydrate diet level two/Cardiac diet, with 1800 mL fluid restriction    Tolerating diet without complaint  + BM postoperatively    Continue stool softeners and prn suppository    Continue GI prophylaxis    Endo:   IDDM2 (A1c- 7.7)     Remains on continuous insulin infusion  On injectable therapy, prior to surgical intervention  Recommendation for discharge diabetes regimen pending endocrinology follow up  Endocrinology following daily    7    Hematology:     Post-operative acute blood loss anemia; Hemoglobin 9.4; trend prn    8.   Disposition:        Routine postoperative recovery to this point; Anticipated discharge date: 5/22 vs 5/23      VTE Pharmacologic Prophylaxis: VTE covered by:  fondaparinux, Subcutaneous, 2.5 mg at 05/23/25 0946      VTE Mechanical Prophylaxis: sequential compression device    Collaborative rounds completed with supervising physician  Plan of care discussed with bedside nurse    SIGNATURE: Jef Quiroz PA-C  DATE: May 23, 2025  TIME: 10:02 AM

## 2025-05-23 NOTE — PHYSICAL THERAPY NOTE
Physical Therapy Progress Note       05/23/25 1025   PT Last Visit   PT Visit Date 05/23/25   Note Type   Note Type Treatment   Pain Assessment   Pain Assessment Tool 0-10   Pain Score 5   Pain Location/Orientation Location: Incision   Hospital Pain Intervention(s) Repositioned;Ambulation/increased activity;Emotional support   Restrictions/Precautions   Other Precautions Cardiac/sternal;Chair Alarm;Bed Alarm;Pain;Fall Risk;Telemetry   Subjective   Subjective The patient states that he is very fatigued, but he is amenable to walk with therapy.   Bed Mobility   Sit to Supine 5  Supervision   Additional items Increased time required   Transfers   Sit to Stand 5  Supervision   Additional items Increased time required   Stand to Sit 5  Supervision   Additional items Increased time required   Ambulation/Elevation   Gait pattern Excessively slow;Inconsistent priya   Gait Assistance 4  Minimal assist   Additional items Assist x 1;Verbal cues   Assistive Device Rolling walker   Distance 200 feet.   Balance   Static Sitting Normal   Dynamic Sitting Good   Static Standing Fair   Dynamic Standing Fair -   Ambulatory Fair -   Activity Tolerance   Activity Tolerance Patient tolerated treatment well   Nurse Made Aware Yes.   Assessment   Prognosis Good   Problem List Decreased strength;Decreased endurance;Impaired balance;Decreased mobility;Pain   Assessment The patient continues to remain limited in activity tolerance as well as balance. He required increase time for all mobility. He was asleep upon entering as well, and he required several minutes in order to come fully awake prior to ambulating. He expressed frustration with his limitations, but most notably about his level of fatigue. The patient then sat back down, but then requested to return to bed. He does remain limited from his baseline, and he will benefit from continued therapies in order to facilitate his return to independence.   Barriers to Discharge Decreased  caregiver support   Goals   Patient Goals To feel better.   STG Expiration Date 05/30/25   PT Treatment Day 1   Plan   Treatment/Interventions Functional transfer training;LE strengthening/ROM;Elevations;Therapeutic exercise;Endurance training;Patient/family training;Bed mobility;Gait training   Progress Progressing toward goals   PT Frequency 4-6x/wk   Discharge Recommendation   Rehab Resource Intensity Level, PT II (Moderate Resource Intensity)   Equipment Recommended Walker   Walker Package Recommended Wheeled walker   AM-PAC Basic Mobility Inpatient   Turning in Flat Bed Without Bedrails 4   Lying on Back to Sitting on Edge of Flat Bed Without Bedrails 3   Moving Bed to Chair 3   Standing Up From Chair Using Arms 3   Walk in Room 3   Climb 3-5 Stairs With Railing 3   Basic Mobility Inpatient Raw Score 19   Basic Mobility Standardized Score 42.48   [facility] Highest Level Of Mobility   -HLM Goal 6: Walk 10 steps or more   -HLM Achieved 7: Walk 25 feet or more         An AM-PAC Basic Mobility raw score less than 16 suggests the patient may benefit from discharge to post-acute rehab services.    Ming Kee, PTA

## 2025-05-23 NOTE — TELEPHONE ENCOUNTER
Patient of Dr. Roth is being discharged from [facility] today and will need a TCM appointment.    S/P Cabg x 2- Open heart surgery 5/19/25    Please contact the patient to schedule.

## 2025-05-23 NOTE — DISCHARGE INSTR - AVS FIRST PAGE
Instructions Following Open Heart Surgery      Protect your sternum (breast bone). Do not lift anything heavier than 10 pounds for the first four weeks after surgery. (For example, a gallon of milk weighs 8 pounds) When you are seen for a routine postop check, you will be cleared to lift up to 25 lbs. Following three months from the time of surgery, you may lift without any restrictions.     Hug a pillow to your chest or cross your arms over your chest when you laugh, sneeze, or cough. You may resume sexual activity when you feel ready. Do not put any excessive pressure on your chest.    Be careful when you get into or out of a chair or bed.  Hug a pillow or cross your arms when you stand or sit. Do not twist as you move. Use only your legs to sit and stand. You may need a raised toilet seat if you have trouble standing up without using your arms.     No sleeping on side for the first 4 weeks. Limit daytime naps, to prevent difficulty sleeping at night.    Women: Wear a clean surgical bra every day.     Do not play sports that use your shoulder.  Examples include tennis and golf.    Do not drive until cleared by surgeon. Typically cleared to drive after one month, determination will be made at routine postop appointment. When a passenger in the car, wear a seat belt.    Continue you breathing exercises throughout the day with incentive spirometry    Activity: Your goal is to go on frequent walks, slowly increasing your activity level. Walking will help prevent leg swelling and prevent blood clots. When you start outpatient cardiac rehab in one month, you will be given additional instructions and a formal exercise plan. It is OK to go up steps, with help.     You may resume sexual activity when you are comfortable. Do not put excessive pressure on your chest.     Weigh yourself daily in the morning and keep of log of your weight. If your weight increases more than 2 lbs per day or more than 5 lbs in a week,  call your surgeon's office.    Keep your legs elevated when sitting. Pressure stockings may be worn to prevent significant leg swelling.     You may get routine vaccines any time after open heart surgery    Do not smoke:  Nicotine can damage blood vessels and make it more difficult to heal. Do not use e-cigarettes or smokeless tobacco in place of cigarettes or to help you quit. They still contain nicotine. Ask your primary care provider for information if you currently smoke and need help quitting.     Incision/Wound Care:    Shower daily. Gently wash your incision with warm water and mild soap. Do not scrub the area. Gently pat the area dry with a clean towel. If you have a bandage, dry the area and put on a new, clean bandage. Change your bandage at least daily, or if it gets wet or dirty. Always wash your hands before you care for your wound. Do not apply ointments, creams, lotions, powders, etc. If you develop signs of an infection (fever > 101, redness, warm skin, or drainage) please call your surgeon's office.     Do not pick at or touch puncture sites or incisions. Allow and scabs to come off on their own.     It is normal to have some drainage from the chest tube sites. Apply clean/dry dressings, until this resolves.    You may have discomfort or numbness along the incision for 3-4 weeks. It is normal to occasionally experience brief sharp shooing pains, tightness, or pulling sensations.    Do not take a bath or swim until cleared by surgeon.    As your incision heals, sometimes small tender lumps or pimple-like bumps develop which is due to your body attempting to “dissolve” the suture (stiches).     Call your local emergency number (911 in the [place]) or have someone call if:   You have squeezing, pressure, or pain in your chest or back, lightheadedness or sudden cold sweat  Short of breath that does not go away with rest  You cough up blood.  You have a fast heartbeat that flutters. Or palpitations  You  faint.  Signs of a stroke:  Numbness or drooping on one side of your face. Weakness in an arm or leg. Confusion or difficulty speaking. Dizziness, a severe headache, or vision loss    Call your surgeons office if:   You have bleeding that does not stop even after you apply pressure for 5 minutes.  You have redness, tenderness, or signs of infection at incision (pain, swelling, odor, or green/yellow discharge from incision site)  You have a severe headache.  You have a fever higher than 101°F (38.4°C).  You urinate less, or not at all.  You have persistent nausea, vomiting, or diarrhea  You hear persistent or worsening crunching or grinding in your sternum.  You have questions or concerns about your condition or care.      Diet:    Eat heart-healthy foods, low in unhealthy fats and sodium (salt). A heart healthy diet helps improve your cholesterol levels and lowers your risk for heart disease and stroke. You will receive formal education on healthy eating and label reading during your cardiac rehab in one month.   Choose foods that contain healthy fats, such as soybean, canola, olive, corn, and sunflower oils.  Limit unhealthy fats. Examples include:  Cholesterol  is found in animal foods, such as eggs and lobster, and in dairy products made from whole milk.    Saturated fat  is found in meats, such as coronado and hamburger. It is also found in chicken or turkey skin, whole milk, and butter.     Trans fat  is found in packaged foods, such as potato chips and cookies. It is also in some fried foods, and shortening. Do not eat foods that contain trans fats.  Get 20 to 30 grams of fiber each day.  Fruits, vegetables, whole-grain foods, and legumes (cooked beans) are good sources of fiber.  Low Sodium: Limit to 2,000 mg or less each day, unless otherwise directed. Choose low-sodium or no-salt-added foods. Add little or no salt to food you prepare. Use herbs and spices in place of salt.  Foods high in sodium include frozen  dinners, macaroni and cheese, instant potatoes, canned vegetables, cured or smoked meats, hot dogs, coronado and sausage, ketchup, barbecue sauce, salad dressing, pickles, olives, soy sauce, and miso.     Fluid Restriction: Fluid restriction after hospital discharge is advised. Limit your fluid intake to no more than 8 cups of liquid (8oz = 1cup) or 2000 mL per day.    Limit/Do not drink alcohol. Alcohol can damage heart and raise your blood pressure. The general recommended limit is 1 drink a day for women 21 or older and for men 65 or older. Do not have more than 3 drinks within 24 hours or 7 within a week. A drink of alcohol is 12 oz of beer, 5 oz of wine, or 1½ oz of liquor.        Narcotic Safety     What do I need to know about narcotic safety?    A narcotic is a type of medicine used to treat pain. When you are discharged from the hospital, you will be prescribed a narcotic called oxycodone. Pain control and management may help you rest, heal, and return to your daily activities. Do not take more than the recommended amount. Too much can cause a life-threatening overdose. Do not continue to take it after your pain stops.     How do I use narcotics safely?   Take prescribed narcotics exactly as directed.  You may develop tolerance. This means you keep needing higher doses to get the same effect. You may also develop narcotic use disorder. This means you are not able to control your narcotic use.  Do not give narcotics to others or take narcotics that belong to someone else.  Do not mix narcotics with other medicines or alcohol.  The combination can cause an overdose, or cause you to stop breathing.   Do not drive or operate heavy machinery after you use a narcotic.    Talk to your healthcare provider if you have any side effects.  Side effects include nausea, sleepiness, itching, and trouble thinking clearly.     What can I do to manage constipation?  Constipation is the most common side effect of narcotic  medicine. Constipation is when you have hard, dry bowel movements, or you go longer than usual between bowel movements. The following are ways you can prevent or relieve constipation:  Drink liquids as allowed.  Drinking extra liquids will help soften and move your bowels. You should drink up to your maximum fluid allotment of 2 liters.    Eat high-fiber foods.  This may help decrease constipation by adding bulk to your bowel movements. High-fiber foods include fruits, vegetables, whole-grain breads and cereals, and beans  Supplements. You have been prescribed a fiber supplement called polyethylene glycol (Kezia lax) and a stool softener called docusate sodium (Colace). You should take these for as long as you continue narcotic medications.  Exercise regularly.  Regular physical activity can help stimulate your intestines. Walking is a good exercise to prevent or relieve constipation. Ask which exercises are best for you.    How do I store narcotics safely?   Store narcotics where others cannot easily get them.  Keep them in a locked cabinet or secure area. Do not  keep them in a purse or other bag you carry with you. A person may be looking for something else and find the narcotics.    Make sure narcotics are stored out of the reach of children.  A child can easily overdose on narcotics. Narcotics may look like candy to a small child.

## 2025-05-27 ENCOUNTER — TRANSITIONAL CARE MANAGEMENT (OUTPATIENT)
Age: 75
End: 2025-05-27

## 2025-06-02 NOTE — TELEPHONE ENCOUNTER
Caller: [facility]    Doctor: Dr. Granados    Reason for call: Called to confirm appointment was cancelled 6/11.     Call back#: [phone]

## 2025-06-05 ENCOUNTER — HOME HEALTH ADMISSION (OUTPATIENT)
Dept: HOME HEALTH SERVICES | Facility: HOME HEALTHCARE | Age: 75
End: 2025-06-05
Payer: MEDICARE

## 2025-06-09 ENCOUNTER — HOME CARE VISIT (OUTPATIENT)
Dept: HOME HEALTH SERVICES | Facility: HOME HEALTHCARE | Age: 75
End: 2025-06-09

## 2025-06-09 ENCOUNTER — TELEPHONE (OUTPATIENT)
Age: 75
End: 2025-06-09

## 2025-06-09 DIAGNOSIS — E11.65 TYPE 2 DIABETES MELLITUS WITH HYPERGLYCEMIA, WITHOUT LONG-TERM CURRENT USE OF INSULIN (HCC): ICD-10-CM

## 2025-06-09 DIAGNOSIS — E11.65 TYPE 2 DIABETES MELLITUS WITH HYPERGLYCEMIA, WITH LONG-TERM CURRENT USE OF INSULIN (HCC): ICD-10-CM

## 2025-06-09 DIAGNOSIS — Z79.4 TYPE 2 DIABETES MELLITUS WITH HYPERGLYCEMIA, WITH LONG-TERM CURRENT USE OF INSULIN (HCC): ICD-10-CM

## 2025-06-09 DIAGNOSIS — E11.9 TYPE 2 DIABETES MELLITUS (HCC): ICD-10-CM

## 2025-06-09 DIAGNOSIS — I25.10 CAD, MULTIPLE VESSEL: ICD-10-CM

## 2025-06-09 DIAGNOSIS — Z95.1 S/P CABG X 3: ICD-10-CM

## 2025-06-09 LAB
ATRIAL RATE: 102 BPM
P AXIS: 60 DEGREES
PR INTERVAL: 226 MS
QRS AXIS: 63 DEGREES
QRSD INTERVAL: 76 MS
QT INTERVAL: 328 MS
QTC INTERVAL: 428 MS
T WAVE AXIS: 54 DEGREES
VENTRICULAR RATE: 102 BPM

## 2025-06-09 RX ORDER — PEN NEEDLE, DIABETIC 32GX 5/32"
NEEDLE, DISPOSABLE MISCELLANEOUS
Qty: 400 EACH | Refills: 3 | Status: SHIPPED | OUTPATIENT
Start: 2025-06-09

## 2025-06-09 NOTE — TELEPHONE ENCOUNTER
Patient called to schedule a transition of care appointment. States he was discharged on Sunday, 6/8/25 from Rehab and is s/p heart surgery.    A warm transfer to office clerical (Padmini) was completed for further assistance with scheduling appointment.

## 2025-06-10 ENCOUNTER — HOME CARE VISIT (OUTPATIENT)
Dept: HOME HEALTH SERVICES | Facility: HOME HEALTHCARE | Age: 75
End: 2025-06-10
Attending: FAMILY MEDICINE
Payer: MEDICARE

## 2025-06-10 ENCOUNTER — HOME CARE VISIT (OUTPATIENT)
Dept: HOME HEALTH SERVICES | Facility: HOME HEALTHCARE | Age: 75
End: 2025-06-10
Payer: MEDICARE

## 2025-06-10 ENCOUNTER — TELEPHONE (OUTPATIENT)
Age: 75
End: 2025-06-10

## 2025-06-10 VITALS
OXYGEN SATURATION: 96 % | DIASTOLIC BLOOD PRESSURE: 58 MMHG | BODY MASS INDEX: 34.68 KG/M2 | TEMPERATURE: 97.7 F | WEIGHT: 221.4 LBS | HEART RATE: 61 BPM | RESPIRATION RATE: 16 BRPM | SYSTOLIC BLOOD PRESSURE: 110 MMHG

## 2025-06-10 PROCEDURE — 400013 VN SOC

## 2025-06-10 PROCEDURE — G0151 HHCP-SERV OF PT,EA 15 MIN: HCPCS

## 2025-06-10 PROCEDURE — 10330081 VN NO-PAY CLAIM PROCEDURE

## 2025-06-10 RX ORDER — INSULIN LISPRO 100 [IU]/ML
3 INJECTION, SOLUTION INTRAVENOUS; SUBCUTANEOUS
Qty: 15 ML | Refills: 2 | Status: SHIPPED | OUTPATIENT
Start: 2025-06-10

## 2025-06-10 RX ORDER — INSULIN GLARGINE 100 [IU]/ML
30 INJECTION, SOLUTION SUBCUTANEOUS
Qty: 27 ML | Refills: 3 | Status: SHIPPED | OUTPATIENT
Start: 2025-06-10

## 2025-06-10 NOTE — TELEPHONE ENCOUNTER
Acute Care - Speech Language Pathology   Swallow Initial Evaluation [facility]     Patient Name: Maikel Mathews  : 1928  MRN: [id number]  Today's Date: 2025               Admit Date: 2025    SPEECH-LANGUAGE PATHOLOGY EVALUATION - SWALLOW  Subjective: The patient was seen on this date for a Clinical Swallow evaluation.  Patient was alert and cooperative. Patient was eating lunch with supervision. SEBASTIAN Thornton, nursing educator Marcy, and nursing aide were present. Patient appeared confused and wanted to leave the bed. Patient was able to be redirected and continue with eating lunch.   Significant history: Patient has dementia, CKD, diabetes, and actue renal failure. Came to ED with altered mental status. SLP consulted to address swallowing difficulties after having trouble with breakfast.   Objective: Oral motor examination was unable to be completed due to cognitive status.  Textures given during assessment of swallow function included thin liquid, mechanical soft consistency, and regular consistency.  Assessment: Difficulties were noted with none of the above consistencies.  Observations: Trials of thin liquids, regular solids, and soft to chew solids were completed. No s/s of aspiration were observed. No changes in vocal quality was noted. SEBASTIAN Thornton reported that breakfast was difficult for the patient to eat due to harder solids and cognitive status. Patient's diet has been modified to soft to chew (chopped) and thin liquids due to cognitive status and baseline deficits.   SLP Findings:  Patient presents with suspected oropharyngeal dysphagia, without esophageal component.   Recommendations: Diet Textures: soft to chew chopped meats and thin liquids  Medications should be taken whole as tolerated. May have sips of water between meals and/or after oral care, under staff or family supervision and with the recommended strategies for safe swallowing. If patient demonstrates reduced alertness, it is  Kristel, who is triage RN with Home health and hospice within [facility],  called regarding pt Humalog. She is wondering if syringes can be ordered for the pt since he is without them right now.     Please call Kristel with update, she is on until 8 pm tonight.    recommended to hold PO and medications. SLP will continue to monitor for diet tolerance.    Recommended Strategies: upright for PO, small bites and sips, and alternate liquids and solids. Oral care after meals and PRN.    Dysphagia therapy is recommended for diet tolerance.     Completed by Atiya Zhang - SLP Student    Gena Delgado, MS CCC-SLP 5/6/2025 13:36 CDT      Visit Dx:     ICD-10-CM ICD-9-CM   1. Acute renal failure, unspecified acute renal failure type  N17.9 584.9   2. Altered mental status, unspecified altered mental status type  R41.82 780.97   3. Aggressive behavior due to dementia  F03.918 294.21   4. Hypomagnesemia  E83.42 275.2   5. Dysphagia, unspecified type  R13.10 787.20     Patient Active Problem List   Diagnosis    Anemia associated with nutritional deficiency    Iron deficiency anemia due to chronic blood loss    Cancer of right colon    Dizzinesses    Stage 3 chronic kidney disease    Encounter for hydration prior to CT scan    Low vitamin B12 level    Iron deficiency anemia    Cat bite    Cough    Low back pain    Pharyngitis    Spondylolisthesis, grade 1    Bilateral inguinal hernia    Type 2 diabetes mellitus with other specified complication    Acute renal failure    Change in mental status     Past Medical History:   Diagnosis Date    Arthritis     AVM (arteriovenous malformation)     Cancer     PROSTATE    Cataract     Colon cancer     Colon polyp     Diabetes mellitus     Diverticulosis     Dizzinesses 10/20/2017    GERD (gastroesophageal reflux disease)     Hemorrhoids     History of transfusion     Hypertension     Inguinal hernia     Iron deficiency anemia due to chronic blood loss 7/26/2017    Prostate CA     Prostate hypertrophy     Rotator cuff syndrome      Past Surgical History:   Procedure Laterality Date    APPENDECTOMY      CHOLECYSTECTOMY      COLON RESECTION N/A 9/18/2017    Procedure: LAPAROSCOPIC ASSISTED RIGHT COLECTOMY;  Surgeon: Caroline Loomis MD;  Location:   [facility] OR;  Service:     COLONOSCOPY  04/29/2010    COLONOSCOPY N/A 8/25/2017    Procedure: COLONOSCOPY WITH ANESTHESIA;  Surgeon: Joaquín Neff MD;  Location: [facility] ENDOSCOPY;  Service:     ENDOSCOPY  03/27/2013    ENDOSCOPY N/A 8/25/2017    Procedure: ESOPHAGOGASTRODUODENOSCOPY WITH ANESTHESIA;  Surgeon: Joaquín Neff MD;  Location: [facility] ENDOSCOPY;  Service:     EYE SURGERY Left     CATARACT    INGUINAL HERNIA REPAIR Bilateral 11/1/2019    Procedure: OPEN BILATERAL INGUINAL HERNIA REPAIR WITH MESH;  Surgeon: Caroline Loomis MD;  Location:  [facility] OR;  Service: General    INTERVENTIONAL RADIOLOGY PROCEDURE N/A 9/18/2017    Procedure: URETHRA DILITATION with dominguez catheter insertion;  Surgeon: Mamadou Paez MD;  Location:  [facility] OR;  Service:     PROSTATECTOMY      PROSTATECTOMY         SLP Recommendation and Plan        Recommended Precautions and Strategies: upright posture during/after eating, small bites of food and sips of liquid, alternate between small bites of food and sips of liquid, general aspiration precautions, 1:1 supervision (05/06/25 1240)                                                                             SWALLOW EVALUATION (Last 72 Hours)       SLP Adult Swallow Evaluation       Row Name 05/06/25 1240                   Rehab Evaluation    Document Type evaluation  -MG (r) LR (t) MG (c)        Subjective Information no complaints  -MG (r) LR (t) MG (c)        Patient Observations alert;cooperative;agree to therapy  -MG (r) LR (t) MG (c)        Patient Effort adequate  -MG (r) LR (t) MG (c)           General Information    Patient Profile Reviewed yes  -MG (r) LR (t) MG (c)        Pertinent History Of Current Problem Patient has dementia, CKD, diabetes, and actue renal failure. Came to ED with altered mental status. SLP consulted to address swallowing difficulties after having trouble with breakfast.  -MG (r) LR (t) MG (c)        Current Method of Nutrition regular  textures;thin liquids  -MG (r) LR (t) MG (c)        Precautions/Limitations, Vision WFL;for purposes of eval  -MG (r) LR (t) MG (c)        Precautions/Limitations, Hearing WFL;for purposes of eval  -MG (r) LR (t) MG (c)        Prior Level of Function-Communication WFL  -MG (r) LR (t) MG (c)        Prior Level of Function-Swallowing no diet consistency restrictions  -MG (r) LR (t) MG (c)        Plans/Goals Discussed with patient;other (see comments)  SEBASTIAN Thornton  -MG (r) LR (t) MG (c)        Barriers to Rehab cognitive status  -MG (r) LR (t) MG (c)        Patient's Goals for Discharge patient did not state  -MG (r) LR (t) MG (c)        Family Goals for Discharge other (see comments)  SNF  -MG (r) LR (t) MG (c)           Pain    Additional Documentation Pain Scale: FACES Pre/Post-Treatment (Group)  -MG (r) LR (t) MG (c)           Pain Scale: FACES Pre/Post-Treatment    Pain: FACES Scale, Pretreatment 0-->no hurt  -MG (r) LR (t) MG (c)        Posttreatment Pain Rating 0-->no hurt  -MG (r) LR (t) MG (c)           Oral Motor Structure and Function    Dentition Assessment natural, present and adequate  -MG (r) LR (t) MG (c)        Secretion Management WNL/WFL  -MG (r) LR (t) MG (c)        Mucosal Quality moist, healthy  -MG (r) LR (t) MG (c)           Oral Musculature and Cranial Nerve Assessment    Oral Motor General Assessment unable to assess  -MG (r) LR (t) MG (c)           General Eating/Swallowing Observations    Respiratory Support Currently in Use room air  -MG (r) LR (t) MG (c)        Eating/Swallowing Skills self-fed;fed by SLP  -MG (r) LR (t) MG (c)        Positioning During Eating upright in bed  -MG (r) LR (t) MG (c)        Utensils Used spoon;straw  -MG (r) LR (t) MG (c)        Consistencies Trialed regular textures;soft to chew textures;thin liquids  -MG           Respiratory    Respiratory Status room air  -MG (r) LR (t) MG (c)           Clinical Swallow Eval    Oral Prep Phase WFL  -MG (r) LR (t) MG (c)         Oral Transit WFL  -MG (r) LR (t) MG (c)        Oral Residue WFL  -MG (r) LR (t) MG (c)        Pharyngeal Phase no overt signs/symptoms of pharyngeal impairment  -MG (r) LR (t) MG (c)        Esophageal Phase unremarkable  -MG (r) LR (t) MG (c)        Clinical Swallow Evaluation Summary See note  -MG           SLP Evaluation Clinical Impression    SLP Swallowing Diagnosis R/O pharyngeal dysphagia;suspect chronic;pharyngeal dysphagia  -MG (r) LR (t) MG (c)        Functional Impact risk of aspiration/pneumonia;risk of malnutrition;risk of dehydration  -MG (r) LR (t) MG (c)        Rehab Potential/Prognosis, Swallowing adequate, monitor progress closely  -MG (r) LR (t) MG (c)        Swallow Criteria for Skilled Therapeutic Interventions Met demonstrates skilled criteria  -MG (r) LR (t) MG (c)           Recommendations    Therapy Frequency (Swallow) 2 days per week  -MG (r) LR (t) MG (c)        Predicted Duration Therapy Intervention (Days) until discharge  -MG (r) LR (t) MG (c)        SLP Diet Recommendation soft to chew textures;chopped;thin liquids  -MG (r) LR (t) MG (c)        Recommended Precautions and Strategies upright posture during/after eating;small bites of food and sips of liquid;alternate between small bites of food and sips of liquid;general aspiration precautions;1:1 supervision  -MG        Oral Care Recommendations Oral Care before breakfast, after meals and PRN  -MG (r) LR (t) MG (c)        SLP Rec. for Method of Medication Administration meds whole;as tolerated  -MG (r) LR (t) MG (c)        Monitor for Signs of Aspiration notify SLP if any concerns  -MG (r) LR (t) MG (c)        Anticipated Discharge Disposition (SLP) skilled nursing facility  -MG (r) LR (t) MG (c)           Swallow Goals (SLP)    Swallow LTGs Swallow Long Term Goal (free text)  -MG (r) LR (t) MG (c)        Swallow STGs diet tolerance goal selection (SLP)  -MG (r) LR (t) MG (c)        Diet Tolerance Goal Selection (SLP) Patient will  tolerate trials of  -MG (r) LR (t) MG (c)           (LTG) Swallow    (LTG) Swallow Patient will tolerate LRD without s/s of aspiration  -MG (r) LR (t) MG (c)        [place] (Swallow Long Term Goal) independently (over 90% accuracy)  -MG (r) LR (t) MG (c)        Time Frame (Swallow Long Term Goal) by discharge  -MG (r) LR (t) MG (c)        Barriers (Swallow Long Term Goal) Cognitive Status  -MG (r) LR (t) MG (c)        Progress/Outcomes (Swallow Long Term Goal) new goal  -MG (r) LR (t) MG (c)           (STG) Patient will tolerate trials of    Consistencies Trialed (Tolerate trials) soft to chew (chopped) textures;thin liquids  -MG (r) LR (t) MG (c)        Desired Outcome (Tolerate trials) without signs/symptoms of aspiration  -MG (r) LR (t) MG (c)        [place] (Tolerate trials) independently (over 90% accuracy)  -MG (r) LR (t) MG (c)        Time Frame (Tolerate trials) by discharge  -MG (r) LR (t) MG (c)        Progress/Outcomes (Tolerate trials) new goal  -MG (r) LR (t) MG (c)                  User Key  (r) = Recorded By, (t) = Taken By, (c) = Cosigned By      Initials Name Effective Dates    MG Gena Delgado, MS [facility]-SLP 07/11/23 -     Atiya Garcia, Speech Therapy Student 03/03/25 -                     EDUCATION  The patient has been educated in the following areas:   Dysphagia (Swallowing Impairment) Oral Care/Hydration.        SLP GOALS       Row Name 05/06/25 1240             (LTG) Swallow    (LTG) Swallow Patient will tolerate LRD without s/s of aspiration  -MG (r) LR (t) MG (c)      [place] (Swallow Long Term Goal) independently (over 90% accuracy)  -MG (r) LR (t) MG (c)      Time Frame (Swallow Long Term Goal) by discharge  -MG (r) LR (t) MG (c)      Barriers (Swallow Long Term Goal) Cognitive Status  -MG (r) LR (t) MG (c)      Progress/Outcomes (Swallow Long Term Goal) new goal  -MG (r) LR (t) MG (c)         (STG) Patient will tolerate trials of    Consistencies Trialed (Tolerate  trials) soft to chew (chopped) textures;thin liquids  -MG (r) LR (t) MG (c)      Desired Outcome (Tolerate trials) without signs/symptoms of aspiration  -MG (r) LR (t) MG (c)      [place] (Tolerate trials) independently (over 90% accuracy)  -MG (r) LR (t) MG (c)      Time Frame (Tolerate trials) by discharge  -MG (r) LR (t) MG (c)      Progress/Outcomes (Tolerate trials) new goal  -MG (r) LR (t) MG (c)                User Key  (r) = Recorded By, (t) = Taken By, (c) = Cosigned By      Initials Name Provider Type    Gena Hernandez MS CCC-SLP Speech and Language Pathologist    Atiya Garcia, Speech Therapy Student SLP Student                         Time Calculation:    Time Calculation- SLP       Row Name 05/06/25 1324             Time Calculation- SLP    SLP Start Time 1240  -MG (r) LR (t) MG (c)      SLP Stop Time 1333  -MG      SLP Time Calculation (min) 53 min  -MG      SLP Received On 05/06/25  -MG      SLP Goal Re-Cert Due Date 05/16/25  -MG         Untimed Charges    SLP Eval/Re-eval  ST Eval Oral Pharyng Swallow - 90408  -MG      [id number] Eval Oral Pharyng Swallow Minutes 53  -MG         Total Minutes    Untimed Charges Total Minutes 53  -MG       Total Minutes 53  -MG                User Key  (r) = Recorded By, (t) = Taken By, (c) = Cosigned By      Initials Name Provider Type    Gena Hernandez MS CCC-SLP Speech and Language Pathologist    Atiya Garcia Speech Therapy Student SLP Student                    Therapy Charges for Today       Code Description Service Date Service Provider Modifiers Qty    [id number]  ST EVAL ORAL PHARYNG SWALLOW 4 5/6/2025 Gena Delgado MS CCC-SLP GN 1                 Gena Delgado MS CCC-KORIN  5/6/2025

## 2025-06-11 ENCOUNTER — TELEPHONE (OUTPATIENT)
Age: 75
End: 2025-06-11

## 2025-06-11 NOTE — TELEPHONE ENCOUNTER
Does patient prefer the syringe version of insulin or are the insulin pen version better for him?

## 2025-06-11 NOTE — TELEPHONE ENCOUNTER
Received Prior Auth Request for Insulin Lispro    Covermymeds  Key# [id number]    Forwarded to prior auth team    Copy in media

## 2025-06-12 ENCOUNTER — TELEPHONE (OUTPATIENT)
Age: 75
End: 2025-06-12

## 2025-06-12 ENCOUNTER — HOME CARE VISIT (OUTPATIENT)
Dept: HOME HEALTH SERVICES | Facility: HOME HEALTHCARE | Age: 75
End: 2025-06-12
Payer: MEDICARE

## 2025-06-12 VITALS
TEMPERATURE: 97.5 F | SYSTOLIC BLOOD PRESSURE: 122 MMHG | HEART RATE: 68 BPM | OXYGEN SATURATION: 97 % | DIASTOLIC BLOOD PRESSURE: 56 MMHG

## 2025-06-12 PROCEDURE — G0151 HHCP-SERV OF PT,EA 15 MIN: HCPCS

## 2025-06-12 NOTE — TELEPHONE ENCOUNTER
PA for Insulin Glargine Solostar (Lantus SoloStar) SUBMITTED to     via    [x][facility]-KEY: [id number]  []Surescripts-Case ID #   []Availity-Auth ID # NDC #   []Faxed to plan   []Other website   []Phone call Case ID #     [x]PA sent as URGENT    All office notes, labs and other pertaining documents and studies sent. Clinical questions answered. Awaiting determination from insurance company.     Turnaround time for your insurance to make a decision on your Prior Authorization can take 7-21 business days.

## 2025-06-12 NOTE — TELEPHONE ENCOUNTER
PA for insulin lispro (HumaLOG KwikPen) 100 units SUBMITTED to     via    [x][facility]-KEY: [id number]   []Surescripts-Case ID #   []Availity-Auth ID # NDC #   []Faxed to plan   []Other website   []Phone call Case ID #     [x]PA sent as URGENT    All office notes, labs and other pertaining documents and studies sent. Clinical questions answered. Awaiting determination from insurance company.     Turnaround time for your insurance to make a decision on your Prior Authorization can take 7-21 business days.

## 2025-06-12 NOTE — TELEPHONE ENCOUNTER
PA for insulin lispro (HumaLOG KwikPen) 100 units  APPROVED     Date(s) approved 05/29/20205 until further notice    Case #[id number]    Patient advised by          []Lanicahart Message  [x]Phone call   []LMOM  []L/M to call office as no active Communication consent on file  []Unable to leave detailed message as VM not approved on Communication consent       Pharmacy advised by    [x]Fax  []Phone call  []Secure Chat       Approval letter scanned into Media Yes

## 2025-06-12 NOTE — TELEPHONE ENCOUNTER
PA for Insulin Glargine Solostar (Lantus SoloStar)  APPROVED     Date(s) approved 05/29/2025 until further notice    Case #[id number]    Patient advised by          []Zinc softwarehart Message  [x]Phone call   []LMOM  []L/M to call office as no active Communication consent on file  []Unable to leave detailed message as VM not approved on Communication consent       Pharmacy advised by    [x]Fax  []Phone call  []Secure Chat       Approval letter scanned into Media Yes

## 2025-06-13 ENCOUNTER — OFFICE VISIT (OUTPATIENT)
Age: 75
End: 2025-06-13
Payer: MEDICARE

## 2025-06-13 ENCOUNTER — HOME CARE VISIT (OUTPATIENT)
Dept: HOME HEALTH SERVICES | Facility: HOME HEALTHCARE | Age: 75
End: 2025-06-13
Payer: MEDICARE

## 2025-06-13 VITALS
WEIGHT: 224 LBS | BODY MASS INDEX: 35.16 KG/M2 | SYSTOLIC BLOOD PRESSURE: 126 MMHG | RESPIRATION RATE: 18 BRPM | HEIGHT: 67 IN | DIASTOLIC BLOOD PRESSURE: 66 MMHG | TEMPERATURE: 96.8 F | OXYGEN SATURATION: 98 % | HEART RATE: 62 BPM

## 2025-06-13 VITALS
OXYGEN SATURATION: 94 % | DIASTOLIC BLOOD PRESSURE: 62 MMHG | SYSTOLIC BLOOD PRESSURE: 135 MMHG | RESPIRATION RATE: 18 BRPM | HEART RATE: 61 BPM

## 2025-06-13 DIAGNOSIS — Z95.1 S/P CABG X 2: ICD-10-CM

## 2025-06-13 DIAGNOSIS — E11.65 TYPE 2 DIABETES MELLITUS WITH HYPERGLYCEMIA, WITHOUT LONG-TERM CURRENT USE OF INSULIN (HCC): ICD-10-CM

## 2025-06-13 DIAGNOSIS — I25.10 CAD, MULTIPLE VESSEL: Primary | ICD-10-CM

## 2025-06-13 PROBLEM — R50.9 FEVER: Status: RESOLVED | Noted: 2025-05-16 | Resolved: 2025-06-13

## 2025-06-13 PROCEDURE — G2211 COMPLEX E/M VISIT ADD ON: HCPCS | Performed by: INTERNAL MEDICINE

## 2025-06-13 PROCEDURE — G0152 HHCP-SERV OF OT,EA 15 MIN: HCPCS

## 2025-06-13 PROCEDURE — 99214 OFFICE O/P EST MOD 30 MIN: CPT | Performed by: INTERNAL MEDICINE

## 2025-06-13 NOTE — ASSESSMENT & PLAN NOTE
Lab Results   Component Value Date    HGBA1C 7.7 (H) 05/13/2025     Continue to monitor blood sugars closely. Continue insulin and jardiance as prescribed.

## 2025-06-13 NOTE — ASSESSMENT & PLAN NOTE
Stable without angina. On appropriate CV regimen. Has follow up with cardiology, CT surgery, and cardiac rehab scheduled in the near future.

## 2025-06-13 NOTE — PROGRESS NOTES
Name: Tc Stovall      : 1950      MRN: [id number]  Encounter Provider: Lester Roth DO  Encounter Date: 2025   Encounter department: [facility] [place]    :  Assessment & Plan  CAD, multiple vessel  S/P CABG x 2    Stable without angina. On appropriate CV regimen. Has follow up with cardiology, CT surgery, and cardiac rehab scheduled in the near future.    Type 2 diabetes mellitus with hyperglycemia, without long-term current use of insulin ([facility])    Lab Results   Component Value Date    HGBA1C 7.7 (H) 2025     Continue to monitor blood sugars closely. Continue insulin and jardiance as prescribed.         History of Present Illness     History of Present Illness  The patient presents for evaluation of diabetes mellitus and post-bypass surgery follow-up.    He reports difficulty in managing blood glucose levels, with a recent reading of 200 after administering 12 units of insulin. Blood glucose level was 120 prior to a meal, and it increased to 179 postprandially. He has not experienced any episodes of hypoglycemia. Blood glucose level this morning was 170. Previously, he was on Lantus 20 units, which has been increased to 30 units. He has not been on mealtime insulin before.    He underwent bypass surgery and was subsequently discharged to a rehabilitation facility. He has brought his remaining prescriptions for review. He is not experiencing any symptoms of acid indigestion or increased heartburn. He has procured Mylanta and MiraLAX as a precautionary measure. He has discontinued the use of oxycodone. Currently, he is receiving home health services, including physical and occupational therapy, and nursing care.    PAST SURGICAL HISTORY:  Bypass surgery     Review of Systems   Constitutional:  Positive for fatigue. Negative for chills and fever.   Respiratory: Negative.     Cardiovascular: Negative.    Gastrointestinal: Negative.    Neurological:  Positive for weakness.  "    Objective   /66 (BP Location: Left arm, Patient Position: Sitting, Cuff Size: Large)   Pulse 62   Temp (!) 96.8 °F (36 °C) (Tympanic)   Resp 18   Ht 5' 7\" (1.702 m)   Wt 102 kg (224 lb)   SpO2 98%   BMI 35.08 kg/m²     Physical Exam  Constitutional:       General: He is not in acute distress.     Appearance: He is obese. He is not ill-appearing.     Cardiovascular:      Rate and Rhythm: Normal rate and regular rhythm.      Heart sounds: No murmur heard.  Pulmonary:      Effort: Pulmonary effort is normal. No respiratory distress.      Breath sounds: No wheezing.   Abdominal:      General: Bowel sounds are normal. There is no distension.      Tenderness: There is no abdominal tenderness.     Musculoskeletal:      Right lower leg: No edema.      Left lower leg: No edema.     Neurological:      Mental Status: He is alert.         Lester Roth DO   "

## 2025-06-14 ENCOUNTER — NURSE TRIAGE (OUTPATIENT)
Dept: OTHER | Facility: OTHER | Age: 75
End: 2025-06-14

## 2025-06-14 ENCOUNTER — HOME CARE VISIT (OUTPATIENT)
Dept: HOME HEALTH SERVICES | Facility: HOME HEALTHCARE | Age: 75
End: 2025-06-14
Payer: MEDICARE

## 2025-06-14 NOTE — CASE COMMUNICATION
6.14.25  pt called to say he was not sure what food he could eat.   states he didnt have anything in refrigerator but then stated there is some food.   talked about food choices and he is going to have a salad.   reports blood sugar is 164 at 1500      pt was crying at end of conversation and support given.   pt gave permission for me to speak with son so i called son and son states he lives 5 min down the road and that he assists pt as  needed.    also states pt is trying to quit smoking and change his diet at same time.

## 2025-06-14 NOTE — TELEPHONE ENCOUNTER
"REASON FOR CONVERSATION: Medication Management    SYMPTOMS: Is on 30 units of lantus HS. Early this am around 3 or 4 am had an episode of hypoglycemia iat 46 was symptomatic so he did double check with fingerstick but managed with PO carbs.     OTHER HEALTH INFORMATION: nervous to take 30 again tonight, wondering if he should drop back down to 20    PROTOCOL DISPOSITION: Call PCP Now    CARE ADVICE PROVIDED: communciated with on call Dr Brizuela who advised 20 units lantus tonight. If fasting sugars during the day are high he can increase lantus tomorrow night to 22. Relayed to patient who verbalized understanding. Discussed options for treating hypoglycemia overnight should it recur.    PRACTICE FOLLOW-UP: please f/u with patients BG trends to see if dose adjustment needed      Reason for Disposition   [1] Caller has URGENT medicine question about med that PCP or specialist prescribed AND [2] triager unable to answer question    Answer Assessment - Initial Assessment Questions  1. NAME of MEDICINE: \"What medicine(s) are you calling about?\"      Lantus    2. QUESTION: \"What is your question?\" (e.g., double dose of medicine, side effect)      \"Can I decrease from 30 units hs to 20 units hs? My sugar was 46 this morning around 4am and I was symptomatic\"    3. PRESCRIBER: \"Who prescribed the medicine?\" Reason: if prescribed by specialist, call should be referred to that group.      PCP cesar    4. SYMPTOMS: \"Do you have any symptoms?\" If Yes, ask: \"What symptoms are you having?\"  \"How bad are the symptoms (e.g., mild, moderate, severe)      Not now, sugar right now is 180s    Protocols used: Medication Question Call-Adult-    "

## 2025-06-14 NOTE — TELEPHONE ENCOUNTER
"Regarding: Medication adjustment- Lantus  ----- Message from Mariluz LIZ sent at 6/14/2025  6:48 PM EDT -----  \"I took my lantus 30 units yesterday and my blood sugar crashed to 46. Today, it is 203, but I don't want the same thing to happen again tonight. Can I decrease my lantus dose. I use to be on 20 units.\"    "

## 2025-06-15 ENCOUNTER — HOME CARE VISIT (OUTPATIENT)
Dept: HOME HEALTH SERVICES | Facility: HOME HEALTHCARE | Age: 75
End: 2025-06-15
Payer: MEDICARE

## 2025-06-15 PROCEDURE — G0299 HHS/HOSPICE OF RN EA 15 MIN: HCPCS

## 2025-06-15 NOTE — CASE COMMUNICATION
6.14.25   at 1830   Pt called stating he didnt think he should take 30 units of his insulin tonight.  States blood sugar is 203.    Education given and I advised he call doctor to confirm and he states he will do that .  Instructed to call back if no answer from doctor.   Pt is being admitted to Nursing tomorrow  6/.15.25

## 2025-06-16 ENCOUNTER — HOME CARE VISIT (OUTPATIENT)
Dept: HOME HEALTH SERVICES | Facility: HOME HEALTHCARE | Age: 75
End: 2025-06-16
Payer: MEDICARE

## 2025-06-16 VITALS
SYSTOLIC BLOOD PRESSURE: 134 MMHG | HEART RATE: 68 BPM | RESPIRATION RATE: 18 BRPM | OXYGEN SATURATION: 98 % | DIASTOLIC BLOOD PRESSURE: 60 MMHG

## 2025-06-16 PROCEDURE — G0152 HHCP-SERV OF OT,EA 15 MIN: HCPCS

## 2025-06-16 NOTE — TELEPHONE ENCOUNTER
Patient called in to return missed call from office. Triage nurse read back PCPs message to the pt. Patient verbalized understanding.

## 2025-06-17 ENCOUNTER — HOME CARE VISIT (OUTPATIENT)
Dept: HOME HEALTH SERVICES | Facility: HOME HEALTHCARE | Age: 75
End: 2025-06-17
Payer: MEDICARE

## 2025-06-17 VITALS
HEART RATE: 66 BPM | TEMPERATURE: 98.1 F | DIASTOLIC BLOOD PRESSURE: 57 MMHG | SYSTOLIC BLOOD PRESSURE: 134 MMHG | RESPIRATION RATE: 16 BRPM | OXYGEN SATURATION: 97 %

## 2025-06-17 PROCEDURE — G0151 HHCP-SERV OF PT,EA 15 MIN: HCPCS

## 2025-06-18 ENCOUNTER — HOME CARE VISIT (OUTPATIENT)
Dept: HOME HEALTH SERVICES | Facility: HOME HEALTHCARE | Age: 75
End: 2025-06-18
Payer: MEDICARE

## 2025-06-18 VITALS
RESPIRATION RATE: 16 BRPM | RESPIRATION RATE: 16 BRPM | DIASTOLIC BLOOD PRESSURE: 80 MMHG | SYSTOLIC BLOOD PRESSURE: 120 MMHG | TEMPERATURE: 97.8 F | DIASTOLIC BLOOD PRESSURE: 70 MMHG | HEART RATE: 86 BPM | SYSTOLIC BLOOD PRESSURE: 130 MMHG | OXYGEN SATURATION: 98 %

## 2025-06-18 VITALS
RESPIRATION RATE: 18 BRPM | OXYGEN SATURATION: 98 % | DIASTOLIC BLOOD PRESSURE: 60 MMHG | HEART RATE: 69 BPM | SYSTOLIC BLOOD PRESSURE: 120 MMHG

## 2025-06-18 PROCEDURE — G0152 HHCP-SERV OF OT,EA 15 MIN: HCPCS

## 2025-06-18 PROCEDURE — G0299 HHS/HOSPICE OF RN EA 15 MIN: HCPCS

## 2025-06-19 ENCOUNTER — HOME CARE VISIT (OUTPATIENT)
Dept: HOME HEALTH SERVICES | Facility: HOME HEALTHCARE | Age: 75
End: 2025-06-19
Payer: MEDICARE

## 2025-06-19 ENCOUNTER — TELEPHONE (OUTPATIENT)
Dept: OTHER | Facility: OTHER | Age: 75
End: 2025-06-19

## 2025-06-19 NOTE — TELEPHONE ENCOUNTER
Reason for Call: High Blood Sugar 241    Caller's Name: Tc Stovall     Caller's Relationship to Patient: Tc Stovall     Caller's Callback Number:[phone]    Home Health OR Hospice Patient:  Home Health

## 2025-06-20 ENCOUNTER — TELEPHONE (OUTPATIENT)
Age: 75
End: 2025-06-20

## 2025-06-20 NOTE — TELEPHONE ENCOUNTER
Received call from pt returning a call to Reva Shannon, surg coordinator. Attempted to return call to Reva but no answer. Advised pt will send a message to Reva to return his call when able. He voiced understanding.

## 2025-06-23 ENCOUNTER — HOME CARE VISIT (OUTPATIENT)
Dept: HOME HEALTH SERVICES | Facility: HOME HEALTHCARE | Age: 75
End: 2025-06-23
Payer: MEDICARE

## 2025-06-23 VITALS
OXYGEN SATURATION: 97 % | RESPIRATION RATE: 16 BRPM | DIASTOLIC BLOOD PRESSURE: 80 MMHG | HEART RATE: 76 BPM | SYSTOLIC BLOOD PRESSURE: 130 MMHG | TEMPERATURE: 98.6 F

## 2025-06-23 PROCEDURE — G0299 HHS/HOSPICE OF RN EA 15 MIN: HCPCS

## 2025-06-24 ENCOUNTER — HOME CARE VISIT (OUTPATIENT)
Dept: HOME HEALTH SERVICES | Facility: HOME HEALTHCARE | Age: 75
End: 2025-06-24
Payer: MEDICARE

## 2025-06-24 VITALS
DIASTOLIC BLOOD PRESSURE: 70 MMHG | TEMPERATURE: 97 F | HEART RATE: 61 BPM | OXYGEN SATURATION: 98 % | SYSTOLIC BLOOD PRESSURE: 122 MMHG

## 2025-06-24 PROCEDURE — G0151 HHCP-SERV OF PT,EA 15 MIN: HCPCS

## 2025-06-26 ENCOUNTER — HOME CARE VISIT (OUTPATIENT)
Dept: HOME HEALTH SERVICES | Facility: HOME HEALTHCARE | Age: 75
End: 2025-06-26
Payer: MEDICARE

## 2025-06-26 PROCEDURE — G0299 HHS/HOSPICE OF RN EA 15 MIN: HCPCS

## 2025-06-27 ENCOUNTER — OFFICE VISIT (OUTPATIENT)
Dept: CARDIAC SURGERY | Facility: CLINIC | Age: 75
End: 2025-06-27

## 2025-06-27 VITALS
WEIGHT: 226.4 LBS | SYSTOLIC BLOOD PRESSURE: 113 MMHG | TEMPERATURE: 96.9 F | HEART RATE: 69 BPM | BODY MASS INDEX: 35.53 KG/M2 | OXYGEN SATURATION: 97 % | HEIGHT: 67 IN | DIASTOLIC BLOOD PRESSURE: 55 MMHG

## 2025-06-27 DIAGNOSIS — Z95.1 S/P CABG X 2: Primary | ICD-10-CM

## 2025-06-27 PROCEDURE — 99024 POSTOP FOLLOW-UP VISIT: CPT | Performed by: THORACIC SURGERY (CARDIOTHORACIC VASCULAR SURGERY)

## 2025-06-27 NOTE — PROGRESS NOTES
Post-Op Visit - Cardiac Surgery  Tc Stovall 75 y.o. [id number]       Procedure: S/P coronary artery bypass grafting x 2 (LIMA to LAD, SVG to RI, LLE EVH), performed by Dr. Polo on 5/19/25    History: Tc Stovall is a 75 year old male who presents to our office today for routine post-surgical follow up care.     He presented to [facility] ED on 5/14 with chest pain and diagnosed with NSTEMI.  Cardiac cath showed severe MV CAD.  He was transferred to [facility] for CABG evaluation.  He was seen evaluated by Dr. Polo and arranges were made for coronary artery bypass grafting as described above.  He had a relatively unremarkable and routine postoperative course.  He was discharged on postop day #4 on 5/23.  He was discharged to [facility].     He saw PCP on 6/13, correspondence reviewed, progressing adequately, no changes made.  Has not seen cardiology yet.  He states he did see cardiology because he did not want to see the cardiologist wanted to place a loop recorder implant.  He did not think he needed it since he just had heart surgery. Explained the difference between the heart rhythm monitor having a different purpose than the bypass surgery.     He has been recovering well.  Ambulating well.  Pain is controlled.  Has some numbness along the left chest.  He has been driving since it has been past 4 weeks of his operation.  Sleeping adequately.  Denies nausea vomiting or abdominal pain.  Passing urine and bowels normally.  Complain of some discomfort at his left lower extremity EVH site and tunnel.  He denies any incisional drainage from sternum or EVH site.  Denies any recent fevers or chills.     Vital Signs:   Vitals:    06/27/25 1538 06/27/25 1542   BP: 118/62 113/55   BP Location: Left arm Right arm   Patient Position: Sitting Sitting   Cuff Size: Large Large   Pulse: 69    Temp: (!) 96.9 °F (36.1 °C)    TempSrc: Tympanic    SpO2: 97%    Weight: 103 kg (226 lb 6.4 oz)    Height: 5'  "7\" (1.702 m)        Home Medications:   Prior to Admission medications    Medication Sig Start Date End Date Taking? Authorizing Provider   acetaminophen (TYLENOL) 325 mg tablet Take 2 tablets (650 mg total) by mouth every 6 (six) hours as needed for mild pain 5/23/25   Livia Cornejo PA-C   aspirin 325 mg tablet Take 1 tablet (325 mg total) by mouth daily  Patient taking differently: Take 81 mg by mouth daily 5/23/25   Livia Cornejo PA-C   atorvastatin (LIPITOR) 80 mg tablet Take 1 tablet (80 mg total) by mouth daily with dinner 5/23/25   Livia Cornejo PA-C   Blood Glucose Monitoring Suppl (OneTouch Verio Reflect) w/Device KIT Check blood sugars three times daily. Please substitute with appropriate alternative as covered by patient's insurance. Dx: E11.65 6/12/23   Lester Roth DO   cholecalciferol (VITAMIN D3) 1,000 units tablet Take 1 tablet (1,000 Units total) by mouth daily 1/21/24   Lester Roth DO   Continuous Glucose Sensor (Dexcom G7 Sensor) Use 1 Device every 10 days 9/23/24 April Charmaine Valenzuela PA-C   docusate sodium (COLACE) 100 mg capsule Take 1 capsule (100 mg total) by mouth 2 (two) times a day 5/23/25 6/22/25  Livia Cornejo PA-C   Empagliflozin (Jardiance) 10 MG TABS tablet Take 1 tablet (10 mg total) by mouth every morning 6/10/25   Lester Roth DO   gabapentin (Neurontin) 600 MG tablet Take 1 tablet (600 mg total) by mouth 2 (two) times a day 3/27/25   JANET Dos Santos   glucose blood (OneTouch Verio) test strip Check blood sugars three times daily. Please substitute with appropriate alternative as covered by patient's insurance. Dx: E11.65 6/12/23   Lester Roth, DO   Insulin Glargine Solostar (Lantus SoloStar) 100 UNIT/ML SOPN Inject 0.3 mL (30 Units total) under the skin daily at bedtime 6/10/25   Lester Roth DO   insulin lispro (HumaLOG KwikPen) 100 units/mL injection pen Inject 3 Units under the skin 3 (three) times a day with meals  Patient taking " differently: Inject 12 Units under the skin 3 (three) times a day with meals 6/10/25   Lester Roth,    Insulin Pen Needle (CareOne Unifine Pentips Plus) 32G X 4 MM MISC USE DAILY AS INSTRUCTED WITH INSULIN PEN 4 TIMES DAILY 6/9/25   Lester Roth DO   metoprolol tartrate (LOPRESSOR) 50 mg tablet Take 1 tablet (50 mg total) by mouth every 12 (twelve) hours 5/23/25   Livia Cornejo PA-C   omeprazole (PriLOSEC) 20 mg delayed release capsule Take 1 capsule (20 mg total) by mouth daily 5/23/25 6/22/25  Livia Cornejo PA-C   OneTouch Delica Lancets 33G MISC Check blood sugars three times daily. Please substitute with appropriate alternative as covered by patient's insurance. Dx: E11.65 1/30/24   Lester Roth, DO   polyethylene glycol (MIRALAX) 17 g packet Take 17 g by mouth daily as needed (for constipation) 5/23/25   Livia Cornejo PA-C   potassium chloride (Klor-Con M20) 20 mEq tablet Take 1 tablet (20 mEq total) by mouth daily for 3 days 5/23/25 6/13/25  Livia Cornejo PA-C   torsemide (DEMADEX) 10 mg tablet Take 1 tablet (10 mg total) by mouth daily for 3 days 5/23/25 6/13/25  Livia Cornejo PA-C       Physical Exam:    HEENT/NECK:  Normocephalic. Atraumatic.  No jugular venous distention.    Cardiac: Regular rate and rhythm and No murmurs/rubs/gallops  Pulmonary:  Breath sounds clear bilaterally  Abdomen:  Non-tender, Non-distended, and Normal bowel sounds  Incisions: Sterntomy incision with very mild superficial separation, no erythema, no drainage, signs already healing over  Extremities: Extremities warm/dry. LLE EVH tunnel with mild old hematoma. Mild LLE EVH incision separation, healing over.   Neuro: Alert and oriented X 3.  Sensation is grossly intact.  No focal deficits.  Skin: Warm/Dry, without rashes or lesions.    Assessment:   - severe MVCAD, NSTEMI S/P coronary artery bypass grafting x 2 (LIMA to LAD, SVG to RI, LLE EVH), performed by Dr. Polo on 5/19/25  - vitals stable  -  LLE EVH thigh tunnel hematoma is common, will take weeks/months to fully resolved  - Mild superficial separation of sternal wound, without erythema, or concern for infection.     Plan:     Tc Stovall continues to recover well following surgery.  To date they have made progressive improvements with their physical rehabilitation. At this point I have cleared them to begin outpatient cardiac rehabilitation and have encouraged them to to do so.      Tc Stovlal has also been cleared to resume driving. I asked that them do so cautiously, in progressive increments. I did remind them of their ongoing lifting restrictions of 25 pounds for an additional 2 months.    Discussed rationale for lifelong ASA, beta blocker, and stating    Discussed ongoing wound care of sternum, with local dressing. He can if any concerning changes. No scheduled follow up needed at this time. Explained that LLE EVH mild thigh hematoma is common and will take weeks to resolve.     Tc Stovall has already been evaluated by their primary care physician cardiologist for ongoing medical care. At this point we will not schedule Tc Stovall  for routine followup care with our office.     Future medical management will be directed by their outpatient cardiologist.. I have advised them to call with any new concerns that may arise. Tc Stovall was comfortable with our recommendations and their questions were answered to their satisfaction.    The patient recently had a screening colonoscopy in 2021.  Therefore GI referral is not indicated at this time.     Maxi Kaye PA-C  06/27/25    * This note was completed in part utilizing [employer] direct voice recognition software.   Grammatical errors, random word insertion, spelling mistakes, and incomplete sentences may be an occasional consequence of the system secondary to software limitations, ambient noise and hardware issues. At the time of dictation, efforts were made to edit,  clarify and /or correct errors. Please read the chart carefully and recognize, using context, where substitutions have occurred.  If you have any questions or concerns about the context, text or information contained within the body of this dictation, please contact myself, the provider, for further clarification.

## 2025-06-29 VITALS
TEMPERATURE: 98.2 F | RESPIRATION RATE: 16 BRPM | HEART RATE: 78 BPM | DIASTOLIC BLOOD PRESSURE: 70 MMHG | SYSTOLIC BLOOD PRESSURE: 120 MMHG | OXYGEN SATURATION: 97 %

## 2025-07-01 ENCOUNTER — OFFICE VISIT (OUTPATIENT)
Age: 75
End: 2025-07-01
Payer: MEDICARE

## 2025-07-01 VITALS
WEIGHT: 227 LBS | OXYGEN SATURATION: 96 % | DIASTOLIC BLOOD PRESSURE: 60 MMHG | SYSTOLIC BLOOD PRESSURE: 120 MMHG | HEIGHT: 67 IN | HEART RATE: 62 BPM | TEMPERATURE: 97.6 F | BODY MASS INDEX: 35.63 KG/M2

## 2025-07-01 DIAGNOSIS — Z79.4 TYPE 2 DIABETES MELLITUS WITH HYPERGLYCEMIA, WITH LONG-TERM CURRENT USE OF INSULIN (HCC): Primary | ICD-10-CM

## 2025-07-01 DIAGNOSIS — E11.3293 TYPE 2 DIABETES MELLITUS WITH BOTH EYES AFFECTED BY MILD NONPROLIFERATIVE RETINOPATHY WITHOUT MACULAR EDEMA, WITH LONG-TERM CURRENT USE OF INSULIN (HCC): ICD-10-CM

## 2025-07-01 DIAGNOSIS — Z79.4 TYPE 2 DIABETES MELLITUS WITH BOTH EYES AFFECTED BY MILD NONPROLIFERATIVE RETINOPATHY WITHOUT MACULAR EDEMA, WITH LONG-TERM CURRENT USE OF INSULIN (HCC): ICD-10-CM

## 2025-07-01 DIAGNOSIS — I25.10 CAD, MULTIPLE VESSEL: ICD-10-CM

## 2025-07-01 DIAGNOSIS — E78.2 MIXED HYPERLIPIDEMIA: ICD-10-CM

## 2025-07-01 DIAGNOSIS — E11.65 TYPE 2 DIABETES MELLITUS WITH HYPERGLYCEMIA, WITH LONG-TERM CURRENT USE OF INSULIN (HCC): Primary | ICD-10-CM

## 2025-07-01 DIAGNOSIS — Z95.1 S/P CABG X 3: ICD-10-CM

## 2025-07-01 DIAGNOSIS — I21.4 NSTEMI (NON-ST ELEVATED MYOCARDIAL INFARCTION) (HCC): ICD-10-CM

## 2025-07-01 PROBLEM — E11.3299 TYPE 2 DIABETES MELLITUS WITH MILD NONPROLIFERATIVE RETINOPATHY WITHOUT MACULAR EDEMA, WITH LONG-TERM CURRENT USE OF INSULIN (HCC): Status: ACTIVE | Noted: 2021-03-01

## 2025-07-01 PROCEDURE — 95251 CONT GLUC MNTR ANALYSIS I&R: CPT

## 2025-07-01 PROCEDURE — 99214 OFFICE O/P EST MOD 30 MIN: CPT

## 2025-07-01 RX ORDER — INSULIN LISPRO 100 [IU]/ML
INJECTION, SOLUTION INTRAVENOUS; SUBCUTANEOUS
Qty: 30 ML | Refills: 1 | Status: SHIPPED | OUTPATIENT
Start: 2025-07-01

## 2025-07-01 RX ORDER — INSULIN GLARGINE 100 [IU]/ML
20 INJECTION, SOLUTION SUBCUTANEOUS
Qty: 30 ML | Refills: 1 | Status: SHIPPED | OUTPATIENT
Start: 2025-07-01

## 2025-07-01 NOTE — PATIENT INSTRUCTIONS
Continue jardiance 10 mg daily  Continue Lantus and increase to 22 units nightly, at the same time every night  Continue Humalog 6 units before meals plus scale  Inject additional insulin as per sliding scale--   150-200 mg/dL: 1 extra units   201-250 mg/dL: 2 extra units   251-300 mg/dL: 3 extra units  301-350 mg/dL: 4 extra units   More than 350 mg/dL: 5 extra units  Continue Dexcom G7  Call the office for blood glucose levels less than 70 mg/dL or persistent patterns over 250 mg/dL.      Low Blood Sugar  Steps to treat low blood sugar.    1. Test blood sugar if you have symptoms of low blood sugar:   Low Blood Sugar Symptoms:  o Sweaty  o Dizzy  o Rapid heartbeat  o Shaky  o Bad mood  o Hungry    2. Treat blood sugar less than 70 with 15 grams of fast-acting carbohydrate:   Examples of 15 grams Fast-Acting Carbohydrate:  o 4 oz juice/ 4 oz regular soda  o 1 tablespoon honey  o 1 pack of fun size skittles (about 15 individual skittles)  o 12 gummy bears  o 4 starburst   o 3-4 hard candies (chew)  o 3-4 glucose tablets (chew)            3.   Wait 15 minutes and test your blood sugar again         4.   If blood sugar is less than 100, repeat steps 2-3.    5.   When your blood sugar is 100 or more, eat a snack if it will be longer than one hour until your next meal. The snack should be 15 grams of carbohydrate and a protein:   Examples of snacks:  o ½ sandwich  o 6 crackers with cheese or with peanut butter  o Piece of fruit with cheese or peanut butter

## 2025-07-01 NOTE — ASSESSMENT & PLAN NOTE
Continue routine follow-up with ophthalmology, [facility].    Orders:  •  Ambulatory referral to Diabetic Education

## 2025-07-01 NOTE — ASSESSMENT & PLAN NOTE
Goal A1c less than 7%.    Increase Lantuss to 22 units nightly. Continue Humalog 6 units before meals plus scale of 1 unit for every 50 over 150 mg/dl. Continue Jardiance 10 mg daily. Would benefit from GLP-1 agonist given cardiac history; will continue to discuss at subsequent visits.    Counseled on the long-term effects of hyperglycemia and uncontrolled diabetes including risk for heart attack, stroke, kidney failure, diabetic foot ulcers, limb loss, blindness, and death.    Reviewed risks as well as signs and symptoms of hypoglycemia.  Rule of 15's provided in AVS for appropriate treatment.  Continue Dexcom G7. Call the office for blood glucose levels less than 70 mg/dL or persistent patterns over 250 mg/dL.      Continue to improve diet and lifestyle including attention to the amount and type of carbohydrates consumed as well as increased physical activity as tolerated.  Stay well-hydrated.  Agreeable to referral to diabetes education.    Follow up in 4 months. Labs prior to next visit.  Lab Results   Component Value Date    HGBA1C 7.7 (H) 05/13/2025       Orders:  •  Basic metabolic panel; Future  •  Hemoglobin A1C; Future  •  Insulin Glargine Solostar (Lantus SoloStar) 100 UNIT/ML SOPN; Inject 0.2 mL (20 Units total) under the skin daily at bedtime  •  Ambulatory referral to Diabetic Education  •  HumaLOG KwikPen 100 units/mL injection pen; Inject 6 units before meals plus scale. Max daily dose 33 units

## 2025-07-01 NOTE — PROGRESS NOTES
Name: Tc Stovall      : 1950      MRN: [id number]  Encounter Provider: JANET Carreon  Encounter Date: 2025   Encounter department: [facility]  :  Assessment & Plan  Type 2 diabetes mellitus with hyperglycemia, with long-term current use of insulin (HCC)  Goal A1c less than 7%.    Increase Lantuss to 22 units nightly. Continue Humalog 6 units before meals plus scale of 1 unit for every 50 over 150 mg/dl. Continue Jardiance 10 mg daily. Would benefit from GLP-1 agonist given cardiac history; will continue to discuss at subsequent visits.    Counseled on the long-term effects of hyperglycemia and uncontrolled diabetes including risk for heart attack, stroke, kidney failure, diabetic foot ulcers, limb loss, blindness, and death.    Reviewed risks as well as signs and symptoms of hypoglycemia.  Rule of 15's provided in AVS for appropriate treatment.  Continue Dexcom G7. Call the office for blood glucose levels less than 70 mg/dL or persistent patterns over 250 mg/dL.      Continue to improve diet and lifestyle including attention to the amount and type of carbohydrates consumed as well as increased physical activity as tolerated.  Stay well-hydrated.  Agreeable to referral to diabetes education.    Follow up in 4 months. Labs prior to next visit.  Lab Results   Component Value Date    HGBA1C 7.7 (H) 2025       Orders:  •  Basic metabolic panel; Future  •  Hemoglobin A1C; Future  •  Insulin Glargine Solostar (Lantus SoloStar) 100 UNIT/ML SOPN; Inject 0.2 mL (20 Units total) under the skin daily at bedtime  •  Ambulatory referral to Diabetic Education  •  HumaLOG KwikPen 100 units/mL injection pen; Inject 6 units before meals plus scale. Max daily dose 33 units    Type 2 diabetes mellitus with both eyes affected by mild nonproliferative retinopathy without macular edema, with long-term current use of insulin (HCC)  Continue routine  follow-up with ophthalmology, [facility].    Orders:  •  Ambulatory referral to Diabetic Education    NSTEMI (non-ST elevated myocardial infarction) (HCC)  Continue routine follow-up with cardiology.  Continue Jardiance for cardioprotective factors.  Would benefit from GLP-1 agonist medication if cardiology is agreeable.  Orders:  •  Ambulatory referral to Diabetic Education    Mixed hyperlipidemia  Continue atorvastatin 80 mg nightly.  Continue routine follow-up with cardiology.  Orders:  •  Ambulatory referral to Diabetic Education          History of Present Illness   History of Present Illness    Tc Stovall is a 75 y.o. male who presents to the office today for hospital follow-up of type 2 diabetes, with long term insulin use. He was initially diagnosed around age 51. Diabetes course has been stable; denies hospitalizations for diabetes. Complications of diabetes include: retinopathy, HLD, neuropathy.  Past medical history is significant for CAD, HTN, multiple CVAs, PVCs, first-degree AV block.  He has not previously following with endocrinology, and his primary care provider has been managing his diabetes thus far.  His most recent A1c is 7.7%.  While hospitalized, he was seen by Dr. Dr. Arshad and Dr. Bowman.    5/15/2025-[facility] hospitalization for NSTEMI with CABG x 2 vessels; insulin drip required for elevated blood glucose levels.    Infrequent recent episodes of hypoglycemia.  Symptoms of hypoglycemia include: shaky, light headed    Current home glucose monitoring: Dexcom G7     Current Medication Regimen:   Lantus 30 units nightly - taking 20 units  Humalog 12 units 3 times a day before meals - taking 6-8 units  Glipizide 10 mg daily - d/c'd at hospital  Jardiance 10 mg daily    Health Maintenance   Topic Date Due   • Diabetic Eye Exam  11/29/2025   • Diabetic Foot Exam  04/18/2026       Thyroid Disorder: No  Hypertension, taking: Not on ACEi/ARB  Hyperlipidemia,  "taking: Atorvastatin 80 mg daily  History of Pancreatitis: No  Medic Alert Tag: Recommended  Diabetes Education: Referred    CGM REVIEW:  Device used : Dexcom G7  Indication: Type 2 /Diabetes  Date Range: 6/18/2025-7/1/2025  More than 72 hours of data was reviewed. Report to be scanned to chart.     Analysis of data:   Average Glucose: 191 mg/dL  Coefficient of Variation: 26.1 %  SD : 50 mg/dL  Time in Target Range: 43%   Time Above Range:  44% high; 13% very high  Time Below Range: 0% low ; <1 % very low     Interpretation of data: Blood glucose levels suboptimally controlled.  Frequent episodes of postprandial hyperglycemia sustained greater than 2 hours.  Poor return to euglycemia overnight.  No episodes of hypoglycemia observed.  GMI 7.9%.      History obtained from: patient    Review of Systems   Constitutional:  Negative for appetite change and fatigue.   HENT:  Negative for congestion, hearing loss and sore throat.    Eyes:  Negative for visual disturbance.   Respiratory:  Negative for cough.    Cardiovascular:  Negative for chest pain and palpitations.   Gastrointestinal:  Positive for constipation. Negative for abdominal pain, diarrhea, nausea and vomiting.   Endocrine: Negative for polydipsia and polyuria.   Musculoskeletal:  Positive for arthralgias.   Neurological:  Negative for dizziness and weakness.   Psychiatric/Behavioral:  Negative for sleep disturbance.      Medications Ordered Prior to Encounter[1]   Social History[2]     Objective   /60 (BP Location: Right arm, Patient Position: Sitting, Cuff Size: Standard)   Pulse 62   Temp 97.6 °F (36.4 °C) (Temporal)   Ht 5' 7\" (1.702 m)   Wt 103 kg (227 lb)   SpO2 96%   BMI 35.55 kg/m²      Physical Exam  Vitals reviewed.   Constitutional:       General: He is not in acute distress.     Appearance: Normal appearance. He is well-groomed. He is obese.   HENT:      Head: Normocephalic and atraumatic.      Mouth/Throat:      Mouth: Mucous membranes " are moist.     Eyes:      Conjunctiva/sclera: Conjunctivae normal.       Cardiovascular:      Rate and Rhythm: Normal rate.   Pulmonary:      Effort: Pulmonary effort is normal. No respiratory distress.   Abdominal:      General: There is no distension.      Palpations: Abdomen is soft.     Musculoskeletal:         General: No swelling.      Cervical back: Normal range of motion.     Skin:     General: Skin is warm and dry.     Neurological:      Mental Status: He is alert and oriented to person, place, and time.     Psychiatric:         Mood and Affect: Mood normal.         Behavior: Behavior normal. Behavior is cooperative.         Thought Content: Thought content normal.         Judgment: Judgment normal.         Administrative Statements   I have spent a total time of 39 minutes in caring for this patient on the day of the visit/encounter including Diagnostic results, Prognosis, Risks and benefits of tx options, Instructions for management, Patient and family education, Importance of tx compliance, Risk factor reductions, Impressions, Counseling / Coordination of care, Documenting in the medical record, Reviewing/placing orders in the medical record (including tests, medications, and/or procedures), and Obtaining or reviewing history  .         [1]  Current Outpatient Medications on File Prior to Visit   Medication Sig Dispense Refill   • acetaminophen (TYLENOL) 325 mg tablet Take 2 tablets (650 mg total) by mouth every 6 (six) hours as needed for mild pain     • aspirin 325 mg tablet Take 1 tablet (325 mg total) by mouth daily     • atorvastatin (LIPITOR) 80 mg tablet Take 1 tablet (80 mg total) by mouth daily with dinner     • cholecalciferol (VITAMIN D3) 1,000 units tablet Take 1 tablet (1,000 Units total) by mouth daily 90 tablet 3   • Continuous Glucose Sensor (Dexcom G7 Sensor) Use 1 Device every 10 days 9 each 1   • docusate sodium (COLACE) 100 mg capsule Take 1 capsule (100 mg total) by mouth 2 (two)  times a day     • Empagliflozin (Jardiance) 10 MG TABS tablet Take 1 tablet (10 mg total) by mouth every morning 90 tablet 3   • gabapentin (Neurontin) 600 MG tablet Take 1 tablet (600 mg total) by mouth 2 (two) times a day 60 tablet 2   • Insulin Pen Needle (CareOne Unifine Pentips Plus) 32G X 4 MM MISC USE DAILY AS INSTRUCTED WITH INSULIN PEN 4 TIMES DAILY 400 each 3   • metoprolol tartrate (LOPRESSOR) 50 mg tablet Take 1 tablet (50 mg total) by mouth every 12 (twelve) hours     • omeprazole (PriLOSEC) 20 mg delayed release capsule Take 1 capsule (20 mg total) by mouth daily     • polyethylene glycol (MIRALAX) 17 g packet Take 17 g by mouth daily as needed (for constipation)     • potassium chloride (Klor-Con M20) 20 mEq tablet Take 1 tablet (20 mEq total) by mouth daily for 3 days     • torsemide (DEMADEX) 10 mg tablet Take 1 tablet (10 mg total) by mouth daily for 3 days     • [DISCONTINUED] Insulin Glargine Solostar (Lantus SoloStar) 100 UNIT/ML SOPN Inject 0.3 mL (30 Units total) under the skin daily at bedtime (Patient taking differently: Inject 20 Units under the skin daily at bedtime Depending on BS) 27 mL 3   • [DISCONTINUED] insulin lispro (HumaLOG KwikPen) 100 units/mL injection pen Inject 3 Units under the skin 3 (three) times a day with meals (Patient taking differently: Inject 6 Units under the skin 3 (three) times a day with meals 6-8 units with meals) 15 mL 2   • OneTouch Delica Lancets 33G MISC Check blood sugars three times daily. Please substitute with appropriate alternative as covered by patient's insurance. Dx: E11.65 300 each 0   • [DISCONTINUED] Blood Glucose Monitoring Suppl (OneTouch Verio Reflect) w/Device KIT Check blood sugars three times daily. Please substitute with appropriate alternative as covered by patient's insurance. Dx: E11.65 1 kit 0   • [DISCONTINUED] glucose blood (OneTouch Verio) test strip Check blood sugars three times daily. Please substitute with appropriate  alternative as covered by patient's insurance. Dx: E11.65 300 each 3     No current facility-administered medications on file prior to visit.   [2]  Social History  Tobacco Use   • Smoking status: Former     Current packs/day: 0.50     Average packs/day: 0.6 packs/day for 86.0 years (55.4 ttl pk-yrs)     Types: Cigarettes     Start date: 4/26/1964   • Smokeless tobacco: Never   Vaping Use   • Vaping status: Never Used   Substance and Sexual Activity   • Alcohol use: Not Currently     Comment: rarely   • Drug use: No   • Sexual activity: Not Currently     Partners: Female     Birth control/protection: Female Sterilization

## 2025-07-01 NOTE — ASSESSMENT & PLAN NOTE
Continue atorvastatin 80 mg nightly.  Continue routine follow-up with cardiology.  Orders:  •  Ambulatory referral to Diabetic Education

## 2025-07-01 NOTE — ASSESSMENT & PLAN NOTE
Continue routine follow-up with cardiology.  Continue Jardiance for cardioprotective factors.  Would benefit from GLP-1 agonist medication if cardiology is agreeable.  Orders:  •  Ambulatory referral to Diabetic Education

## 2025-07-03 ENCOUNTER — HOME CARE VISIT (OUTPATIENT)
Dept: HOME HEALTH SERVICES | Facility: HOME HEALTHCARE | Age: 75
End: 2025-07-03
Payer: MEDICARE

## 2025-07-03 PROCEDURE — G0299 HHS/HOSPICE OF RN EA 15 MIN: HCPCS

## 2025-07-03 RX ORDER — DOCUSATE SODIUM 100 MG/1
100 CAPSULE, LIQUID FILLED ORAL 2 TIMES DAILY
Qty: 60 CAPSULE | Refills: 5 | OUTPATIENT
Start: 2025-07-03 | End: 2025-08-02

## 2025-07-03 RX ORDER — ATORVASTATIN CALCIUM 80 MG/1
80 TABLET, FILM COATED ORAL
Qty: 30 TABLET | Refills: 5 | Status: SHIPPED | OUTPATIENT
Start: 2025-07-03

## 2025-07-03 RX ORDER — OMEPRAZOLE 20 MG/1
20 CAPSULE, DELAYED RELEASE ORAL DAILY
Qty: 30 CAPSULE | Refills: 5 | OUTPATIENT
Start: 2025-07-03 | End: 2025-08-02

## 2025-07-03 RX ORDER — POTASSIUM CHLORIDE 1500 MG/1
20 TABLET, EXTENDED RELEASE ORAL DAILY
Qty: 3 TABLET | Refills: 0 | Status: SHIPPED | OUTPATIENT
Start: 2025-07-03 | End: 2025-07-06

## 2025-07-03 RX ORDER — TORSEMIDE 10 MG/1
10 TABLET ORAL DAILY
Qty: 3 TABLET | Refills: 0 | Status: SHIPPED | OUTPATIENT
Start: 2025-07-03 | End: 2025-07-06

## 2025-07-04 VITALS
DIASTOLIC BLOOD PRESSURE: 78 MMHG | HEART RATE: 82 BPM | TEMPERATURE: 97.6 F | OXYGEN SATURATION: 98 % | RESPIRATION RATE: 16 BRPM | SYSTOLIC BLOOD PRESSURE: 120 MMHG

## 2025-07-08 ENCOUNTER — CLINICAL SUPPORT (OUTPATIENT)
Facility: HOSPITAL | Age: 75
End: 2025-07-08
Attending: PHYSICIAN ASSISTANT
Payer: MEDICARE

## 2025-07-08 DIAGNOSIS — Z95.1 S/P CABG X 3: Primary | ICD-10-CM

## 2025-07-08 DIAGNOSIS — I25.10 CAD, MULTIPLE VESSEL: ICD-10-CM

## 2025-07-08 NOTE — PROGRESS NOTES
CARDIAC REHABILITATION   ASSESSMENT AND INDIVIDUALIZED TREATMENT PLAN  INITIAL           Today's date: 2025   # of Exercise Sessions Completed:   Patient name: Tc Stovall      : 1950  Age: 75 y.o.       MRN: [id number]  Referring Physician: JEAN PAUL Polo  Cardiologist: Mery Lujan MD  Provider: Polo  Clinician: Raman Chiang, MS, CEP, CCRP        Treatment is tailored to this patient's individual needs.  The ITP was reviewed with the patient and all questions were answered to their satisfaction.  Additional ITP documentation can be found electronically including daily and monthly exercise summaries, daily session notes with ECG summaries, education notes, daily medication reconciliation, and daily physician supervision.      INITIAL EVALUATION SUMMARY     Patient's subjective report of progress/symptoms since event:   Feeling good in general  Sleeping most of the night  Current functional status with ADLs:  Light to moderate   Current exercise:  Walking 100ft with an incline to his mailbox   Riding his E-trike up to 9 miles - using the assist with some pedaling  Clinical Comments:   Following sternal restrictions      Dx:   Encounter Diagnoses   Name Primary?    CAD, multiple vessel     S/P CABG x 3        Description of Diagnosis: S/P coronary artery bypass grafting x 2 (LIMA to LAD, SVG to RI, LLE EVH),   Date of onset: 25      Medical History:   Past Medical History[1]    Family History:  Family History[2]    Allergies:   Patient has no known allergies.    Current Medications:   Current Medications[3]    Medication compliance: yes  Pt reports to be compliant with medications    Risk Stratification: Medium    Physical Limitations: carpal tunnel - fine motor limitations, occasional left hip pain    Fall Risk: Low   Comments: Ambulates with a steady gait with no assist device and Denies a fall in the past 6 months    CARDIAC RISK FACTOR MODIFICATION:  Cardiac risk factor  "modification, including education, counseling, and   behavioral intervention will be provided tailored to the patients' needs as   outlined in the Individual Treatment Plan.      EXERCISE ASSESSMENT AND GOALS    Initial  Fitness Assessment:    Submaximal TM ETT:  Resting:  BP: 118/64  HR: 54  Exercise:  BP: 134/70  HR: 75  METs:  2.2  ECG Summary: sinus latrell, 1st deg AV block  Symptoms: leg fatigue  Test terminated at:  RPE 6    Functional Capacity Screening Tool:  [facility] Activity Status Index:  5.5/9.89 METs    (The higher the score, the higher the functional status)    Work Status:   Retired -  at [employer]    Recreational Activities/Hobbies:  Using his riding mower  Riding his new ebike       SMART Exercise Goals:   improvement of 0.5 to 1.0 MET in the fitness assessment  improved DASI score by 10%  increased peak METs tolerated in rehab exercise session  attend rehab regularly  maintain > 150 minutes per week of moderate intensity exercise    Patient Specific EXERCISE GOALS:       Return to all \"normal\" activities   Use his E-trike regularly  Tolerate the incline to his mailbox    EXERCISE PLAN AND PROGRESSION    Progress toward Exercise goals:   Reviewed Pt goals and determined plan of care, Will continue to educate and progress as tolerated.    Exercise Plan:    patient will attend rehab 2-3 times per week to complete 36 exercise sessions  progress workloads as tolerated to maintain RPE 4-6/10  patient will add home exercise 30-45 mins 1-2 days to supplement cardiac rehab  introduce resistance training in rehab session  class: Risk Factors for Heart Disease  Patient education handout:   Exercise After Cardiac Rehabilitation  After discharge patient will continue to follow a regular exercise regimen with the goal of a minimum of 150 minutes per week of moderate intensity exercise and progress as tolerated.     Exercise Education:   benefit of exercise for CAD risk factors  home exercise " "guidelines  AHA guidelines to achieve >150 mins/wk of moderate exercise  RPE scale       PHYSICIAN PRESCRIBED EXERCISE    Current Aerobic Exercise Prescription:      Frequency: 3 days/week   Supplement with home exercise 2+ days/wk as tolerated       Minutes: 20 - 40         METS: 1.8-3.0            HR: RHR +30-40bpm   RPE: 4-6/10         Modalities: Treadmill, UBE, Lifecycle, and NuStep     Exercise workloads will be progressed gradually as tolerated, within limits of patient's ability, and according to the patient's   response to the exercise program.    Aerobic Exercise Prescription Plan for Progression   Frequency: 3 days/week of cardiac rehab       Supplement with home exercise 2+ days/wk as tolerated    Minutes: 40       >150 mins/wk of moderate intensity exercise   METS: 2.2-3.8   HR: RHR +30-40bpm     RPE: 4-6/10   Modalities: Treadmill, UBE, Lifecycle, and NuStep    Strength trainin-3 days / week  12-15 repetitions  1-2 sets per modality   Will be added following at least 8 weeks post surgery and 8-10 monitored sessions  Modalities: Leg Press, Chest Press, Pull Downs, and Arm Curl       NUTRITION ASSESSMENT AND GOALS    Initial Weight:  229.6  Current Weight: 229.6    Height:   Ht Readings from Last 1 Encounters:   25 5' 7\" (1.702 m)       Rate Your Plate Score: 51/81    Dietary habits reported at initial evaluation:  Rarely cooks  Sometimes gets meals from his daughter in law and son  Chicken jocelyn Sinclair subs on wheat bread - lots of tomatoes/onion/lettuce  Chinese buffet  Watermelon, cantaloupe   Wheat bread only   Makes a salad at home  Occasional night time snacks: fruit, crackers  Water intake: has to buy water at home -can't afford  3 - 8oz bottles of water    SMART Nutrition Goals:   reduced BMI to < 25, fasting BG  , improved A1c  < 7.0%, Improved Rate Your Plate score  >64, weight loss 0.5 - 1 ppw,  goal of 200 lbs., eat 5 or more servings of fruits and vegetables a " "day, eat 2 or more servings of low fat milk or yogurt a day, eat no more than 6oz of meat per day, dine out less than once per week or choose low fat restaurant meals, eat red meat once a week or less, choose lean beef or rarely eat beef, rarely eat processed meats or eat low fat processed meats, eat poultry without the skin, eat chicken and fish that is not fried, eat meatless meals twice a week or more, choose 1% or skim milk, rarely eat cheese or choose reduced fat or skim, rarely eat frozen desserts or choose fruit or fat-free sweets, Do not cook with oil, butter or margarine, Do not eat fried foods, use \"light\" tub margarine on bread, potatoes and vegetables, choose light or fat-free salad dressings or longo, choose healthy snacks such as fruit, pretzels, and low fat crackers, choose healthy desserts and sweets such as yuni food cake or  fruit, never add salt to food when cooking or at the table, rarely/never eat salty snacks, and choose low sugar desserts and sweets    Patient Specific NUTRITION GOALS:    Wants to get meals on wheels or Mother's Meals but has heard they are not so healthy  Wt loss     Drug/Alcohol Use:   No      NUTRITION PLAN AND PROGRESSION    Patient's progress toward Nutrition goals:    Reviewed Pt goals and determined plan of care, Will continue to educate and progress as tolerated.    Nutrition Plan:   Increase hydration  Increase intake of fruits and vegetables  reduce/eliminate processed meats  improve portion control  monitor home blood glucose  patient weight measured at least once per week at rehab session  patient will be encouraged to recognize/reward small successes  class:  Reading Food Labels  class:  Heart Healthy Eating    Nutrition Education:   Reviewed patient's Rate your Plate.   Discussed key elements of plant based eating and the mediterranean diet.  Reviewed patient goals for dietary modifications and their clinical implications.  Reviewed most recent lipid profile. "     Additional Nutrition Education: weight loss strategies  low sodium diet  maintaining hydration  healthy choices while dining out  portion control      PSYCHOSOCIAL ASSESSMENT AND GOALS    Date of last Assessment:  7/7/2025  Depression screening:  PHQ-9 = 0    Interpretation:  0 =No Depression  Anxiety screening:  ARI-7 = 2    Interpretation: 0-4  = Not anxious    Psychosocial Assessment as it relates to rehabilitation:   Patient denies issues with his/her family or home life that may affect their rehabilitation efforts.     Pt self-report of depression and anxiety  Doing well today  In the beginning he was overwhelmed with his recovery in rehab and diabetes control    Social Support:   patient lives alone  Patient reports support from:  children (son)    Self-reported stress level:  2   Stressors: financial, wife is in nursing home  Stress Management Tools: TV  yard work, takes care of neighbors yard with his riding mower  Take his dog out    SMART Psychosocial Goals:     manage / reduce stress  Feelings in [place] Score < 3  Physical Fitness in [place] Score < 3  Social Support in [place] Score < 3  Pain in [place] Score < 3  Overall Health in [place] Score < 3  Change in Health in [place] Score < 3   control or stop worrying  reduced irritability    Patient Specific PSYCHOCOSOCIAL GOALS:    Get caught up on bills    Quality of Life Screen:  (Higher score indicates disease impact on QOL)  [facility] COOP score: 25/45     Psychosocial Assessment as it relates to rehabilitation:   Patient denies issues with his/her family or home life that may affect their rehabilitation efforts.     PSYCHOSOCIAL PLAN AND PROGRESSION    Patient's progress toward Psychosocial goals:    Reviewed Pt goals and determined plan of care, Will continue to educate and progress as tolerated.    Psychosocial Plan:   exercise, spend time outdoors, and care for his dog    Psychosocial Education:   benefits of a positive support  system  stress management techniques      OTHER CORE COMPONENT ASSESSMENT AND GOALS    Hyperlipidemia:   Yes   Discussed diet and lipid management and Last lipid profile 5/15/25  Chol 115  TRG 79  HDL 36  LDL 63    Hypertension: Yes   Blood Pressure will be monitored throughout the program and cardiologist will be notified of elevated trends.      Diabetes:  Type 2   on insulin  CGM:  FBG up and down  A1c: 7.7    last measured: 5.15/25  Follows with an endocrinologist since his surgery and now has a system for his insulin  Using tubed cake icing    Tobacco use: Recent user, quit in last 6 months, doing well abstaining  Quit day of NSTEMI 5/14/25    Anginal Symptoms:  stabbing hot chest pain in the center of chest   NTG use: No prescription    Systolic Heart Failure:  Systolic Function: mildly impaired. Ejection Fraction: 50%. Cavity size: normal.   Regional Wall Motion Abnormalities: anterior wall akinesis,apical view.    Ejection Fraction (Date: 5/19/25 ): 50%    SMART Goals:   Fasting BG   Improved A1C  Consistent, controlled resting BP < 130/80  Medication compliance  abstain from smoking    Patient Specific CORE COMPONENT GOALS:    Abstain from smoking  Consistent BG control  Reduced A1c      OTHER CORE COMPONENTS PLAN AND PROGRESSION    Progress toward Core Component goals:   Reviewed Pt goals and determined plan of care, Will continue to educate and progress as tolerated.    Other Core Components plan:   medication compliance  check labels for sodium content  eliminate salt shaker at the table  use salt substitutes  avoid processed foods  engage in regular exercise for BP control  class: Understanding Heart Disease  class: Common Heart Medications    Other Core Components Education:    components of blood pressure management, impact of exercise on glycemic control, signs and symptoms of hypoglycemia with exercise, nutrition for improved BG control, review of recent lipid profile and implications for  disease risk , and plant based eating for lipid management         [1]   Past Medical History:  Diagnosis Date    Arthritis     Cerebellar infarction (HCC)     Cerebrovascular accident (CVA) due to embolism of cerebral artery (HCC)     Diabetes mellitus (HCC)     Hyperlipidemia     Hypertension     Left rotator cuff tear     Stroke (HCC) [id number]    Tobacco abuse     Wears glasses    [2]   Family History  Problem Relation Name Age of Onset    Diabetes type II Mother Shelby Stovall     Alzheimer's disease Mother Shelby Stovall     Heart block Father Author Wilman     Heart disease Father Author Wilman     Diabetes type II Son     [3]   Current Outpatient Medications   Medication Sig Dispense Refill    acetaminophen (TYLENOL) 325 mg tablet Take 2 tablets (650 mg total) by mouth every 6 (six) hours as needed for mild pain      aspirin 325 mg tablet Take 1 tablet (325 mg total) by mouth daily      atorvastatin (LIPITOR) 80 mg tablet Take 1 tablet (80 mg total) by mouth daily with dinner 30 tablet 5    cholecalciferol (VITAMIN D3) 1,000 units tablet Take 1 tablet (1,000 Units total) by mouth daily 90 tablet 3    Continuous Glucose Sensor (Dexcom G7 Sensor) Use 1 Device every 10 days 9 each 1    docusate sodium (COLACE) 100 mg capsule Take 1 capsule (100 mg total) by mouth 2 (two) times a day      Empagliflozin (Jardiance) 10 MG TABS tablet Take 1 tablet (10 mg total) by mouth every morning 90 tablet 3    gabapentin (Neurontin) 600 MG tablet Take 1 tablet (600 mg total) by mouth 2 (two) times a day 60 tablet 2    HumaLOG KwikPen 100 units/mL injection pen Inject 6 units before meals plus scale. Max daily dose 33 units 30 mL 1    Insulin Glargine Solostar (Lantus SoloStar) 100 UNIT/ML SOPN Inject 0.2 mL (20 Units total) under the skin daily at bedtime 30 mL 1    Insulin Pen Needle (CareOne Unifine Pentips Plus) 32G X 4 MM MISC USE DAILY AS INSTRUCTED WITH INSULIN PEN 4 TIMES DAILY 400 each 3    metoprolol tartrate (LOPRESSOR) 50  mg tablet Take 1 tablet (50 mg total) by mouth every 12 (twelve) hours      omeprazole (PriLOSEC) 20 mg delayed release capsule Take 1 capsule (20 mg total) by mouth daily      OneTouch Delica Lancets 33G MISC Check blood sugars three times daily. Please substitute with appropriate alternative as covered by patient's insurance. Dx: E11.65 300 each 0    polyethylene glycol (MIRALAX) 17 g packet Take 17 g by mouth daily as needed (for constipation)      potassium chloride (Klor-Con M20) 20 mEq tablet Take 1 tablet (20 mEq total) by mouth daily for 3 days 3 tablet 0    torsemide (DEMADEX) 10 mg tablet Take 1 tablet (10 mg total) by mouth daily for 3 days 3 tablet 0     No current facility-administered medications for this visit.

## 2025-07-11 ENCOUNTER — CLINICAL SUPPORT (OUTPATIENT)
Facility: HOSPITAL | Age: 75
End: 2025-07-11
Payer: MEDICARE

## 2025-07-11 DIAGNOSIS — Z95.1 S/P CABG X 3: Primary | ICD-10-CM

## 2025-07-11 PROCEDURE — 93798 PHYS/QHP OP CAR RHAB W/ECG: CPT

## 2025-07-14 ENCOUNTER — CLINICAL SUPPORT (OUTPATIENT)
Facility: HOSPITAL | Age: 75
End: 2025-07-14
Payer: MEDICARE

## 2025-07-14 DIAGNOSIS — Z95.1 S/P CABG X 3: Primary | ICD-10-CM

## 2025-07-14 PROCEDURE — 93798 PHYS/QHP OP CAR RHAB W/ECG: CPT

## 2025-07-16 ENCOUNTER — CLINICAL SUPPORT (OUTPATIENT)
Facility: HOSPITAL | Age: 75
End: 2025-07-16
Payer: MEDICARE

## 2025-07-16 DIAGNOSIS — Z95.1 S/P CABG X 3: Primary | ICD-10-CM

## 2025-07-16 PROCEDURE — 93798 PHYS/QHP OP CAR RHAB W/ECG: CPT

## 2025-07-18 ENCOUNTER — CLINICAL SUPPORT (OUTPATIENT)
Facility: HOSPITAL | Age: 75
End: 2025-07-18
Payer: MEDICARE

## 2025-07-18 DIAGNOSIS — Z95.1 S/P CABG X 3: Primary | ICD-10-CM

## 2025-07-18 PROCEDURE — 93798 PHYS/QHP OP CAR RHAB W/ECG: CPT

## 2025-07-21 ENCOUNTER — CLINICAL SUPPORT (OUTPATIENT)
Facility: HOSPITAL | Age: 75
End: 2025-07-21
Payer: MEDICARE

## 2025-07-21 DIAGNOSIS — Z95.1 S/P CABG X 3: Primary | ICD-10-CM

## 2025-07-21 PROCEDURE — 93798 PHYS/QHP OP CAR RHAB W/ECG: CPT

## 2025-07-22 DIAGNOSIS — I25.10 CAD, MULTIPLE VESSEL: ICD-10-CM

## 2025-07-22 DIAGNOSIS — Z95.1 S/P CABG X 3: ICD-10-CM

## 2025-07-23 ENCOUNTER — CLINICAL SUPPORT (OUTPATIENT)
Facility: HOSPITAL | Age: 75
End: 2025-07-23
Payer: MEDICARE

## 2025-07-23 DIAGNOSIS — Z95.1 S/P CABG X 3: Primary | ICD-10-CM

## 2025-07-23 PROCEDURE — 93798 PHYS/QHP OP CAR RHAB W/ECG: CPT

## 2025-07-24 RX ORDER — METOPROLOL TARTRATE 50 MG
50 TABLET ORAL EVERY 12 HOURS SCHEDULED
Qty: 180 TABLET | Refills: 0 | Status: SHIPPED | OUTPATIENT
Start: 2025-07-24

## 2025-07-25 ENCOUNTER — CLINICAL SUPPORT (OUTPATIENT)
Facility: HOSPITAL | Age: 75
End: 2025-07-25
Payer: MEDICARE

## 2025-07-25 DIAGNOSIS — Z95.1 S/P CABG X 3: Primary | ICD-10-CM

## 2025-07-25 PROCEDURE — 93798 PHYS/QHP OP CAR RHAB W/ECG: CPT

## 2025-07-28 ENCOUNTER — CLINICAL SUPPORT (OUTPATIENT)
Facility: HOSPITAL | Age: 75
End: 2025-07-28
Payer: MEDICARE

## 2025-07-28 DIAGNOSIS — Z95.1 S/P CABG X 3: Primary | ICD-10-CM

## 2025-07-28 PROCEDURE — 93798 PHYS/QHP OP CAR RHAB W/ECG: CPT

## 2025-07-30 ENCOUNTER — CLINICAL SUPPORT (OUTPATIENT)
Facility: HOSPITAL | Age: 75
End: 2025-07-30
Payer: MEDICARE

## 2025-07-30 DIAGNOSIS — Z95.1 S/P CABG X 3: Primary | ICD-10-CM

## 2025-07-30 PROCEDURE — 93798 PHYS/QHP OP CAR RHAB W/ECG: CPT

## 2025-08-01 ENCOUNTER — CLINICAL SUPPORT (OUTPATIENT)
Facility: HOSPITAL | Age: 75
End: 2025-08-01
Payer: MEDICARE

## 2025-08-01 DIAGNOSIS — Z95.1 S/P CABG X 3: Primary | ICD-10-CM

## 2025-08-01 PROCEDURE — 93798 PHYS/QHP OP CAR RHAB W/ECG: CPT

## 2025-08-04 ENCOUNTER — CLINICAL SUPPORT (OUTPATIENT)
Facility: HOSPITAL | Age: 75
End: 2025-08-04
Payer: MEDICARE

## 2025-08-04 DIAGNOSIS — Z95.1 S/P CABG X 3: Primary | ICD-10-CM

## 2025-08-04 PROCEDURE — 93798 PHYS/QHP OP CAR RHAB W/ECG: CPT

## 2025-08-06 ENCOUNTER — CLINICAL SUPPORT (OUTPATIENT)
Facility: HOSPITAL | Age: 75
End: 2025-08-06
Payer: MEDICARE

## 2025-08-06 DIAGNOSIS — Z95.1 S/P CABG X 3: Primary | ICD-10-CM

## 2025-08-06 PROCEDURE — 93798 PHYS/QHP OP CAR RHAB W/ECG: CPT

## 2025-08-08 ENCOUNTER — CLINICAL SUPPORT (OUTPATIENT)
Facility: HOSPITAL | Age: 75
End: 2025-08-08
Payer: MEDICARE

## 2025-08-08 DIAGNOSIS — Z95.1 S/P CABG X 3: Primary | ICD-10-CM

## 2025-08-08 PROCEDURE — 93798 PHYS/QHP OP CAR RHAB W/ECG: CPT

## 2025-08-11 ENCOUNTER — CLINICAL SUPPORT (OUTPATIENT)
Facility: HOSPITAL | Age: 75
End: 2025-08-11
Payer: MEDICARE

## 2025-08-12 ENCOUNTER — TELEPHONE (OUTPATIENT)
Age: 75
End: 2025-08-12

## 2025-08-13 ENCOUNTER — CLINICAL SUPPORT (OUTPATIENT)
Facility: HOSPITAL | Age: 75
End: 2025-08-13
Payer: MEDICARE

## 2025-08-15 ENCOUNTER — CLINICAL SUPPORT (OUTPATIENT)
Facility: HOSPITAL | Age: 75
End: 2025-08-15
Payer: MEDICARE

## 2025-08-18 ENCOUNTER — CLINICAL SUPPORT (OUTPATIENT)
Facility: HOSPITAL | Age: 75
End: 2025-08-18
Payer: MEDICARE

## 2025-08-18 DIAGNOSIS — Z95.1 S/P CABG X 3: Primary | ICD-10-CM

## 2025-08-18 PROCEDURE — 93798 PHYS/QHP OP CAR RHAB W/ECG: CPT

## 2025-08-19 DIAGNOSIS — Z95.1 S/P CABG X 3: ICD-10-CM

## 2025-08-19 DIAGNOSIS — E11.65 TYPE 2 DIABETES MELLITUS WITH HYPERGLYCEMIA, WITHOUT LONG-TERM CURRENT USE OF INSULIN (HCC): ICD-10-CM

## 2025-08-20 ENCOUNTER — CLINICAL SUPPORT (OUTPATIENT)
Facility: HOSPITAL | Age: 75
End: 2025-08-20
Payer: MEDICARE

## 2025-08-20 DIAGNOSIS — Z95.1 S/P CABG X 3: Primary | ICD-10-CM

## 2025-08-20 PROCEDURE — 93797 PHYS/QHP OP CAR RHAB WO ECG: CPT

## 2025-08-22 ENCOUNTER — CLINICAL SUPPORT (OUTPATIENT)
Facility: HOSPITAL | Age: 75
End: 2025-08-22
Payer: MEDICARE

## 2025-08-22 DIAGNOSIS — Z95.1 S/P CABG X 3: Primary | ICD-10-CM

## 2025-08-22 PROCEDURE — 93798 PHYS/QHP OP CAR RHAB W/ECG: CPT

## (undated) DEVICE — NEPTUNE E-SEP SMOKE EVACUATION PENCIL, COATED, 70MM BLADE, PUSH BUTTON SWITCH: Brand: NEPTUNE E-SEP

## (undated) DEVICE — INTENDED FOR TISSUE SEPARATION, AND OTHER PROCEDURES THAT REQUIRE A SHARP SURGICAL BLADE TO PUNCTURE OR CUT.: Brand: BARD-PARKER ® CARBON RIB-BACK BLADES

## (undated) DEVICE — DGW .035 FC J3MM 260CM TEF: Brand: EMERALD

## (undated) DEVICE — TUBING ARTHROSCOPIC WAVE  MAIN PUMP

## (undated) DEVICE — CANNULA 5.75 X 70MM BARREL SHAPED BOWL

## (undated) DEVICE — SUT SILK 0 CT-1 30 IN 424H

## (undated) DEVICE — SILVER-COATED ANTIBACTERIAL BARRIER DRESSING: Brand: ACTICOAT SURGIC 10X12CM 5PK US

## (undated) DEVICE — DRAPE SHEET THREE QUARTER

## (undated) DEVICE — U-DRAPE: Brand: CONVERTORS

## (undated) DEVICE — HEMOCLIP CARTRIDGE LRG

## (undated) DEVICE — SUT PDS II 0 CT-1 27 IN Z340H

## (undated) DEVICE — SUT PDS PLUS 1 CTB 36 IN PDPB359T

## (undated) DEVICE — PENCILETTE PUSH BUTTON COATED

## (undated) DEVICE — CHLORAPREP HI-LITE 26ML ORANGE

## (undated) DEVICE — TRAY FOLEY 16FR SURESTEP TEMP SENS URIMETER STAT LOK

## (undated) DEVICE — BURR RND 4MM 13CM 8 FLUTE COOLCUT

## (undated) DEVICE — SUT PROLENE 5-0 C-1/C-1 36 IN 8321H

## (undated) DEVICE — SUT VICRYL PLUS 1 CTB-1 36 IN VCPB947H

## (undated) DEVICE — ELECTRODE BLADE E-Z CLEAN 4IN -0014A

## (undated) DEVICE — BONE WAX WHITE: Brand: BONE WAX WHITE

## (undated) DEVICE — SHOULDER SUSPENSION KIT 6 PER BOX

## (undated) DEVICE — SUT ETHILON 3-0 PS-1 18 IN 1663H

## (undated) DEVICE — ACE WRAP 6 IN UNSTERILE

## (undated) DEVICE — BLADE BEAVER MINI SZ 69

## (undated) DEVICE — ARTHROSCOPY FLOOR MAT

## (undated) DEVICE — RED RUBBER ROBINSON URETHRAL CATHETER, RADIOPAQUE, SMOOTH ROUNDED TIP, 20 FR (6.7 MM): Brand: DOVER

## (undated) DEVICE — GAUZE SPONGES,16 PLY: Brand: CURITY

## (undated) DEVICE — Device

## (undated) DEVICE — SYRINGE 50ML LL

## (undated) DEVICE — SUCTION CATH 18 FR

## (undated) DEVICE — 40601 PROLONGED POSITIONING SYSTEM: Brand: 40601 PROLONGED POSITIONING SYSTEM

## (undated) DEVICE — BLANKET HYPOTHERMIA ADULT GAYMAR

## (undated) DEVICE — ANTIBACTERIAL UNDYED BRAIDED (POLYGLACTIN 910), SYNTHETIC ABSORBABLE SUTURE: Brand: COATED VICRYL

## (undated) DEVICE — ABDOMINAL PAD: Brand: DERMACEA

## (undated) DEVICE — SAFEAIR ULPA FILTER: Brand: NEPTUNE

## (undated) DEVICE — BLADE SHAVER DISSECTOR 3.5MM 13CM COOLCUT

## (undated) DEVICE — GLOVE SRG BIOGEL ORTHOPEDIC 7.5

## (undated) DEVICE — REM POLYHESIVE ADULT PATIENT RETURN ELECTRODE: Brand: VALLEYLAB

## (undated) DEVICE — SUT MONOCRYL PLUS 3-0 PS-2 27 IN MCP427H

## (undated) DEVICE — LIGHT HANDLE COVER SLEEVE DISP BLUE STELLAR

## (undated) DEVICE — DRAPE EQUIPMENT RF WAND

## (undated) DEVICE — PACK CABG PBDS

## (undated) DEVICE — SCD SEQUENTIAL COMPRESSION COMFORT SLEEVE MEDIUM KNEE LENGTH: Brand: KENDALL SCD

## (undated) DEVICE — SUT PROLENE 4-0 BB 36 IN 8581H

## (undated) DEVICE — BETHLEHEM UNIVERSAL  ARTHRO PK: Brand: CARDINAL HEALTH

## (undated) DEVICE — AORTIC PUNCH 5.2 MM DISP

## (undated) DEVICE — EXOFIN PRECISION PEN HIGH VISCOSITY TOPICAL SKIN ADHESIVE: Brand: EXOFIN PRECISION PEN, 1G

## (undated) DEVICE — PACK CUSTOM PERFUSION PLEG PK

## (undated) DEVICE — 32 FR STRAIGHT – SOFT PVC CATHETER: Brand: PVC THORACIC CATHETERS

## (undated) DEVICE — CATH DIAG 5FR .045 100CM FR4

## (undated) DEVICE — SILVER-COATED ANTIBACTERIAL BARRIER DRESSING: Brand: ACTICOAT SURGIC 10X25CM 5PK US

## (undated) DEVICE — OCCLUSIVE GAUZE STRIP,3% BISMUTH TRIBROMOPHENATE IN PETROLATUM BLEND: Brand: XEROFORM

## (undated) DEVICE — BLADE SHAVER EXCALIBUR 4MM 13CM COOLCUT

## (undated) DEVICE — THERMOFLECT BLANKET, L, 25EA                               TS THERMOFLECT BLANKET, 48" X 84", SILVER, 5/BG, 5 BG/CS NW: Brand: THERMOFLECT

## (undated) DEVICE — 3M™ MICROFOAM™ SURGICAL TAPE 4 ROLLS/CARTON 6 CARTONS/CASE 1528-3: Brand: 3M™ MICROFOAM™

## (undated) DEVICE — 32 FR RIGHT ANGLE – SOFT PVC CATHETER: Brand: PVC THORACIC CATHETERS

## (undated) DEVICE — VASOVIEW HEMOPRO 2: Brand: VASOVIEW HEMOPRO 2

## (undated) DEVICE — SUT ETHIBOND 2-0 SH/SH 36 IN X523H

## (undated) DEVICE — OASIS DRAIN, SINGLE, INLINE & ATS COMPATIBLE: Brand: OASIS

## (undated) DEVICE — GLOVE SRG BIOGEL ECLIPSE 8

## (undated) DEVICE — GLOVE INDICATOR PI UNDERGLOVE SZ 8 BLUE

## (undated) DEVICE — TUBING SUCTION 5MM X 12 FT

## (undated) DEVICE — 3M™ STERI-STRIP™ REINFORCED ADHESIVE SKIN CLOSURES, R1547, 1/2 IN X 4 IN (12 MM X 100 MM), 6 STRIPS/ENVELOPE: Brand: 3M™ STERI-STRIP™

## (undated) DEVICE — NEEDLE SUT SCORPION MULTIFIRE

## (undated) DEVICE — 3000CC GUARDIAN II: Brand: GUARDIAN

## (undated) DEVICE — RECIP.STERNUM SAW BLADE 34/7.5/0.7MM: Brand: AESCULAP

## (undated) DEVICE — SUT FIBERTAPE STERNAL CLOSURE W/BLUNT NDL RADIOPAQUE AR-7289R

## (undated) DEVICE — EVERGRIP INSERT SET 86MM: Brand: FOGARTY EVERGRIP

## (undated) DEVICE — SUT PROLENE 7-0 BV175-8/BV175-8 24 IN EPM8747

## (undated) DEVICE — INTENDED FOR TISSUE SEPARATION, AND OTHER PROCEDURES THAT REQUIRE A SHARP SURGICAL BLADE TO PUNCTURE OR CUT.: Brand: BARD-PARKER SAFETY BLADES SIZE 15, STERILE

## (undated) DEVICE — BLADE BEAVER 15 DEG 3.0 MM

## (undated) DEVICE — PROBE ABLATION  APOLLO RF 90 DEG MULTI PORT

## (undated) DEVICE — SUT MONOCRYL 4-0 PS-2 18 IN Y496G

## (undated) DEVICE — SUT PROLENE 7-0 BV-1/BV-1 24 IN 8304H

## (undated) DEVICE — APPLIER SURGICLIP PREMIUM SMALL 9IN

## (undated) DEVICE — SUT SILK 2 60 IN SA8H

## (undated) DEVICE — PACK CUSTOM PERFUSION AV LOOP

## (undated) DEVICE — DRESSING ALLEVYN LIFE HEEL 25 X 25.2CM

## (undated) DEVICE — GLOVE INDICATOR PI UNDERGLOVE SZ 7.5 BLUE

## (undated) DEVICE — TUBING INSUFFLATION SET ISO CONNECTOR

## (undated) DEVICE — SUT ETHIBOND 2-0 SH-1/SH-1 30 IN X763H

## (undated) DEVICE — PLEDGET CARDIO PTFE 9.5 X 4.8 SOFT LF (6EA/PK)

## (undated) DEVICE — PUMP TUBING FUSION PACK

## (undated) DEVICE — Device: Brand: RETRACT-O-TAPE 18G X 30.5CM BLUNT NEEDLE

## (undated) DEVICE — GLIDESHEATH SLENDER STAINLESS STEEL KIT: Brand: GLIDESHEATH SLENDER

## (undated) DEVICE — SUT FIBERTAPE STERNAL CLOSURE W/CUTTING NDL AR-7288

## (undated) DEVICE — CATH DIAG 5FR IMPULSE 100CM FL3.5

## (undated) DEVICE — SUT SILK 2-0 SH CR/8 18 IN C012D